# Patient Record
Sex: FEMALE | Race: WHITE | NOT HISPANIC OR LATINO | Employment: UNEMPLOYED | URBAN - NONMETROPOLITAN AREA
[De-identification: names, ages, dates, MRNs, and addresses within clinical notes are randomized per-mention and may not be internally consistent; named-entity substitution may affect disease eponyms.]

---

## 2016-12-02 LAB — HM PAP SMEAR: NORMAL

## 2017-01-03 ENCOUNTER — TELEPHONE (OUTPATIENT)
Dept: CARDIOLOGY | Facility: HOSPITAL | Age: 32
End: 2017-01-03

## 2017-01-10 ENCOUNTER — PROCEDURE VISIT (OUTPATIENT)
Dept: OBSTETRICS AND GYNECOLOGY | Facility: CLINIC | Age: 32
End: 2017-01-10

## 2017-01-10 DIAGNOSIS — R10.2 PELVIC PAIN IN FEMALE: ICD-10-CM

## 2017-01-10 NOTE — MR AVS SNAPSHOT
[facility] OBSTETRICS AND GYNECOLOGY  [phone]                    Laura Thomson   1/10/2017 10:10 AM   Procedure visit    Dept Phone:  [phone]   Encounter #:  [id number]    Provider:  ULTRASOUND ROOM SCARLETT MASON   Department:  [facility] OBSTETRICS AND GYNECOLOGY                Your Full Care Plan              Your Updated Medication List          This list is accurate as of: 1/10/17 10:37 AM.  Always use your most recent med list.                SPRINTEC 28 0.25-35 MG-MCG per tablet   Generic drug:  norgestimate-ethinyl estradiol               We Performed the Following     US Non-ob Transvaginal       You Were Diagnosed With        Codes Comments    Pelvic pain in female     ICD-10-CM: R10.2  ICD-9-CM: 625.9       Instructions     None    Patient Instructions History      Upcoming Appointments     Visit Type Date Time Department    ULTRASOUND 1/10/2017 10:10 AM [facility] SCARLETT FRASER    NEW PATIENT 1/24/2017  2:00 PM SANJAY GASTRO BRIA    FOLLOW UP 6/26/2017 11:30 AM [facility] ROEL FRASER      MyChart Signup     Our records indicate that you have declined [employer] [facility] Solxt signup. If you would like to sign up for Solxt, please email [email or web address] or call [phone] to obtain an activation code.             Other Info from Your Visit           Your Appointments     Jan 24, 2017  2:00 PM EST   New Patient with Perfecto Mcknight MD   [facility] GASTROENTEROLOGY (--)    [address]  [facility] [address] [phone]           Please bring medications or medication list to appointment along with insurance cards and a photo ID.            Jun 26, 2017 11:30 AM EDT   Follow Up with Francisco Gonzalez MD   [facility] CARDIOLOGY (--)    [address]  [facility] [address] [phone]           Arrive 15 minutes prior to appointment.              Allergies     No Known  Allergies      Vital Signs     Smoking Status                   Never Smoker           Problems and Diagnoses Noted     Pelvic pain in female

## 2017-01-12 VITALS
DIASTOLIC BLOOD PRESSURE: 60 MMHG | HEIGHT: 64 IN | BODY MASS INDEX: 28 KG/M2 | SYSTOLIC BLOOD PRESSURE: 114 MMHG | WEIGHT: 164 LBS

## 2017-01-20 ENCOUNTER — OFFICE VISIT (OUTPATIENT)
Dept: INTERNAL MEDICINE | Facility: CLINIC | Age: 32
End: 2017-01-20

## 2017-01-20 VITALS
BODY MASS INDEX: 28.04 KG/M2 | HEIGHT: 64 IN | WEIGHT: 164.25 LBS | OXYGEN SATURATION: 98 % | HEART RATE: 90 BPM | SYSTOLIC BLOOD PRESSURE: 110 MMHG | RESPIRATION RATE: 14 BRPM | DIASTOLIC BLOOD PRESSURE: 76 MMHG | TEMPERATURE: 98.4 F

## 2017-01-20 DIAGNOSIS — J02.9 ACUTE PHARYNGITIS, UNSPECIFIED ETIOLOGY: Primary | ICD-10-CM

## 2017-01-20 LAB
EXPIRATION DATE: NORMAL
EXPIRATION DATE: NORMAL
FLUAV AG NPH QL: NORMAL
FLUBV AG NPH QL: NORMAL
INTERNAL CONTROL: NORMAL
INTERNAL CONTROL: NORMAL
Lab: NORMAL
Lab: NORMAL
S PYO AG THROAT QL: NEGATIVE

## 2017-01-20 PROCEDURE — 87804 INFLUENZA ASSAY W/OPTIC: CPT | Performed by: PHYSICIAN ASSISTANT

## 2017-01-20 PROCEDURE — 99213 OFFICE O/P EST LOW 20 MIN: CPT | Performed by: PHYSICIAN ASSISTANT

## 2017-01-20 PROCEDURE — 87880 STREP A ASSAY W/OPTIC: CPT | Performed by: PHYSICIAN ASSISTANT

## 2017-01-20 RX ORDER — AMOXICILLIN 875 MG/1
875 TABLET, COATED ORAL 2 TIMES DAILY
Qty: 20 TABLET | Refills: 0 | Status: SHIPPED | OUTPATIENT
Start: 2017-01-20 | End: 2017-02-10

## 2017-01-20 RX ORDER — FLUCONAZOLE 150 MG/1
150 TABLET ORAL ONCE
Qty: 1 TABLET | Refills: 0 | Status: SHIPPED | OUTPATIENT
Start: 2017-01-20 | End: 2017-01-20

## 2017-01-20 NOTE — PROGRESS NOTES
Laura Walls Workman is a 31 y.o. female.     Subjective   History of Present Illness   Here today with concern of sore throat, rhinorrhea, cough and low grade fever since last night. Waking at night due to coughing. Denies body aches but has had a bad headache. Took ibuprofen last night which did not help.  No influenza vaccine this year.        The following portions of the patient's history were reviewed and updated as appropriate: allergies, current medications, past family history, past medical history, past social history, past surgical history and problem list.    Review of Systems    Constitutional: Negative for appetite change, chills, fatigue, fever and unexpected weight change.   HENT: sore throat, rhinorrhea.  Negative for congestion, ear pain, hearing loss, nosebleeds, postnasal drip, tinnitus and trouble swallowing.    Eyes: Negative for photophobia, discharge and visual disturbance.   Respiratory: Cough. Negative for chest tightness, shortness of breath and wheezing.    Cardiovascular: Negative for chest pain, palpitations and leg swelling.   Gastrointestinal: Negative for abdominal distention, abdominal pain, blood in stool, constipation, diarrhea, nausea and vomiting.   Endocrine: Negative for cold intolerance, heat intolerance, polydipsia, polyphagia and polyuria.   Musculoskeletal: Negative for arthralgias, back pain, joint swelling, myalgias, neck pain and neck stiffness.   Skin: Negative for color change, pallor, rash and wound.   Allergic/Immunologic: Negative for environmental allergies, food allergies and immunocompromised state.   Neurological: Headache. Negative for dizziness, tremors, seizures, weakness, numbness.  Hematological: Negative for adenopathy. Does not bruise/bleed easily.   Psychiatric/Behavioral: Negative for agitation, behavioral problems, confusion, hallucinations, self-injury and suicidal ideas. The patient is not nervous/anxious.      Objective    Physical  "Exam  Constitutional: Oriented to person, place, and time. Appears well-developed and well-nourished.   HENT: OP erythema, enlarged tonsils. Bilateral TM air/fluid levels.   Head: No sinus tenderness. Normocephalic and atraumatic.   Eyes: EOM are normal. Pupils are equal, round, and reactive to light.   Neck: Normal range of motion. Neck supple.   Cardiovascular: Normal rate, regular rhythm and normal heart sounds.    Pulmonary/Chest: Effort normal and breath sounds normal. No respiratory distress.  Has no wheezes or rales. Exhibits no chest wall tenderness.   Abdominal: Soft. Bowel sounds are normal. Exhibits no distension and no mass. There is no tenderness.   Musculoskeletal: Normal range of motion. Exhibits no tenderness.   Neurological: Alert and oriented to person, place, and time.   Skin: Skin is warm and dry.   Psychiatric: Has a normal mood and affect. Behavior is normal. Judgment and thought content normal.       Visit Vitals   • /76   • Pulse 90   • Temp 98.4 °F (36.9 °C)   • Resp 14   • Ht 63.5\" (161.3 cm)   • Wt 164 lb 4 oz (74.5 kg)   • LMP 12/16/2016   • SpO2 98%   • BMI 28.64 kg/m2       Nursing note and vitals reviewed.        Assessment/Plan   Laura was seen today for sore throat, fever and cough.    Diagnoses and all orders for this visit:    Acute pharyngitis, unspecified etiology  -     amoxicillin (AMOXIL) 875 MG tablet; Take 1 tablet by mouth 2 (Two) Times a Day.  -     fluconazole (DIFLUCAN) 150 MG tablet; Take 1 tablet by mouth 1 (One) Time for 1 dose.      Strep: negative  Influenza A&B: negative.        "

## 2017-01-20 NOTE — MR AVS SNAPSHOT
Laura Thomson   1/20/2017 4:45 PM   Office Visit    Provider:  LM Blakely   Department:  [facility] PRIMARY CARE   Dept Phone:  [phone]                Your Full Care Plan              Today's Medication Changes          These changes are accurate as of: 1/20/17  5:21 PM.  If you have any questions, ask your nurse or doctor.               New Medication(s)Ordered:     amoxicillin 875 MG tablet   Commonly known as:  AMOXIL   Take 1 tablet by mouth 2 (Two) Times a Day.       fluconazole 150 MG tablet   Commonly known as:  DIFLUCAN   Take 1 tablet by mouth 1 (One) Time for 1 dose.            Where to Get Your Medications      These medications were sent to [facility] [address] [place] [address] - [address] [place] [address] - [phone] [place] [id number] [address] [address]     Phone:  [phone]     amoxicillin 875 MG tablet    fluconazole 150 MG tablet                  Your Updated Medication List          This list is accurate as of: 1/20/17  5:21 PM.  Always use your most recent med list.                amoxicillin 875 MG tablet   Commonly known as:  AMOXIL   Take 1 tablet by mouth 2 (Two) Times a Day.       fluconazole 150 MG tablet   Commonly known as:  DIFLUCAN   Take 1 tablet by mouth 1 (One) Time for 1 dose.       SPRINTEC 28 0.25-35 MG-MCG per tablet   Generic drug:  norgestimate-ethinyl estradiol               You Were Diagnosed With        Codes Comments    Acute pharyngitis, unspecified etiology    -  Primary ICD-10-CM: J02.9  ICD-9-CM: 462       Instructions     None    Patient Instructions History      MyChart Signup     Our records indicate that you have declined [facility] MyChart signup. If you would like to sign up for [facility] Cent[facility], please email [email or web address] or call [phone] to obtain an activation code.             Other Info from Your Visit           Your Appointments     Jan 24, 2017  2:00 PM EST    "  New Patient with Perfecto Mcknight MD   [facility] GASTROENTEROLOGY (--)    [address]  [facility] [address] [phone]           Please bring medications or medication list to appointment along with insurance cards and a photo ID.            Jun 26, 2017 11:30 AM EDT   Follow Up with Francisco Gonzalez MD   [facility] CARDIOLOGY (--)    [address]  [facility] [address] [phone]           Arrive 15 minutes prior to appointment.              Allergies     No Known Allergies      Reason for Visit     Sore Throat onset yesterday    Fever last night-100-101    Cough           Vital Signs     Blood Pressure Pulse Temperature Respirations Height Weight    110/76 90 98.4 °F (36.9 °C) 14 63.5\" (161.3 cm) 164 lb 4 oz (74.5 kg)    Last Menstrual Period Oxygen Saturation Body Mass Index Smoking Status          12/16/2016 98% 28.64 kg/m2 Never Smoker        Problems and Diagnoses Noted     Acute sore throat    -  Primary      "

## 2017-01-24 ENCOUNTER — OFFICE VISIT (OUTPATIENT)
Dept: GASTROENTEROLOGY | Facility: CLINIC | Age: 32
End: 2017-01-24

## 2017-01-24 ENCOUNTER — PREP FOR SURGERY (OUTPATIENT)
Dept: GASTROENTEROLOGY | Facility: CLINIC | Age: 32
End: 2017-01-24

## 2017-01-24 VITALS
WEIGHT: 166 LBS | HEART RATE: 69 BPM | TEMPERATURE: 98.2 F | RESPIRATION RATE: 16 BRPM | HEIGHT: 64 IN | BODY MASS INDEX: 28.34 KG/M2 | DIASTOLIC BLOOD PRESSURE: 81 MMHG | SYSTOLIC BLOOD PRESSURE: 107 MMHG

## 2017-01-24 DIAGNOSIS — R19.7 DIARRHEA, UNSPECIFIED TYPE: Primary | ICD-10-CM

## 2017-01-24 DIAGNOSIS — R10.33 PERIUMBILICAL ABDOMINAL PAIN: ICD-10-CM

## 2017-01-24 DIAGNOSIS — R12 HEARTBURN: ICD-10-CM

## 2017-01-24 PROCEDURE — 99203 OFFICE O/P NEW LOW 30 MIN: CPT | Performed by: INTERNAL MEDICINE

## 2017-01-24 RX ORDER — SODIUM CHLORIDE 0.9 % (FLUSH) 0.9 %
1-10 SYRINGE (ML) INJECTION AS NEEDED
Status: CANCELLED | OUTPATIENT
Start: 2017-01-24

## 2017-01-24 RX ORDER — SODIUM CHLORIDE 9 MG/ML
70 INJECTION, SOLUTION INTRAVENOUS CONTINUOUS PRN
Status: CANCELLED | OUTPATIENT
Start: 2017-01-24

## 2017-01-24 NOTE — LETTER
"January 24, 2017     Roshan Omalley MD  [address]  [address]  [facility] [address]    Patient: Laura Thomson   YOB: 1985   Date of Visit: 1/24/2017       Dear Dr. Shahram MD:    Thank you for referring Laura Thomson to me for evaluation. Below are the relevant portions of my assessment and plan of care.    If you have questions, please do not hesitate to call me. I look forward to following Laura along with you.         Sincerely,        Perfecto Mcknight MD        CC: No Recipients  Perfecto Mcknight MD  1/24/2017  6:28 PM  Signed  [address]  [facility] [address]    (H)   (w) [phone]    Chief Complaint   Patient presents with   • Diarrhea       History of Present Illness    The patient has history of diarrhea off-and-on since age 15.  Diarrhea is rather severe.  It is generally sudden onset, frequency of bowel movements being 6-10 times a day.  The stools are described as loose and watery.  There is a nocturnal element of diarrhea. The diarrhea is associated with tenesmus.  The patient has taken over-the-counter antidiarrhea medications including Imodium perhaps 4-5 per day without improvement of her symptoms.  There is no history of significant consumption of milk or ice cream.  The patient denies taking laxatives, using magnesium based products, drinking herbal tea, senna preparations, aloe vera juice, nutritional supplements, sugar free candies or colon cleansers.  Her second cousin has Crohn's disease.  There is positive family history of colon cancer(great grandmother).  The patient had undergone a colonoscopy at age 15 by Dr. Rodriguez ) surgical service).  \"She was told to have\" irritable bowel syndrome\".    There is history of epigastric abdominal pain off and on for the last several years.  The pain is rather sudden in onset, moderate in severity and cramping in nature.  Frequency being several times a day.  The pain may last for several seconds to a minute or so.  There is " no radiation of abdominal pain.  Eating worsens the abdominal discomfort.  No definite relieving factors of abdominal pain.    The patient has a history of reflux off and on for the last several years.  The reflux used to be rather severe.  Symptoms were described as retrosternal burning sensation, and indigestion.  There was history of regurgitative symptoms.  Frequency was several times a day.  The symptoms were worse at night.  The patient takes acid suppressive therapy with some control of her symptoms.  The patient had undergone a fundoplication in 2014 with improvement of her symptoms.  There is no associated dysphagia or odynophagia.    The patient denies nausea or vomiting recently.  There is no history of reflux.  She denies dysphagia or odynophagia.There is no history of overt GI bleed (Hematemesis, melena or hematochezia).  There is no history of recent weight loss.    Review of Systems   Constitutional: Negative for appetite change, chills, fatigue, fever and unexpected weight change.   HENT: Negative for mouth sores, nosebleeds and trouble swallowing.    Eyes: Negative for discharge and redness.   Respiratory: Negative for apnea, cough and shortness of breath.    Cardiovascular: Positive for palpitations. Negative for chest pain and leg swelling.   Gastrointestinal: Positive for diarrhea. Negative for abdominal distention, abdominal pain, anal bleeding, blood in stool, constipation, nausea and vomiting.   Endocrine: Negative for cold intolerance, heat intolerance and polydipsia.   Genitourinary: Negative for dysuria, hematuria and urgency.   Musculoskeletal: Negative for arthralgias, joint swelling and myalgias.   Skin: Negative for rash.   Allergic/Immunologic: Negative for food allergies and immunocompromised state.   Neurological: Negative for dizziness, seizures, syncope and headaches.   Hematological: Negative for adenopathy. Bruises/bleeds easily.   Psychiatric/Behavioral: Negative for dysphoric  "mood. The patient is not nervous/anxious and is not hyperactive.      Patient Active Problem List   Diagnosis   • Depression   • Dysphagia   • Gastroesophageal reflux disease   • Hyperthyroidism   • Tachycardia   • Amenorrhea     Past Medical History   Diagnosis Date   • Abdominal pain, periumbilical    • Abdominal pain, RLQ (right lower quadrant)    • Acute pharyngitis    • Appetite lost    • Chest pain    • Depression    • Diarrhea    • Difficulty swallowing    • Fatigue    • GERD (gastroesophageal reflux disease)    • History of colonoscopy    • Hyperthyroidism    • Upper respiratory infection    • Vomiting      Past Surgical History   Procedure Laterality Date   • Cholecystectomy     • Dental procedure     •  section  ,,2015     X 3   • Upper gastrointestinal endoscopy     • Colonoscopy     • Stomach surgery       Family History   Problem Relation Age of Onset   • Coronary artery disease Mother    • Diabetes Mother    • Hypertension Mother    • Kidney disease Father    • Skin cancer Father    • Stroke Other    • Hypertension Other    • Diabetes Other    • Colon cancer Other      Social History   Substance Use Topics   • Smoking status: Never Smoker   • Smokeless tobacco: Never Used   • Alcohol use No       Current Outpatient Prescriptions:   •  amoxicillin (AMOXIL) 875 MG tablet, Take 1 tablet by mouth 2 (Two) Times a Day., Disp: 20 tablet, Rfl: 0  •  norgestimate-ethinyl estradiol (SPRINTEC 28) 0.25-35 MG-MCG per tablet, Take 1 package by mouth Daily., Disp: , Rfl:   No Known Allergies  Visit Vitals   • /81   • Pulse 69   • Temp 98.2 °F (36.8 °C)   • Resp 16   • Ht 63.5\" (161.3 cm)   • Wt 166 lb (75.3 kg)   • LMP 2017   • BMI 28.94 kg/m2     Physical Exam   Constitutional: She is oriented to person, place, and time. She appears well-developed and well-nourished. No distress.   HENT:   Head: Normocephalic and atraumatic.   Right Ear: Hearing and external ear normal.   Left " Ear: Hearing and external ear normal.   Nose: Nose normal.   Mouth/Throat: Oropharynx is clear and moist and mucous membranes are normal. Mucous membranes are not pale, not dry and not cyanotic. No oral lesions. No oropharyngeal exudate.   Eyes: Conjunctivae and EOM are normal. Right eye exhibits no discharge. Left eye exhibits no discharge. No scleral icterus.   Neck: Trachea normal. Neck supple. No JVD present. No edema present. No thyroid mass and no thyromegaly present.   Cardiovascular: Normal rate, regular rhythm, S2 normal and normal heart sounds.  Exam reveals no gallop, no S3 and no friction rub.    No murmur heard.  Pulmonary/Chest: Effort normal and breath sounds normal. No respiratory distress. She has no wheezes. She has no rales. She exhibits no tenderness.   Abdominal: Soft. Normal appearance and bowel sounds are normal. She exhibits no distension, no ascites and no mass. There is no splenomegaly or hepatomegaly. There is tenderness in the right upper quadrant. There is no rigidity, no rebound and no guarding. No hernia.   Musculoskeletal: She exhibits no tenderness or deformity.       Vascular Status -  Her exam exhibits no right foot edema. Her exam exhibits no left foot edema.  Lymphadenopathy:     She has no cervical adenopathy.        Left: No supraclavicular adenopathy present.   Neurological: She is alert and oriented to person, place, and time. She has normal strength. No cranial nerve deficit or sensory deficit. She exhibits normal muscle tone. Coordination normal.   Skin: No rash noted. She is not diaphoretic. No cyanosis. No pallor. Nails show no clubbing.   Psychiatric: She has a normal mood and affect. Her behavior is normal. Judgment and thought content normal.   Nursing note and vitals reviewed.    Laboratory Tests:    Upon review of medical records:    Dated August 15, 2015 WBC 11.3 hemoglobin 10.8 hematocrit 33 MCV 89.3 and platelet count 213.  Dated October 30, 2015 WBC 13.1  hemoglobin 10.0 hematocrit 31 MCV 82.6 and platelet count 194.    Dated July 22, 2016 sodium 144 potassium 3.8 chloride 104 CO2 26 BUN 10 serum creatinine 0.7 glucose 85.  Calcium 9.1.  Albumin 4.3T bili 0.6 AST 21 ALT 21 alkaline phosphatase 60.  Total cholesterol 190.  Triglycerides 131.  TSH 0.43.  Free T4 level 0.85.  W BC 8.0 hemoglobin 12.2 hematocrit 39 MCV 84.3 and platelet count 258.    Dated December 15, 2016 sodium 143 potassium 3.7 chloride 105 CO2 27 BUN 11 serum creatinine 0.7 glucose 94.  Calcium 9.3.  Albumin 4.14 bili 0.4 AST 19 ALT 32 alkaline phosphatase 65.  TSH 1.15.      Abdominal Imaging:  Upon Review of medical records:    Dated  January 17, 2014 patient underwent a esophagram/upper GI series which revealed patient ingested barium without difficulty.  Esophageal peristalsis is normal.  No mucosal abnormality or strictures noted within the esophagus.  No gastroesophageal reflux was demonstrated during the examination although the patient could not tolerate adequate barium per significant gastric filling to adequately test for reflux.  No hiatal hernia.  Stomach was well field for evaluation of the stomach.  No mass, ulceration, or filling defect in the stomach.  Pyloric channel was normal.  Duodenal bulb and remainder of the duodenum appears unremarkable without evidence of mass or ulceration.    Assessment and Plan:      Laura was seen today for diarrhea.    Diagnoses and all orders for this visit:    Diarrhea, unspecified type  Comments:  Recurrent long-standing diarrhea.  Possible differential includes underlying inflammatory bowel disease.  Orders:  -     Tissue Transglutaminase, IgA  -     IgA  -     C-reactive Protein    Periumbilical abdominal pain  Comments:  Possibly related to underlying ileitis.    Heartburn  Comments:  Improved after fundoplication.        Discussion:       Plan     Patient Instructions     1.  ChecktTG IgA, IgA quantitative, and C-reactive protein inflammatory  marker.  2.  Colonoscopy: Description of the procedure, risks benefits alternatives and options including non-operative options were discussed with the patient in detail.  The patient understands and wishes to proceed.  3.  Discussed with the patient in detail.  Opportunity was given to ask questions.        Patient Care Team:  Roshan Omalley MD as PCP - General    Perfecto Mcknight MD

## 2017-01-24 NOTE — MR AVS SNAPSHOT
Laura Thomson   1/24/2017 2:00 PM   Office Visit    Dept Phone:  [phone]   Encounter #:  [id number]    Provider:  Perfecto Mcknight MD   Department:  [facility] GASTROENTEROLOGY                Your Full Care Plan              Your Updated Medication List          This list is accurate as of: 1/24/17  3:24 PM.  Always use your most recent med list.                amoxicillin 875 MG tablet   Commonly known as:  AMOXIL   Take 1 tablet by mouth 2 (Two) Times a Day.       SPRINTEC 28 0.25-35 MG-MCG per tablet   Generic drug:  norgestimate-ethinyl estradiol               We Performed the Following     C-reactive Protein     IgA     Tissue Transglutaminase, IgA       You Were Diagnosed With        Codes Comments    Diarrhea, unspecified type    -  Primary ICD-10-CM: R19.7  ICD-9-CM: 787.91       Instructions    1.  ChecktTG IgA, and IgA quantitative, as well as C-reactive protein inflammatory marker.  2.  Colonoscopy: Description of the procedure, risks benefits alternatives and options including non-operative options were discussed with the patient in detail.  The patient understands and wishes to proceed.       Patient Instructions History      Upcoming Appointments     Visit Type Date Time Department    NEW PATIENT 1/24/2017  2:00 PM [facility] GASTRO FRASER    FOLLOW UP 6/26/2017 11:30 AM M[facility] ROEL CARD BRIA      MyChart Signup     Our records indicate that you have declined [facility] Intern[facility] signup. If you would like to sign up for [employer], please email [email or web address] or call [phone] to obtain an activation code.             Other Info from Your Visit           Your Appointments     Jun 26, 2017 11:30 AM EDT   Follow Up with Francisco Gonzalez MD   [facility] CARDIOLOGY (--)    [address]  [facility] [address] [phone]           Arrive 15 minutes prior to appointment.              Allergies     No  "Known Allergies      Reason for Visit     Diarrhea           Vital Signs     Blood Pressure Pulse Temperature Respirations Height Weight    107/81 69 98.2 °F (36.8 °C) 16 63.5\" (161.3 cm) 166 lb (75.3 kg)    Last Menstrual Period Body Mass Index Smoking Status             01/11/2017 28.94 kg/m2 Never Smoker         Problems and Diagnoses Noted     Diarrhea    -  Primary        "

## 2017-01-24 NOTE — PATIENT INSTRUCTIONS
1.  ChecktTG IgA, IgA quantitative, and C-reactive protein inflammatory marker.  2.  Colonoscopy: Description of the procedure, risks benefits alternatives and options including non-operative options were discussed with the patient in detail.  The patient understands and wishes to proceed.  3.  Discussed with the patient in detail.  Opportunity was given to ask questions.

## 2017-01-24 NOTE — PROGRESS NOTES
"[address] [place] [address]    (H)   (w) [phone]    Chief Complaint   Patient presents with   • Diarrhea       History of Present Illness    The patient has history of diarrhea off-and-on since age 15.  Diarrhea is rather severe.  It is generally sudden onset, frequency of bowel movements being 6-10 times a day.  The stools are described as loose and watery.  There is a nocturnal element of diarrhea. The diarrhea is associated with tenesmus.  The patient has taken over-the-counter antidiarrhea medications including Imodium perhaps 4-5 per day without improvement of her symptoms.  There is no history of significant consumption of milk or ice cream.  The patient denies taking laxatives, using magnesium based products, drinking herbal tea, senna preparations, aloe vera juice, nutritional supplements, sugar free candies or colon cleansers.  Her second cousin has Crohn's disease.  There is positive family history of colon cancer(great grandmother).  The patient had undergone a colonoscopy at age 15 by Dr. Rodriguez ) surgical service).  \"She was told to have\" irritable bowel syndrome\".    There is history of epigastric abdominal pain off and on for the last several years.  The pain is rather sudden in onset, moderate in severity and cramping in nature.  Frequency being several times a day.  The pain may last for several seconds to a minute or so.  There is no radiation of abdominal pain.  Eating worsens the abdominal discomfort.  No definite relieving factors of abdominal pain.    The patient has a history of reflux off and on for the last several years.  The reflux used to be rather severe.  Symptoms were described as retrosternal burning sensation, and indigestion.  There was history of regurgitative symptoms.  Frequency was several times a day.  The symptoms were worse at night.  The patient takes acid suppressive therapy with some control of her symptoms.  The patient had undergone a fundoplication in " 2014 with improvement of her symptoms.  There is no associated dysphagia or odynophagia.    The patient denies nausea or vomiting recently.  There is no history of reflux.  She denies dysphagia or odynophagia.There is no history of overt GI bleed (Hematemesis, melena or hematochezia).  There is no history of recent weight loss.    Review of Systems   Constitutional: Negative for appetite change, chills, fatigue, fever and unexpected weight change.   HENT: Negative for mouth sores, nosebleeds and trouble swallowing.    Eyes: Negative for discharge and redness.   Respiratory: Negative for apnea, cough and shortness of breath.    Cardiovascular: Positive for palpitations. Negative for chest pain and leg swelling.   Gastrointestinal: Positive for diarrhea. Negative for abdominal distention, abdominal pain, anal bleeding, blood in stool, constipation, nausea and vomiting.   Endocrine: Negative for cold intolerance, heat intolerance and polydipsia.   Genitourinary: Negative for dysuria, hematuria and urgency.   Musculoskeletal: Negative for arthralgias, joint swelling and myalgias.   Skin: Negative for rash.   Allergic/Immunologic: Negative for food allergies and immunocompromised state.   Neurological: Negative for dizziness, seizures, syncope and headaches.   Hematological: Negative for adenopathy. Bruises/bleeds easily.   Psychiatric/Behavioral: Negative for dysphoric mood. The patient is not nervous/anxious and is not hyperactive.      Patient Active Problem List   Diagnosis   • Depression   • Dysphagia   • Gastroesophageal reflux disease   • Hyperthyroidism   • Tachycardia   • Amenorrhea     Past Medical History   Diagnosis Date   • Abdominal pain, periumbilical    • Abdominal pain, RLQ (right lower quadrant)    • Acute pharyngitis    • Appetite lost    • Chest pain    • Depression    • Diarrhea    • Difficulty swallowing    • Fatigue    • GERD (gastroesophageal reflux disease)    • History of colonoscopy    •  "Hyperthyroidism    • Upper respiratory infection    • Vomiting      Past Surgical History   Procedure Laterality Date   • Cholecystectomy     • Dental procedure     •  section  ,,2015     X 3   • Upper gastrointestinal endoscopy     • Colonoscopy     • Stomach surgery       Family History   Problem Relation Age of Onset   • Coronary artery disease Mother    • Diabetes Mother    • Hypertension Mother    • Kidney disease Father    • Skin cancer Father    • Stroke Other    • Hypertension Other    • Diabetes Other    • Colon cancer Other      Social History   Substance Use Topics   • Smoking status: Never Smoker   • Smokeless tobacco: Never Used   • Alcohol use No       Current Outpatient Prescriptions:   •  amoxicillin (AMOXIL) 875 MG tablet, Take 1 tablet by mouth 2 (Two) Times a Day., Disp: 20 tablet, Rfl: 0  •  norgestimate-ethinyl estradiol (SPRINTEC 28) 0.25-35 MG-MCG per tablet, Take 1 package by mouth Daily., Disp: , Rfl:   No Known Allergies  Visit Vitals   • /81   • Pulse 69   • Temp 98.2 °F (36.8 °C)   • Resp 16   • Ht 63.5\" (161.3 cm)   • Wt 166 lb (75.3 kg)   • LMP 2017   • BMI 28.94 kg/m2     Physical Exam   Constitutional: She is oriented to person, place, and time. She appears well-developed and well-nourished. No distress.   HENT:   Head: Normocephalic and atraumatic.   Right Ear: Hearing and external ear normal.   Left Ear: Hearing and external ear normal.   Nose: Nose normal.   Mouth/Throat: Oropharynx is clear and moist and mucous membranes are normal. Mucous membranes are not pale, not dry and not cyanotic. No oral lesions. No oropharyngeal exudate.   Eyes: Conjunctivae and EOM are normal. Right eye exhibits no discharge. Left eye exhibits no discharge. No scleral icterus.   Neck: Trachea normal. Neck supple. No JVD present. No edema present. No thyroid mass and no thyromegaly present.   Cardiovascular: Normal rate, regular rhythm, S2 normal and normal heart " sounds.  Exam reveals no gallop, no S3 and no friction rub.    No murmur heard.  Pulmonary/Chest: Effort normal and breath sounds normal. No respiratory distress. She has no wheezes. She has no rales. She exhibits no tenderness.   Abdominal: Soft. Normal appearance and bowel sounds are normal. She exhibits no distension, no ascites and no mass. There is no splenomegaly or hepatomegaly. There is tenderness in the right upper quadrant. There is no rigidity, no rebound and no guarding. No hernia.   Musculoskeletal: She exhibits no tenderness or deformity.       Vascular Status -  Her exam exhibits no right foot edema. Her exam exhibits no left foot edema.  Lymphadenopathy:     She has no cervical adenopathy.        Left: No supraclavicular adenopathy present.   Neurological: She is alert and oriented to person, place, and time. She has normal strength. No cranial nerve deficit or sensory deficit. She exhibits normal muscle tone. Coordination normal.   Skin: No rash noted. She is not diaphoretic. No cyanosis. No pallor. Nails show no clubbing.   Psychiatric: She has a normal mood and affect. Her behavior is normal. Judgment and thought content normal.   Nursing note and vitals reviewed.    Laboratory Tests:    Upon review of medical records:    Dated August 15, 2015 WBC 11.3 hemoglobin 10.8 hematocrit 33 MCV 89.3 and platelet count 213.  Dated October 30, 2015 WBC 13.1 hemoglobin 10.0 hematocrit 31 MCV 82.6 and platelet count 194.    Dated July 22, 2016 sodium 144 potassium 3.8 chloride 104 CO2 26 BUN 10 serum creatinine 0.7 glucose 85.  Calcium 9.1.  Albumin 4.3T bili 0.6 AST 21 ALT 21 alkaline phosphatase 60.  Total cholesterol 190.  Triglycerides 131.  TSH 0.43.  Free T4 level 0.85.  W BC 8.0 hemoglobin 12.2 hematocrit 39 MCV 84.3 and platelet count 258.    Dated December 15, 2016 sodium 143 potassium 3.7 chloride 105 CO2 27 BUN 11 serum creatinine 0.7 glucose 94.  Calcium 9.3.  Albumin 4.14 bili 0.4 AST 19 ALT 32  alkaline phosphatase 65.  TSH 1.15.      Abdominal Imaging:  Upon Review of medical records:    Dated  January 17, 2014 patient underwent a esophagram/upper GI series which revealed patient ingested barium without difficulty.  Esophageal peristalsis is normal.  No mucosal abnormality or strictures noted within the esophagus.  No gastroesophageal reflux was demonstrated during the examination although the patient could not tolerate adequate barium per significant gastric filling to adequately test for reflux.  No hiatal hernia.  Stomach was well field for evaluation of the stomach.  No mass, ulceration, or filling defect in the stomach.  Pyloric channel was normal.  Duodenal bulb and remainder of the duodenum appears unremarkable without evidence of mass or ulceration.    Assessment and Plan:      Laura was seen today for diarrhea.    Diagnoses and all orders for this visit:    Diarrhea, unspecified type  Comments:  Recurrent long-standing diarrhea.  Possible differential includes underlying inflammatory bowel disease.  Orders:  -     Tissue Transglutaminase, IgA  -     IgA  -     C-reactive Protein    Periumbilical abdominal pain  Comments:  Possibly related to underlying ileitis.    Heartburn  Comments:  Improved after fundoplication.        Discussion:       Plan     Patient Instructions     1.  ChecktTG IgA, IgA quantitative, and C-reactive protein inflammatory marker.  2.  Colonoscopy: Description of the procedure, risks benefits alternatives and options including non-operative options were discussed with the patient in detail.  The patient understands and wishes to proceed.  3.  Discussed with the patient in detail.  Opportunity was given to ask questions.        Patient Care Team:  Roshan Omalley MD as PCP - General    Perfecto cMknight MD

## 2017-02-17 ENCOUNTER — ANESTHESIA (OUTPATIENT)
Dept: GASTROENTEROLOGY | Facility: HOSPITAL | Age: 32
End: 2017-02-17

## 2017-02-17 ENCOUNTER — HOSPITAL ENCOUNTER (OUTPATIENT)
Facility: HOSPITAL | Age: 32
Setting detail: HOSPITAL OUTPATIENT SURGERY
Discharge: HOME | End: 2017-02-17
Attending: INTERNAL MEDICINE | Admitting: INTERNAL MEDICINE

## 2017-02-17 ENCOUNTER — ANESTHESIA EVENT (OUTPATIENT)
Dept: GASTROENTEROLOGY | Facility: HOSPITAL | Age: 32
End: 2017-02-17

## 2017-02-17 VITALS
BODY MASS INDEX: 27.66 KG/M2 | SYSTOLIC BLOOD PRESSURE: 124 MMHG | TEMPERATURE: 97.3 F | HEART RATE: 76 BPM | DIASTOLIC BLOOD PRESSURE: 70 MMHG | OXYGEN SATURATION: 96 % | HEIGHT: 64 IN | RESPIRATION RATE: 18 BRPM | WEIGHT: 162 LBS

## 2017-02-17 DIAGNOSIS — R19.7 DIARRHEA, UNSPECIFIED TYPE: ICD-10-CM

## 2017-02-17 DIAGNOSIS — R10.33 PERIUMBILICAL ABDOMINAL PAIN: ICD-10-CM

## 2017-02-17 LAB — B-HCG UR QL: NEGATIVE

## 2017-02-17 PROCEDURE — 81025 URINE PREGNANCY TEST: CPT | Performed by: INTERNAL MEDICINE

## 2017-02-17 PROCEDURE — 25010000002 PROPOFOL 200 MG/20ML EMULSION: Performed by: NURSE ANESTHETIST, CERTIFIED REGISTERED

## 2017-02-17 PROCEDURE — 45380 COLONOSCOPY AND BIOPSY: CPT | Performed by: INTERNAL MEDICINE

## 2017-02-17 DEVICE — DEV CLIP GI QUICKCLIPPRO FIX ROT 2300MM: Type: IMPLANTABLE DEVICE | Status: FUNCTIONAL

## 2017-02-17 RX ORDER — SODIUM CHLORIDE 0.9 % (FLUSH) 0.9 %
1-10 SYRINGE (ML) INJECTION AS NEEDED
Status: DISCONTINUED | OUTPATIENT
Start: 2017-02-17 | End: 2017-02-17 | Stop reason: HOSPADM

## 2017-02-17 RX ORDER — GLYCOPYRROLATE 2 MG/1
2 TABLET ORAL DAILY
Qty: 30 TABLET | Refills: 1 | Status: SHIPPED | OUTPATIENT
Start: 2017-02-17 | End: 2017-11-15

## 2017-02-17 RX ORDER — SODIUM CHLORIDE 9 MG/ML
70 INJECTION, SOLUTION INTRAVENOUS CONTINUOUS PRN
Status: DISCONTINUED | OUTPATIENT
Start: 2017-02-17 | End: 2017-02-17 | Stop reason: HOSPADM

## 2017-02-17 RX ORDER — PROPOFOL 10 MG/ML
INJECTION, EMULSION INTRAVENOUS AS NEEDED
Status: DISCONTINUED | OUTPATIENT
Start: 2017-02-17 | End: 2017-02-17 | Stop reason: SURG

## 2017-02-17 RX ADMIN — SODIUM CHLORIDE 70 ML/HR: 9 INJECTION, SOLUTION INTRAVENOUS at 09:33

## 2017-02-17 RX ADMIN — LIDOCAINE HYDROCHLORIDE 100 MG: 20 INJECTION, SOLUTION INTRAVENOUS at 10:25

## 2017-02-17 RX ADMIN — PROPOFOL 300 MG: 10 INJECTION, EMULSION INTRAVENOUS at 10:25

## 2017-02-17 RX ADMIN — SODIUM CHLORIDE: 9 INJECTION, SOLUTION INTRAVENOUS at 10:25

## 2017-02-17 NOTE — ANESTHESIA PREPROCEDURE EVALUATION
Anesthesia Evaluation     Patient summary reviewed and Nursing notes reviewed   history of anesthetic complications ( hx of tolerance on induction): prolonged sedation     Airway   no difficulty expected  Dental - normal exam     Pulmonary    (+) recent URI resolved, decreased breath sounds,   Cardiovascular - normal exam  Exercise tolerance: good (4-7 METS)    (+) dysrhythmias (hx  neg holter) Tachycardia,       Neuro/Psych  (+) psychiatric history Depression,    GI/Hepatic/Renal/Endo    (+)  GERD, hyperthyroidism    Musculoskeletal     (+) arthralgias,   Abdominal    Substance History      OB/GYN          Other   (+) arthritis                               Anesthesia Plan    ASA 2     MAC     intravenous induction   Anesthetic plan and risks discussed with patient.

## 2017-02-17 NOTE — OP NOTE
PROCEDURE:  Colonoscopy to the terminal ileum with one cold biopsy polypectomy, and biopsies as well as placement of a 1 quick clip as hemostatic intervention.     DATE OF PROCEDURE:  February 17, 2017.    REFERRING PROVIDER:  Vitkor Pope M.D.     INSTRUMENT USED:   Olympus PCF H 190 DL Videocolonoscope.     INDICATIONS OF THE PROCEDURE:   This is a 31-year-old white female with history of recurrent long-standing diarrhea.  The patient has history of periumbilical abdominal pain.  Currently the colonoscopy for further evaluation.     BIOPSIES:  Biopsies were obtained from the terminal ileum, and redundant.  The colon including rectum.  A cold biopsy polypectomy was performed in the rectum.      PREPARATION:  Amelia prep score: 8 (3+2+3).     PHOTOGRAPHS:  Photographs were included in the medical records.     MEDICATIONS:  MAC.       CONSENT/PREPROCEDURE EVALUATION:  Risks, benefits, alternatives and options of the procedure including risks of sedation/anesthesia were discussed with the patient and informed consent was obtained prior to the procedure.  History and physical examination were performed and nothing precluded the test.      REPORT:  The patient was placed in left lateral decubitus position and a digital examination was performed.  Once under the influence of IV sedation, the instrument was inserted into the rectum and advanced under direct vision to cecum which was identified by the ileocecal valve, triradiate folds and appendiceal orifice. The scope was then maneuvered into the terminal ileum.        FINDINGS:      Digital rectal examination:  Good anal tone.  No perianal pathology.  No mass.        Terminal ileum:  5 - 6 cm. Early diverticular change was noted.  Mucosa appeared to be somewhat flat.  Biopsies were obtained.       Cecum and ascending colon: Focal erythema was noted in the ascending colon.  This was biopsied..  This area had a tendency to ooze.  This site was secured by placement of a  quick clip.       Hepatic flexure, transverse colon, splenic flexure:  Normal.         Descending colon, sigmoid colon and rectum:  Scant early diverticular change was noted in the left colon.  A 2-3 mm diminutive polyp was noted in the rectum.  This was removed with cold biopsy forceps.  Random biopsies were obtained from the colon including rectum.  A retroflex examination within the rectum revealed internal hemorrhoids.        The scope was then straightened, the lower GI tract was decompressed, and the scope was pulled out of the patient.  The patient tolerated the procedure well.  There were no immediate complications and the patient was transferred in stable condition for post procedure observation.       TECHNICAL DATA: Graham prep score: 8 (3+2+3).     Difficulty of examination:  Average.   Withdrawal time: 8 min.  Retroflex examination in right colon: Yes.  Second look Rectum to cecum with decompression: Yes.    DIAGNOSES:    Scant early diverticular change in the left colon and possibly in terminal ileum.  Small diminutive polyp in the rectum.  Internal hemorrhoids.  No endoscopic evidence of colitis was seen.  Random biopsies were obtained from the colon upon withdrawal of the scope.      RECOMMENDATIONS:     1.  Follow biopsies.    2.  Follow-up: 3-4 weeks.     3.  Followup colonoscopy: Possibly in 5 years depending on the biopsy results.      4.  Dietary instructions.     Thank you very much for letting me participate in the care of this patient. Please do not hesitate to call me if you have any questions.

## 2017-02-17 NOTE — PLAN OF CARE
Problem: GI Endoscopy (Adult)  Goal: Signs and Symptoms of Listed Potential Problems Will be Absent or Manageable (GI Endoscopy)  Outcome: Ongoing (interventions implemented as appropriate)    02/17/17 1503   GI Endoscopy   Problems Assessed (GI Endoscopy) all   Problems Present (GI Endoscopy) none

## 2017-02-17 NOTE — ANESTHESIA POSTPROCEDURE EVALUATION
Patient: Laura Thomson    Procedure Summary     Date Anesthesia Start Anesthesia Stop Room / Location    02/17/17 1024 1058 [facility] ENDOSCOPY 2 / [facility] ENDOSCOPY       Procedure Diagnosis Surgeon Provider    COLONOSCOPY WITH COLD BIOPSY POLYPECTOMY; BIOPSIES , QUICK CLIP (N/A Anus) Periumbilical abdominal pain; Diverticulosis of colon; Internal hemorrhoids; Colon polyp  (Periumbilical abdominal pain [R10.33]; Diarrhea, unspecified type [R19.7]) MD James Martell CRNA          Anesthesia Type: MAC  Last vitals  BP      Temp      Pulse     Resp      SpO2        Post Anesthesia Care and Evaluation    Patient location during evaluation: PACU  Patient participation: complete - patient participated  Level of consciousness: awake and alert  Pain management: adequate  Airway patency: patent  Anesthetic complications: No anesthetic complications  PONV Status: none  Cardiovascular status: acceptable  Respiratory status: acceptable  Hydration status: acceptable

## 2017-02-17 NOTE — H&P (VIEW-ONLY)
"[address] [place] [id number]    (H)   (w) [phone]    Chief Complaint   Patient presents with   • Diarrhea       History of Present Illness    The patient has history of diarrhea off-and-on since age 15.  Diarrhea is rather severe.  It is generally sudden onset, frequency of bowel movements being 6-10 times a day.  The stools are described as loose and watery.  There is a nocturnal element of diarrhea. The diarrhea is associated with tenesmus.  The patient has taken over-the-counter antidiarrhea medications including Imodium perhaps 4-5 per day without improvement of her symptoms.  There is no history of significant consumption of milk or ice cream.  The patient denies taking laxatives, using magnesium based products, drinking herbal tea, senna preparations, aloe vera juice, nutritional supplements, sugar free candies or colon cleansers.  Her second cousin has Crohn's disease.  There is positive family history of colon cancer(great grandmother).  The patient had undergone a colonoscopy at age 15 by Dr. Rodriguez ) surgical service).  \"She was told to have\" irritable bowel syndrome\".    There is history of epigastric abdominal pain off and on for the last several years.  The pain is rather sudden in onset, moderate in severity and cramping in nature.  Frequency being several times a day.  The pain may last for several seconds to a minute or so.  There is no radiation of abdominal pain.  Eating worsens the abdominal discomfort.  No definite relieving factors of abdominal pain.    The patient has a history of reflux off and on for the last several years.  The reflux used to be rather severe.  Symptoms were described as retrosternal burning sensation, and indigestion.  There was history of regurgitative symptoms.  Frequency was several times a day.  The symptoms were worse at night.  The patient takes acid suppressive therapy with some control of her symptoms.  The patient had undergone a fundoplication in " 2014 with improvement of her symptoms.  There is no associated dysphagia or odynophagia.    The patient denies nausea or vomiting recently.  There is no history of reflux.  She denies dysphagia or odynophagia.There is no history of overt GI bleed (Hematemesis, melena or hematochezia).  There is no history of recent weight loss.    Review of Systems   Constitutional: Negative for appetite change, chills, fatigue, fever and unexpected weight change.   HENT: Negative for mouth sores, nosebleeds and trouble swallowing.    Eyes: Negative for discharge and redness.   Respiratory: Negative for apnea, cough and shortness of breath.    Cardiovascular: Positive for palpitations. Negative for chest pain and leg swelling.   Gastrointestinal: Positive for diarrhea. Negative for abdominal distention, abdominal pain, anal bleeding, blood in stool, constipation, nausea and vomiting.   Endocrine: Negative for cold intolerance, heat intolerance and polydipsia.   Genitourinary: Negative for dysuria, hematuria and urgency.   Musculoskeletal: Negative for arthralgias, joint swelling and myalgias.   Skin: Negative for rash.   Allergic/Immunologic: Negative for food allergies and immunocompromised state.   Neurological: Negative for dizziness, seizures, syncope and headaches.   Hematological: Negative for adenopathy. Bruises/bleeds easily.   Psychiatric/Behavioral: Negative for dysphoric mood. The patient is not nervous/anxious and is not hyperactive.      Patient Active Problem List   Diagnosis   • Depression   • Dysphagia   • Gastroesophageal reflux disease   • Hyperthyroidism   • Tachycardia   • Amenorrhea     Past Medical History   Diagnosis Date   • Abdominal pain, periumbilical    • Abdominal pain, RLQ (right lower quadrant)    • Acute pharyngitis    • Appetite lost    • Chest pain    • Depression    • Diarrhea    • Difficulty swallowing    • Fatigue    • GERD (gastroesophageal reflux disease)    • History of colonoscopy    •  "Hyperthyroidism    • Upper respiratory infection    • Vomiting      Past Surgical History   Procedure Laterality Date   • Cholecystectomy     • Dental procedure     •  section  ,,2015     X 3   • Upper gastrointestinal endoscopy     • Colonoscopy     • Stomach surgery       Family History   Problem Relation Age of Onset   • Coronary artery disease Mother    • Diabetes Mother    • Hypertension Mother    • Kidney disease Father    • Skin cancer Father    • Stroke Other    • Hypertension Other    • Diabetes Other    • Colon cancer Other      Social History   Substance Use Topics   • Smoking status: Never Smoker   • Smokeless tobacco: Never Used   • Alcohol use No       Current Outpatient Prescriptions:   •  amoxicillin (AMOXIL) 875 MG tablet, Take 1 tablet by mouth 2 (Two) Times a Day., Disp: 20 tablet, Rfl: 0  •  norgestimate-ethinyl estradiol (SPRINTEC 28) 0.25-35 MG-MCG per tablet, Take 1 package by mouth Daily., Disp: , Rfl:   No Known Allergies  Visit Vitals   • /81   • Pulse 69   • Temp 98.2 °F (36.8 °C)   • Resp 16   • Ht 63.5\" (161.3 cm)   • Wt 166 lb (75.3 kg)   • LMP 2017   • BMI 28.94 kg/m2     Physical Exam   Constitutional: She is oriented to person, place, and time. She appears well-developed and well-nourished. No distress.   HENT:   Head: Normocephalic and atraumatic.   Right Ear: Hearing and external ear normal.   Left Ear: Hearing and external ear normal.   Nose: Nose normal.   Mouth/Throat: Oropharynx is clear and moist and mucous membranes are normal. Mucous membranes are not pale, not dry and not cyanotic. No oral lesions. No oropharyngeal exudate.   Eyes: Conjunctivae and EOM are normal. Right eye exhibits no discharge. Left eye exhibits no discharge. No scleral icterus.   Neck: Trachea normal. Neck supple. No JVD present. No edema present. No thyroid mass and no thyromegaly present.   Cardiovascular: Normal rate, regular rhythm, S2 normal and normal heart " sounds.  Exam reveals no gallop, no S3 and no friction rub.    No murmur heard.  Pulmonary/Chest: Effort normal and breath sounds normal. No respiratory distress. She has no wheezes. She has no rales. She exhibits no tenderness.   Abdominal: Soft. Normal appearance and bowel sounds are normal. She exhibits no distension, no ascites and no mass. There is no splenomegaly or hepatomegaly. There is tenderness in the right upper quadrant. There is no rigidity, no rebound and no guarding. No hernia.   Musculoskeletal: She exhibits no tenderness or deformity.       Vascular Status -  Her exam exhibits no right foot edema. Her exam exhibits no left foot edema.  Lymphadenopathy:     She has no cervical adenopathy.        Left: No supraclavicular adenopathy present.   Neurological: She is alert and oriented to person, place, and time. She has normal strength. No cranial nerve deficit or sensory deficit. She exhibits normal muscle tone. Coordination normal.   Skin: No rash noted. She is not diaphoretic. No cyanosis. No pallor. Nails show no clubbing.   Psychiatric: She has a normal mood and affect. Her behavior is normal. Judgment and thought content normal.   Nursing note and vitals reviewed.    Laboratory Tests:    Upon review of medical records:    Dated August 15, 2015 WBC 11.3 hemoglobin 10.8 hematocrit 33 MCV 89.3 and platelet count 213.  Dated October 30, 2015 WBC 13.1 hemoglobin 10.0 hematocrit 31 MCV 82.6 and platelet count 194.    Dated July 22, 2016 sodium 144 potassium 3.8 chloride 104 CO2 26 BUN 10 serum creatinine 0.7 glucose 85.  Calcium 9.1.  Albumin 4.3T bili 0.6 AST 21 ALT 21 alkaline phosphatase 60.  Total cholesterol 190.  Triglycerides 131.  TSH 0.43.  Free T4 level 0.85.  W BC 8.0 hemoglobin 12.2 hematocrit 39 MCV 84.3 and platelet count 258.    Dated December 15, 2016 sodium 143 potassium 3.7 chloride 105 CO2 27 BUN 11 serum creatinine 0.7 glucose 94.  Calcium 9.3.  Albumin 4.14 bili 0.4 AST 19 ALT 32  alkaline phosphatase 65.  TSH 1.15.      Abdominal Imaging:  Upon Review of medical records:    Dated  January 17, 2014 patient underwent a esophagram/upper GI series which revealed patient ingested barium without difficulty.  Esophageal peristalsis is normal.  No mucosal abnormality or strictures noted within the esophagus.  No gastroesophageal reflux was demonstrated during the examination although the patient could not tolerate adequate barium per significant gastric filling to adequately test for reflux.  No hiatal hernia.  Stomach was well field for evaluation of the stomach.  No mass, ulceration, or filling defect in the stomach.  Pyloric channel was normal.  Duodenal bulb and remainder of the duodenum appears unremarkable without evidence of mass or ulceration.    Assessment and Plan:      Laura was seen today for diarrhea.    Diagnoses and all orders for this visit:    Diarrhea, unspecified type  Comments:  Recurrent long-standing diarrhea.  Possible differential includes underlying inflammatory bowel disease.  Orders:  -     Tissue Transglutaminase, IgA  -     IgA  -     C-reactive Protein    Periumbilical abdominal pain  Comments:  Possibly related to underlying ileitis.    Heartburn  Comments:  Improved after fundoplication.        Discussion:       Plan     Patient Instructions     1.  ChecktTG IgA, IgA quantitative, and C-reactive protein inflammatory marker.  2.  Colonoscopy: Description of the procedure, risks benefits alternatives and options including non-operative options were discussed with the patient in detail.  The patient understands and wishes to proceed.  3.  Discussed with the patient in detail.  Opportunity was given to ask questions.        Patient Care Team:  Roshan Omalley MD as PCP - General    Perfecto Mcknight MD

## 2017-02-21 LAB
LAB AP CASE REPORT: NORMAL
Lab: NORMAL
PATH REPORT.FINAL DX SPEC: NORMAL

## 2017-02-24 ENCOUNTER — TELEPHONE (OUTPATIENT)
Dept: GASTROENTEROLOGY | Facility: CLINIC | Age: 32
End: 2017-02-24

## 2017-02-24 NOTE — TELEPHONE ENCOUNTER
Patient called to say that she has had some back pain since having her colonoscopy.  Advised her to call her PCP, due to back pain can be caused by many different things including, UTI/Kidneys, muscle/bone pain.  She is to call PCP to see what they recommend.

## 2017-03-13 ENCOUNTER — OFFICE VISIT (OUTPATIENT)
Dept: GASTROENTEROLOGY | Facility: CLINIC | Age: 32
End: 2017-03-13

## 2017-03-13 VITALS
WEIGHT: 169 LBS | DIASTOLIC BLOOD PRESSURE: 60 MMHG | RESPIRATION RATE: 15 BRPM | SYSTOLIC BLOOD PRESSURE: 112 MMHG | BODY MASS INDEX: 28.85 KG/M2 | HEART RATE: 104 BPM | HEIGHT: 64 IN | TEMPERATURE: 97.3 F

## 2017-03-13 DIAGNOSIS — R19.7 DIARRHEA, UNSPECIFIED TYPE: Primary | ICD-10-CM

## 2017-03-13 DIAGNOSIS — R10.33 PERIUMBILICAL ABDOMINAL PAIN: ICD-10-CM

## 2017-03-13 DIAGNOSIS — R11.0 NAUSEA: ICD-10-CM

## 2017-03-13 PROCEDURE — 99214 OFFICE O/P EST MOD 30 MIN: CPT | Performed by: INTERNAL MEDICINE

## 2017-03-13 NOTE — PROGRESS NOTES
[address] [facility]  [place] [address]    (H) [phone]  (W) [phone]    No chief complaint on file.    History of Present Illness     The patient came back for follow visit today.  She feels better in terms of diarrhea.  The patient has history of diarrhea off-and-on for the last 10 months.  The diarrhea used to be rather severe with bowel movements perhaps over 10 times per day.  This has significantly improved.  Now the patient has diarrhea which is moderately severe, frequency of bowel movements being 4-5 times a day.  The stools are described as loose and at times watery.  Occasionally, there is a nocturnal element of diarrhea. The diarrhea is associated with tenesmus at times.  The patient denies bright red blood per rectum.    There is history of periumbilical abdominal pain off and on for the last 10 months.  The pain is gradual in onset, mild-moderate in severity and cramping in character.  Frequency being 2-3 times a week.  The pain may last for several minutes.  There is no radiation of abdominal pain.  Eating worsens the abdominal discomfort.  No definite relieving factors of abdominal pain.  Her abdominal pain has improved.    The patient has history of nausea off and on for the last 2-3 weeks. Severity is mild, frequency being 2-3 times a week. There is no associated vomiting.  Nausea is not worsened by eating.  There are no relieving factors of nausea.     She denies reflux.  There is no dysphagia or odynophagia.  She denies recent weight loss.  There is no history of fever or chills.  The patient denies history of hematemesis or melena.  There is no history of weight loss.    Review of Systems   Constitutional: Negative for appetite change, chills, fatigue, fever and unexpected weight change.   HENT: Negative for mouth sores, nosebleeds and trouble swallowing.    Eyes: Negative for discharge and redness.   Respiratory: Negative for apnea, cough and shortness of breath.    Cardiovascular:  Positive for palpitations. Negative for chest pain and leg swelling.   Gastrointestinal: Positive for abdominal pain and diarrhea. Negative for abdominal distention, anal bleeding, blood in stool, constipation, nausea and vomiting.   Endocrine: Negative for cold intolerance, heat intolerance and polydipsia.   Genitourinary: Negative for dysuria, hematuria and urgency.   Musculoskeletal: Negative for arthralgias, joint swelling and myalgias.   Skin: Negative for rash.   Allergic/Immunologic: Negative for food allergies and immunocompromised state.   Neurological: Negative for dizziness, seizures, syncope and headaches.   Hematological: Negative for adenopathy. Bruises/bleeds easily.   Psychiatric/Behavioral: Negative for dysphoric mood. The patient is not nervous/anxious and is not hyperactive.      Patient Active Problem List   Diagnosis   • Depression   • Dysphagia   • Gastroesophageal reflux disease   • Hyperthyroidism   • Tachycardia   • Amenorrhea     Past Medical History   Diagnosis Date   • Abdominal pain, periumbilical    • Abdominal pain, RLQ (right lower quadrant)    • Acute pharyngitis    • Appetite lost    • Chest pain    • Depression    • Diarrhea    • Difficulty swallowing    • Fatigue    • Fracture      RIGHT WRIST TWICE, LEFT HAND   • GERD (gastroesophageal reflux disease)    • History of colonoscopy    • Hyperthyroidism    • Upper respiratory infection    • Vomiting      Past Surgical History   Procedure Laterality Date   • Cholecystectomy     • Dental procedure       REPORTS WISDOM TEETH EXTRACTED   •  section  ,,2015     X 3   • Upper gastrointestinal endoscopy     • Colonoscopy     • Stomach surgery       PATIENT REPORTS STOMACH WAS WRAPPED AROUND ESOPHAGUS   • Colonoscopy N/A 2017     Procedure: COLONOSCOPY WITH COLD BIOPSY POLYPECTOMY; BIOPSIES , QUICK CLIP;  Surgeon: Perfecto Mcknight MD;  Location: [facility] ENDOSCOPY;  Service:      Family History   Problem Relation Age  "of Onset   • Coronary artery disease Mother    • Diabetes Mother    • Hypertension Mother    • Kidney disease Father    • Skin cancer Father    • Stroke Other    • Hypertension Other    • Diabetes Other    • Colon cancer Other      Social History   Substance Use Topics   • Smoking status: Never Smoker   • Smokeless tobacco: Never Used   • Alcohol use No       Current Outpatient Prescriptions:   •  glycopyrrolate (ROBINUL) 2 MG tablet, Take 1 tablet by mouth Daily. Take 1 tablet daily.  Use as directed., Disp: 30 tablet, Rfl: 1  •  norgestimate-ethinyl estradiol (SPRINTEC 28) 0.25-35 MG-MCG per tablet, Take 1 package by mouth Daily., Disp: , Rfl:   No Known Allergies  Visit Vitals   • /60   • Pulse 104   • Temp 97.3 °F (36.3 °C)   • Resp 15   • Ht 63.5\" (161.3 cm)   • Wt 169 lb (76.7 kg)   • LMP 03/06/2017   • BMI 29.47 kg/m2     Physical Exam   Constitutional: She is oriented to person, place, and time. She appears well-developed and well-nourished. No distress.   HENT:   Head: Normocephalic and atraumatic.   Right Ear: Hearing and external ear normal.   Left Ear: Hearing and external ear normal.   Nose: Nose normal.   Mouth/Throat: Oropharynx is clear and moist and mucous membranes are normal. Mucous membranes are not pale, not dry and not cyanotic. No oral lesions. No oropharyngeal exudate.   Eyes: Conjunctivae and EOM are normal. Right eye exhibits no discharge. Left eye exhibits no discharge. No scleral icterus.   Neck: Trachea normal. Neck supple. No JVD present. No edema present. No thyroid mass and no thyromegaly present.   Cardiovascular: Normal rate, regular rhythm, S2 normal and normal heart sounds.  Exam reveals no gallop, no S3 and no friction rub.    No murmur heard.  Pulmonary/Chest: Effort normal and breath sounds normal. No respiratory distress. She has no wheezes. She has no rales. She exhibits no tenderness.   Abdominal: Soft. Normal appearance and bowel sounds are normal. She exhibits no " distension, no ascites and no mass. There is no splenomegaly or hepatomegaly. There is no tenderness. There is no rigidity, no rebound and no guarding. No hernia.   Musculoskeletal: She exhibits no tenderness or deformity.       Vascular Status -  Her exam exhibits no right foot edema. Her exam exhibits no left foot edema.  Lymphadenopathy:     She has no cervical adenopathy.        Left: No supraclavicular adenopathy present.   Neurological: She is alert and oriented to person, place, and time. She has normal strength. No cranial nerve deficit or sensory deficit. She exhibits normal muscle tone. Coordination normal.   Skin: No rash noted. She is not diaphoretic. No cyanosis. No pallor. Nails show no clubbing.   Psychiatric: She has a normal mood and affect. Her behavior is normal. Judgment and thought content normal.   Nursing note and vitals reviewed.    Laboratory Tests:    Upon review of medical records:    Upon review of medical records:    Dated August 15, 2015 WBC 11.3 hemoglobin 10.8 hematocrit 33 MCV 89.3 and platelet count 213.  Dated October 30, 2015 WBC 13.1 hemoglobin 10.0 hematocrit 31 MCV 82.6 and platelet count 194.     Dated July 22, 2016 sodium 144 potassium 3.8 chloride 104 CO2 26 BUN 10 serum creatinine 0.7 glucose 85. Calcium 9.1. Albumin 4.3T bili 0.6 AST 21 ALT 21 alkaline phosphatase 60. Total cholesterol 190. Triglycerides 131. TSH 0.43. Free T4 level 0.85. W BC 8.0 hemoglobin 12.2 hematocrit 39 MCV 84.3 and platelet count 258.     Dated December 15, 2016 sodium 143 potassium 3.7 chloride 105 CO2 27 BUN 11 serum creatinine 0.7 glucose 94. Calcium 9.3. Albumin 4.14 bili 0.4 AST 19 ALT 32 alkaline phosphatase 65. TSH 1.15.    Dated January 30, 2017 C-reactive protein 0.96 (elevated) (range is < 0.80.)    tTG IgA negative at 1.  IgA quantitative 130.        Abdominal Imaging:  Upon Review of medical records:     Dated January 17, 2014 patient underwent a esophagram/upper GI series which  revealed patient ingested barium without difficulty. Esophageal peristalsis is normal. No mucosal abnormality or strictures noted within the esophagus. No gastroesophageal reflux was demonstrated during the examination although the patient could not tolerate adequate barium per significant gastric filling to adequately test for reflux. No hiatal hernia. Stomach was well field for evaluation of the stomach. No mass, ulceration, or filling defect in the stomach. Pyloric channel was normal. Duodenal bulb and remainder of the duodenum appears unremarkable without evidence of mass or ulceration.    Procedures:  Upon review of medical records:    Dated February 17, 2017 the patient underwent a colonoscopy to the terminal ileum which revealed: Scant early diverticular change and left colon and possibly in terminal ileum.  Small diminutive polyp in the rectum.  Internal hemorrhoids.  No endoscopic evidence of colitis was seen.  Random biopsies were obtained from the colon upon withdrawal of scope.  Terminal ileum biopsies revealed small intestinal mucosa with prominent lymphoid aggregate.  No additional significant histopathologic abnormalities identified.  Rectal polyp, biopsy revealed scant fragment of colonic mucosa with no abnormalities identified.  Random colon biopsies revealed colonic-type mucosa with no significant histopathologic abnormalities.    Assessment and Plan:      Diagnoses and all orders for this visit:    Diarrhea, unspecified type  Comments:  Likely postinfectious IBS type picture.  Symptomatically improved.    Periumbilical abdominal pain  Comments:  Improved.    Nausea  Comments:  Could be potentially related to medication.          Discussion:       Plan     Patient Instructions     1. Low lactose diet.  2. May take Robinul Forte 2 mg 1/4 at night.  She may also try 1/4 in the morning.  3. The patient may add Imodium OTC.  One half tablet at night and if necessary one half tablet in the a.m.  Adjust  the dose to have 1-2 soft stools a day.  4. The patient may call back in one to 2 weeks regarding progress.  5. Follow-up in 4 weeks.      Patient Care Team:  Roshan Omalley MD as PCP - General    Perfecto Mcknight MD

## 2017-03-13 NOTE — PATIENT INSTRUCTIONS
1. Low lactose diet.  2. May take Robinul Forte 2 mg 1/4 at night.  She may also try 1/4 in the morning.  3. The patient may add Imodium OTC.  One half tablet at night and if necessary one half tablet in the a.m.  Adjust the dose to have 1-2 soft stools a day.  4. The patient may call back in one to 2 weeks regarding progress.  5. Follow-up in 4 weeks.

## 2017-03-23 ENCOUNTER — OFFICE VISIT (OUTPATIENT)
Dept: INTERNAL MEDICINE | Facility: CLINIC | Age: 32
End: 2017-03-23

## 2017-03-23 VITALS
HEART RATE: 70 BPM | RESPIRATION RATE: 16 BRPM | BODY MASS INDEX: 29.23 KG/M2 | SYSTOLIC BLOOD PRESSURE: 112 MMHG | DIASTOLIC BLOOD PRESSURE: 70 MMHG | OXYGEN SATURATION: 98 % | HEIGHT: 64 IN | WEIGHT: 171.19 LBS | TEMPERATURE: 98 F

## 2017-03-23 DIAGNOSIS — J06.9 VIRAL UPPER RESPIRATORY TRACT INFECTION: Primary | ICD-10-CM

## 2017-03-23 LAB
EXPIRATION DATE: NORMAL
INTERNAL CONTROL: NORMAL
Lab: NORMAL
S PYO AG THROAT QL: NEGATIVE

## 2017-03-23 PROCEDURE — 99213 OFFICE O/P EST LOW 20 MIN: CPT | Performed by: NURSE PRACTITIONER

## 2017-03-23 PROCEDURE — 87880 STREP A ASSAY W/OPTIC: CPT | Performed by: NURSE PRACTITIONER

## 2017-03-23 RX ORDER — IBUPROFEN 800 MG/1
800 TABLET ORAL EVERY 8 HOURS PRN
Qty: 20 TABLET | Refills: 0 | Status: SHIPPED | OUTPATIENT
Start: 2017-03-23 | End: 2017-06-19 | Stop reason: ALTCHOICE

## 2017-03-23 RX ORDER — GUAIFENESIN 600 MG/1
1200 TABLET, EXTENDED RELEASE ORAL 2 TIMES DAILY
Qty: 12 TABLET | Refills: 0 | Status: SHIPPED | OUTPATIENT
Start: 2017-03-23 | End: 2017-03-26

## 2017-03-23 NOTE — PROGRESS NOTES
"Subjective   Laura Walls Workman is a 31 y.o. female who presents for evaluation of symptoms of a URI. Symptoms include congestion, no  fever, non productive cough, sore throat and hoarsness. Onset of symptoms was 5 days ago, and has been unchanged since that time. Treatment to date: none. She is a  and is exposed to sick contacts.     The following portions of the patient's history were reviewed and updated as appropriate: allergies, current medications, past family history, past medical history, past social history, past surgical history and problem list.    Review of Systems  Constitutional: negative  Eyes: negative  Ears, nose, mouth, throat, and face: positive for hoarseness and sore throat  Respiratory: positive for cough  Cardiovascular: negative    Objective /70  Pulse 70  Temp 98 °F (36.7 °C)  Resp 16  Ht 63.5\" (161.3 cm)  Wt 171 lb 3 oz (77.7 kg)  LMP 03/06/2017  SpO2 98%  BMI 29.85 kg/m2  General appearance: alert, appears stated age, cooperative and no distress  Head: sinuses nontender to percussion  Eyes: conjunctivae/corneas clear. PERRL, EOM's intact. Fundi benign.  Ears: normal TM's and external ear canals both ears  Nose: no sinus tenderness  Throat: abnormal findings: marked oropharyngeal erythema  Neck: no adenopathy, no carotid bruit, no JVD, supple, symmetrical, trachea midline and thyroid not enlarged, symmetric, no tenderness/mass/nodules  Lungs: clear to auscultation bilaterally  Heart: regular rate and rhythm, S1, S2 normal, no murmur, click, rub or gallop  Pulses: 2+ and symmetric  Skin: Skin color, texture, turgor normal. No rashes or lesions  Lymph nodes: Cervical, supraclavicular, and axillary nodes normal.    Assessment/Plan   1. Viral upper respiratory tract infection  - guaiFENesin (MUCINEX) 600 MG 12 hr tablet; Take 2 tablets by mouth 2 (Two) Times a Day for 3 days.  Dispense: 12 tablet; Refill: 0  - ibuprofen (ADVIL,MOTRIN) 800 MG tablet; Take 1 tablet by " mouth Every 8 (Eight) Hours As Needed for Mild Pain (1-3).  Dispense: 20 tablet; Refill: 0  - POCT rapid strep A-negative  Discussed diagnosis and treatment of URI.  Suggested symptomatic OTC remedies.  Keep throat and mouth moist.   Try to avoid talking and wisperring.  Increase fluid intake and rest.  Good handwashing techniques.  Follow up as needed.  Megha Fregoso, APRN  03/23/2017

## 2017-04-18 ENCOUNTER — OFFICE VISIT (OUTPATIENT)
Dept: GASTROENTEROLOGY | Facility: CLINIC | Age: 32
End: 2017-04-18

## 2017-04-18 VITALS
WEIGHT: 170 LBS | SYSTOLIC BLOOD PRESSURE: 119 MMHG | HEART RATE: 79 BPM | BODY MASS INDEX: 29.02 KG/M2 | RESPIRATION RATE: 14 BRPM | HEIGHT: 64 IN | TEMPERATURE: 97.7 F | DIASTOLIC BLOOD PRESSURE: 87 MMHG

## 2017-04-18 DIAGNOSIS — R19.7 DIARRHEA, UNSPECIFIED TYPE: Primary | Chronic | ICD-10-CM

## 2017-04-18 DIAGNOSIS — R11.0 NAUSEA: Chronic | ICD-10-CM

## 2017-04-18 DIAGNOSIS — R10.33 PERIUMBILICAL ABDOMINAL PAIN: ICD-10-CM

## 2017-04-18 PROCEDURE — 99214 OFFICE O/P EST MOD 30 MIN: CPT | Performed by: NURSE PRACTITIONER

## 2017-04-18 RX ORDER — LOPERAMIDE HYDROCHLORIDE 2 MG/1
2 CAPSULE ORAL AS NEEDED
COMMUNITY
End: 2017-07-20

## 2017-04-18 NOTE — PATIENT INSTRUCTIONS
1. Low lactose diet.  2. Robinul Forte 2 mg 1/4 at night. She may also try 1/4 in the morning.  3. The patient may add Imodium OTC. One half tablet at night and if necessary one half tablet in the a.m. Adjust the dose to have 1-2 soft stools a day.  4. Abdominal ultrasound.  5. Follow up: 4 weeks

## 2017-04-18 NOTE — PROGRESS NOTES
"[address] [facility] [address]    Chief Complaint   Patient presents with   • Follow-up   • Abdominal Pain     The patient came back for follow visit today. She states she has been having improvement with her symptoms. She is feeling better. She has been taking Glycopyrolate 1/4 tablet twice a day and this has significantly improved her symptoms.     She feels better in terms of diarrhea. The patient has history of diarrhea off-and-on for the last 11 months. The diarrhea used to be rather severe with bowel movements perhaps over 10 times per day. This has significantly improved. Now the patient has diarrhea which is moderate, frequency of bowel movements being 3-4 times a day. The stools are described as loose. There is no nocturnal element of diarrhea. The diarrhea is associated with tenesmus at times.  The patient denies bright red blood per rectum.     There is history of periumbilical abdominal pain off and on for the last 10 months. The pain is gradual in onset, mild-moderate in severity and sharp in character. Frequency being 2-3 times a week. The pain may last for several minutes. The patient states the pain often occurs when she is bending over or moving around. The patient states the pain may initiate on the right side, but she will feel it \"shoot to the left side\". No definite relieving factors of abdominal pain. Her abdominal pain has improved somewhat, but it is not resolved. She states her mother has a history of adhesions and she is concerned this may be causing her symptoms as well, as she has had 3 sections, Nissen fundoplication, cholecystectomy and exploratory laparoscopy in the past.     The patient has history of nausea off and on for the last 6-8 weeks. Severity is mild, frequency being 2-3 times a week. There is no associated vomiting. Nausea is not worsened by eating. There are no relieving factors of nausea. The nausea has improved.     She denies reflux. There is no dysphagia or " odynophagia. She denies recent weight loss. There is no history of fever or chills. The patient denies history of hematemesis or melena. There is no history of weight loss.    Abdominal Pain   This is a recurrent problem. The current episode started more than 1 month ago. The onset quality is sudden. The problem occurs intermittently. The problem has been gradually improving. The pain is located in the periumbilical region. The quality of the pain is sharp. The abdominal pain does not radiate. Pertinent negatives include no arthralgias, constipation, diarrhea, dysuria, fever, headaches, hematuria, myalgias, nausea or vomiting. The pain is aggravated by eating. She has tried proton pump inhibitors for the symptoms. The treatment provided significant relief.     Review of Systems   Constitutional: Negative for appetite change, chills, fatigue, fever and unexpected weight change.   HENT: Negative for mouth sores, nosebleeds and trouble swallowing.    Eyes: Negative for discharge and redness.   Respiratory: Negative for apnea, cough and shortness of breath.    Cardiovascular: Negative for chest pain, palpitations and leg swelling.   Gastrointestinal: Positive for abdominal pain. Negative for abdominal distention, anal bleeding, blood in stool, constipation, diarrhea, nausea and vomiting.   Endocrine: Negative for cold intolerance, heat intolerance and polydipsia.   Genitourinary: Negative for dysuria, hematuria and urgency.   Musculoskeletal: Positive for back pain. Negative for arthralgias, joint swelling and myalgias.   Skin: Negative for rash.   Allergic/Immunologic: Negative for food allergies and immunocompromised state.   Neurological: Negative for dizziness, seizures, syncope and headaches.   Hematological: Negative for adenopathy. Does not bruise/bleed easily.   Psychiatric/Behavioral: Negative for dysphoric mood. The patient is not nervous/anxious and is not hyperactive.      Patient Active Problem List    Diagnosis   • Depression   • Dysphagia   • Gastroesophageal reflux disease   • Hyperthyroidism   • Amenorrhea   • Diarrhea   • Nausea   • Periumbilical abdominal pain     Past Medical History:   Diagnosis Date   • Abdominal pain, periumbilical    • Abdominal pain, RLQ (right lower quadrant)    • Acute pharyngitis    • Appetite lost    • Chest pain    • Depression    • Diarrhea    • Difficulty swallowing    • Fatigue    • Fracture     RIGHT WRIST TWICE, LEFT HAND   • GERD (gastroesophageal reflux disease)    • History of colonoscopy    • Hyperthyroidism    • Upper respiratory infection    • Vomiting      Past Surgical History:   Procedure Laterality Date   •  SECTION  ,,2015    X 3   • CHOLECYSTECTOMY     • COLONOSCOPY     • COLONOSCOPY N/A 2017    Procedure: COLONOSCOPY WITH COLD BIOPSY POLYPECTOMY; BIOPSIES , QUICK CLIP;  Surgeon: Perfecto Mcknight MD;  Location: [facility] ENDOSCOPY;  Service:    • DENTAL PROCEDURE      REPORTS WISDOM TEETH EXTRACTED   • STOMACH SURGERY      PATIENT REPORTS STOMACH WAS WRAPPED AROUND ESOPHAGUS   • UPPER GASTROINTESTINAL ENDOSCOPY       Family History   Problem Relation Age of Onset   • Coronary artery disease Mother    • Diabetes Mother    • Hypertension Mother    • Kidney disease Father    • Skin cancer Father    • Stroke Other    • Hypertension Other    • Diabetes Other    • Colon cancer Other      Social History   Substance Use Topics   • Smoking status: Never Smoker   • Smokeless tobacco: Never Used   • Alcohol use No       Current Outpatient Prescriptions:   •  glycopyrrolate (ROBINUL) 2 MG tablet, Take 1 tablet by mouth Daily. Take 1 tablet daily.  Use as directed., Disp: 30 tablet, Rfl: 1  •  ibuprofen (ADVIL,MOTRIN) 800 MG tablet, Take 1 tablet by mouth Every 8 (Eight) Hours As Needed for Mild Pain (1-3)., Disp: 20 tablet, Rfl: 0  •  loperamide (IMODIUM) 2 MG capsule, Take 2 mg by mouth As Needed for Diarrhea., Disp: , Rfl:   •  norgestimate-ethinyl  "estradiol (SPRINTEC 28) 0.25-35 MG-MCG per tablet, Take 1 package by mouth Daily., Disp: , Rfl:     No Known Allergies    /87  Pulse 79  Temp 97.7 °F (36.5 °C)  Resp 14  Ht 63.5\" (161.3 cm)  Wt 170 lb (77.1 kg)  LMP 04/04/2017 (Approximate)  BMI 29.64 kg/m2    Physical Exam   Constitutional: She is oriented to person, place, and time. She appears well-developed and well-nourished. No distress.   HENT:   Head: Normocephalic and atraumatic.   Right Ear: Hearing and external ear normal.   Left Ear: Hearing and external ear normal.   Nose: Nose normal.   Mouth/Throat: Oropharynx is clear and moist and mucous membranes are normal. Mucous membranes are not pale, not dry and not cyanotic. No oral lesions. No oropharyngeal exudate.   Eyes: Conjunctivae and EOM are normal. Right eye exhibits no discharge. Left eye exhibits no discharge.   Neck: Trachea normal. Neck supple. No JVD present. No edema present. No thyroid mass and no thyromegaly present.   Cardiovascular: Normal rate, regular rhythm, S2 normal and normal heart sounds.  Exam reveals no gallop, no S3 and no friction rub.    No murmur heard.  Pulmonary/Chest: Effort normal and breath sounds normal. No respiratory distress. She exhibits no tenderness.   Abdominal: Normal appearance and bowel sounds are normal. She exhibits no distension, no ascites and no mass. There is no splenomegaly or hepatomegaly. There is no tenderness. There is no rigidity, no rebound and no guarding. No hernia.       Vascular Status -  Her exam exhibits no right foot edema. Her exam exhibits no left foot edema.  Lymphadenopathy:     She has no cervical adenopathy.        Left: No supraclavicular adenopathy present.   Neurological: She is alert and oriented to person, place, and time. She has normal strength. No cranial nerve deficit or sensory deficit.   Skin: No rash noted. She is not diaphoretic. No cyanosis. No pallor. Nails show no clubbing.   Psychiatric: She has a normal " mood and affect.   Nursing note and vitals reviewed.  Stigmata of chronic liver disease:  None.  Asterixis:  None.    Laboratory Tests:    Upon review of medical records:     Dated August 15, 2015 WBC 11.3 hemoglobin 10.8 hematocrit 33 MCV 89.3 and platelet count 213.  Dated October 30, 2015 WBC 13.1 hemoglobin 10.0 hematocrit 31 MCV 82.6 and platelet count 194.     Dated July 22, 2016 sodium 144 potassium 3.8 chloride 104 CO2 26 BUN 10 serum creatinine 0.7 glucose 85. Calcium 9.1. Albumin 4.3T bili 0.6 AST 21 ALT 21 alkaline phosphatase 60. Total cholesterol 190. Triglycerides 131. TSH 0.43. Free T4 level 0.85. W BC 8.0 hemoglobin 12.2 hematocrit 39 MCV 84.3 and platelet count 258.     Dated December 15, 2016 sodium 143 potassium 3.7 chloride 105 CO2 27 BUN 11 serum creatinine 0.7 glucose 94. Calcium 9.3. Albumin 4.14 bili 0.4 AST 19 ALT 32 alkaline phosphatase 65. TSH 1.15.     Dated January 30, 2017 C-reactive protein 0.96 (elevated) (range is < 0.80.)   tTG IgA negative at 1. IgA quantitative 130.    Abdominal Imaging:    Upon Review of medical records:     Dated January 17, 2014 patient underwent a esophagram/upper GI series which revealed patient ingested barium without difficulty. Esophageal peristalsis is normal. No mucosal abnormality or strictures noted within the esophagus. No gastroesophageal reflux was demonstrated during the examination although the patient could not tolerate adequate barium per significant gastric filling to adequately test for reflux. No hiatal hernia. Stomach was well field for evaluation of the stomach. No mass, ulceration, or filling defect in the stomach. Pyloric channel was normal. Duodenal bulb and remainder of the duodenum appears unremarkable without evidence of mass or ulceration.    Procedures:    Upon review of medical records:     Dated February 17, 2017 the patient underwent a colonoscopy to the terminal ileum which revealed: Scant early diverticular change and left  colon and possibly in terminal ileum. Small diminutive polyp in the rectum. Internal hemorrhoids. No endoscopic evidence of colitis was seen. Random biopsies were obtained from the colon upon withdrawal of scope. Terminal ileum biopsies revealed small intestinal mucosa with prominent lymphoid aggregate. No additional significant histopathologic abnormalities identified. Rectal polyp, biopsy revealed scant fragment of colonic mucosa with no abnormalities identified. Random colon biopsies revealed colonic-type mucosa with no significant histopathologic abnormalities.    Laura was seen today for follow-up and abdominal pain.    Diagnoses and all orders for this visit:    Diarrhea, unspecified type  Comments:  Likely postinfectious IBS type picture.  Symptomatically improved.    Nausea  Comments:  Improving.  Orders:  -     US Abdomen Complete; Future    Periumbilical abdominal pain  Comments:  Improving. Possibly secondary to adhesions.  Orders:  -     US Abdomen Complete; Future        Plan  and Patient Instructions:  Patient Instructions   1. Low lactose diet.  2. Robinul Forte 2 mg 1/4 at night. She may also try 1/4 in the morning.  3. The patient may add Imodium OTC. One half tablet at night and if necessary one half tablet in the a.m. Adjust the dose to have 1-2 soft stools a day.  4. Abdominal ultrasound.  5. Follow up: 4 weeks    Patient Care Team:  Roshan Omalley MD as PCP - General  MD Magdalena Banks APRN

## 2017-04-24 ENCOUNTER — APPOINTMENT (OUTPATIENT)
Dept: ULTRASOUND IMAGING | Facility: HOSPITAL | Age: 32
End: 2017-04-24

## 2017-05-01 ENCOUNTER — APPOINTMENT (OUTPATIENT)
Dept: ULTRASOUND IMAGING | Facility: HOSPITAL | Age: 32
End: 2017-05-01

## 2017-05-05 ENCOUNTER — OFFICE VISIT (OUTPATIENT)
Dept: OBSTETRICS AND GYNECOLOGY | Facility: CLINIC | Age: 32
End: 2017-05-05

## 2017-05-05 VITALS
HEIGHT: 63 IN | SYSTOLIC BLOOD PRESSURE: 122 MMHG | BODY MASS INDEX: 29.95 KG/M2 | DIASTOLIC BLOOD PRESSURE: 62 MMHG | WEIGHT: 169 LBS

## 2017-05-05 DIAGNOSIS — R10.2 PELVIC PAIN IN FEMALE: Primary | ICD-10-CM

## 2017-05-05 DIAGNOSIS — R10.33 PERIUMBILICAL ABDOMINAL PAIN: ICD-10-CM

## 2017-05-05 PROCEDURE — 99213 OFFICE O/P EST LOW 20 MIN: CPT | Performed by: OBSTETRICS & GYNECOLOGY

## 2017-05-09 ENCOUNTER — OFFICE VISIT (OUTPATIENT)
Dept: INTERNAL MEDICINE | Facility: CLINIC | Age: 32
End: 2017-05-09

## 2017-05-09 VITALS
HEIGHT: 64 IN | SYSTOLIC BLOOD PRESSURE: 110 MMHG | HEART RATE: 83 BPM | TEMPERATURE: 98.7 F | WEIGHT: 171 LBS | BODY MASS INDEX: 29.19 KG/M2 | OXYGEN SATURATION: 98 % | DIASTOLIC BLOOD PRESSURE: 70 MMHG

## 2017-05-09 DIAGNOSIS — R05.9 COUGH: Primary | ICD-10-CM

## 2017-05-09 LAB
EXPIRATION DATE: ABNORMAL
EXPIRATION DATE: NORMAL
FLUAV AG NPH QL: POSITIVE
FLUBV AG NPH QL: NEGATIVE
INTERNAL CONTROL: ABNORMAL
INTERNAL CONTROL: NORMAL
Lab: ABNORMAL
Lab: NORMAL
S PYO AG THROAT QL: NEGATIVE

## 2017-05-09 PROCEDURE — 87880 STREP A ASSAY W/OPTIC: CPT | Performed by: PHYSICIAN ASSISTANT

## 2017-05-09 PROCEDURE — 99213 OFFICE O/P EST LOW 20 MIN: CPT | Performed by: PHYSICIAN ASSISTANT

## 2017-05-09 PROCEDURE — 87804 INFLUENZA ASSAY W/OPTIC: CPT | Performed by: PHYSICIAN ASSISTANT

## 2017-05-22 ENCOUNTER — HOSPITAL ENCOUNTER (OUTPATIENT)
Dept: ULTRASOUND IMAGING | Facility: HOSPITAL | Age: 32
Discharge: HOME | End: 2017-05-22
Admitting: NURSE PRACTITIONER

## 2017-05-22 DIAGNOSIS — R11.0 NAUSEA: Chronic | ICD-10-CM

## 2017-05-22 DIAGNOSIS — R10.33 PERIUMBILICAL ABDOMINAL PAIN: ICD-10-CM

## 2017-05-22 PROCEDURE — 76700 US EXAM ABDOM COMPLETE: CPT

## 2017-05-25 ENCOUNTER — TELEPHONE (OUTPATIENT)
Dept: GASTROENTEROLOGY | Facility: CLINIC | Age: 32
End: 2017-05-25

## 2017-06-01 ENCOUNTER — OFFICE VISIT (OUTPATIENT)
Dept: OBSTETRICS AND GYNECOLOGY | Facility: CLINIC | Age: 32
End: 2017-06-01

## 2017-06-01 VITALS
DIASTOLIC BLOOD PRESSURE: 60 MMHG | WEIGHT: 171 LBS | BODY MASS INDEX: 29.19 KG/M2 | SYSTOLIC BLOOD PRESSURE: 112 MMHG | HEIGHT: 64 IN

## 2017-06-01 DIAGNOSIS — N83.202 CYSTS OF BOTH OVARIES: Primary | ICD-10-CM

## 2017-06-01 DIAGNOSIS — N83.201 CYSTS OF BOTH OVARIES: Primary | ICD-10-CM

## 2017-06-01 DIAGNOSIS — R10.2 PELVIC PAIN IN FEMALE: ICD-10-CM

## 2017-06-01 PROCEDURE — 99213 OFFICE O/P EST LOW 20 MIN: CPT | Performed by: OBSTETRICS & GYNECOLOGY

## 2017-06-01 NOTE — PROGRESS NOTES
Subjective  Chief Complaint   Patient presents with   • Follow-up     TRANSVAGINAL ULTRASOUND, PELVIC PAIN.      Patient is 31 y.o.  here for f/u regarding pelvic and abdominal pain.  Pt reports pain currently is right mid quadrant.  Pt reports sharp, stabbing pain with radiation into the midline.  Pt denies any fever or chills.  Pt has had GI workup; recently had colonoscopy and abdominal scan with f/u with GI.  Pt here for TVS today.    History  Past Medical History:   Diagnosis Date   • Abdominal pain, periumbilical    • Abdominal pain, RLQ (right lower quadrant)    • Acute pharyngitis    • Appetite lost    • Chest pain    • Depression    • Diarrhea    • Difficulty swallowing    • Fatigue    • Fracture     RIGHT WRIST TWICE, LEFT HAND   • GERD (gastroesophageal reflux disease)    • History of colonoscopy    • History of ovarian cyst    • Hyperthyroidism    • Upper respiratory infection    • Vomiting      Current Outpatient Prescriptions on File Prior to Visit   Medication Sig Dispense Refill   • amoxicillin (AMOXIL) 875 MG tablet Take 1 tablet by mouth 2 (Two) Times a Day. 20 tablet 0   • glycopyrrolate (ROBINUL) 2 MG tablet Take 1 tablet by mouth Daily. Take 1 tablet daily.  Use as directed. 30 tablet 1   • ibuprofen (ADVIL,MOTRIN) 800 MG tablet Take 1 tablet by mouth Every 8 (Eight) Hours As Needed for Mild Pain (1-3). 20 tablet 0   • loperamide (IMODIUM) 2 MG capsule Take 2 mg by mouth As Needed for Diarrhea.     • norgestimate-ethinyl estradiol (SPRINTEC 28) 0.25-35 MG-MCG per tablet Take 1 package by mouth Daily.       No current facility-administered medications on file prior to visit.      No Known Allergies  Past Surgical History:   Procedure Laterality Date   •  SECTION  2008    DR NAVARRETE   •  SECTION  10/30/2015    DR VASQUEZ   •  SECTION  2010    DR ESPINOZA   • CHOLECYSTECTOMY     • COLONOSCOPY     • COLONOSCOPY N/A 2017    Procedure: COLONOSCOPY WITH  "COLD BIOPSY POLYPECTOMY; BIOPSIES , QUICK CLIP;  Surgeon: Perfecto Mcknight MD;  Location: [facility] ENDOSCOPY;  Service:    • DENTAL PROCEDURE      REPORTS WISDOM TEETH EXTRACTED   • DIAGNOSTIC LAPAROSCOPY  07/07/2005    DR ESPINOZA   • DIAGNOSTIC LAPAROSCOPY  02/14/2012    DR ESPINOZA   • LYSIS OF ABDOMINAL ADHESIONS  07/07/2005    DR ESPINOZA   • LYSIS OF ABDOMINAL ADHESIONS  02/14/2012    DR ESPINOZA   • OVARIAN CYST REMOVAL  07/07/2005    DR ESPINOZA   • STOMACH SURGERY  2014    PATIENT REPORTS STOMACH WAS WRAPPED AROUND ESOPHAGUS   • UPPER GASTROINTESTINAL ENDOSCOPY       Family History   Problem Relation Age of Onset   • Coronary artery disease Mother    • Diabetes Mother    • Hypertension Mother    • Kidney disease Father    • Skin cancer Father    • Stroke Other    • Hypertension Other    • Diabetes Other    • Colon cancer Other      Social History     Social History   • Marital status:      Spouse name: N/A   • Number of children: N/A   • Years of education: N/A     Social History Main Topics   • Smoking status: Never Smoker   • Smokeless tobacco: Never Used   • Alcohol use No   • Drug use: No   • Sexual activity: Defer     Other Topics Concern   • None     Social History Narrative     Review of Systems  All systems were reviewed and negative except for:  Genitourinary: postivie for  See HPI     Objective  Vitals:    06/01/17 1050   BP: 112/60   Weight: 171 lb (77.6 kg)   Height: 63.5\" (161.3 cm)     Physical Exam:  General Appearance: alert, appears stated age and cooperative  Head: normocephalic, without obvious abnormality and atraumatic  Eyes: lids and lashes normal, conjunctivae and sclerae normal, no icterus, no pallor and corneas clear  Neck: suppple, trachea midline and no thyromegaly  Lungs: clear to auscultation, respirations regular, respirations even and respirations unlabored  Heart: regular rhythm and normal rate, normal S1, S2, no murmur, gallop, or rubs and no click  Abdomen: normal bowel sounds, no masses, " no hepatomegaly, no splenomegaly, soft non-tender, no guarding and no rebound tenderness  Extremities: moves extremities well, no edema, no cyanosis and no redness  Skin: no bleeding, bruising or rash and no lesions noted  Psych: normal mood and affect, oriented to person, time and place, thought content organized and appropriate judgment    Lab Review   No data reviewed    Imaging   Pelvic ultrasound images independantly reviewed - TVS today shows normal uterus with normal ET; 5.63 mm; right ovary with 1.8 cm simple cyst; left ovary with 3.2 cm fluid filled structure near ovary c/w possible hydrosalpinx or paratubal cyst.  No free fluid seen.    Assessment/Plan    Problem List Items Addressed This Visit     None      Visit Diagnoses     Cysts of both ovaries    -  Primary  TVS as noted above.  Pt informed regarding findings.  Plan repeat in 6-8 weeks; if patient with continued pain and/or no resolution of cystic structure then patient may need dx lap    Relevant Orders    US Non-ob Transvaginal    Pelvic pain in female      Pt with pelvic/abdominal pain right midquadrant.  Pt instructed if any changes, worsening or symptoms, fever, or chills to call for CT scan and/or go to ER.  Pt voices understanding and in agreement with plan.          Follow up 6 weeks for re-evaluation    This note was electronically signed.  Keren Thomas M.D.

## 2017-06-16 ENCOUNTER — APPOINTMENT (OUTPATIENT)
Dept: GENERAL RADIOLOGY | Facility: HOSPITAL | Age: 32
End: 2017-06-16

## 2017-06-16 ENCOUNTER — HOSPITAL ENCOUNTER (EMERGENCY)
Facility: HOSPITAL | Age: 32
Discharge: HOME | End: 2017-06-16
Attending: EMERGENCY MEDICINE | Admitting: EMERGENCY MEDICINE

## 2017-06-16 VITALS
OXYGEN SATURATION: 99 % | WEIGHT: 174 LBS | DIASTOLIC BLOOD PRESSURE: 88 MMHG | HEIGHT: 63 IN | RESPIRATION RATE: 16 BRPM | TEMPERATURE: 98 F | SYSTOLIC BLOOD PRESSURE: 115 MMHG | BODY MASS INDEX: 30.83 KG/M2 | HEART RATE: 69 BPM

## 2017-06-16 DIAGNOSIS — S93.402A SPRAIN OF LEFT ANKLE, UNSPECIFIED LIGAMENT, INITIAL ENCOUNTER: Primary | ICD-10-CM

## 2017-06-16 PROCEDURE — 99283 EMERGENCY DEPT VISIT LOW MDM: CPT

## 2017-06-16 PROCEDURE — 73630 X-RAY EXAM OF FOOT: CPT

## 2017-06-16 PROCEDURE — 73610 X-RAY EXAM OF ANKLE: CPT

## 2017-06-16 RX ORDER — IBUPROFEN 400 MG/1
400 TABLET ORAL ONCE
Status: COMPLETED | OUTPATIENT
Start: 2017-06-16 | End: 2017-06-16

## 2017-06-16 RX ADMIN — IBUPROFEN 400 MG: 400 TABLET ORAL at 16:45

## 2017-06-19 ENCOUNTER — OFFICE VISIT (OUTPATIENT)
Dept: ORTHOPEDIC SURGERY | Facility: CLINIC | Age: 32
End: 2017-06-19

## 2017-06-19 VITALS — HEIGHT: 63 IN | RESPIRATION RATE: 16 BRPM | BODY MASS INDEX: 31.17 KG/M2 | WEIGHT: 175.9 LBS

## 2017-06-19 DIAGNOSIS — S93.412A SPRAIN OF CALCANEOFIBULAR LIGAMENT OF LEFT ANKLE, INITIAL ENCOUNTER: Primary | ICD-10-CM

## 2017-06-19 DIAGNOSIS — J06.9 VIRAL UPPER RESPIRATORY TRACT INFECTION: ICD-10-CM

## 2017-06-19 PROCEDURE — 99203 OFFICE O/P NEW LOW 30 MIN: CPT | Performed by: ORTHOPAEDIC SURGERY

## 2017-06-19 RX ORDER — IBUPROFEN 800 MG/1
800 TABLET ORAL 3 TIMES DAILY
Qty: 90 TABLET | Refills: 5 | Status: SHIPPED | OUTPATIENT
Start: 2017-06-19 | End: 2018-07-02

## 2017-06-19 NOTE — PROGRESS NOTES
Subjective   Patient ID: Laura Walls Workman is a 31 y.o. female  Pain and Edema of the Left Ankle (Patient sustained injury on 17 when stepped in hole at parents house twisting ankle. Patient stated heard a pop and began experiencing pain.)             History of Present Illness pain occurs along the lateral side of the left ankle denies medial ankle pain and swelling in the forefoot, has not worn any type of protective splinting, gives history of injury to left ankle many years ago.  Initial x-rays on  were negative for fracture in the ankle and foot    Review of Systems   Constitutional: Negative for diaphoresis, fever and unexpected weight change.   HENT: Negative for dental problem and sore throat.    Eyes: Negative for visual disturbance.   Respiratory: Negative for shortness of breath.    Cardiovascular: Negative for chest pain.   Gastrointestinal: Negative for abdominal pain, constipation, diarrhea, nausea and vomiting.   Genitourinary: Negative for difficulty urinating and frequency.   Musculoskeletal: Positive for arthralgias.   Neurological: Negative for headaches.   Hematological: Does not bruise/bleed easily.   All other systems reviewed and are negative.      Past Medical History:   Diagnosis Date   • Abdominal pain, periumbilical    • Abdominal pain, RLQ (right lower quadrant)    • Acute pharyngitis    • Appetite lost    • Chest pain    • Depression    • Diarrhea    • Difficulty swallowing    • Fatigue    • Fracture     RIGHT WRIST TWICE, LEFT HAND   • GERD (gastroesophageal reflux disease)    • History of colonoscopy    • History of ovarian cyst    • Hyperthyroidism    • Upper respiratory infection    • Vomiting         Past Surgical History:   Procedure Laterality Date   •  SECTION  2008    DR NAVARRETE   •  SECTION  10/30/2015    DR VASQUEZ   •  SECTION  2010    DR ESPINOZA   • CHOLECYSTECTOMY     • COLONOSCOPY     • COLONOSCOPY N/A 2017     "Procedure: COLONOSCOPY WITH COLD BIOPSY POLYPECTOMY; BIOPSIES , QUICK CLIP;  Surgeon: Perfecto Mcknight MD;  Location: [facility] ENDOSCOPY;  Service:    • DENTAL PROCEDURE      REPORTS WISDOM TEETH EXTRACTED   • DIAGNOSTIC LAPAROSCOPY  07/07/2005    DR ESPINOZA   • DIAGNOSTIC LAPAROSCOPY  02/14/2012    DR ESPINOZA   • LYSIS OF ABDOMINAL ADHESIONS  07/07/2005    DR ESPINOZA   • LYSIS OF ABDOMINAL ADHESIONS  02/14/2012    DR ESPINOZA   • OVARIAN CYST REMOVAL  07/07/2005    DR ESPINOZA   • STOMACH SURGERY  2014    PATIENT REPORTS STOMACH WAS WRAPPED AROUND ESOPHAGUS   • UPPER GASTROINTESTINAL ENDOSCOPY         Family History   Problem Relation Age of Onset   • Coronary artery disease Mother    • Diabetes Mother    • Hypertension Mother    • Kidney disease Father    • Skin cancer Father    • Stroke Other    • Hypertension Other    • Diabetes Other    • Colon cancer Other        Social History     Social History   • Marital status:      Spouse name: N/A   • Number of children: N/A   • Years of education: N/A     Occupational History   • Not on file.     Social History Main Topics   • Smoking status: Never Smoker   • Smokeless tobacco: Never Used   • Alcohol use No   • Drug use: No   • Sexual activity: Defer     Other Topics Concern   • Not on file     Social History Narrative       No Known Allergies    Objective   Resp 16  Ht 63\" (160 cm)  Wt 175 lb 14.4 oz (79.8 kg)  LMP 06/02/2017 (Approximate)  BMI 31.16 kg/m2   Physical Exam  Constitutional: He is oriented to person, place, and time. He appears well-developed and well-nourished.   HENT:   Head: Normocephalic and atraumatic.   Eyes: EOM are normal. Pupils are equal, round, and reactive to light.   Neck: Normal range of motion. Neck supple.   Cardiovascular: Normal rate.    Pulmonary/Chest: Effort normal and breath sounds normal.   Abdominal: Soft.   Neurological: He is alert and oriented to person, place, and time.   Skin: Skin is warm and dry.   Psychiatric: He has a normal mood " and affect.   Nursing note and vitals reviewed.       Ortho Exam left ankle with point tenderness over the distal anterior lateral malleolar area but no ecchymosis, stability, full range of motion, no medial deltoid tenderness, peroneal tendon function intact, foot neurovascularly intact  Assessment/Plan   Review of Radiographic Studies:    No new imaging done today.      Procedures        Physical therapy referral given      Recommendations/Plan:   Referral: PT/OT 2-3 x wk x 4-6 wks    Patient agreeable to call or return sooner for any concerns.             Impression: Grade 1 left lateral ankle sprain     Plan:  Appears tolerated, lace up ankle support, refer to therapy, recheck 6 weeks if not improved consider MRI at that time

## 2017-07-11 ENCOUNTER — TREATMENT (OUTPATIENT)
Dept: PHYSICAL THERAPY | Facility: CLINIC | Age: 32
End: 2017-07-11

## 2017-07-11 DIAGNOSIS — M25.572 LEFT ANKLE PAIN, UNSPECIFIED CHRONICITY: Primary | ICD-10-CM

## 2017-07-11 PROCEDURE — 97161 PT EVAL LOW COMPLEX 20 MIN: CPT | Performed by: PHYSICAL THERAPIST

## 2017-07-11 NOTE — PROGRESS NOTES
Physical Therapy Initial Evaluation and Plan of Care    Patient: Laura Thomson   : 1985  Diagnosis/ICD-10 Code:  Left ankle pain, unspecified chronicity [M25.572]  Referring practitioner: Richardson Fernandez MD  Date of Initial Visit: 2017  Today's Date: 2017  Patient seen for 1 sessions               Subjective Evaluation    History of Present Illness  Date of onset: 2017  Mechanism of injury: Pt states that she was cleaning her dads truck she fell in a pot hole, he foot twisted and she felt a pop in the ankle. She was told by the orthopedic surgeon that she tore all the tendons and was placed in a brace. The brace was causing more pain and she decided to go ahead and take it off.     Subjective comment: Pt c/o soreness and tenderness in the ankle and foot. Pt states that she had more pain with walking on vacation, she started to have pain after a few minutes of walking.   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  Quality of life: good    Pain  Current pain ratin  At best pain ratin  At worst pain ratin  Location: Outside of the left ankle  Quality: dull ache and sharp  Relieving factors: medications and rest  Aggravating factors: ambulation, squatting, stairs and standing  Progression: no change    Social Support  Lives with: young children and spouse    Diagnostic Tests  X-ray: normal    Treatments  No previous or current treatments  Patient Goals  Patient goals for therapy: decreased pain, improved balance, increased motion, decreased edema, increased strength and independence with ADLs/IADLs             Objective     Palpation     Additional Palpation Details  TTP noted along the lateral malleolus of the left ankle liborio in the areas of the post calcaneofibular ligament and the posterior talofibular ligament    Active Range of Motion   Left Ankle/Foot   Dorsiflexion (ke): 13 degrees    Plantar flexion: 50 degrees   Inversion: 53 degrees   Eversion: 6 degrees     Right Ankle/Foot   Dorsiflexion (ke): 15 degrees   Plantar flexion: 52 degrees   Inversion: 50 degrees   Eversion: 30 degrees     Strength/Myotome Testing     Left Ankle/Foot   Dorsiflexion: 4+  Plantar flexion: 4+  Inversion: 4+  Eversion: 4    Tests   Left Ankle/Foot   Positive for anterior drawer.     Ambulation     Ambulation: Level Surfaces     Additional Level Surfaces Ambulation Details  Mild antalgic gait noted on the left LE with slightly decreased weight acceptance.          Assessment & Plan     Assessment  Impairments: abnormal gait, abnormal muscle firing, abnormal or restricted ROM, activity intolerance, impaired balance, impaired physical strength, lacks appropriate home exercise program, pain with function and weight-bearing intolerance  Assessment details: Patient is a 31 year old female who comes to physical therapy after stepping into a hole and injuring her left ankle. Signs and symptoms are consistent with left ankle inversion sprain with probably rupture of the anterior talofibular ligaments and sprain of the remaining lateral ligaments resulting in pain, decreased ROM, decreased strength, and inability to perform all essential functional activities. Pt will benefit from skilled PT services to address the above issues.   Prognosis details: SHORT TERM GOALS:     2 weeks  1. Pt independent with HEP   2. Pt to demonstrate ability to ambulate in the clinic without walking boot and with no reports of increased pain  3. Pt to demonstrate ability to ambulate in the clinic without antalgic gait   3. Pt to demonstrate ability to perform single leg heel raise on the left without increase in pain      LONG TERM GOALS:   6 weeks  1. Pt to demonstrate ability to perform full functional squat with good form and no increase in pain in the left foot/ankle  2. Pt to demonstrate ability to ambulate on TM for 10 minutes with normal  gait pattern without increase in pain  3. Pt to report being able to work full shift without increase in pain in the left ankle/foot  4. Pt to demonstrate ability to perform SLS on the left lower extremity for 30 seconds without LOB or increased pain       Plan  Therapy options: will be seen for skilled physical therapy services  Planned modality interventions: cryotherapy, electrical stimulation/Russian stimulation, high voltage pulsed current (pain management), iontophoresis, microcurrent electrical stimulation, TENS, thermotherapy (hydrocollator packs) and ultrasound  Planned therapy interventions: ADL retraining, balance/weight-bearing training, body mechanics training, flexibility, functional ROM exercises, gait training, home exercise program, IADL retraining, joint mobilization, manual therapy, motor coordination training, neuromuscular re-education, soft tissue mobilization, spinal/joint mobilization, strengthening, stretching and therapeutic activities  Frequency: 2x week  Duration in weeks: 6  Treatment plan discussed with: patient  Functional Limitations: lifting, walking, uncomfortable because of pain and standing      Evaluation Only    Timed Treatment:   0   mins   Total Treatment:     32   mins    PT SIGNATURE: Montrell Vazquez PT   DATE TREATMENT INITIATED: 7/11/2017    Initial Certification  Certification Period: 10/9/2017  I certify that the therapy services are furnished while this patient is under my care.  The services outlined above are required by this patient, and will be reviewed every 90 days.     PHYSICIAN: Richardson Fernandez MD      DATE:     Please sign and return via fax to [phone].. Thank you, [facility].

## 2017-07-13 ENCOUNTER — OFFICE VISIT (OUTPATIENT)
Dept: OBSTETRICS AND GYNECOLOGY | Facility: CLINIC | Age: 32
End: 2017-07-13

## 2017-07-13 VITALS
SYSTOLIC BLOOD PRESSURE: 116 MMHG | BODY MASS INDEX: 31.01 KG/M2 | HEIGHT: 63 IN | DIASTOLIC BLOOD PRESSURE: 60 MMHG | WEIGHT: 175 LBS

## 2017-07-13 DIAGNOSIS — R10.2 PELVIC PAIN IN FEMALE: ICD-10-CM

## 2017-07-13 DIAGNOSIS — R10.33 PERIUMBILICAL ABDOMINAL PAIN: ICD-10-CM

## 2017-07-13 DIAGNOSIS — R10.31 RIGHT LOWER QUADRANT ABDOMINAL PAIN: Primary | ICD-10-CM

## 2017-07-13 LAB
BASOPHILS # BLD AUTO: 0.06 10*3/MM3 (ref 0–0.2)
BASOPHILS NFR BLD AUTO: 0.8 % (ref 0–2.5)
BUN SERPL-MCNC: 11 MG/DL (ref 7–20)
BUN/CREAT SERPL: 15.7 (ref 7.1–23.5)
CALCIUM SERPL-MCNC: 9.3 MG/DL (ref 8.4–10.2)
CHLORIDE SERPL-SCNC: 103 MMOL/L (ref 98–107)
CO2 SERPL-SCNC: 26 MMOL/L (ref 26–30)
CREAT SERPL-MCNC: 0.7 MG/DL (ref 0.6–1.3)
EOSINOPHIL # BLD AUTO: 0.13 10*3/MM3 (ref 0–0.7)
EOSINOPHIL NFR BLD AUTO: 1.6 % (ref 0–7)
ERYTHROCYTE [DISTWIDTH] IN BLOOD BY AUTOMATED COUNT: 14 % (ref 11.5–14.5)
GLUCOSE SERPL-MCNC: 105 MG/DL (ref 74–98)
HCT VFR BLD AUTO: 38.6 % (ref 37–47)
HGB BLD-MCNC: 12.3 G/DL (ref 12–16)
IMM GRANULOCYTES # BLD: 0.02 10*3/MM3 (ref 0–0.06)
IMM GRANULOCYTES NFR BLD: 0.3 % (ref 0–0.6)
LYMPHOCYTES # BLD AUTO: 2.27 10*3/MM3 (ref 0.6–3.4)
LYMPHOCYTES NFR BLD AUTO: 28.5 % (ref 10–50)
MCH RBC QN AUTO: 27.3 PG (ref 27–31)
MCHC RBC AUTO-ENTMCNC: 31.9 G/DL (ref 30–37)
MCV RBC AUTO: 85.8 FL (ref 81–99)
MONOCYTES # BLD AUTO: 0.65 10*3/MM3 (ref 0–0.9)
MONOCYTES NFR BLD AUTO: 8.2 % (ref 0–12)
NEUTROPHILS # BLD AUTO: 4.84 10*3/MM3 (ref 2–6.9)
NEUTROPHILS NFR BLD AUTO: 60.6 % (ref 37–80)
NRBC BLD AUTO-RTO: 0 /100 WBC (ref 0–0)
PLATELET # BLD AUTO: 271 10*3/MM3 (ref 130–400)
POTASSIUM SERPL-SCNC: 4.2 MMOL/L (ref 3.5–5.1)
RBC # BLD AUTO: 4.5 10*6/MM3 (ref 4.2–5.4)
SODIUM SERPL-SCNC: 140 MMOL/L (ref 137–145)
WBC # BLD AUTO: 7.97 10*3/MM3 (ref 4.8–10.8)

## 2017-07-13 PROCEDURE — 99213 OFFICE O/P EST LOW 20 MIN: CPT | Performed by: OBSTETRICS & GYNECOLOGY

## 2017-07-13 NOTE — PROGRESS NOTES
Subjective  Chief Complaint   Patient presents with   • Follow-up     TRANSVAGINAL ULTRASOUND, OVARIAN CYSTS     Patient is 31 y.o.  here for f/u TVS.  Pt with cont right mid abdominal pain radiating to umbilicus and across midline to left mid quadrant.  Pt reports pain is unchanged from last visit; no improvement.  Pt denies any GI symptoms.  Pt reports bm's are unchanged; pt does have history of IBS but reports no problems at present.   Pt eating well with no nausea or emesis.  Pt with no urinary symptoms.  Pt denies fever or chills.  Pt had previous TVS showing 1.8 cm cyst simple right and left adnexae with 3.2 cm fluid filled structure c/w hydrosalpinx.  Pt in monogamous relationship.  Pt with no vaginal discharge, itching, burning, or odor.    History  Past Medical History:   Diagnosis Date   • Abdominal pain, periumbilical    • Abdominal pain, RLQ (right lower quadrant)    • Acute pharyngitis    • Appetite lost    • Chest pain    • Depression    • Diarrhea    • Difficulty swallowing    • Fatigue    • Fracture     RIGHT WRIST TWICE, LEFT HAND   • GERD (gastroesophageal reflux disease)    • History of colonoscopy    • History of ovarian cyst    • Hyperthyroidism    • Ovarian cyst, bilateral    • Upper respiratory infection    • Vomiting      Current Outpatient Prescriptions on File Prior to Visit   Medication Sig Dispense Refill   • glycopyrrolate (ROBINUL) 2 MG tablet Take 1 tablet by mouth Daily. Take 1 tablet daily.  Use as directed. 30 tablet 1   • ibuprofen (ADVIL,MOTRIN) 800 MG tablet Take 1 tablet by mouth 3 (Three) Times a Day. 90 tablet 5   • loperamide (IMODIUM) 2 MG capsule Take 2 mg by mouth As Needed for Diarrhea.     • norgestimate-ethinyl estradiol (SPRINTEC 28) 0.25-35 MG-MCG per tablet Take 1 package by mouth Daily.     • amoxicillin (AMOXIL) 875 MG tablet Take 1 tablet by mouth 2 (Two) Times a Day. 20 tablet 0     No current facility-administered medications on file prior to visit.   "    No Known Allergies  Past Surgical History:   Procedure Laterality Date   •  SECTION  2008    DR NAVARRETE   •  SECTION  10/30/2015    DR VASQUEZ   •  SECTION  2010    DR ESPINOZA   • CHOLECYSTECTOMY     • COLONOSCOPY     • COLONOSCOPY N/A 2017    Procedure: COLONOSCOPY WITH COLD BIOPSY POLYPECTOMY; BIOPSIES , QUICK CLIP;  Surgeon: Perfecto Mcknight MD;  Location: [facility] ENDOSCOPY;  Service:    • DENTAL PROCEDURE      REPORTS WISDOM TEETH EXTRACTED   • DIAGNOSTIC LAPAROSCOPY  2005    DR ESPINOZA   • DIAGNOSTIC LAPAROSCOPY  2012    DR ESPINOZA   • LYSIS OF ABDOMINAL ADHESIONS  2005    DR ESPINOZA   • LYSIS OF ABDOMINAL ADHESIONS  2012    DR ESPINOZA   • OVARIAN CYST REMOVAL  2005    DR ESPINOZA   • STOMACH SURGERY      PATIENT REPORTS STOMACH WAS WRAPPED AROUND ESOPHAGUS   • UPPER GASTROINTESTINAL ENDOSCOPY       Family History   Problem Relation Age of Onset   • Coronary artery disease Mother    • Diabetes Mother    • Hypertension Mother    • Kidney disease Father    • Skin cancer Father    • Stroke Other    • Hypertension Other    • Diabetes Other    • Colon cancer Other      Social History     Social History   • Marital status:      Spouse name: N/A   • Number of children: N/A   • Years of education: N/A     Social History Main Topics   • Smoking status: Never Smoker   • Smokeless tobacco: Never Used   • Alcohol use No   • Drug use: No   • Sexual activity: Defer     Other Topics Concern   • None     Social History Narrative     Review of Systems  All systems were reviewed and negative except for:  Genitourinary: postivie for  See HPI     Objective  Vitals:    17 1420   BP: 116/60   Weight: 175 lb (79.4 kg)   Height: 63\" (160 cm)     Physical Exam:  General Appearance: alert, appears stated age and cooperative  Head: normocephalic, without obvious abnormality and atraumatic  Eyes: lids and lashes normal, conjunctivae and sclerae normal, no icterus, " no pallor and corneas clear  Neck: suppple, trachea midline and no thyromegaly  Lungs: clear to auscultation, respirations regular, respirations even and respirations unlabored  Heart: regular rhythm and normal rate, normal S1, S2, no murmur, gallop, or rubs and no click  Abdomen: normal bowel sounds, no masses, no hepatomegaly, no splenomegaly, no guarding, no rebound tenderness and +tenderness right mid quadrant  Pelvic: Not performed.  Extremities: moves extremities well, no edema, no cyanosis and no redness  Skin: no bleeding, bruising or rash and no lesions noted  Psych: normal mood and affect, oriented to person, time and place, thought content organized and appropriate judgment    Lab Review   No data reviewed    Imaging   Pelvic ultrasound images independantly reviewed - TVS today shows improved in fluid filled structure now measuring .59 cm; right ovarian cyst resolved; no free fluid.    Assessment/Plan    Problem List Items Addressed This Visit        Nervous and Auditory    Periumbilical abdominal pain  Plan below      Other Visit Diagnoses     Right lower quadrant abdominal pain    -  Primary  Patient with cont right mid-lower quadrant pain radiating to umbilicus.  Will obtain labs as noted today.  Improvement noted on TVS but patient with cont pain.  Plan CT scan of abdomen with contrast.  If normal then consider dx laparoscopy.  Instructions and precautions given.    Relevant Orders    CBC & Differential    Basic Metabolic Panel    CT Abdomen Pelvis With & Without Contrast    Pelvic pain in female      As noted above          Follow up as planned    This note was electronically signed.  Keren Thomas M.D.

## 2017-07-18 ENCOUNTER — TREATMENT (OUTPATIENT)
Dept: PHYSICAL THERAPY | Facility: CLINIC | Age: 32
End: 2017-07-18

## 2017-07-18 DIAGNOSIS — M25.572 LEFT ANKLE PAIN, UNSPECIFIED CHRONICITY: Primary | ICD-10-CM

## 2017-07-18 PROCEDURE — 97014 ELECTRIC STIMULATION THERAPY: CPT | Performed by: PHYSICAL THERAPIST

## 2017-07-18 PROCEDURE — 97140 MANUAL THERAPY 1/> REGIONS: CPT | Performed by: PHYSICAL THERAPIST

## 2017-07-18 PROCEDURE — 97110 THERAPEUTIC EXERCISES: CPT | Performed by: PHYSICAL THERAPIST

## 2017-07-19 ENCOUNTER — HOSPITAL ENCOUNTER (OUTPATIENT)
Dept: CT IMAGING | Facility: HOSPITAL | Age: 32
Discharge: HOME | End: 2017-07-19
Attending: OBSTETRICS & GYNECOLOGY | Admitting: OBSTETRICS & GYNECOLOGY

## 2017-07-19 DIAGNOSIS — R10.31 RIGHT LOWER QUADRANT ABDOMINAL PAIN: ICD-10-CM

## 2017-07-19 PROCEDURE — 0 IOPAMIDOL 61 % SOLUTION: Performed by: OBSTETRICS & GYNECOLOGY

## 2017-07-19 PROCEDURE — 0 DIATRIZOATE MEGLUMINE & SODIUM PER 1 ML: Performed by: OBSTETRICS & GYNECOLOGY

## 2017-07-19 PROCEDURE — 74178 CT ABD&PLV WO CNTR FLWD CNTR: CPT

## 2017-07-19 RX ADMIN — IOPAMIDOL 100 ML: 612 INJECTION, SOLUTION INTRAVENOUS at 10:30

## 2017-07-19 RX ADMIN — DIATRIZOATE MEGLUMINE AND DIATRIZOATE SODIUM 30 ML: 660; 100 LIQUID ORAL; RECTAL at 10:45

## 2017-07-19 NOTE — PROGRESS NOTES
Physical Therapy Daily Progress Note    Subjective   Laura Thomson reports that she fell down on the stairs twice over the weekend when her ankle gave out on her. She is having more pain in the ankle this week.     Objective   See Exercise, Manual, and Modality Logs for complete treatment.       Assessment/Plan     Held any resisted exercise today because of the patients increased pain in the ankle. She felt a little better after having the ice and e-stim for her ankle.     Progress per Plan of Care           Manual Therapy:    16     mins  83076;  Therapeutic Exercise:    20     mins  85447;     Neuromuscular Elvin:        mins  47793;    Therapeutic Activity:          mins  02029;     Gait Training:           mins  64930;     Ultrasound:          mins  75963;    Electrical Stimulation:    15     mins  46486 ( );  Dry Needling          mins self-pay    Timed Treatment:   36   mins   Total Treatment:     58   mins    Montrell Vazquez PT  Physical Therapist

## 2017-07-20 ENCOUNTER — TELEPHONE (OUTPATIENT)
Dept: OBSTETRICS AND GYNECOLOGY | Facility: CLINIC | Age: 32
End: 2017-07-20

## 2017-07-20 NOTE — TELEPHONE ENCOUNTER
Dr Thomas,   Called and informed patient of CT wants to go ahead and proceed with surgery. Can you send case request to Sunitha? Thanks.

## 2017-07-21 ENCOUNTER — PREP FOR SURGERY (OUTPATIENT)
Dept: OTHER | Facility: HOSPITAL | Age: 32
End: 2017-07-21

## 2017-07-21 DIAGNOSIS — R10.2 PELVIC PAIN: Primary | ICD-10-CM

## 2017-07-21 RX ORDER — SODIUM CHLORIDE 0.9 % (FLUSH) 0.9 %
1-10 SYRINGE (ML) INJECTION AS NEEDED
Status: CANCELLED | OUTPATIENT
Start: 2017-07-21

## 2017-07-25 ENCOUNTER — TREATMENT (OUTPATIENT)
Dept: PHYSICAL THERAPY | Facility: CLINIC | Age: 32
End: 2017-07-25

## 2017-07-25 ENCOUNTER — APPOINTMENT (OUTPATIENT)
Dept: GENERAL RADIOLOGY | Facility: HOSPITAL | Age: 32
End: 2017-07-25

## 2017-07-25 ENCOUNTER — HOSPITAL ENCOUNTER (EMERGENCY)
Facility: HOSPITAL | Age: 32
Discharge: HOME | End: 2017-07-25
Attending: EMERGENCY MEDICINE | Admitting: EMERGENCY MEDICINE

## 2017-07-25 VITALS
HEIGHT: 63 IN | SYSTOLIC BLOOD PRESSURE: 104 MMHG | OXYGEN SATURATION: 98 % | DIASTOLIC BLOOD PRESSURE: 72 MMHG | BODY MASS INDEX: 28.35 KG/M2 | RESPIRATION RATE: 18 BRPM | WEIGHT: 160 LBS | HEART RATE: 73 BPM | TEMPERATURE: 99 F

## 2017-07-25 DIAGNOSIS — R10.32 ABDOMINAL PAIN, LEFT LOWER QUADRANT: ICD-10-CM

## 2017-07-25 DIAGNOSIS — R55 VASOVAGAL SYNCOPE: Primary | ICD-10-CM

## 2017-07-25 DIAGNOSIS — M25.572 LEFT ANKLE PAIN, UNSPECIFIED CHRONICITY: Primary | ICD-10-CM

## 2017-07-25 DIAGNOSIS — N70.11 HYDROSALPINX: ICD-10-CM

## 2017-07-25 LAB
ALBUMIN SERPL-MCNC: 4.5 G/DL (ref 3.5–5)
ALBUMIN/GLOB SERPL: 1.5 G/DL (ref 1–2)
ALP SERPL-CCNC: 62 U/L (ref 38–126)
ALT SERPL W P-5'-P-CCNC: 23 U/L (ref 13–69)
ANION GAP SERPL CALCULATED.3IONS-SCNC: 14.7 MMOL/L
AST SERPL-CCNC: 19 U/L (ref 15–46)
B-HCG UR QL: NEGATIVE
BACTERIA UR QL AUTO: ABNORMAL /HPF
BASOPHILS # BLD AUTO: 0.05 10*3/MM3 (ref 0–0.2)
BASOPHILS NFR BLD AUTO: 0.5 % (ref 0–2.5)
BILIRUB SERPL-MCNC: 0.4 MG/DL (ref 0.2–1.3)
BILIRUB UR QL STRIP: NEGATIVE
BUN BLD-MCNC: 8 MG/DL (ref 7–20)
BUN/CREAT SERPL: 11.4 (ref 7.1–23.5)
CALCIUM SPEC-SCNC: 9.3 MG/DL (ref 8.4–10.2)
CHLORIDE SERPL-SCNC: 105 MMOL/L (ref 98–107)
CLARITY UR: CLEAR
CO2 SERPL-SCNC: 27 MMOL/L (ref 26–30)
COLOR UR: YELLOW
CREAT BLD-MCNC: 0.7 MG/DL (ref 0.6–1.3)
D-DIMER, QUANTITATIVE (MAD,POW, STR): 340 NG/ML (FEU) (ref 0–500)
DEPRECATED RDW RBC AUTO: 41.5 FL (ref 37–54)
EOSINOPHIL # BLD AUTO: 0.1 10*3/MM3 (ref 0–0.7)
EOSINOPHIL NFR BLD AUTO: 1 % (ref 0–7)
ERYTHROCYTE [DISTWIDTH] IN BLOOD BY AUTOMATED COUNT: 13.5 % (ref 11.5–14.5)
GFR SERPL CREATININE-BSD FRML MDRD: 97 ML/MIN/1.73
GLOBULIN UR ELPH-MCNC: 3.1 GM/DL
GLUCOSE BLD-MCNC: 120 MG/DL (ref 74–98)
GLUCOSE BLDC GLUCOMTR-MCNC: 110 MG/DL (ref 70–130)
GLUCOSE UR STRIP-MCNC: NEGATIVE MG/DL
HCT VFR BLD AUTO: 40.1 % (ref 37–47)
HGB BLD-MCNC: 12.9 G/DL (ref 12–16)
HGB UR QL STRIP.AUTO: ABNORMAL
HOLD SPECIMEN: NORMAL
HOLD SPECIMEN: NORMAL
HYALINE CASTS UR QL AUTO: ABNORMAL /LPF
IMM GRANULOCYTES # BLD: 0.03 10*3/MM3 (ref 0–0.06)
IMM GRANULOCYTES NFR BLD: 0.3 % (ref 0–0.6)
KETONES UR QL STRIP: NEGATIVE
LEUKOCYTE ESTERASE UR QL STRIP.AUTO: NEGATIVE
LYMPHOCYTES # BLD AUTO: 1.56 10*3/MM3 (ref 0.6–3.4)
LYMPHOCYTES NFR BLD AUTO: 15.1 % (ref 10–50)
MCH RBC QN AUTO: 27.2 PG (ref 27–31)
MCHC RBC AUTO-ENTMCNC: 32.2 G/DL (ref 30–37)
MCV RBC AUTO: 84.6 FL (ref 81–99)
MONOCYTES # BLD AUTO: 0.82 10*3/MM3 (ref 0–0.9)
MONOCYTES NFR BLD AUTO: 8 % (ref 0–12)
NEUTROPHILS # BLD AUTO: 7.75 10*3/MM3 (ref 2–6.9)
NEUTROPHILS NFR BLD AUTO: 75.1 % (ref 37–80)
NITRITE UR QL STRIP: NEGATIVE
NRBC BLD MANUAL-RTO: 0 /100 WBC (ref 0–0)
PH UR STRIP.AUTO: 6.5 [PH] (ref 5–8)
PLATELET # BLD AUTO: 284 10*3/MM3 (ref 130–400)
PMV BLD AUTO: 10.5 FL (ref 6–12)
POTASSIUM BLD-SCNC: 3.7 MMOL/L (ref 3.5–5.1)
PROT SERPL-MCNC: 7.6 G/DL (ref 6.3–8.2)
PROT UR QL STRIP: NEGATIVE
RBC # BLD AUTO: 4.74 10*6/MM3 (ref 4.2–5.4)
RBC # UR: ABNORMAL /HPF
REF LAB TEST METHOD: ABNORMAL
SODIUM BLD-SCNC: 143 MMOL/L (ref 137–145)
SP GR UR STRIP: 1.01 (ref 1–1.03)
SQUAMOUS #/AREA URNS HPF: ABNORMAL /HPF
TROPONIN I SERPL-MCNC: <0.012 NG/ML (ref 0–0.03)
UROBILINOGEN UR QL STRIP: ABNORMAL
WBC NRBC COR # BLD: 10.31 10*3/MM3 (ref 4.8–10.8)
WBC UR QL AUTO: ABNORMAL /HPF
WHOLE BLOOD HOLD SPECIMEN: NORMAL
WHOLE BLOOD HOLD SPECIMEN: NORMAL

## 2017-07-25 PROCEDURE — 25010000002 KETOROLAC TROMETHAMINE PER 15 MG: Performed by: PHYSICIAN ASSISTANT

## 2017-07-25 PROCEDURE — 99284 EMERGENCY DEPT VISIT MOD MDM: CPT

## 2017-07-25 PROCEDURE — 84484 ASSAY OF TROPONIN QUANT: CPT | Performed by: PHYSICIAN ASSISTANT

## 2017-07-25 PROCEDURE — 85379 FIBRIN DEGRADATION QUANT: CPT | Performed by: PHYSICIAN ASSISTANT

## 2017-07-25 PROCEDURE — 25010000002 ONDANSETRON PER 1 MG: Performed by: PHYSICIAN ASSISTANT

## 2017-07-25 PROCEDURE — 93005 ELECTROCARDIOGRAM TRACING: CPT

## 2017-07-25 PROCEDURE — 85025 COMPLETE CBC W/AUTO DIFF WBC: CPT | Performed by: PHYSICIAN ASSISTANT

## 2017-07-25 PROCEDURE — 81025 URINE PREGNANCY TEST: CPT | Performed by: PHYSICIAN ASSISTANT

## 2017-07-25 PROCEDURE — 71010 HC CHEST PA OR AP: CPT

## 2017-07-25 PROCEDURE — 96361 HYDRATE IV INFUSION ADD-ON: CPT

## 2017-07-25 PROCEDURE — 82962 GLUCOSE BLOOD TEST: CPT

## 2017-07-25 PROCEDURE — 96374 THER/PROPH/DIAG INJ IV PUSH: CPT

## 2017-07-25 PROCEDURE — 97110 THERAPEUTIC EXERCISES: CPT | Performed by: PHYSICAL THERAPIST

## 2017-07-25 PROCEDURE — 81001 URINALYSIS AUTO W/SCOPE: CPT | Performed by: PHYSICIAN ASSISTANT

## 2017-07-25 PROCEDURE — 80053 COMPREHEN METABOLIC PANEL: CPT | Performed by: PHYSICIAN ASSISTANT

## 2017-07-25 PROCEDURE — 81003 URINALYSIS AUTO W/O SCOPE: CPT | Performed by: PHYSICIAN ASSISTANT

## 2017-07-25 PROCEDURE — 96376 TX/PRO/DX INJ SAME DRUG ADON: CPT

## 2017-07-25 PROCEDURE — 97014 ELECTRIC STIMULATION THERAPY: CPT | Performed by: PHYSICAL THERAPIST

## 2017-07-25 PROCEDURE — 96375 TX/PRO/DX INJ NEW DRUG ADDON: CPT

## 2017-07-25 RX ORDER — ONDANSETRON 4 MG/1
4 TABLET, ORALLY DISINTEGRATING ORAL EVERY 6 HOURS PRN
Qty: 10 TABLET | Refills: 0 | Status: SHIPPED | OUTPATIENT
Start: 2017-07-25 | End: 2017-11-15

## 2017-07-25 RX ORDER — ONDANSETRON 2 MG/ML
4 INJECTION INTRAMUSCULAR; INTRAVENOUS ONCE
Status: COMPLETED | OUTPATIENT
Start: 2017-07-25 | End: 2017-07-25

## 2017-07-25 RX ORDER — NAPROXEN 500 MG/1
500 TABLET ORAL 2 TIMES DAILY PRN
Qty: 14 TABLET | Refills: 0 | Status: SHIPPED | OUTPATIENT
Start: 2017-07-25 | End: 2017-11-15

## 2017-07-25 RX ORDER — SODIUM CHLORIDE 0.9 % (FLUSH) 0.9 %
10 SYRINGE (ML) INJECTION AS NEEDED
Status: DISCONTINUED | OUTPATIENT
Start: 2017-07-25 | End: 2017-07-25 | Stop reason: HOSPADM

## 2017-07-25 RX ORDER — KETOROLAC TROMETHAMINE 30 MG/ML
30 INJECTION, SOLUTION INTRAMUSCULAR; INTRAVENOUS ONCE
Status: COMPLETED | OUTPATIENT
Start: 2017-07-25 | End: 2017-07-25

## 2017-07-25 RX ADMIN — ONDANSETRON 4 MG: 2 INJECTION, SOLUTION INTRAMUSCULAR; INTRAVENOUS at 18:08

## 2017-07-25 RX ADMIN — ONDANSETRON 4 MG: 2 INJECTION INTRAMUSCULAR; INTRAVENOUS at 16:16

## 2017-07-25 RX ADMIN — SODIUM CHLORIDE 1000 ML: 9 INJECTION, SOLUTION INTRAVENOUS at 14:42

## 2017-07-25 RX ADMIN — KETOROLAC TROMETHAMINE 30 MG: 30 INJECTION, SOLUTION INTRAMUSCULAR at 18:07

## 2017-07-25 NOTE — PROGRESS NOTES
Physical Therapy Daily Progress Note    Subjective   Laura Thomson reports that her ankle is feeling a little better, she did not have any falls this weekend and she has been wearing the brace since her last visit.     Objective   See Exercise, Manual, and Modality Logs for complete treatment.       Assessment/Plan     Pt was able to do more exercise in the clinic today without having an exacerbation of symptoms, she demonstrates improved strength of the ankle with t-band exercises     Progress per Plan of Care           Manual Therapy:         mins  36375;  Therapeutic Exercise:    42     mins  51357;     Neuromuscular Elvin:        mins  95754;    Therapeutic Activity:          mins  97827;     Gait Training:           mins  11868;     Ultrasound:          mins  35953;    Electrical Stimulation:    20     mins  85503 ( );  Dry Needling          mins self-pay    Timed Treatment:   42   mins   Total Treatment:     65   mins    Montrell Vazquez PT  Physical Therapist

## 2017-07-25 NOTE — ED PROVIDER NOTES
Subjective   HPI Comments: 31-year-old female was taking her child to the doctor today for a puncture wound to his foot.  Apparently was sitting in the lobby at [facility], became nauseated, hot all over, short of breath had some left lower quadrant pain and heart racing prior to a syncopal event, without injury.  Apparently was caught and taken down to the ground.  Her blood sugar was reportedly 160 and her vital signs were taken.  The vital signs are not known to the family.  No chest pain.  She is still somewhat disoriented at this time according to family.  Seems to be oriented to person place and time on my interview.  LMP 3 weeks ago.  Denies pregnancy.  Remote history of syncope, related to some sort of thyroid issue.  No headache or focal neurologic signs or symptoms.  No history of DVT or PE.  She does not smoke cigarettes.  She does use birth control pills and that is her only home medication.     Aggravating factors: Nausea.  Alleviating factors: None.  Treatment prior to arrival: Vital signs taken at [facility] and sent to ER.      Old records were reviewed.  Patient had a CT scan of her abdomen and pelvis less than 2 weeks ago revealing no acute abdomen findings.  She had a pelvic ultrasound performed in June revealing hydrosalpinx on the left.      History provided by:  Patient  History limited by: nothing.   used: No        Review of Systems   Constitutional: Negative.    HENT: Negative.    Eyes: Negative.    Respiratory: Positive for shortness of breath.    Cardiovascular: Positive for palpitations. Negative for chest pain.   Gastrointestinal: Positive for abdominal pain and nausea.   Endocrine: Negative.    Genitourinary: Negative for dysuria.   Musculoskeletal: Negative.    Skin: Negative.    Allergic/Immunologic: Negative.    Neurological: Positive for dizziness and syncope.   Hematological: Negative.    Psychiatric/Behavioral: Negative.    All other systems  reviewed and are negative.      Past Medical History:   Diagnosis Date   • Abdominal pain, periumbilical    • Abdominal pain, RLQ (right lower quadrant)    • Acute pharyngitis    • Appetite lost    • Chest pain    • Depression    • Diarrhea    • Difficulty swallowing    • Fatigue    • Fracture     RIGHT WRIST TWICE, LEFT HAND   • GERD (gastroesophageal reflux disease)    • History of colonoscopy    • History of ovarian cyst    • Hyperthyroidism    • Ovarian cyst, bilateral    • Upper respiratory infection    • Vomiting        No Known Allergies    Past Surgical History:   Procedure Laterality Date   •  SECTION  2008    DR NAVARRETE   •  SECTION  10/30/2015    DR VASQUEZ   •  SECTION  2010    DR ESPINOZA   • CHOLECYSTECTOMY     • COLONOSCOPY     • COLONOSCOPY N/A 2017    Procedure: COLONOSCOPY WITH COLD BIOPSY POLYPECTOMY; BIOPSIES , QUICK CLIP;  Surgeon: Perfecto Mcknight MD;  Location: [facility] ENDOSCOPY;  Service:    • DENTAL PROCEDURE      REPORTS WISDOM TEETH EXTRACTED   • DIAGNOSTIC LAPAROSCOPY  2005    DR ESPINOZA   • DIAGNOSTIC LAPAROSCOPY  2012    DR ESPINOZA   • LYSIS OF ABDOMINAL ADHESIONS  2005    DR ESPINOZA   • LYSIS OF ABDOMINAL ADHESIONS  2012    DR ESPINOZA   • OVARIAN CYST REMOVAL  2005    DR ESPINOZA   • STOMACH SURGERY      PATIENT REPORTS STOMACH WAS WRAPPED AROUND ESOPHAGUS   • UPPER GASTROINTESTINAL ENDOSCOPY         Family History   Problem Relation Age of Onset   • Coronary artery disease Mother    • Diabetes Mother    • Hypertension Mother    • Kidney disease Father    • Skin cancer Father    • Stroke Other    • Hypertension Other    • Diabetes Other    • Colon cancer Other        Social History     Social History   • Marital status:      Spouse name: N/A   • Number of children: N/A   • Years of education: N/A     Social History Main Topics   • Smoking status: Never Smoker   • Smokeless tobacco: Never Used   • Alcohol use No   • Drug  use: No   • Sexual activity: Yes     Other Topics Concern   • None     Social History Narrative   • None           Objective   Physical Exam   Constitutional: She is oriented to person, place, and time. She appears well-developed and well-nourished. No distress.   HENT:   Head: Normocephalic and atraumatic.   Right Ear: External ear normal.   Left Ear: External ear normal.   Eyes: EOM are normal. Pupils are equal, round, and reactive to light.   Neck: Normal range of motion. Neck supple.   Cardiovascular: Normal rate, regular rhythm and normal heart sounds.    Pulmonary/Chest: Effort normal and breath sounds normal. No stridor. She has no wheezes. She exhibits no tenderness.   Abdominal: Soft. She exhibits no distension. There is tenderness. There is no rebound and no guarding.   Minimal left lower quadrant tenderness.   Musculoskeletal: Normal range of motion. She exhibits no edema.   Neurological: She is alert and oriented to person, place, and time. She displays normal reflexes. No cranial nerve deficit. She exhibits normal muscle tone.   Skin: Skin is warm and dry. No rash noted. She is not diaphoretic.   Psychiatric: She has a normal mood and affect. Her behavior is normal. Judgment and thought content normal.   Nursing note and vitals reviewed.      ECG 12 Lead    Date/Time: 7/25/2017 2:07 PM  Performed by: EWA CHISHOLM  Authorized by: [facility]Y, [employer] [employer]   Comments: EKG: Sinus rhythm at a rate of 65.  No ischemia or infarction.  Normal axis and intervals.  No ectopy.               ED Course  ED Course                  MDM  Number of Diagnoses or Management Options  Abdominal pain, left lower quadrant: new and requires workup  Hydrosalpinx: new and requires workup  Vasovagal syncope: new and requires workup     Amount and/or Complexity of Data Reviewed  Clinical lab tests: reviewed and ordered  Tests in the radiology section of CPT®: ordered and reviewed  Tests in the medicine section of CPT®: ordered  and reviewed  Decide to obtain previous medical records or to obtain history from someone other than the patient: yes  Discuss the patient with other providers: yes  Independent visualization of images, tracings, or specimens: yes    Risk of Complications, Morbidity, and/or Mortality  Presenting problems: moderate  Diagnostic procedures: low  Management options: moderate    Patient Progress  Patient progress: stable      Final diagnoses:   Vasovagal syncope   Abdominal pain, left lower quadrant   Hydrosalpinx            Farzad Dumas PA-C  07/25/17 9739

## 2017-07-25 NOTE — DISCHARGE INSTRUCTIONS
Return to the ER for severe abdominal pain, fever, vomiting, vaginal discharge, chest pain, sustained heart racing, passing out, new or worsening symptoms.      Follow-up with Dr. Thomas regarding this left lower quadrant abdominal pain.  Follow-up with your family doctor tomorrow for further workup, treatment and evaluation.

## 2017-07-26 ENCOUNTER — TELEPHONE (OUTPATIENT)
Dept: OBSTETRICS AND GYNECOLOGY | Facility: CLINIC | Age: 32
End: 2017-07-26

## 2017-07-26 NOTE — TELEPHONE ENCOUNTER
----- Message from Estefani Doran sent at 7/26/2017 11:02 AM EDT -----  Contact: PT  PT WANTS YOU TO CALL HER AT HER MOTHER'S HOUSE...[phone].  THANKS

## 2017-07-26 NOTE — TELEPHONE ENCOUNTER
Patient advised was seen in ER yesterday. She advised pain is worse and passed out. Everything in ER was normal and is scheduled for surgery on 8/10/2017. Wanted to know what she needed to do.

## 2017-07-28 ENCOUNTER — TREATMENT (OUTPATIENT)
Dept: PHYSICAL THERAPY | Facility: CLINIC | Age: 32
End: 2017-07-28

## 2017-07-28 DIAGNOSIS — M25.572 LEFT ANKLE PAIN, UNSPECIFIED CHRONICITY: Primary | ICD-10-CM

## 2017-07-28 PROCEDURE — 97140 MANUAL THERAPY 1/> REGIONS: CPT | Performed by: PHYSICAL THERAPIST

## 2017-07-28 PROCEDURE — 97110 THERAPEUTIC EXERCISES: CPT | Performed by: PHYSICAL THERAPIST

## 2017-07-31 ENCOUNTER — OFFICE VISIT (OUTPATIENT)
Dept: ORTHOPEDIC SURGERY | Facility: CLINIC | Age: 32
End: 2017-07-31

## 2017-07-31 VITALS — RESPIRATION RATE: 16 BRPM | BODY MASS INDEX: 31.4 KG/M2 | WEIGHT: 177.2 LBS | HEIGHT: 63 IN

## 2017-07-31 DIAGNOSIS — S93.412A SPRAIN OF CALCANEOFIBULAR LIGAMENT OF LEFT ANKLE, INITIAL ENCOUNTER: Primary | ICD-10-CM

## 2017-07-31 PROCEDURE — 99213 OFFICE O/P EST LOW 20 MIN: CPT | Performed by: ORTHOPAEDIC SURGERY

## 2017-07-31 NOTE — PROGRESS NOTES
Physical Therapy Daily Progress Note    Subjective   Laura Thomson reports that she had a all after her last visit when she pased out while taking her son to [employer].  She denies hurting her ankle                                                                                                Objective   See Exercise, Manual, and Modality Logs for complete treatment.       Assessment/Plan     Pt had some soreness today but overall her strength and endurance to standing in the clinic.     Progress per Plan of Care           Manual Therapy:    10     mins  62076;  Therapeutic Exercise:    40     mins  59479;     Neuromuscular Elvin:        mins  69178;    Therapeutic Activity:          mins  51600;     Gait Training:           mins  17481;     Ultrasound:          mins  82717;    Electrical Stimulation:         mins  20686 ( );  Dry Needling          mins self-pay    Timed Treatment:   50   mins   Total Treatment:     55   mins    Montrell Vazquez PT  Physical Therapist

## 2017-07-31 NOTE — PROGRESS NOTES
Subjective   Patient ID: Laura Walls Workman is a 31 y.o. female  Follow-up of the Left Ankle             History of Present Illness  Pain is less but instability complaints continue, she is wearing a brace now going up and down steps which helps.  Most the pain lateralizes to the outer part of her left ankle.  Minimal swelling.      Review of Systems   Constitutional: Negative for diaphoresis, fever and unexpected weight change.   HENT: Negative for dental problem and sore throat.    Eyes: Negative for visual disturbance.   Respiratory: Negative for shortness of breath.    Cardiovascular: Negative for chest pain.   Gastrointestinal: Negative for abdominal pain, constipation, diarrhea, nausea and vomiting.   Genitourinary: Negative for difficulty urinating and frequency.   Musculoskeletal: Positive for arthralgias.   Neurological: Negative for headaches.   Hematological: Does not bruise/bleed easily.   All other systems reviewed and are negative.      Past Medical History:   Diagnosis Date   • Abdominal pain, periumbilical    • Abdominal pain, RLQ (right lower quadrant)    • Acute pharyngitis    • Appetite lost    • Chest pain    • Depression    • Diarrhea    • Difficulty swallowing    • Fatigue    • Fracture     RIGHT WRIST TWICE, LEFT HAND   • GERD (gastroesophageal reflux disease)    • History of colonoscopy    • History of ovarian cyst    • Hyperthyroidism    • Ovarian cyst, bilateral    • Upper respiratory infection    • Vomiting         Past Surgical History:   Procedure Laterality Date   •  SECTION  2008    DR NAVARRETE   •  SECTION  10/30/2015    DR VASQUEZ   •  SECTION  2010    DR ESPINOZA   • CHOLECYSTECTOMY     • COLONOSCOPY     • COLONOSCOPY N/A 2017    Procedure: COLONOSCOPY WITH COLD BIOPSY POLYPECTOMY; BIOPSIES , QUICK CLIP;  Surgeon: Perfecto Mcknight MD;  Location: [facility] ENDOSCOPY;  Service:    • DENTAL PROCEDURE      REPORTS WISDOM TEETH EXTRACTED   •  "DIAGNOSTIC LAPAROSCOPY  07/07/2005    DR ESPINOZA   • DIAGNOSTIC LAPAROSCOPY  02/14/2012    DR ESPINOZA   • LYSIS OF ABDOMINAL ADHESIONS  07/07/2005    DR ESPINOZA   • LYSIS OF ABDOMINAL ADHESIONS  02/14/2012    DR ESPINOZA   • OVARIAN CYST REMOVAL  07/07/2005    DR ESPINOZA   • STOMACH SURGERY  2014    PATIENT REPORTS STOMACH WAS WRAPPED AROUND ESOPHAGUS   • UPPER GASTROINTESTINAL ENDOSCOPY         Family History   Problem Relation Age of Onset   • Coronary artery disease Mother    • Diabetes Mother    • Hypertension Mother    • Kidney disease Father    • Skin cancer Father    • Stroke Other    • Hypertension Other    • Diabetes Other    • Colon cancer Other        Social History     Social History   • Marital status:      Spouse name: N/A   • Number of children: N/A   • Years of education: N/A     Occupational History   • Not on file.     Social History Main Topics   • Smoking status: Never Smoker   • Smokeless tobacco: Never Used   • Alcohol use No   • Drug use: No   • Sexual activity: Yes     Other Topics Concern   • Not on file     Social History Narrative       No Known Allergies    Objective   Resp 16  Ht 63\" (160 cm)  Wt 177 lb 3.2 oz (80.4 kg)  LMP 06/25/2017 (Exact Date)  BMI 31.39 kg/m2   Physical Exam  Constitutional: He is oriented to person, place, and time. He appears well-developed and well-nourished.   HENT:   Head: Normocephalic and atraumatic.   Eyes: EOM are normal. Pupils are equal, round, and reactive to light.   Neck: Normal range of motion. Neck supple.   Cardiovascular: Normal rate.    Pulmonary/Chest: Effort normal and breath sounds normal.   Abdominal: Soft.   Neurological: He is alert and oriented to person, place, and time.   Skin: Skin is warm and dry.   Psychiatric: He has a normal mood and affect.   Nursing note and vitals reviewed.       Ortho Exam   Left ankle with some tenderness to posterior lateral fibular region consistent with ankle sprain, full active dorsiflexion no Achilles area " tenderness or swelling neurovascularly intact    Assessment/Plan   Review of Radiographic Studies:    No new imaging done today.      Procedures     Laura was seen today for follow-up.    Diagnoses and all orders for this visit:    Sprain of calcaneofibular ligament of left ankle, initial encounter  -     Ambulatory Referral to Physical Therapy Evaluate and treat     Physical therapy referral given      Recommendations/Plan:   Work/Activity Status: May perform usual activities as tolerated    Patient agreeable to call or return sooner for any concerns.             Impression:  Left lateral grade 2 ankle sprain  Plan:  Continue therapy, brace for support, recheck 6-8 weeks, possible MRI at that time if still painful

## 2017-08-01 ENCOUNTER — TREATMENT (OUTPATIENT)
Dept: PHYSICAL THERAPY | Facility: CLINIC | Age: 32
End: 2017-08-01

## 2017-08-01 DIAGNOSIS — M25.572 LEFT ANKLE PAIN, UNSPECIFIED CHRONICITY: Primary | ICD-10-CM

## 2017-08-01 PROCEDURE — 97110 THERAPEUTIC EXERCISES: CPT | Performed by: PHYSICAL THERAPIST

## 2017-08-01 PROCEDURE — 97014 ELECTRIC STIMULATION THERAPY: CPT | Performed by: PHYSICAL THERAPIST

## 2017-08-01 NOTE — PROGRESS NOTES
Physical Therapy Daily Progress Note    Subjective   Laura Thomson reports that she is feeling better overall, she doesn't feel like she is as unstable and she denies having any falls over the past couple of weeks.     Objective   See Exercise, Manual, and Modality Logs for complete treatment.       Assessment/Plan     Pt is progressing well, she still has some tenderness and soreness over the lateral aspect of the ankle, but she shows an improvement in stability and endurance with standing for long periods of time.     Progress per Plan of Care and Progress strengthening /stabilization /functional activity           Manual Therapy:         mins  93742;  Therapeutic Exercise:    49     mins  36365;     Neuromuscular Elvin:        mins  82064;    Therapeutic Activity:          mins  01095;     Gait Training:           mins  43349;     Ultrasound:          mins  90010;    Electrical Stimulation:    15     mins  64707 ( );  Dry Needling          mins self-pay    Timed Treatment:   49   mins   Total Treatment:     68   mins    Montrell Vazquez PT  Physical Therapist

## 2017-08-02 ENCOUNTER — APPOINTMENT (OUTPATIENT)
Dept: PREADMISSION TESTING | Facility: HOSPITAL | Age: 32
End: 2017-08-02

## 2017-08-02 VITALS — WEIGHT: 177 LBS | BODY MASS INDEX: 30.22 KG/M2 | HEIGHT: 64 IN

## 2017-08-02 DIAGNOSIS — R10.2 PELVIC PAIN: ICD-10-CM

## 2017-08-02 LAB
ABO GROUP BLD: NORMAL
ANION GAP SERPL CALCULATED.3IONS-SCNC: 14.8 MMOL/L
BASOPHILS # BLD AUTO: 0.06 10*3/MM3 (ref 0–0.2)
BASOPHILS NFR BLD AUTO: 0.9 % (ref 0–2.5)
BILIRUB UR QL STRIP: ABNORMAL
BUN BLD-MCNC: 10 MG/DL (ref 7–20)
BUN/CREAT SERPL: 14.3 (ref 7.1–23.5)
CALCIUM SPEC-SCNC: 9.2 MG/DL (ref 8.4–10.2)
CHLORIDE SERPL-SCNC: 105 MMOL/L (ref 98–107)
CLARITY UR: CLEAR
CO2 SERPL-SCNC: 25 MMOL/L (ref 26–30)
COLOR UR: YELLOW
CREAT BLD-MCNC: 0.7 MG/DL (ref 0.6–1.3)
DEPRECATED RDW RBC AUTO: 41.9 FL (ref 37–54)
EOSINOPHIL # BLD AUTO: 0.1 10*3/MM3 (ref 0–0.7)
EOSINOPHIL NFR BLD AUTO: 1.4 % (ref 0–7)
ERYTHROCYTE [DISTWIDTH] IN BLOOD BY AUTOMATED COUNT: 13.4 % (ref 11.5–14.5)
GFR SERPL CREATININE-BSD FRML MDRD: 97 ML/MIN/1.73
GLUCOSE BLD-MCNC: 99 MG/DL (ref 74–98)
GLUCOSE UR STRIP-MCNC: NEGATIVE MG/DL
HCT VFR BLD AUTO: 39.7 % (ref 37–47)
HGB BLD-MCNC: 13 G/DL (ref 12–16)
HGB UR QL STRIP.AUTO: NEGATIVE
IMM GRANULOCYTES # BLD: 0.01 10*3/MM3 (ref 0–0.06)
IMM GRANULOCYTES NFR BLD: 0.1 % (ref 0–0.6)
KETONES UR QL STRIP: ABNORMAL
LEUKOCYTE ESTERASE UR QL STRIP.AUTO: NEGATIVE
LYMPHOCYTES # BLD AUTO: 1.67 10*3/MM3 (ref 0.6–3.4)
LYMPHOCYTES NFR BLD AUTO: 23.9 % (ref 10–50)
MCH RBC QN AUTO: 28 PG (ref 27–31)
MCHC RBC AUTO-ENTMCNC: 32.7 G/DL (ref 30–37)
MCV RBC AUTO: 85.4 FL (ref 81–99)
MONOCYTES # BLD AUTO: 0.56 10*3/MM3 (ref 0–0.9)
MONOCYTES NFR BLD AUTO: 8 % (ref 0–12)
NEUTROPHILS # BLD AUTO: 4.59 10*3/MM3 (ref 2–6.9)
NEUTROPHILS NFR BLD AUTO: 65.7 % (ref 37–80)
NITRITE UR QL STRIP: NEGATIVE
NRBC BLD MANUAL-RTO: 0 /100 WBC (ref 0–0)
PH UR STRIP.AUTO: 5.5 [PH] (ref 5–8)
PLATELET # BLD AUTO: 248 10*3/MM3 (ref 130–400)
PMV BLD AUTO: 10.5 FL (ref 6–12)
POTASSIUM BLD-SCNC: 3.8 MMOL/L (ref 3.5–5.1)
PROT UR QL STRIP: ABNORMAL
RBC # BLD AUTO: 4.65 10*6/MM3 (ref 4.2–5.4)
RH BLD: NEGATIVE
SODIUM BLD-SCNC: 141 MMOL/L (ref 137–145)
SP GR UR STRIP: 1.02 (ref 1–1.03)
UROBILINOGEN UR QL STRIP: ABNORMAL
WBC NRBC COR # BLD: 6.99 10*3/MM3 (ref 4.8–10.8)

## 2017-08-02 PROCEDURE — 86900 BLOOD TYPING SEROLOGIC ABO: CPT | Performed by: OBSTETRICS & GYNECOLOGY

## 2017-08-02 PROCEDURE — 80048 BASIC METABOLIC PNL TOTAL CA: CPT | Performed by: OBSTETRICS & GYNECOLOGY

## 2017-08-02 PROCEDURE — 85025 COMPLETE CBC W/AUTO DIFF WBC: CPT | Performed by: OBSTETRICS & GYNECOLOGY

## 2017-08-02 PROCEDURE — 86901 BLOOD TYPING SEROLOGIC RH(D): CPT | Performed by: OBSTETRICS & GYNECOLOGY

## 2017-08-02 PROCEDURE — 36415 COLL VENOUS BLD VENIPUNCTURE: CPT

## 2017-08-02 PROCEDURE — 81003 URINALYSIS AUTO W/O SCOPE: CPT | Performed by: OBSTETRICS & GYNECOLOGY

## 2017-08-04 ENCOUNTER — TREATMENT (OUTPATIENT)
Dept: PHYSICAL THERAPY | Facility: CLINIC | Age: 32
End: 2017-08-04

## 2017-08-04 DIAGNOSIS — M25.572 LEFT ANKLE PAIN, UNSPECIFIED CHRONICITY: Primary | ICD-10-CM

## 2017-08-04 PROCEDURE — 97110 THERAPEUTIC EXERCISES: CPT | Performed by: PHYSICAL THERAPIST

## 2017-08-04 PROCEDURE — 97140 MANUAL THERAPY 1/> REGIONS: CPT | Performed by: PHYSICAL THERAPIST

## 2017-08-08 ENCOUNTER — TREATMENT (OUTPATIENT)
Dept: PHYSICAL THERAPY | Facility: CLINIC | Age: 32
End: 2017-08-08

## 2017-08-08 DIAGNOSIS — M25.572 LEFT ANKLE PAIN, UNSPECIFIED CHRONICITY: Primary | ICD-10-CM

## 2017-08-08 PROCEDURE — 97014 ELECTRIC STIMULATION THERAPY: CPT | Performed by: PHYSICAL THERAPIST

## 2017-08-08 PROCEDURE — 97110 THERAPEUTIC EXERCISES: CPT | Performed by: PHYSICAL THERAPIST

## 2017-08-08 PROCEDURE — S0260 H&P FOR SURGERY: HCPCS | Performed by: OBSTETRICS & GYNECOLOGY

## 2017-08-08 NOTE — H&P
SASHA Hernández  Laura Thomson  : 1985  MRN: [id number]  CSN: [id number]    History and Physical    Subjective   Laura Thomson is a 31 y.o. year old  who present for surgery due to persistent abdominal and pelvic pain.  Pt reports pain right mid abdominal area radiating to umbilicus and across midline to left quadrant.  Pt has had TVS showing simple 1.8 cm cyst right and left with 3.2 cm fluid filled structure c/w hydrosalpinx.  Patient has had CT scan as well which is unremarkable.  Patient desires surgical intervention for further diagnosis and possible treatment.    History  Past Medical History:   Diagnosis Date   • Abdominal pain, periumbilical    • Abdominal pain, RLQ (right lower quadrant)    • Acute pharyngitis    • Appetite lost    • Chest pain    • Depression    • Diarrhea    • Difficulty swallowing    • Fatigue    • Fracture     RIGHT WRIST TWICE, LEFT HAND   • GERD (gastroesophageal reflux disease)    • History of colonoscopy    • History of ovarian cyst    • Hyperthyroidism     hyperactive taken off medicine when pregnant not placed back on   • Ovarian cyst, bilateral    • Spinal headache    • Upper respiratory infection    • Vomiting      No current facility-administered medications on file prior to encounter.      Current Outpatient Prescriptions on File Prior to Encounter   Medication Sig Dispense Refill   • glycopyrrolate (ROBINUL) 2 MG tablet Take 1 tablet by mouth Daily. Take 1 tablet daily.  Use as directed. 30 tablet 1   • ibuprofen (ADVIL,MOTRIN) 800 MG tablet Take 1 tablet by mouth 3 (Three) Times a Day. 90 tablet 5   • norgestimate-ethinyl estradiol (SPRINTEC 28) 0.25-35 MG-MCG per tablet Take 1 package by mouth Daily.       No Known Allergies  Past Surgical History:   Procedure Laterality Date   •  SECTION  2008    DR NAVARRETE   •  SECTION  10/30/2015    DR VASQUEZ   •  SECTION  2010    DR ESPINOZA   • CHOLECYSTECTOMY     •  COLONOSCOPY  2014   • COLONOSCOPY N/A 2/17/2017    Procedure: COLONOSCOPY WITH COLD BIOPSY POLYPECTOMY; BIOPSIES , QUICK CLIP;  Surgeon: Perfecto Mcknight MD;  Location: [facility] ENDOSCOPY;  Service:    • DENTAL PROCEDURE      REPORTS WISDOM TEETH EXTRACTED   • DIAGNOSTIC LAPAROSCOPY  07/07/2005    DR ESPINOZA   • DIAGNOSTIC LAPAROSCOPY  02/14/2012    DR ESPINOZA   • LYSIS OF ABDOMINAL ADHESIONS  07/07/2005    DR ESPINOZA   • LYSIS OF ABDOMINAL ADHESIONS  02/14/2012    DR ESPINOZA   • NISSEN FUNDOPLICATION     • OVARIAN CYST REMOVAL  07/07/2005    DR ESPINOZA   • STOMACH SURGERY  2014    PATIENT REPORTS STOMACH WAS WRAPPED AROUND ESOPHAGUS   • UPPER GASTROINTESTINAL ENDOSCOPY       Family History   Problem Relation Age of Onset   • Coronary artery disease Mother    • Diabetes Mother    • Hypertension Mother    • Kidney disease Father    • Skin cancer Father    • Stroke Other    • Hypertension Other    • Diabetes Other    • Colon cancer Other      Social History     Social History   • Marital status:      Spouse name: N/A   • Number of children: N/A   • Years of education: N/A     Social History Main Topics   • Smoking status: Never Smoker   • Smokeless tobacco: Never Used   • Alcohol use No   • Drug use: No   • Sexual activity: Defer     Other Topics Concern   • Not on file     Social History Narrative     Review of Systems  The following systems were reviewed and negative:  constitution, eyes, ENT, respiratory, cardiovascular, gastrointestinal, genitourinary, integument, breast, hematologic / lymphatic, musculoskeletal, neurological, behavioral/psych, endocrine and allergies / immunologic     Objective  /72   P 73  R 16  Physical Exam:  General Appearance: alert, appears stated age and cooperative  Head: normocephalic, without obvious abnormality and atraumatic  Eyes: lids and lashes normal, conjunctivae and sclerae normal, no icterus, no pallor and corneas clear  Neck: suppple, trachea midline and no thyromegaly  Lungs: clear  to auscultation, respirations regular, respirations even and respirations unlabored  Heart: regular rhythm and normal rate, normal S1, S2, no murmur, gallop, or rubs and no click  Abdomen: normal bowel sounds, no masses, no hepatomegaly, no splenomegaly, soft non-tender, no guarding and no rebound tenderness  Extremities: moves extremities well, no edema, no cyanosis and no redness  Skin: no bleeding, bruising or rash and no lesions noted  Psych: normal mood and affect, oriented to person, time and place, thought content organized and appropriate judgment    Labs  Lab Results   Component Value Date     08/02/2017    HGB 13.0 08/02/2017    HCT 39.7 08/02/2017    WBC 6.99 08/02/2017     08/02/2017    K 3.8 08/02/2017     08/02/2017    CO2 25.0 (L) 08/02/2017    BUN 10 08/02/2017    CREATININE 0.70 08/02/2017    GLUCOSE 99 (H) 08/02/2017    ALBUMIN 4.50 07/25/2017    CALCIUM 9.2 08/02/2017    AST 19 07/25/2017    ALT 23 07/25/2017    BILITOT 0.4 07/25/2017      Lab Results   Component Value Date    SQUAMEPIUA 0-2 07/25/2017    SPECGRAVUR 1.025 08/02/2017    KETONESU Trace (A) 08/02/2017    BLOODU Negative 08/02/2017    LEUKOCYTESUR Negative 08/02/2017    NITRITEU Negative 08/02/2017    RBCUA 0-2 (A) 07/25/2017    WBCUA 0-2 (A) 07/25/2017    BACTERIA Trace (A) 07/25/2017          Lab Results   Component Value Date    URINECX 50,000-60,000 CFU/mL Normal Urogenital Vicky 11/04/2016        Assessment   1. Pelvic and abdominal pain     Plan   1. Dx laparoscopy with possible lysis of adhesions, possible removal of ovarian cyst, possible salpingectomy, possible USO.  2. ABx and DVT prophylaxis as indicated.  3. Risks, complications, benefits, and other alternatives discussed.  4. All questions answered and pt in agreement with plan.    Keren Thomas M.D.  8/8/2017  9:01 AM

## 2017-08-08 NOTE — PROGRESS NOTES
Physical Therapy Daily Progress Note    Subjective   Laura Thomson reports that she is feeling better today, she was able to go without the brace for the weekend and did not have any exacerbation of her symptoms. She reports 3/10 pain     Objective   See Exercise, Manual, and Modality Logs for complete treatment.       Assessment/Plan     Pt is progressing well with therapy and she reports being able to stand and walk more without having exacerbation of her symptoms.     Progress per Plan of Care           Manual Therapy:         mins  33955;  Therapeutic Exercise:    47     mins  44760;     Neuromuscular Elvin:        mins  51615;    Therapeutic Activity:          mins  84479;     Gait Training:           mins  22350;     Ultrasound:          mins  97484;    Electrical Stimulation:    15     mins  88247 ( );  Dry Needling          mins self-pay    Timed Treatment:   62   mins   Total Treatment:     68   mins    Montrell Vazquez PT  Physical Therapist

## 2017-08-10 ENCOUNTER — ANESTHESIA EVENT (OUTPATIENT)
Dept: PERIOP | Facility: HOSPITAL | Age: 32
End: 2017-08-10

## 2017-08-10 ENCOUNTER — ANESTHESIA (OUTPATIENT)
Dept: PERIOP | Facility: HOSPITAL | Age: 32
End: 2017-08-10

## 2017-08-10 ENCOUNTER — HOSPITAL ENCOUNTER (OUTPATIENT)
Facility: HOSPITAL | Age: 32
Setting detail: HOSPITAL OUTPATIENT SURGERY
Discharge: HOME | End: 2017-08-10
Attending: OBSTETRICS & GYNECOLOGY | Admitting: OBSTETRICS & GYNECOLOGY

## 2017-08-10 VITALS
WEIGHT: 177 LBS | RESPIRATION RATE: 18 BRPM | SYSTOLIC BLOOD PRESSURE: 111 MMHG | BODY MASS INDEX: 31.36 KG/M2 | OXYGEN SATURATION: 97 % | TEMPERATURE: 99.9 F | HEART RATE: 61 BPM | DIASTOLIC BLOOD PRESSURE: 71 MMHG | HEIGHT: 63 IN

## 2017-08-10 DIAGNOSIS — N83.201 OVARIAN CYST, BILATERAL: Primary | ICD-10-CM

## 2017-08-10 DIAGNOSIS — N83.202 OVARIAN CYST, BILATERAL: Primary | ICD-10-CM

## 2017-08-10 DIAGNOSIS — R10.2 PELVIC PAIN: ICD-10-CM

## 2017-08-10 LAB — B-HCG UR QL: NEGATIVE

## 2017-08-10 PROCEDURE — 25010000002 ONDANSETRON PER 1 MG: Performed by: NURSE ANESTHETIST, CERTIFIED REGISTERED

## 2017-08-10 PROCEDURE — 58661 LAPAROSCOPY REMOVE ADNEXA: CPT | Performed by: OBSTETRICS & GYNECOLOGY

## 2017-08-10 PROCEDURE — 25010000002 MIDAZOLAM PER 1 MG

## 2017-08-10 PROCEDURE — 25010000002 FENTANYL CITRATE (PF) 100 MCG/2ML SOLUTION: Performed by: NURSE ANESTHETIST, CERTIFIED REGISTERED

## 2017-08-10 PROCEDURE — 25010000002 PROPOFOL 200 MG/20ML EMULSION: Performed by: NURSE ANESTHETIST, CERTIFIED REGISTERED

## 2017-08-10 PROCEDURE — 25010000002 PROMETHAZINE PER 50 MG

## 2017-08-10 PROCEDURE — 25010000002 DEXAMETHASONE PER 1 MG: Performed by: NURSE ANESTHETIST, CERTIFIED REGISTERED

## 2017-08-10 PROCEDURE — 81025 URINE PREGNANCY TEST: CPT | Performed by: OBSTETRICS & GYNECOLOGY

## 2017-08-10 PROCEDURE — 25010000002 HYDROMORPHONE PER 4 MG

## 2017-08-10 PROCEDURE — 25010000002 KETOROLAC TROMETHAMINE PER 15 MG: Performed by: NURSE ANESTHETIST, CERTIFIED REGISTERED

## 2017-08-10 RX ORDER — ONDANSETRON 4 MG/1
4 TABLET, FILM COATED ORAL ONCE AS NEEDED
Status: DISCONTINUED | OUTPATIENT
Start: 2017-08-10 | End: 2017-08-10 | Stop reason: HOSPADM

## 2017-08-10 RX ORDER — PROMETHAZINE HYDROCHLORIDE 25 MG/ML
6.25 INJECTION, SOLUTION INTRAMUSCULAR; INTRAVENOUS ONCE AS NEEDED
Status: COMPLETED | OUTPATIENT
Start: 2017-08-10 | End: 2017-08-10

## 2017-08-10 RX ORDER — ONDANSETRON 2 MG/ML
INJECTION INTRAMUSCULAR; INTRAVENOUS AS NEEDED
Status: DISCONTINUED | OUTPATIENT
Start: 2017-08-10 | End: 2017-08-10 | Stop reason: SURG

## 2017-08-10 RX ORDER — MAGNESIUM HYDROXIDE 1200 MG/15ML
LIQUID ORAL AS NEEDED
Status: DISCONTINUED | OUTPATIENT
Start: 2017-08-10 | End: 2017-08-10 | Stop reason: HOSPADM

## 2017-08-10 RX ORDER — HYDROCODONE BITARTRATE AND ACETAMINOPHEN 5; 325 MG/1; MG/1
1 TABLET ORAL EVERY 6 HOURS PRN
Qty: 30 TABLET | Refills: 0 | Status: SHIPPED | OUTPATIENT
Start: 2017-08-10 | End: 2017-08-16

## 2017-08-10 RX ORDER — PROMETHAZINE HYDROCHLORIDE 25 MG/ML
12.5 INJECTION, SOLUTION INTRAMUSCULAR; INTRAVENOUS ONCE AS NEEDED
Status: DISCONTINUED | OUTPATIENT
Start: 2017-08-10 | End: 2017-08-10 | Stop reason: HOSPADM

## 2017-08-10 RX ORDER — MIDAZOLAM HYDROCHLORIDE 1 MG/ML
2 INJECTION INTRAMUSCULAR; INTRAVENOUS ONCE
Status: COMPLETED | OUTPATIENT
Start: 2017-08-10 | End: 2017-08-10

## 2017-08-10 RX ORDER — SODIUM CHLORIDE 0.9 % (FLUSH) 0.9 %
1-10 SYRINGE (ML) INJECTION AS NEEDED
Status: DISCONTINUED | OUTPATIENT
Start: 2017-08-10 | End: 2017-08-10 | Stop reason: HOSPADM

## 2017-08-10 RX ORDER — KETOROLAC TROMETHAMINE 30 MG/ML
INJECTION, SOLUTION INTRAMUSCULAR; INTRAVENOUS AS NEEDED
Status: DISCONTINUED | OUTPATIENT
Start: 2017-08-10 | End: 2017-08-10 | Stop reason: SURG

## 2017-08-10 RX ORDER — MIDAZOLAM HYDROCHLORIDE 1 MG/ML
1 INJECTION INTRAMUSCULAR; INTRAVENOUS
Status: DISCONTINUED | OUTPATIENT
Start: 2017-08-10 | End: 2017-08-10 | Stop reason: HOSPADM

## 2017-08-10 RX ORDER — PROMETHAZINE HYDROCHLORIDE 25 MG/1
25 SUPPOSITORY RECTAL ONCE AS NEEDED
Status: COMPLETED | OUTPATIENT
Start: 2017-08-10 | End: 2017-08-10

## 2017-08-10 RX ORDER — SODIUM CHLORIDE, SODIUM LACTATE, POTASSIUM CHLORIDE, CALCIUM CHLORIDE 600; 310; 30; 20 MG/100ML; MG/100ML; MG/100ML; MG/100ML
1000 INJECTION, SOLUTION INTRAVENOUS CONTINUOUS PRN
Status: DISCONTINUED | OUTPATIENT
Start: 2017-08-10 | End: 2017-08-10 | Stop reason: HOSPADM

## 2017-08-10 RX ORDER — DEXAMETHASONE SODIUM PHOSPHATE 4 MG/ML
INJECTION, SOLUTION INTRA-ARTICULAR; INTRALESIONAL; INTRAMUSCULAR; INTRAVENOUS; SOFT TISSUE AS NEEDED
Status: DISCONTINUED | OUTPATIENT
Start: 2017-08-10 | End: 2017-08-10 | Stop reason: SURG

## 2017-08-10 RX ORDER — PROMETHAZINE HYDROCHLORIDE 12.5 MG/1
12.5 TABLET ORAL ONCE AS NEEDED
Status: DISCONTINUED | OUTPATIENT
Start: 2017-08-10 | End: 2017-08-10 | Stop reason: HOSPADM

## 2017-08-10 RX ORDER — ROCURONIUM BROMIDE 10 MG/ML
INJECTION, SOLUTION INTRAVENOUS AS NEEDED
Status: DISCONTINUED | OUTPATIENT
Start: 2017-08-10 | End: 2017-08-10 | Stop reason: SURG

## 2017-08-10 RX ORDER — ONDANSETRON HYDROCHLORIDE 8 MG/1
8 TABLET, FILM COATED ORAL EVERY 8 HOURS PRN
Qty: 30 TABLET | Refills: 0 | Status: SHIPPED | OUTPATIENT
Start: 2017-08-10 | End: 2017-11-15

## 2017-08-10 RX ORDER — NEOSTIGMINE METHYLSULFATE 5 MG/5 ML
SYRINGE (ML) INTRAVENOUS AS NEEDED
Status: DISCONTINUED | OUTPATIENT
Start: 2017-08-10 | End: 2017-08-10 | Stop reason: SURG

## 2017-08-10 RX ORDER — PROMETHAZINE HYDROCHLORIDE 25 MG/ML
INJECTION, SOLUTION INTRAMUSCULAR; INTRAVENOUS
Status: COMPLETED
Start: 2017-08-10 | End: 2017-08-10

## 2017-08-10 RX ORDER — FENTANYL CITRATE 50 UG/ML
INJECTION, SOLUTION INTRAMUSCULAR; INTRAVENOUS AS NEEDED
Status: DISCONTINUED | OUTPATIENT
Start: 2017-08-10 | End: 2017-08-10 | Stop reason: SURG

## 2017-08-10 RX ORDER — ONDANSETRON 2 MG/ML
4 INJECTION INTRAMUSCULAR; INTRAVENOUS ONCE AS NEEDED
Status: DISCONTINUED | OUTPATIENT
Start: 2017-08-10 | End: 2017-08-10 | Stop reason: HOSPADM

## 2017-08-10 RX ORDER — MEPERIDINE HYDROCHLORIDE 25 MG/ML
12.5 INJECTION INTRAMUSCULAR; INTRAVENOUS; SUBCUTANEOUS
Status: DISCONTINUED | OUTPATIENT
Start: 2017-08-10 | End: 2017-08-10 | Stop reason: HOSPADM

## 2017-08-10 RX ORDER — GLYCOPYRROLATE 0.2 MG/ML
INJECTION INTRAMUSCULAR; INTRAVENOUS AS NEEDED
Status: DISCONTINUED | OUTPATIENT
Start: 2017-08-10 | End: 2017-08-10 | Stop reason: SURG

## 2017-08-10 RX ORDER — SODIUM CHLORIDE 9 MG/ML
INJECTION, SOLUTION INTRAVENOUS
Status: DISCONTINUED
Start: 2017-08-10 | End: 2017-08-10 | Stop reason: HOSPADM

## 2017-08-10 RX ORDER — MIDAZOLAM HYDROCHLORIDE 1 MG/ML
INJECTION INTRAMUSCULAR; INTRAVENOUS
Status: COMPLETED
Start: 2017-08-10 | End: 2017-08-10

## 2017-08-10 RX ORDER — PROPOFOL 10 MG/ML
INJECTION, EMULSION INTRAVENOUS AS NEEDED
Status: DISCONTINUED | OUTPATIENT
Start: 2017-08-10 | End: 2017-08-10 | Stop reason: SURG

## 2017-08-10 RX ORDER — HYDROCODONE BITARTRATE AND ACETAMINOPHEN 5; 325 MG/1; MG/1
2 TABLET ORAL EVERY 4 HOURS PRN
Status: DISCONTINUED | OUTPATIENT
Start: 2017-08-10 | End: 2017-08-10 | Stop reason: HOSPADM

## 2017-08-10 RX ORDER — PROMETHAZINE HYDROCHLORIDE 25 MG/1
25 TABLET ORAL ONCE AS NEEDED
Status: COMPLETED | OUTPATIENT
Start: 2017-08-10 | End: 2017-08-10

## 2017-08-10 RX ADMIN — MIDAZOLAM HYDROCHLORIDE 2 MG: 1 INJECTION, SOLUTION INTRAMUSCULAR; INTRAVENOUS at 10:03

## 2017-08-10 RX ADMIN — Medication 1 MG: at 11:31

## 2017-08-10 RX ADMIN — Medication 2 MG: at 10:47

## 2017-08-10 RX ADMIN — PROMETHAZINE HYDROCHLORIDE 6.25 MG: 25 INJECTION, SOLUTION INTRAMUSCULAR; INTRAVENOUS at 11:24

## 2017-08-10 RX ADMIN — ROCURONIUM BROMIDE 25 MG: 10 INJECTION INTRAVENOUS at 10:16

## 2017-08-10 RX ADMIN — GLYCOPYRROLATE 0.2 MG: 0.2 INJECTION, SOLUTION INTRAMUSCULAR; INTRAVENOUS at 10:47

## 2017-08-10 RX ADMIN — PROPOFOL 150 MG: 10 INJECTION, EMULSION INTRAVENOUS at 10:16

## 2017-08-10 RX ADMIN — FENTANYL CITRATE 50 MCG: 50 INJECTION, SOLUTION INTRAMUSCULAR; INTRAVENOUS at 10:21

## 2017-08-10 RX ADMIN — ONDANSETRON 4 MG: 2 INJECTION INTRAMUSCULAR; INTRAVENOUS at 10:46

## 2017-08-10 RX ADMIN — SODIUM CHLORIDE, POTASSIUM CHLORIDE, SODIUM LACTATE AND CALCIUM CHLORIDE 1000 ML: 600; 310; 30; 20 INJECTION, SOLUTION INTRAVENOUS at 09:39

## 2017-08-10 RX ADMIN — Medication 20 MG: at 10:21

## 2017-08-10 RX ADMIN — KETOROLAC TROMETHAMINE 30 MG: 30 INJECTION, SOLUTION INTRAMUSCULAR at 10:46

## 2017-08-10 RX ADMIN — SODIUM CHLORIDE, POTASSIUM CHLORIDE, SODIUM LACTATE AND CALCIUM CHLORIDE: 600; 310; 30; 20 INJECTION, SOLUTION INTRAVENOUS at 10:13

## 2017-08-10 RX ADMIN — PROMETHAZINE HYDROCHLORIDE 6.25 MG: 25 INJECTION INTRAMUSCULAR; INTRAVENOUS at 11:24

## 2017-08-10 RX ADMIN — HYDROMORPHONE HYDROCHLORIDE 1 MG: 1 INJECTION, SOLUTION INTRAMUSCULAR; INTRAVENOUS; SUBCUTANEOUS at 11:31

## 2017-08-10 RX ADMIN — FENTANYL CITRATE 50 MCG: 50 INJECTION, SOLUTION INTRAMUSCULAR; INTRAVENOUS at 10:30

## 2017-08-10 RX ADMIN — MIDAZOLAM HYDROCHLORIDE 2 MG: 1 INJECTION INTRAMUSCULAR; INTRAVENOUS at 10:03

## 2017-08-10 RX ADMIN — DEXAMETHASONE SODIUM PHOSPHATE 8 MG: 4 INJECTION, SOLUTION INTRAMUSCULAR; INTRAVENOUS at 10:16

## 2017-08-10 NOTE — PLAN OF CARE
Problem: Perioperative Period (Adult)  Intervention: Promote Pulmonary Hygiene and Secretion Clearance    08/10/17 1131   Promote Aggressive Pulmonary Hygiene/Secretion Management   Cough And Deep Breathing done with encouragement   Activity   Activity Type bedrest   Positioning   Head Of Bed (HOB) Position HOB at 30-45 degrees       Intervention: Promote Effective Elimination    08/10/17 1116   Manage Acute Burn Pain   Bowel Intervention commode/bedpan at bedside   Genitourinary () Interventions   Urinary Elimination Promotion positioned for ease of voiding       Intervention: Monitor/Manage Pain    08/10/17 1131   Safety Interventions   Medication Review/Management medications reviewed   Manage Acute Burn Pain   Pain Management Interventions medicated         Goal: Signs and Symptoms of Listed Potential Problems Will be Absent or Manageable (Perioperative Period)  Outcome: Ongoing (interventions implemented as appropriate)    08/10/17 1200   Perioperative Period   Problems Assessed (Perioperative Period) all   Problems Present (Perioperative Period) pain;other (see comments)  (nausea)   Pt meets PACU discharge criteria.

## 2017-08-10 NOTE — ANESTHESIA POSTPROCEDURE EVALUATION
Patient: Laura Thomson    Procedure Summary     Date Anesthesia Start Anesthesia Stop Room / Location    08/10/17 1013 1102 [facility] / [facility]       Procedure Diagnosis Surgeon Provider    DIAGNOSTIC LAPAROSCOPY, LEFT SALPINGO-OOPHORECTOMY (N/A Abdomen) Pelvic pain  (Pelvic pain [R10.2]) MD Tiago Springer CRNA          Anesthesia Type: general  Last vitals  BP        Temp        Pulse       Resp        SpO2          Post Anesthesia Care and Evaluation    Patient location during evaluation: PACU  Patient participation: complete - patient participated  Level of consciousness: awake  Pain score: 0  Pain management: satisfactory to patient  Airway patency: patent  Anesthetic complications: No anesthetic complications  PONV Status: none  Cardiovascular status: acceptable and hemodynamically stable  Respiratory status: acceptable  Hydration status: acceptable

## 2017-08-10 NOTE — DISCHARGE INSTRUCTIONS
To assist you in voiding:  Drink plenty of fluids  Listen to running water while attempting to void.    If you are unable to urinate and you have an uncomfortable urge to void or it has been   6 hours since you were discharged, return to the Emergency Room    Rest today  No pushing,pulling,tugging,heavy lifting, or strenuous activity   No major decision making,driving,or drinking alcoholic beverages for 24 hours due to the sedation you received  Always use good hand hygiene/washing technique  No driving on pain medications

## 2017-08-10 NOTE — OP NOTE
[facility]  : 1985  MRN: [id number]  [facility]: [id number]  Date: 8/10/2017    Operative Report        Pre-op Diagnosis:  Pelvic pain [R10.2]   Ovarian cyst   Post-op Diagnosis:  Post-Op Diagnosis Codes:     * Pelvic pain [R10.2]       Ovarian cyst   Procedure: Procedure(s):  DIAGNOSTIC LAPAROSCOPY, LEFT SALPINGO-OOPHORECTOMY   Surgeon: Keren Thomas M.D.   Assist: RITA Singh   Anesthesia: General   Estimated Blood Loss: 10 mls   ABx: None   Drains:   Caraballo   Specimens:  Left ovary and tube   Findings: Large simple cyst of left ovary with minimal normal ovarian tissue noted.  Right adnexa normal.    Complications: none   Indications: See H&P     Description of Procedure:  After the appropriate time out and after adequate dosing of anesthesia, the patient was prepped and draped in the usual sterile fashion.  She was placed in the dorsal lithotomy position using Jake stirrups.  A caraballo catheter had been placed per nursing for drainage during the procedure.  A weighted speculum was placed in the vagina.  The anterior lip of the cervix was grasped with a single tooth tenaculum.  An acorn cannula was placed through the cervix to be used for manipulation during the procedure.  A 2 cm infraumbilical skin incision was made with the knife.  A 10 mm trocar was inserted under direct visualization with the Optiview without any complications.  The abdomen was insufflated with carbon dioxide being sure to keep the pressure less than 15 mmHg.  The patient was placed in Trendelenburg position.  Two ancillary 5 mm trocars were placed in both the right and left lower quadrants, lateral to the epigastric vessels, under direct visualization without any complications.  The pelvis was explored with the above findings noted.  The ureters were identified and noted to be well out of the operative fields at all times.  An attempt was made with ovarian cystectomy initially but given minimal to no normal  ovarian tissue left, decision was made to proceed with LSO.  Using the LigaSure device, the infundibulopelvic ligament was cauterized and transected freeing the ovarian and adnexal attachments on the left removing the left ovary and tube.  The specimen was placed in an Endopouch and retrieved through the umbilical port with the use of a 5 mm camera lens in the right lower quadrant trocar sleeve. The pelvis was irrigated and suctioned.  Inspection revealed adequate hemostasis at the surgical sites.  The abdomen had been released of carbon dioxide and the site of surgical excision was noted to be hemostatic.  The ancillary trocar sleeves were also removed and noted to be hemostatic.  After full desufflation, the umbilical port was also removed.  The umbilical fascia was closed with 0 Vicryl.  The skin was then closed at all sites using 4-0 nylon in an interrupted fashion.  Dressings were placed.  The single tooth tenaculum was removed.  The cervix was noted to be hemostatic..  All instrument and sponge counts were correct at the end of the procedure.  The patient tolerated the procedure well and was taken to the postoperative recovery room in stable condition.     Keren Thomas M.D.  8/10/2017  10:49 AM

## 2017-08-10 NOTE — ANESTHESIA PROCEDURE NOTES
Airway  Urgency: elective    Airway not difficult    General Information and Staff    Patient location during procedure: OR  CRNA: RAYMON LARA    Indications and Patient Condition  Indications for airway management: airway protection    Preoxygenated: yes  Mask difficulty assessment: 1 - vent by mask    Final Airway Details  Final airway type: endotracheal airway      Successful airway: ETT  Cuffed: yes   Successful intubation technique: direct laryngoscopy  Facilitating devices/methods: cricoid pressure  Endotracheal tube insertion site: oral  ETT size: 7.0 mm  Cormack-Lehane Classification: grade IIa - partial view of glottis  Placement verified by: chest auscultation   Measured from: lips  ETT to lips (cm): 20  Number of attempts at approach: 1

## 2017-08-10 NOTE — ANESTHESIA PREPROCEDURE EVALUATION
Anesthesia Evaluation     Patient summary reviewed and Nursing notes reviewed   history of anesthetic complications ( hx of tolerance on induction): prolonged sedation  NPO Solid Status: > 8 hours  NPO Liquid Status: > 8 hours     Airway   Mallampati: I  TM distance: >3 FB  no difficulty expected  Dental - normal exam     Pulmonary    (+) recent URI resolved, decreased breath sounds,   (-) not a smoker  Cardiovascular - normal exam  Exercise tolerance: good (4-7 METS)    (+) dysrhythmias (hx  neg holter) Tachycardia,       Neuro/Psych  (+) headaches, psychiatric history Depression,    GI/Hepatic/Renal/Endo    (+)  GERD, hyperthyroidism    Musculoskeletal     (+) arthralgias,   Abdominal  - normal exam   Substance History      OB/GYN          Other   (+) arthritis     ROS/Med Hx Other: ekg sr  Lab reviewed  cxr nad                                    Anesthesia Plan    ASA 2     general   (Risks and benefits discussed including risk of aspiration, recall and dental damage. All patient questions answered. Will continue with POC.)  intravenous induction   Anesthetic plan and risks discussed with patient.

## 2017-08-11 ENCOUNTER — TELEPHONE (OUTPATIENT)
Dept: OBSTETRICS AND GYNECOLOGY | Facility: CLINIC | Age: 32
End: 2017-08-11

## 2017-08-11 NOTE — TELEPHONE ENCOUNTER
----- Message from Estefani Doran sent at 8/11/2017 10:56 AM EDT -----  Contact: PT  SHAWN WANTS YOU TO CALL HER AT HER MOTHER'S HOUSE...[phone].  THANKS

## 2017-08-11 NOTE — TELEPHONE ENCOUNTER
Spoke with patient and she wanted to see when she would be able to go back to work. 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

## 2017-08-15 LAB
LAB AP CASE REPORT: NORMAL
Lab: NORMAL
PATH REPORT.FINAL DX SPEC: NORMAL

## 2017-08-16 ENCOUNTER — OFFICE VISIT (OUTPATIENT)
Dept: OBSTETRICS AND GYNECOLOGY | Facility: CLINIC | Age: 32
End: 2017-08-16

## 2017-08-16 VITALS
DIASTOLIC BLOOD PRESSURE: 60 MMHG | WEIGHT: 176.2 LBS | BODY MASS INDEX: 31.22 KG/M2 | SYSTOLIC BLOOD PRESSURE: 102 MMHG | HEIGHT: 63 IN

## 2017-08-16 DIAGNOSIS — Z98.890 STATUS POST LAPAROSCOPY: Primary | ICD-10-CM

## 2017-08-16 PROCEDURE — 99024 POSTOP FOLLOW-UP VISIT: CPT | Performed by: PHYSICIAN ASSISTANT

## 2017-08-16 NOTE — PROGRESS NOTES
"Subjective   Chief Complaint   Patient presents with   • Post-op      1 week DX lap LSO 4 stitches removed    • Abdominal Cramping     bloating     /60 (BP Location: Right arm, Patient Position: Sitting, Cuff Size: Adult)  Ht 63\" (160 cm)  Wt 176 lb 3.2 oz (79.9 kg)  LMP 2017  BMI 31.21 kg/m2   Laura Thomson is a 31 y.o. year old  presenting to be seen for post op visit  She is 6 days post op laparoscopy with LSO  She is doing well-normal bowel and bladder function-good bowel movements and flatus  Pathology benign-hemorrhagic ovarian  cyst    Past Medical History:   Diagnosis Date   • Abdominal pain, periumbilical    • Abdominal pain, RLQ (right lower quadrant)    • Acute pharyngitis    • Appetite lost    • Chest pain    • Depression    • Diarrhea    • Difficulty swallowing    • Fatigue    • Fracture     RIGHT WRIST TWICE, LEFT HAND   • GERD (gastroesophageal reflux disease)    • History of colonoscopy    • History of ovarian cyst    • Hyperthyroidism     hyperactive taken off medicine when pregnant not placed back on   • Ovarian cyst, bilateral    • Spinal headache    • Upper respiratory infection    • Vomiting         Current Outpatient Prescriptions:   •  ibuprofen (ADVIL,MOTRIN) 800 MG tablet, Take 1 tablet by mouth 3 (Three) Times a Day., Disp: 90 tablet, Rfl: 5  •  norgestimate-ethinyl estradiol (SPRINTEC 28) 0.25-35 MG-MCG per tablet, Take 1 package by mouth Daily., Disp: , Rfl:   •  glycopyrrolate (ROBINUL) 2 MG tablet, Take 1 tablet by mouth Daily. Take 1 tablet daily.  Use as directed., Disp: 30 tablet, Rfl: 1  •  naproxen (NAPROSYN) 500 MG tablet, Take 1 tablet by mouth 2 (Two) Times a Day As Needed for Mild Pain (1-3) or Moderate Pain (4-6)., Disp: 14 tablet, Rfl: 0  •  ondansetron (ZOFRAN) 8 MG tablet, Take 1 tablet by mouth Every 8 (Eight) Hours As Needed for Nausea or Vomiting., Disp: 30 tablet, Rfl: 0  •  ondansetron ODT (ZOFRAN-ODT) 4 MG disintegrating tablet, Take 1 " tablet by mouth Every 6 (Six) Hours As Needed for Nausea or Vomiting., Disp: 10 tablet, Rfl: 0   No Known Allergies   Past Surgical History:   Procedure Laterality Date   •  SECTION  2008    DR NAVARRETE   •  SECTION  10/30/2015    DR VASQUEZ   •  SECTION  2010    DR ESPINOZA   • CHOLECYSTECTOMY     • COLONOSCOPY     • COLONOSCOPY N/A 2017    Procedure: COLONOSCOPY WITH COLD BIOPSY POLYPECTOMY; BIOPSIES , QUICK CLIP;  Surgeon: Perfecto Mcknight MD;  Location: [facility] ENDOSCOPY;  Service:    • DENTAL PROCEDURE      REPORTS WISDOM TEETH EXTRACTED   • DIAGNOSTIC LAPAROSCOPY  2005    DR ESPINOZA   • DIAGNOSTIC LAPAROSCOPY  2012    DR ESPINOZA   • DIAGNOSTIC LAPAROSCOPY N/A 8/10/2017    Procedure: DIAGNOSTIC LAPAROSCOPY, LEFT SALPINGO-OOPHORECTOMY;  Surgeon: Keren Espinoza MD;  Location: [facility] OR;  Service:    • LYSIS OF ABDOMINAL ADHESIONS  2005    DR ESPINOZA   • LYSIS OF ABDOMINAL ADHESIONS  2012    DR ESPINOZA   • NISSEN FUNDOPLICATION     • OVARIAN CYST REMOVAL  2005    DR ESPINOZA   • STOMACH SURGERY      PATIENT REPORTS STOMACH WAS WRAPPED AROUND ESOPHAGUS   • UPPER GASTROINTESTINAL ENDOSCOPY        Social History     Social History   • Marital status:      Spouse name: N/A   • Number of children: N/A   • Years of education: N/A     Occupational History   • Not on file.     Social History Main Topics   • Smoking status: Never Smoker   • Smokeless tobacco: Never Used   • Alcohol use No   • Drug use: No   • Sexual activity: Defer     Other Topics Concern   • Not on file     Social History Narrative      Family History   Problem Relation Age of Onset   • Coronary artery disease Mother    • Diabetes Mother    • Hypertension Mother    • Kidney disease Father    • Skin cancer Father    • Stroke Other    • Hypertension Other    • Diabetes Other    • Colon cancer Other        The following portions of the patient's history were reviewed and updated as  appropriate:problem list, current medications, allergies, past family history, past medical history, past social history and past surgical history.         Objective     Physical Exam   Constitutional: She appears well-developed and well-nourished. She is cooperative. No distress.   Abdominal: Soft. Normal appearance and bowel sounds are normal. She exhibits no distension. There is no tenderness. There is no rigidity and no guarding.   Incisions healing well   Neurological: She is alert.     Laura was seen today for post-op and abdominal cramping.    Diagnoses and all orders for this visit:    Status post laparoscopy           This note was electronically signed.    Sarah Hilton PA-C   August 16, 2017

## 2017-08-17 ENCOUNTER — TELEPHONE (OUTPATIENT)
Dept: OBSTETRICS AND GYNECOLOGY | Facility: CLINIC | Age: 32
End: 2017-08-17

## 2017-08-21 ENCOUNTER — TELEPHONE (OUTPATIENT)
Dept: OBSTETRICS AND GYNECOLOGY | Facility: CLINIC | Age: 32
End: 2017-08-21

## 2017-08-21 ENCOUNTER — TREATMENT (OUTPATIENT)
Dept: PHYSICAL THERAPY | Facility: CLINIC | Age: 32
End: 2017-08-21

## 2017-08-21 DIAGNOSIS — M25.572 LEFT ANKLE PAIN, UNSPECIFIED CHRONICITY: Primary | ICD-10-CM

## 2017-08-21 PROCEDURE — 97110 THERAPEUTIC EXERCISES: CPT | Performed by: PHYSICAL THERAPIST

## 2017-08-21 NOTE — TELEPHONE ENCOUNTER
Late entry:   Pt had called - 8-17 after office hours -  stated she had locked herself out of the house and her child was left in the house - states she ran to find help to get the door open.... States she was concerned she may have hurt herself.  Laura did not sound in distress or pain - thinks she will be OK but just wanted to check.    Encouraged to rest - anticipate discomfort will resolve - if any doubt - will need to go to ER.    Stated she understood.

## 2017-08-21 NOTE — TELEPHONE ENCOUNTER
----- Message from Galina Fabian sent at 8/21/2017  8:43 AM EDT -----  Contact: PT  PT STATES SHES RELEASED TO RETURN TO WORK ON Thursday BUT WANTING TO RETURN TOMORROW, STATES SHE'S FEELING FINE AND LIFTING HER CHILD. REQUESTING A NOTE IF POSSIBLE    ASHUTOSH  [phone]

## 2017-08-21 NOTE — PROGRESS NOTES
Physical Therapy Daily Progress Note    Subjective   Laura Thomson reports that she is feeling very good, she has not had any falls and just has some occasional soreness in the the ankle, and lower leg.     Objective   See Exercise, Manual, and Modality Logs for complete treatment.       Assessment/Plan     Pt has progressed very well with therapy and she is able to do more functional and dynamic activity in the clinic without c/o exacerbation of symptoms. Pt should be appropriate for return to work.     Progress per Plan of Care           Manual Therapy:         mins  11942;  Therapeutic Exercise:    55     mins  69583;     Neuromuscular Elvin:        mins  24680;    Therapeutic Activity:          mins  28369;     Gait Training:           mins  36008;     Ultrasound:          mins  07223;    Electrical Stimulation:         mins  14251 ( );  Dry Needling          mins self-pay    Timed Treatment:   55   mins   Total Treatment:     58   mins    Montrell Vazquez, PT  Physical Therapist

## 2017-10-23 ENCOUNTER — TELEPHONE (OUTPATIENT)
Dept: OBSTETRICS AND GYNECOLOGY | Facility: CLINIC | Age: 32
End: 2017-10-23

## 2017-10-23 RX ORDER — NORGESTIMATE AND ETHINYL ESTRADIOL 0.25-0.035
1 KIT ORAL DAILY
Qty: 28 TABLET | Refills: 1 | Status: SHIPPED | OUTPATIENT
Start: 2017-10-23 | End: 2017-12-18

## 2017-10-23 NOTE — TELEPHONE ENCOUNTER
----- Message from Estefani Doran sent at 10/23/2017  1:24 PM EDT -----  Contact: PT  PT IS SCHEDULED WITH DR ESPINOZA ON 12/7/17 FOR ANNUAL.  PLEASE SEND IN 2 REFILLS OF SPRINTEC TO [facility].  THANKS

## 2017-11-15 ENCOUNTER — OFFICE VISIT (OUTPATIENT)
Dept: OBSTETRICS AND GYNECOLOGY | Facility: CLINIC | Age: 32
End: 2017-11-15

## 2017-11-15 VITALS
BODY MASS INDEX: 31.36 KG/M2 | HEIGHT: 63 IN | DIASTOLIC BLOOD PRESSURE: 72 MMHG | WEIGHT: 177 LBS | SYSTOLIC BLOOD PRESSURE: 128 MMHG

## 2017-11-15 DIAGNOSIS — N92.6 IRREGULAR MENSTRUAL BLEEDING: ICD-10-CM

## 2017-11-15 DIAGNOSIS — N95.1 MENOPAUSAL SYMPTOMS: Primary | ICD-10-CM

## 2017-11-15 PROCEDURE — 99214 OFFICE O/P EST MOD 30 MIN: CPT | Performed by: OBSTETRICS & GYNECOLOGY

## 2017-11-15 RX ORDER — DESOGESTREL AND ETHINYL ESTRADIOL 21-5 (28)
1 KIT ORAL DAILY
Qty: 28 TABLET | Refills: 12 | Status: SHIPPED | OUTPATIENT
Start: 2017-11-15 | End: 2018-07-02

## 2017-11-15 NOTE — PROGRESS NOTES
Subjective  Chief Complaint   Patient presents with   • HORMONAL SYMPTOMS     Patient complains of hot flashes, mood changes.      Patient is 32 y.o.  here for evaluation of hot flashes, night sweats, and insomnia.  Pt reports acute onset of symptoms.  Pt reports this is affecting quality of life as patient unable to sleep.  Pt also with mood swings and irritable.  Pt unsure regarding timing of menopause in family.  Pt still on ocps; medication has not changed.  Pt also reports now irregular bleeding; will start then stop for day and restart; patient also with bleeding into the new pill pack.  Pt with no other changes in health or medications.  Pt with no changes in stress.    History  Past Medical History:   Diagnosis Date   • Abdominal pain, periumbilical    • Abdominal pain, RLQ (right lower quadrant)    • Acute pharyngitis    • Appetite lost    • Chest pain    • Depression    • Diarrhea    • Difficulty swallowing    • Fatigue    • Fracture     RIGHT WRIST TWICE, LEFT HAND   • GERD (gastroesophageal reflux disease)    • History of colonoscopy    • History of ovarian cyst    • Hyperthyroidism     hyperactive taken off medicine when pregnant not placed back on   • Ovarian cyst, bilateral    • Spinal headache    • Upper respiratory infection    • Vomiting      Current Outpatient Prescriptions on File Prior to Visit   Medication Sig Dispense Refill   • ibuprofen (ADVIL,MOTRIN) 800 MG tablet Take 1 tablet by mouth 3 (Three) Times a Day. 90 tablet 5   • norgestimate-ethinyl estradiol (SPRINTEC 28) 0.25-35 MG-MCG per tablet Take 1 tablet by mouth Daily. 28 tablet 1   • [DISCONTINUED] glycopyrrolate (ROBINUL) 2 MG tablet Take 1 tablet by mouth Daily. Take 1 tablet daily.  Use as directed. 30 tablet 1   • [DISCONTINUED] naproxen (NAPROSYN) 500 MG tablet Take 1 tablet by mouth 2 (Two) Times a Day As Needed for Mild Pain (1-3) or Moderate Pain (4-6). 14 tablet 0   • [DISCONTINUED] ondansetron (ZOFRAN) 8 MG tablet  Take 1 tablet by mouth Every 8 (Eight) Hours As Needed for Nausea or Vomiting. 30 tablet 0   • [DISCONTINUED] ondansetron ODT (ZOFRAN-ODT) 4 MG disintegrating tablet Take 1 tablet by mouth Every 6 (Six) Hours As Needed for Nausea or Vomiting. 10 tablet 0     No current facility-administered medications on file prior to visit.      No Known Allergies  Past Surgical History:   Procedure Laterality Date   •  SECTION  2008    DR NAVARRETE   •  SECTION  10/30/2015    DR VASQUEZ   •  SECTION  2010    DR ESPINOZA   • CHOLECYSTECTOMY     • COLONOSCOPY     • COLONOSCOPY N/A 2017    Procedure: COLONOSCOPY WITH COLD BIOPSY POLYPECTOMY; BIOPSIES , QUICK CLIP;  Surgeon: Perfecto Mcknight MD;  Location: [facility] ENDOSCOPY;  Service:    • DENTAL PROCEDURE      REPORTS WISDOM TEETH EXTRACTED   • DIAGNOSTIC LAPAROSCOPY  2005    DR ESPINOZA   • DIAGNOSTIC LAPAROSCOPY  2012    DR ESPINOZA   • DIAGNOSTIC LAPAROSCOPY N/A 8/10/2017    Procedure: DIAGNOSTIC LAPAROSCOPY, LEFT SALPINGO-OOPHORECTOMY;  Surgeon: Keren Espinoza MD;  Location: [facility] OR;  Service:    • LYSIS OF ABDOMINAL ADHESIONS  2005    DR ESPINOZA   • LYSIS OF ABDOMINAL ADHESIONS  2012    DR ESPINOZA   • NISSEN FUNDOPLICATION     • OVARIAN CYST REMOVAL  2005    DR ESPINOZA   • STOMACH SURGERY      PATIENT REPORTS STOMACH WAS WRAPPED AROUND ESOPHAGUS   • UPPER GASTROINTESTINAL ENDOSCOPY       Family History   Problem Relation Age of Onset   • Coronary artery disease Mother    • Diabetes Mother    • Hypertension Mother    • Kidney disease Father    • Skin cancer Father    • Stroke Other    • Hypertension Other    • Diabetes Other    • Colon cancer Other      Social History     Social History   • Marital status:      Spouse name: N/A   • Number of children: N/A   • Years of education: N/A     Social History Main Topics   • Smoking status: Never Smoker   • Smokeless tobacco: Never Used   • Alcohol use No   • Drug use: No   •  "Sexual activity: Defer     Other Topics Concern   • None     Social History Narrative     Review of Systems  All systems were reviewed and negative except for:  Behavioral/Psych: positive for  unstable mood  Endocrine: positive for  hot flashes     Objective  Vitals:    11/15/17 1003   BP: 128/72   Weight: 177 lb (80.3 kg)   Height: 63\" (160 cm)     Physical Exam:  General Appearance: alert, appears stated age and cooperative  Head: normocephalic, without obvious abnormality and atraumatic  Eyes: lids and lashes normal, conjunctivae and sclerae normal, no icterus, no pallor, corneas clear and PERRLA  Ears: ears appear intact with no abnormalities noted  Nose: nares normal, septum midline, mucosa normal and no drainage  Neck: suppple, trachea midline and no thyromegaly  Lungs: clear to auscultation, respirations regular, respirations even and respirations unlabored  Heart: regular rhythm and normal rate, normal S1, S2, no murmur, gallop, or rubs and no click  Breasts: Not performed.  Abdomen: normal bowel sounds, no masses, no hepatomegaly, no splenomegaly, soft non-tender, no guarding and no rebound tenderness  Pelvic: Not performed.  Extremities: moves extremities well, no edema, no cyanosis and no redness  Skin: no bleeding, bruising or rash and no lesions noted  Lymph Nodes: no palpable adenopathy  Psych: normal mood and affect, oriented to person, time and place, thought content organized and appropriate judgment    Lab Review   TSH - last one 12/2016    Imaging   No data reviewed    Assessment/Plan    Problem List Items Addressed This Visit     None      Visit Diagnoses     Menopausal symptoms    -  Primary New  Patient with symptoms as noted above; pt to return on Friday end of placebo week for labs as noted.  Plan pending results of labs.  Will change ocps given above findings and irregular bleeding to start this pack.  Pt to f/u as discussed.    Relevant Orders    Follicle Stimulating Hormone    Estradiol    " Testosterone, Free, Total    TSH    Irregular menstrual bleeding    New  Patient with irregular bleeding on ocps.  Rx new ocp given as noted.  Pt to return for labs.  F/U post first pack for reevaluation and possibly further testing pending results.            Follow up as discussed     This note was electronically signed.  Keren Thomas M.D.

## 2017-11-19 LAB
ESTRADIOL SERPL-MCNC: 89.6 PG/ML
FSH SERPL-ACNC: 7.2 MIU/ML
TESTOST FREE SERPL-MCNC: 0.4 PG/ML (ref 0–4.2)
TESTOST SERPL-MCNC: 13 NG/DL (ref 8–48)
TSH SERPL DL<=0.005 MIU/L-ACNC: 0.28 MIU/ML (ref 0.47–4.68)

## 2017-11-20 ENCOUNTER — RESULTS ENCOUNTER (OUTPATIENT)
Dept: OBSTETRICS AND GYNECOLOGY | Facility: CLINIC | Age: 32
End: 2017-11-20

## 2017-11-20 ENCOUNTER — OFFICE VISIT (OUTPATIENT)
Dept: INTERNAL MEDICINE | Facility: CLINIC | Age: 32
End: 2017-11-20

## 2017-11-20 VITALS
HEIGHT: 63 IN | SYSTOLIC BLOOD PRESSURE: 122 MMHG | DIASTOLIC BLOOD PRESSURE: 80 MMHG | OXYGEN SATURATION: 98 % | HEART RATE: 96 BPM | TEMPERATURE: 98.5 F | WEIGHT: 175 LBS | BODY MASS INDEX: 31.01 KG/M2 | RESPIRATION RATE: 14 BRPM

## 2017-11-20 DIAGNOSIS — N95.1 MENOPAUSAL SYMPTOMS: ICD-10-CM

## 2017-11-20 DIAGNOSIS — E05.90 HYPERTHYROIDISM: Primary | ICD-10-CM

## 2017-11-20 PROCEDURE — 99213 OFFICE O/P EST LOW 20 MIN: CPT | Performed by: NURSE PRACTITIONER

## 2017-11-21 ENCOUNTER — TELEPHONE (OUTPATIENT)
Dept: INTERNAL MEDICINE | Facility: CLINIC | Age: 32
End: 2017-11-21

## 2017-11-28 ENCOUNTER — HOSPITAL ENCOUNTER (OUTPATIENT)
Dept: ULTRASOUND IMAGING | Facility: HOSPITAL | Age: 32
Discharge: HOME | End: 2017-11-28
Attending: NURSE PRACTITIONER | Admitting: NURSE PRACTITIONER

## 2017-11-28 ENCOUNTER — TELEPHONE (OUTPATIENT)
Dept: INTERNAL MEDICINE | Facility: CLINIC | Age: 32
End: 2017-11-28

## 2017-11-28 DIAGNOSIS — E05.90 HYPERTHYROIDISM: Primary | ICD-10-CM

## 2017-11-28 PROCEDURE — 76536 US EXAM OF HEAD AND NECK: CPT

## 2017-11-28 NOTE — TELEPHONE ENCOUNTER
Patient is coming in this morning downstairs to have a U/S. She mentioned that's he was also suppose to have labs done in regards to her thyroid. The orders is not in the chart for her to have them done. She wants to have them done while she is here for the U/S. Sri is out of the office today and I am needing to see if maybe Amelie could put in orders.

## 2017-11-29 LAB
T3FREE SERPL-MCNC: 3.8 PG/ML (ref 2–4.4)
T4 SERPL-MCNC: 9.5 UG/DL (ref 4.5–12)
THYROPEROXIDASE AB SERPL-ACNC: 7 IU/ML (ref 0–34)
TSH SERPL DL<=0.005 MIU/L-ACNC: 0.48 MIU/ML (ref 0.47–4.68)

## 2017-11-30 ENCOUNTER — TELEPHONE (OUTPATIENT)
Dept: INTERNAL MEDICINE | Facility: CLINIC | Age: 32
End: 2017-11-30

## 2017-11-30 NOTE — TELEPHONE ENCOUNTER
----- Message from LM Blakely sent at 11/29/2017  7:47 AM EST -----  Please let her know that her thyroid labs are all within normal.

## 2017-12-18 ENCOUNTER — OFFICE VISIT (OUTPATIENT)
Dept: INTERNAL MEDICINE | Facility: CLINIC | Age: 32
End: 2017-12-18

## 2017-12-18 VITALS
HEART RATE: 94 BPM | BODY MASS INDEX: 30.69 KG/M2 | DIASTOLIC BLOOD PRESSURE: 64 MMHG | SYSTOLIC BLOOD PRESSURE: 108 MMHG | WEIGHT: 173.19 LBS | HEIGHT: 63 IN | OXYGEN SATURATION: 97 % | TEMPERATURE: 99.1 F | RESPIRATION RATE: 16 BRPM

## 2017-12-18 DIAGNOSIS — J01.40 ACUTE PANSINUSITIS, RECURRENCE NOT SPECIFIED: Primary | ICD-10-CM

## 2017-12-18 PROBLEM — R19.7 DIARRHEA: Status: RESOLVED | Noted: 2017-04-18 | Resolved: 2017-12-18

## 2017-12-18 PROBLEM — R11.0 NAUSEA: Status: RESOLVED | Noted: 2017-04-18 | Resolved: 2017-12-18

## 2017-12-18 PROCEDURE — 99213 OFFICE O/P EST LOW 20 MIN: CPT | Performed by: NURSE PRACTITIONER

## 2017-12-18 RX ORDER — FLUCONAZOLE 150 MG/1
150 TABLET ORAL ONCE
Qty: 1 TABLET | Refills: 0 | Status: SHIPPED | OUTPATIENT
Start: 2017-12-18 | End: 2017-12-18

## 2017-12-18 RX ORDER — AMOXICILLIN AND CLAVULANATE POTASSIUM 875; 125 MG/1; MG/1
1 TABLET, FILM COATED ORAL 2 TIMES DAILY
Qty: 20 TABLET | Refills: 0 | Status: SHIPPED | OUTPATIENT
Start: 2017-12-18 | End: 2017-12-28

## 2017-12-18 NOTE — PROGRESS NOTES
Chief Complaint / Reason:      Chief Complaint   Patient presents with   • Cough     onset Friday   • Nasal Congestion     and runny nose   • Fever     possible       Subjective     Sinusitis   This is a new problem. The current episode started in the past 7 days. The maximum temperature recorded prior to her arrival was 100.4 - 100.9 F. Associated symptoms include chills, congestion, coughing, ear pain, headaches, a hoarse voice, neck pain, sinus pressure, sneezing, a sore throat and swollen glands. Past treatments include lying down and oral decongestants. The treatment provided no relief.       History taken from: patient    PMH/FH/Social History were reviewed and updated appropriately in the electronic medical record.     Review of Systems:   Review of Systems   Constitutional: Positive for chills and fatigue.   HENT: Positive for congestion, ear pain, hoarse voice, sinus pain, sinus pressure, sneezing and sore throat.    Respiratory: Positive for cough.    Cardiovascular: Negative.    Gastrointestinal: Negative.    Musculoskeletal: Positive for neck pain.   Skin: Negative.    Neurological: Positive for headaches.   Hematological: Negative for adenopathy.     All other systems were reviewed and are negative.  Exceptions are noted in the subjective or above.      Objective     Vital Signs  Vitals:    12/18/17 1744   BP: 108/64   Pulse: 94   Resp: 16   Temp: 99.1 °F (37.3 °C)   SpO2: 97%       Body mass index is 30.68 kg/(m^2).    Physical Exam   Constitutional: She is oriented to person, place, and time. She appears well-developed and well-nourished.   HENT:   Head: Normocephalic.   Right Ear: External ear normal. Tympanic membrane is bulging. A middle ear effusion is present.   Left Ear: External ear normal. Tympanic membrane is bulging. A middle ear effusion is present.   Nose: Mucosal edema, rhinorrhea and sinus tenderness present. Right sinus exhibits maxillary sinus tenderness and frontal sinus tenderness.  Left sinus exhibits maxillary sinus tenderness and frontal sinus tenderness.   Mouth/Throat: Mucous membranes are dry. Posterior oropharyngeal erythema present.   Cardiovascular: Normal rate, regular rhythm, normal heart sounds and intact distal pulses.    Pulmonary/Chest: Effort normal. She has decreased breath sounds in the right lower field and the left lower field.   Abdominal: Soft. Bowel sounds are normal.   Lymphadenopathy:     She has no cervical adenopathy.   Neurological: She is alert and oriented to person, place, and time.   Skin: Skin is warm and dry.   Psychiatric: She has a normal mood and affect. Her behavior is normal. Judgment and thought content normal.   Nursing note and vitals reviewed.    Medication Review:     Current Outpatient Prescriptions:   •  desogestrel-ethinyl estradiol (KARIVA) 0.15-0.02/0.01 MG (21/5) per tablet, Take 1 tablet by mouth Daily., Disp: 28 tablet, Rfl: 12  •  ibuprofen (ADVIL,MOTRIN) 800 MG tablet, Take 1 tablet by mouth 3 (Three) Times a Day., Disp: 90 tablet, Rfl: 5  •  amoxicillin-clavulanate (AUGMENTIN) 875-125 MG per tablet, Take 1 tablet by mouth 2 (Two) Times a Day for 10 days., Disp: 20 tablet, Rfl: 0  •  FLUoxetine HCl, PMDD, 10 MG capsule, Take 10 mg by mouth Daily., Disp: 30 each, Rfl: 6    Assessment/Plan   Laura was seen today for cough, nasal congestion and fever.    Diagnoses and all orders for this visit:    Acute pansinusitis, recurrence not specified  -     amoxicillin-clavulanate (AUGMENTIN) 875-125 MG per tablet; Take 1 tablet by mouth 2 (Two) Times a Day for 10 days.  Increase fluid intake and rest.    Humidification   Mucinex and saline nasal irrigation   Salt water gargles    Motrin or Tylenol for discomfort or fever  Seek Medical Attention   Persistent high fever (>102°F)   Sudden, severe pain in the face or head   Vision issues   Confusion or difficulty thinking clearly   Swelling or redness around eyes   Stiff neck    Other orders  -      fluconazole (DIFLUCAN) 150 MG tablet; Take 1 tablet by mouth 1 (One) Time for 1 dose.        Return if symptoms worsen or fail to improve.    Megha Fregoso, APRN  12/18/2017

## 2017-12-20 ENCOUNTER — OFFICE VISIT (OUTPATIENT)
Dept: OBSTETRICS AND GYNECOLOGY | Facility: CLINIC | Age: 32
End: 2017-12-20

## 2017-12-20 VITALS
HEIGHT: 63 IN | SYSTOLIC BLOOD PRESSURE: 122 MMHG | DIASTOLIC BLOOD PRESSURE: 60 MMHG | BODY MASS INDEX: 30.48 KG/M2 | WEIGHT: 172 LBS

## 2017-12-20 DIAGNOSIS — N95.1 MENOPAUSAL SYMPTOMS: Primary | ICD-10-CM

## 2017-12-20 DIAGNOSIS — N92.6 IRREGULAR MENSTRUAL BLEEDING: ICD-10-CM

## 2017-12-20 DIAGNOSIS — R10.2 PELVIC PAIN IN FEMALE: ICD-10-CM

## 2017-12-20 PROCEDURE — 99213 OFFICE O/P EST LOW 20 MIN: CPT | Performed by: OBSTETRICS & GYNECOLOGY

## 2017-12-20 RX ORDER — FLUOXETINE HYDROCHLORIDE 10 MG/1
10 CAPSULE ORAL EVERY 24 HOURS
Qty: 30 EACH | Refills: 6 | Status: SHIPPED | OUTPATIENT
Start: 2017-12-20 | End: 2018-05-30

## 2017-12-20 NOTE — PROGRESS NOTES
Subjective  Chief Complaint   Patient presents with   • Follow-up     FOLLOW UP ORAL CONTRACEPTIVES, PATIENT ADVISED HAVING MOOD CHANGES AND HOT FLASHES.      Patient is 32 y.o.  here for f/u of ocps with history of irregular menses, heavy and painful.  Pt reports now on 2nd pack of ocps.  Pt does report menses was lighter; lasted from Wed-.  Pt reports pain was slightly improved as well.  Pt's main complaint was menopausal symptoms and mood swings prior to menses which were severe.  Pt with hot flashes, sweats, nausea, as well as significant mood swings.  Pt reports irritable and anxious during that time.  Pt reports severe in nature.  Pt has never been on SSRI in past.  Pt has tolerated the ocps otherwise.    History  Past Medical History:   Diagnosis Date   • Abdominal pain, periumbilical    • Abdominal pain, RLQ (right lower quadrant)    • Acute pharyngitis    • Appetite lost    • Chest pain    • Depression    • Diarrhea    • Difficulty swallowing    • Fatigue    • Fracture     RIGHT WRIST TWICE, LEFT HAND   • GERD (gastroesophageal reflux disease)    • History of colonoscopy    • History of ovarian cyst    • Hyperthyroidism     hyperactive taken off medicine when pregnant not placed back on   • Ovarian cyst, bilateral    • Spinal headache    • Upper respiratory infection    • Vomiting      Current Outpatient Prescriptions on File Prior to Visit   Medication Sig Dispense Refill   • amoxicillin-clavulanate (AUGMENTIN) 875-125 MG per tablet Take 1 tablet by mouth 2 (Two) Times a Day for 10 days. 20 tablet 0   • desogestrel-ethinyl estradiol (KARIVA) 0.15-0.02/0.01 MG (21/5) per tablet Take 1 tablet by mouth Daily. 28 tablet 12   • ibuprofen (ADVIL,MOTRIN) 800 MG tablet Take 1 tablet by mouth 3 (Three) Times a Day. 90 tablet 5     No current facility-administered medications on file prior to visit.      No Known Allergies  Past Surgical History:   Procedure Laterality Date   •  SECTION   "2008    DR NAVARRETE   •  SECTION  10/30/2015    DR VASQUEZ   •  SECTION  2010    DR ESPINOZA   • CHOLECYSTECTOMY     • COLONOSCOPY     • COLONOSCOPY N/A 2017    Procedure: COLONOSCOPY WITH COLD BIOPSY POLYPECTOMY; BIOPSIES , QUICK CLIP;  Surgeon: Perfecto Mcknight MD;  Location: [facility] ENDOSCOPY;  Service:    • DENTAL PROCEDURE      REPORTS WISDOM TEETH EXTRACTED   • DIAGNOSTIC LAPAROSCOPY  2005    DR ESPINOZA   • DIAGNOSTIC LAPAROSCOPY  2012    DR ESPINOZA   • DIAGNOSTIC LAPAROSCOPY N/A 8/10/2017    Procedure: DIAGNOSTIC LAPAROSCOPY, LEFT SALPINGO-OOPHORECTOMY;  Surgeon: Keren Espinoza MD;  Location: [facility] OR;  Service:    • LYSIS OF ABDOMINAL ADHESIONS  2005    DR ESPINOZA   • LYSIS OF ABDOMINAL ADHESIONS  2012    DR ESPINOZA   • NISSEN FUNDOPLICATION     • OVARIAN CYST REMOVAL  2005    DR ESPINOZA   • STOMACH SURGERY      PATIENT REPORTS STOMACH WAS WRAPPED AROUND ESOPHAGUS   • UPPER GASTROINTESTINAL ENDOSCOPY       Family History   Problem Relation Age of Onset   • Coronary artery disease Mother    • Diabetes Mother    • Hypertension Mother    • Kidney disease Father    • Skin cancer Father    • Stroke Other    • Hypertension Other    • Diabetes Other    • Colon cancer Other      Social History     Social History   • Marital status:      Spouse name: N/A   • Number of children: N/A   • Years of education: N/A     Social History Main Topics   • Smoking status: Never Smoker   • Smokeless tobacco: Never Used   • Alcohol use No   • Drug use: No   • Sexual activity: Defer     Other Topics Concern   • None     Social History Narrative     Review of Systems  All systems were reviewed and negative except for:  Genitourinary: postivie for  abnormal menstrual bleeding  Behavioral/Psych: positive for  unstable mood  Endocrine: positive for  hot flashes     Objective  Vitals:    17 1044   BP: 122/60   Weight: 78 kg (172 lb)   Height: 160 cm (63\")     Physical " Exam:  General Appearance: alert, appears stated age and cooperative  Head: normocephalic, without obvious abnormality and atraumatic  Eyes: lids and lashes normal, conjunctivae and sclerae normal, no icterus, no pallor, corneas clear and PERRLA  Ears: ears appear intact with no abnormalities noted  Nose: nares normal, septum midline, mucosa normal and no drainage  Neck: suppple, trachea midline and no thyromegaly  Lungs: clear to auscultation, respirations regular, respirations even and respirations unlabored  Heart: regular rhythm and normal rate, normal S1, S2, no murmur, gallop, or rubs and no click  Breasts: Not performed.  Abdomen: normal bowel sounds, no masses, no hepatomegaly, no splenomegaly, soft non-tender, no guarding and no rebound tenderness  Pelvic: Not performed.  Extremities: moves extremities well, no edema, no cyanosis and no redness  Skin: no bleeding, bruising or rash and no lesions noted  Lymph Nodes: no palpable adenopathy  Psych: normal mood and affect, oriented to person, time and place, thought content organized and appropriate judgment    Lab Review   No data reviewed    Imaging   No data reviewed    Assessment/Plan    Problem List Items Addressed This Visit     None      Visit Diagnoses     Menopausal symptoms    -  Primary  Pt with menopausal symptoms prior to menses.  Discussed etiology for symptoms with decreasing estrogen levels.  Various options discussed with patient.  Given patient just started new ocp with some improvement in other symptoms, recommend continuation with starting SSRI.  Rx Sarafem given to take week prior to and week of menses.  Instructions and precautions given.  Pt in agreement with plan.    Irregular menstrual bleeding    Improved  Pt has seen some improvement with symptoms on first cycle of ocps.  Plan cont current pill.    Pelvic pain in female    Improved  See plan above            Follow up 3 months     This note was electronically signed.  Keren Thomas  M.D.

## 2018-01-29 ENCOUNTER — OFFICE VISIT (OUTPATIENT)
Dept: INTERNAL MEDICINE | Facility: CLINIC | Age: 33
End: 2018-01-29

## 2018-01-29 VITALS
HEIGHT: 63 IN | TEMPERATURE: 98.5 F | BODY MASS INDEX: 31.22 KG/M2 | DIASTOLIC BLOOD PRESSURE: 60 MMHG | OXYGEN SATURATION: 98 % | RESPIRATION RATE: 14 BRPM | HEART RATE: 76 BPM | SYSTOLIC BLOOD PRESSURE: 104 MMHG | WEIGHT: 176.19 LBS

## 2018-01-29 DIAGNOSIS — N64.4 BREAST PAIN, LEFT: Primary | ICD-10-CM

## 2018-01-29 PROCEDURE — 99213 OFFICE O/P EST LOW 20 MIN: CPT | Performed by: NURSE PRACTITIONER

## 2018-01-31 ENCOUNTER — HOSPITAL ENCOUNTER (OUTPATIENT)
Dept: ULTRASOUND IMAGING | Facility: HOSPITAL | Age: 33
Discharge: HOME | End: 2018-01-31
Admitting: NURSE PRACTITIONER

## 2018-01-31 PROCEDURE — 76641 ULTRASOUND BREAST COMPLETE: CPT

## 2018-02-01 ENCOUNTER — TELEPHONE (OUTPATIENT)
Dept: INTERNAL MEDICINE | Facility: CLINIC | Age: 33
End: 2018-02-01

## 2018-02-01 NOTE — TELEPHONE ENCOUNTER
PLEASE RETURN CALL. PATIENT SAID SHE CALLED HER INSURANCE REGARDING THE LAB WORK [facility] WANTS HER TO GET DONE, AND THEY TOLD HER THEY NEED A CODE FROM US.

## 2018-02-05 ENCOUNTER — OFFICE VISIT (OUTPATIENT)
Dept: SURGERY | Facility: CLINIC | Age: 33
End: 2018-02-05

## 2018-02-05 VITALS
OXYGEN SATURATION: 98 % | DIASTOLIC BLOOD PRESSURE: 80 MMHG | HEIGHT: 63 IN | HEART RATE: 83 BPM | BODY MASS INDEX: 31.18 KG/M2 | TEMPERATURE: 97.5 F | WEIGHT: 176 LBS | SYSTOLIC BLOOD PRESSURE: 126 MMHG

## 2018-02-05 DIAGNOSIS — N63.0 BREAST MASS: Primary | ICD-10-CM

## 2018-02-05 PROCEDURE — 99244 OFF/OP CNSLTJ NEW/EST MOD 40: CPT | Performed by: SURGERY

## 2018-02-05 NOTE — PROGRESS NOTES
Patient: Laura Thomson    YOB: 1985    Date: 02/05/2018    Primary Care Provider: Roshan Omalley MD      Chief complaint:   Chief Complaint   Patient presents with   • Mass     Mass in breast       Subjective .     History of present illness:  I saw the patient in the office  today for evaluation and treatment of a mass in her left breast. She had an ultrasound performed on 1/31/18 that showed BI  RADS 2 and a benign appearing normal size lymph node in the left axilla measuring 16 x 8 x 12 mm. She complains of burning pain in her left breast for the past two months, radiates into her left arm, worsens with movement and touch. She denies nipple drainage and skin discoloration.     Review of Systems   Constitutional: Negative for chills, fever and unexpected weight change.   HENT: Negative for hearing loss, trouble swallowing and voice change.    Eyes: Negative for visual disturbance.   Respiratory: Negative for apnea, cough, chest tightness, shortness of breath and wheezing.    Cardiovascular: Negative for chest pain, palpitations and leg swelling.   Gastrointestinal: Negative for abdominal distention, abdominal pain, anal bleeding, blood in stool, constipation, diarrhea, nausea, rectal pain and vomiting.   Endocrine: Negative for cold intolerance and heat intolerance.   Genitourinary: Negative for difficulty urinating, dysuria and flank pain.   Musculoskeletal: Negative for back pain and gait problem.   Skin: Negative for color change, rash and wound.        Mass in her left breast   Neurological: Negative for dizziness, syncope, speech difficulty, weakness, light-headedness, numbness and headaches.   Hematological: Negative for adenopathy. Does not bruise/bleed easily.   Psychiatric/Behavioral: Negative for confusion. The patient is not nervous/anxious.        History:  Past Medical History:   Diagnosis Date   • Abdominal pain, periumbilical    • Abdominal pain, RLQ (right lower quadrant)     • Acute pharyngitis    • Appetite lost    • Chest pain    • Depression    • Diarrhea    • Difficulty swallowing    • Fatigue    • Fracture     RIGHT WRIST TWICE, LEFT HAND   • GERD (gastroesophageal reflux disease)    • History of colonoscopy    • History of ovarian cyst    • Hyperthyroidism     hyperactive taken off medicine when pregnant not placed back on   • Ovarian cyst, bilateral    • Spinal headache    • Upper respiratory infection    • Vomiting           Past Surgical History:   Procedure Laterality Date   •  SECTION  2008    DR NAVARRETE   •  SECTION  10/30/2015    DR VASQUEZ   •  SECTION  2010    DR ESPINOZA   • CHOLECYSTECTOMY     • COLONOSCOPY     • COLONOSCOPY N/A 2017    Procedure: COLONOSCOPY WITH COLD BIOPSY POLYPECTOMY; BIOPSIES , QUICK CLIP;  Surgeon: Perfecto Mcknight MD;  Location: [facility] ENDOSCOPY;  Service:    • DENTAL PROCEDURE      REPORTS WISDOM TEETH EXTRACTED   • DIAGNOSTIC LAPAROSCOPY  2005    DR ESPINOZA   • DIAGNOSTIC LAPAROSCOPY  2012    DR ESPINOZA   • DIAGNOSTIC LAPAROSCOPY N/A 8/10/2017    Procedure: DIAGNOSTIC LAPAROSCOPY, LEFT SALPINGO-OOPHORECTOMY;  Surgeon: Keren Espinoza MD;  Location: [facility] OR;  Service:    • LYSIS OF ABDOMINAL ADHESIONS  2005    DR ESPINOZA   • LYSIS OF ABDOMINAL ADHESIONS  2012    DR ESPINOZA   • NISSEN FUNDOPLICATION     • OVARIAN CYST REMOVAL  2005    DR ESPINOZA   • STOMACH SURGERY      PATIENT REPORTS STOMACH WAS WRAPPED AROUND ESOPHAGUS   • UPPER GASTROINTESTINAL ENDOSCOPY         Family History   Problem Relation Age of Onset   • Coronary artery disease Mother    • Diabetes Mother    • Hypertension Mother    • Kidney disease Father    • Skin cancer Father    • Stroke Other    • Hypertension Other    • Diabetes Other    • Colon cancer Other        Social History   Substance Use Topics   • Smoking status: Never Smoker   • Smokeless tobacco: Never Used   • Alcohol use No       Allergies:  No Known  Allergies    Medications:     Current Outpatient Prescriptions:   •  desogestrel-ethinyl estradiol (KARIVA) 0.15-0.02/0.01 MG (21/5) per tablet, Take 1 tablet by mouth Daily., Disp: 28 tablet, Rfl: 12  •  FLUoxetine HCl, PMDD, 10 MG capsule, Take 10 mg by mouth Daily., Disp: 30 each, Rfl: 6  •  ibuprofen (ADVIL,MOTRIN) 800 MG tablet, Take 1 tablet by mouth 3 (Three) Times a Day., Disp: 90 tablet, Rfl: 5    Objective     Vital Signs:        Physical Exam:     General Appearance:    Alert, cooperative, in no acute distress   Head:    Normocephalic, without obvious abnormality, atraumatic   Eyes:            Lids and lashes normal, conjunctivae and sclerae normal, no   icterus, no pallor, corneas clear,   Ears:    Ears appear intact with no abnormalities noted   Throat:   No oral lesions, no thrush, oral mucosa moist   Breast:     No suspicious masses of either breast, slight tenderness left axilla   Lungs:     Clear to auscultation,respirations regular, even and                  Unlabored    Heart:    Regular rhythm and normal rate, no murmur, no gallop.   Chest Wall:    No abnormalities observed   Abdomen:     Normal bowel sounds, no masses, no organomegaly, soft        non-tender, non-distended, no guarding.   Extremities:   Moves all extremities well, no edema, no cyanosis, no             redness   Pulses:   Pulses palpable and equal bilaterally   Skin:   No bleeding, bruising or rash   Lymph nodes:   No palpable adenopathy   Neurologic:   Cranial nerves 2 - 12 grossly intact.           Results Review:   I reviewed the patient's new clinical results.  I reviewed the patient's new imaging results and agree with the interpretation.  I reviewed the patient's other test results and agree with the interpretation      Assessment / Plan:    1. Breast mass        I did have a detailed and extensive discussion with the patient in the office today and reviewed her recent workup.  I have told the patient that she needs to have  a bilateral mammogram and then return to see me.    Electronically signed by Franck King MD  02/08/18  12:15 PM

## 2018-02-06 NOTE — TELEPHONE ENCOUNTER
PER CELESTINO, WE DO NOT DO PRECERTS FOR [employer] TESTING, THE PATIENT WILL NEED TO CALL HER INSURANCE COMPANY. ANDI NOTIFIED.

## 2018-02-20 ENCOUNTER — HOSPITAL ENCOUNTER (OUTPATIENT)
Dept: MAMMOGRAPHY | Facility: HOSPITAL | Age: 33
Discharge: HOME | End: 2018-02-20
Attending: SURGERY | Admitting: SURGERY

## 2018-02-20 DIAGNOSIS — N63.0 BREAST MASS: ICD-10-CM

## 2018-02-20 PROCEDURE — 77066 DX MAMMO INCL CAD BI: CPT

## 2018-02-20 PROCEDURE — G0279 TOMOSYNTHESIS, MAMMO: HCPCS

## 2018-02-26 ENCOUNTER — OFFICE VISIT (OUTPATIENT)
Dept: SURGERY | Facility: CLINIC | Age: 33
End: 2018-02-26

## 2018-02-26 VITALS
HEIGHT: 63 IN | HEART RATE: 70 BPM | WEIGHT: 177.4 LBS | SYSTOLIC BLOOD PRESSURE: 120 MMHG | TEMPERATURE: 98.7 F | DIASTOLIC BLOOD PRESSURE: 78 MMHG | OXYGEN SATURATION: 99 % | BODY MASS INDEX: 31.43 KG/M2

## 2018-02-26 DIAGNOSIS — N64.4 BREAST PAIN, LEFT: Primary | ICD-10-CM

## 2018-02-26 PROCEDURE — 99213 OFFICE O/P EST LOW 20 MIN: CPT | Performed by: SURGERY

## 2018-02-26 NOTE — PROGRESS NOTES
Patient: Laura Thomson    YOB: 1985    Date: 02/26/2018    Primary Care Provider: Roshan Omalley MD    Chief Complaint:   Chief Complaint   Patient presents with   • Follow-up     Follow up left breast pain and mammogram       History: I am seeing the patient today in follow up from a recent mammogram and left breast pain. Patient continues to complain of left breast pain.  She denies a mass. She states that the pain starts out as a 6 early in the day and as the day progresses her pain is usually at a 10 by bedtime.  The patient states she does constantly work throughout the day and does not slow down.  Previous breast ultrasound only showed a small lymph node but no evidence of a breast mass in the area of pain.  Mammogram was read as a BIRADS 1.     The following portions of the patient's history were reviewed and updated as appropriate: allergies, current medications, past family history, past medical history, past social history, past surgical history and problem list.      Review of Systems   Constitutional: Negative for chills, fever and unexpected weight change.   HENT: Negative for hearing loss, trouble swallowing and voice change.    Eyes: Negative for visual disturbance.   Respiratory: Negative for apnea, cough, chest tightness, shortness of breath and wheezing.    Cardiovascular: Negative for chest pain, palpitations and leg swelling.   Gastrointestinal: Negative for abdominal distention, abdominal pain, anal bleeding, blood in stool, constipation, diarrhea, nausea, rectal pain and vomiting.   Endocrine: Negative for cold intolerance and heat intolerance.   Genitourinary: Negative for difficulty urinating, dysuria and flank pain.   Musculoskeletal: Negative for back pain and gait problem.   Skin: Negative for color change, rash and wound.   Neurological: Negative for dizziness, syncope, speech difficulty, weakness, light-headedness, numbness and headaches.   Hematological:  "Negative for adenopathy. Does not bruise/bleed easily.   Psychiatric/Behavioral: Negative for confusion. The patient is not nervous/anxious.        Vital Signs  /78 (BP Location: Left arm)  Pulse 70  Temp 98.7 °F (37.1 °C) (Temporal Artery )   Ht 160 cm (63\")  Wt 80.5 kg (177 lb 6.4 oz)  SpO2 99%  BMI 31.42 kg/m2    Allergies:  No Known Allergies    Medications:    Current Outpatient Prescriptions:   •  desogestrel-ethinyl estradiol (KARIVA) 0.15-0.02/0.01 MG (21/5) per tablet, Take 1 tablet by mouth Daily., Disp: 28 tablet, Rfl: 12  •  FLUoxetine HCl, PMDD, 10 MG capsule, Take 10 mg by mouth Daily., Disp: 30 each, Rfl: 6  •  ibuprofen (ADVIL,MOTRIN) 800 MG tablet, Take 1 tablet by mouth 3 (Three) Times a Day., Disp: 90 tablet, Rfl: 5    Physical Exam:   General Appearance:    Alert, cooperative, in no acute distress   Head:    Normocephalic, without obvious abnormality, atraumatic   Lungs:     Clear to auscultation,respirations regular, even and                  unlabored    Heart:    Regular rhythm and normal rate, normal S1 and S2, no            murmur, no gallop, no rub, no click   Abdomen:     Normal bowel sounds, no masses, no organomegaly, soft        non-tender, non-distended, no guarding, no rebound                tenderness   Extremities:   Moves all extremities well, no edema, no cyanosis, no             redness   Pulses:   Pulses palpable and equal bilaterally   Skin:   No bleeding, bruising or rash, bilateral breast exam shows no masses, there is some left breast tenderness       Results Review:   I reviewed the patient's new clinical results.  I reviewed the patient's new imaging results and agree with the interpretation.  I reviewed the patient's other test results and agree with the interpretation     Assessment/Plan     1. Breast pain, left      I did have a detailed discussion with the patient and her mother in the office today.  She needs no surgical intervention, I have asked her to " avoid caffeine products and she needs to see me back in the office in 1 year with a follow-up bilateral mammogram.  She clearly knows to see me sooner if she has any further problems.    Electronically signed by Franck King MD  02/26/18  Scribed for Franck King MD by Madonna De La Torre. 2/26/2018  10:48 AM

## 2018-02-28 ENCOUNTER — OFFICE VISIT (OUTPATIENT)
Dept: INTERNAL MEDICINE | Facility: CLINIC | Age: 33
End: 2018-02-28

## 2018-02-28 VITALS
RESPIRATION RATE: 14 BRPM | TEMPERATURE: 98.8 F | HEIGHT: 63 IN | BODY MASS INDEX: 31.92 KG/M2 | DIASTOLIC BLOOD PRESSURE: 70 MMHG | OXYGEN SATURATION: 99 % | WEIGHT: 180.13 LBS | SYSTOLIC BLOOD PRESSURE: 110 MMHG | HEART RATE: 64 BPM

## 2018-02-28 DIAGNOSIS — N64.4 BREAST PAIN, LEFT: Primary | ICD-10-CM

## 2018-02-28 PROCEDURE — 99213 OFFICE O/P EST LOW 20 MIN: CPT | Performed by: NURSE PRACTITIONER

## 2018-02-28 NOTE — PROGRESS NOTES
Chief Complaint / Reason:      Chief Complaint   Patient presents with   • Breast Pain     f/u- 1 mo., saw Dr. King on monday, he did some testing.        Subjective     HPI  Patient presents today for one-month follow-up regarding left breast pain.  She had previously had a left breast complete ultrasound performed which showed no acute abnormalities.  She states that she had tried decreasing caffeine intake changing deodorants and applying warm moist heat to area which provided only minimal relief.  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  Her father knows Dr. King and contacted him regarding the breast pain complaint and she saw Dr. King on Monday and he ordered a limited ultrasound of left breast and a mammogram.  Both imaging performed did not reveal breast mass but did show left axillary lymph node.  Denies chest pain, shortness of breath or heart palpitations.  She does state that she has had some weight gain.  She sees Dr. hensley on 3/21/18 and has been taking some hormones to help regulate periods and decrease bleeding.  I recommend she discuss hormones with Dr. hensley at that visit.  She should continue doing monthly breast self exams.  History taken from: patient    PMH/FH/Social History were reviewed and updated appropriately in the electronic medical record.     Review of Systems:   Review of Systems   Constitutional: Positive for unexpected weight change (weight gain ). Negative for chills and fever.   HENT: Negative for hearing loss, trouble swallowing and voice change.    Eyes: Negative for visual disturbance.   Respiratory: Negative for apnea, cough, chest tightness, shortness of breath and wheezing.    Cardiovascular: Negative for chest pain, palpitations and leg swelling.   Gastrointestinal: Negative for abdominal distention, abdominal pain, anal bleeding, blood in stool, constipation, diarrhea, nausea, rectal pain and vomiting.    Endocrine: Negative for cold intolerance and heat intolerance.   Genitourinary: Negative for difficulty urinating, dysuria and flank pain.   Musculoskeletal: Negative for back pain and gait problem.   Skin: Negative for color change, rash and wound.        left breast tenderness    Neurological: Negative for dizziness, syncope, speech difficulty, weakness, light-headedness, numbness and headaches.   Hematological: Positive for adenopathy. Does not bruise/bleed easily.   Psychiatric/Behavioral: Negative for confusion. The patient is not nervous/anxious.      All other systems were reviewed and are negative.  Exceptions are noted in the subjective or above.      Objective     Vital Signs  Vitals:    02/28/18 0847   BP: 110/70   Pulse: 64   Resp: 14   Temp: 98.8 °F (37.1 °C)   SpO2: 99%       Body mass index is 31.91 kg/(m^2).    Physical Exam   Constitutional: She is oriented to person, place, and time. She appears well-developed and well-nourished. No distress.   Cardiovascular: Normal rate, regular rhythm and normal heart sounds.    Pulmonary/Chest: Effort normal and breath sounds normal. She has no wheezes. She exhibits no tenderness.   Neurological: She is alert and oriented to person, place, and time.   Skin: Skin is warm and dry. No rash noted. No erythema. No pallor.   Psychiatric: She has a normal mood and affect. Her behavior is normal. Judgment and thought content normal.   Nursing note and vitals reviewed.       Results Review:    I reviewed the patient's previous clinical results.   Results for orders placed during the hospital encounter of 02/20/18   Mammo Diagnostic Digital Tomosynthesis Bilateral With CAD    Narrative EXAMINATION: MAMMO DIAGNOSTIC DIGITAL TOMOSYNTHESIS BILATERAL W CAD-      CLINICAL INDICATION: left breast pain; N63.0-Unspecified lump in  unspecified breast     TECHNIQUE: Bilateral CC and MLO views were obtained with both 2-D and  3-D acquisitions. The study was read with the  assistance of CAD.      COMPARISON: None.     FINDINGS:  There are scattered areas of fibroglandular density. Palpable  marker is present in the left upper outer quadrant. There are no  suspicious masses, areas of architectural distortion or clustered  microcalcifications.       Impression BI-RADS CATEGORY: 1 , NEGATIVE.              RECOMMENDATIONS: Annual screening mammogram.           NOTES: Mammography does not detect approximately 10-15% of breast  cancers. Physical examination of the breasts by a physician and regular  monthly breast self examinations are integral parts of breast cancer  screening.  A normal mammogram does not exclude breast cancer if there  is an abnormal finding on physical examination. When clinically  indicated, a biopsy should not be postponed because of a normal  mammogram report.  [place]. [address]     NOTE: If a biopsy is performed on this patient, a copy of the pathology  report would be appreciated.     This report was finalized on 2/20/2018 2:41 PM by Nahid Parks M.D..     Results for orders placed in visit on 02/05/18   US breast left limited    Narrative LEFT BREAST ULTRASOUND       The patient did have a breast ultrasound performed today in the normal   manner.  There was good visualization obtained of the corresponding breast   as mentioned above, there was noted to be no evidence of solid or cystic   abnormalities of the left breast that would be suspicious in nature.    There was a small lymph node noted of the left axilla.         Impression Normal left breast ultrasound           Medication Review:     Current Outpatient Prescriptions:   •  desogestrel-ethinyl estradiol (KARIVA) 0.15-0.02/0.01 MG (21/5) per tablet, Take 1 tablet by mouth Daily., Disp: 28 tablet, Rfl: 12  •  FLUoxetine HCl, PMDD, 10 MG capsule, Take 10 mg by mouth Daily., Disp: 30 each, Rfl: 6  •  ibuprofen (ADVIL,MOTRIN) 800 MG tablet, Take 1  tablet by mouth 3 (Three) Times a Day., Disp: 90 tablet, Rfl: 5    Assessment/Plan   Laura was seen today for breast pain.    Diagnoses and all orders for this visit:    Breast pain, left  Mammogram recommend annual screening.  Recommend patient continue to do monthly breast self exam.  Recommend she follow up with Dr. hensley as scheduled.  Continue to decrease caffeine intake.  Return if symptoms worsen or fail to improve.    Megha Fregoso, APRN  02/28/2018

## 2018-03-21 ENCOUNTER — OFFICE VISIT (OUTPATIENT)
Dept: OBSTETRICS AND GYNECOLOGY | Facility: CLINIC | Age: 33
End: 2018-03-21

## 2018-03-21 VITALS
HEIGHT: 63 IN | BODY MASS INDEX: 31.71 KG/M2 | SYSTOLIC BLOOD PRESSURE: 120 MMHG | DIASTOLIC BLOOD PRESSURE: 70 MMHG | WEIGHT: 179 LBS

## 2018-03-21 DIAGNOSIS — F32.81 PMDD (PREMENSTRUAL DYSPHORIC DISORDER): Primary | ICD-10-CM

## 2018-03-21 DIAGNOSIS — N95.1 MENOPAUSAL SYMPTOMS: ICD-10-CM

## 2018-03-21 DIAGNOSIS — N92.6 IRREGULAR MENSTRUAL BLEEDING: ICD-10-CM

## 2018-03-21 PROCEDURE — 99213 OFFICE O/P EST LOW 20 MIN: CPT | Performed by: OBSTETRICS & GYNECOLOGY

## 2018-03-21 NOTE — PROGRESS NOTES
Subjective  Chief Complaint   Patient presents with   • Follow-up     FOLLOW UP ORAL CONTRACEPTIVES, MENOPAUSAL SYMPTOMS.      Patient is 32 y.o.  here for f/u ocps.  Pt on 5th pack of ocps; starting menses at the appropriate time; lasting 2-7 days; no BTB.  Pt reports the menses have improved.  Pt has continued to have hot flashes, night sweats.  Pt reports the menopausal symptoms are not improved.  Pt also reports no improvement with mood swings and irritability liborio prior to menses.  Pt can't remember when to take the fluoxetine.  Pt has not been taking regularly.    History  Past Medical History:   Diagnosis Date   • Abdominal pain, periumbilical    • Abdominal pain, RLQ (right lower quadrant)    • Acute pharyngitis    • Appetite lost    • Chest pain    • Depression    • Diarrhea    • Difficulty swallowing    • Fatigue    • Fracture     RIGHT WRIST TWICE, LEFT HAND   • GERD (gastroesophageal reflux disease)    • History of colonoscopy    • History of ovarian cyst    • Hyperthyroidism     hyperactive taken off medicine when pregnant not placed back on   • Ovarian cyst, bilateral    • Spinal headache    • Upper respiratory infection    • Vomiting      Current Outpatient Prescriptions on File Prior to Visit   Medication Sig Dispense Refill   • desogestrel-ethinyl estradiol (KARIVA) 0.15-0.02/0.01 MG () per tablet Take 1 tablet by mouth Daily. 28 tablet 12   • FLUoxetine HCl, PMDD, 10 MG capsule Take 10 mg by mouth Daily. 30 each 6   • ibuprofen (ADVIL,MOTRIN) 800 MG tablet Take 1 tablet by mouth 3 (Three) Times a Day. 90 tablet 5     No current facility-administered medications on file prior to visit.      No Known Allergies  Past Surgical History:   Procedure Laterality Date   •  SECTION  2008    DR NAVARRETE   •  SECTION  10/30/2015    DR VASQUEZ   •  SECTION  2010    DR ESPINOZA   • CHOLECYSTECTOMY     • COLONOSCOPY     • COLONOSCOPY N/A 2017    Procedure:  "COLONOSCOPY WITH COLD BIOPSY POLYPECTOMY; BIOPSIES , QUICK CLIP;  Surgeon: Perfecto Mcknight MD;  Location: [facility] ENDOSCOPY;  Service:    • DENTAL PROCEDURE      REPORTS WISDOM TEETH EXTRACTED   • DIAGNOSTIC LAPAROSCOPY  07/07/2005    DR ESPINOZA   • DIAGNOSTIC LAPAROSCOPY  02/14/2012    DR ESPINOZA   • DIAGNOSTIC LAPAROSCOPY N/A 8/10/2017    Procedure: DIAGNOSTIC LAPAROSCOPY, LEFT SALPINGO-OOPHORECTOMY;  Surgeon: Keren Espinoza MD;  Location: [facility] OR;  Service:    • LYSIS OF ABDOMINAL ADHESIONS  07/07/2005    DR ESPINOZA   • LYSIS OF ABDOMINAL ADHESIONS  02/14/2012    DR ESPINOZA   • NISSEN FUNDOPLICATION     • OOPHORECTOMY     • OVARIAN CYST REMOVAL  07/07/2005    DR ESPINOZA   • STOMACH SURGERY  2014    PATIENT REPORTS STOMACH WAS WRAPPED AROUND ESOPHAGUS   • UPPER GASTROINTESTINAL ENDOSCOPY       Family History   Problem Relation Age of Onset   • Coronary artery disease Mother    • Diabetes Mother    • Hypertension Mother    • Kidney disease Father    • Skin cancer Father    • Stroke Other    • Hypertension Other    • Diabetes Other    • Colon cancer Other      Social History     Social History   • Marital status:      Social History Main Topics   • Smoking status: Never Smoker   • Smokeless tobacco: Never Used   • Alcohol use No   • Drug use: No   • Sexual activity: Defer     Other Topics Concern   • Not on file     Review of Systems  All systems were reviewed and negative except for:  Genitourinary: postivie for  abnormal menstrual bleeding  Behavioral/Psych: positive for  unstable mood  Endocrine: positive for  hot flashes     Objective  Vitals:    03/21/18 1117   BP: 120/70   Weight: 81.2 kg (179 lb)   Height: 160 cm (63\")     Physical Exam:  General Appearance: alert, appears stated age and cooperative  Head: normocephalic, without obvious abnormality and atraumatic  Eyes: lids and lashes normal, conjunctivae and sclerae normal, no icterus, no pallor, corneas clear and PERRLA  Ears: ears appear intact with no " abnormalities noted  Nose: nares normal, septum midline, mucosa normal and no drainage  Neck: suppple, trachea midline and no thyromegaly  Lungs: clear to auscultation, respirations regular, respirations even and respirations unlabored  Heart: regular rhythm and normal rate, normal S1, S2, no murmur, gallop, or rubs and no click  Breasts: Not performed.  Abdomen: normal bowel sounds, no masses, no hepatomegaly, no splenomegaly, soft non-tender, no guarding and no rebound tenderness  Pelvic: Not performed.  Extremities: moves extremities well, no edema, no cyanosis and no redness  Skin: no bleeding, bruising or rash and no lesions noted  Lymph Nodes: no palpable adenopathy  Psych: normal mood and affect, oriented to person, time and place, thought content organized and appropriate judgment    Lab Review   No data reviewed    Imaging   No data reviewed    Assessment/Plan    Problem List Items Addressed This Visit     None      Visit Diagnoses     PMDD (premenstrual dysphoric disorder)    -  Primary  Pt with no improvement in mood swings.  Can't remember when to start medication.  Pt informed to take fluoxetine daily.    Menopausal symptoms    Unchanged  Pt to start medication daily as noted.    Irregular menstrual bleeding    Improved  Pt to cont ocps as previously given.            Follow up 2 months    This note was electronically signed.  Keren Thomas M.D.

## 2018-03-27 ENCOUNTER — OFFICE VISIT (OUTPATIENT)
Dept: INTERNAL MEDICINE | Facility: CLINIC | Age: 33
End: 2018-03-27

## 2018-03-27 VITALS
HEIGHT: 63 IN | RESPIRATION RATE: 16 BRPM | WEIGHT: 179 LBS | HEART RATE: 77 BPM | OXYGEN SATURATION: 96 % | SYSTOLIC BLOOD PRESSURE: 118 MMHG | BODY MASS INDEX: 31.71 KG/M2 | TEMPERATURE: 98.2 F | DIASTOLIC BLOOD PRESSURE: 70 MMHG

## 2018-03-27 DIAGNOSIS — R39.9 URINARY SYMPTOM OR SIGN: Primary | ICD-10-CM

## 2018-03-27 LAB
BILIRUB BLD-MCNC: ABNORMAL MG/DL
CLARITY, POC: CLEAR
COLOR UR: YELLOW
GLUCOSE UR STRIP-MCNC: NEGATIVE MG/DL
KETONES UR QL: NEGATIVE
LEUKOCYTE EST, POC: ABNORMAL
NITRITE UR-MCNC: NEGATIVE MG/ML
PH UR: 5 [PH] (ref 5–8)
PROT UR STRIP-MCNC: NEGATIVE MG/DL
RBC # UR STRIP: NEGATIVE /UL
SP GR UR: 1.03 (ref 1–1.03)
UROBILINOGEN UR QL: NORMAL

## 2018-03-27 PROCEDURE — 99213 OFFICE O/P EST LOW 20 MIN: CPT | Performed by: NURSE PRACTITIONER

## 2018-03-27 PROCEDURE — 81003 URINALYSIS AUTO W/O SCOPE: CPT | Performed by: NURSE PRACTITIONER

## 2018-03-27 NOTE — PROGRESS NOTES
Chief Complaint / Reason:      Chief Complaint   Patient presents with   • Urinary Tract Infection     pt presents for possible uti has recently treated may have yeast as well       Subjective     HPI  Presents today with complaints of urinary symptoms and stated that she was treated a week ago Sunday from urgent care on [address] with Bactrim and Pyridium along with Diflucan.  She states that she still continues to have itching and burning with urination.  She denies any fever.  She states that she has tried taking over-the-counter Monistat also.  Denies any chance of pregnancy she states her last menstrual period was 3 weeks ago.  Denies any hematuria or blood in stool.  Denies nausea constipation or diarrhea.    History taken from: patient    PMH/FH/Social History were reviewed and updated appropriately in the electronic medical record.     Review of Systems:   Review of Systems   Constitutional: Positive for fatigue.   Respiratory: Negative.    Cardiovascular: Negative.    Gastrointestinal: Positive for abdominal pain.   Genitourinary: Positive for difficulty urinating, dysuria, frequency and urgency.     All other systems were reviewed and are negative.  Exceptions are noted in the subjective or above.      Objective     Vital Signs  Vitals:    03/27/18 0857   BP: 118/70   Pulse: 77   Resp: 16   Temp: 98.2 °F (36.8 °C)   SpO2: 96%       Body mass index is 31.72 kg/m².    Physical Exam   Constitutional: She is oriented to person, place, and time. She appears well-developed and well-nourished.   HENT:   Head: Normocephalic.   Right Ear: External ear normal.   Left Ear: External ear normal.   Cardiovascular: Normal rate, regular rhythm, normal heart sounds and intact distal pulses.    Pulmonary/Chest: Effort normal and breath sounds normal.   Abdominal: Soft. Bowel sounds are normal. There is tenderness in the right lower quadrant and suprapubic area. There is no CVA tenderness.   Neurological: She is alert and  oriented to person, place, and time.   Skin: Skin is warm and dry.   Nursing note and vitals reviewed.       Results Review:    I reviewed the patient's new clinical results.   Office Visit on 03/27/2018   Component Date Value Ref Range Status   • Color 03/27/2018 Yellow  Yellow, Straw, Dark Yellow, Pushpa Final   • Clarity, UA 03/27/2018 Clear  Clear Final   • Glucose, UA 03/27/2018 Negative  Negative, 1000 mg/dL (3+) mg/dL Final   • Bilirubin 03/27/2018 1 mg/dL* Negative Final   • Ketones, UA 03/27/2018 Negative  Negative Final   • Specific Gravity  03/27/2018 1.030  1.005 - 1.030 Final   • Blood, UA 03/27/2018 Negative  Negative Final   • pH, Urine 03/27/2018 5.0  5.0 - 8.0 Final   • Protein, POC 03/27/2018 Negative  Negative mg/dL Final   • Urobilinogen, UA 03/27/2018 Normal  Normal Final   • Leukocytes 03/27/2018 25 Christos/ul* Negative Final   • Nitrite, UA 03/27/2018 Negative  Negative Final         Medication Review:     Current Outpatient Prescriptions:   •  desogestrel-ethinyl estradiol (KARIVA) 0.15-0.02/0.01 MG (21/5) per tablet, Take 1 tablet by mouth Daily., Disp: 28 tablet, Rfl: 12  •  FLUoxetine HCl, PMDD, 10 MG capsule, Take 10 mg by mouth Daily., Disp: 30 each, Rfl: 6  •  ibuprofen (ADVIL,MOTRIN) 800 MG tablet, Take 1 tablet by mouth 3 (Three) Times a Day., Disp: 90 tablet, Rfl: 5    Assessment/Plan   Laura was seen today for urinary tract infection.    Diagnoses and all orders for this visit:    Urinary symptom or sign  -     POCT urinalysis dipstick, automated  -     Urine Culture - Urine, Urine, Clean Catch    Increase fluid intake and rest.  Void often to empty bladder.  Wipe from front to back and avoid changes in bowel habits.  Empty your bladder after intercourse.   Avoid potentially irritating feminine products.    Return if symptoms worsen or fail to improve.    Megha Fregoso, APRN  03/27/2018

## 2018-03-29 LAB
BACTERIA UR CULT: NORMAL
BACTERIA UR CULT: NORMAL

## 2018-05-16 ENCOUNTER — TRANSCRIBE ORDERS (OUTPATIENT)
Dept: LAB | Facility: HOSPITAL | Age: 33
End: 2018-05-16

## 2018-05-16 ENCOUNTER — LAB (OUTPATIENT)
Dept: LAB | Facility: HOSPITAL | Age: 33
End: 2018-05-16

## 2018-05-16 DIAGNOSIS — N92.6 IRREGULAR MENSTRUAL CYCLE: ICD-10-CM

## 2018-05-16 DIAGNOSIS — F39 MILD MOOD DISORDER (HCC): Primary | ICD-10-CM

## 2018-05-16 DIAGNOSIS — F39 MILD MOOD DISORDER (HCC): ICD-10-CM

## 2018-05-16 LAB
25(OH)D3 SERPL-MCNC: 19.8 NG/ML
ESTRADIOL SERPL HS-MCNC: <12 PG/ML
FSH SERPL-ACNC: 6.2 MIU/ML
LH SERPL-ACNC: 5.3 MIU/ML
PROGEST SERPL-MCNC: 0.19 NG/ML
PROLACTIN SERPL-MCNC: 3.2 NG/ML
TESTOST SERPL-MCNC: 13.81 NG/DL
TSH SERPL DL<=0.05 MIU/L-ACNC: 0.53 MIU/ML (ref 0.35–5.35)
VIT B12 BLD-MCNC: 166 PG/ML (ref 211–911)

## 2018-05-16 PROCEDURE — 84144 ASSAY OF PROGESTERONE: CPT

## 2018-05-16 PROCEDURE — 84403 ASSAY OF TOTAL TESTOSTERONE: CPT

## 2018-05-16 PROCEDURE — 82306 VITAMIN D 25 HYDROXY: CPT

## 2018-05-16 PROCEDURE — 83001 ASSAY OF GONADOTROPIN (FSH): CPT

## 2018-05-16 PROCEDURE — 82670 ASSAY OF TOTAL ESTRADIOL: CPT

## 2018-05-16 PROCEDURE — 83002 ASSAY OF GONADOTROPIN (LH): CPT

## 2018-05-16 PROCEDURE — 84443 ASSAY THYROID STIM HORMONE: CPT

## 2018-05-16 PROCEDURE — 84402 ASSAY OF FREE TESTOSTERONE: CPT

## 2018-05-16 PROCEDURE — 82607 VITAMIN B-12: CPT

## 2018-05-16 PROCEDURE — 84146 ASSAY OF PROLACTIN: CPT

## 2018-05-16 PROCEDURE — 36415 COLL VENOUS BLD VENIPUNCTURE: CPT

## 2018-05-19 LAB — TESTOST FREE SERPL-MCNC: 0.4 PG/ML (ref 0–4.2)

## 2018-05-30 ENCOUNTER — OFFICE VISIT (OUTPATIENT)
Dept: OBSTETRICS AND GYNECOLOGY | Facility: CLINIC | Age: 33
End: 2018-05-30

## 2018-05-30 ENCOUNTER — TELEPHONE (OUTPATIENT)
Dept: INTERNAL MEDICINE | Facility: CLINIC | Age: 33
End: 2018-05-30

## 2018-05-30 VITALS
WEIGHT: 177 LBS | BODY MASS INDEX: 31.36 KG/M2 | SYSTOLIC BLOOD PRESSURE: 118 MMHG | HEIGHT: 63 IN | DIASTOLIC BLOOD PRESSURE: 80 MMHG

## 2018-05-30 DIAGNOSIS — N94.10 DYSPAREUNIA, FEMALE: ICD-10-CM

## 2018-05-30 DIAGNOSIS — F32.81 PMDD (PREMENSTRUAL DYSPHORIC DISORDER): ICD-10-CM

## 2018-05-30 DIAGNOSIS — R10.2 PELVIC PAIN: Primary | ICD-10-CM

## 2018-05-30 PROBLEM — Z90.79 HISTORY OF LEFT SALPINGO-OOPHORECTOMY: Status: ACTIVE | Noted: 2018-05-30

## 2018-05-30 PROBLEM — Z84.2 FAMILY HISTORY OF ENDOMETRIOSIS: Status: ACTIVE | Noted: 2018-05-30

## 2018-05-30 PROBLEM — Z90.721 HISTORY OF LEFT SALPINGO-OOPHORECTOMY: Status: ACTIVE | Noted: 2018-05-30

## 2018-05-30 PROBLEM — Z90.49 S/P CHOLECYSTECTOMY: Status: ACTIVE | Noted: 2018-05-30

## 2018-05-30 PROBLEM — Z98.891 H/O: CESAREAN SECTION: Status: ACTIVE | Noted: 2018-05-30

## 2018-05-30 PROCEDURE — 99203 OFFICE O/P NEW LOW 30 MIN: CPT | Performed by: OBSTETRICS & GYNECOLOGY

## 2018-05-30 NOTE — TELEPHONE ENCOUNTER
I contacted patient and she wanted to discuss what Dr. Alicea stated her options were and I advised her to continue researching the options. Recommend she discussed with Dr. Alicea.

## 2018-05-30 NOTE — TELEPHONE ENCOUNTER
BEEN HAVING SOME ISSUES AND YOU REFERRED HER TO A SPECIALIST.  SHE HAD HER APPT THIS MORNING AND WOULD LIKE TO SPEAK WITH YOU ABOUT THIS.  PLEASE RETURN CALL TO PT.  IF YOU CANNOT REACH HER ON THE CELL PHONE PLEASE CALL THE HOME NUMBER BECAUSE SHE DOESN'T GET CELL SERVICE AT HOME.

## 2018-05-30 NOTE — PROGRESS NOTES
Subjective   Chief Complaint   Patient presents with   • Gynecologic Exam   • Abdominal Pain   • Constipation     Laura Riveraman is a 32 y.o. year old  presenting to be seen because of Abdominal pelvic pain.  She also has dyspareunia.  This is at entry and during intercourse.  Complains also hot flashes and decreased sex drive.  She reports that she feels she has normal lubrication but there is pain at entry.  She's been  for 11 years.  She thinks this has changed since she had her left ovary removed in 2017.  Path report was reviewed from [facility] not revealed a hemorrhagic corpus luteum cyst.  She later remembered that she may have had surgery on that same ovary when she was 17 years old.  In the operative note in 2017 a mention there was very little normal ovarian tissue left so the whole adnexa was therefore removed after cyst was taken off the ovary.  She may have had one other exploratory or laparoscopic surgery.  There is some endometriosis in her mother and maternal grandmother.  She doesn't want anymore children.  Has some issues with irritability crying moodiness before and early in her period.  She wondered about a hysterectomy.  She reports some of her friends who are nurses suggested she should've had a hysterectomy in 2017.  However I don't see any evidence of endometriosis so I told her I don't know so agree with that opinion..   Current birth control method: OCP (estrogen/progesterone).     Past 6 month menstrual and contraceptive history:    No LMP recorded.  Cycle Frequency: regular, predictable and consistent every 28 - 32 days   Menstrual cycle character: flow is typically normal and flow is typically moderately heavy   Cycle Duration: 4 - 7   Number of heavy days of flows: 2   Intermenstrual bleeding present: She spots before.  It may be black or red flow may very from month to month she may skip a month.     Post-coital bleeding present: no       The following  "portions of the patient's history were reviewed and updated as appropriate:problem list, current medications and allergies    Review of Systems      Objective   /80   Ht 159.4 cm (62.75\")   Wt 80.3 kg (177 lb)   BMI 31.60 kg/m²     General:  well developed; well nourished  no acute distress  appears stated age   Skin:  No suspicious lesions seen   Thyroid: not examined   Lungs:  breathing is unlabored   Heart:  Not performed.       Abdomen: soft, non-tender; no masses  no umbilical or inginual hernias are present  no hepato-splenomegaly   Pelvis: Clinical staff was present for exam  External genitalia:  normal appearance of the external genitalia including Bartholin's and Starbuck's glands.  :  urethral meatus normal; Nontender urethra  Vaginal:  normal pink mucosa without prolapse or lesions. Tenderness to single digit in all directions.  Uterus seemed to be anterior and not very tender certainly not any more so than single digit vaginal examinatio  Uterus:  normal pink mucosa without prolapse or lesions. Tenderness to single digit posteriorly laterally and anteriorly.  Adnexa:  non palpable bilaterally.  Rectal:  digital rectal exam not performed; anus visually normal appearing.     Lab Review   Path report left tube and ovary 2017; TSH    Imaging   No data reviewed       Assessment   1. Pelvic pain with no evidence of endometriosisOn pathology report from laparoscopy in 2017.  She had hemorrhagic left ovarian cyst.  Sounds like she probably had a left cyst removed when she was 17 years old very minimal normal ovarian tissue in the fall of 17 Dr. hensley remove tube and ovary on that side.  2. She's having problems with hot flashes and decreased libido and painful intercourse.  Discussed that discontinuing her birth control pills may help her libido issue if it was hormonally related.  However she is also very tender just to single digit and more so to 2 fingers in the vagina so there may be an issue of " vaginismus as well.  Reviewed TSH levels and they appear to be normal regarding hot flashes  3. PMDD with irritability moodiness crying before and during her cycle.  This is short-lived for the most part.  I discussed the progesterone in her birth control pills may be contributing to some of her problems such as the PMDD decreased libido potentially hot flashes.  Would not be causing the abdominal pain or pain with intercourse which is new over the last 6-8 months.  4. I don't think an ultrasound would be helpful as she was fairly cooperative on examination uterus was not tender nor was the right or left adnexa very tender at all.    5. Her bladder was nontender.  I do not think this represents interstitial cystitis no history of nocturia      Plan   1. I would like her to stop her birth control pills after this week when she completes his pack use condoms for birth control for a few months and see if some of her moodiness libido and hot flashes don't improve.  Potentially could help the painful intercourse if there is a dominance a progesterone effect in the vagina.  2. We'll see her back in a few months to see how she is doing reverse all her issues.    Medications Rx this encounter:  No orders of the defined types were placed in this encounter.         This note was electronically signed.    Luc Alicea MD  May 30, 2018

## 2018-06-11 ENCOUNTER — TELEPHONE (OUTPATIENT)
Dept: OBSTETRICS AND GYNECOLOGY | Facility: CLINIC | Age: 33
End: 2018-06-11

## 2018-06-11 NOTE — TELEPHONE ENCOUNTER
Try OTC Advil or Aleve for pain (okay to Rx if she prefers) and / or heating pad  Withdrawal bleeding is normal after pills but surprised if heavy as pills should make periods lighter  We want to see if some side effects improve off of OCP;s

## 2018-06-11 NOTE — TELEPHONE ENCOUNTER
Patient states that she d/c her birth control,as told. She states that she is now passing clots and having a lot of pain. She didn't know if this is normal since she stopped the birth control.

## 2018-07-02 ENCOUNTER — OFFICE VISIT (OUTPATIENT)
Dept: INTERNAL MEDICINE | Facility: CLINIC | Age: 33
End: 2018-07-02

## 2018-07-02 VITALS
SYSTOLIC BLOOD PRESSURE: 114 MMHG | OXYGEN SATURATION: 98 % | WEIGHT: 179 LBS | DIASTOLIC BLOOD PRESSURE: 60 MMHG | BODY MASS INDEX: 31.71 KG/M2 | RESPIRATION RATE: 14 BRPM | HEIGHT: 63 IN | TEMPERATURE: 97.5 F | HEART RATE: 76 BPM
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
## 2018-07-02 PROCEDURE — 99213 OFFICE O/P EST LOW 20 MIN: CPT | Performed by: NURSE PRACTITIONER

## 2018-07-02 PROCEDURE — 90471 IMMUNIZATION ADMIN: CPT | Performed by: NURSE PRACTITIONER

## 2018-07-02 PROCEDURE — 90715 TDAP VACCINE 7 YRS/> IM: CPT | Performed by: NURSE PRACTITIONER

## 2018-07-02 NOTE — PROGRESS NOTES
Chief Complaint / Reason:      Chief Complaint   Patient presents with   • Insect Bite     possible tick bite to left lower leg, feels a burning sensation down the leg.        Subjective     HPI  Patient presents today with possible tick bite to left lower leg.  She states she noticed it a few days ago and it is tender to touch but she has shaved around the area and she did use her 's razor.  She states that she feels a burning sensation down the leg.  Denies fever or chills.  She states that it is also itchy at times and she has tried to avoid scratching it because it hurts.  History taken from: patient    PMH/FH/Social History were reviewed and updated appropriately in the electronic medical record.     Review of Systems:   Review of Systems   Constitutional: Negative.    Respiratory: Negative.    Cardiovascular: Negative.    Gastrointestinal: Negative.    Skin: Positive for color change.   Neurological: Negative.      All other systems were reviewed and are negative.  Exceptions are noted in the subjective or above.      Objective     Vital Signs  Vitals:    07/02/18 1414   BP: 114/60   Pulse: 76   Resp: 14   Temp: 97.5 °F (36.4 °C)   SpO2: 98%       Body mass index is 31.71 kg/m².    Physical Exam   Constitutional: She is oriented to person, place, and time. She appears well-developed and well-nourished. No distress.   Cardiovascular: Normal rate, regular rhythm, normal heart sounds and intact distal pulses.    Pulmonary/Chest: Effort normal and breath sounds normal. She has no wheezes. She exhibits no tenderness.   Neurological: She is alert and oriented to person, place, and time.   Skin: Skin is warm and dry. Capillary refill takes less than 2 seconds. No rash noted. There is erythema. No pallor.        Psychiatric: She has a normal mood and affect. Her behavior is normal. Judgment and thought content normal.   Nursing note and vitals reviewed.       Results Review:    I reviewed the patient's  previous clinical results.       Medication Review:     Current Outpatient Prescriptions:   •  HYDROcodone-acetaminophen (NORCO) 5-325 MG per tablet, Take 1 tablet by mouth Every 6 (Six) Hours As Needed for Severe Pain  for up to 9 days., Disp: 20 tablet, Rfl: 0  •  ibuprofen (ADVIL,MOTRIN) 600 MG tablet, Take 1 tablet by mouth Every 6 (Six) Hours As Needed for Moderate Pain  for up to 1 day., Disp: 30 tablet, Rfl: 0  •  mupirocin (BACTROBAN) 2 % ointment, Apply  topically 3 (Three) Times a Day., Disp: 15 g, Rfl: 0  No current facility-administered medications for this visit.     Assessment/Plan   Laura was seen today for insect bite.    Diagnoses and all orders for this visit:    Insect bite, initial encounter  -     mupirocin (BACTROBAN) 2 % ointment; Apply  topically 3 (Three) Times a Day.    Other orders  -     Tdap Vaccine Greater Than or Equal To 8yo IM    Discussed worsening signs and symptoms.  Advised patient to avoid using the others razors and to keep area clean and dry.  Discussed health maintenance with patient and she was out of date on Tdap.  No Follow-up on file.    Megha Fregoso, TRISTEN  07/02/2018

## 2018-07-03 ENCOUNTER — PREP FOR SURGERY (OUTPATIENT)
Dept: OTHER | Facility: HOSPITAL | Age: 33
End: 2018-07-03

## 2018-07-03 ENCOUNTER — OFFICE VISIT (OUTPATIENT)
Dept: OBSTETRICS AND GYNECOLOGY | Facility: CLINIC | Age: 33
End: 2018-07-03

## 2018-07-03 ENCOUNTER — APPOINTMENT (OUTPATIENT)
Dept: PREADMISSION TESTING | Facility: HOSPITAL | Age: 33
End: 2018-07-03

## 2018-07-03 VITALS
SYSTOLIC BLOOD PRESSURE: 116 MMHG | DIASTOLIC BLOOD PRESSURE: 70 MMHG | RESPIRATION RATE: 16 BRPM | BODY MASS INDEX: 31.71 KG/M2 | WEIGHT: 179 LBS

## 2018-07-03 VITALS — HEIGHT: 63 IN | BODY MASS INDEX: 31.72 KG/M2 | WEIGHT: 179.01 LBS

## 2018-07-03 DIAGNOSIS — N92.0 MENORRHAGIA WITH REGULAR CYCLE: ICD-10-CM

## 2018-07-03 DIAGNOSIS — Z30.09 ENCOUNTER FOR OTHER GENERAL COUNSELING OR ADVICE ON CONTRACEPTION: ICD-10-CM

## 2018-07-03 DIAGNOSIS — F32.81 PMDD (PREMENSTRUAL DYSPHORIC DISORDER): ICD-10-CM

## 2018-07-03 DIAGNOSIS — N94.6 DYSMENORRHEA: ICD-10-CM

## 2018-07-03 DIAGNOSIS — R10.2 PELVIC PAIN: ICD-10-CM

## 2018-07-03 DIAGNOSIS — R10.2 PELVIC PAIN: Primary | ICD-10-CM

## 2018-07-03 DIAGNOSIS — N95.1 MENOPAUSAL SYMPTOMS: Primary | ICD-10-CM

## 2018-07-03 LAB
BASOPHILS # BLD AUTO: 0.03 10*3/MM3 (ref 0–0.2)
BASOPHILS NFR BLD AUTO: 0.4 % (ref 0–1)
DEPRECATED RDW RBC AUTO: 42 FL (ref 37–54)
EOSINOPHIL # BLD AUTO: 0.1 10*3/MM3 (ref 0–0.3)
EOSINOPHIL NFR BLD AUTO: 1.5 % (ref 0–3)
ERYTHROCYTE [DISTWIDTH] IN BLOOD BY AUTOMATED COUNT: 13.3 % (ref 11.3–14.5)
HBA1C MFR BLD: 5.5 % (ref 4.8–5.6)
HCT VFR BLD AUTO: 40.7 % (ref 34.5–44)
HGB BLD-MCNC: 13 G/DL (ref 11.5–15.5)
IMM GRANULOCYTES # BLD: 0 10*3/MM3 (ref 0–0.03)
IMM GRANULOCYTES NFR BLD: 0 % (ref 0–0.6)
LYMPHOCYTES # BLD AUTO: 1.92 10*3/MM3 (ref 0.6–4.8)
LYMPHOCYTES NFR BLD AUTO: 28.7 % (ref 24–44)
MCH RBC QN AUTO: 27.7 PG (ref 27–31)
MCHC RBC AUTO-ENTMCNC: 31.9 G/DL (ref 32–36)
MCV RBC AUTO: 86.8 FL (ref 80–99)
MONOCYTES # BLD AUTO: 0.42 10*3/MM3 (ref 0–1)
MONOCYTES NFR BLD AUTO: 6.3 % (ref 0–12)
NEUTROPHILS # BLD AUTO: 4.21 10*3/MM3 (ref 1.5–8.3)
NEUTROPHILS NFR BLD AUTO: 63.1 % (ref 41–71)
PLATELET # BLD AUTO: 242 10*3/MM3 (ref 150–450)
PMV BLD AUTO: 10.8 FL (ref 6–12)
RBC # BLD AUTO: 4.69 10*6/MM3 (ref 3.89–5.14)
WBC NRBC COR # BLD: 6.68 10*3/MM3 (ref 3.5–10.8)

## 2018-07-03 PROCEDURE — 85025 COMPLETE CBC W/AUTO DIFF WBC: CPT | Performed by: OBSTETRICS & GYNECOLOGY

## 2018-07-03 PROCEDURE — 36415 COLL VENOUS BLD VENIPUNCTURE: CPT

## 2018-07-03 PROCEDURE — 83036 HEMOGLOBIN GLYCOSYLATED A1C: CPT | Performed by: ANESTHESIOLOGY

## 2018-07-03 PROCEDURE — 99213 OFFICE O/P EST LOW 20 MIN: CPT | Performed by: OBSTETRICS & GYNECOLOGY

## 2018-07-03 RX ORDER — SODIUM CHLORIDE, SODIUM LACTATE, POTASSIUM CHLORIDE, CALCIUM CHLORIDE 600; 310; 30; 20 MG/100ML; MG/100ML; MG/100ML; MG/100ML
125 INJECTION, SOLUTION INTRAVENOUS CONTINUOUS
Status: CANCELLED | OUTPATIENT
Start: 2018-07-03

## 2018-07-03 RX ORDER — SODIUM CHLORIDE 0.9 % (FLUSH) 0.9 %
1-10 SYRINGE (ML) INJECTION AS NEEDED
Status: CANCELLED | OUTPATIENT
Start: 2018-07-03

## 2018-07-03 RX ORDER — CEFAZOLIN SODIUM 2 G/100ML
2 INJECTION, SOLUTION INTRAVENOUS
Status: CANCELLED | OUTPATIENT
Start: 2018-07-03

## 2018-07-03 NOTE — PROGRESS NOTES
Subjective   Chief Complaint   Patient presents with   • Follow-up     off ocp's since 17, can't tell a difference being off b/c     Laura Thomson is a 32 y.o. year old  presenting to be seen because of follow-up of her issues with premenstrual syndrome, pelvic pain including dyspareunia dysmenorrhea and menorrhagia.  She has nota difference off of her birth control pills.  She had a normal FSH and LH while on birth control pills..  But then had a period off of pills indicating she is not postmenopausal.  She has lost her left tube and ovary due to an ectopic I believe or ovarian cysts on not sure.  Discussed that she'll probably go 2 menopausal early because of that.  She is also having heavier periods now that she is off birth control pills which did not help her pain.  She does not want anymore children.  Her youngest will be 3 and October she is 110% sure no more children.  We discussed salpingectomy of the remaining right tube to reduce risk for tubal cancer.  However she is also having heavy periods.  I mentioned ablation.  She wonders about vaginal hysterectomy.  Given the fact that she did not have endometriosis on Dr. Arvizu laparoscopy we would not necessarily have to do laparoscopy and vaginal hysterectomy with salpingectomy would take care of her bleeding and cramping.  May not help her dyspareunia.  And we will leave her ovary so she potentially could have premenstrual syndrome symptoms much or cause of hot flashes but doesn't sound like it's menopausal for hypothyroidism and thyroid levels have been normal to low TSH   Current birth control method: none.     Past 6 month menstrual and contraceptive history:    Patient's last menstrual period was 2018.  Cycle Frequency: regular, predictable and consistent every 28 - 32 days   Menstrual cycle character: flow is typically normal and flow is typically moderately heavy   Cycle Duration: 6 - 6   Number of heavy days of flows: 4    Intermenstrual bleeding present: no   Post-coital bleeding present: no       The following portions of the patient's history were reviewed and updated as appropriate:current medications, allergies and past surgical history    Review of Systems      Objective   /70   Resp 16   Wt 81.2 kg (179 lb)   LMP 06/26/2018   BMI 31.71 kg/m²     General:  well developed; well nourished  no acute distress  appears stated age   Skin:  Not performed.   Thyroid: not examined   Lungs:  breathing is unlabored   Heart:  Not performed.       Abdomen: soft, non-tender; no masses  no umbilical or inginual hernias are present  no hepato-splenomegaly   Pelvis: Not performed.     Lab Review   No data reviewed    Imaging   Pelvic ultrasound report       Assessment   1. Dysmenorrhea and dyspareunia with normal laparoscopy 2017.  Normal ultrasound essentially 2016 and 17.  Family history of endometriosis in her mother who is with her today.  She had a hysterectomy when she was young.  2. Relative menorrhagia off birth control pills which she does not want to continue.    3. Desire sterilization family is completed  4. PMDD   5. Menopausal symptoms      Plan   1. After long discussion of options will proceed with vaginal hysterectomy and salpingectomy as long as insurance approves.  I discussed recovery overnight stay etc. given information on vaginal hysterectomy.  We will preserve her remaining ovary at her young age  2. If not consider microscopic salpingectomy for sterilization and endometrial ablation at the same outpatient procedure    Medications Rx this encounter:  No orders of the defined types were placed in this encounter.         This note was electronically signed.    Luc Alicea MD  July 3, 2018

## 2018-07-03 NOTE — DISCHARGE INSTRUCTIONS

## 2018-07-06 ENCOUNTER — ANESTHESIA EVENT (OUTPATIENT)
Dept: PERIOP | Facility: HOSPITAL | Age: 33
End: 2018-07-06

## 2018-07-06 RX ORDER — SODIUM CHLORIDE 0.9 % (FLUSH) 0.9 %
1-10 SYRINGE (ML) INJECTION AS NEEDED
Status: CANCELLED | OUTPATIENT
Start: 2018-07-06

## 2018-07-06 RX ORDER — FAMOTIDINE 20 MG/1
20 TABLET, FILM COATED ORAL ONCE
Status: CANCELLED | OUTPATIENT
Start: 2018-07-06 | End: 2018-07-06

## 2018-07-06 RX ORDER — LIDOCAINE HYDROCHLORIDE 10 MG/ML
0.5 INJECTION, SOLUTION EPIDURAL; INFILTRATION; INTRACAUDAL; PERINEURAL ONCE AS NEEDED
Status: CANCELLED | OUTPATIENT
Start: 2018-07-06

## 2018-07-06 RX ORDER — FAMOTIDINE 10 MG/ML
20 INJECTION, SOLUTION INTRAVENOUS ONCE
Status: CANCELLED | OUTPATIENT
Start: 2018-07-06 | End: 2018-07-06

## 2018-07-06 RX ORDER — SODIUM CHLORIDE, SODIUM LACTATE, POTASSIUM CHLORIDE, CALCIUM CHLORIDE 600; 310; 30; 20 MG/100ML; MG/100ML; MG/100ML; MG/100ML
9 INJECTION, SOLUTION INTRAVENOUS CONTINUOUS
Status: CANCELLED | OUTPATIENT
Start: 2018-07-06

## 2018-07-09 ENCOUNTER — ANESTHESIA (OUTPATIENT)
Dept: PERIOP | Facility: HOSPITAL | Age: 33
End: 2018-07-09

## 2018-07-09 ENCOUNTER — HOSPITAL ENCOUNTER (OUTPATIENT)
Facility: HOSPITAL | Age: 33
Discharge: HOME | End: 2018-07-10
Attending: OBSTETRICS & GYNECOLOGY | Admitting: ANESTHESIOLOGY

## 2018-07-09 DIAGNOSIS — R10.2 PELVIC PAIN: ICD-10-CM

## 2018-07-09 LAB
B-HCG UR QL: NEGATIVE
INTERNAL NEGATIVE CONTROL: NORMAL
INTERNAL POSITIVE CONTROL: REACTIVE
Lab: NORMAL

## 2018-07-09 PROCEDURE — 25010000002 ONDANSETRON PER 1 MG: Performed by: NURSE ANESTHETIST, CERTIFIED REGISTERED

## 2018-07-09 PROCEDURE — 25010000003 CEFAZOLIN IN DEXTROSE 2-4 GM/100ML-% SOLUTION: Performed by: OBSTETRICS & GYNECOLOGY

## 2018-07-09 PROCEDURE — 58262 VAG HYST INCLUDING T/O: CPT | Performed by: OBSTETRICS & GYNECOLOGY

## 2018-07-09 PROCEDURE — 25010000002 FENTANYL CITRATE (PF) 100 MCG/2ML SOLUTION: Performed by: NURSE ANESTHETIST, CERTIFIED REGISTERED

## 2018-07-09 PROCEDURE — 81025 URINE PREGNANCY TEST: CPT | Performed by: ANESTHESIOLOGY

## 2018-07-09 PROCEDURE — 25010000002 PROPOFOL 10 MG/ML EMULSION: Performed by: NURSE ANESTHETIST, CERTIFIED REGISTERED

## 2018-07-09 PROCEDURE — 25010000002 PHENYLEPHRINE PER 1 ML: Performed by: OBSTETRICS & GYNECOLOGY

## 2018-07-09 PROCEDURE — 25010000002 PROMETHAZINE PER 50 MG: Performed by: OBSTETRICS & GYNECOLOGY

## 2018-07-09 PROCEDURE — 88307 TISSUE EXAM BY PATHOLOGIST: CPT | Performed by: OBSTETRICS & GYNECOLOGY

## 2018-07-09 PROCEDURE — 25010000002 KETOROLAC TROMETHAMINE PER 15 MG: Performed by: OBSTETRICS & GYNECOLOGY

## 2018-07-09 PROCEDURE — 25010000002 MIDAZOLAM PER 1 MG: Performed by: ANESTHESIOLOGY

## 2018-07-09 RX ORDER — BISACODYL 5 MG/1
10 TABLET, DELAYED RELEASE ORAL DAILY PRN
Status: DISCONTINUED | OUTPATIENT
Start: 2018-07-09 | End: 2018-07-10 | Stop reason: HOSPADM

## 2018-07-09 RX ORDER — SODIUM CHLORIDE, SODIUM LACTATE, POTASSIUM CHLORIDE, CALCIUM CHLORIDE 600; 310; 30; 20 MG/100ML; MG/100ML; MG/100ML; MG/100ML
100 INJECTION, SOLUTION INTRAVENOUS CONTINUOUS
Status: DISCONTINUED | OUTPATIENT
Start: 2018-07-09 | End: 2018-07-10 | Stop reason: HOSPADM

## 2018-07-09 RX ORDER — MIDAZOLAM HYDROCHLORIDE 1 MG/ML
2 INJECTION INTRAMUSCULAR; INTRAVENOUS ONCE
Status: COMPLETED | OUTPATIENT
Start: 2018-07-09 | End: 2018-07-09

## 2018-07-09 RX ORDER — SODIUM CHLORIDE, SODIUM LACTATE, POTASSIUM CHLORIDE, CALCIUM CHLORIDE 600; 310; 30; 20 MG/100ML; MG/100ML; MG/100ML; MG/100ML
125 INJECTION, SOLUTION INTRAVENOUS CONTINUOUS
Status: DISCONTINUED | OUTPATIENT
Start: 2018-07-09 | End: 2018-07-09

## 2018-07-09 RX ORDER — DOCUSATE SODIUM 100 MG/1
100 CAPSULE, LIQUID FILLED ORAL 2 TIMES DAILY PRN
Status: DISCONTINUED | OUTPATIENT
Start: 2018-07-09 | End: 2018-07-10 | Stop reason: HOSPADM

## 2018-07-09 RX ORDER — ZOLPIDEM TARTRATE 5 MG/1
5 TABLET ORAL NIGHTLY PRN
Status: DISCONTINUED | OUTPATIENT
Start: 2018-07-09 | End: 2018-07-10 | Stop reason: HOSPADM

## 2018-07-09 RX ORDER — MEPERIDINE HYDROCHLORIDE 25 MG/ML
12.5 INJECTION INTRAMUSCULAR; INTRAVENOUS; SUBCUTANEOUS
Status: DISCONTINUED | OUTPATIENT
Start: 2018-07-09 | End: 2018-07-09 | Stop reason: HOSPADM

## 2018-07-09 RX ORDER — ONDANSETRON 4 MG/1
4 TABLET, FILM COATED ORAL EVERY 6 HOURS PRN
Status: DISCONTINUED | OUTPATIENT
Start: 2018-07-09 | End: 2018-07-10 | Stop reason: HOSPADM

## 2018-07-09 RX ORDER — HYDROCODONE BITARTRATE AND ACETAMINOPHEN 5; 325 MG/1; MG/1
1 TABLET ORAL EVERY 4 HOURS PRN
Status: DISCONTINUED | OUTPATIENT
Start: 2018-07-09 | End: 2018-07-10 | Stop reason: HOSPADM

## 2018-07-09 RX ORDER — DEXTROSE AND SODIUM CHLORIDE 5; .45 G/100ML; G/100ML
100 INJECTION, SOLUTION INTRAVENOUS CONTINUOUS
Status: DISCONTINUED | OUTPATIENT
Start: 2018-07-09 | End: 2018-07-10 | Stop reason: HOSPADM

## 2018-07-09 RX ORDER — PROMETHAZINE HYDROCHLORIDE 12.5 MG/1
12.5 TABLET ORAL EVERY 6 HOURS PRN
Status: DISCONTINUED | OUTPATIENT
Start: 2018-07-09 | End: 2018-07-10 | Stop reason: HOSPADM

## 2018-07-09 RX ORDER — SODIUM CHLORIDE 0.9 % (FLUSH) 0.9 %
1-10 SYRINGE (ML) INJECTION AS NEEDED
Status: DISCONTINUED | OUTPATIENT
Start: 2018-07-09 | End: 2018-07-09 | Stop reason: HOSPADM

## 2018-07-09 RX ORDER — SIMETHICONE 80 MG
80 TABLET,CHEWABLE ORAL 4 TIMES DAILY PRN
Status: DISCONTINUED | OUTPATIENT
Start: 2018-07-09 | End: 2018-07-10 | Stop reason: HOSPADM

## 2018-07-09 RX ORDER — ACETAMINOPHEN 650 MG
TABLET, EXTENDED RELEASE ORAL AS NEEDED
Status: DISCONTINUED | OUTPATIENT
Start: 2018-07-09 | End: 2018-07-09 | Stop reason: HOSPADM

## 2018-07-09 RX ORDER — FAMOTIDINE 20 MG/1
20 TABLET, FILM COATED ORAL ONCE
Status: COMPLETED | OUTPATIENT
Start: 2018-07-09 | End: 2018-07-09

## 2018-07-09 RX ORDER — LIDOCAINE HYDROCHLORIDE 10 MG/ML
0.5 INJECTION, SOLUTION EPIDURAL; INFILTRATION; INTRACAUDAL; PERINEURAL ONCE AS NEEDED
Status: COMPLETED | OUTPATIENT
Start: 2018-07-09 | End: 2018-07-09

## 2018-07-09 RX ORDER — PROPOFOL 10 MG/ML
VIAL (ML) INTRAVENOUS AS NEEDED
Status: DISCONTINUED | OUTPATIENT
Start: 2018-07-09 | End: 2018-07-09 | Stop reason: SURG

## 2018-07-09 RX ORDER — KETOROLAC TROMETHAMINE 30 MG/ML
30 INJECTION, SOLUTION INTRAMUSCULAR; INTRAVENOUS EVERY 6 HOURS PRN
Status: DISCONTINUED | OUTPATIENT
Start: 2018-07-09 | End: 2018-07-10 | Stop reason: HOSPADM

## 2018-07-09 RX ORDER — PROMETHAZINE HYDROCHLORIDE 25 MG/ML
12.5 INJECTION, SOLUTION INTRAMUSCULAR; INTRAVENOUS EVERY 6 HOURS PRN
Status: DISCONTINUED | OUTPATIENT
Start: 2018-07-09 | End: 2018-07-10 | Stop reason: HOSPADM

## 2018-07-09 RX ORDER — ONDANSETRON 2 MG/ML
4 INJECTION INTRAMUSCULAR; INTRAVENOUS ONCE AS NEEDED
Status: COMPLETED | OUTPATIENT
Start: 2018-07-09 | End: 2018-07-09

## 2018-07-09 RX ORDER — ATRACURIUM BESYLATE 10 MG/ML
INJECTION, SOLUTION INTRAVENOUS AS NEEDED
Status: DISCONTINUED | OUTPATIENT
Start: 2018-07-09 | End: 2018-07-09 | Stop reason: SURG

## 2018-07-09 RX ORDER — LIDOCAINE HYDROCHLORIDE 10 MG/ML
INJECTION, SOLUTION EPIDURAL; INFILTRATION; INTRACAUDAL; PERINEURAL AS NEEDED
Status: DISCONTINUED | OUTPATIENT
Start: 2018-07-09 | End: 2018-07-09 | Stop reason: SURG

## 2018-07-09 RX ORDER — NALOXONE HCL 0.4 MG/ML
0.4 VIAL (ML) INJECTION AS NEEDED
Status: DISCONTINUED | OUTPATIENT
Start: 2018-07-09 | End: 2018-07-09 | Stop reason: HOSPADM

## 2018-07-09 RX ORDER — FENTANYL CITRATE 50 UG/ML
50 INJECTION, SOLUTION INTRAMUSCULAR; INTRAVENOUS
Status: DISCONTINUED | OUTPATIENT
Start: 2018-07-09 | End: 2018-07-09 | Stop reason: HOSPADM

## 2018-07-09 RX ORDER — EPHEDRINE SULFATE 50 MG/ML
5 INJECTION, SOLUTION INTRAVENOUS ONCE AS NEEDED
Status: DISCONTINUED | OUTPATIENT
Start: 2018-07-09 | End: 2018-07-09 | Stop reason: HOSPADM

## 2018-07-09 RX ORDER — IBUPROFEN 600 MG/1
600 TABLET ORAL EVERY 6 HOURS PRN
Status: DISCONTINUED | OUTPATIENT
Start: 2018-07-09 | End: 2018-07-10 | Stop reason: HOSPADM

## 2018-07-09 RX ORDER — METOCLOPRAMIDE HYDROCHLORIDE 5 MG/ML
10 INJECTION INTRAMUSCULAR; INTRAVENOUS EVERY 6 HOURS PRN
Status: DISCONTINUED | OUTPATIENT
Start: 2018-07-09 | End: 2018-07-10 | Stop reason: HOSPADM

## 2018-07-09 RX ORDER — PROMETHAZINE HYDROCHLORIDE 12.5 MG/1
12.5 SUPPOSITORY RECTAL EVERY 6 HOURS PRN
Status: DISCONTINUED | OUTPATIENT
Start: 2018-07-09 | End: 2018-07-10 | Stop reason: HOSPADM

## 2018-07-09 RX ORDER — SODIUM CHLORIDE, SODIUM LACTATE, POTASSIUM CHLORIDE, CALCIUM CHLORIDE 600; 310; 30; 20 MG/100ML; MG/100ML; MG/100ML; MG/100ML
9 INJECTION, SOLUTION INTRAVENOUS CONTINUOUS
Status: DISCONTINUED | OUTPATIENT
Start: 2018-07-09 | End: 2018-07-09

## 2018-07-09 RX ORDER — FENTANYL CITRATE 50 UG/ML
INJECTION, SOLUTION INTRAMUSCULAR; INTRAVENOUS AS NEEDED
Status: DISCONTINUED | OUTPATIENT
Start: 2018-07-09 | End: 2018-07-09 | Stop reason: SURG

## 2018-07-09 RX ORDER — ONDANSETRON 2 MG/ML
INJECTION INTRAMUSCULAR; INTRAVENOUS AS NEEDED
Status: DISCONTINUED | OUTPATIENT
Start: 2018-07-09 | End: 2018-07-09 | Stop reason: SURG

## 2018-07-09 RX ORDER — CEFAZOLIN SODIUM 2 G/100ML
2 INJECTION, SOLUTION INTRAVENOUS EVERY 8 HOURS
Status: COMPLETED | OUTPATIENT
Start: 2018-07-09 | End: 2018-07-10

## 2018-07-09 RX ORDER — HYDROCODONE BITARTRATE AND ACETAMINOPHEN 10; 325 MG/1; MG/1
1 TABLET ORAL EVERY 6 HOURS PRN
Status: DISCONTINUED | OUTPATIENT
Start: 2018-07-09 | End: 2018-07-10 | Stop reason: HOSPADM

## 2018-07-09 RX ORDER — ONDANSETRON 2 MG/ML
4 INJECTION INTRAMUSCULAR; INTRAVENOUS EVERY 6 HOURS PRN
Status: DISCONTINUED | OUTPATIENT
Start: 2018-07-09 | End: 2018-07-10 | Stop reason: HOSPADM

## 2018-07-09 RX ORDER — SODIUM CHLORIDE 0.9 % (FLUSH) 0.9 %
1-10 SYRINGE (ML) INJECTION AS NEEDED
Status: DISCONTINUED | OUTPATIENT
Start: 2018-07-09 | End: 2018-07-09

## 2018-07-09 RX ORDER — CEFAZOLIN SODIUM 2 G/100ML
2 INJECTION, SOLUTION INTRAVENOUS
Status: COMPLETED | OUTPATIENT
Start: 2018-07-09 | End: 2018-07-09

## 2018-07-09 RX ORDER — FAMOTIDINE 10 MG/ML
20 INJECTION, SOLUTION INTRAVENOUS ONCE
Status: CANCELLED | OUTPATIENT
Start: 2018-07-09 | End: 2018-07-09

## 2018-07-09 RX ORDER — MAGNESIUM HYDROXIDE 1200 MG/15ML
LIQUID ORAL AS NEEDED
Status: DISCONTINUED | OUTPATIENT
Start: 2018-07-09 | End: 2018-07-09 | Stop reason: HOSPADM

## 2018-07-09 RX ORDER — LABETALOL HYDROCHLORIDE 5 MG/ML
5 INJECTION, SOLUTION INTRAVENOUS
Status: DISCONTINUED | OUTPATIENT
Start: 2018-07-09 | End: 2018-07-09 | Stop reason: HOSPADM

## 2018-07-09 RX ORDER — OXYCODONE HYDROCHLORIDE AND ACETAMINOPHEN 5; 325 MG/1; MG/1
1 TABLET ORAL ONCE AS NEEDED
Status: DISCONTINUED | OUTPATIENT
Start: 2018-07-09 | End: 2018-07-09 | Stop reason: HOSPADM

## 2018-07-09 RX ADMIN — FENTANYL CITRATE 50 MCG: 50 INJECTION, SOLUTION INTRAMUSCULAR; INTRAVENOUS at 17:10

## 2018-07-09 RX ADMIN — HYDROCODONE BITARTRATE AND ACETAMINOPHEN 1 TABLET: 10; 325 TABLET ORAL at 20:49

## 2018-07-09 RX ADMIN — LIDOCAINE HYDROCHLORIDE 30 MG: 10 INJECTION, SOLUTION EPIDURAL; INFILTRATION; INTRACAUDAL; PERINEURAL at 14:32

## 2018-07-09 RX ADMIN — DEXTROSE AND SODIUM CHLORIDE 100 ML/HR: 5; 450 INJECTION, SOLUTION INTRAVENOUS at 18:10

## 2018-07-09 RX ADMIN — PROPOFOL 150 MG: 10 INJECTION, EMULSION INTRAVENOUS at 14:32

## 2018-07-09 RX ADMIN — FAMOTIDINE 20 MG: 20 TABLET ORAL at 12:06

## 2018-07-09 RX ADMIN — LIDOCAINE HYDROCHLORIDE 0.3 ML: 10 INJECTION, SOLUTION EPIDURAL; INFILTRATION; INTRACAUDAL; PERINEURAL at 12:05

## 2018-07-09 RX ADMIN — KETOROLAC TROMETHAMINE 30 MG: 30 INJECTION, SOLUTION INTRAMUSCULAR at 18:10

## 2018-07-09 RX ADMIN — SODIUM CHLORIDE, POTASSIUM CHLORIDE, SODIUM LACTATE AND CALCIUM CHLORIDE: 600; 310; 30; 20 INJECTION, SOLUTION INTRAVENOUS at 14:28

## 2018-07-09 RX ADMIN — SODIUM CHLORIDE, POTASSIUM CHLORIDE, SODIUM LACTATE AND CALCIUM CHLORIDE: 600; 310; 30; 20 INJECTION, SOLUTION INTRAVENOUS at 15:30

## 2018-07-09 RX ADMIN — FENTANYL CITRATE 100 MCG: 50 INJECTION, SOLUTION INTRAMUSCULAR; INTRAVENOUS at 14:32

## 2018-07-09 RX ADMIN — FENTANYL CITRATE 50 MCG: 50 INJECTION, SOLUTION INTRAMUSCULAR; INTRAVENOUS at 16:55

## 2018-07-09 RX ADMIN — CEFAZOLIN SODIUM 2 G: 2 INJECTION, SOLUTION INTRAVENOUS at 22:28

## 2018-07-09 RX ADMIN — SODIUM CHLORIDE, POTASSIUM CHLORIDE, SODIUM LACTATE AND CALCIUM CHLORIDE 9 ML/HR: 600; 310; 30; 20 INJECTION, SOLUTION INTRAVENOUS at 12:05

## 2018-07-09 RX ADMIN — PROMETHAZINE HYDROCHLORIDE 12.5 MG: 25 INJECTION INTRAMUSCULAR; INTRAVENOUS at 18:11

## 2018-07-09 RX ADMIN — ATRACURIUM BESYLATE 40 MG: 10 INJECTION, SOLUTION INTRAVENOUS at 14:32

## 2018-07-09 RX ADMIN — CEFAZOLIN SODIUM 2 G: 2 INJECTION, SOLUTION INTRAVENOUS at 14:28

## 2018-07-09 RX ADMIN — ONDANSETRON 4 MG: 2 INJECTION, SOLUTION INTRAMUSCULAR; INTRAVENOUS at 16:55

## 2018-07-09 RX ADMIN — SODIUM CHLORIDE 500 ML: 9 INJECTION, SOLUTION INTRAVENOUS at 18:10

## 2018-07-09 RX ADMIN — MIDAZOLAM HYDROCHLORIDE 2 MG: 1 INJECTION, SOLUTION INTRAMUSCULAR; INTRAVENOUS at 12:14

## 2018-07-09 RX ADMIN — ONDANSETRON 4 MG: 2 INJECTION INTRAMUSCULAR; INTRAVENOUS at 16:17

## 2018-07-09 NOTE — BRIEF OP NOTE
VAGINAL HYSTERECTOMY  Progress Note    Laura Thomson  7/9/2018    Pre-op Diagnosis:   Pelvic pain [R10.2]       Post-Op Diagnosis Codes:     * Pelvic pain [R10.2]    Procedure/CPT® Codes:      Procedure(s):  VAGINAL HYSTERECTOMY, right SALPINGECTOMY    Surgeon(s):  Luc Alicea MD    Anesthesia: General    Staff:   Circulator: Rebecca Combs RN; Raimundo Shaffer RN; Melonei Kenney RN  Scrub Person: Emmanuel Marsh; Russell Dyer  Nursing Assistant: Bobby Elliott  Assistant: LM Mosley    Estimated Blood Loss: 200 mL    Urine Voided: 200 mL    Specimens:                ID Type Source Tests Collected by Time   A :  Tissue Uterus with Cervix TISSUE PATHOLOGY EXAM Luc Alicea MD 7/9/2018 1623         Drains:   Urethral Catheter Silicone 16 Fr. (Active)       Findings: normal size uterus c/w adenomyosis and adhesions from cesareans    Complications: none      Luc Alicea MD     Date: 7/9/2018  Time: 4:30 PM

## 2018-07-09 NOTE — ANESTHESIA PREPROCEDURE EVALUATION
Anesthesia Evaluation     Patient summary reviewed and Nursing notes reviewed   history of anesthetic complications:  NPO Solid Status: > 8 hours  NPO Liquid Status: > 8 hours           Airway   Mallampati: II  TM distance: >3 FB  Neck ROM: full  No difficulty expected  Dental      Pulmonary - normal exam   (+) recent URI,   Cardiovascular - normal exam        Neuro/Psych  (+) headaches, psychiatric history,     GI/Hepatic/Renal/Endo    (+)  GERD,  hyperthyroidism    Musculoskeletal     Abdominal    Substance History      OB/GYN          Other                        Anesthesia Plan    ASA 2     general     intravenous induction   Anesthetic plan and risks discussed with patient.    Plan discussed with CRNA.

## 2018-07-09 NOTE — OP NOTE
OPERATIVE NOTE    2018    Preoperative diagnosis: Menorrhagia and dysmenorrhea    Postoperative diagnosis: Same probable adenomyosis and adhesions secondary 3  sections    Anesthesia: General    Estimated blood loss: 200 mL    Brief history and indications: ID: 32 y.o.  with history of menorrhagia and dysmenorrhea and dyspareunia.  She had been tried on birth control pills with no change in her pain.  Her bleeding had increased off the pills.  Options were discussed including IUD and ablation.  She had a laparoscopy was normal no endometriosis noted.  Family is completed she desires definitive therapy.  Options were discussed and she elected to proceed with vaginal hysterectomy.  She had 3 prior  sections and possibly an ectopic pregnancy were she lost her left tube and ovary.        Procedure: The patient was taken to the operating room and placed under general anesthesia without complication.  She she was prepped and draped in the standard fashion in dorsolithotomy position.  Shafer for straight drainage.  A timeout was undertaken prior to proceeding.  Exam under anesthesia revealed slightly narrow vaginal introitus normal multiparous anterior uterus.  No adnexal masses appreciated.  A weighted speculum was too wide to be placed today Malu retractor was used the cervix was grasped with a Sheila thyroid clamp.  Injected circumferentially with dilute Jose Guadalupe-Synephrine.  A curvilinear circumferential incision is made anteriorly.  I had difficulty entering anteriorly possibly secondary to  section scar.  I elected to proceed with Malu technique with clamps bilaterally.  Class replace and Nathan transfixation sutures were placed.  There was some tearing of the tissue.  I had earlier attempted to put the uterus anteriorly in a Doderlein technique with no success because tissue tearing.  I entered the posterior cul-de-sac.  Continue to use clamps bilaterally.  Once again had difficulty  entering anteriorly even know what can feel my fingertip.  The cervix avulsed from the fundus with what was felt to be normal traction.  The fundus was grasped.  I proceeded with Malu transfixation sutures and clamping bilaterally.  The fundus was removed.  Angle sutures were placed incorporating vaginal mucosa cardinal ligaments posterior perineum once again vaginal mucosa.  This improved hemostasis.  The abdomen was inspected to the ovary on the right side had a cyst which during manipulation ruptured clear fluid.  Identified the right tube and grasp it with a right angle clamp placed a free tie and stitch tie around this and excised it.  There are some bleeding above this area which was controlled with a figure-of-eight suture.  The Prolene stitch was placed posteriorly to close the cul-de-sac from left to right inside the uterosacral ligaments.  The lower pelvis was irrigated and suctioned for blood clots found to be hemostatic.  The peritoneum was closed using a chromic pursestring suture.  The vaginal cuff was closed using interrupted chromic sutures.  A Betadine soaked pack was placed in vagina.    Vaginal instruments  were removed with exception of a Shafer catheter remaining in place. Sponge and needle counts were correct.  Patient tolerated the procedure well and left for the postop recovery room in stable condition.    Findings were consistent with probable adenomyosis with a very friable uterus with some sutures tearing through tissue and having to be replaced.  The cervix also avulsed  from the uterine fundus.  There were scar tissue anteriorly consistent with 3 prior  sections making anterior entry more difficult.  Right ovarian cyst apparently benign; absent left tube and ovary       Luc Alicea M.D.

## 2018-07-09 NOTE — ANESTHESIA PROCEDURE NOTES
Airway  Urgency: elective    Airway not difficult    General Information and Staff    Patient location during procedure: OR  Anesthesiologist: SARMAD SANTANA  CRNA: MARLIN ENGLISH    Indications and Patient Condition  Indications for airway management: airway protection    Preoxygenated: yes  MILS not maintained throughout  Mask difficulty assessment: 1 - vent by mask    Final Airway Details  Final airway type: endotracheal airway      Successful airway: ETT  Cuffed: yes   Successful intubation technique: direct laryngoscopy  Endotracheal tube insertion site: oral  Blade: Asher  Blade size: #3  ETT size: 7.0 mm  Cormack-Lehane Classification: grade I - full view of glottis  Placement verified by: chest auscultation and capnometry   Cuff volume (mL): 5  Measured from: lips  ETT to lips (cm): 21  Number of attempts at approach: 1    Additional Comments  Negative epigastric sounds, Breath sound equal bilaterally with symmetric chest rise and fall

## 2018-07-09 NOTE — ANESTHESIA POSTPROCEDURE EVALUATION
Patient: Laura Thomson    Procedure Summary     Date:  07/09/18 Room / Location:   [facility] [address] [place] [address] [facility]    Anesthesia Start:  1428 Anesthesia Stop:      Procedure:  VAGINAL HYSTERECTOMY, BILATERAL SALPINGECTOMY (N/A Uterus) Diagnosis:       Pelvic pain      (Pelvic pain [R10.2])    Surgeon:  Luc Alicea MD Provider:  Dimple Ames MD    Anesthesia Type:  general ASA Status:  2          Anesthesia Type: general  Last vitals  BP   121/78 (07/09/18 1158)127/72   Temp   98 °F (36.7 °C) (07/09/18 1158)97.4   Pulse   66 (07/09/18 1158)79   Resp   18 (07/09/18 1158) 20    SpO2   98 % (07/09/18 1158)98     Post Anesthesia Care and Evaluation    Patient location during evaluation: PACU  Patient participation: complete - patient participated  Level of consciousness: awake and alert  Pain score: 0  Pain management: adequate  Airway patency: patent  Anesthetic complications: No anesthetic complications  PONV Status: none  Cardiovascular status: hemodynamically stable and acceptable  Respiratory status: nonlabored ventilation, acceptable and nasal cannula  Hydration status: acceptable

## 2018-07-10 VITALS
DIASTOLIC BLOOD PRESSURE: 78 MMHG | RESPIRATION RATE: 18 BRPM | BODY MASS INDEX: 31.71 KG/M2 | TEMPERATURE: 98.4 F | OXYGEN SATURATION: 97 % | WEIGHT: 179 LBS | HEART RATE: 68 BPM | SYSTOLIC BLOOD PRESSURE: 125 MMHG | HEIGHT: 63 IN

## 2018-07-10 PROBLEM — Z90.710 S/P VAGINAL HYSTERECTOMY: Status: ACTIVE | Noted: 2018-07-10

## 2018-07-10 PROBLEM — R10.2 PELVIC PAIN: Status: ACTIVE | Noted: 2017-07-21

## 2018-07-10 PROCEDURE — 25010000002 KETOROLAC TROMETHAMINE PER 15 MG: Performed by: OBSTETRICS & GYNECOLOGY

## 2018-07-10 PROCEDURE — 25010000003 CEFAZOLIN IN DEXTROSE 2-4 GM/100ML-% SOLUTION: Performed by: OBSTETRICS & GYNECOLOGY

## 2018-07-10 RX ORDER — HYDROCODONE BITARTRATE AND ACETAMINOPHEN 5; 325 MG/1; MG/1
1 TABLET ORAL EVERY 6 HOURS PRN
Qty: 20 TABLET | Refills: 0 | Status: SHIPPED | OUTPATIENT
Start: 2018-07-10 | End: 2018-07-19

## 2018-07-10 RX ORDER — IBUPROFEN 600 MG/1
600 TABLET ORAL EVERY 6 HOURS PRN
Qty: 30 TABLET | Refills: 0 | Status: SHIPPED | OUTPATIENT
Start: 2018-07-10 | End: 2018-07-11

## 2018-07-10 RX ADMIN — MUPIROCIN: 20 OINTMENT TOPICAL at 10:39

## 2018-07-10 RX ADMIN — KETOROLAC TROMETHAMINE 30 MG: 30 INJECTION, SOLUTION INTRAMUSCULAR at 00:49

## 2018-07-10 RX ADMIN — HYDROCODONE BITARTRATE AND ACETAMINOPHEN 1 TABLET: 10; 325 TABLET ORAL at 05:38

## 2018-07-10 RX ADMIN — KETOROLAC TROMETHAMINE 30 MG: 30 INJECTION, SOLUTION INTRAMUSCULAR at 07:42

## 2018-07-10 RX ADMIN — CEFAZOLIN SODIUM 2 G: 2 INJECTION, SOLUTION INTRAVENOUS at 05:36

## 2018-07-10 RX ADMIN — HYDROCODONE BITARTRATE AND ACETAMINOPHEN 1 TABLET: 5; 325 TABLET ORAL at 10:39

## 2018-07-10 RX ADMIN — DOCUSATE SODIUM 100 MG: 100 CAPSULE, LIQUID FILLED ORAL at 10:39

## 2018-07-10 NOTE — PLAN OF CARE
Problem: Patient Care Overview  Goal: Plan of Care Review  Outcome: Ongoing (interventions implemented as appropriate)   07/10/18 0501   Coping/Psychosocial   Plan of Care Reviewed With patient   Plan of Care Review   Progress improving   OTHER   Outcome Summary will remove caraballo 5 am, vag packing at 0700,pt probab ly nwill be dc'd today     Goal: Individualization and Mutuality  Outcome: Ongoing (interventions implemented as appropriate)    Goal: Discharge Needs Assessment  Outcome: Ongoing (interventions implemented as appropriate)      Problem: Pain, Acute (Adult)  Goal: Identify Related Risk Factors and Signs and Symptoms  Outcome: Ongoing (interventions implemented as appropriate)    Goal: Acceptable Pain Control/Comfort Level  Outcome: Ongoing (interventions implemented as appropriate)

## 2018-07-10 NOTE — PLAN OF CARE
Problem: Patient Care Overview  Goal: Plan of Care Review  Outcome: Ongoing (interventions implemented as appropriate)   07/10/18 9892   Coping/Psychosocial   Plan of Care Reviewed With patient   Plan of Care Review   Progress improving   OTHER   Outcome Summary minimal pain, slept well, family at bedside.     Goal: Individualization and Mutuality  Outcome: Ongoing (interventions implemented as appropriate)

## 2018-07-10 NOTE — DISCHARGE SUMMARY
Mariel Thomson  : 1985  MRN: [id number]  CSN: [id number]    Discharge Summary      Date of Admission: 2018   Date of Discharge:    Discharge Diagnosis: Dysmenorrhea, dyspareunia, menorrhagia   Status post left salpingo-oophorectomy  Probable adenomyosis    Procedures Performed: Procedure(s):  VAGINAL HYSTERECTOMY, Right SALPINGECTOMY      Consults: None    Brief History: Patient is a 32 y.o.who presented For complaints of menorrhagia and dysmenorrhea along with dyspareunia.  She had a negative laparoscopy for endometriosis.  Her periods came heavy off of birth control pills which did not help her cramping.  Family is completed she desires definitive therapy in the form of hysterectomy.  She is status post 3 prior  sections.  With the negative laparoscopy decision made to see with vaginal hysterectomy and attempt to remove the right fallopian tube to reduce risk for tubal cancer.  She understood risks, case planned procedure options in her care was agreeable to proceed.     Hospital Course: Patient underwent the above procedure without difficulty postoperatively she is doing well and instructions reviewed and understood morning of postoperative day #1.  Discharge instructions were reviewed and understood by the patient    Pending Studies: Pathology    Condition at discharge: gradually improving   Discharge Medications:    Your medication list      START taking these medications      Instructions Last Dose Given Next Dose Due   HYDROcodone-acetaminophen 5-325 MG per tablet  Commonly known as:  NORCO      Take 1 tablet by mouth Every 6 (Six) Hours As Needed for Severe Pain  for up to 9 days.       ibuprofen 600 MG tablet  Commonly known as:  ADVIL,MOTRIN      Take 1 tablet by mouth Every 6 (Six) Hours As Needed for Moderate Pain  for up to 1 day.          CONTINUE taking these medications      Instructions Last Dose Given Next Dose Due   mupirocin 2 % ointment  Commonly known  as:  BACTROBAN      Apply  topically 3 (Three) Times a Day.             Where to Get Your Medications      These medications were sent to [facility] [address] [place] [address] - [address] [place] [address] - [phone]  - [phone]   [address] [facility] [address]    Phone:  [phone] ·   ibuprofen 600 MG tablet     You can get these medications from any pharmacy    Bring a paper prescription for each of these medications  · HYDROcodone-acetaminophen 5-325 MG per tablet        Discharge Disposition: home   Follow-up: To be seen back in 2 weeks or sooner should she have any difficulties          This note has been electronically signed.    Luc Alicea MD  July 10, 2018

## 2018-07-10 NOTE — PLAN OF CARE
Problem: Patient Care Overview  Goal: Discharge Needs Assessment  Outcome: Ongoing (interventions implemented as appropriate)   07/10/18 0322   Discharge Needs Assessment   Readmission Within the Last 30 Days no previous admission in last 30 days   Concerns to be Addressed discharge planning   Patient/Family Anticipates Transition to home with family   Patient/Family Anticipated Services at Transition none   Transportation Concerns car, none   Transportation Anticipated family or friend will provide   Anticipated Changes Related to Illness none   Equipment Needed After Discharge none   Outpatient/Agency/Support Group Needs (none)   Discharge Facility/Level of Care Needs (none)   Current Discharge Risk (none)   Disability   Equipment Currently Used at Home none     Goal: Interprofessional Rounds/Family Conf  Outcome: Outcome(s) achieved Date Met: 07/10/18      Problem: Pain, Acute (Adult)  Goal: Identify Related Risk Factors and Signs and Symptoms  Outcome: Ongoing (interventions implemented as appropriate)    Goal: Acceptable Pain Control/Comfort Level  Outcome: Ongoing (interventions implemented as appropriate)

## 2018-07-11 LAB
CYTO UR: NORMAL
LAB AP CASE REPORT: NORMAL
LAB AP CLINICAL INFORMATION: NORMAL
PATH REPORT.FINAL DX SPEC: NORMAL
PATH REPORT.GROSS SPEC: NORMAL

## 2018-07-12 ENCOUNTER — TELEPHONE (OUTPATIENT)
Dept: OBSTETRICS AND GYNECOLOGY | Facility: CLINIC | Age: 33
End: 2018-07-12

## 2018-07-12 RX ORDER — ONDANSETRON 4 MG/1
4 TABLET, ORALLY DISINTEGRATING ORAL EVERY 12 HOURS PRN
Qty: 9 TABLET | Refills: 0 | Status: SHIPPED | OUTPATIENT
Start: 2018-07-12 | End: 2018-08-20

## 2018-07-12 NOTE — TELEPHONE ENCOUNTER
SONG      PT HAD VAGINAL HYSTERECTOMY ON Monday. She is very nauseated. What does she need to do?      [facility] in [place], [place]

## 2018-07-24 ENCOUNTER — OFFICE VISIT (OUTPATIENT)
Dept: OBSTETRICS AND GYNECOLOGY | Facility: CLINIC | Age: 33
End: 2018-07-24

## 2018-07-24 VITALS
SYSTOLIC BLOOD PRESSURE: 110 MMHG | RESPIRATION RATE: 16 BRPM | BODY MASS INDEX: 31.53 KG/M2 | WEIGHT: 178 LBS | DIASTOLIC BLOOD PRESSURE: 60 MMHG

## 2018-07-24 DIAGNOSIS — Z09 SURGERY FOLLOW-UP: Primary | ICD-10-CM

## 2018-07-24 PROCEDURE — 99024 POSTOP FOLLOW-UP VISIT: CPT | Performed by: OBSTETRICS & GYNECOLOGY

## 2018-07-24 NOTE — PROGRESS NOTES
Subjective   Chief Complaint   Patient presents with   • Post-op     TVH 18     Laura Thomson is a 32 y.o. year old  presenting to be seen for her post-operative visit.  She had a TVH with right salpingectomy - partial .  Currently she reports no problems with eating, bowel movements, voiding, or wound drainage and pain is well controlled. First few days were rough but okay now    The following portions of the patient's history were reviewed and updated as appropriate:problem list, current medications and allergies    Review of Systems pinkish vaginal discharge     Objective   /60   Resp 16   Wt 80.7 kg (178 lb)   LMP 2018   BMI 31.53 kg/m²     General:  well developed; well nourished  no acute distress  appears stated age   Abdomen: soft, non-tender; no masses  no umbilical or inginual hernias are present  no hepato-splenomegaly   Pelvis: Clinical staff was present for exam  External genitalia:  normal appearance of the external genitalia including Bartholin's and Norrie's glands.  :  urethral meatus normal;  Vaginal:  normal pink mucosa without prolapse or lesions. slight discharge c/w minimal blood and angle sutures are present(vicryl)  Cervix:  absent.  Uterus:  absent.  Adnexa:  normal bimanual exam of the adnexa.          Assessment   1. She is doing well postop from TVH and right partial salpingectomy.  The pathology report was reviewed.  I had thought there may be adenomyosis of the uterus is soft and friable at the time of surgery but there was only benign findings.  2. She is having minimal bleeding from the vaginal cuff angle sutures are present.  Discussed that I would have her wait a couple weeks before intercourse and to be careful regarding deeper penetration as the vagina was not as deep as it used to be.  Otherwise normal activities are okay     Plan   1. As above follow-up for yearly examinations.  2. No need for Pap testing without the cervix.    Medications Rx  this encounter:  No orders of the defined types were placed in this encounter.         This note was electronically signed.    Luc Alicea MD  July 24, 2018

## 2018-08-20 ENCOUNTER — OFFICE VISIT (OUTPATIENT)
Dept: INTERNAL MEDICINE | Facility: CLINIC | Age: 33
End: 2018-08-20

## 2018-08-20 VITALS
TEMPERATURE: 97.9 F | RESPIRATION RATE: 14 BRPM | OXYGEN SATURATION: 97 % | DIASTOLIC BLOOD PRESSURE: 70 MMHG | BODY MASS INDEX: 31.5 KG/M2 | HEIGHT: 63 IN | HEART RATE: 72 BPM | SYSTOLIC BLOOD PRESSURE: 108 MMHG | WEIGHT: 177.75 LBS

## 2018-08-20 DIAGNOSIS — K11.20 PAROTITIS: ICD-10-CM

## 2018-08-20 DIAGNOSIS — H92.01 EARACHE SYMPTOMS IN RIGHT EAR: Primary | ICD-10-CM

## 2018-08-20 PROCEDURE — 99213 OFFICE O/P EST LOW 20 MIN: CPT | Performed by: NURSE PRACTITIONER

## 2018-08-20 NOTE — PROGRESS NOTES
Chief Complaint / Reason:      Chief Complaint   Patient presents with   • Earache     right ear pain x 1 week. Tried son's ear drops a couple of times.        Subjective     HPI  Ear Pain  Patient complains of ear pain and possible ear infection. Symptoms include right ear pain. Onset of symptoms was 1 week ago, and have been gradually worsening since that time. Associated symptoms include: sore throat. Patient denies: chills, fever , headache, non productive cough, productive cough and sinus pressure. She is drinking plenty of fluids.    History taken from: patient    PMH/FH/Social History were reviewed and updated appropriately in the electronic medical record.     Review of Systems:   Review of Systems   Constitutional: Negative.    HENT: Positive for ear pain.    Respiratory: Negative.    Cardiovascular: Negative.    Gastrointestinal: Negative.    Neurological: Negative.      All other systems were reviewed and are negative.  Exceptions are noted in the subjective or above.      Objective     Vital Signs  Vitals:    08/20/18 1306   BP: 108/70   Pulse: 72   Resp: 14   Temp: 97.9 °F (36.6 °C)   SpO2: 97%       Body mass index is 31.49 kg/m².    Physical Exam   Constitutional: She is oriented to person, place, and time. She appears well-developed and well-nourished. No distress.   HENT:   Head: Normocephalic.   Right Ear: External ear normal. Tympanic membrane is bulging. Tympanic membrane is not erythematous.   Left Ear: External ear normal. Tympanic membrane is bulging. Tympanic membrane is not erythematous.   Nose: Right sinus exhibits maxillary sinus tenderness and frontal sinus tenderness. Left sinus exhibits maxillary sinus tenderness and frontal sinus tenderness.   Mouth/Throat: Mucous membranes are dry. Posterior oropharyngeal erythema present.   Cardiovascular: Normal rate, regular rhythm, normal heart sounds and intact distal pulses.    Pulmonary/Chest: Effort normal and breath sounds normal. She has  no wheezes. She exhibits no tenderness.   Abdominal: Soft. Bowel sounds are normal.   Lymphadenopathy:        Head (right side): Preauricular adenopathy present.     She has cervical adenopathy.        Right cervical: Superficial cervical adenopathy present.   Neurological: She is alert and oriented to person, place, and time.   Skin: Skin is warm and dry. No rash noted. No erythema. No pallor.   Psychiatric: She has a normal mood and affect. Her behavior is normal. Judgment and thought content normal.   Nursing note and vitals reviewed.       Results Review:    I reviewed the patient's previous clinical results.       Medication Review:   No current outpatient prescriptions on file.    Assessment/Plan   Laura was seen today for earache.    Diagnoses and all orders for this visit:    Earache symptoms in right ear  Discussed worsening s/s recommend take otc tylenol or motrin   Parotitis  Recommend patient increase water intake and suck on sour candy to increase salvia production  Discussed worsening signs and symptoms    Return if symptoms worsen or fail to improve.    Megha Fregoso, TRISTEN  08/20/2018

## 2018-08-24 ENCOUNTER — HOSPITAL ENCOUNTER (EMERGENCY)
Facility: HOSPITAL | Age: 33
Discharge: HOME | End: 2018-08-24
Attending: EMERGENCY MEDICINE | Admitting: EMERGENCY MEDICINE

## 2018-08-24 VITALS
BODY MASS INDEX: 31.15 KG/M2 | HEART RATE: 88 BPM | RESPIRATION RATE: 18 BRPM | OXYGEN SATURATION: 99 % | TEMPERATURE: 98.2 F | DIASTOLIC BLOOD PRESSURE: 80 MMHG | WEIGHT: 175.8 LBS | HEIGHT: 63 IN | SYSTOLIC BLOOD PRESSURE: 121 MMHG

## 2018-08-24 DIAGNOSIS — S61.210A LACERATION OF RIGHT INDEX FINGER WITHOUT FOREIGN BODY WITHOUT DAMAGE TO NAIL, INITIAL ENCOUNTER: Primary | ICD-10-CM

## 2018-08-24 PROCEDURE — 99283 EMERGENCY DEPT VISIT LOW MDM: CPT

## 2018-08-24 RX ORDER — ONDANSETRON 4 MG/1
4 TABLET, ORALLY DISINTEGRATING ORAL ONCE
Status: COMPLETED | OUTPATIENT
Start: 2018-08-24 | End: 2018-08-24

## 2018-08-24 RX ORDER — BACITRACIN ZINC 500 [USP'U]/G
OINTMENT TOPICAL ONCE
Status: COMPLETED | OUTPATIENT
Start: 2018-08-24 | End: 2018-08-24

## 2018-08-24 RX ORDER — LIDOCAINE HYDROCHLORIDE 10 MG/ML
10 INJECTION, SOLUTION INFILTRATION; PERINEURAL ONCE
Status: COMPLETED | OUTPATIENT
Start: 2018-08-24 | End: 2018-08-24

## 2018-08-24 RX ADMIN — BACITRACIN ZINC: 500 OINTMENT TOPICAL at 20:05

## 2018-08-24 RX ADMIN — LIDOCAINE HYDROCHLORIDE 10 ML: 10 INJECTION, SOLUTION INFILTRATION; PERINEURAL at 19:33

## 2018-08-24 RX ADMIN — ONDANSETRON 4 MG: 4 TABLET, ORALLY DISINTEGRATING ORAL at 20:05

## 2018-10-30 ENCOUNTER — OFFICE VISIT (OUTPATIENT)
Dept: INTERNAL MEDICINE | Facility: CLINIC | Age: 33
End: 2018-10-30

## 2018-10-30 VITALS
WEIGHT: 168 LBS | HEART RATE: 92 BPM | BODY MASS INDEX: 29.77 KG/M2 | HEIGHT: 63 IN | SYSTOLIC BLOOD PRESSURE: 104 MMHG | TEMPERATURE: 98.4 F | OXYGEN SATURATION: 99 % | DIASTOLIC BLOOD PRESSURE: 72 MMHG

## 2018-10-30 DIAGNOSIS — A08.4 VIRAL GASTROENTERITIS: Primary | ICD-10-CM

## 2018-10-30 PROCEDURE — 99213 OFFICE O/P EST LOW 20 MIN: CPT | Performed by: PHYSICIAN ASSISTANT

## 2018-10-30 RX ORDER — PROMETHAZINE HYDROCHLORIDE 25 MG/1
25 TABLET ORAL EVERY 6 HOURS PRN
Qty: 20 TABLET | Refills: 0 | Status: SHIPPED | OUTPATIENT
Start: 2018-10-30 | End: 2018-12-17

## 2018-10-30 NOTE — PROGRESS NOTES
Laura Walls Workman is a 33 y.o. female.     Subjective   History of Present Illness   Here today with concern of 5 days of diarrhea, nausea and dry heaving. She has not eaten much since onset. She is trying to drink enough fluids. No fever. She has had some RLQ pain. No family members have had similar symptoms. She has been using promethazine and zofran which are not helping much. Diarrhea is very watery.     Had bronchitis last week diagnosed at an urgent care where she was prescribed Zithromax which she used 3 dose of before stopping due to concern of it causing diarrhea. She was also prescribed prednisone and cough syrup. She still has a little cough but not nearly as bad.           The following portions of the patient's history were reviewed and updated as appropriate: allergies, current medications, past family history, past medical history, past social history, past surgical history and problem list.    Review of Systems    Constitutional: Negative for appetite change, chills, fatigue, fever and unexpected weight change.   HENT: Negative for congestion, ear pain, hearing loss, nosebleeds, postnasal drip, rhinorrhea, sore throat, tinnitus and trouble swallowing.    Eyes: Negative for photophobia, discharge and visual disturbance.   Respiratory: cough- nearly resolved. Negative for chest tightness, shortness of breath and wheezing.    Cardiovascular: Negative for chest pain, palpitations and leg swelling.   Gastrointestinal: abdominal pain, diarrhea, nausea and vomiting. Negative for abdominal distention, blood in stool, constipation.  Endocrine: Negative for cold intolerance, heat intolerance, polydipsia, polyphagia and polyuria.   Musculoskeletal: Negative for arthralgias, back pain, joint swelling, myalgias, neck pain and neck stiffness.   Skin: Negative for color change, pallor, rash and wound.   Allergic/Immunologic: Negative for environmental allergies, food allergies and immunocompromised state.  "  Neurological: Negative for dizziness, tremors, seizures, weakness, numbness and headaches.   Hematological: Negative for adenopathy. Does not bruise/bleed easily.   Psychiatric/Behavioral: Negative for sleep disturbances, agitation, behavioral problems, confusion, hallucinations, self-injury and suicidal ideas. The patient is not nervous/anxious.      Objective    Physical Exam  Constitutional: Appears well-developed and well-nourished.   HENT: OP normal.   Head: Normocephalic and atraumatic.   Eyes: EOM are normal. Pupils are equal, round, and reactive to light.   Neck: Normal range of motion. Neck supple.   Cardiovascular: Normal rate, regular rhythm and normal heart sounds.    Pulmonary/Chest: Effort normal and breath sounds normal. No respiratory distress.  Has no wheezes or rales. Exhibits no chest wall tenderness.   Abdominal: mild diffuse tenderness. Soft. Bowel sounds are normoactive. Exhibits no distension and no mass.   Musculoskeletal: Normal range of motion. Exhibits no tenderness.   Neurological: Alert and oriented to person, place, and time.   Skin: Skin is warm and dry.   Psychiatric: Has a normal mood and affect. Behavior is normal. Judgment and thought content normal.       /72   Pulse 92   Temp 98.4 °F (36.9 °C)   Ht 160 cm (62.99\")   Wt 76.2 kg (168 lb)   LMP 06/26/2018   SpO2 99%   BMI 29.77 kg/m²     Nursing note and vitals reviewed.        Assessment/Plan   Laura was seen today for gi problem, nausea and diarrhea.    Diagnoses and all orders for this visit:    Viral gastroenteritis  -     promethazine (PHENERGAN) 25 MG tablet; Take 1 tablet by mouth Every 6 (Six) Hours As Needed for Nausea or Vomiting.      Recommended she begin Imodium OTC per package instructions. Encouraged aggressive rehydration with water and Pedialyte.     Call or RTC if symptoms worsen or persist.          "

## 2018-12-17 ENCOUNTER — APPOINTMENT (OUTPATIENT)
Dept: LAB | Facility: HOSPITAL | Age: 33
End: 2018-12-17

## 2018-12-17 ENCOUNTER — OFFICE VISIT (OUTPATIENT)
Dept: INTERNAL MEDICINE | Facility: CLINIC | Age: 33
End: 2018-12-17

## 2018-12-17 VITALS
RESPIRATION RATE: 16 BRPM | DIASTOLIC BLOOD PRESSURE: 72 MMHG | HEIGHT: 63 IN | SYSTOLIC BLOOD PRESSURE: 102 MMHG | OXYGEN SATURATION: 97 % | WEIGHT: 169 LBS | TEMPERATURE: 98.1 F | HEART RATE: 67 BPM | BODY MASS INDEX: 29.95 KG/M2

## 2018-12-17 DIAGNOSIS — R79.89 LOW TSH LEVEL: Primary | ICD-10-CM

## 2018-12-17 DIAGNOSIS — J01.40 ACUTE PANSINUSITIS, RECURRENCE NOT SPECIFIED: ICD-10-CM

## 2018-12-17 LAB
T4 FREE SERPL-MCNC: 0.93 NG/DL (ref 0.78–2.19)
TSH SERPL DL<=0.05 MIU/L-ACNC: 0.77 MIU/ML (ref 0.47–4.68)

## 2018-12-17 PROCEDURE — 84481 FREE ASSAY (FT-3): CPT | Performed by: NURSE PRACTITIONER

## 2018-12-17 PROCEDURE — 84439 ASSAY OF FREE THYROXINE: CPT | Performed by: NURSE PRACTITIONER

## 2018-12-17 PROCEDURE — 84443 ASSAY THYROID STIM HORMONE: CPT | Performed by: NURSE PRACTITIONER

## 2018-12-17 PROCEDURE — 99214 OFFICE O/P EST MOD 30 MIN: CPT | Performed by: NURSE PRACTITIONER

## 2018-12-17 PROCEDURE — 36415 COLL VENOUS BLD VENIPUNCTURE: CPT | Performed by: NURSE PRACTITIONER

## 2018-12-19 LAB — T3FREE SERPL-MCNC: 3.5 PG/ML (ref 2–4.4)

## 2018-12-20 RX ORDER — AMOXICILLIN 875 MG/1
875 TABLET, COATED ORAL 2 TIMES DAILY
Qty: 14 TABLET | Refills: 0 | Status: SHIPPED | OUTPATIENT
Start: 2018-12-20 | End: 2019-01-31

## 2019-01-06 NOTE — PROGRESS NOTES
Chief Complaint / Reason:      Chief Complaint   Patient presents with   • Thyroid Problem     thinks my thyroid acting up. knot on left side. also developed a cold       Subjective     HPI  Patient presents today with complaints of sinus pressure sore throat and congestion also she thinks her thyroid is aches and abdomen like to have labs rechecked.  She denies chest pain, shortness of breath or heart palpitations she states upper respiratory symptoms have been going on for over a week and has progressively worsened the last few days denies fever or chills.  In regards to thyroid she states that her thyroid levels have been abnormal and she feels a knot on the left side of her throat.  Denies any problems swallowing or breathing.  History taken from: patient    PMH/FH/Social History were reviewed and updated appropriately in the electronic medical record.     Review of Systems:   Review of Systems   Constitutional: Positive for fatigue.   HENT: Positive for congestion, sinus pressure, sinus pain and sore throat.    Respiratory: Negative.    Cardiovascular: Negative.    Gastrointestinal: Negative.    Skin: Negative.    Neurological: Negative.    Hematological: Negative for adenopathy.     All other systems were reviewed and are negative.  Exceptions are noted in the subjective or above.      Objective     Vital Signs  Vitals:    12/17/18 1720   BP: 102/72   Pulse: 67   Resp: 16   Temp: 98.1 °F (36.7 °C)   SpO2: 97%       Body mass index is 29.95 kg/m².    Physical Exam   Constitutional: She is oriented to person, place, and time. She appears well-developed and well-nourished.   HENT:   Head: Normocephalic.   Right Ear: External ear normal. Tympanic membrane is erythematous and bulging.   Left Ear: External ear normal. Tympanic membrane is erythematous and bulging.   Nose: Right sinus exhibits maxillary sinus tenderness and frontal sinus tenderness. Left sinus exhibits maxillary sinus tenderness and frontal sinus  tenderness.   Mouth/Throat: Mucous membranes are dry. Posterior oropharyngeal erythema present.   Cardiovascular: Normal rate, regular rhythm, normal heart sounds and intact distal pulses.   Pulmonary/Chest: Effort normal and breath sounds normal.   Abdominal: Soft. Bowel sounds are normal.   Lymphadenopathy:     She has no cervical adenopathy.   Neurological: She is alert and oriented to person, place, and time.   Skin: Skin is warm and dry.   Psychiatric: She has a normal mood and affect. Her behavior is normal. Judgment and thought content normal.   Nursing note and vitals reviewed.         Medication Review:     Current Outpatient Medications:   •  amoxicillin (AMOXIL) 875 MG tablet, Take 1 tablet by mouth 2 (Two) Times a Day., Disp: 14 tablet, Rfl: 0    Assessment/Plan   Laura was seen today for thyroid problem.    Diagnoses and all orders for this visit:    Low TSH level  -     Cancel: T4, free  -     Cancel: TSH  -     TSH  -     T3, free  -     T4, free    Acute pansinusitis, recurrence not specified    amoxicillin (AMOXIL) 875 MG tablet, Take 1 tablet by mouth 2 (Two) Times a Day., Disp: 14 tablet, Rfl: 0      Return if symptoms worsen or fail to improve.    Megha Fregoso, APRN  12/17/2018

## 2019-01-31 ENCOUNTER — OFFICE VISIT (OUTPATIENT)
Dept: OBSTETRICS AND GYNECOLOGY | Facility: CLINIC | Age: 34
End: 2019-01-31

## 2019-01-31 VITALS
HEIGHT: 64 IN | DIASTOLIC BLOOD PRESSURE: 70 MMHG | BODY MASS INDEX: 29.19 KG/M2 | SYSTOLIC BLOOD PRESSURE: 112 MMHG | WEIGHT: 171 LBS

## 2019-01-31 DIAGNOSIS — Z01.419 WOMEN'S ANNUAL ROUTINE GYNECOLOGICAL EXAMINATION: Primary | ICD-10-CM

## 2019-01-31 PROBLEM — Z90.49 S/P CHOLECYSTECTOMY: Status: RESOLVED | Noted: 2018-05-30 | Resolved: 2019-01-31

## 2019-01-31 PROBLEM — R10.2 PELVIC PAIN: Status: RESOLVED | Noted: 2017-07-21 | Resolved: 2019-01-31

## 2019-01-31 PROBLEM — Z84.2 FAMILY HISTORY OF ENDOMETRIOSIS: Status: RESOLVED | Noted: 2018-05-30 | Resolved: 2019-01-31

## 2019-01-31 PROBLEM — R10.33 PERIUMBILICAL ABDOMINAL PAIN: Status: RESOLVED | Noted: 2017-04-18 | Resolved: 2019-01-31

## 2019-01-31 PROBLEM — Z98.891 H/O: CESAREAN SECTION: Status: RESOLVED | Noted: 2018-05-30 | Resolved: 2019-01-31

## 2019-01-31 PROBLEM — Z90.710 S/P VAGINAL HYSTERECTOMY: Status: RESOLVED | Noted: 2018-07-10 | Resolved: 2019-01-31

## 2019-01-31 PROCEDURE — 99395 PREV VISIT EST AGE 18-39: CPT | Performed by: OBSTETRICS & GYNECOLOGY

## 2019-01-31 NOTE — PROGRESS NOTES
"Subjective   Chief Complaint   Patient presents with   • Annual Exam     Laura Thomson is a 33 y.o. year old  who is S/P hysterectomy presenting to be seen for her annual exam.  She had a lot of pain after the vaginal hysterectomy but overall well worth it ! No issues     Recently accepted to [employer] she will go for an associates in business.  Congratulated.    SEXUAL Hx:  She is currently sexually active.  In the past year there has not been new sexual partners.    She does nothave pain or problems with sex  Concerns about domestic violence:no  HEALTH Hx:  Level of weekly physical activity:3 hours  She wears her seat belt: yes  Caffeine intake : small  Calcium intake: 1servings per day  Social History    Tobacco Use      Smoking status: Never Smoker      Smokeless tobacco: Never Used    Alcohol:does not drink  Illegal drug use:no      The following portions of the patient's history were reviewed and updated as appropriate:problem list, current medications, allergies, past family history, past medical history, past social history and past surgical history.      Review of Systems  Constitutional   POS: nothing reported    NEG: anorexia or night sweats   Gastointestinal POS: nothing reported    NEG: bloating, change in bowel habits, melena or reflux symptoms   Genitourinary POS: nothing reported    NEG:dysuria or hematuria   Skin POS :moles that are changing in size, shape, color or rashes    NEG : nothing reported   Breast POS: normal mamm except lymph node    NEG: nothing reported        Objective   /70   Ht 161.3 cm (63.5\")   Wt 77.6 kg (171 lb)   LMP 2018   Breastfeeding? No   BMI 29.82 kg/m²     General:  well developed; well nourished  no acute distress  appears stated age   Skin:  No suspicious lesions seen   Thyroid: not examined   Breasts:  Examined in supine position  Symmetric without masses or skin dimpling  Nipples normal without inversion, lesions or " discharge  There are no palpable axillary nodes   Abdomen: soft, non-tender; no masses  no umbilical or inguinal hernias are present  no hepato-splenomegaly   Pelvis: Clinical staff was present for exam  External genitalia:  normal appearance of the external genitalia including Bartholin's and [place]'s glands.  :  urethral meatus normal; urethral hypermobility is absent.  Vaginal:  normal pink mucosa without prolapse or lesions.  Uterus:  absent.  Adnexa:  normal bimanual exam of the adnexa.       Lab Review   No data reviewed and path report  Imaging Review   Breast ultrasound report  Mammogram report       Assessment   1. Normal post hysterectomy examination.  We cleared up some confusion.  She is also status post a left salpingo-oophorectomy at a  section by Dr. Thomas.  2. She had a mammogram last year that showed a benign lymph node.  The radiologist and Dr. King suggested a repeat mammogram in 1 year.  I discussed that I am not sure that that is necessary given the benign finding but due to protocol I will order another one.  If this is a normal mammogram I would think that she could safely return to more normal screening such as age 39.     Plan   1. 1000 mg calcium in divided doses ; ideally in her diet will give information on dietary calcium  2. Regular exercise  3. Self breast awareness, mammogram protocols discussed  4.   Follow up for annual exam or sooner for any problems.    No orders of the defined types were placed in this encounter.         This note was electronically signed.    Luc Alicea M.D.  2019

## 2019-02-06 DIAGNOSIS — Z12.31 BREAST CANCER SCREENING BY MAMMOGRAM: Primary | ICD-10-CM

## 2019-03-22 ENCOUNTER — HOSPITAL ENCOUNTER (OUTPATIENT)
Dept: MAMMOGRAPHY | Facility: HOSPITAL | Age: 34
Discharge: HOME | End: 2019-03-22
Admitting: OBSTETRICS & GYNECOLOGY

## 2019-03-22 DIAGNOSIS — Z12.31 BREAST CANCER SCREENING BY MAMMOGRAM: ICD-10-CM

## 2019-03-22 PROCEDURE — 77067 SCR MAMMO BI INCL CAD: CPT

## 2019-03-22 PROCEDURE — 77063 BREAST TOMOSYNTHESIS BI: CPT

## 2019-04-07 ENCOUNTER — APPOINTMENT (OUTPATIENT)
Dept: CT IMAGING | Facility: HOSPITAL | Age: 34
End: 2019-04-07

## 2019-04-07 ENCOUNTER — HOSPITAL ENCOUNTER (EMERGENCY)
Facility: HOSPITAL | Age: 34
Discharge: HOME | End: 2019-04-07
Attending: EMERGENCY MEDICINE | Admitting: EMERGENCY MEDICINE

## 2019-04-07 VITALS
BODY MASS INDEX: 30.79 KG/M2 | TEMPERATURE: 97.9 F | HEART RATE: 72 BPM | OXYGEN SATURATION: 100 % | HEIGHT: 63 IN | SYSTOLIC BLOOD PRESSURE: 112 MMHG | WEIGHT: 173.8 LBS | RESPIRATION RATE: 16 BRPM | DIASTOLIC BLOOD PRESSURE: 91 MMHG

## 2019-04-07 DIAGNOSIS — M54.50 ACUTE MIDLINE LOW BACK PAIN WITHOUT SCIATICA: Primary | ICD-10-CM

## 2019-04-07 PROCEDURE — 72131 CT LUMBAR SPINE W/O DYE: CPT

## 2019-04-07 PROCEDURE — 99283 EMERGENCY DEPT VISIT LOW MDM: CPT

## 2019-04-07 RX ORDER — IBUPROFEN 600 MG/1
600 TABLET ORAL ONCE
Status: COMPLETED | OUTPATIENT
Start: 2019-04-07 | End: 2019-04-07

## 2019-04-07 RX ORDER — ACETAMINOPHEN 325 MG/1
650 TABLET ORAL ONCE
Status: COMPLETED | OUTPATIENT
Start: 2019-04-07 | End: 2019-04-07

## 2019-04-07 RX ADMIN — ACETAMINOPHEN 650 MG: 325 TABLET, FILM COATED ORAL at 17:52

## 2019-04-07 RX ADMIN — IBUPROFEN 600 MG: 600 TABLET, FILM COATED ORAL at 17:52

## 2019-04-07 NOTE — ED PROVIDER NOTES
Subjective   History of Present Illness   33-year-old female presenting with lower back pain.  States that she fell getting into her house about 1 hour prior to arrival, heard a loud pop, and has had pain across her lower back since that is worse with any movement. Denies any weakness / numbness in legs, loss of control of bowels or bladder, fever, IVDA.       Review of Systems   Musculoskeletal: Positive for back pain.   All other systems reviewed and are negative.      Past Medical History:   Diagnosis Date   • Abdominal pain, periumbilical    • Abdominal pain, RLQ (right lower quadrant)    • Acute pharyngitis    • Appetite lost    • Chest pain    • Depression     patient denies   • Diarrhea    • Difficulty swallowing    • Fatigue    • Fracture     RIGHT WRIST TWICE, LEFT HAND   • GERD (gastroesophageal reflux disease)    • History of colonoscopy    • History of ovarian cyst    • Hyperthyroidism     hyperactive taken off medicine when pregnant not placed back on   • Ovarian cyst, bilateral    • Spinal headache    • Upper respiratory infection    • Vomiting        No Known Allergies    Past Surgical History:   Procedure Laterality Date   •  SECTION  2008    DR NAVARRETE   •  SECTION  10/30/2015    DR VASQUEZ   •  SECTION  2010    DR ESPINOZA   • CHOLECYSTECTOMY     • COLONOSCOPY     • COLONOSCOPY N/A 2017    Procedure: COLONOSCOPY WITH COLD BIOPSY POLYPECTOMY; BIOPSIES , QUICK CLIP;  Surgeon: Perfecto Mcknight MD;  Location: [facility] ENDOSCOPY;  Service:    • DENTAL PROCEDURE      REPORTS WISDOM TEETH EXTRACTED   • DIAGNOSTIC LAPAROSCOPY  2005    DR ESPINOZA   • DIAGNOSTIC LAPAROSCOPY  2012    DR ESPINOZA   • DIAGNOSTIC LAPAROSCOPY N/A 8/10/2017    Procedure: DIAGNOSTIC LAPAROSCOPY, LEFT SALPINGO-OOPHORECTOMY;  Surgeon: Keren Espinoza MD;  Location: [facility] OR;  Service:    • LYSIS OF ABDOMINAL ADHESIONS  2005    DR ESPINOZA   • LYSIS OF ABDOMINAL ADHESIONS  2012      OLGA   • NISSEN FUNDOPLICATION     • OVARIAN CYST REMOVAL  07/07/2005    DR ESPINOZA   • SALPINGO OOPHORECTOMY Left 08/2017   • STOMACH SURGERY  2014    PATIENT REPORTS STOMACH WAS WRAPPED AROUND ESOPHAGUS   • UPPER GASTROINTESTINAL ENDOSCOPY     • VAGINAL HYSTERECTOMY N/A 7/9/2018    Procedure: VAGINAL HYSTERECTOMY, BILATERAL SALPINGECTOMY;  Surgeon: Luc Alicea MD;  Location: [facility];  Service: Gynecology       Family History   Problem Relation Age of Onset   • Coronary artery disease Mother    • Diabetes Mother    • Hypertension Mother    • Kidney disease Father    • Skin cancer Father    • Stroke Other    • Hypertension Other    • Diabetes Other    • Colon cancer Other        Social History     Socioeconomic History   • Marital status:      Spouse name: Not on file   • Number of children: Not on file   • Years of education: Not on file   • Highest education level: Not on file   Tobacco Use   • Smoking status: Never Smoker   • Smokeless tobacco: Never Used   Substance and Sexual Activity   • Alcohol use: No   • Drug use: No   • Sexual activity: Yes     Partners: Male     Birth control/protection: Surgical           Objective   Physical Exam   Constitutional: She is oriented to person, place, and time. She appears well-developed and well-nourished. No distress.   HENT:   Head: Normocephalic.   Mouth/Throat: Oropharynx is clear and moist. No oropharyngeal exudate.   Eyes: Conjunctivae are normal. No scleral icterus.   Neck: Neck supple. No tracheal deviation present.   Cardiovascular: Normal rate, regular rhythm, normal heart sounds and intact distal pulses. Exam reveals no gallop and no friction rub.   No murmur heard.  Pulmonary/Chest: Effort normal and breath sounds normal. No stridor. No respiratory distress. She has no wheezes. She has no rales. She exhibits no tenderness.   Abdominal: Soft. She exhibits no distension and no mass. There is no tenderness. There is no rebound and no guarding.    Musculoskeletal: She exhibits tenderness (Focally ttp over L2-3). She exhibits no edema or deformity.   Neurological: She is alert and oriented to person, place, and time.   Strength and sensation intact to BUE / BLE   Skin: Skin is warm and dry. She is not diaphoretic. No erythema. No pallor.   Psychiatric: She has a normal mood and affect. Her behavior is normal.   Nursing note and vitals reviewed.      Procedures           ED Course                  MDM  Number of Diagnoses or Management Options  Acute midline low back pain without sciatica:      Amount and/or Complexity of Data Reviewed  Tests in the radiology section of CPT®: reviewed      33-year-old female here with acute lower back pain status post fall.  Afebrile, vital signs stable.  No red flag signs for cauda equina or other emergent cause of her pain.  However, given her direct trauma and tenderness palpation over the spinous process, I will get imaging, treat symptomatically and reassess.    7:35 PM I received patient in signout.  CT scan of the spine is without acute findings.  Will discharge home with symptomatic treatment.    Final diagnoses:   Acute midline low back pain without sciatica            Maximino Bo MD  04/07/19 1935

## 2019-04-08 ENCOUNTER — OFFICE VISIT (OUTPATIENT)
Dept: INTERNAL MEDICINE | Facility: CLINIC | Age: 34
End: 2019-04-08

## 2019-04-08 VITALS
SYSTOLIC BLOOD PRESSURE: 113 MMHG | DIASTOLIC BLOOD PRESSURE: 61 MMHG | BODY MASS INDEX: 31.18 KG/M2 | WEIGHT: 176 LBS | TEMPERATURE: 98 F | HEART RATE: 75 BPM | RESPIRATION RATE: 18 BRPM | OXYGEN SATURATION: 100 %

## 2019-04-08 DIAGNOSIS — M54.50 ACUTE BILATERAL LOW BACK PAIN WITHOUT SCIATICA: Primary | ICD-10-CM

## 2019-04-08 PROCEDURE — 99213 OFFICE O/P EST LOW 20 MIN: CPT | Performed by: PHYSICIAN ASSISTANT

## 2019-04-08 PROCEDURE — 96372 THER/PROPH/DIAG INJ SC/IM: CPT | Performed by: PHYSICIAN ASSISTANT

## 2019-04-08 RX ORDER — TIZANIDINE 4 MG/1
4 TABLET ORAL NIGHTLY PRN
Qty: 15 TABLET | Refills: 0 | Status: SHIPPED | OUTPATIENT
Start: 2019-04-08 | End: 2019-06-05

## 2019-04-08 RX ORDER — KETOROLAC TROMETHAMINE 30 MG/ML
60 INJECTION, SOLUTION INTRAMUSCULAR; INTRAVENOUS ONCE
Status: COMPLETED | OUTPATIENT
Start: 2019-04-08 | End: 2019-04-08

## 2019-04-08 RX ADMIN — KETOROLAC TROMETHAMINE 60 MG: 30 INJECTION, SOLUTION INTRAMUSCULAR; INTRAVENOUS at 17:52

## 2019-04-08 NOTE — PROGRESS NOTES
Subjective     Chief Complaint   Patient presents with   • Back Pain     fell yesterday and back popped. Had CT yesterday       History of Present Illness     Laura Walls Workman is a 33 y.o. female presenting with complaints of lower back discomfort following fall yesterday evening at her home.  Patient went to the ED, lumbar CT without acute findings.  She denies any loss of bowel or bladder functioning, saddle anesthesia.  Pain does not radiate into the lower legs.  She has not taken any medication over-the-counter.  She denies any prior back injuries.    The following portions of the patient's history were reviewed and updated as appropriate: current medications, allergies, PMH.    Review of Systems   Constitutional: Negative for appetite change, chills, fatigue, fever and unexpected weight change.   HENT: Negative for congestion, ear pain, hearing loss, nosebleeds, sinus pressure, sore throat, tinnitus and trouble swallowing.    Eyes: Negative for pain, discharge, redness, itching and visual disturbance.   Respiratory: Negative for cough, chest tightness, shortness of breath and wheezing.    Cardiovascular: Negative for chest pain, palpitations and leg swelling.   Gastrointestinal: Negative for abdominal pain, blood in stool, constipation, diarrhea, nausea and vomiting.   Endocrine: Negative for cold intolerance, heat intolerance, polydipsia, polyphagia and polyuria.   Genitourinary: Negative for decreased urine volume, dysuria, flank pain, frequency and hematuria.   Musculoskeletal: Positive for back pain. Negative for arthralgias, gait problem, joint swelling, myalgias, neck pain and neck stiffness.   Skin: Negative for color change and rash.   Allergic/Immunologic: Negative for environmental allergies, food allergies and immunocompromised state.   Neurological: Negative for dizziness, syncope, weakness, light-headedness and headaches.   Hematological: Negative for adenopathy. Does not bruise/bleed easily.    Psychiatric/Behavioral: Negative for dysphoric mood, sleep disturbance and suicidal ideas. The patient is not nervous/anxious.      Objective     Vitals:    04/08/19 1727   BP: 113/61   Pulse: 75   Resp: 18   Temp: 98 °F (36.7 °C)   TempSrc: Temporal   SpO2: 100%   Weight: 79.8 kg (176 lb)       Physical Exam   Constitutional: Appears well-developed and well-nourished.   Cardiovascular: Normal rate, regular rhythm and normal heart sounds.    Pulmonary/Chest: Effort normal and breath sounds normal.   Abdominal: Soft. Bowel sounds are normal. There is no CVA tenderness.   Musculoskeletal: Lumbar paraspinous muscles ttp  Lymphadenopathy: No cervical adenopathy noted.   Neurological: Alert and oriented to person, place, and time.   Skin: Skin is warm and dry.   Psychiatric: Exhibits a normal mood and affect.     Assessment/Plan     Diagnoses and all orders for this visit:    Acute bilateral low back pain without sciatica  -     ketorolac (TORADOL) injection 60 mg  -     tiZANidine (ZANAFLEX) 4 MG tablet; Take 1 tablet by mouth At Night As Needed for Muscle Spasms.    Heat, gentle range of motion and stretching, may add on Tylenol as needed for discomfort.  RTC if symptoms worsen or fail to improve.    Brenda Gama PA-C  04/08/2019         Please note that portions of this note were completed with a voice recognition program. Efforts were made to edit dictation, but occasionally words are mistranscribed.

## 2019-04-27 ENCOUNTER — HOSPITAL ENCOUNTER (EMERGENCY)
Facility: HOSPITAL | Age: 34
Discharge: HOME | End: 2019-04-27
Attending: EMERGENCY MEDICINE | Admitting: EMERGENCY MEDICINE

## 2019-04-27 ENCOUNTER — APPOINTMENT (OUTPATIENT)
Dept: GENERAL RADIOLOGY | Facility: HOSPITAL | Age: 34
End: 2019-04-27

## 2019-04-27 VITALS
HEART RATE: 76 BPM | OXYGEN SATURATION: 99 % | DIASTOLIC BLOOD PRESSURE: 83 MMHG | WEIGHT: 179.4 LBS | TEMPERATURE: 98 F | HEIGHT: 64 IN | SYSTOLIC BLOOD PRESSURE: 119 MMHG | RESPIRATION RATE: 16 BRPM | BODY MASS INDEX: 30.63 KG/M2

## 2019-04-27 DIAGNOSIS — S63.502A WRIST SPRAIN, LEFT, INITIAL ENCOUNTER: Primary | ICD-10-CM

## 2019-04-27 PROCEDURE — 99283 EMERGENCY DEPT VISIT LOW MDM: CPT

## 2019-04-27 PROCEDURE — 73130 X-RAY EXAM OF HAND: CPT

## 2019-04-27 RX ORDER — IBUPROFEN 800 MG/1
800 TABLET ORAL ONCE
Status: COMPLETED | OUTPATIENT
Start: 2019-04-27 | End: 2019-04-27

## 2019-04-27 RX ORDER — IBUPROFEN 600 MG/1
600 TABLET ORAL EVERY 6 HOURS PRN
Qty: 30 TABLET | Refills: 0 | Status: SHIPPED | OUTPATIENT
Start: 2019-04-27 | End: 2019-04-30 | Stop reason: ALTCHOICE

## 2019-04-27 RX ADMIN — IBUPROFEN 800 MG: 800 TABLET ORAL at 18:41

## 2019-04-30 ENCOUNTER — OFFICE VISIT (OUTPATIENT)
Dept: ORTHOPEDIC SURGERY | Facility: CLINIC | Age: 34
End: 2019-04-30

## 2019-04-30 VITALS — WEIGHT: 179 LBS | RESPIRATION RATE: 18 BRPM | HEIGHT: 64 IN | BODY MASS INDEX: 30.56 KG/M2

## 2019-04-30 DIAGNOSIS — S63.502A LEFT WRIST SPRAIN, INITIAL ENCOUNTER: Primary | ICD-10-CM

## 2019-04-30 PROCEDURE — 99203 OFFICE O/P NEW LOW 30 MIN: CPT | Performed by: PHYSICIAN ASSISTANT

## 2019-04-30 PROCEDURE — 29075 APPL CST ELBW FNGR SHORT ARM: CPT | Performed by: PHYSICIAN ASSISTANT

## 2019-04-30 NOTE — PROGRESS NOTES
Subjective   Patient ID: Laura Walls Workman is a 33 y.o. left hand dominant female  Pain and Injury of the Left Wrist (Patient here today for left wrist pain after fall on 19. She was in her yard and tripped and fell on wrist, it was bent backward when she fell. )         History of Present Illness    Patient presents with complaints of left wrist and hand pain status post fall on 2019.  She was seen in the emergency room diagnosed with wrist sprain and placed in thumb spica brace.  She states she is left-hand dominant and the brace does seem to irritate her hand.  Patient request a cast as opposed to the thumb spica brace.  Patient has been taking ibuprofen with no improvement  Pain Score: 8  Pain Location: Wrist  Pain Orientation: Left  Pain Radiating Towards: up arm  Pain Descriptors: Throbbing, Stabbing  Pain Frequency: Constant/continuous  Pain Onset: Sudden  Date Pain First Started: 19  Clinical Progression: Gradually worsening  Aggravating Factors: (any movement of arm)        Pain Intervention(s): Home medication, Cold applied, Elevated(ibuprofen, brace)  Result of Injury: Yes(fell in yard)  Work-Related Injury: No    Past Medical History:   Diagnosis Date   • Abdominal pain, periumbilical    • Abdominal pain, RLQ (right lower quadrant)    • Acute pharyngitis    • Appetite lost    • Chest pain    • Depression     patient denies   • Diarrhea    • Difficulty swallowing    • Fatigue    • Fracture     RIGHT WRIST TWICE, LEFT HAND   • GERD (gastroesophageal reflux disease)    • History of colonoscopy    • History of ovarian cyst    • Hyperthyroidism     hyperactive taken off medicine when pregnant not placed back on   • Ovarian cyst, bilateral    • Spinal headache    • Upper respiratory infection    • Vomiting         Past Surgical History:   Procedure Laterality Date   •  SECTION  2008    DR NAVARRETE   •  SECTION  10/30/2015    DR VASQUEZ   •  SECTION  2010     DR ESPINOZA   • CHOLECYSTECTOMY     • COLONOSCOPY  2014   • COLONOSCOPY N/A 2/17/2017    Procedure: COLONOSCOPY WITH COLD BIOPSY POLYPECTOMY; BIOPSIES , QUICK CLIP;  Surgeon: Perfecto Mcknight MD;  Location: [facility] ENDOSCOPY;  Service:    • DENTAL PROCEDURE      REPORTS WISDOM TEETH EXTRACTED   • DIAGNOSTIC LAPAROSCOPY  07/07/2005    DR ESPINOZA   • DIAGNOSTIC LAPAROSCOPY  02/14/2012    DR ESPINOZA   • DIAGNOSTIC LAPAROSCOPY N/A 8/10/2017    Procedure: DIAGNOSTIC LAPAROSCOPY, LEFT SALPINGO-OOPHORECTOMY;  Surgeon: Keren Espinoza MD;  Location: [facility] OR;  Service:    • LYSIS OF ABDOMINAL ADHESIONS  07/07/2005    DR ESPINOZA   • LYSIS OF ABDOMINAL ADHESIONS  02/14/2012    DR ESPINOZA   • NISSEN FUNDOPLICATION     • OVARIAN CYST REMOVAL  07/07/2005    DR ESPINOZA   • SALPINGO OOPHORECTOMY Left 08/2017   • STOMACH SURGERY  2014    PATIENT REPORTS STOMACH WAS WRAPPED AROUND ESOPHAGUS   • UPPER GASTROINTESTINAL ENDOSCOPY     • VAGINAL HYSTERECTOMY N/A 7/9/2018    Procedure: VAGINAL HYSTERECTOMY, BILATERAL SALPINGECTOMY;  Surgeon: Luc Alicea MD;  Location: [facility] OR;  Service: Gynecology       Family History   Problem Relation Age of Onset   • Coronary artery disease Mother    • Diabetes Mother    • Hypertension Mother    • Kidney disease Father    • Skin cancer Father    • Stroke Other    • Hypertension Other    • Diabetes Other    • Colon cancer Other        Social History     Socioeconomic History   • Marital status:      Spouse name: Not on file   • Number of children: Not on file   • Years of education: Not on file   • Highest education level: Not on file   Tobacco Use   • Smoking status: Never Smoker   • Smokeless tobacco: Never Used   Substance and Sexual Activity   • Alcohol use: No   • Drug use: No   • Sexual activity: Yes     Partners: Male     Birth control/protection: Surgical         Current Outpatient Medications:   •  tiZANidine (ZANAFLEX) 4 MG tablet, Take 1 tablet by mouth At Night As Needed for Muscle Spasms., Disp: 15  "tablet, Rfl: 0    No Known Allergies    Review of Systems   Constitutional: Negative for fever.   HENT: Negative for voice change.    Eyes: Negative for visual disturbance.   Respiratory: Negative for shortness of breath.    Cardiovascular: Negative for chest pain.   Gastrointestinal: Negative for abdominal distention and abdominal pain.   Genitourinary: Negative for dysuria.   Musculoskeletal: Positive for arthralgias and joint swelling. Negative for gait problem.   Skin: Negative for rash.   Neurological: Negative for speech difficulty.   Hematological: Does not bruise/bleed easily.   Psychiatric/Behavioral: Negative for confusion.       Objective   Resp 18   Ht 162.6 cm (64\")   Wt 81.2 kg (179 lb)   LMP 06/26/2018   BMI 30.73 kg/m²    Physical Exam   Constitutional: She appears well-developed.   Pulmonary/Chest: Effort normal.   Musculoskeletal:        Left elbow: She exhibits normal range of motion and no swelling. No tenderness found. No radial head, no medial epicondyle, no lateral epicondyle and no olecranon process tenderness noted.        Left wrist: She exhibits tenderness (left hand). She exhibits normal range of motion, no swelling, no effusion, no crepitus, no deformity and no laceration.        Left hand: She exhibits no tenderness, no bony tenderness and normal capillary refill. Normal sensation noted. Normal strength noted. She exhibits no thumb/finger opposition.   Neurological: She is alert.   Psychiatric: She has a normal mood and affect.   Vitals reviewed.    Ortho Exam     Neurologic Exam       Mild ttp to anatomical snuffbox     Assessment/Plan   Independent Review of Radiographic Studies:    X-ray of the left hand was reviewed from [facility].  There is no evidence of acute fracture.  We will re-x-ray and recheck in 2 weeks        Procedures       Laura was seen today for pain and injury.    Diagnoses and all orders for this visit:    Left wrist sprain, initial encounter     "   Reduced physical activity as appropriate  Weight bearing parameters reviewed  Avoid offending activity    Recommendations/Plan:  Exercise, medications, injections, other patient advice, and return appointment as noted.  Patient is encouraged to call or return for any issues or concerns.    Patient was placed in a thumb spica fiberglass cast  Will re-examine in 2 weeks and re xray.     Patient agreeable to call or return sooner for any concerns.

## 2019-05-20 DIAGNOSIS — S63.502A LEFT WRIST SPRAIN, INITIAL ENCOUNTER: Primary | ICD-10-CM

## 2019-05-21 ENCOUNTER — OFFICE VISIT (OUTPATIENT)
Dept: ORTHOPEDIC SURGERY | Facility: CLINIC | Age: 34
End: 2019-05-21

## 2019-05-21 VITALS — WEIGHT: 177 LBS | RESPIRATION RATE: 18 BRPM | BODY MASS INDEX: 31.36 KG/M2 | HEIGHT: 63 IN

## 2019-05-21 DIAGNOSIS — S63.502A LEFT WRIST SPRAIN, INITIAL ENCOUNTER: Primary | ICD-10-CM
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
## 2019-05-21 DIAGNOSIS — M25.532 WRIST PAIN, ACUTE, LEFT: ICD-10-CM

## 2019-05-21 PROCEDURE — 99213 OFFICE O/P EST LOW 20 MIN: CPT | Performed by: PHYSICIAN ASSISTANT

## 2019-05-21 NOTE — PROGRESS NOTES
Subjective   Patient ID: Laura Walls Workman is a 33 y.o. left hand dominant female  Fracture and Follow-up of the Left Hand (Left wrist sprain)         History of Present Illness    Patient is following up with a scheduled appointment in regards to left wrist injury after a fall on 2019.  Patient states she is still having pain to the left wrist worse with movement.  She has been immobilized in a thumb spica fiberglass cast since her initial eval in our office on 2019.  Patient denies numbness or tingling    Past Medical History:   Diagnosis Date   • Abdominal pain, periumbilical    • Abdominal pain, RLQ (right lower quadrant)    • Acute pharyngitis    • Appetite lost    • Chest pain    • Depression     patient denies   • Diarrhea    • Difficulty swallowing    • Fatigue    • Fracture     RIGHT WRIST TWICE, LEFT HAND   • GERD (gastroesophageal reflux disease)    • History of colonoscopy    • History of ovarian cyst    • Hyperthyroidism     hyperactive taken off medicine when pregnant not placed back on   • Ovarian cyst, bilateral    • Spinal headache    • Upper respiratory infection    • Vomiting         Past Surgical History:   Procedure Laterality Date   •  SECTION  2008    DR NAVARRETE   •  SECTION  10/30/2015    DR VASQUEZ   •  SECTION  2010    DR ESPINOZA   • CHOLECYSTECTOMY     • COLONOSCOPY     • COLONOSCOPY N/A 2017    Procedure: COLONOSCOPY WITH COLD BIOPSY POLYPECTOMY; BIOPSIES , QUICK CLIP;  Surgeon: Perfecto Mcknight MD;  Location: [facility] ENDOSCOPY;  Service:    • DENTAL PROCEDURE      REPORTS WISDOM TEETH EXTRACTED   • DIAGNOSTIC LAPAROSCOPY  2005    DR ESPINOZA   • DIAGNOSTIC LAPAROSCOPY  2012    DR ESPINOZA   • DIAGNOSTIC LAPAROSCOPY N/A 8/10/2017    Procedure: DIAGNOSTIC LAPAROSCOPY, LEFT SALPINGO-OOPHORECTOMY;  Surgeon: Keren Espinoza MD;  Location: [facility] OR;  Service:    • LYSIS OF ABDOMINAL ADHESIONS  2005    DR ESPINOZA   • LYSIS OF ABDOMINAL  ADHESIONS  02/14/2012    DR ESPINOZA   • NISSEN FUNDOPLICATION     • OVARIAN CYST REMOVAL  07/07/2005    DR ESPINOZA   • SALPINGO OOPHORECTOMY Left 08/2017   • STOMACH SURGERY  2014    PATIENT REPORTS STOMACH WAS WRAPPED AROUND ESOPHAGUS   • UPPER GASTROINTESTINAL ENDOSCOPY     • VAGINAL HYSTERECTOMY N/A 7/9/2018    Procedure: VAGINAL HYSTERECTOMY, BILATERAL SALPINGECTOMY;  Surgeon: Luc Alicea MD;  Location: [facility];  Service: Gynecology       Family History   Problem Relation Age of Onset   • Coronary artery disease Mother    • Diabetes Mother    • Hypertension Mother    • Kidney disease Father    • Skin cancer Father    • Stroke Other    • Hypertension Other    • Diabetes Other    • Colon cancer Other        Social History     Socioeconomic History   • Marital status:      Spouse name: Not on file   • Number of children: Not on file   • Years of education: Not on file   • Highest education level: Not on file   Tobacco Use   • Smoking status: Never Smoker   • Smokeless tobacco: Never Used   Substance and Sexual Activity   • Alcohol use: No   • Drug use: No   • Sexual activity: Yes     Partners: Male     Birth control/protection: Surgical       I have reviewed all of the above social hx, family hx, surgical hx, medications, allergies & ROS and confirm that it is accurate.      Current Outpatient Medications:   •  tiZANidine (ZANAFLEX) 4 MG tablet, Take 1 tablet by mouth At Night As Needed for Muscle Spasms., Disp: 15 tablet, Rfl: 0    No Known Allergies    Review of Systems   Constitutional: Negative for fever.   HENT: Negative for voice change.    Eyes: Negative for visual disturbance.   Respiratory: Negative for shortness of breath.    Cardiovascular: Negative for chest pain.   Gastrointestinal: Negative for abdominal distention and abdominal pain.   Genitourinary: Negative for dysuria.   Musculoskeletal: Positive for arthralgias. Negative for gait problem and joint swelling.   Skin: Negative for rash.  "  Neurological: Negative for speech difficulty.   Hematological: Does not bruise/bleed easily.   Psychiatric/Behavioral: Negative for confusion.       Objective   Resp 18   Ht 160 cm (63\")   Wt 80.3 kg (177 lb)   LMP 06/26/2018   BMI 31.35 kg/m²    Physical Exam   Constitutional: She is oriented to person, place, and time. She appears well-nourished.   Pulmonary/Chest: Effort normal.   Musculoskeletal:        Left wrist: She exhibits decreased range of motion, tenderness and bony tenderness. She exhibits no swelling, no effusion, no crepitus, no deformity and no laceration.        Left hand: She exhibits normal capillary refill, no deformity and no swelling. Normal sensation noted. She exhibits no thumb/finger opposition.   Neurological: She is alert and oriented to person, place, and time.   Psychiatric: She has a normal mood and affect. Her behavior is normal.   Vitals reviewed.    Left Hand Exam     Range of Motion   Wrist   Extension: 40   Flexion: 70   Pronation: normal   Supination: normal     Muscle Strength   The patient has normal left wrist strength.    Tests   Tinel's sign (median nerve): negative  Finkelstein's test: positive    Other   Erythema: absent  Sensation: normal  Pulse: present             Neurologic Exam     Mental Status   Oriented to person, place, and time.          + left snuffbox ttp     Assessment/Plan   Independent Review of Radiographic Studies:    X-ray of the left wrist performed in the office reveals no obvious acute fracture.  Patient is very tender to the snuffbox region    Procedures       Laura was seen today for fracture and follow-up.    Diagnoses and all orders for this visit:    Left wrist sprain, initial encounter  -     CT wrist left wo contrast    Wrist pain, acute, left  -     CT wrist left wo contrast    Fall, initial encounter  -     CT wrist left wo contrast       Discussion of orthopedic goals  Risk, benefits, and merits of treatment alternatives reviewed with " the patient and questions answered  Use brace as instructed  Reduced physical activity as appropriate  Weight bearing parameters reviewed  Avoid offending activity    Recommendations/Plan:  Exercise, medications, injections, other patient advice, and return appointment as noted.  Patient is encouraged to call or return for any issues or concerns.    Patient was given a Velcro thumb spica brace.  Wear this and treat like a cast.  Follow-up after CT of the left wrist    I do feel a CT scan of the left wrist is warranted as the patient has tried rest, immobilization with a cast and the initial x-rays are negative for acute fracture.  We need to rule out a scaphoid fracture to determine if further immobilization is warranted.  If the CT of the left wrist is negative for acute osseous abnormality we will enroll in formal physical therapy for wrist sprain    Patient agreeable to call or return sooner for any concerns.

## 2019-05-22 ENCOUNTER — HOSPITAL ENCOUNTER (OUTPATIENT)
Dept: CT IMAGING | Facility: HOSPITAL | Age: 34
Discharge: HOME | End: 2019-05-22
Admitting: PHYSICIAN ASSISTANT

## 2019-05-22 DIAGNOSIS — S63.502A WRIST SPRAIN, LEFT, INITIAL ENCOUNTER: ICD-10-CM

## 2019-05-22 DIAGNOSIS — M25.332 DISI (DORSAL INTERCALATED SEGMENT INSTABILITY), LEFT: Primary | ICD-10-CM

## 2019-05-22 PROCEDURE — 73200 CT UPPER EXTREMITY W/O DYE: CPT

## 2019-05-22 NOTE — PROGRESS NOTES
I called the patient to inform her of the abnormal CT results to her left wrist. Dr. Bragg reviewed the imaging as well and would like for her to Follow up with a hand subspecialist for an evaluation.  Patient is to continue using the thumb spica Velcro wrist brace.  We will place a referral to hand surgeon.

## 2019-05-24 DIAGNOSIS — M25.332 DISI (DORSAL INTERCALATED SEGMENT INSTABILITY), LEFT: Primary | ICD-10-CM

## 2019-06-05 ENCOUNTER — OFFICE VISIT (OUTPATIENT)
Dept: INTERNAL MEDICINE | Facility: CLINIC | Age: 34
End: 2019-06-05

## 2019-06-05 VITALS
TEMPERATURE: 97.9 F | OXYGEN SATURATION: 96 % | BODY MASS INDEX: 31.35 KG/M2 | WEIGHT: 177 LBS | HEART RATE: 63 BPM | SYSTOLIC BLOOD PRESSURE: 113 MMHG | DIASTOLIC BLOOD PRESSURE: 64 MMHG

## 2019-06-05 DIAGNOSIS — S91.302A OPEN WOUND OF LEFT FOOT, INITIAL ENCOUNTER: ICD-10-CM

## 2019-06-05 DIAGNOSIS — M79.672 LEFT FOOT PAIN: ICD-10-CM

## 2019-06-05 DIAGNOSIS — E03.9 HYPOTHYROIDISM, UNSPECIFIED TYPE: Primary | ICD-10-CM

## 2019-06-05 LAB
T4 FREE SERPL-MCNC: 0.92 NG/DL (ref 0.78–2.19)
TSH SERPL DL<=0.005 MIU/L-ACNC: 0.71 MIU/ML (ref 0.47–4.68)

## 2019-06-05 PROCEDURE — 99214 OFFICE O/P EST MOD 30 MIN: CPT | Performed by: PHYSICIAN ASSISTANT

## 2019-06-05 RX ORDER — CEPHALEXIN 500 MG/1
500 CAPSULE ORAL 2 TIMES DAILY
Qty: 14 CAPSULE | Refills: 0 | OUTPATIENT
Start: 2019-06-05 | End: 2019-06-16 | Stop reason: HOSPADM

## 2019-06-05 NOTE — PROGRESS NOTES
Subjective     Chief Complaint   Patient presents with   • Foot Pain     left foot feels like there is glass       History of Present Illness     Laura Thomson is a 33 y.o. female presenting with complaints of left foot pain.  Patient states several weeks ago a glass fell off of a shelf.  She believes she had all of it cleaned up but now she is concerned there might be a small piece of glass in her foot as she is having slight pain as well as an area of redness.  Patient states she just noticed this last night.  She has not noticed any bleeding or drainage from this wound.  It is painful to bear weight or to the touch.    She also is due to have her thyroid checked.  Patient notes a history of hypothyroidism, has been on medication at one time but eventually taken back off of it.  Her most recent labs were in December 2018 and these were normal.  She does feel like she has gained some weight and noticed some changes to her skin and hair.  Would like to make sure thyroid is not abnormal again.    The following portions of the patient's history were reviewed and updated as appropriate: current medications, allergies, PMH.    Review of Systems   Constitutional: Negative for appetite change, chills, fatigue, fever and unexpected weight change.   HENT: Negative for congestion, ear pain, hearing loss, nosebleeds, sinus pressure, sore throat, tinnitus and trouble swallowing.    Eyes: Negative for pain, discharge, redness, itching and visual disturbance.   Respiratory: Negative for cough, chest tightness, shortness of breath and wheezing.    Cardiovascular: Negative for chest pain, palpitations and leg swelling.   Gastrointestinal: Negative for abdominal pain, blood in stool, constipation, diarrhea, nausea and vomiting.   Endocrine: Negative for cold intolerance, heat intolerance, polydipsia, polyphagia and polyuria.   Genitourinary: Negative for decreased urine volume, dysuria, flank pain, frequency and hematuria.    Musculoskeletal: Negative for arthralgias, back pain, gait problem, joint swelling, myalgias, neck pain and neck stiffness.        Left foot pain.   Skin: Positive for wound. Negative for color change and rash.   Allergic/Immunologic: Negative for environmental allergies, food allergies and immunocompromised state.   Neurological: Negative for dizziness, syncope, weakness, light-headedness and headaches.   Hematological: Negative for adenopathy. Does not bruise/bleed easily.   Psychiatric/Behavioral: Negative for dysphoric mood, sleep disturbance and suicidal ideas. The patient is not nervous/anxious.        Objective     Vitals:    06/05/19 1450   BP: 113/64   Pulse: 63   Temp: 97.9 °F (36.6 °C)   TempSrc: Temporal   SpO2: 96%   Weight: 80.3 kg (177 lb)       Physical Exam   Constitutional: She is oriented to person, place, and time. She appears well-developed and well-nourished.   HENT:   Nose: Nose normal.   Mouth/Throat: Oropharynx is clear and moist.   Eyes: EOM are normal. Pupils are equal, round, and reactive to light.   Neck: Normal range of motion. Neck supple. No thyromegaly present.   Cardiovascular: Normal rate and regular rhythm.   Pulmonary/Chest: Effort normal and breath sounds normal.   Abdominal: Soft. Bowel sounds are normal.   Musculoskeletal: Normal range of motion.   Small wound to left foot, about 1 cm raised and erythematous.  Not draining anything.  No induration.  It is tender to the touch.        Lymphadenopathy:     She has no cervical adenopathy.   Neurological: She is alert and oriented to person, place, and time.   Skin: Skin is warm and dry.   Psychiatric: She has a normal mood and affect.       Assessment/Plan     Diagnoses and all orders for this visit:    Hypothyroidism, unspecified type  -     TSH  -     T4, Free    Left foot pain    Open wound of left foot, initial encounter  -     cephalexin (KEFLEX) 500 MG capsule; Take 1 capsule by mouth 2 (Two) Times a Day.    This area of  "discomfort and redness may be due to infection either from initial injury or from when she tried to \"take something out of it several days ago\".  If she is not improving or pain is worsening, consider x-ray to rule out foreign body.    Brenda Gama PA-C  06/05/2019         Please note that portions of this note were completed with a voice recognition program. Efforts were made to edit dictation, but occasionally words are mistranscribed.    "

## 2019-06-11 ENCOUNTER — TELEPHONE (OUTPATIENT)
Dept: OBSTETRICS AND GYNECOLOGY | Facility: CLINIC | Age: 34
End: 2019-06-11

## 2019-06-11 ENCOUNTER — LAB (OUTPATIENT)
Dept: LAB | Facility: HOSPITAL | Age: 34
End: 2019-06-11

## 2019-06-11 DIAGNOSIS — N95.9 POSTMENOPAUSAL SYMPTOMS: Primary | ICD-10-CM

## 2019-06-11 DIAGNOSIS — N95.9 POSTMENOPAUSAL SYMPTOMS: ICD-10-CM

## 2019-06-11 PROCEDURE — 83001 ASSAY OF GONADOTROPIN (FSH): CPT

## 2019-06-11 PROCEDURE — 36415 COLL VENOUS BLD VENIPUNCTURE: CPT

## 2019-06-11 PROCEDURE — 83002 ASSAY OF GONADOTROPIN (LH): CPT

## 2019-06-11 NOTE — TELEPHONE ENCOUNTER
Pt had hysterectomy last July - she is just now recently having hot flashes - night sweats ect.. - She wants to know if dr nicole could order blood work to test her hormones and if so shed like to do that in [facility] in [place]    Please call her [phone]

## 2019-06-12 LAB
FSH SERPL-ACNC: 6.7 MIU/ML
LH SERPL-ACNC: 8.4 MIU/ML

## 2019-06-13 ENCOUNTER — TRANSCRIBE ORDERS (OUTPATIENT)
Dept: PHYSICAL THERAPY | Facility: CLINIC | Age: 34
End: 2019-06-13

## 2019-06-13 DIAGNOSIS — S63.509A SPRAIN OF WRIST, UNSPECIFIED LATERALITY, INITIAL ENCOUNTER: Primary | ICD-10-CM

## 2019-06-16 ENCOUNTER — HOSPITAL ENCOUNTER (EMERGENCY)
Facility: HOSPITAL | Age: 34
Discharge: HOME | End: 2019-06-16
Attending: STUDENT IN AN ORGANIZED HEALTH CARE EDUCATION/TRAINING PROGRAM | Admitting: STUDENT IN AN ORGANIZED HEALTH CARE EDUCATION/TRAINING PROGRAM

## 2019-06-16 VITALS
WEIGHT: 179 LBS | RESPIRATION RATE: 16 BRPM | OXYGEN SATURATION: 97 % | BODY MASS INDEX: 31.71 KG/M2 | HEIGHT: 63 IN | TEMPERATURE: 98 F | DIASTOLIC BLOOD PRESSURE: 76 MMHG | HEART RATE: 69 BPM | SYSTOLIC BLOOD PRESSURE: 116 MMHG

## 2019-06-16 DIAGNOSIS — H66.91 RIGHT ACUTE OTITIS MEDIA: Primary | ICD-10-CM

## 2019-06-16 PROCEDURE — 99283 EMERGENCY DEPT VISIT LOW MDM: CPT

## 2019-06-16 RX ORDER — TRAMADOL HYDROCHLORIDE 50 MG/1
50 TABLET ORAL EVERY 6 HOURS PRN
Qty: 10 TABLET | Refills: 0 | Status: SHIPPED | OUTPATIENT
Start: 2019-06-16 | End: 2019-07-10

## 2019-06-16 RX ORDER — AMOXICILLIN 500 MG/1
500 CAPSULE ORAL 3 TIMES DAILY
Qty: 21 CAPSULE | Refills: 0 | Status: SHIPPED | OUTPATIENT
Start: 2019-06-16 | End: 2019-06-23

## 2019-06-16 RX ORDER — IBUPROFEN 600 MG/1
600 TABLET ORAL EVERY 8 HOURS PRN
Qty: 12 TABLET | Refills: 0 | OUTPATIENT
Start: 2019-06-16 | End: 2019-09-03 | Stop reason: HOSPADM

## 2019-06-16 RX ORDER — ACETAMINOPHEN 325 MG/1
650 TABLET ORAL ONCE
Status: COMPLETED | OUTPATIENT
Start: 2019-06-16 | End: 2019-06-16

## 2019-06-16 RX ADMIN — ACETAMINOPHEN 650 MG: 325 TABLET, FILM COATED ORAL at 08:31

## 2019-06-19 ENCOUNTER — TREATMENT (OUTPATIENT)
Dept: PHYSICAL THERAPY | Facility: CLINIC | Age: 34
End: 2019-06-19

## 2019-06-19 DIAGNOSIS — S63.502D SPRAIN OF LEFT WRIST, SUBSEQUENT ENCOUNTER: Primary | ICD-10-CM

## 2019-06-19 DIAGNOSIS — M25.532 LEFT WRIST PAIN: ICD-10-CM

## 2019-06-19 PROCEDURE — 97162 PT EVAL MOD COMPLEX 30 MIN: CPT | Performed by: PHYSICAL THERAPIST

## 2019-06-19 NOTE — PROGRESS NOTES
Physical Therapy Initial Evaluation and Plan of Care      Patient: Laura Walls Workman   : 1985  Diagnosis/ICD-10 Code:  No primary diagnosis found.  Referring practitioner: MATA Sanon*    Subjective Evaluation    History of Present Illness  Date of onset: 2019  Mechanism of injury: Pt reports falling backwards over cooler and bending L wrist backwards to her forearm. Pt reports going to ER and then to the doctor who casted her for 3 weeks. Pt reports going to [facility] orthopedics who re casted the wrist for another 3 weeks. Pt reports that the doctor said the ligaments were healed and to go to PT. Pt reports that lifting and pulling makes pain worse. Pt reports that she does not do much to relieve pain. Second cast was removed about 2 weeks ago.       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  Current pain ratin  At best pain rating: 3  At worst pain ratin  Quality: sharp and throbbing  Aggravating factors: lifting (pulling, house work)  Progression: no change    Social Support  Lives in: one-story house  Lives with: spouse and young children    Hand dominance: left    Diagnostic Tests  X-ray: normal  CT scan: normal    Treatments  Previous treatment: physical therapy and immobilization  Current treatment: immobilization  Patient Goals  Patient goals for therapy: decreased pain, increased motion, increased strength, return to work and independence with ADLs/IADLs             Objective       Tenderness     Additional Tenderness Details  Pt with diffuse tenderness throughout the volar surface of the wrist. Pt with tenderness over thumb into the thenar eminence.     Active Range of Motion     Left Wrist   Wrist flexion: 62 degrees   Wrist extension: 41 degrees   Radial deviation: 15 degrees   Ulnar deviation: 33 degrees     Right Wrist   Wrist flexion: 75 degrees   Wrist extension: 62 degrees   Radial deviation: 17 degrees   Ulnar deviation: 25 degrees     Strength/Myotome  Testing   Cervical Spine     Left   Levator scapulae (C4): 5    Right   Levator scapulae (C4): 5    Left Shoulder     Planes of Motion   Abduction: 4     Isolated Muscles   Levator scapulae: 5     Right Shoulder     Planes of Motion   Abduction: 4     Isolated Muscles   Levator scapulae: 5     Left Elbow   Flexion: 4+  Extension: 4    Right Elbow   Flexion: 4+  Extension: 4    Left Wrist/Hand      (2nd hand position)     Trial 1: 18.6 lbs    Trial 2: 12.5 lbs    Trial 3: 12.4 lbs    Average: 14.5 lbs    Right Wrist/Hand      (2nd hand position)     Trial 1: 58.5 lbs    Trial 2: 60.9 lbs    Trial 3: 52.5 lbs    Average: 57.3 lbs    Additional Strength Details  EPL painful on L hand    Dorsal palmar interossei 5/5 bilaterally            Assessment & Plan     Assessment  Impairments: abnormal or restricted ROM, activity intolerance, impaired physical strength, lacks appropriate home exercise program and pain with function  Assessment details: Pt is a 33 year old female that presents to PT 2 months following traumatic sprain of L wrist. Pt with no sensory deficits or neuro signs noted with exam. Pt with decreased ROM, decreased strength, and pain expected following wrist sprain. Pt with decreased  strength in L hand. Pt would benefit from PT to address the above deficits.   Prognosis: good  Prognosis details: Short Term Goals (2 weeks)  1. Pt will demonstrate independence with HEP  2. Pt will increase AROM of L wrist to equal with R wrist  3. Pt will increase L hand  strength to 30 lbs    Long Term Goals (6 weeks)  1. Pt will have 4/5 strength in wrist extension and flexion to help improve function with daily tasks  2. Pt will increase L hand  strength to equal to or greater than R hand  3. Pt will be able to play piano for 30 mins without pain in the L hand or wrist  Functional Limitations: carrying objects, lifting, sleeping and uncomfortable because of pain  Plan  Therapy options: will be seen for  skilled physical therapy services  Planned modality interventions: cryotherapy and thermotherapy (hydrocollator packs)  Planned therapy interventions: abdominal trunk stabilization, body mechanics training, fine motor coordination training, flexibility, functional ROM exercises, home exercise program, joint mobilization, manual therapy, motor coordination training, neuromuscular re-education, soft tissue mobilization, strengthening, stretching and therapeutic activities  Frequency: 2x week  Treatment plan discussed with: patient  Plan details: Pt to be seen 2x per week for 4-6 weeks        Manual Therapy:         mins  22389;  Therapeutic Exercise:         mins  77750;     Neuromuscular Elvin:        mins  12261;    Therapeutic Activity:          mins  03070;     Gait Training:           mins  43934;     Ultrasound:          mins  60263;    Electrical Stimulation:         mins  83528 ( );  Dry Needling          mins self-pay    Timed Treatment:      mins   Total Treatment:     51   mins    PT SIGNATURE: Theron Hammer, PT   DATE TREATMENT INITIATED: 6/19/2019    Initial Certification  Certification Period: 9/17/2019  I certify that the therapy services are furnished while this patient is under my care.  The services outlined above are required by this patient, and will be reviewed every 90 days.     PHYSICIAN: Rolando Martinez PA-C      DATE:     Please sign and return via fax to [phone].. Thank you, [facility].

## 2019-06-24 ENCOUNTER — TREATMENT (OUTPATIENT)
Dept: PHYSICAL THERAPY | Facility: CLINIC | Age: 34
End: 2019-06-24

## 2019-06-24 DIAGNOSIS — M25.532 LEFT WRIST PAIN: ICD-10-CM

## 2019-06-24 DIAGNOSIS — S63.502D SPRAIN OF LEFT WRIST, SUBSEQUENT ENCOUNTER: Primary | ICD-10-CM

## 2019-06-24 PROCEDURE — 97110 THERAPEUTIC EXERCISES: CPT | Performed by: PHYSICAL THERAPIST

## 2019-06-24 PROCEDURE — 97140 MANUAL THERAPY 1/> REGIONS: CPT | Performed by: PHYSICAL THERAPIST

## 2019-06-24 NOTE — PROGRESS NOTES
Physical Therapy Daily Progress Note        Laura Thomson reports 0/10 pain today at rest.  Pt reports that the wrist hurts the most when moving into flexion. Pt states she has been working with the HEP a lot since last visit.         Objective Pt present to PT today with no distress at rest.     Pt tolerated exercises well today with no increased pain in the L wrist.     Pt with moderate tenderness over the anterior forearm over the carpals.       See Exercise, Manual, and Modality Logs for complete treatment.     Assessment/Plan  Pt wrist motion has improved and pt has less pain although  and strength in wrist are still decreased. Pt would benefit from PT to help improve wrist and hand motion, strength, and activity tolerance in order to increase function and return to work in August.       Progress per Plan of Care  Progress as tolerated           Manual Therapy:    12     mins  59571;  Therapeutic Exercise:    31     mins  31027;     Neuromuscular Elvin:        mins  46577;    Therapeutic Activity:          mins  21078;     Gait Training:        ___  mins  93146;     Ultrasound:          mins  00103;    Electrical Stimulation:         mins  93675 ( );  Dry Needling          mins self-pay    Timed Treatment:   43   mins   Total Treatment:     54   mins    Theron Hammer, PT  Physical Therapist

## 2019-06-26 ENCOUNTER — TREATMENT (OUTPATIENT)
Dept: PHYSICAL THERAPY | Facility: CLINIC | Age: 34
End: 2019-06-26

## 2019-06-26 DIAGNOSIS — M25.532 LEFT WRIST PAIN: ICD-10-CM

## 2019-06-26 DIAGNOSIS — M25.572 LEFT ANKLE PAIN, UNSPECIFIED CHRONICITY: ICD-10-CM

## 2019-06-26 DIAGNOSIS — S63.502D SPRAIN OF LEFT WRIST, SUBSEQUENT ENCOUNTER: Primary | ICD-10-CM

## 2019-06-26 PROCEDURE — 97140 MANUAL THERAPY 1/> REGIONS: CPT | Performed by: PHYSICAL THERAPIST

## 2019-06-26 PROCEDURE — 97110 THERAPEUTIC EXERCISES: CPT | Performed by: PHYSICAL THERAPIST

## 2019-06-26 NOTE — PROGRESS NOTES
Physical Therapy Daily Progress Note        Laura Thomson reports 3/10 pain today at rest.  Pt reports that she felt pretty good after last visit and the pain has decreased in the palmar surface of the wrist. Pt states that she was able to use her hand some to clean the house yesterday.         Objective Pt present to PT today with no distress at rest.     Pt tolerated exercises well today with no increased pain.       See Exercise, Manual, and Modality Logs for complete treatment.     Assessment/Plan  Pt continues to improve L hand and wrist function with less pain and greater activity tolerance. Pt to progress strengthening as tolerated. Pt would benefit from PT to help increase pain free motion, strength and function in the L wrist and hand.       Progress per Plan of Care  Progress strengthening           Manual Therapy:    12     mins  40836;  Therapeutic Exercise:    33     mins  30701;     Neuromuscular Elvin:        mins  21952;    Therapeutic Activity:          mins  63308;     Gait Training:        ___  mins  07792;     Ultrasound:          mins  63306;    Electrical Stimulation:         mins  53402 ( );  Dry Needling          mins self-pay    Timed Treatment:   45   mins   Total Treatment:     55   mins    Theron Hammer, PT  Physical Therapist

## 2019-07-02 ENCOUNTER — TREATMENT (OUTPATIENT)
Dept: PHYSICAL THERAPY | Facility: CLINIC | Age: 34
End: 2019-07-02

## 2019-07-02 DIAGNOSIS — S63.502D SPRAIN OF LEFT WRIST, SUBSEQUENT ENCOUNTER: Primary | ICD-10-CM

## 2019-07-02 DIAGNOSIS — M25.572 LEFT ANKLE PAIN, UNSPECIFIED CHRONICITY: ICD-10-CM

## 2019-07-02 DIAGNOSIS — M25.532 LEFT WRIST PAIN: ICD-10-CM

## 2019-07-02 PROCEDURE — 97140 MANUAL THERAPY 1/> REGIONS: CPT | Performed by: PHYSICAL THERAPIST

## 2019-07-02 PROCEDURE — 97110 THERAPEUTIC EXERCISES: CPT | Performed by: PHYSICAL THERAPIST

## 2019-07-02 NOTE — PROGRESS NOTES
Physical Therapy Daily Progress Note        Laura Thomson reports 0/10 pain today at rest.  Pt reports that she has felt good although the L hand and forearm fatigue quickly and feel weak. Pt states that she does not have much pain at this point.         Objective Pt present to PT today with no distress at rest.     Pt tolerated increased activities well today with no increased pain in the hand or wrist after exercises.       See Exercise, Manual, and Modality Logs for complete treatment.     Assessment/Plan  Pt continues to improve L wrist and hand function and strength with less pain with activities. Pt would benefit from PT to help improve activity tolerance and function to return to work in August without fatigue.       Progress per Plan of Care  Progress as tolerated           Manual Therapy:    13     mins  73783;  Therapeutic Exercise:    34     mins  97461;     Neuromuscular Elvin:        mins  70277;    Therapeutic Activity:          mins  53556;     Gait Training:        ___  mins  78319;     Ultrasound:          mins  66155;    Electrical Stimulation:         mins  73493 ( );  Dry Needling          mins self-pay    Timed Treatment:   47   mins   Total Treatment:     47   mins    Theron Hammer, PT  Physical Therapist

## 2019-07-05 ENCOUNTER — TREATMENT (OUTPATIENT)
Dept: PHYSICAL THERAPY | Facility: CLINIC | Age: 34
End: 2019-07-05

## 2019-07-05 PROCEDURE — 97110 THERAPEUTIC EXERCISES: CPT | Performed by: PHYSICAL THERAPIST

## 2019-07-05 PROCEDURE — 97140 MANUAL THERAPY 1/> REGIONS: CPT | Performed by: PHYSICAL THERAPIST

## 2019-07-05 NOTE — PROGRESS NOTES
Physical Therapy Daily Progress Note        Laura Thomson reports 0/10 pain today at rest.  Pt reports that the wrist and hand are feeling good with no pain. Pt states that her  and hand feel weak still despite pain free function.         Objective Pt present to PT today with no distress at rest.     Pt fatigued quickly today although was able to do more exercises today to help improve  and wrist strength.       See Exercise, Manual, and Modality Logs for complete treatment.     Assessment/Plan  Pt continues to improve with no pain and improved activity tolerance for daily household activities. Pt would benefit from PT to help increase wrist function, strength, and endurance to return to work in august.       Progress per Plan of Care  Progress strengthening           Manual Therapy:    12     mins  19034;  Therapeutic Exercise:    34     mins  35884;     Neuromuscular Elvin:        mins  68181;    Therapeutic Activity:          mins  69747;     Gait Training:        ___  mins  14502;     Ultrasound:          mins  17130;    Electrical Stimulation:         mins  03745 ( );  Dry Needling          mins self-pay    Timed Treatment:   46   mins   Total Treatment:     46   mins    Theron Hammer, PT  Physical Therapist

## 2019-07-08 ENCOUNTER — TREATMENT (OUTPATIENT)
Dept: PHYSICAL THERAPY | Facility: CLINIC | Age: 34
End: 2019-07-08

## 2019-07-08 DIAGNOSIS — S63.502D SPRAIN OF LEFT WRIST, SUBSEQUENT ENCOUNTER: Primary | ICD-10-CM

## 2019-07-08 DIAGNOSIS — M25.532 LEFT WRIST PAIN: ICD-10-CM

## 2019-07-08 DIAGNOSIS — M25.572 LEFT ANKLE PAIN, UNSPECIFIED CHRONICITY: ICD-10-CM

## 2019-07-08 PROCEDURE — 97140 MANUAL THERAPY 1/> REGIONS: CPT | Performed by: PHYSICAL THERAPIST

## 2019-07-08 PROCEDURE — 97110 THERAPEUTIC EXERCISES: CPT | Performed by: PHYSICAL THERAPIST

## 2019-07-08 NOTE — PROGRESS NOTES
Physical Therapy Daily Progress Note        Luara Thomson reports 0/10 pain today at rest.  Pt reports that her wrist has been doing well and is feeling stronger. Pt states that she has had no pain in the L wrist but the hand and wrist get tired easily.         Objective Pt present to PT today with no distress at rest.     Pt tolerated exercises well today with some fatigue but was able to increase activity today with more resistance.       See Exercise, Manual, and Modality Logs for complete treatment.     Assessment/Plan  Pt continues to improve with greater activity tolerance with exercises during PT sessions. Pt continues to be pain free but still fatigues. Pt would benefit from PT to help improve wrist and hand strength, endurance, and function in order to return to full work duties as a .       Progress per Plan of Care  Progress as tolerated           Manual Therapy:    9     mins  23145;  Therapeutic Exercise:    41     mins  34304;     Neuromuscular Elvin:        mins  07021;    Therapeutic Activity:          mins  38867;     Gait Training:        ___  mins  29307;     Ultrasound:          mins  92730;    Electrical Stimulation:         mins  72473 ( );  Dry Needling          mins self-pay    Timed Treatment:   50   mins   Total Treatment:     50   mins    Theron Hammer, PT  Physical Therapist

## 2019-07-10 ENCOUNTER — TELEPHONE (OUTPATIENT)
Dept: INTERNAL MEDICINE | Facility: CLINIC | Age: 34
End: 2019-07-10

## 2019-07-10 ENCOUNTER — OFFICE VISIT (OUTPATIENT)
Dept: INTERNAL MEDICINE | Facility: CLINIC | Age: 34
End: 2019-07-10

## 2019-07-10 ENCOUNTER — TREATMENT (OUTPATIENT)
Dept: PHYSICAL THERAPY | Facility: CLINIC | Age: 34
End: 2019-07-10

## 2019-07-10 VITALS
DIASTOLIC BLOOD PRESSURE: 59 MMHG | HEIGHT: 63 IN | HEART RATE: 61 BPM | BODY MASS INDEX: 31.36 KG/M2 | RESPIRATION RATE: 15 BRPM | OXYGEN SATURATION: 99 % | TEMPERATURE: 97.3 F | SYSTOLIC BLOOD PRESSURE: 105 MMHG | WEIGHT: 177 LBS

## 2019-07-10 DIAGNOSIS — M25.532 LEFT WRIST PAIN: ICD-10-CM

## 2019-07-10 DIAGNOSIS — M26.69 TMJ CREPITUS: Primary | ICD-10-CM

## 2019-07-10 DIAGNOSIS — S63.502D SPRAIN OF LEFT WRIST, SUBSEQUENT ENCOUNTER: Primary | ICD-10-CM

## 2019-07-10 DIAGNOSIS — M25.572 LEFT ANKLE PAIN, UNSPECIFIED CHRONICITY: ICD-10-CM

## 2019-07-10 PROCEDURE — 97140 MANUAL THERAPY 1/> REGIONS: CPT | Performed by: PHYSICAL THERAPIST

## 2019-07-10 PROCEDURE — 97110 THERAPEUTIC EXERCISES: CPT | Performed by: PHYSICAL THERAPIST

## 2019-07-10 PROCEDURE — 99213 OFFICE O/P EST LOW 20 MIN: CPT | Performed by: NURSE PRACTITIONER

## 2019-07-10 NOTE — TELEPHONE ENCOUNTER
Patient was looking to speak with a nurse or provider for a second opinion because of a recent appointment at the ENT.    Patient went into ENT because of ear aches/infection and the doctor diagnosed TMJ. The doctor suggested physical therapy as the solution for this.    Patient wants second opinion before going through PT process.    Please advise.

## 2019-07-10 NOTE — TELEPHONE ENCOUNTER
It doesn't look like we referred her to ENT that I can see. She will need to be seen for referral. thanks

## 2019-07-10 NOTE — PROGRESS NOTES
Physical Therapy Daily Progress Note        Laura Thomson reports 0/10 pain today at rest.  Pt reports that she has had no pain with any activities during her daily routine. Pt states that her hand still feels weak and fatigues easily.         Objective       Strength/Myotome Testing     Left Wrist/Hand      (2nd hand position)     Trial 1: 46.9 lbs    Trial 2: 42.1 lbs    Trial 3: 44.4 lbs    Average: 44.47 lbs   Pt present to PT today with no distress at rest.     Pt tolerated exercises well today with no increased pain in the L wrist.       See Exercise, Manual, and Modality Logs for complete treatment.     Assessment/Plan  Pt has improved  strength although still needs 15-20 lbs to reach goals. Pt continues to be pain free. Pt would benefit from PT to help increase hand activity tolerance, strength, and function in order to return to normal work and daily activities with full hand function.       Progress per Plan of Care  Progress as tolerated           Manual Therapy:    11     mins  46334;  Therapeutic Exercise:    40     mins  09028;     Neuromuscular Elvin:        mins  82859;    Therapeutic Activity:          mins  03979;     Gait Training:        ___  mins  45584;     Ultrasound:          mins  35473;    Electrical Stimulation:         mins  41054 ( );  Dry Needling          mins self-pay    Timed Treatment:   51   mins   Total Treatment:     51   mins    Theron Hammer, PT  Physical Therapist

## 2019-07-10 NOTE — TELEPHONE ENCOUNTER
Patient stated she has already been to the ENT. She wanted to know if our office thought that physical therapy was necessary for the TMJ.

## 2019-07-11 NOTE — PROGRESS NOTES
Chief Complaint / Reason:      Chief Complaint   Patient presents with   • Temporomandibular Joint Pain     went to ENT and states she has TMJ. ENT wants to send her to PT and she wants second opinion.        Subjective     HPI  Patient presents today with complaints of TMJ.  She states that she did see an ENT as she had recurrent ear infection and I recommended that she go to physical therapy and she would like to have a second opinion.  She is scheduled with Dr. SLOAN for an second opinion that she wanted to discuss plan of care and other options available.  Patient has tried bite guard but states that she has tried steroids.  Patient states that her reasoning for not wanting to go to physical therapy is because she had previously been going to physical therapy because of an injury of upper extemity.  She denies grinding her teeth or any history of injury or trauma to face.  History taken from: patient    PMH/FH/Social History were reviewed and updated appropriately in the electronic medical record.   Past Medical History:   Diagnosis Date   • Abdominal pain, periumbilical    • Abdominal pain, RLQ (right lower quadrant)    • Acute pharyngitis    • Appetite lost    • Chest pain    • Depression     patient denies   • Diarrhea    • Difficulty swallowing    • Fatigue    • Fracture     RIGHT WRIST TWICE, LEFT HAND   • GERD (gastroesophageal reflux disease)    • History of colonoscopy    • History of ovarian cyst    • Hyperthyroidism     hyperactive taken off medicine when pregnant not placed back on   • Ovarian cyst, bilateral    • Spinal headache    • TMJ (dislocation of temporomandibular joint)    • Upper respiratory infection    • Vomiting      Past Surgical History:   Procedure Laterality Date   •  SECTION  2008    DR NAVARRETE   •  SECTION  10/30/2015    DR VASQUEZ   •  SECTION  2010    DR ESPINOZA   • CHOLECYSTECTOMY     • COLONOSCOPY     • COLONOSCOPY N/A 2017    Procedure:  COLONOSCOPY WITH COLD BIOPSY POLYPECTOMY; BIOPSIES , QUICK CLIP;  Surgeon: Perfecto Mcknight MD;  Location: [facility] ENDOSCOPY;  Service:    • DENTAL PROCEDURE      REPORTS WISDOM TEETH EXTRACTED   • DIAGNOSTIC LAPAROSCOPY  07/07/2005    DR ESPINOZA   • DIAGNOSTIC LAPAROSCOPY  02/14/2012    DR ESPINOZA   • DIAGNOSTIC LAPAROSCOPY N/A 8/10/2017    Procedure: DIAGNOSTIC LAPAROSCOPY, LEFT SALPINGO-OOPHORECTOMY;  Surgeon: Keren Espinoza MD;  Location: [facility] OR;  Service:    • LYSIS OF ABDOMINAL ADHESIONS  07/07/2005    DR ESPINOZA   • LYSIS OF ABDOMINAL ADHESIONS  02/14/2012    DR ESPINOZA   • NISSEN FUNDOPLICATION     • OVARIAN CYST REMOVAL  07/07/2005    DR ESPINOZA   • SALPINGO OOPHORECTOMY Left 08/2017   • STOMACH SURGERY  2014    PATIENT REPORTS STOMACH WAS WRAPPED AROUND ESOPHAGUS   • UPPER GASTROINTESTINAL ENDOSCOPY     • VAGINAL HYSTERECTOMY N/A 7/9/2018    Procedure: VAGINAL HYSTERECTOMY, BILATERAL SALPINGECTOMY;  Surgeon: Luc Alicea MD;  Location: [facility] OR;  Service: Gynecology     Social History     Socioeconomic History   • Marital status:      Spouse name: Not on file   • Number of children: Not on file   • Years of education: Not on file   • Highest education level: Not on file   Tobacco Use   • Smoking status: Never Smoker   • Smokeless tobacco: Never Used   Substance and Sexual Activity   • Alcohol use: No   • Drug use: No   • Sexual activity: Yes     Partners: Male     Birth control/protection: Surgical     Family History   Problem Relation Age of Onset   • Coronary artery disease Mother    • Diabetes Mother    • Hypertension Mother    • Kidney disease Father    • Skin cancer Father    • Stroke Other    • Hypertension Other    • Diabetes Other    • Colon cancer Other        Review of Systems:   Review of Systems   Constitutional: Positive for fatigue.   HENT:        TMJ   Respiratory: Negative.    Cardiovascular: Negative.    Musculoskeletal: Positive for arthralgias.   Neurological: Negative.    Psychiatric/Behavioral:  Positive for sleep disturbance and stress.         All other systems were reviewed and are negative.  Exceptions are noted in the subjective or above.      Objective     Vital Signs  Vitals:    07/10/19 1729   BP: 105/59   Pulse: 61   Resp: 15   Temp: 97.3 °F (36.3 °C)   SpO2: 99%       Body mass index is 31.35 kg/m².    Physical Exam   Constitutional: She is oriented to person, place, and time. She appears well-developed and well-nourished. No distress.   HENT:   Head: Normocephalic.   Right Ear: Tympanic membrane is bulging.   Left Ear: Tympanic membrane is bulging.   Mouth/Throat: Mucous membranes are dry. Posterior oropharyngeal erythema present.   Tenderness noted in temporomandibular joint with opening and closing of mouth   Neck: No JVD present.   Cardiovascular: Normal rate, regular rhythm, normal heart sounds and intact distal pulses.   No murmur heard.  Pulmonary/Chest: Effort normal and breath sounds normal. No respiratory distress. She exhibits no tenderness.   Abdominal: Soft. She exhibits no distension. There is no tenderness.   Neurological: She is alert and oriented to person, place, and time.   Skin: Skin is warm and dry. Capillary refill takes less than 2 seconds. She is not diaphoretic.   Psychiatric: Her mood appears anxious.   Nursing note and vitals reviewed.           Results Review:    I reviewed the patient's previous clinical results.       Medication Review:     Current Outpatient Medications:   •  ibuprofen (ADVIL,MOTRIN) 600 MG tablet, Take 1 tablet by mouth Every 8 (Eight) Hours As Needed for Mild Pain ., Disp: 12 tablet, Rfl: 0    Assessment/Plan   Laura was seen today for temporomandibular joint pain.    Diagnoses and all orders for this visit:    TMJ crepitus    Nonpharmacological interventions such as bite guard and getting adequate rest avoid foods that worsen symptoms.  Recommend stress management.  Avoid clenching teeth.  Recommend patient follow-up with physical therapy as  prescribed and keep scheduled appointment with other ENT to discuss management options.  Recommend over-the-counter analgesics and advised patient to apply warm moist heat for inflammation.    Return if symptoms worsen or fail to improve.    Megha Fregoso, APRN  07/10/2019

## 2019-07-15 ENCOUNTER — TREATMENT (OUTPATIENT)
Dept: PHYSICAL THERAPY | Facility: CLINIC | Age: 34
End: 2019-07-15

## 2019-07-15 PROCEDURE — 97140 MANUAL THERAPY 1/> REGIONS: CPT | Performed by: PHYSICAL THERAPIST

## 2019-07-15 PROCEDURE — 97110 THERAPEUTIC EXERCISES: CPT | Performed by: PHYSICAL THERAPIST

## 2019-07-15 NOTE — PROGRESS NOTES
Physical Therapy Daily Progress Note        Laura Thomson reports 0/10 pain today at rest.  Pt reports that she has had no pain in the L wrist but like reported the last few visits, she still fatigues when using her left hand. Pt states that she has trouble getting lids and tops off of jars with the left hand.         Objective Pt present to PT today with no distress at rest.     Pt tolerated exercises well but was fatigued following activities today.       See Exercise, Manual, and Modality Logs for complete treatment.     Assessment/Plan  Pt continues to be pain free with all activities at PT and at home but still has weakness noted with daily living. Pt would benefit from PT to help improve wrist and hand strength and activity tolerance to improve function with work and daily tasks.       Progress per Plan of Care  Progress as tolerated           Manual Therapy:    9     mins  59605;  Therapeutic Exercise:    43     mins  20305;     Neuromuscular Elvin:        mins  73823;    Therapeutic Activity:          mins  53032;     Gait Training:        ___  mins  39184;     Ultrasound:          mins  16587;    Electrical Stimulation:         mins  50985 ( );  Dry Needling          mins self-pay    Timed Treatment:   52   mins   Total Treatment:     52   mins    Theron Hammer, PT  Physical Therapist

## 2019-07-19 ENCOUNTER — TELEPHONE (OUTPATIENT)
Dept: INTERNAL MEDICINE | Facility: CLINIC | Age: 34
End: 2019-07-19

## 2019-07-19 NOTE — TELEPHONE ENCOUNTER
Patient called in regards to the medication she is requesting in her email (07/10/19). She just wanted to check on the status.    Patient states that [facility] in [place] is the best place to send the meds.    Please advise.

## 2019-07-22 ENCOUNTER — TREATMENT (OUTPATIENT)
Dept: PHYSICAL THERAPY | Facility: CLINIC | Age: 34
End: 2019-07-22

## 2019-07-22 DIAGNOSIS — M25.532 LEFT WRIST PAIN: ICD-10-CM

## 2019-07-22 DIAGNOSIS — S63.502D SPRAIN OF LEFT WRIST, SUBSEQUENT ENCOUNTER: Primary | ICD-10-CM

## 2019-07-22 DIAGNOSIS — M25.572 LEFT ANKLE PAIN, UNSPECIFIED CHRONICITY: ICD-10-CM

## 2019-07-22 PROCEDURE — 97110 THERAPEUTIC EXERCISES: CPT | Performed by: PHYSICAL THERAPIST

## 2019-07-22 PROCEDURE — 97140 MANUAL THERAPY 1/> REGIONS: CPT | Performed by: PHYSICAL THERAPIST

## 2019-07-22 NOTE — PROGRESS NOTES
Physical Therapy Daily Progress Note        Laura Thomson reports 0/10 pain today at rest.  Pt reports that she is noticing less fatigue during her normal activities with greater ease opening jars and lids. Pt reports that she has had no pain in the L wrist or hand.         Objective Pt present to PT today with no distress at rest.     Pt tolerated exercises well today with no pain in the L wrist or hand although she did fatigue.       See Exercise, Manual, and Modality Logs for complete treatment.     Assessment/Plan  Pt continues to be pain free with daily activities and exercises but is still fatiguing. Pt to follow up next week. Pt would benefit from PT to help progress strengthening exercises and improve activity tolerance to help reduce fatigue with daily activities and in order to return to work without limitations.       Progress per Plan of Care  Assess  strength           Manual Therapy:    11     mins  69469;  Therapeutic Exercise:    28     mins  60302;     Neuromuscular Elvin:        mins  45397;    Therapeutic Activity:         mins  67816;     Gait Training:        ___  mins  50675;     Ultrasound:          mins  12394;    Electrical Stimulation:         mins  04467 ( );  Dry Needling          mins self-pay    Timed Treatment:  39    mins   Total Treatment:     53   mins    Theron Hammer, PT  Physical Therapist

## 2019-07-24 RX ORDER — HYDROXYZINE HYDROCHLORIDE 25 MG/1
25 TABLET, FILM COATED ORAL NIGHTLY PRN
Qty: 30 TABLET | Refills: 1 | Status: SHIPPED | OUTPATIENT
Start: 2019-07-24 | End: 2019-09-16 | Stop reason: SDUPTHER

## 2019-07-29 ENCOUNTER — TREATMENT (OUTPATIENT)
Dept: PHYSICAL THERAPY | Facility: CLINIC | Age: 34
End: 2019-07-29

## 2019-07-29 DIAGNOSIS — S63.502D SPRAIN OF LEFT WRIST, SUBSEQUENT ENCOUNTER: Primary | ICD-10-CM

## 2019-07-29 DIAGNOSIS — M25.532 LEFT WRIST PAIN: ICD-10-CM

## 2019-07-29 PROCEDURE — 97110 THERAPEUTIC EXERCISES: CPT | Performed by: PHYSICAL THERAPIST

## 2019-07-29 NOTE — PROGRESS NOTES
Physical Therapy Daily Progress Note        Laura Thomson reports 0/10 pain today at rest.  Pt reports that she continues to have no pain in the L wrist with any activities. Pt reports that she is noticing that she is not fatiguing as much with her daily activities.         Objective       Strength/Myotome Testing     Left Wrist/Hand      (2nd hand position)     Trial 1: 54 lbs    Right Wrist/Hand      (2nd hand position)     Trial 1: 61.7 lbs   Pt present to PT today with no distress at rest.     Pt tolerated exercises well today with no increased pain in her wrist.       See Exercise, Manual, and Modality Logs for complete treatment.     Assessment/Plan  Pt continues to have some weakness in the L  but is continuing increase strength and activity tolerance. Pt to continue with PT to help increase hand and wrist strength, function, and endurance for improved daily and work activities.       Progress per Plan of Care  Progress as tolerated           Manual Therapy:         mins  52956;  Therapeutic Exercise:    52     mins  00408;     Neuromuscular Elvin:        mins  37814;    Therapeutic Activity:          mins  26426;     Gait Training:        ___  mins  41754;     Ultrasound:          mins  53330;    Electrical Stimulation:         mins  04475 ( );  Dry Needling          mins self-pay    Timed Treatment:   52   mins   Total Treatment:     52   mins    Theron Hammer, PT  Physical Therapist

## 2019-08-05 ENCOUNTER — TREATMENT (OUTPATIENT)
Dept: PHYSICAL THERAPY | Facility: CLINIC | Age: 34
End: 2019-08-05

## 2019-08-05 DIAGNOSIS — M25.532 LEFT WRIST PAIN: ICD-10-CM

## 2019-08-05 DIAGNOSIS — S63.502D SPRAIN OF LEFT WRIST, SUBSEQUENT ENCOUNTER: Primary | ICD-10-CM

## 2019-08-05 PROCEDURE — 97110 THERAPEUTIC EXERCISES: CPT | Performed by: PHYSICAL THERAPIST

## 2019-08-05 PROCEDURE — 97140 MANUAL THERAPY 1/> REGIONS: CPT | Performed by: PHYSICAL THERAPIST

## 2019-08-05 NOTE — PROGRESS NOTES
Physical Therapy Daily Progress Note        Laura Thomson reports 0/10 pain today at rest.  Pt reports that her wrist and forearm feel worn out today. Pt states she has been on vacation and been very busy and doing a lot. Pt reports that she has not had any pain in the L wrist or hand.         Objective Pt present to PT today with no distress at rest.     Pt tolerated exercises well today with no increased pain in the L wrist or hand.       See Exercise, Manual, and Modality Logs for complete treatment.     Assessment/Plan  Pt continues to progress well with activities and has full, pain free motion in the L wrist and hand. Pt to continue with HEP to help strengthen her wrist and  but no longer needs supervised treatment for progression. Pt to be held for 2 weeks and then discharged.       Progress per Plan of Care  Progress as tolerated           Manual Therapy:    10     mins  16341;  Therapeutic Exercise:    33     mins  86233;     Neuromuscular Elvin:        mins  28682;    Therapeutic Activity:          mins  17793;     Gait Training:        ___  mins  44527;     Ultrasound:          mins  60112;    Electrical Stimulation:         mins  71881 ( );  Dry Needling          mins self-pay    Timed Treatment:   43   mins   Total Treatment:     43   mins    Theron Hammer, PT  Physical Therapist

## 2019-08-09 ENCOUNTER — TRANSCRIBE ORDERS (OUTPATIENT)
Dept: PHYSICAL THERAPY | Facility: CLINIC | Age: 34
End: 2019-08-09

## 2019-08-09 DIAGNOSIS — M26.609 TEMPORAL MANDIBULAR JOINT DISORDER: Primary | ICD-10-CM

## 2019-09-14 ENCOUNTER — HOSPITAL ENCOUNTER (EMERGENCY)
Facility: HOSPITAL | Age: 34
Discharge: HOME | End: 2019-09-14
Attending: EMERGENCY MEDICINE | Admitting: EMERGENCY MEDICINE

## 2019-09-14 ENCOUNTER — APPOINTMENT (OUTPATIENT)
Dept: GENERAL RADIOLOGY | Facility: HOSPITAL | Age: 34
End: 2019-09-14

## 2019-09-14 VITALS
WEIGHT: 185.38 LBS | BODY MASS INDEX: 32.85 KG/M2 | OXYGEN SATURATION: 98 % | SYSTOLIC BLOOD PRESSURE: 121 MMHG | HEIGHT: 63 IN | DIASTOLIC BLOOD PRESSURE: 91 MMHG | RESPIRATION RATE: 19 BRPM | HEART RATE: 75 BPM | TEMPERATURE: 97.5 F

## 2019-09-14 DIAGNOSIS — J40 BRONCHITIS: Primary | ICD-10-CM

## 2019-09-14 PROCEDURE — 96372 THER/PROPH/DIAG INJ SC/IM: CPT

## 2019-09-14 PROCEDURE — 71046 X-RAY EXAM CHEST 2 VIEWS: CPT

## 2019-09-14 PROCEDURE — 25010000002 METHYLPREDNISOLONE PER 80 MG: Performed by: PHYSICIAN ASSISTANT

## 2019-09-14 PROCEDURE — 99282 EMERGENCY DEPT VISIT SF MDM: CPT

## 2019-09-14 RX ORDER — METHYLPREDNISOLONE ACETATE 80 MG/ML
80 INJECTION, SUSPENSION INTRA-ARTICULAR; INTRALESIONAL; INTRAMUSCULAR; SOFT TISSUE ONCE
Status: COMPLETED | OUTPATIENT
Start: 2019-09-14 | End: 2019-09-14

## 2019-09-14 RX ADMIN — METHYLPREDNISOLONE ACETATE 80 MG: 80 INJECTION, SUSPENSION INTRA-ARTICULAR; INTRALESIONAL; INTRAMUSCULAR; SOFT TISSUE at 12:27

## 2019-09-14 NOTE — ED PROVIDER NOTES
"Subjective   34-year-old female presents emergency room with complaints of previous diagnosis of pneumonia has taken her antibiotics but still does not feel very well.  She is also been placed on methylprednisolone.  She does have a primary care physician.  She is had a hysterectomy.  Vital signs are normal.  Diastolic blood pressures a little bit high but she has O2 sat of 98% on room air.  She has a significant medical history including pharyngitis chest pain depression and difficulty swallowing fatigue GERD hyperthyroidism spinal headaches TMJ and vomiting.  She denies any allergies to medications.  She denies ear pain, headache, nausea, vomiting, diarrhea, fevers, chills, abdominal pain.  She has had decreased solid food intake but has been drinking plenty of water.  She finished a 10-day course of antibiotics, doxycycline.  She is finished her methylprednisolone.  She had a breathing treatment at urgent care.  They did not do a chest x-ray but after listening to the patient at urgent care the the patient said, \"the provider stated that I was past bronchitis but not quite to pneumonia and treated me.\"            Review of Systems   Constitutional: Positive for activity change, appetite change and fatigue. Negative for chills and fever.   HENT: Positive for congestion, rhinorrhea and sinus pressure. Negative for ear discharge, ear pain, facial swelling, sore throat and trouble swallowing.    Respiratory: Positive for cough and chest tightness. Negative for shortness of breath and wheezing.    Cardiovascular: Negative for chest pain.   Gastrointestinal: Negative for abdominal pain, constipation, diarrhea, nausea and vomiting.   Genitourinary: Negative for decreased urine volume, difficulty urinating, dysuria and frequency.   Musculoskeletal: Negative for arthralgias, back pain and myalgias.   Skin: Negative for color change and rash.   Neurological: Negative for dizziness, syncope, weakness and light-headedness. "   Hematological: Does not bruise/bleed easily.       Past Medical History:   Diagnosis Date   • Abdominal pain, periumbilical    • Abdominal pain, RLQ (right lower quadrant)    • Acute pharyngitis    • Appetite lost    • Chest pain    • Depression     patient denies   • Diarrhea    • Difficulty swallowing    • Fatigue    • Fracture     RIGHT WRIST TWICE, LEFT HAND   • GERD (gastroesophageal reflux disease)    • History of colonoscopy    • History of ovarian cyst    • Hyperthyroidism     hyperactive taken off medicine when pregnant not placed back on   • Ovarian cyst, bilateral    • Spinal headache    • TMJ (dislocation of temporomandibular joint)    • Upper respiratory infection    • Vomiting        No Known Allergies    Past Surgical History:   Procedure Laterality Date   •  SECTION  2008    DR NAVARRETE   •  SECTION  10/30/2015    DR VASQUEZ   •  SECTION  2010    DR ESPINOZA   • CHOLECYSTECTOMY     • COLONOSCOPY     • COLONOSCOPY N/A 2017    Procedure: COLONOSCOPY WITH COLD BIOPSY POLYPECTOMY; BIOPSIES , QUICK CLIP;  Surgeon: Perfecto Mcknight MD;  Location: [facility] ENDOSCOPY;  Service:    • DENTAL PROCEDURE      REPORTS WISDOM TEETH EXTRACTED   • DIAGNOSTIC LAPAROSCOPY  2005    DR ESPINOZA   • DIAGNOSTIC LAPAROSCOPY  2012    DR ESPINOZA   • DIAGNOSTIC LAPAROSCOPY N/A 8/10/2017    Procedure: DIAGNOSTIC LAPAROSCOPY, LEFT SALPINGO-OOPHORECTOMY;  Surgeon: Keren Espinoza MD;  Location: [facility] OR;  Service:    • LYSIS OF ABDOMINAL ADHESIONS  2005    DR ESPINOZA   • LYSIS OF ABDOMINAL ADHESIONS  2012    DR ESPINOZA   • NISSEN FUNDOPLICATION     • OVARIAN CYST REMOVAL  2005    DR ESPINOZA   • SALPINGO OOPHORECTOMY Left 2017   • STOMACH SURGERY      PATIENT REPORTS STOMACH WAS WRAPPED AROUND ESOPHAGUS   • UPPER GASTROINTESTINAL ENDOSCOPY     • VAGINAL HYSTERECTOMY N/A 2018    Procedure: VAGINAL HYSTERECTOMY, BILATERAL SALPINGECTOMY;  Surgeon: Luc Alicae MD;   Location: [facility] OR;  Service: Gynecology       Family History   Problem Relation Age of Onset   • Coronary artery disease Mother    • Diabetes Mother    • Hypertension Mother    • Kidney disease Father    • Skin cancer Father    • Stroke Other    • Hypertension Other    • Diabetes Other    • Colon cancer Other        Social History     Socioeconomic History   • Marital status:      Spouse name: Not on file   • Number of children: Not on file   • Years of education: Not on file   • Highest education level: Not on file   Tobacco Use   • Smoking status: Never Smoker   • Smokeless tobacco: Never Used   Substance and Sexual Activity   • Alcohol use: No   • Drug use: No   • Sexual activity: Yes     Partners: Male     Birth control/protection: Surgical           Objective   Physical Exam   Constitutional: She is oriented to person, place, and time. Vital signs are normal. She appears well-developed and well-nourished. She is cooperative.  Non-toxic appearance. She does not have a sickly appearance. She does not appear ill. No distress.   HENT:   Head: Normocephalic and atraumatic.   Right Ear: Hearing, tympanic membrane, external ear and ear canal normal.   Left Ear: Hearing, tympanic membrane, external ear and ear canal normal.   Nose: Mucosal edema present. No sinus tenderness.   Mouth/Throat: Uvula is midline and oropharynx is clear and moist. No posterior oropharyngeal erythema.   Eyes: Conjunctivae, EOM and lids are normal.   Neck: Normal range of motion and phonation normal. Neck supple. No neck rigidity.   Cardiovascular: Normal rate, regular rhythm, intact distal pulses and normal pulses.   Pulmonary/Chest: Effort normal. No accessory muscle usage. Tachypnea noted. No respiratory distress. She has decreased breath sounds (mild, poor effort due to impending cough) in the right lower field and the left lower field.   Abdominal: Soft. Normal appearance. There is no tenderness.   Musculoskeletal: Normal range  of motion. She exhibits no edema or deformity.   Neurological: She is alert and oriented to person, place, and time. She has normal strength. She is not disoriented. No sensory deficit. Coordination and gait normal. GCS eye subscore is 4. GCS verbal subscore is 5. GCS motor subscore is 6.   Skin: Skin is warm, dry and intact. Capillary refill takes less than 2 seconds. No rash noted.   Psychiatric: She has a normal mood and affect. Her speech is normal and behavior is normal.   Nursing note and vitals reviewed.      Procedures           ED Course    XR Chest 2 View   Order: [id number]   Status:  Final result   Visible to patient:  No (Not Released) Next appt:  02/03/2020 at 10:00 AM in Gynecology (Luc Alicea MD)   Details     Reading Physician Reading Date Result Priority   Nahid Parks MD 9/14/2019       Narrative     PROCEDURE: XR CHEST 2 VW-        HISTORY: cough, recent tx for URI     COMPARISON: 07/25/2017.     FINDINGS: The heart is normal in size. The mediastinum is unremarkable.  The lungs are clear. There is no pneumothorax. There are no acute  osseous abnormalities.         Impression     No acute cardiopulmonary process.           This report was finalized on 9/14/2019 12:06 PM by Nahid Parks M.D..           12:18 PM-no acute abnormality noted on xray. I have treated with depomedrol injection. She needs to follow up with PCP on Monday.     I have reviewed all labs, and all imaging that has been ordered for this patient.  I have discussed the results of this testing with the patient.  Together the patient, their family and I discussed the plan for treatment and disposition.      Red flags, indicating immediate need to return to the emergency room, were discussed with the patient and/or the patient's family and they verbalized understanding.  The patient and/or the family were encouraged to return to the emergency room for any worsening or concerning symptoms.            MDM    Final  diagnoses:   Bronchitis              Megha Carpio PA-C  09/14/19 2263

## 2019-09-16 ENCOUNTER — OFFICE VISIT (OUTPATIENT)
Dept: INTERNAL MEDICINE | Facility: CLINIC | Age: 34
End: 2019-09-16

## 2019-09-16 VITALS
OXYGEN SATURATION: 99 % | SYSTOLIC BLOOD PRESSURE: 116 MMHG | HEART RATE: 91 BPM | RESPIRATION RATE: 15 BRPM | WEIGHT: 186 LBS | DIASTOLIC BLOOD PRESSURE: 71 MMHG | BODY MASS INDEX: 32.96 KG/M2 | HEIGHT: 63 IN | TEMPERATURE: 98.6 F

## 2019-09-16 DIAGNOSIS — J40 BRONCHITIS: ICD-10-CM

## 2019-09-16 DIAGNOSIS — J30.2 SEASONAL ALLERGIC RHINITIS, UNSPECIFIED TRIGGER: ICD-10-CM

## 2019-09-16 DIAGNOSIS — K57.90 DIVERTICULOSIS OF INTESTINE WITHOUT BLEEDING, UNSPECIFIED INTESTINAL TRACT LOCATION: ICD-10-CM

## 2019-09-16 DIAGNOSIS — F41.9 ANXIETY: ICD-10-CM

## 2019-09-16 DIAGNOSIS — R10.30 LOWER ABDOMINAL PAIN: Primary | ICD-10-CM

## 2019-09-16 DIAGNOSIS — H65.192 ACUTE OTITIS MEDIA WITH EFFUSION OF LEFT EAR: Primary | ICD-10-CM

## 2019-09-16 DIAGNOSIS — Z87.19 HISTORY OF RECTAL POLYPS: ICD-10-CM

## 2019-09-16 DIAGNOSIS — R19.7 DIARRHEA, UNSPECIFIED TYPE: ICD-10-CM

## 2019-09-16 PROCEDURE — 96372 THER/PROPH/DIAG INJ SC/IM: CPT | Performed by: NURSE PRACTITIONER

## 2019-09-16 PROCEDURE — 99214 OFFICE O/P EST MOD 30 MIN: CPT | Performed by: NURSE PRACTITIONER

## 2019-09-16 RX ORDER — FLUTICASONE PROPIONATE 50 MCG
2 SPRAY, SUSPENSION (ML) NASAL DAILY
Qty: 15.8 ML | Refills: 1 | Status: SHIPPED | OUTPATIENT
Start: 2019-09-16 | End: 2020-02-13

## 2019-09-16 RX ORDER — HYDROXYZINE HYDROCHLORIDE 25 MG/1
25 TABLET, FILM COATED ORAL NIGHTLY PRN
Qty: 30 TABLET | Refills: 1 | Status: SHIPPED | OUTPATIENT
Start: 2019-09-16 | End: 2020-05-14

## 2019-09-16 RX ORDER — CEFTRIAXONE 1 G/1
1 INJECTION, POWDER, FOR SOLUTION INTRAMUSCULAR; INTRAVENOUS ONCE
Status: COMPLETED | OUTPATIENT
Start: 2019-09-16 | End: 2019-09-16

## 2019-09-16 RX ORDER — CETIRIZINE HYDROCHLORIDE 10 MG/1
10 TABLET ORAL DAILY
Qty: 30 TABLET | Refills: 1 | Status: SHIPPED | OUTPATIENT
Start: 2019-09-16 | End: 2020-02-13

## 2019-09-16 RX ADMIN — CEFTRIAXONE 1 G: 1 INJECTION, POWDER, FOR SOLUTION INTRAMUSCULAR; INTRAVENOUS at 10:03

## 2019-09-16 NOTE — PROGRESS NOTES
Chief Complaint / Reason:      Chief Complaint   Patient presents with   • Cough     and congestion. been to [facility] and told almost pneumonia on 2019. went to ER on Saturday and got a steriod shot. Nurse told me walking pneumonia       Subjective     HPI  Patient presents today with complaints of ongoing cough and congestion.  She states that she has been to urgent care on 2019 and they thought that she had pneumonia and did give her doxycycline in which she states she completed.  She went to the ER on Saturday, 2019 and got a steroid shot.  Patient did have a chest x-ray which did not show any acute abnormalities.  Patient was diagnosed with bronchitis she states that she has tried coughing and deep breathing with minimal relief denies fever or chills but states she feels like she cannot catch her breath.  She has tried albuterol inhalers along with the nebulizer.  She does have seasonal allergies but does not take daily medication.  She states that she feels like symptoms have been going on for about 3 weeks.Vital signs are stable.  History taken from: patient    PMH/FH/Social History were reviewed and updated appropriately in the electronic medical record.   Past Medical History:   Diagnosis Date   • Abdominal pain, periumbilical    • Abdominal pain, RLQ (right lower quadrant)    • Acute pharyngitis    • Appetite lost    • Chest pain    • Depression     patient denies   • Diarrhea    • Difficulty swallowing    • Fatigue    • Fracture     RIGHT WRIST TWICE, LEFT HAND   • GERD (gastroesophageal reflux disease)    • History of colonoscopy    • History of ovarian cyst    • Hyperthyroidism     hyperactive taken off medicine when pregnant not placed back on   • Ovarian cyst, bilateral    • Spinal headache    • TMJ (dislocation of temporomandibular joint)    • Upper respiratory infection    • Vomiting      Past Surgical History:   Procedure Laterality Date   •  SECTION  2008    DR NAVARRETE    •  SECTION  10/30/2015    DR VASQUEZ   •  SECTION  2010    DR ESPINOZA   • CHOLECYSTECTOMY     • COLONOSCOPY     • COLONOSCOPY N/A 2017    Procedure: COLONOSCOPY WITH COLD BIOPSY POLYPECTOMY; BIOPSIES , QUICK CLIP;  Surgeon: Perfecto Mcknight MD;  Location: [facility] ENDOSCOPY;  Service:    • DENTAL PROCEDURE      REPORTS WISDOM TEETH EXTRACTED   • DIAGNOSTIC LAPAROSCOPY  2005    DR ESPINOZA   • DIAGNOSTIC LAPAROSCOPY  2012    DR ESPINOZA   • DIAGNOSTIC LAPAROSCOPY N/A 8/10/2017    Procedure: DIAGNOSTIC LAPAROSCOPY, LEFT SALPINGO-OOPHORECTOMY;  Surgeon: Keren Espinoza MD;  Location: [facility] OR;  Service:    • LYSIS OF ABDOMINAL ADHESIONS  2005    DR ESPINOZA   • LYSIS OF ABDOMINAL ADHESIONS  2012    DR ESPINOZA   • NISSEN FUNDOPLICATION     • OVARIAN CYST REMOVAL  2005    DR ESPINOZA   • SALPINGO OOPHORECTOMY Left 2017   • STOMACH SURGERY      PATIENT REPORTS STOMACH WAS WRAPPED AROUND ESOPHAGUS   • UPPER GASTROINTESTINAL ENDOSCOPY     • VAGINAL HYSTERECTOMY N/A 2018    Procedure: VAGINAL HYSTERECTOMY, BILATERAL SALPINGECTOMY;  Surgeon: Luc Alicea MD;  Location: [facility] OR;  Service: Gynecology     Social History     Socioeconomic History   • Marital status:      Spouse name: Not on file   • Number of children: Not on file   • Years of education: Not on file   • Highest education level: Not on file   Tobacco Use   • Smoking status: Never Smoker   • Smokeless tobacco: Never Used   Substance and Sexual Activity   • Alcohol use: No   • Drug use: No   • Sexual activity: Yes     Partners: Male     Birth control/protection: Surgical     Family History   Problem Relation Age of Onset   • Coronary artery disease Mother    • Diabetes Mother    • Hypertension Mother    • Kidney disease Father    • Skin cancer Father    • Stroke Other    • Hypertension Other    • Diabetes Other    • Colon cancer Other        Review of Systems:   Review of Systems   Constitutional: Positive for  fatigue. Negative for chills and fever.   HENT: Positive for congestion and sore throat.    Respiratory: Positive for cough, chest tightness, shortness of breath and wheezing.    Cardiovascular: Negative.  Negative for chest pain.   Gastrointestinal: Negative for nausea.   Allergic/Immunologic: Positive for environmental allergies.   Psychiatric/Behavioral: Positive for sleep disturbance and stress.         All other systems were reviewed and are negative.  Exceptions are noted in the subjective or above.      Objective     Vital Signs  Vitals:    09/16/19 0804   BP: 116/71   Pulse: 91   Resp: 15   Temp: 98.6 °F (37 °C)   SpO2: 99%       Body mass index is 32.95 kg/m².    Physical Exam   Constitutional: She appears well-developed and well-nourished. No distress.   HENT:   Head: Normocephalic.   Right Ear: External ear and ear canal normal. Tympanic membrane is erythematous and bulging.   Left Ear: External ear and ear canal normal. Tympanic membrane is erythematous and bulging. A middle ear effusion is present.   Nose: Mucosal edema, rhinorrhea and congestion present. Right sinus exhibits no maxillary sinus tenderness and no frontal sinus tenderness. Left sinus exhibits no maxillary sinus tenderness and no frontal sinus tenderness.   Mouth/Throat: Mucous membranes are dry. Posterior oropharyngeal edema and posterior oropharyngeal erythema present. No tonsillar exudate.   Eyes: Conjunctivae are normal. Right eye exhibits no discharge. Left eye exhibits no discharge.   Neck: Normal range of motion. Neck supple.   Cardiovascular: Normal rate, regular rhythm and normal heart sounds.   Pulmonary/Chest: Effort normal. No respiratory distress. She has decreased breath sounds. She has wheezes. She has no rhonchi. She has no rales.   Lymphadenopathy:        Head (right side): No submental, no submandibular and no tonsillar adenopathy present.        Head (left side): No submental, no submandibular and no tonsillar adenopathy  present.     She has no cervical adenopathy.   Skin: Skin is warm and dry. She is not diaphoretic.   Nursing note and vitals reviewed.           Results Review:    I reviewed the patient's previous clinical results.       Medication Review:     Current Outpatient Medications:   •  albuterol sulfate  (90 Base) MCG/ACT inhaler, Inhale 2 puffs Every 6 (Six) Hours As Needed for Wheezing., Disp: 1 inhaler, Rfl: 0  •  hydrOXYzine (ATARAX) 25 MG tablet, Take 1 tablet by mouth At Night As Needed for Anxiety., Disp: 30 tablet, Rfl: 1  •  cetirizine (zyrTEC) 10 MG tablet, Take 1 tablet by mouth Daily., Disp: 30 tablet, Rfl: 1  •  fluticasone (FLONASE) 50 MCG/ACT nasal spray, 2 sprays into the nostril(s) as directed by provider Daily., Disp: 15.8 mL, Rfl: 1  No current facility-administered medications for this visit.     Assessment/Plan   Laura was seen today for cough.    Diagnoses and all orders for this visit:    Acute otitis media with effusion of left ear  -     cefTRIAXone (ROCEPHIN) injection 1 g  -     fluticasone (FLONASE) 50 MCG/ACT nasal spray; 2 sprays into the nostril(s) as directed by provider Daily.  -     cetirizine (zyrTEC) 10 MG tablet; Take 1 tablet by mouth Daily.    Bronchitis  -     cefTRIAXone (ROCEPHIN) injection 1 g  Recommend hydration and coughing and deep breathing.  Seasonal allergic rhinitis, unspecified trigger  -     fluticasone (FLONASE) 50 MCG/ACT nasal spray; 2 sprays into the nostril(s) as directed by provider Daily.  -     cetirizine (zyrTEC) 10 MG tablet; Take 1 tablet by mouth Daily.  -     hydrOXYzine (ATARAX) 25 MG tablet; Take 1 tablet by mouth At Night As Needed for Anxiety.    Anxiety  -     hydrOXYzine (ATARAX) 25 MG tablet; Take 1 tablet by mouth At Night As Needed for Anxiety.        Return if symptoms worsen or fail to improve.    Megha Fregoso, APRN  09/16/2019

## 2019-09-17 ENCOUNTER — HOSPITAL ENCOUNTER (OUTPATIENT)
Dept: GENERAL RADIOLOGY | Facility: HOSPITAL | Age: 34
Discharge: HOME | End: 2019-09-17
Admitting: NURSE PRACTITIONER

## 2019-09-17 PROCEDURE — 74018 RADEX ABDOMEN 1 VIEW: CPT

## 2019-09-25 ENCOUNTER — OFFICE VISIT (OUTPATIENT)
Dept: GASTROENTEROLOGY | Facility: CLINIC | Age: 34
End: 2019-09-25

## 2019-09-25 VITALS
DIASTOLIC BLOOD PRESSURE: 80 MMHG | TEMPERATURE: 97.8 F | WEIGHT: 187 LBS | BODY MASS INDEX: 31.92 KG/M2 | SYSTOLIC BLOOD PRESSURE: 110 MMHG | HEART RATE: 84 BPM | RESPIRATION RATE: 15 BRPM | HEIGHT: 64 IN

## 2019-09-25 DIAGNOSIS — R12 HEARTBURN: Chronic | ICD-10-CM

## 2019-09-25 DIAGNOSIS — R10.13 EPIGASTRIC PAIN: Primary | Chronic | ICD-10-CM

## 2019-09-25 DIAGNOSIS — R13.10 DYSPHAGIA, UNSPECIFIED TYPE: Chronic | ICD-10-CM

## 2019-09-25 DIAGNOSIS — R19.7 DIARRHEA, UNSPECIFIED TYPE: Chronic | ICD-10-CM

## 2019-09-25 DIAGNOSIS — R11.0 NAUSEA: Chronic | ICD-10-CM

## 2019-09-25 PROCEDURE — 99214 OFFICE O/P EST MOD 30 MIN: CPT | Performed by: NURSE PRACTITIONER

## 2019-09-25 RX ORDER — SODIUM CHLORIDE 9 MG/ML
70 INJECTION, SOLUTION INTRAVENOUS CONTINUOUS PRN
Status: CANCELLED | OUTPATIENT
Start: 2019-09-25

## 2019-09-25 RX ORDER — FAMOTIDINE 20 MG/1
20 TABLET, FILM COATED ORAL 2 TIMES DAILY
Qty: 60 TABLET | Refills: 5 | Status: SHIPPED | OUTPATIENT
Start: 2019-09-25 | End: 2019-10-15 | Stop reason: HOSPADM

## 2019-09-25 RX ORDER — CHOLESTYRAMINE LIGHT 4 G/5.7G
POWDER, FOR SUSPENSION ORAL
Qty: 239.4 G | Refills: 3 | Status: SHIPPED | OUTPATIENT
Start: 2019-09-25 | End: 2020-02-13

## 2019-09-25 RX ORDER — ONDANSETRON 4 MG/1
4 TABLET, ORALLY DISINTEGRATING ORAL EVERY 8 HOURS PRN
Qty: 30 TABLET | Refills: 1 | Status: SHIPPED | OUTPATIENT
Start: 2019-09-25 | End: 2020-02-13

## 2019-09-25 NOTE — PATIENT INSTRUCTIONS
1. Antireflux measures: Avoid fried, fatty foods, alcohol, chocolate, coffee, tea,  soft drinks, peppermint and spearmint, spicy foods, tomatoes and tomato based foods, onion based foods, and smoking. Other antireflux measures include weight reduction if overweight, avoiding tight clothing around the abdomen, elevating the head of the bed 6 inches with blocks under the head board, and don't drink or eat before going to bed and avoid lying down immediately after meals.  2. Pepcid 20 mg 1 po twice daily.  3. Zofran 4 mg 1 po every 8 hours as needed for nausea.   4. High fiber diet with liberal water intake.   5. Dietary instructions.  The patient should eat relatively soft diet, and should eat in upright position and chew well.  The patient should drink water after 2-3 bites and take medications with liberal amounts of water. Furthermore after eating and taking medications, the patient should remain in upright position for 5-10 minutes.  6. Cholestyramine powder 1/2 scoop at bedtime in 8 ounces of noncarbonated beverage. May increase to 1 scoop if diarrhea is not controlled. Adjust to have 1-2 soft bowel movements daily. Avoid constipation.   7. Upper endoscopy-EGD: Description of the procedure, risks, benefits, alternatives and options, including nonoperative options, were discussed with the patient in detail. The patient understands and wishes to proceed.

## 2019-09-25 NOTE — PROGRESS NOTES
"Chief Complaint   Patient presents with   • Diarrhea   • Abdominal Pain   • Bloated   • Nausea     There is a long standing history of epigastric pain. The pain occurs daily. The pain is moderate and described as cramping and a sensation of fullness. She constantly feels bloated and \"swollen\". Eating makes the pain worse. Nothing improves the pain.     There is a long standing history of nausea. The patient has nausea daily. Eating makes nausea worse. Nausea is described as mild. She is not taking anything for nausea. The patient denies vomiting.     There is a long standing history of heartburn. The patient may have heartburn 1-2 times per week. Heartburn is mild to moderate. Reflux is worse at night. The patient is not taking anything for heartburn. Of interest, the patient has a history of Nissen wrap in the past.     There is a history of difficulty swallowing for the past 6 months. The patient has difficulty swallowing solids and liquids several times per day. She frequently \"gets choked\" when swallowing saliva. There is no history of weight loss.     There is a long standing history of diarrhea. The patient may have diarrhea several times per day. Eating makes diarrhea worse. The patient is not taking anything for diarrhea at this time. The patient had colonoscopy in 2017 and biopsies were negative for inflammatory bowel disease. She was recommended to take Imodium in the past, but she stopped taking it as it caused constipation. There is no history of bright red blood per rectum or melena.     Diarrhea    This is a chronic problem. Episode onset: age 14. The problem occurs 5 to 10 times per day. The problem has been unchanged. Diarrhea characteristics: loose. The patient states that diarrhea does not awaken her from sleep. Associated symptoms include abdominal pain. Pertinent negatives include no arthralgias, chills, coughing, fever, headaches, myalgias or vomiting. Nothing aggravates the symptoms. There are " no known risk factors. She has tried anti-motility drug for the symptoms. The treatment provided significant relief.   Abdominal Pain   This is a chronic problem. The onset quality is gradual. The problem occurs daily. The problem has been unchanged. The pain is located in the epigastric region. The pain is moderate. The quality of the pain is a sensation of fullness and cramping. The abdominal pain does not radiate. Associated symptoms include diarrhea and nausea. Pertinent negatives include no arthralgias, constipation, dysuria, fever, headaches, hematuria, myalgias or vomiting. Nothing aggravates the pain. The pain is relieved by nothing. She has tried nothing for the symptoms. Her past medical history is significant for GERD.   Nausea   This is a chronic problem. The current episode started more than 1 year ago. The problem occurs daily. The problem has been unchanged. Associated symptoms include abdominal pain, fatigue and nausea. Pertinent negatives include no arthralgias, chest pain, chills, coughing, fever, headaches, joint swelling, myalgias, rash or vomiting. The symptoms are aggravated by eating. She has tried nothing for the symptoms.   Heartburn   She complains of abdominal pain, heartburn and nausea. She reports no chest pain or no coughing. This is a chronic problem. The current episode started more than 1 year ago. The problem occurs occasionally. The problem has been unchanged. The heartburn duration is an hour. The heartburn is located in the substernum. The heartburn is of mild intensity. The heartburn wakes her from sleep. Nothing aggravates the symptoms. Associated symptoms include fatigue. There are no known risk factors. She has tried nothing for the symptoms.   Difficulty Swallowing   This is a chronic problem. Episode onset: March 2019. The problem occurs daily. The problem has been unchanged. Associated symptoms include abdominal pain, fatigue and nausea. Pertinent negatives include no  arthralgias, chest pain, chills, coughing, fever, headaches, joint swelling, myalgias, rash or vomiting. The symptoms are aggravated by drinking and eating. She has tried nothing for the symptoms.     Review of Systems   Constitutional: Positive for fatigue and unexpected weight change. Negative for appetite change, chills and fever.   HENT: Negative for mouth sores, nosebleeds and trouble swallowing.    Eyes: Negative for discharge and redness.   Respiratory: Positive for shortness of breath. Negative for apnea and cough.    Cardiovascular: Negative for chest pain, palpitations and leg swelling.   Gastrointestinal: Positive for abdominal distention, abdominal pain, diarrhea, heartburn and nausea. Negative for anal bleeding, blood in stool, constipation and vomiting.   Endocrine: Positive for polydipsia. Negative for cold intolerance and heat intolerance.   Genitourinary: Negative for dysuria, hematuria and urgency.   Musculoskeletal: Negative for arthralgias, joint swelling and myalgias.   Skin: Negative for rash.   Allergic/Immunologic: Negative for food allergies and immunocompromised state.   Neurological: Negative for dizziness, seizures, syncope and headaches.   Hematological: Negative for adenopathy. Does not bruise/bleed easily.   Psychiatric/Behavioral: Negative for dysphoric mood. The patient is nervous/anxious. The patient is not hyperactive.      Patient Active Problem List   Diagnosis   • Depression   • Dysphagia   • Gastroesophageal reflux disease   • Hyperthyroidism   • Diarrhea   • Nausea   • History of left salpingo-oophorectomy  2017 August   • PMDD (premenstrual dysphoric disorder)   • Epigastric pain   • Heartburn     Past Medical History:   Diagnosis Date   • Abdominal pain, periumbilical    • Abdominal pain, RLQ (right lower quadrant)    • Acute pharyngitis    • Appetite lost    • Chest pain    • Colon polyp 02/17/2017   • Depression     patient denies   • Diarrhea    • Difficulty swallowing     • Fatigue    • Fracture     RIGHT WRIST TWICE, LEFT HAND   • GERD (gastroesophageal reflux disease)    • History of colonoscopy    • History of ovarian cyst    • Hyperthyroidism     hyperactive taken off medicine when pregnant not placed back on   • Ovarian cyst, bilateral    • Spinal headache    • TMJ (dislocation of temporomandibular joint)    • Upper respiratory infection    • Vomiting      Past Surgical History:   Procedure Laterality Date   •  SECTION  2008    DR NAVARRETE   •  SECTION  10/30/2015    DR VASQUEZ   •  SECTION  2010    DR ESPINOZA   • CHOLECYSTECTOMY     • COLONOSCOPY     • COLONOSCOPY N/A 2017    Procedure: COLONOSCOPY WITH COLD BIOPSY POLYPECTOMY; BIOPSIES , QUICK CLIP;  Surgeon: Perfecto Mcknight MD;  Location: [facility] ENDOSCOPY;  Service:    • DENTAL PROCEDURE      REPORTS WISDOM TEETH EXTRACTED   • DIAGNOSTIC LAPAROSCOPY  2005    DR ESPINOZA   • DIAGNOSTIC LAPAROSCOPY  2012    DR ESPINOZA   • DIAGNOSTIC LAPAROSCOPY N/A 8/10/2017    Procedure: DIAGNOSTIC LAPAROSCOPY, LEFT SALPINGO-OOPHORECTOMY;  Surgeon: Keren Espinoza MD;  Location: [facility] OR;  Service:    • LYSIS OF ABDOMINAL ADHESIONS  2005    DR ESPINOZA   • LYSIS OF ABDOMINAL ADHESIONS  2012    DR ESPINOZA   • NISSEN FUNDOPLICATION     • OVARIAN CYST REMOVAL  2005    DR ESPINOZA   • SALPINGO OOPHORECTOMY Left 2017   • STOMACH SURGERY      PATIENT REPORTS STOMACH WAS WRAPPED AROUND ESOPHAGUS   • UPPER GASTROINTESTINAL ENDOSCOPY     • VAGINAL HYSTERECTOMY N/A 2018    Procedure: VAGINAL HYSTERECTOMY, BILATERAL SALPINGECTOMY;  Surgeon: Luc Alicea MD;  Location: [facility] OR;  Service: Gynecology     Family History   Problem Relation Age of Onset   • Coronary artery disease Mother    • Diabetes Mother    • Hypertension Mother    • Kidney disease Father    • Skin cancer Father    • Stroke Other    • Hypertension Other    • Diabetes Other    • Colon cancer Other      Social History  "    Tobacco Use   • Smoking status: Never Smoker   • Smokeless tobacco: Never Used   Substance Use Topics   • Alcohol use: No       Current Outpatient Medications:   •  albuterol sulfate  (90 Base) MCG/ACT inhaler, Inhale 2 puffs Every 6 (Six) Hours As Needed for Wheezing., Disp: 1 inhaler, Rfl: 0  •  cetirizine (zyrTEC) 10 MG tablet, Take 1 tablet by mouth Daily., Disp: 30 tablet, Rfl: 1  •  fluticasone (FLONASE) 50 MCG/ACT nasal spray, 2 sprays into the nostril(s) as directed by provider Daily., Disp: 15.8 mL, Rfl: 1  •  hydrOXYzine (ATARAX) 25 MG tablet, Take 1 tablet by mouth At Night As Needed for Anxiety., Disp: 30 tablet, Rfl: 1  •  cholestyramine light (PREVALITE) 4 GM/DOSE powder, Take 1/2-1 scoop daily in 8 oz of noncarbonated beverage for diarrhea. Adjust to have 1-2 soft bowel movements daily., Disp: 239.4 g, Rfl: 3  •  famotidine (PEPCID) 20 MG tablet, Take 1 tablet by mouth 2 (Two) Times a Day., Disp: 60 tablet, Rfl: 5  •  ondansetron ODT (ZOFRAN-ODT) 4 MG disintegrating tablet, Take 1 tablet by mouth Every 8 (Eight) Hours As Needed for Nausea or Vomiting., Disp: 30 tablet, Rfl: 1    No Known Allergies     /80   Pulse 84   Temp 97.8 °F (36.6 °C)   Resp 15   Ht 162.6 cm (64\")   Wt 84.8 kg (187 lb)   LMP 06/26/2018   BMI 32.10 kg/m²     Physical Exam   Constitutional: She is oriented to person, place, and time. She appears well-developed and well-nourished. No distress.   HENT:   Head: Normocephalic and atraumatic.   Right Ear: Hearing and external ear normal.   Left Ear: Hearing and external ear normal.   Nose: Nose normal.   Mouth/Throat: Oropharynx is clear and moist and mucous membranes are normal. Mucous membranes are not pale, not dry and not cyanotic. No oral lesions. No oropharyngeal exudate.   Eyes: Conjunctivae and EOM are normal. Right eye exhibits no discharge. Left eye exhibits no discharge.   Neck: Trachea normal. Neck supple. No JVD present. No edema present. No thyroid " mass and no thyromegaly present.   Cardiovascular: Normal rate, regular rhythm, S2 normal and normal heart sounds. Exam reveals no gallop, no S3 and no friction rub.   No murmur heard.  Pulmonary/Chest: Effort normal and breath sounds normal. No respiratory distress. She exhibits no tenderness.   Abdominal: Normal appearance and bowel sounds are normal. She exhibits no distension, no ascites and no mass. There is no splenomegaly or hepatomegaly. There is no tenderness. There is no rigidity, no rebound and no guarding. No hernia.     Vascular Status -  Her right foot exhibits no edema. Her left foot exhibits no edema.  Lymphadenopathy:     She has no cervical adenopathy.        Left: No supraclavicular adenopathy present.   Neurological: She is alert and oriented to person, place, and time. She has normal strength. No cranial nerve deficit or sensory deficit.   Skin: No rash noted. She is not diaphoretic. No cyanosis. No pallor. Nails show no clubbing.   Psychiatric: She has a normal mood and affect.   Nursing note and vitals reviewed.  Stigmata of chronic liver disease:  None.  Asterixis:  None.    Laboratory Tests:   Upon review of records:    Dated 6/5/2019 TSH 0.714    Abdominal Imaging:  Upon review of records:    KUB dated 9/17/2019 reveals an AP view of the abdomen and pelvis demonstrates a nonspecific, nonobstructive bowel gas pattern.  No radiopaque stones are seen overlying the renal shadows.    Procedures:  Upon review of records:    Dated February 17, 2017 the patient underwent a colonoscopy to the terminal ileum which revealed: Scant early diverticular change and left colon and possibly in terminal ileum. Small diminutive polyp in the rectum. Internal hemorrhoids. No endoscopic evidence of colitis was seen. Random biopsies were obtained from the colon upon withdrawal of scope. Terminal ileum biopsies revealed small intestinal mucosa with prominent lymphoid aggregate. No additional significant  histopathologic abnormalities identified. Rectal polyp, biopsy revealed scant fragment of colonic mucosa with no abnormalities identified. Random colon biopsies revealed colonic-type mucosa with no significant histopathologic abnormalities.    Assessment:      ICD-10-CM ICD-9-CM   1. Epigastric pain R10.13 789.06   2. Nausea R11.0 787.02   3. Heartburn R12 787.1   4. Dysphagia, unspecified type R13.10 787.20   5. Diarrhea, unspecified type R19.7 787.91     Discussion:  1. History of epigastric pain and nausea.  Differentials include peptic ulcer disease, pancreatobiliary disease, delayed gastric emptying.  2. Long-standing history of heartburn.  Not on medications at this time.  Concerns for underlying Malcolm's.  3. Long-standing history of difficulty swallowing.  Differentials include esophagitis, esophageal dysmotility, Schatzki's ring.  4. Long-standing history of diarrhea.  Likely secondary to bile acid diarrhea.  Colonoscopy biopsies in 2017 with no evidence of underlying inflammatory bowel disease or microscopic colitis.    Plan/  Patient Instructions   1. Antireflux measures: Avoid fried, fatty foods, alcohol, chocolate, coffee, tea,  soft drinks, peppermint and spearmint, spicy foods, tomatoes and tomato based foods, onion based foods, and smoking. Other antireflux measures include weight reduction if overweight, avoiding tight clothing around the abdomen, elevating the head of the bed 6 inches with blocks under the head board, and don't drink or eat before going to bed and avoid lying down immediately after meals.  2. Pepcid 20 mg 1 po twice daily.  3. Zofran 4 mg 1 po every 8 hours as needed for nausea.   4. High fiber diet with liberal water intake.   5. Dietary instructions.  The patient should eat relatively soft diet, and should eat in upright position and chew well.  The patient should drink water after 2-3 bites and take medications with liberal amounts of water. Furthermore after eating and taking  medications, the patient should remain in upright position for 5-10 minutes.  6. Cholestyramine powder 1/2 scoop at bedtime in 8 ounces of noncarbonated beverage. May increase to 1 scoop if diarrhea is not controlled. Adjust to have 1-2 soft bowel movements daily. Avoid constipation.   7. Upper endoscopy-EGD: Description of the procedure, risks, benefits, alternatives and options, including nonoperative options, were discussed with the patient in detail. The patient understands and wishes to proceed.     Magdalena Richardson, APRN

## 2019-10-15 ENCOUNTER — ANESTHESIA EVENT (OUTPATIENT)
Dept: GASTROENTEROLOGY | Facility: HOSPITAL | Age: 34
End: 2019-10-15

## 2019-10-15 ENCOUNTER — HOSPITAL ENCOUNTER (OUTPATIENT)
Facility: HOSPITAL | Age: 34
Setting detail: HOSPITAL OUTPATIENT SURGERY
Discharge: HOME | End: 2019-10-15
Attending: INTERNAL MEDICINE | Admitting: INTERNAL MEDICINE

## 2019-10-15 ENCOUNTER — ANESTHESIA (OUTPATIENT)
Dept: GASTROENTEROLOGY | Facility: HOSPITAL | Age: 34
End: 2019-10-15

## 2019-10-15 VITALS
BODY MASS INDEX: 30.56 KG/M2 | DIASTOLIC BLOOD PRESSURE: 65 MMHG | OXYGEN SATURATION: 97 % | HEART RATE: 56 BPM | RESPIRATION RATE: 16 BRPM | HEIGHT: 64 IN | TEMPERATURE: 97.3 F | WEIGHT: 179 LBS | SYSTOLIC BLOOD PRESSURE: 104 MMHG

## 2019-10-15 DIAGNOSIS — R13.10 DYSPHAGIA, UNSPECIFIED TYPE: ICD-10-CM

## 2019-10-15 DIAGNOSIS — R10.13 EPIGASTRIC PAIN: ICD-10-CM

## 2019-10-15 DIAGNOSIS — R11.0 NAUSEA: ICD-10-CM

## 2019-10-15 DIAGNOSIS — R12 HEARTBURN: ICD-10-CM

## 2019-10-15 PROCEDURE — 25010000002 PROPOFOL 200 MG/20ML EMULSION: Performed by: NURSE ANESTHETIST, CERTIFIED REGISTERED

## 2019-10-15 PROCEDURE — 43239 EGD BIOPSY SINGLE/MULTIPLE: CPT | Performed by: INTERNAL MEDICINE

## 2019-10-15 PROCEDURE — 25010000002 ONDANSETRON PER 1 MG: Performed by: NURSE ANESTHETIST, CERTIFIED REGISTERED

## 2019-10-15 PROCEDURE — C1726 CATH, BAL DIL, NON-VASCULAR: HCPCS | Performed by: INTERNAL MEDICINE

## 2019-10-15 PROCEDURE — 43249 ESOPH EGD DILATION <30 MM: CPT | Performed by: INTERNAL MEDICINE

## 2019-10-15 RX ORDER — ONDANSETRON 2 MG/ML
INJECTION INTRAMUSCULAR; INTRAVENOUS AS NEEDED
Status: DISCONTINUED | OUTPATIENT
Start: 2019-10-15 | End: 2019-10-15 | Stop reason: SURG

## 2019-10-15 RX ORDER — LIDOCAINE HYDROCHLORIDE 20 MG/ML
INJECTION, SOLUTION INTRAVENOUS AS NEEDED
Status: DISCONTINUED | OUTPATIENT
Start: 2019-10-15 | End: 2019-10-15 | Stop reason: SURG

## 2019-10-15 RX ORDER — SODIUM CHLORIDE 0.9 % (FLUSH) 0.9 %
10 SYRINGE (ML) INJECTION AS NEEDED
Status: DISCONTINUED | OUTPATIENT
Start: 2019-10-15 | End: 2019-10-15 | Stop reason: HOSPADM

## 2019-10-15 RX ORDER — ESOMEPRAZOLE MAGNESIUM 40 MG/1
CAPSULE, DELAYED RELEASE ORAL
Qty: 30 CAPSULE | Refills: 3 | Status: SHIPPED | OUTPATIENT
Start: 2019-10-15 | End: 2020-02-13

## 2019-10-15 RX ORDER — PROPOFOL 10 MG/ML
INJECTION, EMULSION INTRAVENOUS AS NEEDED
Status: DISCONTINUED | OUTPATIENT
Start: 2019-10-15 | End: 2019-10-15 | Stop reason: SURG

## 2019-10-15 RX ORDER — SODIUM CHLORIDE 9 MG/ML
70 INJECTION, SOLUTION INTRAVENOUS CONTINUOUS PRN
Status: DISCONTINUED | OUTPATIENT
Start: 2019-10-15 | End: 2019-10-15 | Stop reason: HOSPADM

## 2019-10-15 RX ORDER — SODIUM CHLORIDE, SODIUM LACTATE, POTASSIUM CHLORIDE, CALCIUM CHLORIDE 600; 310; 30; 20 MG/100ML; MG/100ML; MG/100ML; MG/100ML
1000 INJECTION, SOLUTION INTRAVENOUS CONTINUOUS
Status: DISCONTINUED | OUTPATIENT
Start: 2019-10-15 | End: 2019-10-15

## 2019-10-15 RX ADMIN — ONDANSETRON 4 MG: 2 INJECTION INTRAMUSCULAR; INTRAVENOUS at 07:40

## 2019-10-15 RX ADMIN — PROPOFOL 50 MG: 10 INJECTION, EMULSION INTRAVENOUS at 07:54

## 2019-10-15 RX ADMIN — LIDOCAINE HYDROCHLORIDE 60 MG: 20 INJECTION, SOLUTION INTRAVENOUS at 07:40

## 2019-10-15 RX ADMIN — PROPOFOL 200 MG: 10 INJECTION, EMULSION INTRAVENOUS at 07:45

## 2019-10-15 RX ADMIN — PROPOFOL 50 MG: 10 INJECTION, EMULSION INTRAVENOUS at 07:49

## 2019-10-15 RX ADMIN — PROPOFOL 50 MG: 10 INJECTION, EMULSION INTRAVENOUS at 07:57

## 2019-10-15 RX ADMIN — SODIUM CHLORIDE 70 ML/HR: 9 INJECTION, SOLUTION INTRAVENOUS at 06:46

## 2019-10-15 NOTE — ANESTHESIA POSTPROCEDURE EVALUATION
Patient: Laura Thomson    Procedure Summary     Date:  10/15/19 Room / Location:  [facility] ENDOSCOPY 2 / [facility] ENDOSCOPY    Anesthesia Start:  0740 Anesthesia Stop:  0802    Procedure:  ESOPHAGOGASTRODUODENOSCOPY WITH BIOPSY AND ESOPHAGEAL DILATATION (N/A Esophagus) Diagnosis:       Epigastric pain      Nausea      Heartburn      Dysphagia, unspecified type      (Epigastric pain [R10.13])      (Nausea [R11.0])      (Heartburn [R12])      (Dysphagia, unspecified type [R13.10])    Surgeon:  Perfecto Mcknight MD Provider:  Richard Blair CRNA    Anesthesia Type:  MAC ASA Status:  2          Anesthesia Type: MAC  Last vitals  BP   104/65 (10/15/19 0835)   Temp   97.3 °F (36.3 °C) (10/15/19 0805)   Pulse   56 (10/15/19 0835)   Resp   16 (10/15/19 0835)     SpO2   97 % (10/15/19 0835)     Post Anesthesia Care and Evaluation    Patient location during evaluation: bedside  Patient participation: complete - patient participated  Level of consciousness: awake  Pain score: 0  Pain management: adequate  Airway patency: patent  Anesthetic complications: No anesthetic complications  PONV Status: controlled  Cardiovascular status: acceptable and stable  Respiratory status: acceptable and room air  Hydration status: acceptable

## 2019-10-15 NOTE — INTERVAL H&P NOTE
"  H&P reviewed. The patient was examined and there are no changes to the H&P.       No recent shortness of breath or chest pains.      Blood pressure 118/78, pulse 69, temperature 98 °F (36.7 °C), temperature source Tympanic, resp. rate 17, height 161.3 cm (63.5\"), weight 81.2 kg (179 lb), last menstrual period 06/26/2018, SpO2 99 %, not currently breastfeeding.    Chest: clear to auscultation.  Cardiac exam: No S3 no murmurs.   Abdomen: soft bowel sounds present nondistended nontender.        "

## 2019-10-15 NOTE — ANESTHESIA PREPROCEDURE EVALUATION
Anesthesia Evaluation     Patient summary reviewed and Nursing notes reviewed   no history of anesthetic complications:  NPO Solid Status: > 8 hours  NPO Liquid Status: > 8 hours           Airway   Mallampati: I  TM distance: >3 FB  Neck ROM: full  no difficulty expected  Dental - normal exam     Pulmonary - normal exam   (+) recent URI,   Cardiovascular - negative cardio ROS and normal exam        Neuro/Psych- negative ROS  GI/Hepatic/Renal/Endo    (+)  GERD,  hyperthyroidism    Musculoskeletal (-) negative ROS    Abdominal    Substance History - negative use     OB/GYN negative ob/gyn ROS         Other - negative ROS                       Anesthesia Plan    ASA 2     MAC     intravenous induction   Anesthetic plan, all risks, benefits, and alternatives have been provided, discussed and informed consent has been obtained with: patient.

## 2019-10-15 NOTE — DISCHARGE INSTRUCTIONS
No pushing, pulling, tugging, heavy lifting, or strenuous activity.  No major decision making, driving, or drinking alcoholic beverages for 24 hours. (due to the medications you have received)  Always use good hand hygiene/washing techniques.  NO driving while taking pain medications.      To assist you in voiding:  Drink plenty of fluids  Listen to running water while attempting to void.    If you are unable to urinate and you have an uncomfortable urge to void or it has been   6 hours since you were discharged, return to the Emergency Room    ********************************************************************    Postprocedure instructions:  1. Nothing by mouth until fully alert and as specified below .  2. Bedrest until fully alert.  3. Vital signs as routine.    Diet:   1. Nothing by mouth for 60 minutes, then if no chest pains the patient may have Clear liquids diet (No Sodas) for 4 hours.  2. May advance to soft diet in 4 hours if no chest pains, Fever or chills, nausea vomiting or bleeding.    Other instructions:  1. The patient may eat in upright position, chew well, take small bites and take medications in upright position.   2. The patient should drink water after 3-4 bites, and liberally with medications.    3. The patient should remain upright for about 10 minutes after eating and taking oral medications.    Blood Thinner and other medications Directions:  Avoid Aspirin & other NSAIDS for 3 days.  Tylenol is okay.    Other instructions:  The patient should avoid medications that have a potential to cause pill esophagitis including potassium pills, tetracycline capsules, iron pills, NSAIDs and bisphosphonates.    Follow-up:    DR. HANNA MCKNIGHT in 4 weeks.Office phone # [phone].    ****************************************************************************************************    Notes to the patient and the family from Dr. Mcknight.     Dear patient/family member,    Findings on today's procedure  are as follows:    1. Esophagitis. Inflammation of the esophagus.  2. Esophageal rings.  These areas were stretched.   3. Inflammation of the stomach.  Erosive gastritis.  4. Small sliding hiatal hernia.  5. No cancer. No active ulcers.    6. Bulbar duodenitis.  Inflammation of the top portion of the small intestine.    7. Small nodule in the lower portion of the stomach.  This was biopsied.      Recommendations:    1. Protonix (Pantoprazole) tablet 40 mg tablet. Take 1 tablet orally in the morning half an hour before eating every day.  2. Other instructions as above.  3. Discontinue Pepcid-famotidine.  Zofran 4 mg tablet.  1-to 3 times a day by mouth as needed for nausea.    Should you have more questions please do not hesitate to talk to the nurse who can call me and let me talk to you.     I hope you feel better.    Perfecto Mcknight M.D., FACP, FACG.

## 2019-10-15 NOTE — OP NOTE
PREOPERATIVE DIAGNOSIS: Right upper eyelid ptosis.   POSTOPERATIVE DIAGNOSIS:  Right upper eyelid ptosis.   PROCEDURE:Right upper eyelid ptosis repair by Raines's muscle conjunctival resection.   SURGEON: Leo Barba MD  ASSISTANT: Kelsi Harvey MD  ANESTHESIA: Monitored with local infiltration of 1% lidocaine with epinephrine.   COMPLICATIONS: None.   ESTIMATED BLOOD LOSS: Less than 5 cc.   HISTORY: Yuko Arambula presented with upper lid ptosis interfering with the superior visual field and activities of daily living. After the risks, benefits and alternatives to the proposed procedure were explained, informed consent was obtained.   PROCEDURE: The patient was brought to the operating room and placed supine on the operating table. IV sedation was given. The right upper eyelid was infiltrated with local anesthetic and  was prepped and draped in the typical sterile ophthalmic fashion. Atttention was directed to the right  side.  A 4-0 silk suture was placed through the eyelid margin and the eyelid everted over a Desmarres retractor. A 6-0 silk suture was then threaded through the conjunctiva and Raines's muscle 4.5 mm from the superior tarsal border. These sutures were used to elevate the conjunctiva and Raines's muscle which was gently peeled from the underlying levator muscle. The Putterman clamp was used and clamped over the elevated tissues. A 6-0 plain gut suture was run in a horizontal mattress fashion 1 mm below the clamp from lateral to medial, then medial to lateral. The elevated tissues were excised with a 15 blade. The sutures were then externalized and tied in the lid crease. The 4-0 silk suture was removed. The lid was reverted to its normal position and ophthalmic antibiotic ointment placed in the eye. The patient tolerated the procedure well and left the operating room in stable condition.   LEO BARBA MD      PROCEDURE:  Upper Endoscopy with serial dilation of the esophagus to 18 mm using CRE balloon and biopsies.    DATE OF PROCEDURE: October 15, 2019.    REFERRING PROVIDER:  Megha Fregoso APRN.     INSTRUMENT:  Olympus GIF H 190 video endoscope     INDICATIONS OF THE PROCEDURE: This is a 34-year-old white female is free of recurrent reflux, epigastric abdominal pain, nausea and dysphagia.        BIOPSIES: Second portion of duodenum.  Gastric antrum, body and fundus.  Mid and distal esophagus.  Gastric antral nodule.     MEDICATIONS:  MAC.     PHOTOGRAPHS:  Photographs were included in the medical records.     CONSENT/PREPROCEDURE EVALUATION:  Risks, benefits, alternatives and options of the procedure including risks of anesthesia/sedation were discussed and informed consent was obtained prior to the procedure. History and physical examination were performed and nothing precluded the test.     REPORT:  The patient was placed in left lateral decubitus position. Once under the influence of IV sedation, the instrument was inserted into the mouth and esophagus was intubated under direct vision without difficulty.    Visualized portions of the laryngopharyngeal areas including partial view of the vocal cords did not reveal significant pathology.    Esophagus:  Z line was noted to be around 36 cm.  Erythematous distal esophagitis.  Subtle concentric rings were noted within the mid and distal esophagus.  Biopsies were obtained.  Proximal, A small sliding hiatal hernia less than 3 cm was noted.  No Malcolm's esophagus was seen.     Stomach:  Antrum:  Erythematous-erosive gastritis.  Antral deformity was seen.  6 to 7 mm rather formed gastric antral nodule is noted.  Biopsies were obtained from the gastric antral nodule.  Angulus, lesser and greater curves: Normal.  Retroflex examination: Sliding hiatal hernia.  Cardia and fundus: Erythematous-erosive gastritis.     Body of the stomach: Erythematous-erosive gastritis.   Good distensibility of the stomach was achieved no giant folds were noted.  Erosive Biopsies were obtained from the gastric antrum,  body and fundus.    Pylorus and pyloric channel:  normal.     Duodenum:  Bulb: Mild erythematous bulbar duodenitis.  Second portion: normal.  No scalloping was seen in the second portion of duodenum.  Biopsies were obtained from the second portion of duodenum.    Intervention: Distal, mid and more proximal esophagus were dilated using 15-16.5-18 mm CRE balloon to 18 mm under vision without difficulty.  No active bleeding was noted.       The upper GI tract was decompressed and the scope was pulled out of the patient. The patient tolerated the procedure well.     DIAGNOSES:     1. Erythematous distal esophagitis.  Subtle concentric rings involving the proximal mid and distal esophagus.  Status post dilation to 18 mm.  2. Small sliding hiatal hernia less than 3 cm.  3. Erythematous-erosive gastritis.  4. Gastric antral deformity.  5. Gastric antral nodule.  6. Erythematous bulbar duodenitis.    RECOMMENDATIONS:  1.  Dietary instructions.  2.  Nexium (esomeprazole) capsule 40 mg. Take one capsule orally in the morning half an hour before eating daily.  3.  Follow biopsies.  4.  Follow up in office.     Thank you very much for letting me participate in the care of this patient. Please do not hesitate to call me if you have any questions.

## 2019-10-16 ENCOUNTER — OFFICE VISIT (OUTPATIENT)
Dept: SURGERY | Facility: CLINIC | Age: 34
End: 2019-10-16

## 2019-10-16 VITALS
OXYGEN SATURATION: 98 % | BODY MASS INDEX: 30.64 KG/M2 | HEART RATE: 62 BPM | DIASTOLIC BLOOD PRESSURE: 70 MMHG | WEIGHT: 179.5 LBS | SYSTOLIC BLOOD PRESSURE: 110 MMHG | TEMPERATURE: 98.2 F | HEIGHT: 64 IN

## 2019-10-16 DIAGNOSIS — R11.0 NAUSEA: Primary | ICD-10-CM

## 2019-10-16 DIAGNOSIS — R10.13 EPIGASTRIC PAIN: ICD-10-CM

## 2019-10-16 DIAGNOSIS — R11.0 NAUSEA: ICD-10-CM

## 2019-10-16 DIAGNOSIS — K21.00 GASTROESOPHAGEAL REFLUX DISEASE WITH ESOPHAGITIS: Primary | ICD-10-CM

## 2019-10-16 DIAGNOSIS — K21.9 GASTROESOPHAGEAL REFLUX DISEASE WITHOUT ESOPHAGITIS: ICD-10-CM

## 2019-10-16 PROCEDURE — 99243 OFF/OP CNSLTJ NEW/EST LOW 30: CPT | Performed by: SURGERY

## 2019-10-16 NOTE — PROGRESS NOTES
Patient: Laura Thomson    YOB: 1985    Date: 10/16/2019    Primary Care Provider: Megha Fregoso APRN    Reason for Consultation:Epigastric pain, GERD    Chief Complaint   Patient presents with   • Heartburn       SUBJECTIVE:    History of present illness:  I saw the patient in the office  today as a consultation for evaluation and treatment of ***    The following portions of the patient's history were reviewed and updated as appropriate: allergies, current medications, past family history, past medical history, past social history, past surgical history and problem list.      Review of Systems    History:  Past Medical History:   Diagnosis Date   • Abdominal pain, periumbilical    • Abdominal pain, RLQ (right lower quadrant)    • Acute pharyngitis    • Anxiety    • Appetite lost    • Chest pain    • Colon polyp 2017   • Depression     patient denies   • Diarrhea     Secondary to IBS   • Difficulty swallowing    • Dysphagia     Patient reported she is noticing that she gets choked easily   • Fatigue    • Fracture     RIGHT WRIST TWICE, LEFT HAND   • GERD (gastroesophageal reflux disease)    • Hearing loss     No use of hearing aids   • History of bronchitis    • History of colonoscopy    • History of ovarian cyst    • History of pneumonia    • Hyperthyroidism     hyperactive taken off medicine when pregnant.  Reported she was medication at one time, but none since pregnancy.    • Ovarian cyst, bilateral    • Seasonal allergies    • TMJ (dislocation of temporomandibular joint)    • Upper respiratory infection    • Vomiting        Past Surgical History:   Procedure Laterality Date   •  SECTION  2008    DR NAVARRETE   •  SECTION  10/30/2015    DR VASQUEZ   •  SECTION  2010    DR ESPINOZA   • CHOLECYSTECTOMY     • COLONOSCOPY     • COLONOSCOPY N/A 2017    Procedure: COLONOSCOPY WITH COLD BIOPSY POLYPECTOMY; BIOPSIES , QUICK CLIP;  Surgeon:  Perfecto Mcknight MD;  Location: [facility] ENDOSCOPY;  Service:    • DIAGNOSTIC LAPAROSCOPY  07/07/2005    DR ESPINOZA   • DIAGNOSTIC LAPAROSCOPY  02/14/2012    DR ESPINOZA   • DIAGNOSTIC LAPAROSCOPY N/A 8/10/2017    Procedure: DIAGNOSTIC LAPAROSCOPY, LEFT SALPINGO-OOPHORECTOMY;  Surgeon: Keren Espinoza MD;  Location: [facility] OR;  Service:    • ENDOSCOPY N/A 10/15/2019    Procedure: ESOPHAGOGASTRODUODENOSCOPY WITH BIOPSY AND ESOPHAGEAL DILATATION;  Surgeon: Perfecto Mcknight MD;  Location: [facility] ENDOSCOPY;  Service: Gastroenterology   • LYSIS OF ABDOMINAL ADHESIONS  07/07/2005    DR ESPINOZA   • LYSIS OF ABDOMINAL ADHESIONS  02/14/2012    DR ESPINOZA   • NISSEN FUNDOPLICATION     • OVARIAN CYST REMOVAL  07/07/2005    DR ESPINOZA   • SALPINGO OOPHORECTOMY Left 08/2017   • STOMACH SURGERY  2014    PATIENT REPORTS STOMACH WAS WRAPPED AROUND ESOPHAGUS   • UPPER GASTROINTESTINAL ENDOSCOPY  2013   • VAGINAL HYSTERECTOMY N/A 7/9/2018    Procedure: VAGINAL HYSTERECTOMY, BILATERAL SALPINGECTOMY;  Surgeon: Luc Alicea MD;  Location: [facility] OR;  Service: Gynecology   • WISDOM TOOTH EXTRACTION         Family History   Problem Relation Age of Onset   • Coronary artery disease Mother    • Diabetes Mother    • Hypertension Mother    • Kidney disease Father    • Skin cancer Father    • Stroke Other    • Hypertension Other    • Diabetes Other    • Colon cancer Other        Social History     Tobacco Use   • Smoking status: Never Smoker   • Smokeless tobacco: Never Used   Substance Use Topics   • Alcohol use: No   • Drug use: No       Allergies:  No Known Allergies    Medications:    Current Outpatient Medications:   •  albuterol sulfate  (90 Base) MCG/ACT inhaler, Inhale 2 puffs Every 6 (Six) Hours As Needed for Wheezing., Disp: 1 inhaler, Rfl: 0  •  cetirizine (zyrTEC) 10 MG tablet, Take 1 tablet by mouth Daily., Disp: 30 tablet, Rfl: 1  •  cholestyramine light (PREVALITE) 4 GM/DOSE powder, Take 1/2-1 scoop daily in 8 oz of noncarbonated beverage for  "diarrhea. Adjust to have 1-2 soft bowel movements daily. (Patient taking differently: Take  by mouth Take As Directed. Take 1/2-1 scoop daily in 8 oz of noncarbonated beverage for diarrhea. Adjust to have 1-2 soft bowel movements daily.), Disp: 239.4 g, Rfl: 3  •  esomeprazole (nexIUM) 40 MG capsule, Take 1 capsule by mouth 30 minutes before breakfast daily., Disp: 30 capsule, Rfl: 3  •  fluticasone (FLONASE) 50 MCG/ACT nasal spray, 2 sprays into the nostril(s) as directed by provider Daily., Disp: 15.8 mL, Rfl: 1  •  hydrOXYzine (ATARAX) 25 MG tablet, Take 1 tablet by mouth At Night As Needed for Anxiety., Disp: 30 tablet, Rfl: 1  •  ondansetron ODT (ZOFRAN-ODT) 4 MG disintegrating tablet, Take 1 tablet by mouth Every 8 (Eight) Hours As Needed for Nausea or Vomiting., Disp: 30 tablet, Rfl: 1    OBJECTIVE:    Vital Signs:   Vitals:    10/16/19 1306   BP: 110/70   Pulse: 62   Temp: 98.2 °F (36.8 °C)   SpO2: 98%   Weight: 81.4 kg (179 lb 8 oz)   Height: 161.3 cm (63.5\")       Physical Exam:   General Appearance:    Alert, cooperative, in no acute distress   Head:    Normocephalic, without obvious abnormality, atraumatic   Eyes:            Lids and lashes normal, conjunctivae and sclerae normal, no   icterus, no pallor, corneas clear, PERRLA   Ears:    Ears appear intact with no abnormalities noted   Throat:   No oral lesions, no thrush, oral mucosa moist   Neck:   No adenopathy, supple, trachea midline, no thyromegaly, no   carotid bruit, no JVD   Lungs:     Clear to auscultation,respirations regular, even and                  unlabored    Heart:    Regular rhythm and normal rate, normal S1 and S2, no            murmur, no gallop, no rub, no click   Chest Wall:    No abnormalities observed   Abdomen:     Normal bowel sounds, no masses, no organomegaly, soft        non-tender, non-distended, no guarding, there is evidence of epigastric  tenderness   Extremities:   Moves all extremities well, no edema, no cyanosis, no   " "          redness   Pulses:   Pulses palpable and equal bilaterally   Skin:   No bleeding, bruising or rash   Lymph nodes:   No palpable adenopathy   Neurologic:   Cranial nerves 2 - 12 grossly intact, sensation intact     Results Review:   {Results Review:[id number]::\"I reviewed the patient's new clinical results.\"}    Review of Systems was reviewed and confirmed as accurate today.    ASSESSMENT/PLAN:    No diagnosis found.    I did have a detailed and extensive discussion with the patient in the office and they understand that they need to undergo upper endoscopy. Full risks and benefits of operative versus nonoperative intervention were discussed with the patient and these include bleeding and esophageal injury. The patient understands, agrees, and wishes to proceed with the surgical treatment plan as mentioned above. The patient had no questions for me at the end of the discussion.       I discussed the patients findings and my recommendations with patient.     Electronically signed by Tania Cortés MA  10/16/19 1:07 PM                "

## 2019-10-16 NOTE — PROGRESS NOTES
Patient: Laura Thomson    YOB: 1985    Date: 10/16/2019    Primary Care Provider: Megha Fregoso APRN    Chief Complaint   Patient presents with   • Heartburn       SUBJECTIVE:    History of present illness:  I saw the patient in the office today for an evaluation and consultation GERD.  Patient has a significant history of acid reflux.  She had a lap nissen done more than 5 years ago.  She complains of reflux that has become constant with epigastric pressure and burning. Patient had an EGD with Dr Mcknight 10/15/19.    She did have a recent upper endoscopy performed yesterday and did have esophageal dilation performed of the mid, upper, and lower aspects of the esophagus as per the operative report.  She does not really complain of excessive reflux but rather burning type discomfort in the midepigastric region associated with abdominal bloating.  Nothing seems to make this better or worse, she has been started on Nexium recently.    The following portions of the patient's history were reviewed and updated as appropriate: allergies, current medications, past family history, past medical history, past social history, past surgical history and problem list.       Review of Systems   Constitutional: Negative for chills, fever and unexpected weight change.   HENT: Negative for hearing loss, trouble swallowing and voice change.    Eyes: Negative for visual disturbance.   Respiratory: Negative for apnea, cough, chest tightness, shortness of breath and wheezing.    Cardiovascular: Negative for chest pain, palpitations and leg swelling.   Gastrointestinal: Positive for abdominal distention. Negative for abdominal pain, anal bleeding, blood in stool, constipation, diarrhea, nausea, rectal pain and vomiting.   Endocrine: Negative for cold intolerance and heat intolerance.   Genitourinary: Negative for difficulty urinating, dysuria and flank pain.   Musculoskeletal: Negative for back pain and gait  problem.   Skin: Negative for color change, rash and wound.   Neurological: Negative for dizziness, syncope, speech difficulty, weakness, light-headedness, numbness and headaches.   Hematological: Negative for adenopathy. Does not bruise/bleed easily.   Psychiatric/Behavioral: Negative for confusion. The patient is not nervous/anxious.        Allergies:  No Known Allergies    Medications:    Current Outpatient Medications:   •  albuterol sulfate  (90 Base) MCG/ACT inhaler, Inhale 2 puffs Every 6 (Six) Hours As Needed for Wheezing., Disp: 1 inhaler, Rfl: 0  •  cetirizine (zyrTEC) 10 MG tablet, Take 1 tablet by mouth Daily., Disp: 30 tablet, Rfl: 1  •  cholestyramine light (PREVALITE) 4 GM/DOSE powder, Take 1/2-1 scoop daily in 8 oz of noncarbonated beverage for diarrhea. Adjust to have 1-2 soft bowel movements daily. (Patient taking differently: Take  by mouth Take As Directed. Take 1/2-1 scoop daily in 8 oz of noncarbonated beverage for diarrhea. Adjust to have 1-2 soft bowel movements daily.), Disp: 239.4 g, Rfl: 3  •  esomeprazole (nexIUM) 40 MG capsule, Take 1 capsule by mouth 30 minutes before breakfast daily., Disp: 30 capsule, Rfl: 3  •  fluticasone (FLONASE) 50 MCG/ACT nasal spray, 2 sprays into the nostril(s) as directed by provider Daily., Disp: 15.8 mL, Rfl: 1  •  hydrOXYzine (ATARAX) 25 MG tablet, Take 1 tablet by mouth At Night As Needed for Anxiety., Disp: 30 tablet, Rfl: 1  •  ondansetron ODT (ZOFRAN-ODT) 4 MG disintegrating tablet, Take 1 tablet by mouth Every 8 (Eight) Hours As Needed for Nausea or Vomiting., Disp: 30 tablet, Rfl: 1    History:  Past Medical History:   Diagnosis Date   • Abdominal pain, periumbilical    • Abdominal pain, RLQ (right lower quadrant)    • Acute pharyngitis    • Anxiety    • Appetite lost    • Chest pain    • Colon polyp 02/17/2017   • Depression     patient denies   • Diarrhea     Secondary to IBS   • Difficulty swallowing    • Dysphagia     Patient reported she  is noticing that she gets choked easily   • Fatigue    • Fracture     RIGHT WRIST TWICE, LEFT HAND   • GERD (gastroesophageal reflux disease)    • Hearing loss     No use of hearing aids   • History of bronchitis    • History of colonoscopy    • History of ovarian cyst    • History of pneumonia    • Hyperthyroidism     hyperactive taken off medicine when pregnant.  Reported she was medication at one time, but none since pregnancy.    • Ovarian cyst, bilateral    • Seasonal allergies    • TMJ (dislocation of temporomandibular joint)    • Upper respiratory infection    • Vomiting        Past Surgical History:   Procedure Laterality Date   •  SECTION  2008    DR NAVARRETE   •  SECTION  10/30/2015    DR VASQUEZ   •  SECTION  2010    DR ESPINOZA   • CHOLECYSTECTOMY     • COLONOSCOPY     • COLONOSCOPY N/A 2017    Procedure: COLONOSCOPY WITH COLD BIOPSY POLYPECTOMY; BIOPSIES , QUICK CLIP;  Surgeon: Perfecto Mcknight MD;  Location: [facility] ENDOSCOPY;  Service:    • DIAGNOSTIC LAPAROSCOPY  2005    DR ESPINOZA   • DIAGNOSTIC LAPAROSCOPY  2012    DR ESPINOZA   • DIAGNOSTIC LAPAROSCOPY N/A 8/10/2017    Procedure: DIAGNOSTIC LAPAROSCOPY, LEFT SALPINGO-OOPHORECTOMY;  Surgeon: Keren Espinoza MD;  Location: [facility] OR;  Service:    • ENDOSCOPY N/A 10/15/2019    Procedure: ESOPHAGOGASTRODUODENOSCOPY WITH BIOPSY AND ESOPHAGEAL DILATATION;  Surgeon: Perfecto Mcknight MD;  Location: [facility] ENDOSCOPY;  Service: Gastroenterology   • LYSIS OF ABDOMINAL ADHESIONS  2005    DR ESPINOZA   • LYSIS OF ABDOMINAL ADHESIONS  2012    DR ESPINOZA   • NISSEN FUNDOPLICATION     • OVARIAN CYST REMOVAL  2005    DR ESPINOZA   • SALPINGO OOPHORECTOMY Left 2017   • STOMACH SURGERY      PATIENT REPORTS STOMACH WAS WRAPPED AROUND ESOPHAGUS   • UPPER GASTROINTESTINAL ENDOSCOPY     • VAGINAL HYSTERECTOMY N/A 2018    Procedure: VAGINAL HYSTERECTOMY, BILATERAL SALPINGECTOMY;  Surgeon: Luc Alicea MD;   "Location: [facility] OR;  Service: Gynecology   • WISDOM TOOTH EXTRACTION         Family History   Problem Relation Age of Onset   • Coronary artery disease Mother    • Diabetes Mother    • Hypertension Mother    • Kidney disease Father    • Skin cancer Father    • Stroke Other    • Hypertension Other    • Diabetes Other    • Colon cancer Other        Social History     Tobacco Use   • Smoking status: Never Smoker   • Smokeless tobacco: Never Used   Substance Use Topics   • Alcohol use: No   • Drug use: No        OBJECTIVE:    Vital Signs:   Vitals:    10/16/19 1306   BP: 110/70   Pulse: 62   Temp: 98.2 °F (36.8 °C)   SpO2: 98%   Weight: 81.4 kg (179 lb 8 oz)   Height: 161.3 cm (63.5\")       Physical Exam:   General Appearance:    Alert, cooperative, in no acute distress   Head:    Normocephalic, without obvious abnormality, atraumatic   Eyes:            Lids and lashes normal, conjunctivae and sclerae normal, no   icterus, no pallor, corneas clear, PERRLA   Ears:    Ears appear intact with no abnormalities noted   Throat:   No oral lesions, no thrush, oral mucosa moist   Neck:   No adenopathy, supple, trachea midline, no thyromegaly, no   carotid bruit, no JVD   Lungs:     Clear to auscultation,respirations regular, even and                  unlabored    Heart:    Regular rhythm and normal rate, normal S1 and S2, no            murmur, no gallop, no rub, no click   Chest Wall:    No abnormalities observed   Abdomen:     Normal bowel sounds, no masses, no organomegaly, soft        non-tender, non-distended, no guarding, no rebound                tenderness, well-healed incisions   Extremities:   Moves all extremities well, no edema, no cyanosis, no             redness   Pulses:   Pulses palpable and equal bilaterally   Skin:   No bleeding, bruising or rash   Lymph nodes:   No palpable adenopathy   Neurologic:   Cranial nerves 2 - 12 grossly intact, sensation intact, DTR       present and equal bilaterally     Results " Review:   I reviewed the patient's new clinical results.  I reviewed the patient's new imaging results and agree with the interpretation.  I reviewed the patient's other test results and agree with the interpretation     I did review the patient's recent op notes and previous work-up.    ASSESSMENT/PLAN:    1. Gastroesophageal reflux disease with esophagitis    2. Epigastric pain    3. Nausea        I did have a detailed discussion with the patient and her family in the office today.  I think the patient needs to have a barium swallow and upper GI performed and she may or may not need a CT scan of the chest to ascertain the degree of hiatal hernia present.  I am not convinced that her symptomatology is related to failed Nissen fundoplication, I do not know if she is going to require further surgical intervention.  I will see her back in the office in 2 weeks, I have asked her to stay compliant on her current prescription of Nexium.    I discussed the patients findings and my recommendations with patient and family.    Review of Systems was reviewed and confirmed as accurate today.    Electronically signed by Franck King MD  10/16/19

## 2019-10-18 ENCOUNTER — HOSPITAL ENCOUNTER (OUTPATIENT)
Dept: GENERAL RADIOLOGY | Facility: HOSPITAL | Age: 34
Discharge: HOME | End: 2019-10-18

## 2019-10-18 ENCOUNTER — HOSPITAL ENCOUNTER (OUTPATIENT)
Dept: GENERAL RADIOLOGY | Facility: HOSPITAL | Age: 34
Discharge: HOME | End: 2019-10-18
Admitting: SURGERY

## 2019-10-18 DIAGNOSIS — R10.13 EPIGASTRIC PAIN: ICD-10-CM

## 2019-10-18 DIAGNOSIS — K21.9 GASTROESOPHAGEAL REFLUX DISEASE WITHOUT ESOPHAGITIS: ICD-10-CM

## 2019-10-18 DIAGNOSIS — K21.00 GASTROESOPHAGEAL REFLUX DISEASE WITH ESOPHAGITIS: ICD-10-CM

## 2019-10-18 DIAGNOSIS — R11.0 NAUSEA: ICD-10-CM

## 2019-10-18 LAB
LAB AP CASE REPORT: NORMAL
PATH REPORT.FINAL DX SPEC: NORMAL

## 2019-10-18 PROCEDURE — 74246 X-RAY XM UPR GI TRC 2CNTRST: CPT

## 2019-10-28 ENCOUNTER — OFFICE VISIT (OUTPATIENT)
Dept: SURGERY | Facility: CLINIC | Age: 34
End: 2019-10-28

## 2019-10-28 VITALS
BODY MASS INDEX: 30.56 KG/M2 | SYSTOLIC BLOOD PRESSURE: 124 MMHG | WEIGHT: 179 LBS | HEIGHT: 64 IN | HEART RATE: 71 BPM | TEMPERATURE: 98.2 F | DIASTOLIC BLOOD PRESSURE: 76 MMHG

## 2019-10-28 DIAGNOSIS — K21.00 GASTROESOPHAGEAL REFLUX DISEASE WITH ESOPHAGITIS: ICD-10-CM

## 2019-10-28 DIAGNOSIS — R10.13 EPIGASTRIC PAIN: Primary | ICD-10-CM

## 2019-10-28 PROCEDURE — 99213 OFFICE O/P EST LOW 20 MIN: CPT | Performed by: SURGERY

## 2019-10-28 NOTE — PROGRESS NOTES
"Patient: Laura Thomson    YOB: 1985    Date: 10/28/2019    Primary Care Provider: Megha Fregoso APRN    Chief Complaint   Patient presents with   • Follow-up     UGI        History: I saw the patient in the office today for an evaluation and follow up from her recent UGI that did show gastroesophageal reflux to the mid esophagus. She had an EGD with Dr. Mcknight on 10/15/19 and has recently started Nexium. She complains of burning in the midepigastric region associated with abdominal bloating. She states Nexium is not helping.     Barium swallow showed slight reflux, there was no evidence of recurrent hiatal hernia.  It does seem that the wrap was still intact.    The following portions of the patient's history were reviewed and updated as appropriate: allergies, current medications, past family history, past medical history, past social history, past surgical history and problem list.      Review of Systems   Constitutional: Negative for chills, fatigue and fever.   Respiratory: Negative for cough.    Cardiovascular: Negative for chest pain.   Gastrointestinal: Positive for abdominal distention and abdominal pain. Negative for diarrhea, nausea and vomiting.       Vital Signs  Vitals:    10/28/19 0851   BP: 124/76   Pulse: 71   Temp: 98.2 °F (36.8 °C)   Weight: 81.2 kg (179 lb)   Height: 161.3 cm (63.5\")       Allergies:  No Known Allergies    Medications:    Current Outpatient Medications:   •  albuterol sulfate  (90 Base) MCG/ACT inhaler, Inhale 2 puffs Every 6 (Six) Hours As Needed for Wheezing., Disp: 1 inhaler, Rfl: 0  •  cetirizine (zyrTEC) 10 MG tablet, Take 1 tablet by mouth Daily., Disp: 30 tablet, Rfl: 1  •  cholestyramine light (PREVALITE) 4 GM/DOSE powder, Take 1/2-1 scoop daily in 8 oz of noncarbonated beverage for diarrhea. Adjust to have 1-2 soft bowel movements daily. (Patient taking differently: Take  by mouth Take As Directed. Take 1/2-1 scoop daily in 8 oz of " noncarbonated beverage for diarrhea. Adjust to have 1-2 soft bowel movements daily.), Disp: 239.4 g, Rfl: 3  •  esomeprazole (nexIUM) 40 MG capsule, Take 1 capsule by mouth 30 minutes before breakfast daily., Disp: 30 capsule, Rfl: 3  •  fluticasone (FLONASE) 50 MCG/ACT nasal spray, 2 sprays into the nostril(s) as directed by provider Daily., Disp: 15.8 mL, Rfl: 1  •  hydrOXYzine (ATARAX) 25 MG tablet, Take 1 tablet by mouth At Night As Needed for Anxiety., Disp: 30 tablet, Rfl: 1  •  ondansetron ODT (ZOFRAN-ODT) 4 MG disintegrating tablet, Take 1 tablet by mouth Every 8 (Eight) Hours As Needed for Nausea or Vomiting., Disp: 30 tablet, Rfl: 1    Physical Exam:   General Appearance:    Alert, cooperative, in no acute distress   Head:    Normocephalic, without obvious abnormality, atraumatic   Lungs:     Clear to auscultation,respirations regular, even and                  unlabored    Heart:    Regular rhythm and normal rate, normal S1 and S2, no            murmur, no gallop, no rub, no click   Abdomen:     Normal bowel sounds, no masses, no organomegaly, soft        non-tender, non-distended, no guarding, no rebound                tenderness, no evidence of peritoneal signs   Extremities:   Moves all extremities well, no edema, no cyanosis, no             redness   Pulses:   Pulses palpable and equal bilaterally   Skin:   No bleeding, bruising or rash       Results Review:   I reviewed the patient's new clinical results.  I reviewed the patient's new imaging results and agree with the interpretation.  I reviewed the patient's other test results and agree with the interpretation     ASSESSMENT/PLAN:    1. Epigastric pain    2. Gastroesophageal reflux disease with esophagitis      I did have a detailed discussion with the patient in the office today.  She states that she takes 3 Nexium's per day and this does not seem to help her symptomatology and I really wonder if this is something else other than reflux that is  causing her symptoms, Nexium should be able to handle her symptoms related to any reflux that is occurring.  I want to see her back in the office in 3 weeks and we will perform ultrasound of the incisions to make sure she does not have an incisional hernia, review of her barium swallow was actually encouraging and that she does not have a recurrent hiatal hernia and her wrap seems to be intact.    Electronically signed by Franck King MD  10/28/19    Portions of this note has been scribed for Franck King MD by Johana Jackson. 10/28/2019  10:00 AM

## 2019-11-15 NOTE — PROGRESS NOTES
"Patient: Laura Thomson    YOB: 1985    Date: 11/18/2019    Primary Care Provider: Megha Fregoso APRN    Chief Complaint   Patient presents with   • Follow-up     reflux       History: Patient is in the office today for an evaluation and follow up of reflux. Patient has had a recent UGI that did show gastroesophageal reflux to the mid esophagus. She has also had a previous EGD with Dr. Mcknight on 10/15/19. She complains of dysphagia and states it has worsened since her last visit. She states the reflux has not worsened or improved.     She also has pain at the periumbilical region at the area of previous incision.  This pain is sharp in nature, associated with a palpable mass, worse with heavy lifting, worse with standing, not relieved, present on many months, non-radiating.    The following portions of the patient's history were reviewed and updated as appropriate: allergies, current medications, past family history, past medical history, past social history, past surgical history and problem list.      Review of Systems   Constitutional: Negative for chills, fatigue and fever.   HENT: Positive for trouble swallowing.    Respiratory: Negative for cough.    Cardiovascular: Negative for chest pain.   Gastrointestinal: Negative for abdominal pain, diarrhea, nausea and vomiting.       Vital Signs  Vitals:    11/18/19 0908   BP: 118/70   Pulse: 84   Temp: 98.3 °F (36.8 °C)   SpO2: 99%   Weight: 81.2 kg (179 lb)   Height: 161.3 cm (63.5\")       Allergies:  No Known Allergies    Medications:    Current Outpatient Medications:   •  albuterol sulfate  (90 Base) MCG/ACT inhaler, Inhale 2 puffs Every 6 (Six) Hours As Needed for Wheezing., Disp: 1 inhaler, Rfl: 0  •  cetirizine (zyrTEC) 10 MG tablet, Take 1 tablet by mouth Daily., Disp: 30 tablet, Rfl: 1  •  cholestyramine light (PREVALITE) 4 GM/DOSE powder, Take 1/2-1 scoop daily in 8 oz of noncarbonated beverage for diarrhea. Adjust to have " 1-2 soft bowel movements daily. (Patient taking differently: Take  by mouth Take As Directed. Take 1/2-1 scoop daily in 8 oz of noncarbonated beverage for diarrhea. Adjust to have 1-2 soft bowel movements daily.), Disp: 239.4 g, Rfl: 3  •  esomeprazole (nexIUM) 40 MG capsule, Take 1 capsule by mouth 30 minutes before breakfast daily., Disp: 30 capsule, Rfl: 3  •  fluticasone (FLONASE) 50 MCG/ACT nasal spray, 2 sprays into the nostril(s) as directed by provider Daily., Disp: 15.8 mL, Rfl: 1  •  hydrOXYzine (ATARAX) 25 MG tablet, Take 1 tablet by mouth At Night As Needed for Anxiety., Disp: 30 tablet, Rfl: 1  •  ondansetron ODT (ZOFRAN-ODT) 4 MG disintegrating tablet, Take 1 tablet by mouth Every 8 (Eight) Hours As Needed for Nausea or Vomiting., Disp: 30 tablet, Rfl: 1    Physical Exam:   General Appearance:    Alert, cooperative, in no acute distress   Head:    Normocephalic, without obvious abnormality, atraumatic   Lungs:     Clear to auscultation,respirations regular, even and                  unlabored    Heart:    Regular rhythm and normal rate, normal S1 and S2, no            murmur, no gallop, no rub, no click   Abdomen:     Normal bowel sounds, no masses, no organomegaly, soft        non-tender, non-distended, no guarding, no rebound                Tenderness, tender to deep palpation at the periumbilical region with a palpable mass consistent with incisional hernia that is incarcerated   Extremities:   Moves all extremities well, no edema, no cyanosis, no             redness   Pulses:   Pulses palpable and equal bilaterally   Skin:   No bleeding, bruising or rash       Results Review:   I reviewed the patient's new clinical results.  I reviewed the patient's new imaging results and agree with the interpretation.  I reviewed the patient's other test results and agree with the interpretation     ASSESSMENT/PLAN:    1. Gastroesophageal reflux disease, esophagitis presence not specified    2. Dysphagia,  unspecified type    3. Incisional pain      I did have a clear and detailed discussion with the patient and her mother in the office today.  I think she needs to undergo incisional hernia repair with mesh implantation but I have clearly told the patient that I do not think she needs any further surgical intervention with regards to her previous Nissen fundoplication.    Risk and benefits were discussed with her, she understands and agrees, and wishes to proceed.    Electronically signed by Franck King MD  11/20/19    Portions of this note has been scribed for Franck King MD by Johana Jackson. 11/20/2019  1:53 PM

## 2019-11-18 ENCOUNTER — OFFICE VISIT (OUTPATIENT)
Dept: SURGERY | Facility: CLINIC | Age: 34
End: 2019-11-18

## 2019-11-18 VITALS
SYSTOLIC BLOOD PRESSURE: 118 MMHG | HEIGHT: 64 IN | OXYGEN SATURATION: 99 % | BODY MASS INDEX: 30.56 KG/M2 | WEIGHT: 179 LBS | HEART RATE: 84 BPM | DIASTOLIC BLOOD PRESSURE: 70 MMHG | TEMPERATURE: 98.3 F

## 2019-11-18 DIAGNOSIS — L76.82 INCISIONAL PAIN: ICD-10-CM

## 2019-11-18 DIAGNOSIS — K21.9 GASTROESOPHAGEAL REFLUX DISEASE, ESOPHAGITIS PRESENCE NOT SPECIFIED: Primary | ICD-10-CM

## 2019-11-18 DIAGNOSIS — R13.10 DYSPHAGIA, UNSPECIFIED TYPE: ICD-10-CM

## 2019-11-18 PROCEDURE — 99213 OFFICE O/P EST LOW 20 MIN: CPT | Performed by: SURGERY

## 2019-12-20 ENCOUNTER — OUTSIDE FACILITY SERVICE (OUTPATIENT)
Dept: SURGERY | Facility: CLINIC | Age: 34
End: 2019-12-20

## 2019-12-20 PROCEDURE — 49561 PR REPAIR INCISIONAL HERNIA,STRANG: CPT | Performed by: SURGERY

## 2019-12-20 PROCEDURE — 49568 PR IMPLANT MESH HERNIA REPAIR/DEBRIDEMENT CLOSURE: CPT | Performed by: SURGERY

## 2019-12-26 NOTE — PROGRESS NOTES
"Patient: Laura Thomson    YOB: 1985    Date: 12/30/2019    Primary Care Provider: Megha Fregoso APRN    Chief Complaint   Patient presents with   • Post-op Follow-up     hernia       History of present illness:  I saw the patient in the office today as a followup from their recent incarcerated incisional hernia repair with mesh which was done on 12/20/2019.  They state that they have done well and are having no complaints.    Vital Signs:   Vitals:    12/30/19 0951   BP: 126/70   Pulse: 72   Temp: 98.5 °F (36.9 °C)   SpO2: 98%   Weight: 83 kg (183 lb)   Height: 161.3 cm (63.5\")       Physical Exam:   General Appearance:    Alert, cooperative, in no acute distress   Abdomen:     no masses, no organomegaly, soft non-tender, non-distended, no guarding, wounds are well healed, no evidence of recurrent hernia     Assessment / Plan:    1. Postoperative visit        I did discuss the situation with the patient today in the office and they have done well from their recent herniorraphy.  I have released the patient back to normal activity, they understand that they need to be careful about heavy lifting.  I need to see the patient back in the office only if they are having further problems, they know to call me if they are.    Electronically signed by Franck King MD  12/30/19                "

## 2019-12-30 ENCOUNTER — OFFICE VISIT (OUTPATIENT)
Dept: SURGERY | Facility: CLINIC | Age: 34
End: 2019-12-30

## 2019-12-30 VITALS
WEIGHT: 183 LBS | SYSTOLIC BLOOD PRESSURE: 126 MMHG | DIASTOLIC BLOOD PRESSURE: 70 MMHG | OXYGEN SATURATION: 98 % | HEIGHT: 64 IN | TEMPERATURE: 98.5 F | HEART RATE: 72 BPM | BODY MASS INDEX: 31.24 KG/M2

## 2019-12-30 DIAGNOSIS — Z48.89 POSTOPERATIVE VISIT: Primary | ICD-10-CM

## 2019-12-30 PROCEDURE — 99024 POSTOP FOLLOW-UP VISIT: CPT | Performed by: SURGERY

## 2020-01-07 ENCOUNTER — TELEPHONE (OUTPATIENT)
Dept: INTERNAL MEDICINE | Facility: CLINIC | Age: 35
End: 2020-01-07

## 2020-01-07 RX ORDER — OSELTAMIVIR PHOSPHATE 75 MG/1
75 CAPSULE ORAL DAILY
Qty: 10 CAPSULE | Refills: 0 | Status: SHIPPED | OUTPATIENT
Start: 2020-01-07 | End: 2020-01-17

## 2020-01-07 NOTE — TELEPHONE ENCOUNTER
Patient called in to request Monse be calling in to pharmacy.    Child was just confirmed with the Flu and Tomas suggested that she request RX so as to not catch it herself.     Pharmacy confirmed      Patient requested call to confirm if RX is sent.

## 2020-01-07 NOTE — TELEPHONE ENCOUNTER
Patient informed medication has been sent to the pharmacy.    I did clear this with Dr. Scherer before sending

## 2020-02-13 ENCOUNTER — OFFICE VISIT (OUTPATIENT)
Dept: OBSTETRICS AND GYNECOLOGY | Facility: CLINIC | Age: 35
End: 2020-02-13

## 2020-02-13 VITALS
BODY MASS INDEX: 30.73 KG/M2 | WEIGHT: 180 LBS | DIASTOLIC BLOOD PRESSURE: 80 MMHG | SYSTOLIC BLOOD PRESSURE: 118 MMHG | HEIGHT: 64 IN

## 2020-02-13 DIAGNOSIS — N39.3 SUI (STRESS URINARY INCONTINENCE, FEMALE): ICD-10-CM

## 2020-02-13 DIAGNOSIS — Z01.419 ENCOUNTER FOR GYNECOLOGICAL EXAMINATION WITHOUT ABNORMAL FINDING: Primary | ICD-10-CM

## 2020-02-13 PROCEDURE — 99395 PREV VISIT EST AGE 18-39: CPT | Performed by: OBSTETRICS & GYNECOLOGY

## 2020-02-13 NOTE — PROGRESS NOTES
DataSubjective   Chief Complaint   Patient presents with   • Annual Exam     Laura Thomson is a 34 y.o. year old  who is post-menopausal.  She is S/P hysterectomy presenting to be seen for her annual exam.  This past year she has not been on hormone replacement therapy.  There has not been vaginal bleeding in the last 12 months.  Menopausal symptoms are not present.  Recently had surgery with Dr. King Nissen fundoplication/hiatal hernia repair for reflux.  Updated some of her surgeries which are duplicated; from a GYN standpoint she still has her right ovary no tubes no left ovary or uterus cervix.    SEXUAL Hx:  She is currently sexually active.    [place] is painful: no              She has concerns about domestic violence no    HEALTH Hx:  Level of weekly physical activity: 5 hours watching children  She wears her seat belt: yes.  Self breast awareness: yes  Calcium servings per day: none  Caffeine intake:none for 2 months  Social History     Substance and Sexual Activity   Alcohol Use No                 Social History    Tobacco Use      Smoking status: Never Smoker      Smokeless tobacco: Never Used      The following portions of the patient's history were reviewed and updated as appropriate:problem list, current medications, allergies, past family history, past medical history, past social history and past surgical history    Current Outpatient Medications:   •  albuterol sulfate  (90 Base) MCG/ACT inhaler, Inhale 2 puffs Every 6 (Six) Hours As Needed for Wheezing., Disp: 1 inhaler, Rfl: 0  •  hydrOXYzine (ATARAX) 25 MG tablet, Take 1 tablet by mouth At Night As Needed for Anxiety., Disp: 30 tablet, Rfl: 1    Review of Systems  Constitutional POS: nothing reported    NEG: anorexia, fevers, malaise or night sweats   Genitourinary POS: RITU is present but it IS NOT effecting her ADL's    NEG: dysuria, frequency or hematuria   Gastointestinal POS: nothing reported    NEG: bloating,  "change in bowel habits, melena or reflux symptoms   Breast                       POS: nothing reported  NEG: persistent breast lump, skin dimpling, breast tenderness or nipple discharge                    Objective   /80   Ht 161.3 cm (63.5\")   Wt 81.6 kg (180 lb)   LMP 06/26/2018   Breastfeeding No   BMI 31.39 kg/m²     General:  well developed; well nourished  no acute distress  appears stated age   Skin:  No suspicious lesions seen   Thyroid: not examined   Breasts:  Examined in supine position  Symmetric without masses or skin dimpling  Nipples normal without inversion, lesions or discharge  Fibrocystic changes are present both breasts without a discrete mass   Abdomen: soft, non-tender; no masses  no umbilical or inguinal hernias are present  no hepato-splenomegaly   Pelvis: Clinical staff was present for exam  External genitalia:  normal appearance of the external genitalia including Bartholin's and Argo's glands.  :  urethral meatus normal;  Vaginal:  normal pink mucosa without prolapse or lesions. she is able to perform a Kegel contraction upon request;  Uterus:  absent.  Adnexa:  normal bimanual exam of the adnexa.  Rectal:  digital rectal exam not performed; anus visually normal appearing.       Lab and Imaging Review   PATHOLOGY    CT of abdomen/pelvis report  Mammogram report       Assessment   1. Normal post hysterectomy examination.  Minimal tenderness at the vaginal cuff with slight banding at the apex.  2. Stress urinary incontinence.  She had never done a Kegel exercise before today.  We will ask her to do these for 5 minutes a day and as needed along with timed voiding  3. Gynecologic, breast and colorectal screening protocols were reviewed.       Plan   1. Behavioral changes as above for bladder leakage  2. The importance of keeping all planned follow-up and taking all medications as prescribed was emphasized.  3. Self breast awareness and mammogram protocols discussed.  4. Regular " exercise and calcium ( ideally dietary) discussed  5. Follow up for annual exam or PRN     No orders of the defined types were placed in this encounter.    No orders of the defined types were placed in this encounter.         This note was electronically signed.    Luc Alicea MD  February 13, 2020    Note: Speech recognition transcription software may have been used to create portions of this document.  An attempt at proofreading has been made but errors in transcription could still be present.

## 2020-03-01 ENCOUNTER — APPOINTMENT (OUTPATIENT)
Dept: GENERAL RADIOLOGY | Facility: HOSPITAL | Age: 35
End: 2020-03-01

## 2020-03-01 ENCOUNTER — HOSPITAL ENCOUNTER (EMERGENCY)
Facility: HOSPITAL | Age: 35
Discharge: HOME | End: 2020-03-01
Attending: EMERGENCY MEDICINE | Admitting: EMERGENCY MEDICINE

## 2020-03-01 VITALS
DIASTOLIC BLOOD PRESSURE: 73 MMHG | HEART RATE: 69 BPM | TEMPERATURE: 98.4 F | SYSTOLIC BLOOD PRESSURE: 110 MMHG | HEIGHT: 63 IN | BODY MASS INDEX: 31.01 KG/M2 | OXYGEN SATURATION: 96 % | WEIGHT: 175 LBS | RESPIRATION RATE: 16 BRPM

## 2020-03-01 DIAGNOSIS — R07.9 CHEST PAIN, UNSPECIFIED TYPE: Primary | ICD-10-CM

## 2020-03-01 LAB
ALBUMIN SERPL-MCNC: 4.4 G/DL (ref 3.5–5.2)
ALBUMIN/GLOB SERPL: 1.6 G/DL
ALP SERPL-CCNC: 71 U/L (ref 39–117)
ALT SERPL W P-5'-P-CCNC: 11 U/L (ref 1–33)
ANION GAP SERPL CALCULATED.3IONS-SCNC: 13 MMOL/L (ref 5–15)
AST SERPL-CCNC: 19 U/L (ref 1–32)
BASOPHILS # BLD AUTO: 0.06 10*3/MM3 (ref 0–0.2)
BASOPHILS NFR BLD AUTO: 0.6 % (ref 0–1.5)
BILIRUB SERPL-MCNC: 0.3 MG/DL (ref 0.2–1.2)
BUN BLD-MCNC: 10 MG/DL (ref 6–20)
BUN/CREAT SERPL: 12.7 (ref 7–25)
CALCIUM SPEC-SCNC: 9.2 MG/DL (ref 8.6–10.5)
CHLORIDE SERPL-SCNC: 102 MMOL/L (ref 98–107)
CO2 SERPL-SCNC: 26 MMOL/L (ref 22–29)
CREAT BLD-MCNC: 0.79 MG/DL (ref 0.57–1)
DEPRECATED RDW RBC AUTO: 40.1 FL (ref 37–54)
EOSINOPHIL # BLD AUTO: 0.11 10*3/MM3 (ref 0–0.4)
EOSINOPHIL NFR BLD AUTO: 1 % (ref 0.3–6.2)
ERYTHROCYTE [DISTWIDTH] IN BLOOD BY AUTOMATED COUNT: 12.7 % (ref 12.3–15.4)
GFR SERPL CREATININE-BSD FRML MDRD: 83 ML/MIN/1.73
GLOBULIN UR ELPH-MCNC: 2.7 GM/DL
GLUCOSE BLD-MCNC: 130 MG/DL (ref 65–99)
HCT VFR BLD AUTO: 42 % (ref 34–46.6)
HGB BLD-MCNC: 13.8 G/DL (ref 12–15.9)
HOLD SPECIMEN: NORMAL
HOLD SPECIMEN: NORMAL
IMM GRANULOCYTES # BLD AUTO: 0.03 10*3/MM3 (ref 0–0.05)
IMM GRANULOCYTES NFR BLD AUTO: 0.3 % (ref 0–0.5)
LYMPHOCYTES # BLD AUTO: 2.06 10*3/MM3 (ref 0.7–3.1)
LYMPHOCYTES NFR BLD AUTO: 19 % (ref 19.6–45.3)
MCH RBC QN AUTO: 28.3 PG (ref 26.6–33)
MCHC RBC AUTO-ENTMCNC: 32.9 G/DL (ref 31.5–35.7)
MCV RBC AUTO: 86.2 FL (ref 79–97)
MONOCYTES # BLD AUTO: 0.89 10*3/MM3 (ref 0.1–0.9)
MONOCYTES NFR BLD AUTO: 8.2 % (ref 5–12)
NEUTROPHILS # BLD AUTO: 7.7 10*3/MM3 (ref 1.7–7)
NEUTROPHILS NFR BLD AUTO: 70.9 % (ref 42.7–76)
NRBC BLD AUTO-RTO: 0 /100 WBC (ref 0–0.2)
PLATELET # BLD AUTO: 265 10*3/MM3 (ref 140–450)
PMV BLD AUTO: 10.4 FL (ref 6–12)
POTASSIUM BLD-SCNC: 3.2 MMOL/L (ref 3.5–5.2)
PROT SERPL-MCNC: 7.1 G/DL (ref 6–8.5)
RBC # BLD AUTO: 4.87 10*6/MM3 (ref 3.77–5.28)
SODIUM BLD-SCNC: 141 MMOL/L (ref 136–145)
TROPONIN I SERPL-MCNC: 0 NG/ML (ref 0–0.05)
TROPONIN T SERPL-MCNC: <0.01 NG/ML (ref 0–0.03)
TROPONIN T SERPL-MCNC: <0.01 NG/ML (ref 0–0.03)
WBC NRBC COR # BLD: 10.85 10*3/MM3 (ref 3.4–10.8)
WHOLE BLOOD HOLD SPECIMEN: NORMAL
WHOLE BLOOD HOLD SPECIMEN: NORMAL

## 2020-03-01 PROCEDURE — 96375 TX/PRO/DX INJ NEW DRUG ADDON: CPT

## 2020-03-01 PROCEDURE — 25010000002 ONDANSETRON PER 1 MG: Performed by: EMERGENCY MEDICINE

## 2020-03-01 PROCEDURE — 96374 THER/PROPH/DIAG INJ IV PUSH: CPT

## 2020-03-01 PROCEDURE — 71045 X-RAY EXAM CHEST 1 VIEW: CPT

## 2020-03-01 PROCEDURE — 25010000002 MORPHINE PER 10 MG: Performed by: EMERGENCY MEDICINE

## 2020-03-01 PROCEDURE — 85025 COMPLETE CBC W/AUTO DIFF WBC: CPT | Performed by: EMERGENCY MEDICINE

## 2020-03-01 PROCEDURE — 80053 COMPREHEN METABOLIC PANEL: CPT | Performed by: EMERGENCY MEDICINE

## 2020-03-01 PROCEDURE — 84484 ASSAY OF TROPONIN QUANT: CPT | Performed by: EMERGENCY MEDICINE

## 2020-03-01 PROCEDURE — 25010000002 PROMETHAZINE PER 50 MG: Performed by: EMERGENCY MEDICINE

## 2020-03-01 PROCEDURE — 99284 EMERGENCY DEPT VISIT MOD MDM: CPT

## 2020-03-01 PROCEDURE — 93005 ELECTROCARDIOGRAM TRACING: CPT | Performed by: EMERGENCY MEDICINE

## 2020-03-01 RX ORDER — MORPHINE SULFATE 4 MG/ML
4 INJECTION, SOLUTION INTRAMUSCULAR; INTRAVENOUS ONCE
Status: COMPLETED | OUTPATIENT
Start: 2020-03-01 | End: 2020-03-01

## 2020-03-01 RX ORDER — SODIUM CHLORIDE 0.9 % (FLUSH) 0.9 %
10 SYRINGE (ML) INJECTION AS NEEDED
Status: DISCONTINUED | OUTPATIENT
Start: 2020-03-01 | End: 2020-03-02 | Stop reason: HOSPADM

## 2020-03-01 RX ORDER — PROMETHAZINE HYDROCHLORIDE 25 MG/ML
12.5 INJECTION, SOLUTION INTRAMUSCULAR; INTRAVENOUS ONCE
Status: COMPLETED | OUTPATIENT
Start: 2020-03-01 | End: 2020-03-01

## 2020-03-01 RX ORDER — ONDANSETRON 2 MG/ML
4 INJECTION INTRAMUSCULAR; INTRAVENOUS ONCE
Status: COMPLETED | OUTPATIENT
Start: 2020-03-01 | End: 2020-03-01

## 2020-03-01 RX ORDER — PROMETHAZINE HYDROCHLORIDE 25 MG/1
25 TABLET ORAL EVERY 6 HOURS PRN
Qty: 12 TABLET | Refills: 0 | Status: SHIPPED | OUTPATIENT
Start: 2020-03-01 | End: 2021-09-09 | Stop reason: SDUPTHER

## 2020-03-01 RX ORDER — ALUMINA, MAGNESIA, AND SIMETHICONE 2400; 2400; 240 MG/30ML; MG/30ML; MG/30ML
15 SUSPENSION ORAL ONCE
Status: COMPLETED | OUTPATIENT
Start: 2020-03-01 | End: 2020-03-01

## 2020-03-01 RX ORDER — ASPIRIN 325 MG
325 TABLET ORAL ONCE
Status: COMPLETED | OUTPATIENT
Start: 2020-03-01 | End: 2020-03-01

## 2020-03-01 RX ORDER — PANTOPRAZOLE SODIUM 20 MG/1
20 TABLET, DELAYED RELEASE ORAL DAILY
Qty: 30 TABLET | Refills: 0 | Status: SHIPPED | OUTPATIENT
Start: 2020-03-01 | End: 2020-06-23

## 2020-03-01 RX ORDER — NITROGLYCERIN 0.4 MG/1
0.4 TABLET SUBLINGUAL
Status: DISCONTINUED | OUTPATIENT
Start: 2020-03-01 | End: 2020-03-02 | Stop reason: HOSPADM

## 2020-03-01 RX ORDER — ONDANSETRON 4 MG/1
4 TABLET, ORALLY DISINTEGRATING ORAL EVERY 8 HOURS PRN
Qty: 12 TABLET | Refills: 0 | Status: SHIPPED | OUTPATIENT
Start: 2020-03-01 | End: 2021-09-11

## 2020-03-01 RX ORDER — LIDOCAINE HYDROCHLORIDE 20 MG/ML
15 SOLUTION OROPHARYNGEAL ONCE
Status: COMPLETED | OUTPATIENT
Start: 2020-03-01 | End: 2020-03-01

## 2020-03-01 RX ADMIN — PROMETHAZINE HYDROCHLORIDE 12.5 MG: 25 INJECTION INTRAMUSCULAR; INTRAVENOUS at 21:44

## 2020-03-01 RX ADMIN — ASPIRIN 325 MG ORAL TABLET 325 MG: 325 PILL ORAL at 19:45

## 2020-03-01 RX ADMIN — MORPHINE SULFATE 4 MG: 4 INJECTION, SOLUTION INTRAMUSCULAR; INTRAVENOUS at 21:43

## 2020-03-01 RX ADMIN — ALUMINUM HYDROXIDE, MAGNESIUM HYDROXIDE, AND DIMETHICONE 15 ML: 400; 400; 40 SUSPENSION ORAL at 19:47

## 2020-03-01 RX ADMIN — ONDANSETRON 4 MG: 2 INJECTION INTRAMUSCULAR; INTRAVENOUS at 19:46

## 2020-03-01 RX ADMIN — LIDOCAINE HYDROCHLORIDE 15 ML: 20 SOLUTION ORAL; TOPICAL at 19:47

## 2020-03-01 RX ADMIN — NITROGLYCERIN 0.4 MG: 0.4 TABLET SUBLINGUAL at 19:45

## 2020-03-02 ENCOUNTER — OFFICE VISIT (OUTPATIENT)
Dept: CARDIOLOGY | Facility: CLINIC | Age: 35
End: 2020-03-02

## 2020-03-02 VITALS
WEIGHT: 180 LBS | DIASTOLIC BLOOD PRESSURE: 78 MMHG | HEIGHT: 63 IN | BODY MASS INDEX: 31.89 KG/M2 | TEMPERATURE: 98.2 F | OXYGEN SATURATION: 98 % | HEART RATE: 87 BPM | SYSTOLIC BLOOD PRESSURE: 102 MMHG

## 2020-03-02 DIAGNOSIS — R07.89 CHEST PAIN, ATYPICAL: Primary | ICD-10-CM

## 2020-03-02 DIAGNOSIS — R00.2 PALPITATIONS: ICD-10-CM

## 2020-03-02 PROCEDURE — 99243 OFF/OP CNSLTJ NEW/EST LOW 30: CPT | Performed by: INTERNAL MEDICINE

## 2020-03-02 NOTE — ED PROVIDER NOTES
TRIAGE CHIEF COMPLAINT:     Nursing and triage notes reviewed    Chief Complaint   Patient presents with   • Chest Pain      HPI: Laura Walls Workman is a 34 y.o. female who presents to the emergency department complaining of chest pain.  The patient describes pressure in the left side of her chest radiating to her left shoulder with tingling in her left upper extremity.  Symptoms began an hour approximately prior to arrival.  Patient also complains of nausea.  Denies abdominal pain.  Denies any history of cardiac disease.    REVIEW OF SYSTEMS: All other systems reviewed and are negative     PAST MEDICAL HISTORY:   Past Medical History:   Diagnosis Date   • Abdominal pain, periumbilical    • Abdominal pain, RLQ (right lower quadrant)    • Acute pharyngitis    • Anxiety    • Appetite lost    • Chest pain    • Colon polyp 02/17/2017   • Depression     patient denies   • Diarrhea     Secondary to IBS   • Difficulty swallowing    • Dysphagia     Patient reported she is noticing that she gets choked easily   • Fatigue    • Fracture     RIGHT WRIST TWICE, LEFT HAND   • GERD (gastroesophageal reflux disease)    • Hearing loss     No use of hearing aids   • History of bronchitis    • History of colonoscopy    • History of ovarian cyst    • History of pneumonia    • Hyperthyroidism     hyperactive taken off medicine when pregnant.  Reported she was medication at one time, but none since pregnancy.    • Ovarian cyst, bilateral    • Seasonal allergies    • TMJ (dislocation of temporomandibular joint)    • Upper respiratory infection    • Vomiting         FAMILY HISTORY:   Family History   Problem Relation Age of Onset   • Coronary artery disease Mother    • Diabetes Mother    • Hypertension Mother    • Kidney disease Father    • Skin cancer Father    • Stroke Maternal Grandfather    • Hypertension Maternal Grandfather    • Diabetes Maternal Grandfather    • Colon cancer Maternal Great-Grandmother    • Osteoporosis Neg Hx          SOCIAL HISTORY:   Social History     Socioeconomic History   • Marital status:      Spouse name: Not on file   • Number of children: Not on file   • Years of education: Not on file   • Highest education level: Not on file   Tobacco Use   • Smoking status: Never Smoker   • Smokeless tobacco: Never Used   Substance and Sexual Activity   • Alcohol use: No   • Drug use: No   • Sexual activity: Yes     Partners: Male     Birth control/protection: Surgical        SURGICAL HISTORY:   Past Surgical History:   Procedure Laterality Date   •  SECTION  2008    DR NAVARRETE   •  SECTION  10/30/2015    DR VASQUEZ   •  SECTION  2010    DR ESPINOZA   • CHOLECYSTECTOMY     • COLONOSCOPY N/A 2017    Procedure: COLONOSCOPY WITH COLD BIOPSY POLYPECTOMY; BIOPSIES , QUICK CLIP;  Surgeon: Perfecto Mcknight MD;  Location: [facility] ENDOSCOPY;  Service:    • DIAGNOSTIC LAPAROSCOPY  2005    DR ESPINOZA   • DIAGNOSTIC LAPAROSCOPY  2012    DR ESPINOZA   • DIAGNOSTIC LAPAROSCOPY N/A 8/10/2017    Procedure: DIAGNOSTIC LAPAROSCOPY, LEFT SALPINGO-OOPHORECTOMY;  Surgeon: Keren Espinoza MD;  Location: [facility] OR;  Service:    • ENDOSCOPY N/A 10/15/2019    Procedure: ESOPHAGOGASTRODUODENOSCOPY WITH BIOPSY AND ESOPHAGEAL DILATATION;  Surgeon: Perfecto Mcknight MD;  Location: [facility] ENDOSCOPY;  Service: Gastroenterology   • LYSIS OF ABDOMINAL ADHESIONS  2005    DR ESPINOZA/ ovarian cystectomy   • LYSIS OF ABDOMINAL ADHESIONS  2012    DR ESPINOZA   • NISSEN FUNDOPLICATION  2013   • SALPINGO OOPHORECTOMY Left 2017   • UPPER GASTROINTESTINAL ENDOSCOPY     • VAGINAL HYSTERECTOMY N/A 2018    Procedure: VAGINAL HYSTERECTOMY, BILATERAL SALPINGECTOMY;  Surgeon: Luc Alicea MD;  Location: [facility] OR;  Service: Gynecology   • WISDOM TOOTH EXTRACTION          CURRENT MEDICATIONS:      Medication List      ASK your doctor about these medications    albuterol sulfate  (90 Base) MCG/ACT  inhaler  Commonly known as:  PROVENTIL HFA;VENTOLIN HFA;PROAIR HFA  Inhale 2 puffs Every 6 (Six) Hours As Needed for Wheezing.     hydrOXYzine 25 MG tablet  Commonly known as:  ATARAX  Take 1 tablet by mouth At Night As Needed for Anxiety.           ALLERGIES: Patient has no known allergies.     PHYSICAL EXAM:   VITAL SIGNS:   Vitals:    03/01/20 1910   BP: 123/83   Pulse: 93   Resp: 16   Temp: 98.4 °F (36.9 °C)   SpO2: 99%      CONSTITUTIONAL: Awake, oriented, appears non-toxic   HENT: Atraumatic, normocephalic, oral mucosa pink and moist, airway patent. Nares patent without drainage. External ears normal.   EYES: Conjunctiva clear  NECK: Trachea midline, non-tender, supple   CARDIOVASCULAR: Normal heart rate, Normal rhythm, No murmurs, rubs, gallops   PULMONARY/CHEST: Clear to auscultation, no rhonchi, wheezes, or rales. Symmetrical breath sounds.  Tenderness along the left sternal border.  ABDOMINAL: Non-distended, soft, non-tender - no rebound or guarding.   NEUROLOGIC: Non-focal, moving all four extremities, no gross sensory or motor deficits.   EXTREMITIES: No clubbing, cyanosis, or edema   SKIN: Warm, Dry, No erythema, No rash     ED COURSE / MEDICAL DECISION MAKING:   Laura Thomson is a 34 y.o. female who presents to the emergency department for evaluation of chest pain.  Patient appears mildly uncomfortable on arrival but is nontoxic.  Vital signs are stable.  Exam reveals some mild tenderness in the left sternal border but otherwise is unremarkable.    EKG interpreted by me reveals sinus rhythm with rate of 80 bpm.  There are no acute ST segment or T wave changes.  This is a normal-appearing EKG.    Chest x-ray per my interpretation did not reveal any obvious acute cardiopulmonary process.    Laboratory testing including 2 sets of cardiac enzymes were within normal limits.  Patient's pain improved after treatment in the emergency department.  At this point time I have a lower suspicion for acute  coronary syndrome, however will refer patient to a cardiologist for further evaluation.  I gave patient very strict return precautions to include any new or worsening of her symptoms.    DECISION TO DISCHARGE/ADMIT: see ED care timeline     FINAL IMPRESSION:   1 --chest pain  2 --   3 --     Electronically signed by: Camille Messina MD, 3/1/2020 7:23 PM       Camille Messina MD  03/01/20 6780

## 2020-03-02 NOTE — PROGRESS NOTES
"     [facility] Cardiology OP Consult Note    Laura Thomson  [id number]  2020    Referred By: Camille Messina MD    Chief Complaint: Chest pain    History of Present Illness:   Mrs. Laura Thomson is a 34 y.o. female who presents to the Cardiology Clinic for evaluation of chest pain.  The patient has a past medical history significant for hypothyroidism, GERD, and anxiety/depression.  Her past cardiac history significant for palpitations, for which she underwent 24-hour Holter monitoring in .  At that time, she did not have any significant arrhythmias and rare supraventricular and ventricular ectopy with no patient symptom diary reported.  She presents to the cardiology clinic today for follow-up for evaluation of chest pain.  The patient initially presented to the emergency department 3/1 for evaluation of chest pain.  Ports she developed an episode of palpitations described as a \"racing\" heart rate.  She also describes her palpitations as a \"fluttering\" sensation.  Her palpitations were followed by substernal chest discomfort which she describes as a \"pressure-like\" sensation.  She denies any associated dizziness or lightheadedness.  She does report associated nausea as well as diaphoresis.  On further questioning, the patient has had 3-4 similar episodes over the past 3 weeks.  She was subsequently taken to the emergency department for evaluation    On initial evaluation emergency department, an ECG did not show any acute ischemic changes.  Initial labs including troponin x3 were unremarkable.  She is subsequent discharged from emergency room, instructed to follow-up with cCardiology for further management.  Consider evaluation emergency department yesterday, the patient has not had any recurrent episodes.  She denies exertional chest pain or dyspnea.  No orthopnea, PND, or lower extremity edema.  No other specific complaints at this time.    Past Cardiac Testin. Last " Coronary Angio: None  2. Prior Stress Testing: None  3. Last Echo: None  4. Prior Holter Monitor: 24-hour Holter 12/30/2016   1.  Predominantly sinus rhythm, with sinus arrhythmia   2.  Rare ventricular and supraventricular ectopy   3.  No patient diary submitted    Review of Systems:   Review of Systems   Constitutional: Negative for activity change, appetite change, chills, diaphoresis, fatigue, fever, unexpected weight gain and unexpected weight loss.   Respiratory: Positive for chest tightness and shortness of breath. Negative for cough and wheezing.    Cardiovascular: Positive for chest pain and palpitations. Negative for leg swelling.   Gastrointestinal: Negative for abdominal pain, anal bleeding, blood in stool and GERD.   Endocrine: Negative for cold intolerance and heat intolerance.   Genitourinary: Negative for hematuria.   Neurological: Negative for dizziness, syncope, weakness and light-headedness.   Hematological: Does not bruise/bleed easily.   Psychiatric/Behavioral: Negative for depressed mood and stress. The patient is not nervous/anxious.        Past Medical History:   Past Medical History:   Diagnosis Date   • Abdominal pain, periumbilical    • Abdominal pain, RLQ (right lower quadrant)    • Acute pharyngitis    • Anxiety    • Appetite lost    • Chest pain    • Colon polyp 02/17/2017   • Depression     patient denies   • Diarrhea     Secondary to IBS   • Difficulty swallowing    • Dysphagia     Patient reported she is noticing that she gets choked easily   • Fatigue    • Fracture     RIGHT WRIST TWICE, LEFT HAND   • GERD (gastroesophageal reflux disease)    • Hearing loss     No use of hearing aids   • History of bronchitis    • History of colonoscopy    • History of ovarian cyst    • History of pneumonia    • Hyperthyroidism     hyperactive taken off medicine when pregnant.  Reported she was medication at one time, but none since pregnancy.    • Ovarian cyst, bilateral    • Seasonal allergies     • TMJ (dislocation of temporomandibular joint)    • Upper respiratory infection    • Vomiting        Past Surgical History:   Past Surgical History:   Procedure Laterality Date   •  SECTION  2008    DR NAVARRETE   •  SECTION  10/30/2015    DR VASQUEZ   •  SECTION  2010    DR ESPINOZA   • CHOLECYSTECTOMY     • COLONOSCOPY N/A 2017    Procedure: COLONOSCOPY WITH COLD BIOPSY POLYPECTOMY; BIOPSIES , QUICK CLIP;  Surgeon: Perfecto Mcknight MD;  Location: [facility] ENDOSCOPY;  Service:    • DIAGNOSTIC LAPAROSCOPY  2005    DR ESPINOZA   • DIAGNOSTIC LAPAROSCOPY  2012    DR ESPINOZA   • DIAGNOSTIC LAPAROSCOPY N/A 8/10/2017    Procedure: DIAGNOSTIC LAPAROSCOPY, LEFT SALPINGO-OOPHORECTOMY;  Surgeon: Keren Espinoza MD;  Location: [facility] OR;  Service:    • ENDOSCOPY N/A 10/15/2019    Procedure: ESOPHAGOGASTRODUODENOSCOPY WITH BIOPSY AND ESOPHAGEAL DILATATION;  Surgeon: Perfecto Mcknight MD;  Location: [facility] ENDOSCOPY;  Service: Gastroenterology   • LYSIS OF ABDOMINAL ADHESIONS  2005    DR ESPINOZA/ ovarian cystectomy   • LYSIS OF ABDOMINAL ADHESIONS  2012    DR ESPINOZA   • NISSEN FUNDOPLICATION  2013   • SALPINGO OOPHORECTOMY Left 2017   • UPPER GASTROINTESTINAL ENDOSCOPY     • VAGINAL HYSTERECTOMY N/A 2018    Procedure: VAGINAL HYSTERECTOMY, BILATERAL SALPINGECTOMY;  Surgeon: Luc Alicea MD;  Location: [facility];  Service: Gynecology   • WISDOM TOOTH EXTRACTION         Family History:   Family History   Problem Relation Age of Onset   • Coronary artery disease Mother    • Diabetes Mother    • Hypertension Mother    • Kidney disease Father    • Skin cancer Father    • Stroke Maternal Grandfather    • Hypertension Maternal Grandfather    • Diabetes Maternal Grandfather    • Colon cancer Maternal Great-Grandmother    • Osteoporosis Neg Hx        Social History:   Social History     Socioeconomic History   • Marital status:      Spouse name: Not on file   • Number  "of children: Not on file   • Years of education: Not on file   • Highest education level: Not on file   Tobacco Use   • Smoking status: Never Smoker   • Smokeless tobacco: Never Used   Substance and Sexual Activity   • Alcohol use: No   • Drug use: No   • Sexual activity: Yes     Partners: Male     Birth control/protection: Surgical       Medications:     Current Outpatient Medications:   •  hydrOXYzine (ATARAX) 25 MG tablet, Take 1 tablet by mouth At Night As Needed for Anxiety., Disp: 30 tablet, Rfl: 1  •  albuterol sulfate  (90 Base) MCG/ACT inhaler, Inhale 2 puffs Every 6 (Six) Hours As Needed for Wheezing., Disp: 1 inhaler, Rfl: 0  •  ondansetron ODT (ZOFRAN-ODT) 4 MG disintegrating tablet, Take 1 tablet by mouth Every 8 (Eight) Hours As Needed for Nausea or Vomiting., Disp: 12 tablet, Rfl: 0  •  pantoprazole (PROTONIX) 20 MG EC tablet, Take 1 tablet by mouth Daily., Disp: 30 tablet, Rfl: 0  •  promethazine (PHENERGAN) 25 MG tablet, Take 1 tablet by mouth Every 6 (Six) Hours As Needed for Nausea or Vomiting., Disp: 12 tablet, Rfl: 0  No current facility-administered medications for this visit.     Allergies:   No Known Allergies    Physical Exam:  Vital Signs:   Vitals:    03/02/20 1356   BP: 102/78   BP Location: Right arm   Patient Position: Sitting   Cuff Size: Adult   Pulse: 87   Temp: 98.2 °F (36.8 °C)   TempSrc: Temporal   SpO2: 98%   Weight: 81.6 kg (180 lb)   Height: 160 cm (63\")       Physical Exam   Constitutional: She is oriented to person, place, and time. She appears well-developed and well-nourished. No distress.   HENT:   Head: Normocephalic and atraumatic.   Moist Mucous Membranes.    Eyes: Pupils are equal, round, and reactive to light. EOM are normal. No scleral icterus.   Neck: No tracheal deviation present.   Cardiovascular: Normal rate, regular rhythm, normal heart sounds and intact distal pulses. Exam reveals no gallop and no friction rub.   No murmur heard.  Normal JVD. "   Pulmonary/Chest: Effort normal and breath sounds normal. No stridor. No respiratory distress. She has no wheezes. She has no rales. She exhibits no tenderness.   Abdominal: Soft. Bowel sounds are normal. She exhibits no distension. There is no tenderness. There is no rebound and no guarding.   Musculoskeletal: Normal range of motion. She exhibits no edema.   Lymphadenopathy:     She has no cervical adenopathy.   Neurological: She is alert and oriented to person, place, and time.   Skin: Skin is warm and dry. She is not diaphoretic. No erythema.   Psychiatric: She has a normal mood and affect. Her behavior is normal.       Results Review:   I reviewed the patient's new clinical results.  I personally viewed and interpreted the patient's EKG/Telemetry data    ECG 3/1/2020: Sinus rhythm at 80 bpm with sinus arrhythmia.  Normal ECG.      Assessment / Plan:     1.  Palpitations  --Presents today for evaluation of palpitations, with approximately 4 episodes over the past 3 weeks  --Palpitations currently of unclear etiology  --ECG 3/1 with normal sinus rhythm with sinus arrhythmia, no significant abnormalities  --Laboratory evaluation 3/1 significant only for mild hypokalemia  --Will obtain 30-day MCOT to further rule out underlying arrhythmias, attempt to correlate symptoms  --Follow-up in 2 months to review results, or sooner if indicated    2.  Chest pain  --Additionally complains of episodes of chest pain, which appear to primarily be related to palpitations  --No exertional chest pain or exertional anginal symptoms  --ECG without acute or prior ischemic changes  --Troponin x2 unremarkable on 3/1  --Will obtain exercise treadmill stress test to further rule out ischemia as contributing to her current symptoms        Follow Up:   Return in about 2 months (around 5/2/2020).      Thank you for allowing me to participate in the care of your patient. Please to not hesitate to contact me with additional questions or  concerns.     ASIA. Edd Corbin MD  Interventional Cardiology   03/02/2020  1:55 PM

## 2020-03-13 ENCOUNTER — TELEPHONE (OUTPATIENT)
Dept: INTERNAL MEDICINE | Facility: CLINIC | Age: 35
End: 2020-03-13

## 2020-03-13 NOTE — TELEPHONE ENCOUNTER
Has a bad ear ache all week. Wanted to know if something can be called in for her. Right ear, can't hardly hear out of it. She said she is trying to avoid getting out because her daughter is asthmatic and she does not want to take chances.     Please call her back and let her know     [facility]

## 2020-05-13 DIAGNOSIS — J30.2 SEASONAL ALLERGIC RHINITIS, UNSPECIFIED TRIGGER: ICD-10-CM

## 2020-05-13 DIAGNOSIS — F41.9 ANXIETY: ICD-10-CM

## 2020-05-14 RX ORDER — HYDROXYZINE HYDROCHLORIDE 25 MG/1
25 TABLET, FILM COATED ORAL NIGHTLY PRN
Qty: 30 TABLET | Refills: 0 | Status: SHIPPED | OUTPATIENT
Start: 2020-05-14 | End: 2020-07-06 | Stop reason: SDUPTHER

## 2020-06-09 ENCOUNTER — OFFICE VISIT (OUTPATIENT)
Dept: CARDIOLOGY | Facility: CLINIC | Age: 35
End: 2020-06-09

## 2020-06-09 VITALS
BODY MASS INDEX: 34.02 KG/M2 | SYSTOLIC BLOOD PRESSURE: 98 MMHG | HEIGHT: 63 IN | HEART RATE: 64 BPM | DIASTOLIC BLOOD PRESSURE: 70 MMHG | OXYGEN SATURATION: 98 % | WEIGHT: 192 LBS

## 2020-06-09 DIAGNOSIS — R00.2 PALPITATIONS: Primary | ICD-10-CM

## 2020-06-09 DIAGNOSIS — K21.9 GASTROESOPHAGEAL REFLUX DISEASE, ESOPHAGITIS PRESENCE NOT SPECIFIED: ICD-10-CM

## 2020-06-09 PROBLEM — R07.89 CHEST PAIN, ATYPICAL: Status: ACTIVE | Noted: 2020-06-09

## 2020-06-09 PROCEDURE — 99213 OFFICE O/P EST LOW 20 MIN: CPT | Performed by: INTERNAL MEDICINE

## 2020-06-09 RX ORDER — IBUPROFEN 600 MG/1
600 TABLET ORAL EVERY 6 HOURS PRN
COMMUNITY
Start: 2020-06-08 | End: 2022-01-19 | Stop reason: HOSPADM

## 2020-06-09 NOTE — PROGRESS NOTES
"             [facility] Office Follow Up Note    Laura Thomson  [id number]  06/09/2020    Primary Care Provider: Megha Fregoso APRN    Chief Complaint: Regular follow-up    History of Present Illness:   Mrs. Laura Thomson is a 34 y.o. female who presents to the Cardiology Clinic for regular follow-up after cardiac testing.  The patient has a past medical history significant for hypothyroidism, GERD, and anxiety/depression.  She has a past cardiac history significant for palpitations, and recently presented to the cardiology clinic for evaluation of palpitations and chest discomfort.  She subsequently underwent a GXT in 5/20 which showed no evidence of ischemia, with an average exercise capacity of 10.1 METS and a low risk Duke treadmill score.  She additionally underwent outpatient cardiac monitoring, which showed occasional PACs with no correlation of symptoms to ectopy.  She returns to cardiology clinic today for regular follow-up.  Since her last appointment, the patient reports overall improvement in her palpitations.  She currently reports a \"fluttering\" sensation which typically lasts 1 to 2 seconds before resolving spontaneously.  Currently, she reports one episode every 2 to 3 weeks.  No associated dizziness, lightheadedness, or presyncopal symptoms.  No history of syncope.  She continues to note occasional episodes of chest discomfort, particularly when lying down at night.  She does have a history of significant gastric reflux, having previously undergone Nissen fundoplication.  She remains on PPI therapy, however suspicious for recurrent reflux may be contributing to her episodes of chest discomfort.  She continues to deny any exertional component of her chest pain.  No significant exertional dyspnea.  No orthopnea, PND, or lower extremity swelling.  She reports recently developing left ankle discomfort, for which she is currently in a brace.  Trace " "evaluation obtained, and have reportedly revealed a stress fracture.  No other specific complaints at this time.     Past Cardiac Testin. Last Coronary Angio: None  2. Prior Stress Testing:  GXT 2020   1.  Normal exercise treadmill stress test.   2.  No ischemic ECG changes or significant anginal symptoms during peak stress.   3.  Normal chronotropic and BP response to exercise.   4.  Average exercise capacity, reaching 10.1 METS.   5.  Low risk Duke treadmill score, 7.0.  3. Last Echo: None  4. Prior Holter Monitor:    1.  24-hour Holter 2016               1.  Predominantly sinus rhythm, with sinus arrhythmia               2.  Rare ventricular and supraventricular ectopy               3.  No patient diary submitted   2.  30-day MCOT     1.  The predominant rhythm is sinus rhythm with an average heart rate 75 bpm    2.  Normal atrioventricular conduction    3.  No sustained supraventricular or ventricular arrhythmias    4.  Occasional PACs, burden 1%    5.  Two reported episodes of \"chest pain and shortness of breath\" corresponding with mild sinus tachycardia.  No correlation to ectopy.    Review of Systems:   Review of Systems   Constitutional: Negative for activity change, appetite change, chills, diaphoresis, fatigue, fever, unexpected weight gain and unexpected weight loss.   Eyes: Negative for blurred vision and double vision.   Respiratory: Negative for cough, chest tightness, shortness of breath and wheezing.    Cardiovascular: Positive for chest pain and palpitations. Negative for leg swelling.   Gastrointestinal: Negative for abdominal pain, anal bleeding, blood in stool and GERD.   Endocrine: Negative for cold intolerance and heat intolerance.   Genitourinary: Negative for hematuria.   Musculoskeletal:        Left ankle pain   Neurological: Negative for dizziness, syncope, weakness and light-headedness.   Hematological: Does not bruise/bleed easily.   Psychiatric/Behavioral: Negative " "for depressed mood and stress. The patient is not nervous/anxious.        I have reviewed and/or updated the patient's past medical, past surgical, family, social history, problem list and allergies as appropriate.     Medications:     Current Outpatient Medications:   •  albuterol sulfate  (90 Base) MCG/ACT inhaler, Inhale 2 puffs Every 6 (Six) Hours As Needed for Wheezing., Disp: 1 inhaler, Rfl: 0  •  hydrOXYzine (ATARAX) 25 MG tablet, TAKE 1 TABLET BY MOUTH AT NIGHT AS NEEDED FOR ANXIETY, Disp: 30 tablet, Rfl: 0  •  ibuprofen (ADVIL,MOTRIN) 600 MG tablet, , Disp: , Rfl:   •  ondansetron ODT (ZOFRAN-ODT) 4 MG disintegrating tablet, Take 1 tablet by mouth Every 8 (Eight) Hours As Needed for Nausea or Vomiting., Disp: 12 tablet, Rfl: 0  •  pantoprazole (PROTONIX) 20 MG EC tablet, Take 1 tablet by mouth Daily., Disp: 30 tablet, Rfl: 0  •  promethazine (PHENERGAN) 25 MG tablet, Take 1 tablet by mouth Every 6 (Six) Hours As Needed for Nausea or Vomiting., Disp: 12 tablet, Rfl: 0    Physical Exam:  Vital Signs:   Vitals:    06/09/20 1125   BP: 98/70   Pulse: 64   SpO2: 98%   Weight: 87.1 kg (192 lb)   Height: 160 cm (63\")       Physical Exam   Constitutional: She is oriented to person, place, and time. She appears well-developed and well-nourished. No distress.   HENT:   Head: Normocephalic and atraumatic.   Moist Mucous Membranes.    Eyes: Pupils are equal, round, and reactive to light. EOM are normal. No scleral icterus.   Neck: No tracheal deviation present.   Cardiovascular: Normal rate, regular rhythm, normal heart sounds and intact distal pulses. Exam reveals no gallop and no friction rub.   No murmur heard.  Normal JVD.   Pulmonary/Chest: Effort normal and breath sounds normal. No stridor. No respiratory distress. She has no wheezes. She has no rales. She exhibits no tenderness.   Abdominal: Soft. Bowel sounds are normal. She exhibits no distension. There is no tenderness. There is no rebound and no " guarding.   Musculoskeletal: She exhibits no edema.   Left foot/ankle brace in place.   Lymphadenopathy:     She has no cervical adenopathy.   Neurological: She is alert and oriented to person, place, and time.   Skin: Skin is warm and dry. She is not diaphoretic. No erythema.   Psychiatric: She has a normal mood and affect. Her behavior is normal.       Results Review:   I reviewed the patient's new clinical results.      Assessment / Plan:     1.  Palpitations  --Initially presented for evaluation of palpitations, of unclear etiology  --30-day MCOT 5/20 showed predominantly sinus rhythm, occasional PACs and brief paroxysmal SVT, however no correlation of symptoms to ectopy  --Overall improvement in frequency of palpitations since last follow-up  --Symptoms possibly related to occasional symptomatic ectopy, however given symptoms are rare and minimally bothersome no indication for suppression at this time  --Will follow-up in 1 year to reevaluate symptoms at that time     2.  Chest pain  --Continues to experience episodes of atypical to noncardiac chest pain, with some characteristics suggestive of acid reflux  --Prior ECG without acute or prior ischemic changes  --GXT in 5/20 without evidence of inducible ischemia  --Given unremarkable GXT, suspect episodes of chest pain are likely noncardiac in etiology  --The patient has follow-up scheduled with surgery for further evaluation of recurrent reflux in the setting of prior Nissen fundoplication  --No indication for further coronary ischemic evaluation at this time      Preventative Cardiology:   Tobacco Cessation: N/A  Obstructive Sleep Apnea Screening: Deffered  AAA Screening: N/A  Peripheral Arterial Disease Screening: N/A    Follow Up:   Return in about 1 year (around 6/9/2021).      Thank you for allowing me to participate in the care of your patient. Please to not hesitate to contact me with additional questions or concerns.     MALIA Corbin,  MD  Interventional Cardiology   06/09/2020  11:40 AM

## 2020-06-15 ENCOUNTER — OFFICE VISIT (OUTPATIENT)
Dept: SURGERY | Facility: CLINIC | Age: 35
End: 2020-06-15

## 2020-06-15 VITALS
HEIGHT: 63 IN | OXYGEN SATURATION: 99 % | DIASTOLIC BLOOD PRESSURE: 64 MMHG | SYSTOLIC BLOOD PRESSURE: 110 MMHG | BODY MASS INDEX: 34.2 KG/M2 | TEMPERATURE: 98 F | WEIGHT: 193 LBS | HEART RATE: 85 BPM

## 2020-06-15 DIAGNOSIS — K21.9 GASTROESOPHAGEAL REFLUX DISEASE, ESOPHAGITIS PRESENCE NOT SPECIFIED: ICD-10-CM

## 2020-06-15 DIAGNOSIS — K21.9 GASTROESOPHAGEAL REFLUX DISEASE, ESOPHAGITIS PRESENCE NOT SPECIFIED: Primary | ICD-10-CM

## 2020-06-15 DIAGNOSIS — R13.10 DYSPHAGIA, UNSPECIFIED TYPE: ICD-10-CM

## 2020-06-15 DIAGNOSIS — R10.84 GENERALIZED ABDOMINAL PAIN: Primary | ICD-10-CM

## 2020-06-15 PROCEDURE — 99243 OFF/OP CNSLTJ NEW/EST LOW 30: CPT | Performed by: SURGERY

## 2020-06-15 NOTE — PROGRESS NOTES
Patient: Laura Thomson    YOB: 1985    Date: 06/15/2020    Primary Care Provider: Megha Fregoso APRN    Chief Complaint   Patient presents with   • Mass       SUBJECTIVE:    History of present illness:  I saw the patient in the office today as a consultation for evaluation and treatment of a bulge on her mid to left abdomen. She states she has had the bulge for a little over a month. She denies irregular bowel movements.     She did have an incisional hernia with mesh implantation in October of last year.  She has had a previous laparoscopic Nissen performed 7 years ago.  She does have episodes of dysphasia, she does have episodes of intermittent reflux.    The following portions of the patient's history were reviewed and updated as appropriate: allergies, current medications, past family history, past medical history, past social history, past surgical history and problem list.    Review of Systems   Constitutional: Negative for chills, fever and unexpected weight change.   HENT: Negative for hearing loss, trouble swallowing and voice change.    Eyes: Negative for visual disturbance.   Respiratory: Negative for apnea, cough, chest tightness, shortness of breath and wheezing.    Cardiovascular: Negative for chest pain, palpitations and leg swelling.   Gastrointestinal: Negative for abdominal distention, abdominal pain, anal bleeding, blood in stool, constipation, diarrhea, nausea, rectal pain and vomiting.   Endocrine: Negative for cold intolerance and heat intolerance.   Genitourinary: Negative for difficulty urinating, dysuria and flank pain.   Musculoskeletal: Negative for back pain and gait problem.   Skin: Negative for color change, rash and wound.   Neurological: Negative for dizziness, syncope, speech difficulty, weakness, light-headedness, numbness and headaches.   Hematological: Negative for adenopathy. Does not bruise/bleed easily.   Psychiatric/Behavioral: Negative for  confusion. The patient is not nervous/anxious.        History:  Past Medical History:   Diagnosis Date   • Abdominal pain, periumbilical    • Abdominal pain, RLQ (right lower quadrant)    • Acute pharyngitis    • Anxiety    • Appetite lost    • Chest pain    • Colon polyp 2017   • Depression     patient denies   • Diarrhea     Secondary to IBS   • Difficulty swallowing    • Dysphagia     Patient reported she is noticing that she gets choked easily   • Fatigue    • Fracture     RIGHT WRIST TWICE, LEFT HAND   • GERD (gastroesophageal reflux disease)    • Hearing loss     No use of hearing aids   • History of bronchitis    • History of colonoscopy    • History of ovarian cyst    • History of pneumonia    • Hyperthyroidism     hyperactive taken off medicine when pregnant.  Reported she was medication at one time, but none since pregnancy.    • Ovarian cyst, bilateral    • Seasonal allergies    • TMJ (dislocation of temporomandibular joint)    • Upper respiratory infection    • Vomiting        Past Surgical History:   Procedure Laterality Date   •  SECTION  2008    DR NAVARRETE   •  SECTION  10/30/2015    DR VASQUEZ   •  SECTION  2010    DR ESPINOZA   • CHOLECYSTECTOMY     • COLONOSCOPY N/A 2017    Procedure: COLONOSCOPY WITH COLD BIOPSY POLYPECTOMY; BIOPSIES , QUICK CLIP;  Surgeon: Perfecto Mcknight MD;  Location: [facility] ENDOSCOPY;  Service:    • DIAGNOSTIC LAPAROSCOPY  2005    DR ESPINOZA   • DIAGNOSTIC LAPAROSCOPY  2012    DR ESPINOZA   • DIAGNOSTIC LAPAROSCOPY N/A 8/10/2017    Procedure: DIAGNOSTIC LAPAROSCOPY, LEFT SALPINGO-OOPHORECTOMY;  Surgeon: Keren Espinoza MD;  Location: [facility] OR;  Service:    • ENDOSCOPY N/A 10/15/2019    Procedure: ESOPHAGOGASTRODUODENOSCOPY WITH BIOPSY AND ESOPHAGEAL DILATATION;  Surgeon: Perfecto Mcknight MD;  Location: [facility] ENDOSCOPY;  Service: Gastroenterology   • LYSIS OF ABDOMINAL ADHESIONS  2005    DR ESPINOZA/ ovarian cystectomy   •  "LYSIS OF ABDOMINAL ADHESIONS  02/14/2012    DR ESPINOZA   • NISSEN FUNDOPLICATION  06/2013   • SALPINGO OOPHORECTOMY Left 08/2017   • UPPER GASTROINTESTINAL ENDOSCOPY  2013   • VAGINAL HYSTERECTOMY N/A 7/9/2018    Procedure: VAGINAL HYSTERECTOMY, BILATERAL SALPINGECTOMY;  Surgeon: Luc Alicea MD;  Location: [facility];  Service: Gynecology   • WISDOM TOOTH EXTRACTION  2000       Family History   Problem Relation Age of Onset   • Coronary artery disease Mother    • Diabetes Mother    • Hypertension Mother    • Kidney disease Father    • Skin cancer Father    • Stroke Maternal Grandfather    • Hypertension Maternal Grandfather    • Diabetes Maternal Grandfather    • Colon cancer Maternal Great-Grandmother    • Osteoporosis Neg Hx        Social History     Tobacco Use   • Smoking status: Never Smoker   • Smokeless tobacco: Never Used   Substance Use Topics   • Alcohol use: No   • Drug use: No       Allergies:  No Known Allergies    Medications:    Current Outpatient Medications:   •  albuterol sulfate  (90 Base) MCG/ACT inhaler, Inhale 2 puffs Every 6 (Six) Hours As Needed for Wheezing., Disp: 1 inhaler, Rfl: 0  •  hydrOXYzine (ATARAX) 25 MG tablet, TAKE 1 TABLET BY MOUTH AT NIGHT AS NEEDED FOR ANXIETY, Disp: 30 tablet, Rfl: 0  •  ibuprofen (ADVIL,MOTRIN) 600 MG tablet, , Disp: , Rfl:   •  ondansetron ODT (ZOFRAN-ODT) 4 MG disintegrating tablet, Take 1 tablet by mouth Every 8 (Eight) Hours As Needed for Nausea or Vomiting., Disp: 12 tablet, Rfl: 0  •  pantoprazole (PROTONIX) 20 MG EC tablet, Take 1 tablet by mouth Daily., Disp: 30 tablet, Rfl: 0  •  promethazine (PHENERGAN) 25 MG tablet, Take 1 tablet by mouth Every 6 (Six) Hours As Needed for Nausea or Vomiting., Disp: 12 tablet, Rfl: 0    OBJECTIVE:    Vital Signs:   Vitals:    06/15/20 0918   BP: 110/64   Pulse: 85   Temp: 98 °F (36.7 °C)   SpO2: 99%   Weight: 87.5 kg (193 lb)   Height: 160 cm (62.99\")       Physical Exam:   General Appearance:    Alert, " cooperative, in no acute distress   Head:    Normocephalic, without obvious abnormality, atraumatic   Eyes:            Lids and lashes normal, conjunctivae and sclerae normal, no   icterus, no pallor, corneas clear, PERRLA   Ears:    Ears appear intact with no abnormalities noted   Throat:   No oral lesions, no thrush, oral mucosa moist   Neck:   No adenopathy, supple, trachea midline, no thyromegaly, no   carotid bruit, no JVD   Lungs:     Clear to auscultation,respirations regular, even and                  unlabored    Heart:    Regular rhythm and normal rate, normal S1 and S2, no            murmur   Abdomen:     no masses, no organomegaly, soft non-tender, non-distended, no guarding, there is evidence of a slight bulge at the previous incisional hernia but no evidence of recurrent hernia noted   Extremities:   Moves all extremities well, no edema, no cyanosis, no             redness   Pulses:   Pulses palpable and equal bilaterally   Skin:   No bleeding, bruising or rash   Lymph nodes:   No palpable adenopathy   Neurologic:   Cranial nerves 2 - 12 grossly intact, sensation intact        Results Review:   I reviewed the patient's new clinical results.  I reviewed the patient's new imaging results and agree with the interpretation.  I reviewed the patient's other test results and agree with the interpretation    Review of Systems was reviewed and confirmed as accurate as documented by the MA.    ASSESSMENT/PLAN:    1. Generalized abdominal pain    2. Dysphagia, unspecified type    3. Gastroesophageal reflux disease, esophagitis presence not specified        I had a detailed and extensive discussion with the patient in the office and they understand that they need to undergo upper endoscopy for further evaluation of her symptomatology.  Risk and benefits of operative versus nonoperative intervention have been discussed with the patient, she understands and agrees, and wishes to proceed.I discussed the patients  findings and my recommendations with patient and her mother.    As far as her incisional hernia is concerned, ultrasound was performed today and there is no evidence of recurrent hernia.  I have just asked the patient to stay away from heavy lifting.    Electronically signed by Franck King MD  06/15/20    Portions of this note has been scribed for Franck King MD by Johana Jackson. 6/15/2020  09:42

## 2020-06-17 ENCOUNTER — LAB (OUTPATIENT)
Dept: LAB | Facility: HOSPITAL | Age: 35
End: 2020-06-17

## 2020-06-17 DIAGNOSIS — K21.9 GASTROESOPHAGEAL REFLUX DISEASE, ESOPHAGITIS PRESENCE NOT SPECIFIED: ICD-10-CM

## 2020-06-17 LAB
REF LAB TEST METHOD: NORMAL
SARS-COV-2 RNA RESP QL NAA+PROBE: NOT DETECTED

## 2020-06-17 PROCEDURE — U0002 COVID-19 LAB TEST NON-CDC: HCPCS

## 2020-06-17 PROCEDURE — U0004 COV-19 TEST NON-CDC HGH THRU: HCPCS

## 2020-06-17 PROCEDURE — C9803 HOPD COVID-19 SPEC COLLECT: HCPCS

## 2020-06-19 ENCOUNTER — OUTSIDE FACILITY SERVICE (OUTPATIENT)
Dept: SURGERY | Facility: CLINIC | Age: 35
End: 2020-06-19

## 2020-06-19 PROCEDURE — 43239 EGD BIOPSY SINGLE/MULTIPLE: CPT | Performed by: SURGERY

## 2020-06-23 ENCOUNTER — OFFICE VISIT (OUTPATIENT)
Dept: OBSTETRICS AND GYNECOLOGY | Facility: CLINIC | Age: 35
End: 2020-06-23

## 2020-06-23 VITALS
WEIGHT: 194 LBS | RESPIRATION RATE: 16 BRPM | BODY MASS INDEX: 34.37 KG/M2 | SYSTOLIC BLOOD PRESSURE: 108 MMHG | DIASTOLIC BLOOD PRESSURE: 70 MMHG

## 2020-06-23 DIAGNOSIS — N39.3 SUI (STRESS URINARY INCONTINENCE, FEMALE): Primary | ICD-10-CM

## 2020-06-23 PROCEDURE — 99213 OFFICE O/P EST LOW 20 MIN: CPT | Performed by: OBSTETRICS & GYNECOLOGY

## 2020-06-23 RX ORDER — OMEPRAZOLE 40 MG/1
40 CAPSULE, DELAYED RELEASE ORAL DAILY
COMMUNITY
Start: 2020-06-19 | End: 2021-08-26

## 2020-06-23 RX ORDER — OXYBUTYNIN CHLORIDE 5 MG/1
5 TABLET ORAL 2 TIMES DAILY
Qty: 60 TABLET | Refills: 2 | OUTPATIENT
Start: 2020-06-23 | End: 2021-05-18

## 2020-06-23 NOTE — PROGRESS NOTES
Subjective   Chief Complaint   Patient presents with   • Urinary Incontinence     has been doing exercises and it is not helping      Laura Riveraman is a 34 y.o. year old .  Patient's last menstrual period was 2018.  She presents to be seen because of follow-up for urinary incontinence.  She does not think the Kegel exercises are doing much good.  Still changing her clothing maybe 2 or 3 times a day on occasion.  Not wearing pads however.  Suggest she may try to change the solution to help change her clothing quite as often.  She does not relate this as being severely bothersome to her however despite changing clothing fairly often.  She does void about every hour to 2 hours.  Gets up maybe 3 or 4 times at night despite not drinking liquids about 2 and half hours before she goes to sleep.  She has not tried any counter medications to help this incontinence.  Symptoms: positive :  both stress and urge incontinence are  present but it IS NOT effecting her ADL's                     Negative: dysuria or hematuria  Duration:  year(s)  Severity : mild and moderate  Associated factors: worsening since childbirth 5 years ago after last child ; weight 7#9oz 4 weeks early  Therapies: Kegel exercises, which was not very effective    no or minimal alcohol, nonsmoker, no or mild caffeine use                          The following portions of the patient's history were reviewed and updated as appropriate:problem list, current medications and allergies    Current Outpatient Medications:   •  albuterol sulfate  (90 Base) MCG/ACT inhaler, Inhale 2 puffs Every 6 (Six) Hours As Needed for Wheezing., Disp: 1 inhaler, Rfl: 0  •  hydrOXYzine (ATARAX) 25 MG tablet, TAKE 1 TABLET BY MOUTH AT NIGHT AS NEEDED FOR ANXIETY, Disp: 30 tablet, Rfl: 0  •  ibuprofen (ADVIL,MOTRIN) 600 MG tablet, , Disp: , Rfl:   •  omeprazole (priLOSEC) 40 MG capsule, Take 40 mg by mouth Daily., Disp: , Rfl:   •  ondansetron ODT  (ZOFRAN-ODT) 4 MG disintegrating tablet, Take 1 tablet by mouth Every 8 (Eight) Hours As Needed for Nausea or Vomiting., Disp: 12 tablet, Rfl: 0  •  promethazine (PHENERGAN) 25 MG tablet, Take 1 tablet by mouth Every 6 (Six) Hours As Needed for Nausea or Vomiting., Disp: 12 tablet, Rfl: 0  •  oxybutynin (DITROPAN) 5 MG tablet, Take 1 tablet by mouth 2 (Two) Times a Day., Disp: 60 tablet, Rfl: 2     Review of systems  Constitutional    Positive:nothing                             Negative:anorexia, malaise, night sweats or sweats  Gastrointestinal  pos:nothing reported                             Neg:bloating, change in bowel habits, melena or reflux symptoms       Objective   /70   Resp 16   Wt 88 kg (194 lb)   LMP 06/26/2018   Breastfeeding No   BMI 34.37 kg/m²     General:  well developed; well nourished  no acute distress  appears stated age   Skin:  No suspicious lesions seen   Thyroid: not examined   Lungs:  breathing is unlabored   Heart:  Not performed.   Abdomen: soft, non-tender; no masses  no umbilical or inguinal hernias are present  no hepato-splenomegaly   Pelvis: Clinical staff was present for exam  External genitalia:  normal appearance of the external genitalia including Bartholin's and [place]'s glands.  :  urethral meatus normal; urethra hypermobile; Sterile Q-tip applied approximately 15-20 degree angle change with Valsalva  Vaginal:  normal pink mucosa without prolapse or lesions.  Uterus:  absent.  Adnexa:  non palpable bilaterally.     Lab Review   CBC, CMP and PATHOLOGY      Imaging   CT of abdomen/pelvis report       Assessment   1. Mixed urinary incontinence.  She grazes is mild to moderate in severity despite changing clothing to 3 times a day.  This been going on about 5 years she has had negative urinalysis in that time..  2. She was able to do a moderately strong Kegel exercise but encouraged to continue these along with timed voiding.  We will add Ditropan for the urge  incontinence as she occasionally will leave a trail on the way to the bathroom if she cannot get there in time.  3.      Plan   1.  Follow-up in about 3 months  2.          No orders of the defined types were placed in this encounter.    New Medications Ordered This Visit   Medications   • oxybutynin (DITROPAN) 5 MG tablet     Sig: Take 1 tablet by mouth 2 (Two) Times a Day.     Dispense:  60 tablet     Refill:  2              This note was electronically signed.    Luc Alicea MD  June 23, 2020

## 2020-07-06 DIAGNOSIS — F41.9 ANXIETY: ICD-10-CM

## 2020-07-06 DIAGNOSIS — J30.2 SEASONAL ALLERGIC RHINITIS, UNSPECIFIED TRIGGER: ICD-10-CM

## 2020-07-06 RX ORDER — HYDROXYZINE HYDROCHLORIDE 25 MG/1
25 TABLET, FILM COATED ORAL NIGHTLY PRN
Qty: 30 TABLET | Refills: 0 | Status: SHIPPED | OUTPATIENT
Start: 2020-07-06 | End: 2020-07-10 | Stop reason: SDUPTHER

## 2020-07-10 ENCOUNTER — OFFICE VISIT (OUTPATIENT)
Dept: INTERNAL MEDICINE | Facility: CLINIC | Age: 35
End: 2020-07-10

## 2020-07-10 VITALS
WEIGHT: 193 LBS | HEIGHT: 63 IN | RESPIRATION RATE: 15 BRPM | SYSTOLIC BLOOD PRESSURE: 104 MMHG | OXYGEN SATURATION: 100 % | BODY MASS INDEX: 34.2 KG/M2 | HEART RATE: 80 BPM | TEMPERATURE: 98 F | DIASTOLIC BLOOD PRESSURE: 65 MMHG

## 2020-07-10 DIAGNOSIS — Z13.0 SCREENING FOR ENDOCRINE, NUTRITIONAL, METABOLIC AND IMMUNITY DISORDER: ICD-10-CM

## 2020-07-10 DIAGNOSIS — E66.9 OBESITY (BMI 30.0-34.9): ICD-10-CM

## 2020-07-10 DIAGNOSIS — Z13.29 SCREENING FOR ENDOCRINE, NUTRITIONAL, METABOLIC AND IMMUNITY DISORDER: ICD-10-CM

## 2020-07-10 DIAGNOSIS — J30.2 SEASONAL ALLERGIC RHINITIS, UNSPECIFIED TRIGGER: ICD-10-CM

## 2020-07-10 DIAGNOSIS — Z00.00 PHYSICAL EXAM, ANNUAL: Primary | ICD-10-CM

## 2020-07-10 DIAGNOSIS — Z13.21 SCREENING FOR ENDOCRINE, NUTRITIONAL, METABOLIC AND IMMUNITY DISORDER: ICD-10-CM

## 2020-07-10 DIAGNOSIS — Z13.228 SCREENING FOR ENDOCRINE, NUTRITIONAL, METABOLIC AND IMMUNITY DISORDER: ICD-10-CM

## 2020-07-10 DIAGNOSIS — F41.9 ANXIETY: ICD-10-CM

## 2020-07-10 PROCEDURE — 99395 PREV VISIT EST AGE 18-39: CPT | Performed by: NURSE PRACTITIONER

## 2020-07-10 RX ORDER — HYDROXYZINE HYDROCHLORIDE 25 MG/1
25 TABLET, FILM COATED ORAL NIGHTLY PRN
Qty: 90 TABLET | Refills: 1 | Status: SHIPPED | OUTPATIENT
Start: 2020-07-10 | End: 2020-09-16 | Stop reason: SDUPTHER
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
## 2020-07-11 LAB
25(OH)D3+25(OH)D2 SERPL-MCNC: 26.7 NG/ML (ref 30–100)
ALBUMIN SERPL-MCNC: 4.6 G/DL (ref 3.5–5.2)
ALBUMIN/GLOB SERPL: 2.4 G/DL
ALP SERPL-CCNC: 67 U/L (ref 39–117)
ALT SERPL-CCNC: 12 U/L (ref 1–33)
AST SERPL-CCNC: 16 U/L (ref 1–32)
BASOPHILS # BLD AUTO: 0.07 10*3/MM3 (ref 0–0.2)
BASOPHILS NFR BLD AUTO: 0.9 % (ref 0–1.5)
BILIRUB SERPL-MCNC: 0.4 MG/DL (ref 0–1.2)
BUN SERPL-MCNC: 9 MG/DL (ref 6–20)
BUN/CREAT SERPL: 12.5 (ref 7–25)
CALCIUM SERPL-MCNC: 8.8 MG/DL (ref 8.6–10.5)
CHLORIDE SERPL-SCNC: 104 MMOL/L (ref 98–107)
CHOLEST SERPL-MCNC: 147 MG/DL (ref 0–200)
CO2 SERPL-SCNC: 28.6 MMOL/L (ref 22–29)
CREAT SERPL-MCNC: 0.72 MG/DL (ref 0.57–1)
EOSINOPHIL # BLD AUTO: 0.13 10*3/MM3 (ref 0–0.4)
EOSINOPHIL NFR BLD AUTO: 1.8 % (ref 0.3–6.2)
ERYTHROCYTE [DISTWIDTH] IN BLOOD BY AUTOMATED COUNT: 13.1 % (ref 12.3–15.4)
FERRITIN SERPL-MCNC: 28.9 NG/ML (ref 13–150)
GLOBULIN SER CALC-MCNC: 1.9 GM/DL
GLUCOSE SERPL-MCNC: 89 MG/DL (ref 65–99)
HBA1C MFR BLD: 5.7 % (ref 4.8–5.6)
HCT VFR BLD AUTO: 39.1 % (ref 34–46.6)
HDLC SERPL-MCNC: 44 MG/DL (ref 40–60)
HGB BLD-MCNC: 12.9 G/DL (ref 12–15.9)
IMM GRANULOCYTES # BLD AUTO: 0.02 10*3/MM3 (ref 0–0.05)
IMM GRANULOCYTES NFR BLD AUTO: 0.3 % (ref 0–0.5)
IRON SATN MFR SERPL: 16 % (ref 20–50)
IRON SERPL-MCNC: 59 MCG/DL (ref 37–145)
LDLC SERPL CALC-MCNC: 82 MG/DL (ref 0–100)
LYMPHOCYTES # BLD AUTO: 1.43 10*3/MM3 (ref 0.7–3.1)
LYMPHOCYTES NFR BLD AUTO: 19.4 % (ref 19.6–45.3)
MCH RBC QN AUTO: 28 PG (ref 26.6–33)
MCHC RBC AUTO-ENTMCNC: 33 G/DL (ref 31.5–35.7)
MCV RBC AUTO: 85 FL (ref 79–97)
MONOCYTES # BLD AUTO: 0.75 10*3/MM3 (ref 0.1–0.9)
MONOCYTES NFR BLD AUTO: 10.2 % (ref 5–12)
NEUTROPHILS # BLD AUTO: 4.97 10*3/MM3 (ref 1.7–7)
NEUTROPHILS NFR BLD AUTO: 67.4 % (ref 42.7–76)
NRBC BLD AUTO-RTO: 0 /100 WBC (ref 0–0.2)
PLATELET # BLD AUTO: 267 10*3/MM3 (ref 140–450)
POTASSIUM SERPL-SCNC: 4.3 MMOL/L (ref 3.5–5.2)
PROT SERPL-MCNC: 6.5 G/DL (ref 6–8.5)
RBC # BLD AUTO: 4.6 10*6/MM3 (ref 3.77–5.28)
SODIUM SERPL-SCNC: 141 MMOL/L (ref 136–145)
T4 FREE SERPL-MCNC: 1.21 NG/DL (ref 0.93–1.7)
TIBC SERPL-MCNC: 376 MCG/DL
TRIGL SERPL-MCNC: 107 MG/DL (ref 0–150)
TSH SERPL DL<=0.005 MIU/L-ACNC: 0.8 UIU/ML (ref 0.27–4.2)
UIBC SERPL-MCNC: 317 MCG/DL (ref 112–346)
VIT B12 SERPL-MCNC: 186 PG/ML (ref 211–946)
VLDLC SERPL CALC-MCNC: 21.4 MG/DL
WBC # BLD AUTO: 7.37 10*3/MM3 (ref 3.4–10.8)

## 2020-07-14 ENCOUNTER — TELEPHONE (OUTPATIENT)
Dept: INTERNAL MEDICINE | Facility: CLINIC | Age: 35
End: 2020-07-14

## 2020-07-14 NOTE — TELEPHONE ENCOUNTER
Spoke with pt and told her you was out of office today that someone would call her when they was reviewed.

## 2020-07-14 NOTE — TELEPHONE ENCOUNTER
PATIENT CALLED REQUESTING A CALL BACK  REGARDING LAB RESULTS    PATIENT CALL BACK    [phone]

## 2020-07-20 NOTE — TELEPHONE ENCOUNTER
PATIENT IS CALLING BACK TO REQUEST A CALL  BACK WITH LAB RESULTS BECAUSE SHE REPORTS THAT SHE WAS TOLD AFTER HER LEVELS WERE CHECKED, THE DR MIGHT CHANGE HER ANXIETY MEDICATION DOSAGE.       PLEASE CALL PATIENT [phone]

## 2020-07-22 ENCOUNTER — CLINICAL SUPPORT (OUTPATIENT)
Dept: INTERNAL MEDICINE | Facility: CLINIC | Age: 35
End: 2020-07-22

## 2020-07-22 DIAGNOSIS — E53.8 VITAMIN B12 DEFICIENCY: ICD-10-CM

## 2020-07-22 DIAGNOSIS — E53.8 B12 DEFICIENCY: Primary | ICD-10-CM

## 2020-07-22 PROCEDURE — 96372 THER/PROPH/DIAG INJ SC/IM: CPT | Performed by: NURSE PRACTITIONER

## 2020-07-22 RX ORDER — CYANOCOBALAMIN 1000 UG/ML
1000 INJECTION, SOLUTION INTRAMUSCULAR; SUBCUTANEOUS
Status: COMPLETED | OUTPATIENT
Start: 2020-07-22 | End: 2020-08-19

## 2020-07-22 RX ORDER — ERGOCALCIFEROL 1.25 MG/1
50000 CAPSULE ORAL WEEKLY
Qty: 12 CAPSULE | Refills: 0 | Status: SHIPPED | OUTPATIENT
Start: 2020-07-22 | End: 2021-09-11 | Stop reason: ALTCHOICE

## 2020-07-22 RX ORDER — ESCITALOPRAM OXALATE 5 MG/1
5 TABLET ORAL DAILY
Qty: 30 TABLET | Refills: 1 | Status: SHIPPED | OUTPATIENT
Start: 2020-07-22 | End: 2020-09-16 | Stop reason: SDUPTHER

## 2020-07-22 RX ADMIN — CYANOCOBALAMIN 1000 MCG: 1000 INJECTION, SOLUTION INTRAMUSCULAR; SUBCUTANEOUS at 13:39

## 2020-07-22 NOTE — TELEPHONE ENCOUNTER
I contacted patient and discussed lab results with her seen in B12 injections as her B12 was insufficient along with vitamin D insufficiency vitamin D was sent into pharmacy also patient requested something for her nerves and states that she is more anxious but she has been sleeping better taking Atarax.  She denies SI or HI Lexapro 5 mg was sent to pharmacy.  Patient is advised to follow-up in 4 weeks since I started her on a new medication she stated understanding.

## 2020-07-29 ENCOUNTER — CLINICAL SUPPORT (OUTPATIENT)
Dept: INTERNAL MEDICINE | Facility: CLINIC | Age: 35
End: 2020-07-29

## 2020-07-29 DIAGNOSIS — F32.A DEPRESSION, UNSPECIFIED DEPRESSION TYPE: ICD-10-CM

## 2020-07-29 PROCEDURE — 96372 THER/PROPH/DIAG INJ SC/IM: CPT | Performed by: NURSE PRACTITIONER

## 2020-07-29 RX ADMIN — CYANOCOBALAMIN 1000 MCG: 1000 INJECTION, SOLUTION INTRAMUSCULAR; SUBCUTANEOUS at 11:37

## 2020-07-30 ENCOUNTER — OFFICE VISIT (OUTPATIENT)
Dept: SURGERY | Facility: CLINIC | Age: 35
End: 2020-07-30

## 2020-07-30 VITALS
WEIGHT: 193.2 LBS | SYSTOLIC BLOOD PRESSURE: 110 MMHG | HEIGHT: 63 IN | TEMPERATURE: 98.2 F | BODY MASS INDEX: 34.23 KG/M2 | HEART RATE: 74 BPM | OXYGEN SATURATION: 98 % | DIASTOLIC BLOOD PRESSURE: 70 MMHG
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
## 2020-07-30 PROCEDURE — 99213 OFFICE O/P EST LOW 20 MIN: CPT | Performed by: SURGERY

## 2020-07-30 PROCEDURE — 10060 I&D ABSCESS SIMPLE/SINGLE: CPT | Performed by: SURGERY

## 2020-07-30 NOTE — PROGRESS NOTES
Patient: Laura Thomson    YOB: 1985    Date: 07/30/2020    Primary Care Provider: Megha Fregoso APRN    Chief Complaint   Patient presents with   • Tick Removal       SUBJECTIVE:     History of present illness:  I saw the patient in the office today for an evaluation and consultation tick bite. Patient complains of a tick bite @ right flank. She complains of redness, itching and raised.     Apparently this does cause her some dull discomfort, located in the right flank region, associated with a small tick bite, present over the past day or 2, no history of fever, nonradiating in nature, touching it makes it worse.    The following portions of the patient's history were reviewed and updated as appropriate: allergies, current medications, past family history, past medical history, past social history, past surgical history and problem list.      Review of Systems   Constitutional: Negative for chills, fever and unexpected weight change.   HENT: Negative for hearing loss, trouble swallowing and voice change.    Eyes: Negative for visual disturbance.   Respiratory: Negative for apnea, cough, chest tightness, shortness of breath and wheezing.    Cardiovascular: Negative for chest pain, palpitations and leg swelling.   Gastrointestinal: Negative for abdominal distention, abdominal pain, anal bleeding, blood in stool, constipation, diarrhea, nausea, rectal pain and vomiting.   Endocrine: Negative for cold intolerance and heat intolerance.   Genitourinary: Negative for difficulty urinating, dysuria and flank pain.   Musculoskeletal: Negative for back pain and gait problem.   Skin: Negative for color change, rash and wound.   Neurological: Negative for dizziness, syncope, speech difficulty, weakness, light-headedness, numbness and headaches.   Hematological: Negative for adenopathy. Does not bruise/bleed easily.   Psychiatric/Behavioral: Negative for confusion. The patient is not  nervous/anxious.        Allergies:  No Known Allergies    Medications:    Current Outpatient Medications:   •  albuterol sulfate  (90 Base) MCG/ACT inhaler, Inhale 2 puffs Every 6 (Six) Hours As Needed for Wheezing., Disp: 1 inhaler, Rfl: 0  •  escitalopram (Lexapro) 5 MG tablet, Take 1 tablet by mouth Daily., Disp: 30 tablet, Rfl: 1  •  hydrOXYzine (ATARAX) 25 MG tablet, Take 1 tablet by mouth At Night As Needed for Anxiety., Disp: 90 tablet, Rfl: 1  •  ibuprofen (ADVIL,MOTRIN) 600 MG tablet, , Disp: , Rfl:   •  omeprazole (priLOSEC) 40 MG capsule, Take 40 mg by mouth Daily., Disp: , Rfl:   •  ondansetron ODT (ZOFRAN-ODT) 4 MG disintegrating tablet, Take 1 tablet by mouth Every 8 (Eight) Hours As Needed for Nausea or Vomiting., Disp: 12 tablet, Rfl: 0  •  oxybutynin (DITROPAN) 5 MG tablet, Take 1 tablet by mouth 2 (Two) Times a Day., Disp: 60 tablet, Rfl: 2  •  promethazine (PHENERGAN) 25 MG tablet, Take 1 tablet by mouth Every 6 (Six) Hours As Needed for Nausea or Vomiting., Disp: 12 tablet, Rfl: 0  •  vitamin D (ERGOCALCIFEROL) 1.25 MG (47452 UT) capsule capsule, Take 1 capsule by mouth 1 (One) Time Per Week., Disp: 12 capsule, Rfl: 0    Current Facility-Administered Medications:   •  cyanocobalamin injection 1,000 mcg, 1,000 mcg, Intramuscular, Q7 Days, Megha Fregoso APRN, 1,000 mcg at 07/29/20 1137    History:  Past Medical History:   Diagnosis Date   • Abdominal pain, periumbilical    • Abdominal pain, RLQ (right lower quadrant)    • Acute pharyngitis    • Anxiety    • Appetite lost    • Chest pain    • Colon polyp 02/17/2017   • Depression     patient denies   • Diarrhea     Secondary to IBS   • Difficulty swallowing    • Dysphagia     Patient reported she is noticing that she gets choked easily   • Fatigue    • Fracture     RIGHT WRIST TWICE, LEFT HAND   • GERD (gastroesophageal reflux disease)    • Hearing loss     No use of hearing aids   • History of bronchitis    • History of colonoscopy     • History of ovarian cyst    • History of pneumonia    • Hyperthyroidism     hyperactive taken off medicine when pregnant.  Reported she was medication at one time, but none since pregnancy.    • Ovarian cyst, bilateral    • Seasonal allergies    • TMJ (dislocation of temporomandibular joint)    • Upper respiratory infection    • Vomiting        Past Surgical History:   Procedure Laterality Date   •  SECTION  2008    DR NAVARRETE   •  SECTION  10/30/2015    DR VASQUEZ   •  SECTION  2010    DR ESPINOZA   • CHOLECYSTECTOMY     • COLONOSCOPY N/A 2017    Procedure: COLONOSCOPY WITH COLD BIOPSY POLYPECTOMY; BIOPSIES , QUICK CLIP;  Surgeon: Perfecto Mcknight MD;  Location: [facility] ENDOSCOPY;  Service:    • DIAGNOSTIC LAPAROSCOPY  2005    DR JONES NAVA/cystectomy   • DIAGNOSTIC LAPAROSCOPY  2012    DR ESPINOZA/ELIJAH   • DIAGNOSTIC LAPAROSCOPY N/A 8/10/2017     DIAGNOSTIC LAPAROSCOPY, LEFT SALPINGO-OOPHORECTOMY;  Keren Espinoza MD;  Location: [facility] OR;  Service:    • ENDOSCOPY N/A 10/15/2019    Procedure: ESOPHAGOGASTRODUODENOSCOPY WITH BIOPSY AND ESOPHAGEAL DILATATION;  Surgeon: Perfecto Mcknight MD;  Location: [facility] ENDOSCOPY;  Service: Gastroenterology   • HERNIA REPAIR  2019   • NISSEN FUNDOPLICATION  2013   • UPPER GASTROINTESTINAL ENDOSCOPY     • VAGINAL HYSTERECTOMY N/A 2018    VAGINAL HYSTERECTOMY, BILATERAL SALPINGECTOMY;  Luc Alicea MD;  Location: [facility] OR;  Service: Gynecology   • WISDOM TOOTH EXTRACTION         Family History   Problem Relation Age of Onset   • Coronary artery disease Mother    • Diabetes Mother    • Hypertension Mother    • Kidney disease Father    • Skin cancer Father    • Stroke Maternal Grandfather    • Hypertension Maternal Grandfather    • Diabetes Maternal Grandfather    • Colon cancer Maternal Great-Grandmother    • Osteoporosis Neg Hx        Social History     Tobacco Use   • Smoking status: Never Smoker   • Smokeless  "tobacco: Never Used   Substance Use Topics   • Alcohol use: No   • Drug use: No        OBJECTIVE:    Vital Signs:   Vitals:    07/30/20 1435   BP: 110/70   Pulse: 74   Temp: 98.2 °F (36.8 °C)   SpO2: 98%   Weight: 87.6 kg (193 lb 3.2 oz)   Height: 160 cm (63\")       Physical Exam:   General Appearance:    Alert, cooperative, in no acute distress   Head:    Normocephalic, without obvious abnormality, atraumatic   Eyes:            Lids and lashes normal, conjunctivae and sclerae normal, no   icterus, no pallor, corneas clear, PERRLA   Ears:    Ears appear intact with no abnormalities noted   Throat:   No oral lesions, no thrush, oral mucosa moist   Neck:   No adenopathy, supple, trachea midline, no thyromegaly, no   carotid bruit, no JVD   Lungs:     Clear to auscultation,respirations regular, even and                  unlabored    Heart:    Regular rhythm and normal rate, normal S1 and S2, no            murmur, no gallop, no rub, no click   Chest Wall:    No abnormalities observed   Abdomen:     Normal bowel sounds, no masses, no organomegaly, soft        non-tender, non-distended, no guarding, no rebound                tenderness   Extremities:   Moves all extremities well, no edema, no cyanosis, no             redness   Pulses:   Pulses palpable and equal bilaterally   Skin:   No bleeding, bruising or rash, small bite sized area in the right flank consistent with a history of tick bite with a dark area in the middle   Lymph nodes:   No palpable adenopathy   Neurologic:   Cranial nerves 2 - 12 grossly intact, sensation intact, DTR       present and equal bilaterally     Results Review:   I reviewed the patient's new clinical results.  I reviewed the patient's new imaging results and agree with the interpretation.  I reviewed the patient's other test results and agree with the interpretation    Review of Systems was reviewed and confirmed as accurate as documented by the MA.    ASSESSMENT/PLAN:    1. Tick bite, " initial encounter        In short, the patient needs to undergo incision and drainage of this.  Risk and benefits were explained to her.    Procedure:     I recommended abscess drainage to the patient. I explained the indication as well as the risks and benefits which include bleeding, further infection requiring additional procedures, non healing of the wound etc. The patient understands these and wishes to proceed.      The patient was brought to the procedure room. Consent and time out were performed. The area was prepped and draped in the usual fashion. 1% lidocaine with epinephrine was infused locally. The area was then incised and drained sharply with a #11 blade.  Minimal blood loss had occurred and was well controlled with pressure. There were no complications and the patient tolerated the procedure well. Wound instructions were given. I did excise the central region without difficulty.    I discussed the patients findings and my recommendations with patient and family        Electronically signed by Tania Cortés MA  07/30/20

## 2020-08-04 ENCOUNTER — TELEPHONE (OUTPATIENT)
Dept: INTERNAL MEDICINE | Facility: CLINIC | Age: 35
End: 2020-08-04

## 2020-08-04 ENCOUNTER — CLINICAL SUPPORT (OUTPATIENT)
Dept: INTERNAL MEDICINE | Facility: CLINIC | Age: 35
End: 2020-08-04

## 2020-08-04 DIAGNOSIS — E53.8 B12 DEFICIENCY: ICD-10-CM

## 2020-08-04 PROCEDURE — 96372 THER/PROPH/DIAG INJ SC/IM: CPT | Performed by: NURSE PRACTITIONER

## 2020-08-04 RX ADMIN — CYANOCOBALAMIN 1000 MCG: 1000 INJECTION, SOLUTION INTRAMUSCULAR; SUBCUTANEOUS at 12:23

## 2020-08-04 NOTE — TELEPHONE ENCOUNTER
Patient stated she is due for her B12 shot tomorrow but would like to know if she could come by today and get it.   Please call patient and advise [phone]

## 2020-08-17 ENCOUNTER — TRANSCRIBE ORDERS (OUTPATIENT)
Dept: ADMINISTRATIVE | Facility: HOSPITAL | Age: 35
End: 2020-08-17

## 2020-08-17 DIAGNOSIS — M65.272: ICD-10-CM

## 2020-08-17 DIAGNOSIS — S96.012A: Primary | ICD-10-CM

## 2020-08-19 ENCOUNTER — CLINICAL SUPPORT (OUTPATIENT)
Dept: INTERNAL MEDICINE | Facility: CLINIC | Age: 35
End: 2020-08-19

## 2020-08-19 DIAGNOSIS — E53.8 B12 DEFICIENCY: ICD-10-CM

## 2020-08-19 PROCEDURE — 96372 THER/PROPH/DIAG INJ SC/IM: CPT | Performed by: NURSE PRACTITIONER

## 2020-08-19 RX ADMIN — CYANOCOBALAMIN 1000 MCG: 1000 INJECTION, SOLUTION INTRAMUSCULAR; SUBCUTANEOUS at 10:25

## 2020-09-08 ENCOUNTER — HOSPITAL ENCOUNTER (OUTPATIENT)
Dept: MRI IMAGING | Facility: HOSPITAL | Age: 35
Discharge: HOME | End: 2020-09-08
Admitting: ORTHOPAEDIC SURGERY

## 2020-09-08 DIAGNOSIS — M65.272: ICD-10-CM

## 2020-09-08 DIAGNOSIS — S96.012A: ICD-10-CM

## 2020-09-08 PROCEDURE — 73721 MRI JNT OF LWR EXTRE W/O DYE: CPT

## 2020-09-16 ENCOUNTER — OFFICE VISIT (OUTPATIENT)
Dept: INTERNAL MEDICINE | Facility: CLINIC | Age: 35
End: 2020-09-16

## 2020-09-16 VITALS
OXYGEN SATURATION: 99 % | HEART RATE: 87 BPM | BODY MASS INDEX: 34.55 KG/M2 | SYSTOLIC BLOOD PRESSURE: 113 MMHG | RESPIRATION RATE: 15 BRPM | HEIGHT: 63 IN | DIASTOLIC BLOOD PRESSURE: 83 MMHG | WEIGHT: 195 LBS | TEMPERATURE: 97.7 F

## 2020-09-16 DIAGNOSIS — F41.9 ANXIETY: ICD-10-CM

## 2020-09-16 DIAGNOSIS — J30.2 SEASONAL ALLERGIC RHINITIS, UNSPECIFIED TRIGGER: ICD-10-CM

## 2020-09-16 PROCEDURE — 99214 OFFICE O/P EST MOD 30 MIN: CPT | Performed by: NURSE PRACTITIONER

## 2020-09-16 RX ORDER — HYDROXYZINE HYDROCHLORIDE 25 MG/1
25 TABLET, FILM COATED ORAL EVERY 8 HOURS PRN
Qty: 90 TABLET | Refills: 1 | Status: SHIPPED | OUTPATIENT
Start: 2020-09-16 | End: 2021-03-22 | Stop reason: SDUPTHER

## 2020-09-16 RX ORDER — ESCITALOPRAM OXALATE 10 MG/1
10 TABLET ORAL DAILY
Qty: 30 TABLET | Refills: 1 | Status: SHIPPED | OUTPATIENT
Start: 2020-09-16 | End: 2020-10-21 | Stop reason: SDUPTHER

## 2020-10-21 ENCOUNTER — OFFICE VISIT (OUTPATIENT)
Dept: INTERNAL MEDICINE | Facility: CLINIC | Age: 35
End: 2020-10-21

## 2020-10-21 VITALS — TEMPERATURE: 98.1 F

## 2020-10-21 DIAGNOSIS — F41.9 ANXIETY: ICD-10-CM

## 2020-10-21 PROCEDURE — 99441 PR PHYS/QHP TELEPHONE EVALUATION 5-10 MIN: CPT | Performed by: NURSE PRACTITIONER

## 2020-10-21 RX ORDER — ASPIRIN 325 MG
325 TABLET, DELAYED RELEASE (ENTERIC COATED) ORAL DAILY
COMMUNITY
Start: 2020-10-14 | End: 2021-05-18

## 2020-10-21 RX ORDER — ESCITALOPRAM OXALATE 10 MG/1
10 TABLET ORAL DAILY
Qty: 90 TABLET | Refills: 1 | Status: SHIPPED | OUTPATIENT
Start: 2020-10-21 | End: 2022-02-07 | Stop reason: SDUPTHER

## 2020-10-21 NOTE — PROGRESS NOTES
You have chosen to receive care through a telephone visit. Do you consent to use a telephone visit for your medical care today? Yes  Involved parties: Rosalind Richardson CMA, TRISTEN Durand and patient.    Chief Complaint / Reason:      Chief Complaint   Patient presents with   • Anxiety     fup on lexapro       Subjective     HPI  Patient presents today via telephone as she could not get her Internet to do video visit regarding follow-up on Lexapro.  She states overall she has been doing well and denies any side effects.  She denies SI or HI.  She would like to continue medication at current dosage and states that she did not take the medicine while she was on pain medicine as she was concerned about the interactions.  She states that she had been under a lot of stress as she does have limited mobility and is currently in a wheelchair after having left foot/ankle surgery last Tuesday per Dr. Alvarez.  She does have a follow-up appointment on Monday.  History taken from: patient    PMH/FH/Social History were reviewed and updated appropriately in the electronic medical record.   Past Medical History:   Diagnosis Date   • Abdominal pain, periumbilical    • Abdominal pain, RLQ (right lower quadrant)    • Acute pharyngitis    • Anxiety    • Appetite lost    • Chest pain    • Colon polyp 02/17/2017   • Depression     patient denies   • Diarrhea     Secondary to IBS   • Difficulty swallowing    • Dysphagia     Patient reported she is noticing that she gets choked easily   • Fatigue    • Fracture     RIGHT WRIST TWICE, LEFT HAND   • GERD (gastroesophageal reflux disease)    • Hearing loss     No use of hearing aids   • History of bronchitis    • History of colonoscopy    • History of ovarian cyst    • History of pneumonia    • Hyperthyroidism     hyperactive taken off medicine when pregnant.  Reported she was medication at one time, but none since pregnancy.    • Ovarian cyst, bilateral    • Seasonal allergies    • TMJ  (dislocation of temporomandibular joint)    • Upper respiratory infection    • Vomiting      Past Surgical History:   Procedure Laterality Date   •  SECTION  2008    DR NAVARRETE   •  SECTION  10/30/2015    DR VASQUEZ   •  SECTION  2010    DR ESPINOZA   • CHOLECYSTECTOMY     • COLONOSCOPY N/A 2017    Procedure: COLONOSCOPY WITH COLD BIOPSY POLYPECTOMY; BIOPSIES , QUICK CLIP;  Surgeon: Perfecto Mcknight MD;  Location: [facility] ENDOSCOPY;  Service:    • DIAGNOSTIC LAPAROSCOPY  2005    DR JONES NAVA/cystectomy   • DIAGNOSTIC LAPAROSCOPY  2012    DR ESPINOZA/ELIJAH   • DIAGNOSTIC LAPAROSCOPY N/A 8/10/2017     DIAGNOSTIC LAPAROSCOPY, LEFT SALPINGO-OOPHORECTOMY;  Keren Espinoza MD;  Location: [facility] OR;  Service:    • ENDOSCOPY N/A 10/15/2019    Procedure: ESOPHAGOGASTRODUODENOSCOPY WITH BIOPSY AND ESOPHAGEAL DILATATION;  Surgeon: Perfecto Mcknight MD;  Location: [facility] ENDOSCOPY;  Service: Gastroenterology   • FOOT SURGERY     • HERNIA REPAIR  2019   • NISSEN FUNDOPLICATION  2013   • UPPER GASTROINTESTINAL ENDOSCOPY     • VAGINAL HYSTERECTOMY N/A 2018    VAGINAL HYSTERECTOMY, BILATERAL SALPINGECTOMY;  Luc Alicea MD;  Location: [facility] OR;  Service: Gynecology   • WISDOM TOOTH EXTRACTION       Social History     Socioeconomic History   • Marital status:      Spouse name: Not on file   • Number of children: Not on file   • Years of education: Not on file   • Highest education level: Not on file   Tobacco Use   • Smoking status: Never Smoker   • Smokeless tobacco: Never Used   Substance and Sexual Activity   • Alcohol use: No   • Drug use: No   • Sexual activity: Yes     Partners: Male     Birth control/protection: Surgical     Family History   Problem Relation Age of Onset   • Coronary artery disease Mother    • Diabetes Mother    • Hypertension Mother    • Kidney disease Father    • Skin cancer Father    • Stroke Maternal Grandfather    • Hypertension  Maternal Grandfather    • Diabetes Maternal Grandfather    • Colon cancer Maternal Great-Grandmother    • Osteoporosis Neg Hx        Review of Systems:   Review of Systems   Constitutional: Positive for activity change and fatigue.   Respiratory: Negative.    Cardiovascular: Negative.    Musculoskeletal: Positive for arthralgias and gait problem.   Psychiatric/Behavioral: Negative.  Positive for stress.         All other systems were reviewed and are negative.  Exceptions are noted in the subjective or above.      Objective     Vital Signs  Vitals:    10/21/20 0923   Temp: 98.1 °F (36.7 °C)       There is no height or weight on file to calculate BMI.    Unable to perform physical assessment     Results Review:    I reviewed the patient's previous clinical results.       Medication Review:     Current Outpatient Medications:   •  albuterol sulfate  (90 Base) MCG/ACT inhaler, Inhale 2 puffs Every 6 (Six) Hours As Needed for Wheezing., Disp: 1 inhaler, Rfl: 0  •  escitalopram (LEXAPRO) 10 MG tablet, Take 1 tablet by mouth Daily., Disp: 90 tablet, Rfl: 1  •  hydrOXYzine (ATARAX) 25 MG tablet, Take 1 tablet by mouth Every 8 (Eight) Hours As Needed for Anxiety., Disp: 90 tablet, Rfl: 1  •  ibuprofen (ADVIL,MOTRIN) 600 MG tablet, , Disp: , Rfl:   •  omeprazole (priLOSEC) 40 MG capsule, Take 40 mg by mouth Daily., Disp: , Rfl:   •  ondansetron ODT (ZOFRAN-ODT) 4 MG disintegrating tablet, Take 1 tablet by mouth Every 8 (Eight) Hours As Needed for Nausea or Vomiting., Disp: 12 tablet, Rfl: 0  •  oxybutynin (DITROPAN) 5 MG tablet, Take 1 tablet by mouth 2 (Two) Times a Day., Disp: 60 tablet, Rfl: 2  •  promethazine (PHENERGAN) 25 MG tablet, Take 1 tablet by mouth Every 6 (Six) Hours As Needed for Nausea or Vomiting., Disp: 12 tablet, Rfl: 0  •  vitamin D (ERGOCALCIFEROL) 1.25 MG (31170 UT) capsule capsule, Take 1 capsule by mouth 1 (One) Time Per Week., Disp: 12 capsule, Rfl: 0  •  aspirin  MG tablet, Take 325 mg  by mouth Daily., Disp: , Rfl:     Assessment/Plan   Diagnoses and all orders for this visit:    1. Anxiety  -     escitalopram (LEXAPRO) 10 MG tablet; Take 1 tablet by mouth Daily.  Dispense: 90 tablet; Refill: 1    Continue current medication regimen, stable on medications without worsening s/s       Return in about 6 months (around 4/21/2021), or if symptoms worsen or fail to improve.    Megha Fregoso, APRN  10/21/2020

## 2020-10-26 ENCOUNTER — TELEPHONE (OUTPATIENT)
Dept: INTERNAL MEDICINE | Facility: CLINIC | Age: 35
End: 2020-10-26

## 2020-10-26 NOTE — TELEPHONE ENCOUNTER
Caller: Laura Thomson    Relationship: Self    Best call back number: [phone]     What medication are you requesting: ANTIBIOTIC FOR INFECTION    What are your current symptoms: ODOR AND BURNING WHEN URINATING WITH ITCHING    How long have you been experiencing symptoms: 3DYS    Have you had these symptoms before:    [x] Yes  [x] No    Have you been treated for these symptoms before:   [x] Yes  [] No    If a prescription is needed, what is your preferred pharmacy and phone number:  [facility]    Additional notes:PATIENT CALLED IN REQUESTING AN ANTIBIOTIC FOR TREATMENT OF AN INFECTION DUE TO PREVIOUS MEDICATION CAUSING NEW SYMPTOMS AND INFECTION PLEASE ADVISE

## 2020-12-08 ENCOUNTER — TELEPHONE (OUTPATIENT)
Dept: INTERNAL MEDICINE | Facility: CLINIC | Age: 35
End: 2020-12-08

## 2020-12-09 ENCOUNTER — HOSPITAL ENCOUNTER (OUTPATIENT)
Dept: ULTRASOUND IMAGING | Facility: HOSPITAL | Age: 35
Discharge: HOME | End: 2020-12-09
Admitting: NURSE PRACTITIONER

## 2020-12-09 ENCOUNTER — OFFICE VISIT (OUTPATIENT)
Dept: INTERNAL MEDICINE | Facility: CLINIC | Age: 35
End: 2020-12-09

## 2020-12-09 VITALS
OXYGEN SATURATION: 98 % | SYSTOLIC BLOOD PRESSURE: 122 MMHG | RESPIRATION RATE: 16 BRPM | HEART RATE: 89 BPM | DIASTOLIC BLOOD PRESSURE: 83 MMHG | BODY MASS INDEX: 31.54 KG/M2 | HEIGHT: 63 IN | TEMPERATURE: 98.2 F | WEIGHT: 178 LBS

## 2020-12-09 DIAGNOSIS — M79.605 PAIN AND SWELLING OF LEFT LOWER EXTREMITY: Primary | ICD-10-CM

## 2020-12-09 DIAGNOSIS — M79.89 PAIN AND SWELLING OF LEFT LOWER EXTREMITY: Primary | ICD-10-CM

## 2020-12-09 PROCEDURE — 93971 EXTREMITY STUDY: CPT

## 2020-12-09 PROCEDURE — 99213 OFFICE O/P EST LOW 20 MIN: CPT | Performed by: NURSE PRACTITIONER

## 2020-12-09 NOTE — PROGRESS NOTES
Chief Complaint / Reason:      Chief Complaint   Patient presents with   • Foot Pain     left -turning colors and swollen x 2 days. Surgery 10/13/2020 fixed tendons and ligaments.        Subjective     HPI  Presents today with complaints of left ankle pain along with left calf pain.  She states that it turns colors and has been more swelling the past few days.  She denies any history of DVT, chest pain or shortness of breath she has ongoing shortness of breath but denies it being worse than normal.  She is accompanied by her .  Patient denies any bloody sputum numbness or tingling but states that she is limited on her mobility due to surgical intervention of left ankle from 10/13/2020.  Patient is able to wiggle her toes and is currently in boot and using scooter for ambulation.  She states that she is now able to bear weight some but it does hurt.  She does have an appointment with Ortho for follow-up but is concerned regarding DVT as she was on aspirin and stopped taking it.  Does have swelling and pain in lower extremity.  Vital signs are stable.  History taken from: patient    PMH/FH/Social History were reviewed and updated appropriately in the electronic medical record.   Past Medical History:   Diagnosis Date   • Abdominal pain, periumbilical    • Abdominal pain, RLQ (right lower quadrant)    • Acute pharyngitis    • Anxiety    • Appetite lost    • Chest pain    • Colon polyp 02/17/2017   • Depression     patient denies   • Diarrhea     Secondary to IBS   • Difficulty swallowing    • Dysphagia     Patient reported she is noticing that she gets choked easily   • Fatigue    • Fracture     RIGHT WRIST TWICE, LEFT HAND   • GERD (gastroesophageal reflux disease)    • Hearing loss     No use of hearing aids   • History of bronchitis    • History of colonoscopy    • History of ovarian cyst    • History of pneumonia    • Hyperthyroidism     hyperactive taken off medicine when pregnant.  Reported she was  medication at one time, but none since pregnancy.    • Ovarian cyst, bilateral    • Seasonal allergies    • TMJ (dislocation of temporomandibular joint)    • Upper respiratory infection    • Vomiting      Past Surgical History:   Procedure Laterality Date   •  SECTION  2008    DR NAVARRETE   •  SECTION  10/30/2015    DR VASQUEZ   •  SECTION  2010    DR ESPINOZA   • CHOLECYSTECTOMY     • COLONOSCOPY N/A 2017    Procedure: COLONOSCOPY WITH COLD BIOPSY POLYPECTOMY; BIOPSIES , QUICK CLIP;  Surgeon: Perfecto Mcknight MD;  Location: [facility] ENDOSCOPY;  Service:    • DIAGNOSTIC LAPAROSCOPY  2005    DR JONES NAVA/cystectomy   • DIAGNOSTIC LAPAROSCOPY  2012    DR ESPINOZA/ELIJAH   • DIAGNOSTIC LAPAROSCOPY N/A 8/10/2017     DIAGNOSTIC LAPAROSCOPY, LEFT SALPINGO-OOPHORECTOMY;  Keren Espinoza MD;  Location: [facility] OR;  Service:    • ENDOSCOPY N/A 10/15/2019    Procedure: ESOPHAGOGASTRODUODENOSCOPY WITH BIOPSY AND ESOPHAGEAL DILATATION;  Surgeon: Perfecto Mcknight MD;  Location: [facility] ENDOSCOPY;  Service: Gastroenterology   • FOOT SURGERY     • HERNIA REPAIR  2019   • NISSEN FUNDOPLICATION  2013   • UPPER GASTROINTESTINAL ENDOSCOPY     • VAGINAL HYSTERECTOMY N/A 2018    VAGINAL HYSTERECTOMY, BILATERAL SALPINGECTOMY;  Luc Alicea MD;  Location: [facility] OR;  Service: Gynecology   • WISDOM TOOTH EXTRACTION       Social History     Socioeconomic History   • Marital status:      Spouse name: Not on file   • Number of children: Not on file   • Years of education: Not on file   • Highest education level: Not on file   Tobacco Use   • Smoking status: Never Smoker   • Smokeless tobacco: Never Used   Substance and Sexual Activity   • Alcohol use: No   • Drug use: No   • Sexual activity: Yes     Partners: Male     Birth control/protection: Surgical     Family History   Problem Relation Age of Onset   • Coronary artery disease Mother    • Diabetes Mother    • Hypertension  Mother    • Kidney disease Father    • Skin cancer Father    • Stroke Maternal Grandfather    • Hypertension Maternal Grandfather    • Diabetes Maternal Grandfather    • Colon cancer Maternal Great-Grandmother    • Osteoporosis Neg Hx        Review of Systems:   Review of Systems   Constitutional: Positive for activity change and fatigue.   Respiratory: Negative.    Cardiovascular: Negative.    Musculoskeletal: Positive for arthralgias, gait problem, joint swelling and myalgias.   Allergic/Immunologic: Positive for environmental allergies.   Psychiatric/Behavioral: Positive for sleep disturbance and stress. The patient is nervous/anxious.          All other systems were reviewed and are negative.  Exceptions are noted in the subjective or above.      Objective     Vital Signs  Vitals:    12/09/20 0841   BP: 122/83   Pulse: 89   Resp: 16   Temp: 98.2 °F (36.8 °C)   SpO2: 98%       Body mass index is 31.53 kg/m².    Physical Exam  Vitals signs and nursing note reviewed.   Constitutional:       Appearance: She is well-developed.   Cardiovascular:      Rate and Rhythm: Normal rate and regular rhythm.      Heart sounds: Normal heart sounds.   Pulmonary:      Effort: Pulmonary effort is normal.      Breath sounds: Normal breath sounds.   Abdominal:      General: Bowel sounds are normal.      Palpations: Abdomen is soft.   Musculoskeletal: Normal range of motion.         General: Tenderness present.      Comments: Reddish-purple discoloration noted when patient foot dangling or attempting to stand.  Refill normal.  Swelling noted in left lower extremity but is not hot.  Patient does have boot which was removed for assessment and pulses are palpable   Skin:     General: Skin is warm and dry.      Capillary Refill: Capillary refill takes less than 2 seconds.      Findings: Erythema present.   Neurological:      Mental Status: She is alert and oriented to person, place, and time.      Coordination: Coordination normal.    Psychiatric:         Behavior: Behavior normal.         Thought Content: Thought content normal.         Judgment: Judgment normal.              Results Review:    I reviewed the patient's previous clinical results.       Medication Review:     Current Outpatient Medications:   •  albuterol sulfate  (90 Base) MCG/ACT inhaler, Inhale 2 puffs Every 6 (Six) Hours As Needed for Wheezing., Disp: 1 inhaler, Rfl: 0  •  aspirin  MG tablet, Take 325 mg by mouth Daily., Disp: , Rfl:   •  escitalopram (LEXAPRO) 10 MG tablet, Take 1 tablet by mouth Daily., Disp: 90 tablet, Rfl: 1  •  hydrOXYzine (ATARAX) 25 MG tablet, Take 1 tablet by mouth Every 8 (Eight) Hours As Needed for Anxiety., Disp: 90 tablet, Rfl: 1  •  ibuprofen (ADVIL,MOTRIN) 600 MG tablet, , Disp: , Rfl:   •  omeprazole (priLOSEC) 40 MG capsule, Take 40 mg by mouth Daily., Disp: , Rfl:   •  ondansetron ODT (ZOFRAN-ODT) 4 MG disintegrating tablet, Take 1 tablet by mouth Every 8 (Eight) Hours As Needed for Nausea or Vomiting., Disp: 12 tablet, Rfl: 0  •  oxybutynin (DITROPAN) 5 MG tablet, Take 1 tablet by mouth 2 (Two) Times a Day., Disp: 60 tablet, Rfl: 2  •  promethazine (PHENERGAN) 25 MG tablet, Take 1 tablet by mouth Every 6 (Six) Hours As Needed for Nausea or Vomiting., Disp: 12 tablet, Rfl: 0  •  vitamin D (ERGOCALCIFEROL) 1.25 MG (61654 UT) capsule capsule, Take 1 capsule by mouth 1 (One) Time Per Week., Disp: 12 capsule, Rfl: 0    Diagnoses and all orders for this visit:    Pain and swelling of left lower extremity  -     US venous doppler lower extremity left (duplex)    Discussed nonpharmacological interventions and recommend increasing activity.  Discussed DVT prevention and recommend following up with Ortho.  Discussed differential diagnosis.    Return if symptoms worsen or fail to improve.    Megha Fregoso, APRN  12/09/2020

## 2021-01-25 ENCOUNTER — APPOINTMENT (OUTPATIENT)
Dept: GENERAL RADIOLOGY | Facility: HOSPITAL | Age: 36
End: 2021-01-25

## 2021-01-25 ENCOUNTER — HOSPITAL ENCOUNTER (EMERGENCY)
Facility: HOSPITAL | Age: 36
Discharge: HOME | End: 2021-01-25
Attending: EMERGENCY MEDICINE | Admitting: STUDENT IN AN ORGANIZED HEALTH CARE EDUCATION/TRAINING PROGRAM

## 2021-01-25 VITALS
SYSTOLIC BLOOD PRESSURE: 121 MMHG | DIASTOLIC BLOOD PRESSURE: 70 MMHG | HEIGHT: 64 IN | HEART RATE: 77 BPM | WEIGHT: 195 LBS | RESPIRATION RATE: 18 BRPM | TEMPERATURE: 98.7 F | OXYGEN SATURATION: 99 % | BODY MASS INDEX: 33.29 KG/M2

## 2021-01-25 DIAGNOSIS — S93.402A SPRAIN OF LEFT ANKLE, UNSPECIFIED LIGAMENT, INITIAL ENCOUNTER: Primary | ICD-10-CM

## 2021-01-25 PROCEDURE — 99283 EMERGENCY DEPT VISIT LOW MDM: CPT

## 2021-01-25 PROCEDURE — 73630 X-RAY EXAM OF FOOT: CPT

## 2021-01-25 PROCEDURE — 73610 X-RAY EXAM OF ANKLE: CPT

## 2021-01-25 RX ORDER — HYDROCODONE BITARTRATE AND ACETAMINOPHEN 5; 325 MG/1; MG/1
1 TABLET ORAL EVERY 6 HOURS PRN
Qty: 12 TABLET | Refills: 0 | Status: SHIPPED | OUTPATIENT
Start: 2021-01-25 | End: 2021-02-26

## 2021-01-25 RX ORDER — HYDROCODONE BITARTRATE AND ACETAMINOPHEN 5; 325 MG/1; MG/1
1 TABLET ORAL ONCE
Status: COMPLETED | OUTPATIENT
Start: 2021-01-25 | End: 2021-01-25

## 2021-01-25 RX ADMIN — HYDROCODONE BITARTRATE AND ACETAMINOPHEN 1 TABLET: 5; 325 TABLET ORAL at 19:02

## 2021-02-08 ENCOUNTER — TRANSCRIBE ORDERS (OUTPATIENT)
Dept: ADMINISTRATIVE | Facility: HOSPITAL | Age: 36
End: 2021-02-08

## 2021-02-08 DIAGNOSIS — S86.392A OTHER INJURY OF MUSCLE(S) AND TENDON(S) OF PERONEAL MUSCLE GROUP AT LOWER LEG LEVEL, LEFT LEG, INITIAL ENCOUNTER: Primary | ICD-10-CM

## 2021-02-08 DIAGNOSIS — M76.72 PERONEAL TENDINITIS OF LEFT LOWER EXTREMITY: ICD-10-CM

## 2021-02-09 ENCOUNTER — HOSPITAL ENCOUNTER (OUTPATIENT)
Dept: MRI IMAGING | Facility: HOSPITAL | Age: 36
Discharge: HOME | End: 2021-02-09
Admitting: ORTHOPAEDIC SURGERY

## 2021-02-09 DIAGNOSIS — S86.392A OTHER INJURY OF MUSCLE(S) AND TENDON(S) OF PERONEAL MUSCLE GROUP AT LOWER LEG LEVEL, LEFT LEG, INITIAL ENCOUNTER: ICD-10-CM

## 2021-02-09 DIAGNOSIS — M76.72 PERONEAL TENDINITIS OF LEFT LOWER EXTREMITY: ICD-10-CM

## 2021-02-09 PROCEDURE — 73721 MRI JNT OF LWR EXTRE W/O DYE: CPT

## 2021-02-26 ENCOUNTER — OFFICE VISIT (OUTPATIENT)
Dept: INTERNAL MEDICINE | Facility: CLINIC | Age: 36
End: 2021-02-26

## 2021-02-26 VITALS
WEIGHT: 198.8 LBS | DIASTOLIC BLOOD PRESSURE: 64 MMHG | TEMPERATURE: 97.5 F | HEIGHT: 64 IN | SYSTOLIC BLOOD PRESSURE: 123 MMHG | HEART RATE: 76 BPM | OXYGEN SATURATION: 98 % | BODY MASS INDEX: 33.94 KG/M2

## 2021-02-26 DIAGNOSIS — F33.0 MILD EPISODE OF RECURRENT MAJOR DEPRESSIVE DISORDER (HCC): Primary | ICD-10-CM

## 2021-02-26 DIAGNOSIS — J30.9 ALLERGIC RHINITIS, UNSPECIFIED SEASONALITY, UNSPECIFIED TRIGGER: ICD-10-CM

## 2021-02-26 PROCEDURE — 99214 OFFICE O/P EST MOD 30 MIN: CPT | Performed by: NURSE PRACTITIONER

## 2021-02-26 RX ORDER — BUPROPION HYDROCHLORIDE 150 MG/1
150 TABLET ORAL EVERY MORNING
Qty: 90 TABLET | Refills: 0 | Status: SHIPPED | OUTPATIENT
Start: 2021-02-26 | End: 2021-03-22 | Stop reason: SDUPTHER

## 2021-02-26 RX ORDER — FLUTICASONE PROPIONATE 50 MCG
2 SPRAY, SUSPENSION (ML) NASAL DAILY
Qty: 15.8 ML | Refills: 1 | OUTPATIENT
Start: 2021-02-26 | End: 2021-05-18

## 2021-02-26 RX ORDER — MECLIZINE HCL 12.5 MG/1
12.5 TABLET ORAL 3 TIMES DAILY PRN
Qty: 30 TABLET | Refills: 0 | OUTPATIENT
Start: 2021-02-26 | End: 2021-05-18

## 2021-02-26 RX ORDER — LORATADINE 10 MG/1
10 TABLET ORAL DAILY
Qty: 30 TABLET | Refills: 1 | Status: SHIPPED | OUTPATIENT
Start: 2021-02-26 | End: 2023-03-24 | Stop reason: HOSPADM

## 2021-03-22 ENCOUNTER — OFFICE VISIT (OUTPATIENT)
Dept: INTERNAL MEDICINE | Facility: CLINIC | Age: 36
End: 2021-03-22

## 2021-03-22 VITALS
SYSTOLIC BLOOD PRESSURE: 115 MMHG | HEART RATE: 67 BPM | OXYGEN SATURATION: 99 % | WEIGHT: 194.8 LBS | TEMPERATURE: 97.8 F | DIASTOLIC BLOOD PRESSURE: 69 MMHG | BODY MASS INDEX: 33.26 KG/M2 | HEIGHT: 64 IN

## 2021-03-22 DIAGNOSIS — E66.9 OBESITY (BMI 30.0-34.9): Primary | ICD-10-CM

## 2021-03-22 DIAGNOSIS — J30.2 SEASONAL ALLERGIC RHINITIS, UNSPECIFIED TRIGGER: ICD-10-CM

## 2021-03-22 DIAGNOSIS — F41.9 ANXIETY: ICD-10-CM

## 2021-03-22 DIAGNOSIS — F33.0 MILD EPISODE OF RECURRENT MAJOR DEPRESSIVE DISORDER (HCC): ICD-10-CM

## 2021-03-22 PROCEDURE — 99214 OFFICE O/P EST MOD 30 MIN: CPT | Performed by: NURSE PRACTITIONER

## 2021-03-22 RX ORDER — HYDROXYZINE HYDROCHLORIDE 25 MG/1
25 TABLET, FILM COATED ORAL EVERY 8 HOURS PRN
Qty: 90 TABLET | Refills: 1 | Status: SHIPPED | OUTPATIENT
Start: 2021-03-22 | End: 2022-02-07 | Stop reason: SDUPTHER

## 2021-03-22 RX ORDER — BUPROPION HYDROCHLORIDE 300 MG/1
300 TABLET ORAL EVERY MORNING
Qty: 30 TABLET | Refills: 1 | Status: SHIPPED | OUTPATIENT
Start: 2021-03-22 | End: 2021-04-22 | Stop reason: SDUPTHER

## 2021-03-22 NOTE — PROGRESS NOTES
Chief Complaint / Reason:      Chief Complaint   Patient presents with   • Weight Loss     follow up on medication to help with weight loss       Subjective     HPI  Patient presents today for follow-up regarding weight loss and mood.  She states overall she feels like she is doing okay and can tell a difference but is wondering if we can increase the dosage of the Wellbutrin.  She denies any side effects.  Denies SI or HI.  She states that she feels more motivated and they are getting ready to go to [place] for vacation.  History taken from: patient    PMH/FH/Social History were reviewed and updated appropriately in the electronic medical record.   Past Medical History:   Diagnosis Date   • Abdominal pain, periumbilical    • Abdominal pain, RLQ (right lower quadrant)    • Acute pharyngitis    • Anxiety    • Appetite lost    • Chest pain    • Colon polyp 2017   • Depression     patient denies   • Diarrhea     Secondary to IBS   • Difficulty swallowing    • Dysphagia     Patient reported she is noticing that she gets choked easily   • Fatigue    • Fracture     RIGHT WRIST TWICE, LEFT HAND   • GERD (gastroesophageal reflux disease)    • Hearing loss     No use of hearing aids   • History of bronchitis    • History of colonoscopy    • History of ovarian cyst    • History of pneumonia    • Hyperthyroidism     hyperactive taken off medicine when pregnant.  Reported she was medication at one time, but none since pregnancy.    • Ovarian cyst, bilateral    • Seasonal allergies    • TMJ (dislocation of temporomandibular joint)    • Upper respiratory infection    • Vomiting      Past Surgical History:   Procedure Laterality Date   •  SECTION  2008    DR NAVARRETE   •  SECTION  10/30/2015    DR VASQUEZ   •  SECTION  2010    DR ESPINOZA   • CHOLECYSTECTOMY     • COLONOSCOPY N/A 2017    Procedure: COLONOSCOPY WITH COLD BIOPSY POLYPECTOMY; BIOPSIES , QUICK CLIP;  Surgeon: Perfecto  MEGAN Mcknight MD;  Location: [facility] ENDOSCOPY;  Service:    • DIAGNOSTIC LAPAROSCOPY  07/07/2005    DR JONES NAVA/cystectomy   • DIAGNOSTIC LAPAROSCOPY  02/14/2012    DR ESPINOZA/LEIJAH   • DIAGNOSTIC LAPAROSCOPY N/A 8/10/2017     DIAGNOSTIC LAPAROSCOPY, LEFT SALPINGO-OOPHORECTOMY;  Keren Espinoza MD;  Location: [facility] OR;  Service:    • ENDOSCOPY N/A 10/15/2019    Procedure: ESOPHAGOGASTRODUODENOSCOPY WITH BIOPSY AND ESOPHAGEAL DILATATION;  Surgeon: Perfecto Mcknight MD;  Location: [facility] ENDOSCOPY;  Service: Gastroenterology   • FOOT SURGERY     • HERNIA REPAIR  12/20/2019   • NISSEN FUNDOPLICATION  06/2013   • UPPER GASTROINTESTINAL ENDOSCOPY  2013   • VAGINAL HYSTERECTOMY N/A 7/9/2018    VAGINAL HYSTERECTOMY, BILATERAL SALPINGECTOMY;  Luc Alicea MD;  Location: [facility] OR;  Service: Gynecology   • WISDOM TOOTH EXTRACTION  2000     Social History     Socioeconomic History   • Marital status:      Spouse name: Not on file   • Number of children: Not on file   • Years of education: Not on file   • Highest education level: Not on file   Tobacco Use   • Smoking status: Never Smoker   • Smokeless tobacco: Never Used   Substance and Sexual Activity   • Alcohol use: No   • Drug use: No   • Sexual activity: Yes     Partners: Male     Birth control/protection: Surgical     Family History   Problem Relation Age of Onset   • Coronary artery disease Mother    • Diabetes Mother    • Hypertension Mother    • Kidney disease Father    • Skin cancer Father    • Stroke Maternal Grandfather    • Hypertension Maternal Grandfather    • Diabetes Maternal Grandfather    • Colon cancer Maternal Great-Grandmother    • Osteoporosis Neg Hx        Review of Systems:   Review of Systems   Constitutional: Positive for fatigue.   Respiratory: Negative.    Cardiovascular: Negative.    Allergic/Immunologic: Positive for environmental allergies.   Neurological: Negative.    Psychiatric/Behavioral: Negative.  Positive for stress.         All other systems  were reviewed and are negative.  Exceptions are noted in the subjective or above.      Objective     Vital Signs  Vitals:    03/22/21 1654   BP: 115/69   Pulse: 67   Temp: 97.8 °F (36.6 °C)   SpO2: 99%       Body mass index is 33.97 kg/m².    Physical Exam  Vitals and nursing note reviewed.   Constitutional:       Appearance: She is well-developed.   Cardiovascular:      Rate and Rhythm: Normal rate and regular rhythm.      Heart sounds: Normal heart sounds.   Pulmonary:      Effort: Pulmonary effort is normal.      Breath sounds: Normal breath sounds.   Abdominal:      General: Bowel sounds are normal.      Palpations: Abdomen is soft.   Musculoskeletal:         General: Normal range of motion.   Skin:     General: Skin is warm and dry.      Capillary Refill: Capillary refill takes less than 2 seconds.   Neurological:      Mental Status: She is alert and oriented to person, place, and time.      Coordination: Coordination normal.   Psychiatric:         Behavior: Behavior normal.         Thought Content: Thought content normal.         Judgment: Judgment normal.              Results Review:    I reviewed the patient's previous clinical results.       Medication Review:     Current Outpatient Medications:   •  albuterol sulfate  (90 Base) MCG/ACT inhaler, Inhale 2 puffs Every 6 (Six) Hours As Needed for Wheezing., Disp: 1 inhaler, Rfl: 0  •  aspirin  MG tablet, Take 325 mg by mouth Daily., Disp: , Rfl:   •  buPROPion XL (Wellbutrin XL) 300 MG 24 hr tablet, Take 1 tablet by mouth Every Morning., Disp: 30 tablet, Rfl: 1  •  escitalopram (LEXAPRO) 10 MG tablet, Take 1 tablet by mouth Daily., Disp: 90 tablet, Rfl: 1  •  fluticasone (Flonase) 50 MCG/ACT nasal spray, 2 sprays into the nostril(s) as directed by provider Daily., Disp: 15.8 mL, Rfl: 1  •  hydrOXYzine (ATARAX) 25 MG tablet, Take 1 tablet by mouth Every 8 (Eight) Hours As Needed for Anxiety., Disp: 90 tablet, Rfl: 1  •  ibuprofen (ADVIL,MOTRIN) 600  MG tablet, , Disp: , Rfl:   •  loratadine (Claritin) 10 MG tablet, Take 1 tablet by mouth Daily., Disp: 30 tablet, Rfl: 1  •  meclizine (ANTIVERT) 12.5 MG tablet, Take 1 tablet by mouth 3 (Three) Times a Day As Needed for Dizziness or Nausea., Disp: 30 tablet, Rfl: 0  •  omeprazole (priLOSEC) 40 MG capsule, Take 40 mg by mouth Daily., Disp: , Rfl:   •  ondansetron ODT (ZOFRAN-ODT) 4 MG disintegrating tablet, Take 1 tablet by mouth Every 8 (Eight) Hours As Needed for Nausea or Vomiting. (Patient taking differently: Take 4 mg by mouth As Needed for Nausea or Vomiting.), Disp: 12 tablet, Rfl: 0  •  oxybutynin (DITROPAN) 5 MG tablet, Take 1 tablet by mouth 2 (Two) Times a Day., Disp: 60 tablet, Rfl: 2  •  promethazine (PHENERGAN) 25 MG tablet, Take 1 tablet by mouth Every 6 (Six) Hours As Needed for Nausea or Vomiting. (Patient taking differently: Take 25 mg by mouth As Needed for Nausea or Vomiting.), Disp: 12 tablet, Rfl: 0  •  vitamin D (ERGOCALCIFEROL) 1.25 MG (33661 UT) capsule capsule, Take 1 capsule by mouth 1 (One) Time Per Week., Disp: 12 capsule, Rfl: 0    Diagnoses and all orders for this visit:    Obesity (BMI 30.0-34.9)  Continue Wellbutrin and discussed dietary modifications along with exercise  Mild episode of recurrent major depressive disorder (CMS/HCC)  -     buPROPion XL (Wellbutrin XL) 300 MG 24 hr tablet; Take 1 tablet by mouth Every Morning.  Continue Wellbutrin and discussed nonpharmacological interventions  Seasonal allergic rhinitis, unspecified trigger  -     hydrOXYzine (ATARAX) 25 MG tablet; Take 1 tablet by mouth Every 8 (Eight) Hours As Needed for Anxiety.    Anxiety  -     hydrOXYzine (ATARAX) 25 MG tablet; Take 1 tablet by mouth Every 8 (Eight) Hours As Needed for Anxiety.    Stable on current medication regimen      Return in about 4 weeks (around 4/19/2021), or if symptoms worsen or fail to improve.    Megha Fregoso, APRN  03/22/2021

## 2021-04-22 DIAGNOSIS — F33.0 MILD EPISODE OF RECURRENT MAJOR DEPRESSIVE DISORDER (HCC): ICD-10-CM

## 2021-04-22 RX ORDER — BUPROPION HYDROCHLORIDE 300 MG/1
300 TABLET ORAL EVERY MORNING
Qty: 90 TABLET | Refills: 1 | Status: SHIPPED | OUTPATIENT
Start: 2021-04-22 | End: 2022-02-07 | Stop reason: SDUPTHER

## 2021-04-29 ENCOUNTER — OFFICE VISIT (OUTPATIENT)
Dept: OBSTETRICS AND GYNECOLOGY | Facility: CLINIC | Age: 36
End: 2021-04-29

## 2021-04-29 ENCOUNTER — LAB (OUTPATIENT)
Dept: LAB | Facility: HOSPITAL | Age: 36
End: 2021-04-29

## 2021-04-29 VITALS
BODY MASS INDEX: 31.92 KG/M2 | DIASTOLIC BLOOD PRESSURE: 70 MMHG | SYSTOLIC BLOOD PRESSURE: 118 MMHG | WEIGHT: 187 LBS | HEIGHT: 64 IN

## 2021-04-29 DIAGNOSIS — Z01.411 ENCOUNTER FOR GYNECOLOGICAL EXAMINATION WITH ABNORMAL FINDING: ICD-10-CM

## 2021-04-29 DIAGNOSIS — R35.1 NOCTURIA: ICD-10-CM

## 2021-04-29 DIAGNOSIS — N39.3 SUI (STRESS URINARY INCONTINENCE, FEMALE): ICD-10-CM

## 2021-04-29 DIAGNOSIS — R35.1 NOCTURIA: Primary | ICD-10-CM

## 2021-04-29 DIAGNOSIS — F32.81 PMDD (PREMENSTRUAL DYSPHORIC DISORDER): ICD-10-CM

## 2021-04-29 PROBLEM — Z90.721 HISTORY OF LEFT SALPINGO-OOPHORECTOMY: Status: RESOLVED | Noted: 2018-05-30 | Resolved: 2021-04-29

## 2021-04-29 PROBLEM — Z90.79 HISTORY OF LEFT SALPINGO-OOPHORECTOMY: Status: RESOLVED | Noted: 2018-05-30 | Resolved: 2021-04-29

## 2021-04-29 LAB
BILIRUB UR QL STRIP: NEGATIVE
CLARITY UR: CLEAR
COLOR UR: YELLOW
GLUCOSE UR STRIP-MCNC: NEGATIVE MG/DL
HGB UR QL STRIP.AUTO: NEGATIVE
KETONES UR QL STRIP: NEGATIVE
LEUKOCYTE ESTERASE UR QL STRIP.AUTO: NEGATIVE
NITRITE UR QL STRIP: NEGATIVE
PH UR STRIP.AUTO: 6.5 [PH] (ref 5–8)
PROT UR QL STRIP: NEGATIVE
SP GR UR STRIP: <1.005 (ref 1–1.03)
UROBILINOGEN UR QL STRIP: ABNORMAL

## 2021-04-29 PROCEDURE — 99395 PREV VISIT EST AGE 18-39: CPT | Performed by: OBSTETRICS & GYNECOLOGY

## 2021-04-29 PROCEDURE — 81003 URINALYSIS AUTO W/O SCOPE: CPT

## 2021-04-29 RX ORDER — LIDOCAINE 50 MG/G
OINTMENT TOPICAL NIGHTLY
Qty: 35 G | Refills: 1 | Status: SHIPPED | OUTPATIENT
Start: 2021-04-29 | End: 2023-03-24 | Stop reason: HOSPADM

## 2021-04-29 NOTE — PROGRESS NOTES
Subjective   Chief Complaint   Patient presents with   • Annual Exam     Laura Thomson is a 35 y.o. year old  presenting to be seen for her annual exam.    Current birth control method: Hysterectomy .  She had a question regarding cycling with her to eat daughters.  Discussed that she still has 1 ovary and will still ovulating on a regular basis so she could have PMS symptoms along with them her cycle along with them.  She still has some stress incontinence and nocturia 4-5 times.  She drinks about 7 glasses of water a day.  Suggested that there is an urban myth to drink that much water.  She says she stays thirsty.  She goes through a few light liners a day.  Denies being a problem at home when she gets to the bathroom.  I would suggest she try to cut back on across the chest more opportunity to leak urine.  She reports she does some Kegel's but they are not very effective.  She has had 3  sections but did labor with the first in .      Patient's last menstrual period was 2018.    She is sexually active.   Condoms are not typically used.    STDs and sexual behavior discussed.  [place] is painful or she is having problems :yes at entry ; uses OTC lubricants; states she is a bit embarrassed to discuss this but I told her that is why she is here to see us.  We can potentially try some estrogens or some other things depending on what is found on examination.  She states her  does not really understand why it hurts.  She has concerns about domestic violence: no.                              She exercises regularly: yes.  Discussed maintaining healthy weight and nutrition and injury avoidance  Self breast awareness:yes    Calcium intake: is not adequate.1  Caffeine intake: no or mild caffeine use  Social History    Tobacco Use      Smoking status: Never Smoker      Smokeless tobacco: Never Used    Social History     Substance and Sexual Activity   Alcohol Use No      Discussed  avoidance of illicit drugs    The following portions of the patient's history were reviewed and updated as is  appropriate:problem list, current medications, allergies, past family history, past medical history, past social history and past surgical history.    Current Outpatient Medications:   •  albuterol sulfate  (90 Base) MCG/ACT inhaler, Inhale 2 puffs Every 6 (Six) Hours As Needed for Wheezing., Disp: 1 inhaler, Rfl: 0  •  aspirin  MG tablet, Take 325 mg by mouth Daily., Disp: , Rfl:   •  buPROPion XL (Wellbutrin XL) 300 MG 24 hr tablet, Take 1 tablet by mouth Every Morning., Disp: 90 tablet, Rfl: 1  •  escitalopram (LEXAPRO) 10 MG tablet, Take 1 tablet by mouth Daily., Disp: 90 tablet, Rfl: 1  •  fluticasone (Flonase) 50 MCG/ACT nasal spray, 2 sprays into the nostril(s) as directed by provider Daily., Disp: 15.8 mL, Rfl: 1  •  hydrOXYzine (ATARAX) 25 MG tablet, Take 1 tablet by mouth Every 8 (Eight) Hours As Needed for Anxiety., Disp: 90 tablet, Rfl: 1  •  ibuprofen (ADVIL,MOTRIN) 600 MG tablet, , Disp: , Rfl:   •  loratadine (Claritin) 10 MG tablet, Take 1 tablet by mouth Daily., Disp: 30 tablet, Rfl: 1  •  meclizine (ANTIVERT) 12.5 MG tablet, Take 1 tablet by mouth 3 (Three) Times a Day As Needed for Dizziness or Nausea., Disp: 30 tablet, Rfl: 0  •  omeprazole (priLOSEC) 40 MG capsule, Take 40 mg by mouth Daily., Disp: , Rfl:   •  ondansetron ODT (ZOFRAN-ODT) 4 MG disintegrating tablet, Take 1 tablet by mouth Every 8 (Eight) Hours As Needed for Nausea or Vomiting. (Patient taking differently: Take 4 mg by mouth As Needed for Nausea or Vomiting.), Disp: 12 tablet, Rfl: 0  •  oxybutynin (DITROPAN) 5 MG tablet, Take 1 tablet by mouth 2 (Two) Times a Day., Disp: 60 tablet, Rfl: 2  •  promethazine (PHENERGAN) 25 MG tablet, Take 1 tablet by mouth Every 6 (Six) Hours As Needed for Nausea or Vomiting. (Patient taking differently: Take 25 mg by mouth As Needed for Nausea or Vomiting.), Disp: 12 tablet,  "Rfl: 0  •  vitamin D (ERGOCALCIFEROL) 1.25 MG (87510 UT) capsule capsule, Take 1 capsule by mouth 1 (One) Time Per Week., Disp: 12 capsule, Rfl: 0  •  lidocaine (XYLOCAINE) 5 % ointment, Apply  topically to the appropriate area as directed Every Night., Disp: 35 g, Rfl: 1    Discussed risk factors for hypertension, hyperlipidemia coronary heart disease.    Review of systems    Constitutional    POS weight loss                            NEG anorexia, fatigue or night sweats  Breast                POS nothing reported                            NEG persistent breast lump, skin dimpling or nipple discharge  GI                      POS nothing reported                            NEG bloating, change in bowel habits, melena or reflux symptoms                       POS RITU is present but it IS NOT effecting her ADL's; nocturia 4-5 x                            NEG dysuria or hematuria         Objective   /70   Ht 161.3 cm (63.5\")   Wt 84.8 kg (187 lb)   LMP 06/26/2018   Breastfeeding No   BMI 32.61 kg/m²       General:  well developed; well nourished  no acute distress  appears stated age   Skin:  No suspicious lesions seen   Thyroid:    Breasts:  Examined in supine position  Symmetric without masses or skin dimpling  Nipples normal without inversion, lesions or discharge  Fibrocystic changes are present both breasts without a discrete mass   Abdomen: soft, non-tender; no masses  no umbilical or inguinal hernias are present  no hepato-splenomegaly   Pelvis: Clinical staff was present for exam  External genitalia:  normal appearance of the external genitalia including Bartholin's and Bosworth's glands.  :  urethral meatus normal; bladder tender to palpation;  Vaginal:  normal pink mucosa without prolapse or lesions. pH = 4.5 she is able to perform a Kegel contraction upon request;  Cervix:  absent.  Uterus:  absent.  Adnexa:  normal bimanual exam of the adnexa.  Rectal:  digital rectal exam not performed; anus " visually normal appearing.       Lab Review   CBC, CMP, LIPIDS, PATHOLOGY  , TSH and Vitamin D    Imaging  Mammogram report               Assessment     1. Premenopausal exam status post hysterectomy; she has her right ovary remaining.  2. Dyspareunia at entry.  Caliber of the vagina is little bit narrowed could not place 2 fingers because of discomfort.  I will try some lidocaine ointment  3. Fairly weak Kegel response would encourage doing these 5 minutes a day and as needed  4. Stress incontinence and nocturia discussed decreasing liquids.  She already limits liquids at nighttime check urinalysis to rule out UTI  5. Discussed my [facility] mid July             Plan     1. Annual examination or sooner as needed  2. 1000 mg calcium in divided doses ideally in diet; regular exercise  3. Self breast awareness discussed  4.    Continue timed voiding check urinalysis information regarding Kegels/incontinence and mid urethral sling   5.. Lidocaine ointment and cottonball nightly placed just inside introitus at night            New Medications Ordered This Visit   Medications   • lidocaine (XYLOCAINE) 5 % ointment     Sig: Apply  topically to the appropriate area as directed Every Night.     Dispense:  35 g     Refill:  1     Orders Placed This Encounter   Procedures   • Urinalysis With Culture If Indicated - Urine, Clean Catch     Standing Status:   Future     Standing Expiration Date:   4/29/2022     Order Specific Question:   Release to patient     Answer:   Immediate           This note was electronically signed.    Luc Alicea MD  4/29/2021

## 2021-05-03 ENCOUNTER — PREP FOR SURGERY (OUTPATIENT)
Dept: OTHER | Facility: HOSPITAL | Age: 36
End: 2021-05-03

## 2021-05-03 DIAGNOSIS — N39.3 SUI (STRESS URINARY INCONTINENCE, FEMALE): Primary | ICD-10-CM

## 2021-05-03 RX ORDER — SODIUM CHLORIDE 0.9 % (FLUSH) 0.9 %
3 SYRINGE (ML) INJECTION EVERY 12 HOURS SCHEDULED
Status: CANCELLED | OUTPATIENT
Start: 2021-05-03

## 2021-05-03 RX ORDER — SODIUM CHLORIDE 0.9 % (FLUSH) 0.9 %
1-10 SYRINGE (ML) INJECTION AS NEEDED
Status: CANCELLED | OUTPATIENT
Start: 2021-05-03

## 2021-05-12 ENCOUNTER — TRANSCRIBE ORDERS (OUTPATIENT)
Dept: LAB | Facility: HOSPITAL | Age: 36
End: 2021-05-12

## 2021-05-12 DIAGNOSIS — Z01.818 PRE-OP EXAM: Primary | ICD-10-CM

## 2021-05-27 ENCOUNTER — TRANSCRIBE ORDERS (OUTPATIENT)
Dept: LAB | Facility: HOSPITAL | Age: 36
End: 2021-05-27

## 2021-06-02 ENCOUNTER — PRE-ADMISSION TESTING (OUTPATIENT)
Dept: PREADMISSION TESTING | Facility: HOSPITAL | Age: 36
End: 2021-06-02

## 2021-06-02 ENCOUNTER — OFFICE VISIT (OUTPATIENT)
Dept: OBSTETRICS AND GYNECOLOGY | Facility: CLINIC | Age: 36
End: 2021-06-02

## 2021-06-02 VITALS
DIASTOLIC BLOOD PRESSURE: 70 MMHG | SYSTOLIC BLOOD PRESSURE: 118 MMHG | WEIGHT: 183 LBS | HEIGHT: 64 IN | BODY MASS INDEX: 31.24 KG/M2

## 2021-06-02 VITALS — BODY MASS INDEX: 32.54 KG/M2 | HEIGHT: 63 IN | WEIGHT: 183.64 LBS

## 2021-06-02 DIAGNOSIS — Z01.818 PREOP EXAMINATION: ICD-10-CM

## 2021-06-02 DIAGNOSIS — N39.3 SUI (STRESS URINARY INCONTINENCE, FEMALE): Primary | ICD-10-CM

## 2021-06-02 PROBLEM — K58.0 IRRITABLE BOWEL SYNDROME WITH DIARRHEA: Status: ACTIVE | Noted: 2021-06-02

## 2021-06-02 PROBLEM — R07.89 CHEST PAIN, ATYPICAL: Status: RESOLVED | Noted: 2020-06-09 | Resolved: 2021-06-02

## 2021-06-02 LAB
DEPRECATED RDW RBC AUTO: 42.3 FL (ref 37–54)
ERYTHROCYTE [DISTWIDTH] IN BLOOD BY AUTOMATED COUNT: 13.5 % (ref 12.3–15.4)
HCT VFR BLD AUTO: 42.5 % (ref 34–46.6)
HGB BLD-MCNC: 13.6 G/DL (ref 12–15.9)
MCH RBC QN AUTO: 27.8 PG (ref 26.6–33)
MCHC RBC AUTO-ENTMCNC: 32 G/DL (ref 31.5–35.7)
MCV RBC AUTO: 86.7 FL (ref 79–97)
PLATELET # BLD AUTO: 271 10*3/MM3 (ref 140–450)
PMV BLD AUTO: 10.8 FL (ref 6–12)
RBC # BLD AUTO: 4.9 10*6/MM3 (ref 3.77–5.28)
WBC # BLD AUTO: 7.73 10*3/MM3 (ref 3.4–10.8)

## 2021-06-02 PROCEDURE — 36415 COLL VENOUS BLD VENIPUNCTURE: CPT

## 2021-06-02 PROCEDURE — 85027 COMPLETE CBC AUTOMATED: CPT

## 2021-06-02 PROCEDURE — S0260 H&P FOR SURGERY: HCPCS | Performed by: OBSTETRICS & GYNECOLOGY

## 2021-06-02 NOTE — H&P (VIEW-ONLY)
Subjective   Laura Walls Workman is a 35 y.o. year old  who is scheduled  for surgery due to symptomatic stress urinary incontinence.  She has failed conservative measures with bladder training Kegel exercises.  Pessary discussed and declined.  She has been provided with information for mid urethral slings.  Discussed potential postoperative urinary retention and need for catheterization intermittently if that occurs.  Also discussed risk for damage to surrounding tissues.  Length of procedure discussed.  She should build go home later that same day we will provide her with information to record voided and post void residuals.  My nursing assistant will instruct her on self-catheterization techniques and will provide information that regarding more information regarding planned surgery.  This is scheduled for Wednesday the .  She is here today with her daughter.  She has had the opportunity ask and have her questions answered..    Past Medical History:   Diagnosis Date   • Abdominal pain, periumbilical    • Abdominal pain, RLQ (right lower quadrant)    • Acute pharyngitis    • Anxiety    • Appetite lost    • Chest pain    • Chest pain, atypical 2020   • Colon polyp 2017   • Depression     patient denies   • Diarrhea     Secondary to IBS   • Difficulty swallowing    • Dysphagia     Patient reported she is noticing that she gets choked easily   • Fatigue    • Fracture     RIGHT WRIST TWICE, LEFT HAND   • GERD (gastroesophageal reflux disease)    • H/O foot surgery 10/2020   • Hearing loss     No use of hearing aids   • History of bronchitis    • History of colonoscopy    • History of left salpingo-oophorectomy  2018   • History of ovarian cyst    • History of pneumonia    • Hyperthyroidism     hyperactive taken off medicine when pregnant.  Reported she was medication at one time, but none since pregnancy.    • Ovarian cyst, bilateral    • Seasonal allergies    • TMJ  (dislocation of temporomandibular joint)    • Upper respiratory infection    • Vomiting      Past Surgical History:   Procedure Laterality Date   •  SECTION  2008    DR NAVARRETE   •  SECTION  10/30/2015    DR VASQUEZ   •  SECTION  2010    DR ESPINOZA   • CHOLECYSTECTOMY     • COLONOSCOPY N/A 2017     COLONOSCOPY WITH COLD BIOPSY POLYPECTOMY; BIOPSIES , QUICK CLIP;  Surgeon: Perfecto Mcknight MD;  Location: [facility] ENDOSCOPY;  Service:    • DIAGNOSTIC LAPAROSCOPY  2005    DR JONES NAVA/cystectomy   • DIAGNOSTIC LAPAROSCOPY  2012    DR ESPINOZA/ELIJAH   • DIAGNOSTIC LAPAROSCOPY N/A 8/10/2017     DIAGNOSTIC LAPAROSCOPY, LEFT S&O;  Keren Espinoza MD;  Location: [facility] OR;  Service:    • ENDOSCOPY N/A 10/15/2019    Procedure: ESOPHAGOGASTRODUODENOSCOPY WITH BIOPSY AND ESOPHAGEAL DILATATION;  Surgeon: Perfecto Mcknight MD;  Location: [facility] ENDOSCOPY;  Service: Gastroenterology   • FOOT SURGERY     • HERNIA REPAIR  2019   • NISSEN FUNDOPLICATION  2013   • UPPER GASTROINTESTINAL ENDOSCOPY     • VAGINAL HYSTERECTOMY N/A 2018    VAGINAL HYSTERECTOMY, BILATERAL SALPINGECTOMY;  Luc Alicea MD;  Location: [facility] OR;  Service: Gynecology   • WISDOM TOOTH EXTRACTION       Social History     Socioeconomic History   • Marital status:      Spouse name: Not on file   • Number of children: Not on file   • Years of education: Not on file   • Highest education level: Not on file   Tobacco Use   • Smoking status: Never Smoker   • Smokeless tobacco: Never Used   Vaping Use   • Vaping Use: Never used   Substance and Sexual Activity   • Alcohol use: No   • Drug use: No   • Sexual activity: Yes     Partners: Male     Birth control/protection: Surgical       Current Outpatient Medications:   •  albuterol sulfate  (90 Base) MCG/ACT inhaler, Inhale 2 puffs Every 6 (Six) Hours As Needed for Wheezing., Disp: 1 inhaler, Rfl: 0  •  buPROPion XL (Wellbutrin XL) 300 MG 24 hr  tablet, Take 1 tablet by mouth Every Morning., Disp: 90 tablet, Rfl: 1  •  escitalopram (LEXAPRO) 10 MG tablet, Take 1 tablet by mouth Daily., Disp: 90 tablet, Rfl: 1  •  hydrOXYzine (ATARAX) 25 MG tablet, Take 1 tablet by mouth Every 8 (Eight) Hours As Needed for Anxiety., Disp: 90 tablet, Rfl: 1  •  ibuprofen (ADVIL,MOTRIN) 600 MG tablet, , Disp: , Rfl:   •  lidocaine (XYLOCAINE) 5 % ointment, Apply  topically to the appropriate area as directed Every Night., Disp: 35 g, Rfl: 1  •  loratadine (Claritin) 10 MG tablet, Take 1 tablet by mouth Daily., Disp: 30 tablet, Rfl: 1  •  omeprazole (priLOSEC) 40 MG capsule, Take 40 mg by mouth Daily., Disp: , Rfl:   •  ondansetron ODT (ZOFRAN-ODT) 4 MG disintegrating tablet, Take 1 tablet by mouth Every 8 (Eight) Hours As Needed for Nausea or Vomiting. (Patient taking differently: Take 4 mg by mouth As Needed for Nausea or Vomiting.), Disp: 12 tablet, Rfl: 0  •  promethazine (PHENERGAN) 25 MG tablet, Take 1 tablet by mouth Every 6 (Six) Hours As Needed for Nausea or Vomiting. (Patient taking differently: Take 25 mg by mouth As Needed for Nausea or Vomiting.), Disp: 12 tablet, Rfl: 0  •  vitamin D (ERGOCALCIFEROL) 1.25 MG (64744 UT) capsule capsule, Take 1 capsule by mouth 1 (One) Time Per Week., Disp: 12 capsule, Rfl: 0      No Known Allergies  Social History    Tobacco Use      Smoking status: Never Smoker      Smokeless tobacco: Never Used    Review of Systems   Constitutional: Negative.    HENT: Negative.    Eyes: Positive for visual disturbance.   Respiratory: Negative.    Cardiovascular: Negative.    Gastrointestinal: Positive for diarrhea and nausea.   Endocrine: Negative.    Genitourinary: Positive for difficulty urinating.   Musculoskeletal: Negative.    Skin: Negative.    Allergic/Immunologic: Negative.    Neurological: Negative.    Hematological: Negative.    Psychiatric/Behavioral: Negative.    wears glasses; GERD          Objective   /70   Ht 161.9 cm  "(63.75\")   Wt 83 kg (183 lb)   LMP 06/26/2018   Breastfeeding No   BMI 31.66 kg/m²   General: well developed; well nourished  no acute distress  appears stated age   Heart: regular rate and rhythm   Lungs: breathing is unlabored  clear to auscultation bilaterally   Abdomen: soft, non-tender; no masses  no umbilical or inguinal hernias are present  no hepato-splenomegaly   Pelvis:: Clinical staff was present for exam  She was instructed on intermittent self-catheterization techniques          Assessment   1. Symptomatic stress urinary incontinence interfering with activities of daily living.  Status post hysterectomy  2. Standard risks of anesthesia, bleeding, infection and damage to surrounding structures discussed.  Additional risk for specific procedure discussed as well.  Patient had opportunity to ask and have her questions answered regarding the planned surgery, risks, options, postoperative recovery and expected outcome.       Plan   1. She is scheduled for TVT exact (mid urethral sling) and associated cystoscopy at 730 on June 9th  2. Preadmission testing Covid testing  3. Given preoperative and postoperative instruction sheets and post void residual charts  4. I will call in postoperative narcotics on day of surgery      Luc Alicea M.D.  6/2/2021              "

## 2021-06-02 NOTE — DISCHARGE INSTRUCTIONS

## 2021-06-02 NOTE — H&P
Subjective   Laura Walls Workman is a 35 y.o. year old  who is scheduled  for surgery due to symptomatic stress urinary incontinence.  She has failed conservative measures with bladder training Kegel exercises.  Pessary discussed and declined.  She has been provided with information for mid urethral slings.  Discussed potential postoperative urinary retention and need for catheterization intermittently if that occurs.  Also discussed risk for damage to surrounding tissues.  Length of procedure discussed.  She should build go home later that same day we will provide her with information to record voided and post void residuals.  My nursing assistant will instruct her on self-catheterization techniques and will provide information that regarding more information regarding planned surgery.  This is scheduled for Wednesday the .  She is here today with her daughter.  She has had the opportunity ask and have her questions answered..    Past Medical History:   Diagnosis Date   • Abdominal pain, periumbilical    • Abdominal pain, RLQ (right lower quadrant)    • Acute pharyngitis    • Anxiety    • Appetite lost    • Chest pain    • Chest pain, atypical 2020   • Colon polyp 2017   • Depression     patient denies   • Diarrhea     Secondary to IBS   • Difficulty swallowing    • Dysphagia     Patient reported she is noticing that she gets choked easily   • Fatigue    • Fracture     RIGHT WRIST TWICE, LEFT HAND   • GERD (gastroesophageal reflux disease)    • H/O foot surgery 10/2020   • Hearing loss     No use of hearing aids   • History of bronchitis    • History of colonoscopy    • History of left salpingo-oophorectomy  2018   • History of ovarian cyst    • History of pneumonia    • Hyperthyroidism     hyperactive taken off medicine when pregnant.  Reported she was medication at one time, but none since pregnancy.    • Ovarian cyst, bilateral    • Seasonal allergies    • TMJ  (dislocation of temporomandibular joint)    • Upper respiratory infection    • Vomiting      Past Surgical History:   Procedure Laterality Date   •  SECTION  2008    DR NAVARRETE   •  SECTION  10/30/2015    DR VASQUEZ   •  SECTION  2010    DR ESPINOZA   • CHOLECYSTECTOMY     • COLONOSCOPY N/A 2017     COLONOSCOPY WITH COLD BIOPSY POLYPECTOMY; BIOPSIES , QUICK CLIP;  Surgeon: Perfecto Mcknight MD;  Location: [facility] ENDOSCOPY;  Service:    • DIAGNOSTIC LAPAROSCOPY  2005    DR JONES NAVA/cystectomy   • DIAGNOSTIC LAPAROSCOPY  2012    DR ESPINOZA/ELIJAH   • DIAGNOSTIC LAPAROSCOPY N/A 8/10/2017     DIAGNOSTIC LAPAROSCOPY, LEFT S&O;  Keren Esipnoza MD;  Location: [facility] OR;  Service:    • ENDOSCOPY N/A 10/15/2019    Procedure: ESOPHAGOGASTRODUODENOSCOPY WITH BIOPSY AND ESOPHAGEAL DILATATION;  Surgeon: Perfecto Mcknight MD;  Location: [facility] ENDOSCOPY;  Service: Gastroenterology   • FOOT SURGERY     • HERNIA REPAIR  2019   • NISSEN FUNDOPLICATION  2013   • UPPER GASTROINTESTINAL ENDOSCOPY     • VAGINAL HYSTERECTOMY N/A 2018    VAGINAL HYSTERECTOMY, BILATERAL SALPINGECTOMY;  Luc Alicea MD;  Location: [facility] OR;  Service: Gynecology   • WISDOM TOOTH EXTRACTION       Social History     Socioeconomic History   • Marital status:      Spouse name: Not on file   • Number of children: Not on file   • Years of education: Not on file   • Highest education level: Not on file   Tobacco Use   • Smoking status: Never Smoker   • Smokeless tobacco: Never Used   Vaping Use   • Vaping Use: Never used   Substance and Sexual Activity   • Alcohol use: No   • Drug use: No   • Sexual activity: Yes     Partners: Male     Birth control/protection: Surgical       Current Outpatient Medications:   •  albuterol sulfate  (90 Base) MCG/ACT inhaler, Inhale 2 puffs Every 6 (Six) Hours As Needed for Wheezing., Disp: 1 inhaler, Rfl: 0  •  buPROPion XL (Wellbutrin XL) 300 MG 24 hr  tablet, Take 1 tablet by mouth Every Morning., Disp: 90 tablet, Rfl: 1  •  escitalopram (LEXAPRO) 10 MG tablet, Take 1 tablet by mouth Daily., Disp: 90 tablet, Rfl: 1  •  hydrOXYzine (ATARAX) 25 MG tablet, Take 1 tablet by mouth Every 8 (Eight) Hours As Needed for Anxiety., Disp: 90 tablet, Rfl: 1  •  ibuprofen (ADVIL,MOTRIN) 600 MG tablet, , Disp: , Rfl:   •  lidocaine (XYLOCAINE) 5 % ointment, Apply  topically to the appropriate area as directed Every Night., Disp: 35 g, Rfl: 1  •  loratadine (Claritin) 10 MG tablet, Take 1 tablet by mouth Daily., Disp: 30 tablet, Rfl: 1  •  omeprazole (priLOSEC) 40 MG capsule, Take 40 mg by mouth Daily., Disp: , Rfl:   •  ondansetron ODT (ZOFRAN-ODT) 4 MG disintegrating tablet, Take 1 tablet by mouth Every 8 (Eight) Hours As Needed for Nausea or Vomiting. (Patient taking differently: Take 4 mg by mouth As Needed for Nausea or Vomiting.), Disp: 12 tablet, Rfl: 0  •  promethazine (PHENERGAN) 25 MG tablet, Take 1 tablet by mouth Every 6 (Six) Hours As Needed for Nausea or Vomiting. (Patient taking differently: Take 25 mg by mouth As Needed for Nausea or Vomiting.), Disp: 12 tablet, Rfl: 0  •  vitamin D (ERGOCALCIFEROL) 1.25 MG (24142 UT) capsule capsule, Take 1 capsule by mouth 1 (One) Time Per Week., Disp: 12 capsule, Rfl: 0      No Known Allergies  Social History    Tobacco Use      Smoking status: Never Smoker      Smokeless tobacco: Never Used    Review of Systems   Constitutional: Negative.    HENT: Negative.    Eyes: Positive for visual disturbance.   Respiratory: Negative.    Cardiovascular: Negative.    Gastrointestinal: Positive for diarrhea and nausea.   Endocrine: Negative.    Genitourinary: Positive for difficulty urinating.   Musculoskeletal: Negative.    Skin: Negative.    Allergic/Immunologic: Negative.    Neurological: Negative.    Hematological: Negative.    Psychiatric/Behavioral: Negative.    wears glasses; GERD          Objective   /70   Ht 161.9 cm  "(63.75\")   Wt 83 kg (183 lb)   LMP 06/26/2018   Breastfeeding No   BMI 31.66 kg/m²   General: well developed; well nourished  no acute distress  appears stated age   Heart: regular rate and rhythm   Lungs: breathing is unlabored  clear to auscultation bilaterally   Abdomen: soft, non-tender; no masses  no umbilical or inguinal hernias are present  no hepato-splenomegaly   Pelvis:: Clinical staff was present for exam  She was instructed on intermittent self-catheterization techniques          Assessment   1. Symptomatic stress urinary incontinence interfering with activities of daily living.  Status post hysterectomy  2. Standard risks of anesthesia, bleeding, infection and damage to surrounding structures discussed.  Additional risk for specific procedure discussed as well.  Patient had opportunity to ask and have her questions answered regarding the planned surgery, risks, options, postoperative recovery and expected outcome.       Plan   1. She is scheduled for TVT exact (mid urethral sling) and associated cystoscopy at 730 on June 9th  2. Preadmission testing Covid testing  3. Given preoperative and postoperative instruction sheets and post void residual charts  4. I will call in postoperative narcotics on day of surgery      Luc Alicea M.D.  6/2/2021              "

## 2021-06-02 NOTE — PAT

## 2021-06-04 ENCOUNTER — APPOINTMENT (OUTPATIENT)
Dept: GENERAL RADIOLOGY | Facility: HOSPITAL | Age: 36
End: 2021-06-04

## 2021-06-04 ENCOUNTER — HOSPITAL ENCOUNTER (EMERGENCY)
Facility: HOSPITAL | Age: 36
Discharge: HOME | End: 2021-06-04
Attending: EMERGENCY MEDICINE | Admitting: EMERGENCY MEDICINE

## 2021-06-04 VITALS
SYSTOLIC BLOOD PRESSURE: 111 MMHG | DIASTOLIC BLOOD PRESSURE: 74 MMHG | RESPIRATION RATE: 18 BRPM | HEART RATE: 72 BPM | BODY MASS INDEX: 32.25 KG/M2 | TEMPERATURE: 98.7 F | OXYGEN SATURATION: 97 % | WEIGHT: 182 LBS | HEIGHT: 63 IN

## 2021-06-04 DIAGNOSIS — S93.401A SPRAIN OF RIGHT ANKLE, UNSPECIFIED LIGAMENT, INITIAL ENCOUNTER: Primary | ICD-10-CM

## 2021-06-04 PROCEDURE — 99282 EMERGENCY DEPT VISIT SF MDM: CPT

## 2021-06-04 PROCEDURE — 73610 X-RAY EXAM OF ANKLE: CPT

## 2021-06-04 NOTE — ED PROVIDER NOTES
Subjective   35-year-old female presents to the ED with chief complaint of ankle injury.  Patient is complaining of right ankle pain.  Patient states that she stepped in a hole and rolled her ankle outward.  She has pain on the lateral aspect of her right malleolus.  Pain is worse with weightbearing.  No prior treatments or limiting factors.  No prior evaluations.  No other complaints at this time.          Review of Systems   Musculoskeletal: Positive for arthralgias and joint swelling.   All other systems reviewed and are negative.      Past Medical History:   Diagnosis Date   • Abdominal pain, periumbilical    • Abdominal pain, RLQ (right lower quadrant)    • Acute pharyngitis    • Anxiety    • Appetite lost    • Chest pain    • Chest pain, atypical 2020   • Colon polyp 2017   • Depression     patient denies   • Diarrhea     Secondary to IBS   • Difficulty swallowing    • Dysphagia     Patient reported she is noticing that she gets choked easily   • Fatigue    • Fracture     RIGHT WRIST TWICE, LEFT HAND   • GERD (gastroesophageal reflux disease)    • H/O foot surgery 10/2020   • Hearing loss     No use of hearing aids   • History of bronchitis    • History of colonoscopy    • History of left salpingo-oophorectomy  2018   • History of ovarian cyst    • History of pneumonia    • Hyperthyroidism     hyperactive taken off medicine when pregnant.  Reported she was medication at one time, but none since pregnancy.    • Ovarian cyst, bilateral    • Seasonal allergies    • TMJ (dislocation of temporomandibular joint)    • Upper respiratory infection    • Vomiting        No Known Allergies    Past Surgical History:   Procedure Laterality Date   •  SECTION  2008    DR NAVARRETE   •  SECTION  10/30/2015    DR VASQUEZ   •  SECTION  2010    DR ESPINOZA   • CHOLECYSTECTOMY     • COLONOSCOPY N/A 2017     COLONOSCOPY WITH COLD BIOPSY POLYPECTOMY; BIOPSIES ,  QUICK CLIP;  Surgeon: Perfecto Mcknight MD;  Location: [facility] ENDOSCOPY;  Service:    • DIAGNOSTIC LAPAROSCOPY  07/07/2005    DR JONES NAVA/cystectomy   • DIAGNOSTIC LAPAROSCOPY  02/14/2012    DR ESIPNOZA/ELIJAH   • DIAGNOSTIC LAPAROSCOPY N/A 8/10/2017     DIAGNOSTIC LAPAROSCOPY, LEFT S&O;  Keren Espinoza MD;  Location: [facility] OR;  Service:    • ENDOSCOPY N/A 10/15/2019    Procedure: ESOPHAGOGASTRODUODENOSCOPY WITH BIOPSY AND ESOPHAGEAL DILATATION;  Surgeon: Perfecto Mcknight MD;  Location: [facility] ENDOSCOPY;  Service: Gastroenterology   • FOOT SURGERY     • HERNIA REPAIR  12/20/2019   • NISSEN FUNDOPLICATION  06/2013   • UPPER GASTROINTESTINAL ENDOSCOPY  2013   • VAGINAL HYSTERECTOMY N/A 7/9/2018    VAGINAL HYSTERECTOMY, BILATERAL SALPINGECTOMY;  Luc Alicea MD;  Location: [facility] OR;  Service: Gynecology   • WISDOM TOOTH EXTRACTION  2000       Family History   Problem Relation Age of Onset   • Coronary artery disease Mother    • Diabetes Mother    • Hypertension Mother    • Kidney disease Father    • Skin cancer Father    • Stroke Maternal Grandfather    • Hypertension Maternal Grandfather    • Diabetes Maternal Grandfather    • Colon cancer Maternal Great-Grandmother    • Osteoporosis Neg Hx        Social History     Socioeconomic History   • Marital status:      Spouse name: Not on file   • Number of children: Not on file   • Years of education: Not on file   • Highest education level: Not on file   Tobacco Use   • Smoking status: Never Smoker   • Smokeless tobacco: Never Used   Vaping Use   • Vaping Use: Never used   Substance and Sexual Activity   • Alcohol use: No   • Drug use: No   • Sexual activity: Yes     Partners: Male     Birth control/protection: Surgical           Objective   Physical Exam  Vitals and nursing note reviewed.   Constitutional:       General: She is not in acute distress.     Appearance: She is well-developed. She is not diaphoretic.   HENT:      Head: Normocephalic and atraumatic.      Nose: Nose  normal.   Eyes:      Conjunctiva/sclera: Conjunctivae normal.   Cardiovascular:      Rate and Rhythm: Normal rate and regular rhythm.   Pulmonary:      Effort: Pulmonary effort is normal. No respiratory distress.      Breath sounds: Normal breath sounds.   Abdominal:      General: There is no distension.      Palpations: Abdomen is soft.      Tenderness: There is no abdominal tenderness. There is no guarding.   Musculoskeletal:         General: No deformity.      Comments: Tenderness to palpation the right ankle.  No ecchymosis or edema.  Tenderness is mostly localized to the lateral malleolus.   Neurological:      Mental Status: She is alert and oriented to person, place, and time.      Cranial Nerves: No cranial nerve deficit.         Procedures           ED Course                                           MDM    35-year-old female with right ankle injury.  Imaging is negative for acute process.  Patient is appropriate for discharge to follow-up as needed.    Final diagnoses:   Sprain of right ankle, unspecified ligament, initial encounter       ED Disposition  ED Disposition     ED Disposition Condition Comment    Discharge Stable           Megha Fregoso, APRN  [address] [id number] [phone]               Medication List      Changed    ondansetron ODT 4 MG disintegrating tablet  Commonly known as: ZOFRAN-ODT  Take 1 tablet by mouth Every 8 (Eight) Hours As Needed for Nausea or Vomiting.  What changed: when to take this     promethazine 25 MG tablet  Commonly known as: PHENERGAN  Take 1 tablet by mouth Every 6 (Six) Hours As Needed for Nausea or Vomiting.  What changed: when to take this             Blu Morales, DO  06/05/21 0721

## 2021-06-07 ENCOUNTER — LAB (OUTPATIENT)
Dept: LAB | Facility: HOSPITAL | Age: 36
End: 2021-06-07

## 2021-06-07 DIAGNOSIS — Z01.818 PRE-OP EXAM: ICD-10-CM

## 2021-06-07 LAB — SARS-COV-2 RNA NOSE QL NAA+PROBE: NOT DETECTED

## 2021-06-07 PROCEDURE — C9803 HOPD COVID-19 SPEC COLLECT: HCPCS

## 2021-06-07 PROCEDURE — U0004 COV-19 TEST NON-CDC HGH THRU: HCPCS

## 2021-06-08 ENCOUNTER — ANESTHESIA EVENT (OUTPATIENT)
Dept: PERIOP | Facility: HOSPITAL | Age: 36
End: 2021-06-08

## 2021-06-08 RX ORDER — FAMOTIDINE 10 MG/ML
20 INJECTION, SOLUTION INTRAVENOUS ONCE
Status: CANCELLED | OUTPATIENT
Start: 2021-06-08 | End: 2021-06-08

## 2021-06-09 ENCOUNTER — HOSPITAL ENCOUNTER (OUTPATIENT)
Facility: HOSPITAL | Age: 36
Discharge: HOME | End: 2021-06-09
Attending: OBSTETRICS & GYNECOLOGY | Admitting: ANESTHESIOLOGY

## 2021-06-09 ENCOUNTER — ANESTHESIA (OUTPATIENT)
Dept: PERIOP | Facility: HOSPITAL | Age: 36
End: 2021-06-09

## 2021-06-09 VITALS
BODY MASS INDEX: 32.25 KG/M2 | WEIGHT: 182 LBS | DIASTOLIC BLOOD PRESSURE: 81 MMHG | HEIGHT: 63 IN | HEART RATE: 90 BPM | TEMPERATURE: 98.5 F | OXYGEN SATURATION: 92 % | SYSTOLIC BLOOD PRESSURE: 125 MMHG | RESPIRATION RATE: 18 BRPM

## 2021-06-09 DIAGNOSIS — G89.18 POST-OP PAIN: Primary | ICD-10-CM

## 2021-06-09 DIAGNOSIS — N39.3 SUI (STRESS URINARY INCONTINENCE, FEMALE): ICD-10-CM

## 2021-06-09 PROCEDURE — C1771 REP DEV, URINARY, W/SLING: HCPCS | Performed by: OBSTETRICS & GYNECOLOGY

## 2021-06-09 PROCEDURE — 25010000002 DEXAMETHASONE PER 1 MG: Performed by: NURSE ANESTHETIST, CERTIFIED REGISTERED

## 2021-06-09 PROCEDURE — 25010000002 KETOROLAC TROMETHAMINE PER 15 MG: Performed by: NURSE ANESTHETIST, CERTIFIED REGISTERED

## 2021-06-09 PROCEDURE — 25010000002 ONDANSETRON PER 1 MG: Performed by: NURSE ANESTHETIST, CERTIFIED REGISTERED

## 2021-06-09 PROCEDURE — 25010000003 CEFAZOLIN IN DEXTROSE 2-4 GM/100ML-% SOLUTION: Performed by: NURSE ANESTHETIST, CERTIFIED REGISTERED

## 2021-06-09 PROCEDURE — 25010000002 PROPOFOL 10 MG/ML EMULSION: Performed by: NURSE ANESTHETIST, CERTIFIED REGISTERED

## 2021-06-09 PROCEDURE — 25010000002 ATROPINE PER 0.01 MG: Performed by: NURSE ANESTHETIST, CERTIFIED REGISTERED

## 2021-06-09 PROCEDURE — 25010000002 FENTANYL CITRATE (PF) 50 MCG/ML SOLUTION: Performed by: NURSE ANESTHETIST, CERTIFIED REGISTERED

## 2021-06-09 PROCEDURE — 25010000002 MIDAZOLAM PER 1 MG: Performed by: ANESTHESIOLOGY

## 2021-06-09 PROCEDURE — 57288 REPAIR BLADDER DEFECT: CPT | Performed by: OBSTETRICS & GYNECOLOGY

## 2021-06-09 DEVICE — SLNG TVT EXACT CONTINENCE SYS BX/1EA: Type: IMPLANTABLE DEVICE | Site: URETHRA | Status: FUNCTIONAL

## 2021-06-09 RX ORDER — CEFAZOLIN SODIUM 2 G/100ML
INJECTION, SOLUTION INTRAVENOUS AS NEEDED
Status: DISCONTINUED | OUTPATIENT
Start: 2021-06-09 | End: 2021-06-09 | Stop reason: SURG

## 2021-06-09 RX ORDER — DEXAMETHASONE SODIUM PHOSPHATE 4 MG/ML
INJECTION, SOLUTION INTRA-ARTICULAR; INTRALESIONAL; INTRAMUSCULAR; INTRAVENOUS; SOFT TISSUE AS NEEDED
Status: DISCONTINUED | OUTPATIENT
Start: 2021-06-09 | End: 2021-06-09 | Stop reason: SURG

## 2021-06-09 RX ORDER — OXYCODONE AND ACETAMINOPHEN 10; 325 MG/1; MG/1
1 TABLET ORAL EVERY 4 HOURS PRN
Status: DISCONTINUED | OUTPATIENT
Start: 2021-06-09 | End: 2021-06-09 | Stop reason: HOSPADM

## 2021-06-09 RX ORDER — SODIUM CHLORIDE, SODIUM LACTATE, POTASSIUM CHLORIDE, CALCIUM CHLORIDE 600; 310; 30; 20 MG/100ML; MG/100ML; MG/100ML; MG/100ML
9 INJECTION, SOLUTION INTRAVENOUS CONTINUOUS
Status: DISCONTINUED | OUTPATIENT
Start: 2021-06-09 | End: 2021-06-09 | Stop reason: HOSPADM

## 2021-06-09 RX ORDER — FENTANYL CITRATE 50 UG/ML
INJECTION, SOLUTION INTRAMUSCULAR; INTRAVENOUS AS NEEDED
Status: DISCONTINUED | OUTPATIENT
Start: 2021-06-09 | End: 2021-06-09 | Stop reason: SURG

## 2021-06-09 RX ORDER — DROPERIDOL 2.5 MG/ML
0.62 INJECTION, SOLUTION INTRAMUSCULAR; INTRAVENOUS ONCE AS NEEDED
Status: DISCONTINUED | OUTPATIENT
Start: 2021-06-09 | End: 2021-06-09 | Stop reason: HOSPADM

## 2021-06-09 RX ORDER — KETOROLAC TROMETHAMINE 30 MG/ML
INJECTION, SOLUTION INTRAMUSCULAR; INTRAVENOUS AS NEEDED
Status: DISCONTINUED | OUTPATIENT
Start: 2021-06-09 | End: 2021-06-09 | Stop reason: SURG

## 2021-06-09 RX ORDER — HYDROMORPHONE HYDROCHLORIDE 1 MG/ML
0.5 INJECTION, SOLUTION INTRAMUSCULAR; INTRAVENOUS; SUBCUTANEOUS
Status: DISCONTINUED | OUTPATIENT
Start: 2021-06-09 | End: 2021-06-09 | Stop reason: HOSPADM

## 2021-06-09 RX ORDER — SODIUM CHLORIDE 0.9 % (FLUSH) 0.9 %
10 SYRINGE (ML) INJECTION AS NEEDED
Status: DISCONTINUED | OUTPATIENT
Start: 2021-06-09 | End: 2021-06-09 | Stop reason: HOSPADM

## 2021-06-09 RX ORDER — SODIUM CHLORIDE 0.9 % (FLUSH) 0.9 %
10 SYRINGE (ML) INJECTION EVERY 12 HOURS SCHEDULED
Status: DISCONTINUED | OUTPATIENT
Start: 2021-06-09 | End: 2021-06-09 | Stop reason: HOSPADM

## 2021-06-09 RX ORDER — OXYCODONE AND ACETAMINOPHEN 7.5; 325 MG/1; MG/1
1 TABLET ORAL ONCE AS NEEDED
Status: DISCONTINUED | OUTPATIENT
Start: 2021-06-09 | End: 2021-06-09 | Stop reason: HOSPADM

## 2021-06-09 RX ORDER — SODIUM CHLORIDE 0.9 % (FLUSH) 0.9 %
1-10 SYRINGE (ML) INJECTION AS NEEDED
Status: DISCONTINUED | OUTPATIENT
Start: 2021-06-09 | End: 2021-06-09 | Stop reason: HOSPADM

## 2021-06-09 RX ORDER — SODIUM CHLORIDE 0.9 % (FLUSH) 0.9 %
3 SYRINGE (ML) INJECTION EVERY 12 HOURS SCHEDULED
Status: DISCONTINUED | OUTPATIENT
Start: 2021-06-09 | End: 2021-06-09 | Stop reason: HOSPADM

## 2021-06-09 RX ORDER — GLYCOPYRROLATE 0.2 MG/ML
INJECTION INTRAMUSCULAR; INTRAVENOUS AS NEEDED
Status: DISCONTINUED | OUTPATIENT
Start: 2021-06-09 | End: 2021-06-09 | Stop reason: SURG

## 2021-06-09 RX ORDER — ONDANSETRON 4 MG/1
4 TABLET, FILM COATED ORAL ONCE AS NEEDED
Status: DISCONTINUED | OUTPATIENT
Start: 2021-06-09 | End: 2021-06-09 | Stop reason: HOSPADM

## 2021-06-09 RX ORDER — MEPERIDINE HYDROCHLORIDE 25 MG/ML
12.5 INJECTION INTRAMUSCULAR; INTRAVENOUS; SUBCUTANEOUS
Status: DISCONTINUED | OUTPATIENT
Start: 2021-06-09 | End: 2021-06-09 | Stop reason: HOSPADM

## 2021-06-09 RX ORDER — ATROPINE SULFATE 0.4 MG/ML
AMPUL (ML) INJECTION AS NEEDED
Status: DISCONTINUED | OUTPATIENT
Start: 2021-06-09 | End: 2021-06-09 | Stop reason: SURG

## 2021-06-09 RX ORDER — LIDOCAINE HYDROCHLORIDE 10 MG/ML
INJECTION, SOLUTION EPIDURAL; INFILTRATION; INTRACAUDAL; PERINEURAL AS NEEDED
Status: DISCONTINUED | OUTPATIENT
Start: 2021-06-09 | End: 2021-06-09 | Stop reason: SURG

## 2021-06-09 RX ORDER — TRAMADOL HYDROCHLORIDE 50 MG/1
50 TABLET ORAL ONCE AS NEEDED
Status: DISCONTINUED | OUTPATIENT
Start: 2021-06-09 | End: 2021-06-09 | Stop reason: HOSPADM

## 2021-06-09 RX ORDER — IPRATROPIUM BROMIDE AND ALBUTEROL SULFATE 2.5; .5 MG/3ML; MG/3ML
3 SOLUTION RESPIRATORY (INHALATION) ONCE AS NEEDED
Status: DISCONTINUED | OUTPATIENT
Start: 2021-06-09 | End: 2021-06-09 | Stop reason: HOSPADM

## 2021-06-09 RX ORDER — ACETAMINOPHEN 325 MG/1
650 TABLET ORAL EVERY 6 HOURS
Start: 2021-06-09 | End: 2022-01-19 | Stop reason: HOSPADM

## 2021-06-09 RX ORDER — FENTANYL CITRATE 50 UG/ML
50 INJECTION, SOLUTION INTRAMUSCULAR; INTRAVENOUS
Status: DISCONTINUED | OUTPATIENT
Start: 2021-06-09 | End: 2021-06-09 | Stop reason: HOSPADM

## 2021-06-09 RX ORDER — ONDANSETRON 2 MG/ML
4 INJECTION INTRAMUSCULAR; INTRAVENOUS ONCE AS NEEDED
Status: COMPLETED | OUTPATIENT
Start: 2021-06-09 | End: 2021-06-09

## 2021-06-09 RX ORDER — BUPIVACAINE HYDROCHLORIDE 5 MG/ML
INJECTION, SOLUTION PERINEURAL AS NEEDED
Status: DISCONTINUED | OUTPATIENT
Start: 2021-06-09 | End: 2021-06-09 | Stop reason: HOSPADM

## 2021-06-09 RX ORDER — IBUPROFEN 600 MG/1
600 TABLET ORAL EVERY 6 HOURS PRN
Status: DISCONTINUED | OUTPATIENT
Start: 2021-06-09 | End: 2021-06-09 | Stop reason: HOSPADM

## 2021-06-09 RX ORDER — EPHEDRINE SULFATE 50 MG/ML
INJECTION, SOLUTION INTRAVENOUS AS NEEDED
Status: DISCONTINUED | OUTPATIENT
Start: 2021-06-09 | End: 2021-06-09 | Stop reason: SURG

## 2021-06-09 RX ORDER — ACETAMINOPHEN 325 MG/1
650 TABLET ORAL ONCE
Status: DISCONTINUED | OUTPATIENT
Start: 2021-06-09 | End: 2021-06-09 | Stop reason: HOSPADM

## 2021-06-09 RX ORDER — SODIUM CHLORIDE 9 MG/ML
INJECTION, SOLUTION INTRAVENOUS CONTINUOUS PRN
Status: COMPLETED | OUTPATIENT
Start: 2021-06-09 | End: 2021-06-09

## 2021-06-09 RX ORDER — ONDANSETRON 2 MG/ML
INJECTION INTRAMUSCULAR; INTRAVENOUS AS NEEDED
Status: DISCONTINUED | OUTPATIENT
Start: 2021-06-09 | End: 2021-06-09 | Stop reason: SURG

## 2021-06-09 RX ORDER — ACETAMINOPHEN 325 MG/1
650 TABLET ORAL ONCE AS NEEDED
Status: DISCONTINUED | OUTPATIENT
Start: 2021-06-09 | End: 2021-06-09 | Stop reason: HOSPADM

## 2021-06-09 RX ORDER — TRAMADOL HYDROCHLORIDE 50 MG/1
50 TABLET ORAL EVERY 6 HOURS PRN
Qty: 10 TABLET | Refills: 0 | Status: SHIPPED | OUTPATIENT
Start: 2021-06-09 | End: 2021-08-17

## 2021-06-09 RX ORDER — PROPOFOL 10 MG/ML
VIAL (ML) INTRAVENOUS AS NEEDED
Status: DISCONTINUED | OUTPATIENT
Start: 2021-06-09 | End: 2021-06-09 | Stop reason: SURG

## 2021-06-09 RX ORDER — IBUPROFEN 600 MG/1
600 TABLET ORAL 4 TIMES DAILY
Qty: 24 TABLET | Refills: 0
Start: 2021-06-09 | End: 2021-08-17

## 2021-06-09 RX ORDER — ACETAMINOPHEN 650 MG/1
650 SUPPOSITORY RECTAL ONCE AS NEEDED
Status: DISCONTINUED | OUTPATIENT
Start: 2021-06-09 | End: 2021-06-09 | Stop reason: HOSPADM

## 2021-06-09 RX ORDER — LIDOCAINE HYDROCHLORIDE 10 MG/ML
0.5 INJECTION, SOLUTION EPIDURAL; INFILTRATION; INTRACAUDAL; PERINEURAL ONCE AS NEEDED
Status: COMPLETED | OUTPATIENT
Start: 2021-06-09 | End: 2021-06-09

## 2021-06-09 RX ORDER — FAMOTIDINE 20 MG/1
20 TABLET, FILM COATED ORAL ONCE
Status: COMPLETED | OUTPATIENT
Start: 2021-06-09 | End: 2021-06-09

## 2021-06-09 RX ORDER — MIDAZOLAM HYDROCHLORIDE 1 MG/ML
1 INJECTION INTRAMUSCULAR; INTRAVENOUS
Status: COMPLETED | OUTPATIENT
Start: 2021-06-09 | End: 2021-06-09

## 2021-06-09 RX ORDER — MAGNESIUM HYDROXIDE 1200 MG/15ML
LIQUID ORAL AS NEEDED
Status: DISCONTINUED | OUTPATIENT
Start: 2021-06-09 | End: 2021-06-09 | Stop reason: HOSPADM

## 2021-06-09 RX ADMIN — TRAMADOL HYDROCHLORIDE 50 MG: 50 TABLET, FILM COATED ORAL at 10:23

## 2021-06-09 RX ADMIN — GLYCOPYRROLATE 0.2 MCG: 0.4 INJECTION INTRAMUSCULAR; INTRAVENOUS at 08:12

## 2021-06-09 RX ADMIN — LIDOCAINE HYDROCHLORIDE 50 MG: 10 INJECTION, SOLUTION EPIDURAL; INFILTRATION; INTRACAUDAL; PERINEURAL at 07:46

## 2021-06-09 RX ADMIN — FENTANYL CITRATE 50 MCG: 50 INJECTION, SOLUTION INTRAMUSCULAR; INTRAVENOUS at 09:39

## 2021-06-09 RX ADMIN — ONDANSETRON 4 MG: 2 INJECTION INTRAMUSCULAR; INTRAVENOUS at 09:44

## 2021-06-09 RX ADMIN — ONDANSETRON 4 MG: 2 INJECTION INTRAMUSCULAR; INTRAVENOUS at 08:29

## 2021-06-09 RX ADMIN — DEXAMETHASONE SODIUM PHOSPHATE 8 MG: 4 INJECTION, SOLUTION INTRA-ARTICULAR; INTRALESIONAL; INTRAMUSCULAR; INTRAVENOUS; SOFT TISSUE at 07:49

## 2021-06-09 RX ADMIN — LIDOCAINE HYDROCHLORIDE 0.5 ML: 10 INJECTION, SOLUTION EPIDURAL; INFILTRATION; INTRACAUDAL; PERINEURAL at 07:07

## 2021-06-09 RX ADMIN — PROPOFOL 200 MG: 10 INJECTION, EMULSION INTRAVENOUS at 07:46

## 2021-06-09 RX ADMIN — ATROPINE SULFATE 0.1 MG: 0.4 INJECTION, SOLUTION INTRAMUSCULAR; INTRAVENOUS; SUBCUTANEOUS at 08:15

## 2021-06-09 RX ADMIN — FENTANYL CITRATE 50 MCG: 50 INJECTION, SOLUTION INTRAMUSCULAR; INTRAVENOUS at 09:44

## 2021-06-09 RX ADMIN — EPHEDRINE SULFATE 10 MG: 50 INJECTION INTRAVENOUS at 08:05

## 2021-06-09 RX ADMIN — SODIUM CHLORIDE, POTASSIUM CHLORIDE, SODIUM LACTATE AND CALCIUM CHLORIDE: 600; 310; 30; 20 INJECTION, SOLUTION INTRAVENOUS at 08:40

## 2021-06-09 RX ADMIN — KETOROLAC TROMETHAMINE 30 MG: 30 INJECTION, SOLUTION INTRAMUSCULAR; INTRAVENOUS at 09:02

## 2021-06-09 RX ADMIN — FENTANYL CITRATE 50 MCG: 50 INJECTION, SOLUTION INTRAMUSCULAR; INTRAVENOUS at 07:45

## 2021-06-09 RX ADMIN — EPHEDRINE SULFATE 20 MG: 50 INJECTION INTRAVENOUS at 08:09

## 2021-06-09 RX ADMIN — FENTANYL CITRATE 50 MCG: 50 INJECTION, SOLUTION INTRAMUSCULAR; INTRAVENOUS at 07:40

## 2021-06-09 RX ADMIN — GLYCOPYRROLATE 0.2 MCG: 0.4 INJECTION INTRAMUSCULAR; INTRAVENOUS at 08:11

## 2021-06-09 RX ADMIN — FAMOTIDINE 20 MG: 20 TABLET ORAL at 07:08

## 2021-06-09 RX ADMIN — MIDAZOLAM HYDROCHLORIDE 1 MG: 1 INJECTION, SOLUTION INTRAMUSCULAR; INTRAVENOUS at 07:20

## 2021-06-09 RX ADMIN — SODIUM CHLORIDE, POTASSIUM CHLORIDE, SODIUM LACTATE AND CALCIUM CHLORIDE 9 ML/HR: 600; 310; 30; 20 INJECTION, SOLUTION INTRAVENOUS at 07:08

## 2021-06-09 RX ADMIN — CEFAZOLIN SODIUM 2 G: 10 INJECTION, POWDER, FOR SOLUTION INTRAVENOUS at 07:40

## 2021-06-09 RX ADMIN — MIDAZOLAM HYDROCHLORIDE 1 MG: 1 INJECTION, SOLUTION INTRAMUSCULAR; INTRAVENOUS at 07:39

## 2021-06-09 NOTE — ANESTHESIA POSTPROCEDURE EVALUATION
Patient: Laura Thomson    Procedure Summary     Date: 06/09/21 Room / Location:  [facility] [address] [place] [address] [facility]    Anesthesia Start: 0740 Anesthesia Stop: 0914    Procedure: MID-URETHRAL SLING WITH CYSTOSCOPY TVT EXACT (N/A Uterus) Diagnosis:       RITU (stress urinary incontinence, female)      (RITU (stress urinary incontinence, female) [N39.3])    Surgeons: Luc Alicea MD Provider: German Calix MD    Anesthesia Type: general ASA Status: 2          Anesthesia Type: general    Vitals  Vitals Value Taken Time   BP 96/74 06/09/21 0910   Temp     Pulse 92 06/09/21 0913   Resp     SpO2 100 % 06/09/21 0913   Vitals shown include unvalidated device data.        Post Anesthesia Care and Evaluation    Patient location during evaluation: PACU  Patient participation: complete - patient participated  Level of consciousness: awake and alert  Pain management: adequate  Airway patency: patent  Anesthetic complications: No anesthetic complications  PONV Status: none  Cardiovascular status: hemodynamically stable and acceptable  Respiratory status: nonlabored ventilation, acceptable and nasal cannula  Hydration status: acceptable

## 2021-06-09 NOTE — OP NOTE
2021      Preoperative diagnosis: Stress urinary incontinence    Postoperative diagnosis: Same    Procedure: Tension-free vaginal taping-exact with associated cystoscopy    Anesthesia: General    Circulator: Audrey Adam RN  Scrub Person: BonnieRoula  Assistant: Renetta Wang PA  Orientee: Joel Michele RN       Estimated blood loss: 75 ml    Complications: none    Drains: none      Brief history and indications for procedure: This patient is a 35 y.o. [id number] has symptomatic stress urinary incontinence.  She has failed conservative medical management.  This was interfering with activities of daily living.  Alternative therapies were discussed.  Risk and benefits of each of these was discussed.  She elected to proceed with mid urethral sling in the form of TVT (tension-free vaginal taping).  Potential complications discussed including need for intermittent self-catheterization if she encounters urinary retention.  She had previously demonstrated ability to perform self-catheterization in the office.  Other potential complications included but were not limited to damage to surrounding structures including bladder, ureter, urethra and bowel.      Procedure: The patient was taken to the operating room placed under general anesthesia without complication.  She was prepped and draped in standard fashion dorsolithotomy position.  A timeout was undertaken for cystoscopy and tension-free vaginal taping/exact.    The 30 degree cystoscope was inserted under direct visualization and the bladder was inspected.  Findings revealed normal bladder anatomy.  Ureteral orifices were located bilaterally.    5 mL's local was placed to the right and left periurethral tissue.  A midline incision was made approximately 1 cm from the urethral meatus with knife blade suburethrally.  The para urethral space was opened up with Metzenbaum scissors.  5 mL of local was placed approximately 1 cm to the right and left of the  "midline suprapubically.  Incisions were made with knife blade.    The bladder was drained.  Urethral guide was placed and the urethra was deviated to the right.  The TVT  EXACT trocar was passed on the left side in standard fashion behind the symphysis pubis towards the ipsilateral shoulder and exiting through the previously made skin Incision.  The urethral guide was removed , the cystoscope was inserted to inspect for proper placement.  With no foreign body noted within the bladder , the bladder was drained and the procedure was repeated in a similar fashion on the opposite side by deviating the urethra to the left while the TVT trocar was passed on the right side.  The bladder was inspected to assure no foreign body was seen within the bladder.  The ureteral orifices were visualized and normal peristalsis and urine reflux was noted.  Having ascertained proper placement of the trochars, the TVT tape was you are adjusted.  A #8 Hegar dilator was passed into the urethra.  There was adequate mobility of approximately 45 degrees with the dilator.  Valsalva pressure was given to inspect for and noted  leakage of urine.  The dilator was again passed to make sure there was no \"bump \"in the urethra due to the TVT tape.  With the TVT adequately adjusted,  a right angle clamp was placed between the TVT tape and the urethra to keep the TVT tape flattened while the plastic sheaths were removed suprapubically.  The TVT tape was cut just below the level of the skin.  These incisions were closed with 3-0 plain suture.  The vaginal incision was closed with a 3-0 chromic stitch.      The Shafer was  Pulled after leaving 150-200 ml of fluid in the bladder to backfill.  Sponge, instrument and needle counts were correct.  The patient tolerated the procedure well and was taken to postop recovery area in stable condition.    Luc Alicea MD  "

## 2021-06-09 NOTE — PLAN OF CARE
Goal Outcome Evaluation:  Plan of Care Reviewed With: patient, mother        Progress: improving  Outcome Summary: Patient recvd from PACU. Tolerating PO, pain controled and pt has voided. All DC criteria met. DC home with mother.

## 2021-06-09 NOTE — ANESTHESIA PROCEDURE NOTES
Airway  Urgency: elective    Date/Time: 6/9/2021 7:46 AM  End Time:6/9/2021 7:47 AM  Airway not difficult    General Information and Staff    Patient location during procedure: OR  CRNA: Kraig Garcia CRNA    Indications and Patient Condition  Indications for airway management: airway protection    Preoxygenated: yes  Mask difficulty assessment: 1 - vent by mask    Final Airway Details  Final airway type: supraglottic airway      Successful airway: I-gel  Size 4    Number of attempts at approach: 1  Assessment: lips, teeth, and gum same as pre-op    Additional Comments  LMA placed without difficulty, ventilation with assist, equal breath sounds and symmetric chest rise and fall

## 2021-06-09 NOTE — ANESTHESIA PREPROCEDURE EVALUATION
Anesthesia Evaluation     Patient summary reviewed and Nursing notes reviewed   no history of anesthetic complications:  NPO Solid Status: > 8 hours  NPO Liquid Status: > 2 hours           Airway   Mallampati: II  TM distance: >3 FB  Neck ROM: full  No difficulty expected  Dental - normal exam     Pulmonary - negative pulmonary ROS and normal exam    breath sounds clear to auscultation  Cardiovascular - negative cardio ROS and normal exam    Rhythm: regular  Rate: normal      ROS comment: 5/20 Negative stress test - had chest/upper abdominal pain later diagnosed as GERD    Neuro/Psych- negative ROS  GI/Hepatic/Renal/Endo    (+)  GERD well controlled,      Musculoskeletal (-) negative ROS    Abdominal    Substance History      OB/GYN      Comment: Pelvic prolapse      Other - negative ROS                       Anesthesia Plan    ASA 2     general     intravenous induction     Anesthetic plan, all risks, benefits, and alternatives have been provided, discussed and informed consent has been obtained with: patient.    Plan discussed with CRNA.

## 2021-06-10 ENCOUNTER — HOSPITAL ENCOUNTER (EMERGENCY)
Facility: HOSPITAL | Age: 36
Discharge: HOME | End: 2021-06-10
Attending: EMERGENCY MEDICINE | Admitting: EMERGENCY MEDICINE

## 2021-06-10 ENCOUNTER — TELEPHONE (OUTPATIENT)
Dept: OBSTETRICS AND GYNECOLOGY | Facility: CLINIC | Age: 36
End: 2021-06-10

## 2021-06-10 VITALS
BODY MASS INDEX: 33.31 KG/M2 | TEMPERATURE: 98.3 F | DIASTOLIC BLOOD PRESSURE: 79 MMHG | WEIGHT: 188 LBS | SYSTOLIC BLOOD PRESSURE: 117 MMHG | RESPIRATION RATE: 18 BRPM | HEART RATE: 61 BPM | HEIGHT: 63 IN | OXYGEN SATURATION: 97 %

## 2021-06-10 DIAGNOSIS — R33.9 URINARY RETENTION: Primary | ICD-10-CM

## 2021-06-10 LAB
BACTERIA UR QL AUTO: ABNORMAL /HPF
BILIRUB UR QL STRIP: NEGATIVE
CLARITY UR: CLEAR
COLOR UR: YELLOW
GLUCOSE UR STRIP-MCNC: NEGATIVE MG/DL
HGB UR QL STRIP.AUTO: ABNORMAL
HYALINE CASTS UR QL AUTO: ABNORMAL /LPF
KETONES UR QL STRIP: NEGATIVE
LEUKOCYTE ESTERASE UR QL STRIP.AUTO: ABNORMAL
NITRITE UR QL STRIP: NEGATIVE
PH UR STRIP.AUTO: 5.5 [PH] (ref 5–8)
PROT UR QL STRIP: NEGATIVE
RBC # UR: ABNORMAL /HPF
REF LAB TEST METHOD: ABNORMAL
SP GR UR STRIP: 1.02 (ref 1–1.03)
SQUAMOUS #/AREA URNS HPF: ABNORMAL /HPF
UROBILINOGEN UR QL STRIP: ABNORMAL
WBC UR QL AUTO: ABNORMAL /HPF

## 2021-06-10 PROCEDURE — 81001 URINALYSIS AUTO W/SCOPE: CPT | Performed by: EMERGENCY MEDICINE

## 2021-06-10 PROCEDURE — 99283 EMERGENCY DEPT VISIT LOW MDM: CPT

## 2021-06-10 PROCEDURE — 51798 US URINE CAPACITY MEASURE: CPT

## 2021-06-10 NOTE — TELEPHONE ENCOUNTER
Laura had question of how long she is to go between voiding.  She is going every 2-3 hours voiding about 200 mL.  Has not had to catheterize her self yet.  Told her she may be voiding more often because of the IV fluids.  She was up about 130 and emptied her bladder and was awake until 3 but did not void till 630 or 7 AM when she voided about 300 mL.  I have asked her not to drink anything for about 3 hours before she goes to sleep and to void before she goes to sleep.  I would like her to do it in and out cath after she voids wants to be sure she does not have an increased postvoid residual.    Less than 100 would be good if it is 200 and more she may need to catheterize her self particularly at night before she goes to sleep so that she can avoid over distention her bladder  She voiced understanding.  And will call if she has any questions.

## 2021-06-10 NOTE — ED PROVIDER NOTES
Subjective   35-year-old female presents to the ED with a chief complaint of postop complication.  The patient indicates that she had a procedure yesterday at [facility].  Chart review shows that she had a Tension-free vaginal taping-exact with associated cystoscopy.  She states that she has had some difficulty with urinating and decreased urinary output since procedure.  She states that earlier today she was urinating 200 to 300 mL every few hours but since then it has decreased.  She states that she has some suprapubic pressure.  The surgeon told her to straight cath herself at home but she was unable to path she pass the catheter secondary to some swelling.  She denies fever chills.  No other complaints at this time.          Review of Systems   Constitutional: Negative for fatigue and fever.   Genitourinary: Positive for difficulty urinating and pelvic pain.   All other systems reviewed and are negative.      Past Medical History:   Diagnosis Date   • Abdominal pain, periumbilical    • Abdominal pain, RLQ (right lower quadrant)    • Acute pharyngitis    • Anxiety    • Appetite lost    • Chest pain    • Chest pain, atypical 6/9/2020   • Colon polyp 02/17/2017   • Depression     patient denies   • Diarrhea     Secondary to IBS   • Difficulty swallowing    • Dysphagia     Patient reported she is noticing that she gets choked easily   • Fatigue    • Fracture     RIGHT WRIST TWICE, LEFT HAND   • GERD (gastroesophageal reflux disease)    • H/O foot surgery 10/2020   • Hearing loss     No use of hearing aids   • History of bronchitis    • History of colonoscopy    • History of left salpingo-oophorectomy  2017 August 5/30/2018   • History of ovarian cyst    • History of pneumonia    • Hyperthyroidism     hyperactive taken off medicine when pregnant.  Reported she was medication at one time, but none since pregnancy.    • Ovarian cyst, bilateral    • Seasonal allergies    • TMJ (dislocation of  temporomandibular joint)    • Upper respiratory infection    • Vomiting        No Known Allergies    Past Surgical History:   Procedure Laterality Date   •  SECTION  2008    DR NAVARRETE   •  SECTION  10/30/2015    DR VASQUEZ   •  SECTION  2010    DR ESPINOZA   • CHOLECYSTECTOMY     • COLONOSCOPY N/A 2017     COLONOSCOPY WITH COLD BIOPSY POLYPECTOMY; BIOPSIES , QUICK CLIP;  Surgeon: Perfecto Mcknight MD;  Location: [facility] ENDOSCOPY;  Service:    • DIAGNOSTIC LAPAROSCOPY  2005    DR JONES NAVA/cystectomy   • DIAGNOSTIC LAPAROSCOPY  2012    DR ESPINOZA/ELIJAH   • DIAGNOSTIC LAPAROSCOPY N/A 8/10/2017     DIAGNOSTIC LAPAROSCOPY, LEFT S&O;  Keren Espinoza MD;  Location: [facility] OR;  Service:    • ENDOSCOPY N/A 10/15/2019    Procedure: ESOPHAGOGASTRODUODENOSCOPY WITH BIOPSY AND ESOPHAGEAL DILATATION;  Surgeon: Perfecto Mcknight MD;  Location:  [facility];  Service: Gastroenterology   • FOOT SURGERY Left     ligament and tendon repair   • HERNIA REPAIR  2019    abd   • MID-URETHRAL SLING WITH CYSTOSCOPY N/A 2021    Procedure: MID-URETHRAL SLING WITH CYSTOSCOPY TVT EXACT;  Surgeon: Luc Alicea MD;  Location:  [facility];  Service: Obstetrics/Gynecology;  Laterality: N/A;   • NISSEN FUNDOPLICATION  2013   • UPPER GASTROINTESTINAL ENDOSCOPY     • VAGINAL HYSTERECTOMY N/A 2018    VAGINAL HYSTERECTOMY, BILATERAL SALPINGECTOMY;  Luc Alicea MD;  Location:  [facility] OR;  Service: Gynecology   • WISDOM TOOTH EXTRACTION         Family History   Problem Relation Age of Onset   • Coronary artery disease Mother    • Diabetes Mother    • Hypertension Mother    • Kidney disease Father    • Skin cancer Father    • Stroke Maternal Grandfather    • Hypertension Maternal Grandfather    • Diabetes Maternal Grandfather    • Colon cancer Maternal Great-Grandmother    • Osteoporosis Neg Hx        Social History     Socioeconomic History   • Marital status:      Spouse name:  Not on file   • Number of children: Not on file   • Years of education: Not on file   • Highest education level: Not on file   Tobacco Use   • Smoking status: Never Smoker   • Smokeless tobacco: Never Used   Vaping Use   • Vaping Use: Never used   Substance and Sexual Activity   • Alcohol use: No   • Drug use: No   • Sexual activity: Yes     Partners: Male     Birth control/protection: Surgical           Objective   Physical Exam  Vitals and nursing note reviewed.   Constitutional:       General: She is not in acute distress.     Appearance: She is well-developed. She is not diaphoretic.   HENT:      Head: Normocephalic and atraumatic.      Nose: Nose normal.   Eyes:      Conjunctiva/sclera: Conjunctivae normal.   Cardiovascular:      Rate and Rhythm: Normal rate and regular rhythm.   Pulmonary:      Effort: Pulmonary effort is normal. No respiratory distress.      Breath sounds: Normal breath sounds.   Abdominal:      General: There is no distension.      Palpations: Abdomen is soft.      Tenderness: There is abdominal tenderness. There is no guarding.      Comments: spurapubic TTP   Musculoskeletal:         General: No deformity.   Neurological:      Mental Status: She is alert and oriented to person, place, and time.      Cranial Nerves: No cranial nerve deficit.         Procedures           ED Course                                           MDM         35-year-old female presents to the ED complaining of urinary retention decreased urinary output status post cystoscopy and vaginal taping. Bladder scan shows 100 cc in the bladder. Discussed with Dr. Cruz the patient surgeon he recommended placing a Shafer catheter until she can follow-up on Monday. Patient was agreeable to this plan.    Final diagnoses:   Urinary retention       ED Disposition  ED Disposition     ED Disposition Condition Comment    Discharge Stable           Megha Fregoso, TRISTEN  [address]  [facility]  [address]  [phone]          Luc Alicea MD  [address] [address]  [phone]    Schedule an appointment as soon as possible for a visit            Medication List      Changed    ondansetron ODT 4 MG disintegrating tablet  Commonly known as: ZOFRAN-ODT  Take 1 tablet by mouth Every 8 (Eight) Hours As Needed for Nausea or Vomiting.  What changed: when to take this     promethazine 25 MG tablet  Commonly known as: PHENERGAN  Take 1 tablet by mouth Every 6 (Six) Hours As Needed for Nausea or Vomiting.  What changed: when to take this             Blu Morales, DO  06/11/21 0035

## 2021-06-11 NOTE — ED NOTES
Dr. Alicea on the phone at this time. Call transferred to Dr. Morales.      German Brandt  06/10/21 2016

## 2021-06-11 NOTE — PROGRESS NOTES
Telephone call to patient on Friday, June 11.  She was seen emergency room in [place].  Note states that she had decreased urination.  I spoke with her earlier on Thursday and she was voiding about 200 mL every couple of hours and I have asked her to try to stretch the void to 3 every 3 hours.  She report a lot of pressure and was unable to self catheter self to rule out increased post void residual as I discussed on the telephone.  She had been shown how to self catheterize her self in the office prior to surgery.  She went to the emergency room and they did a bladder scan and there was only 100 mL in the bladder.  She reports that after the catheter was placed she felt better with less pressure.  She slept better last night had about 500 mL in the bladder this morning.  Gave her the option of removing the catheter self or coming to the office on Monday and she would rather come to the office.  I have asked her to record the amount of urine that she voids or empties from her Shafer catheter so we get an idea of urinary output.  Could be there is swelling or maybe even a small hematoma underneath the urethra that is causing some of her pressure.  I do not think Urispas to be helpful since she has had her symptoms relieved with the catheter being placed.  The reassuring is that she was able to void without too much difficulty after surgery.  It could be that the placement of the TVT exact is irritating her bladder and giving her the pressure sensation however she should still have a sensation with the Shafer catheter in place if that was the cause.  We will evaluate on Monday.  Patient voices understanding

## 2021-06-11 NOTE — ED NOTES
[employer] [facility] contacted at this time requesting on-call for Dr. Alicea per Dr. Morales. Dr. Alicea to be paged and return call.      German Brandt  06/10/21 2012

## 2021-06-14 ENCOUNTER — LAB (OUTPATIENT)
Dept: LAB | Facility: HOSPITAL | Age: 36
End: 2021-06-14

## 2021-06-14 ENCOUNTER — OFFICE VISIT (OUTPATIENT)
Dept: OBSTETRICS AND GYNECOLOGY | Facility: CLINIC | Age: 36
End: 2021-06-14

## 2021-06-14 VITALS — SYSTOLIC BLOOD PRESSURE: 110 MMHG | RESPIRATION RATE: 16 BRPM | DIASTOLIC BLOOD PRESSURE: 60 MMHG

## 2021-06-14 DIAGNOSIS — G89.18 POST-OPERATIVE PAIN: ICD-10-CM

## 2021-06-14 DIAGNOSIS — G89.18 POST-OPERATIVE PAIN: Primary | ICD-10-CM

## 2021-06-14 LAB
BILIRUB UR QL STRIP: NEGATIVE
CLARITY UR: CLEAR
COLOR UR: YELLOW
GLUCOSE UR STRIP-MCNC: NEGATIVE MG/DL
HGB UR QL STRIP.AUTO: ABNORMAL
KETONES UR QL STRIP: NEGATIVE
LEUKOCYTE ESTERASE UR QL STRIP.AUTO: NEGATIVE
NITRITE UR QL STRIP: NEGATIVE
PH UR STRIP.AUTO: 6.5 [PH] (ref 5–8)
PROT UR QL STRIP: ABNORMAL
SP GR UR STRIP: 1.03 (ref 1–1.03)
UROBILINOGEN UR QL STRIP: ABNORMAL

## 2021-06-14 PROCEDURE — 99024 POSTOP FOLLOW-UP VISIT: CPT | Performed by: OBSTETRICS & GYNECOLOGY

## 2021-06-14 PROCEDURE — 81001 URINALYSIS AUTO W/SCOPE: CPT

## 2021-06-14 NOTE — PROGRESS NOTES
Subjective   Chief Complaint   Patient presents with   • Post-op     Laura Thomson is a 35 y.o. year old  presenting to be seen for probable Shafer catheter removal.   She reports that after I talked to her I had suggested possibly catheterizing herself to be sure she did not have an increased postvoid residual.  She was unable to catheterize herself with assistance of her daughter when her mother tried it was very painful so that is why they went [facility] ED.  She had a TVT done on Wednesday the  and  was able to void when I talked her on Thursday, about 200 to 300 mL at a time.  I asked her to check postvoid residual at least once to be sure there is not a great deal and if so she can continue to catheter self as needed.  She reportedly had trouble catheterizing herself and went to the emergency room she said they had trouble catheterizing her as well so when they called I elected to have them leave the Shafer in over the weekend to give her some relief.  Currently the Shafer catheter is really bothering her.   I reviewed notes and the urinalysis did shows a little bit of blood some white cells and red cells and trace of leuks.  So we will send urinalysis from this Shafer  She is here today with her daughter.    The following portions of the patient's history were reviewed and updated as appropriate:problem list, current medications, allergies and past surgical history    Current Outpatient Medications:   •  acetaminophen (TYLENOL) 325 MG tablet, Take 2 tablets by mouth Every 6 (Six) Hours. For 3 to 5 days postoperatively, Disp: , Rfl:   •  albuterol sulfate  (90 Base) MCG/ACT inhaler, Inhale 2 puffs Every 6 (Six) Hours As Needed for Wheezing., Disp: 1 inhaler, Rfl: 0  •  buPROPion XL (Wellbutrin XL) 300 MG 24 hr tablet, Take 1 tablet by mouth Every Morning., Disp: 90 tablet, Rfl: 1  •  escitalopram (LEXAPRO) 10 MG tablet, Take 1 tablet by mouth Daily., Disp: 90 tablet, Rfl: 1  •   hydrOXYzine (ATARAX) 25 MG tablet, Take 1 tablet by mouth Every 8 (Eight) Hours As Needed for Anxiety., Disp: 90 tablet, Rfl: 1  •  ibuprofen (ADVIL,MOTRIN) 600 MG tablet, , Disp: , Rfl:   •  ibuprofen (ADVIL,MOTRIN) 600 MG tablet, Take 1 tablet by mouth 4 (Four) Times a Day. For 3 to 5 days postoperatively, Disp: 24 tablet, Rfl: 0  •  lidocaine (XYLOCAINE) 5 % ointment, Apply  topically to the appropriate area as directed Every Night., Disp: 35 g, Rfl: 1  •  loratadine (Claritin) 10 MG tablet, Take 1 tablet by mouth Daily., Disp: 30 tablet, Rfl: 1  •  omeprazole (priLOSEC) 40 MG capsule, Take 40 mg by mouth Daily., Disp: , Rfl:   •  ondansetron ODT (ZOFRAN-ODT) 4 MG disintegrating tablet, Take 1 tablet by mouth Every 8 (Eight) Hours As Needed for Nausea or Vomiting. (Patient taking differently: Take 4 mg by mouth As Needed for Nausea or Vomiting.), Disp: 12 tablet, Rfl: 0  •  promethazine (PHENERGAN) 25 MG tablet, Take 1 tablet by mouth Every 6 (Six) Hours As Needed for Nausea or Vomiting. (Patient taking differently: Take 25 mg by mouth As Needed for Nausea or Vomiting.), Disp: 12 tablet, Rfl: 0  •  vitamin D (ERGOCALCIFEROL) 1.25 MG (49992 UT) capsule capsule, Take 1 capsule by mouth 1 (One) Time Per Week., Disp: 12 capsule, Rfl: 0  •  traMADol (ULTRAM) 50 MG tablet, Take 1 tablet by mouth Every 6 (Six) Hours As Needed for Severe Pain ., Disp: 10 tablet, Rfl: 0  Review of Systems : Please see above     Objective   /60   Resp 16   LMP 06/26/2018   Breastfeeding No     General:  well developed; well nourished  no acute distress  appears stated age   Abdomen: soft, non-tender; no masses  no umbilical or inguinal hernias are present  no hepato-splenomegaly  The suprapubic incisions are intact there is bruising in the midline and off to the right yellowish-green approximately 3 cm maximal dimension   Pelvis: Clinical staff was present for exam  External genitalia:  normal appearance of the external genitalia  "including Bartholin's and Amesti's glands.  :  urethral meatus normal; There does not appear to be any significant swelling around the Shafer catheter which we were removed today and send urinalysis.  Vaginal:  normal pink mucosa without prolapse or lesions.  Uterus:  absent.  Adnexa:  normal bimanual exam of the adnexa.  Rectal:  digital rectal exam not performed; anus visually normal appearing.                  Assessment   1. History of postoperative bladder \"pressure \"without excessive postvoid residual.  The patient reports that she did void in the emergency room before the bladder scanner but only got 100 on bladder scan which would be within normal limits the first couple days after TVT/mid urethral sling.       Plan   1. I discussed again timed voiding recording voided amounts.  If she does not feel full post void she does not need to catheterize her self.  She may want to catheterize her self tonight after limiting liquids for 3 hours prior to sleep.  If there is less than 100 mL on catheterization I would suggest she not do that in the future.  On the other hand should she get up to her times or more in the middle of the night she may need to catheterize her self after that second episode in the middle of the night.  She reports she feels comfortable catheterizing herself.  She may stay at her mother's as her  is not very helpful postoperatively.  2. Keep her follow-up appointment June 24 sooner as needed.  3. If pressure persists might call in medication to help such as Urispas    Orders Placed This Encounter   Procedures   • Urinalysis With Culture If Indicated - Urine, Catheter In/Out     Standing Status:   Future     Standing Expiration Date:   6/14/2022     Order Specific Question:   Release to patient     Answer:   Immediate     No orders of the defined types were placed in this encounter.         This note was electronically signed.    Luc Alicea MD  June 14, 2021    "

## 2021-06-15 LAB
BACTERIA UR QL AUTO: ABNORMAL /HPF
COD CRY URNS QL: ABNORMAL /HPF
HYALINE CASTS UR QL AUTO: ABNORMAL /LPF
RBC # UR: ABNORMAL /HPF
REF LAB TEST METHOD: ABNORMAL
SQUAMOUS #/AREA URNS HPF: ABNORMAL /HPF
WBC UR QL AUTO: ABNORMAL /HPF

## 2021-06-18 ENCOUNTER — TELEPHONE (OUTPATIENT)
Dept: OBSTETRICS AND GYNECOLOGY | Facility: CLINIC | Age: 36
End: 2021-06-18

## 2021-06-24 ENCOUNTER — OFFICE VISIT (OUTPATIENT)
Dept: OBSTETRICS AND GYNECOLOGY | Facility: CLINIC | Age: 36
End: 2021-06-24

## 2021-06-24 VITALS
SYSTOLIC BLOOD PRESSURE: 118 MMHG | WEIGHT: 185 LBS | RESPIRATION RATE: 16 BRPM | BODY MASS INDEX: 32.77 KG/M2 | DIASTOLIC BLOOD PRESSURE: 70 MMHG

## 2021-06-24 DIAGNOSIS — Z09 S/P GYNECOLOGICAL SURGERY, FOLLOW-UP EXAM: Primary | ICD-10-CM

## 2021-06-24 PROBLEM — R19.7 DIARRHEA: Status: RESOLVED | Noted: 2017-04-18 | Resolved: 2021-06-24

## 2021-06-24 PROBLEM — N39.3 SUI (STRESS URINARY INCONTINENCE, FEMALE): Status: RESOLVED | Noted: 2020-02-13 | Resolved: 2021-06-24

## 2021-06-24 PROCEDURE — 99024 POSTOP FOLLOW-UP VISIT: CPT | Performed by: OBSTETRICS & GYNECOLOGY

## 2021-06-24 NOTE — PROGRESS NOTES
Subjective   Chief Complaint   Patient presents with   • Post-op     Laura Thomson is a 35 y.o. year old  presenting to be seen for her post-operative visit.  She had a TVT exact and cystoscopy.  The operative findings were reviewed.  Currently she reports no problems with eating, bowel movements, voiding, or wound drainage and pain is well controlled.  After the catheter was taken out postop week #1 she could not void on one occasion and had her sister in law who is a nurse at [facility] come down to catheterize her self.  She said it looks swollen I placed a little bit ice on the urethra and then she was able to catheterize about 400 mL of fluid.  Since then she has had no problems with voiding voids about 2 or 300 mL at a time feels like she empties adequately and has not had to self catheterize.    The following portions of the patient's history were reviewed and updated as appropriate:problem list, current medications, allergies and past surgical history    Current Outpatient Medications:   •  acetaminophen (TYLENOL) 325 MG tablet, Take 2 tablets by mouth Every 6 (Six) Hours. For 3 to 5 days postoperatively, Disp: , Rfl:   •  albuterol sulfate  (90 Base) MCG/ACT inhaler, Inhale 2 puffs Every 6 (Six) Hours As Needed for Wheezing., Disp: 1 inhaler, Rfl: 0  •  buPROPion XL (Wellbutrin XL) 300 MG 24 hr tablet, Take 1 tablet by mouth Every Morning., Disp: 90 tablet, Rfl: 1  •  escitalopram (LEXAPRO) 10 MG tablet, Take 1 tablet by mouth Daily., Disp: 90 tablet, Rfl: 1  •  hydrOXYzine (ATARAX) 25 MG tablet, Take 1 tablet by mouth Every 8 (Eight) Hours As Needed for Anxiety., Disp: 90 tablet, Rfl: 1  •  ibuprofen (ADVIL,MOTRIN) 600 MG tablet, , Disp: , Rfl:   •  ibuprofen (ADVIL,MOTRIN) 600 MG tablet, Take 1 tablet by mouth 4 (Four) Times a Day. For 3 to 5 days postoperatively, Disp: 24 tablet, Rfl: 0  •  lidocaine (XYLOCAINE) 5 % ointment, Apply  topically to the appropriate area as  directed Every Night., Disp: 35 g, Rfl: 1  •  loratadine (Claritin) 10 MG tablet, Take 1 tablet by mouth Daily., Disp: 30 tablet, Rfl: 1  •  omeprazole (priLOSEC) 40 MG capsule, Take 40 mg by mouth Daily., Disp: , Rfl:   •  ondansetron ODT (ZOFRAN-ODT) 4 MG disintegrating tablet, Take 1 tablet by mouth Every 8 (Eight) Hours As Needed for Nausea or Vomiting. (Patient taking differently: Take 4 mg by mouth As Needed for Nausea or Vomiting.), Disp: 12 tablet, Rfl: 0  •  promethazine (PHENERGAN) 25 MG tablet, Take 1 tablet by mouth Every 6 (Six) Hours As Needed for Nausea or Vomiting. (Patient taking differently: Take 25 mg by mouth As Needed for Nausea or Vomiting.), Disp: 12 tablet, Rfl: 0  •  traMADol (ULTRAM) 50 MG tablet, Take 1 tablet by mouth Every 6 (Six) Hours As Needed for Severe Pain ., Disp: 10 tablet, Rfl: 0  •  vitamin D (ERGOCALCIFEROL) 1.25 MG (04942 UT) capsule capsule, Take 1 capsule by mouth 1 (One) Time Per Week., Disp: 12 capsule, Rfl: 0  Review of Systems : Please see above     Objective   /70   Resp 16   Wt 83.9 kg (185 lb)   LMP 06/26/2018   Breastfeeding No   BMI 32.77 kg/m²     General:  well developed; well nourished  no acute distress  appears stated age   Abdomen: soft, non-tender; no masses  no umbilical or inguinal hernias are present  no hepato-splenomegaly   Pelvis: Clinical staff was present for exam  External genitalia:  normal appearance of the external genitalia including Bartholin's and Stinnett's glands.  :  urethral meatus normal;  Vaginal:  normal pink mucosa without prolapse or lesions. Suture line is healing in with minimal granulation tissue  Uterus:  absent.  Adnexa:  non palpable bilaterally. On bimanual examination the bladder feels empty after recent voiding  Rectal:  digital rectal exam not performed; anus visually normal appearing.                  Assessment   1. Doing well status post TVT exact with initial issue with urinary retention and inability to self  cath.  She has not had the need to self catheterize lately.       Plan   1. Normal activities  2. Continue timed voiding and keep her self-catheterization supplies in the event she has any problems over the next few months.  3. Annual examination or sooner as needed.  She can follow-up with Radha RAMON or Dr. Aguayo here at this office.    No orders of the defined types were placed in this encounter.    No orders of the defined types were placed in this encounter.         This note was electronically signed.    Luc Alicea MD  June 24, 2021

## 2021-07-23 RX ORDER — BUPROPION HYDROCHLORIDE 150 MG/1
150 TABLET ORAL DAILY
Qty: 90 TABLET | Refills: 1 | Status: SHIPPED | OUTPATIENT
Start: 2021-07-23 | End: 2022-02-07 | Stop reason: SDUPTHER

## 2021-08-15 ENCOUNTER — APPOINTMENT (OUTPATIENT)
Dept: CT IMAGING | Facility: HOSPITAL | Age: 36
End: 2021-08-15

## 2021-08-15 ENCOUNTER — APPOINTMENT (OUTPATIENT)
Dept: GENERAL RADIOLOGY | Facility: HOSPITAL | Age: 36
End: 2021-08-15

## 2021-08-15 ENCOUNTER — HOSPITAL ENCOUNTER (EMERGENCY)
Facility: HOSPITAL | Age: 36
Discharge: HOME | End: 2021-08-15
Attending: EMERGENCY MEDICINE | Admitting: EMERGENCY MEDICINE

## 2021-08-15 VITALS
OXYGEN SATURATION: 100 % | HEART RATE: 63 BPM | WEIGHT: 181 LBS | BODY MASS INDEX: 30.9 KG/M2 | DIASTOLIC BLOOD PRESSURE: 86 MMHG | RESPIRATION RATE: 16 BRPM | SYSTOLIC BLOOD PRESSURE: 119 MMHG | TEMPERATURE: 98.1 F | HEIGHT: 64 IN

## 2021-08-15 DIAGNOSIS — I88.0 MESENTERIC ADENITIS: ICD-10-CM

## 2021-08-15 DIAGNOSIS — R40.4 EPISODE OF ALTERED CONSCIOUSNESS: Primary | ICD-10-CM

## 2021-08-15 LAB
ALBUMIN SERPL-MCNC: 4.3 G/DL (ref 3.5–5.2)
ALBUMIN/GLOB SERPL: 1.7 G/DL
ALP SERPL-CCNC: 78 U/L (ref 39–117)
ALT SERPL W P-5'-P-CCNC: 10 U/L (ref 1–33)
AMPHET+METHAMPHET UR QL: NEGATIVE
AMPHETAMINES UR QL: NEGATIVE
ANION GAP SERPL CALCULATED.3IONS-SCNC: 12.9 MMOL/L (ref 5–15)
APAP SERPL-MCNC: <5 MCG/ML (ref 0–30)
AST SERPL-CCNC: 15 U/L (ref 1–32)
BARBITURATES UR QL SCN: NEGATIVE
BASOPHILS # BLD AUTO: 0.07 10*3/MM3 (ref 0–0.2)
BASOPHILS NFR BLD AUTO: 0.8 % (ref 0–1.5)
BENZODIAZ UR QL SCN: NEGATIVE
BILIRUB SERPL-MCNC: 0.2 MG/DL (ref 0–1.2)
BILIRUB UR QL STRIP: NEGATIVE
BUN SERPL-MCNC: 8 MG/DL (ref 6–20)
BUN/CREAT SERPL: 10.8 (ref 7–25)
BUPRENORPHINE SERPL-MCNC: NEGATIVE NG/ML
CALCIUM SPEC-SCNC: 9.1 MG/DL (ref 8.6–10.5)
CANNABINOIDS SERPL QL: NEGATIVE
CHLORIDE SERPL-SCNC: 103 MMOL/L (ref 98–107)
CLARITY UR: CLEAR
CO2 SERPL-SCNC: 25.1 MMOL/L (ref 22–29)
COCAINE UR QL: NEGATIVE
COLOR UR: YELLOW
CREAT SERPL-MCNC: 0.74 MG/DL (ref 0.57–1)
DEPRECATED RDW RBC AUTO: 40.5 FL (ref 37–54)
EOSINOPHIL # BLD AUTO: 0.1 10*3/MM3 (ref 0–0.4)
EOSINOPHIL NFR BLD AUTO: 1.2 % (ref 0.3–6.2)
ERYTHROCYTE [DISTWIDTH] IN BLOOD BY AUTOMATED COUNT: 12.9 % (ref 12.3–15.4)
ETHANOL BLD-MCNC: <10 MG/DL (ref 0–10)
ETHANOL UR QL: <0.01 %
GFR SERPL CREATININE-BSD FRML MDRD: 89 ML/MIN/1.73
GLOBULIN UR ELPH-MCNC: 2.6 GM/DL
GLUCOSE SERPL-MCNC: 101 MG/DL (ref 65–99)
GLUCOSE UR STRIP-MCNC: NEGATIVE MG/DL
HCT VFR BLD AUTO: 41.6 % (ref 34–46.6)
HGB BLD-MCNC: 13.5 G/DL (ref 12–15.9)
HGB UR QL STRIP.AUTO: NEGATIVE
HOLD SPECIMEN: NORMAL
HOLD SPECIMEN: NORMAL
IMM GRANULOCYTES # BLD AUTO: 0.03 10*3/MM3 (ref 0–0.05)
IMM GRANULOCYTES NFR BLD AUTO: 0.4 % (ref 0–0.5)
KETONES UR QL STRIP: NEGATIVE
LEUKOCYTE ESTERASE UR QL STRIP.AUTO: NEGATIVE
LYMPHOCYTES # BLD AUTO: 1.68 10*3/MM3 (ref 0.7–3.1)
LYMPHOCYTES NFR BLD AUTO: 20.3 % (ref 19.6–45.3)
MAGNESIUM SERPL-MCNC: 1.9 MG/DL (ref 1.6–2.6)
MCH RBC QN AUTO: 28 PG (ref 26.6–33)
MCHC RBC AUTO-ENTMCNC: 32.5 G/DL (ref 31.5–35.7)
MCV RBC AUTO: 86.1 FL (ref 79–97)
METHADONE UR QL SCN: NEGATIVE
MONOCYTES # BLD AUTO: 0.67 10*3/MM3 (ref 0.1–0.9)
MONOCYTES NFR BLD AUTO: 8.1 % (ref 5–12)
NEUTROPHILS NFR BLD AUTO: 5.72 10*3/MM3 (ref 1.7–7)
NEUTROPHILS NFR BLD AUTO: 69.2 % (ref 42.7–76)
NITRITE UR QL STRIP: NEGATIVE
NRBC BLD AUTO-RTO: 0 /100 WBC (ref 0–0.2)
OPIATES UR QL: NEGATIVE
OXYCODONE UR QL SCN: NEGATIVE
PCP UR QL SCN: NEGATIVE
PH UR STRIP.AUTO: 8 [PH] (ref 5–8)
PLATELET # BLD AUTO: 234 10*3/MM3 (ref 140–450)
PMV BLD AUTO: 10.4 FL (ref 6–12)
POTASSIUM SERPL-SCNC: 4.1 MMOL/L (ref 3.5–5.2)
PROPOXYPH UR QL: NEGATIVE
PROT SERPL-MCNC: 6.9 G/DL (ref 6–8.5)
PROT UR QL STRIP: NEGATIVE
RBC # BLD AUTO: 4.83 10*6/MM3 (ref 3.77–5.28)
SALICYLATES SERPL-MCNC: <0.3 MG/DL
SODIUM SERPL-SCNC: 141 MMOL/L (ref 136–145)
SP GR UR STRIP: 1.01 (ref 1–1.03)
TRICYCLICS UR QL SCN: NEGATIVE
TROPONIN T SERPL-MCNC: <0.01 NG/ML (ref 0–0.03)
UROBILINOGEN UR QL STRIP: NORMAL
WBC # BLD AUTO: 8.27 10*3/MM3 (ref 3.4–10.8)
WHOLE BLOOD HOLD SPECIMEN: NORMAL

## 2021-08-15 PROCEDURE — 25010000002 IOPAMIDOL 61 % SOLUTION: Performed by: EMERGENCY MEDICINE

## 2021-08-15 PROCEDURE — 83735 ASSAY OF MAGNESIUM: CPT | Performed by: EMERGENCY MEDICINE

## 2021-08-15 PROCEDURE — 71045 X-RAY EXAM CHEST 1 VIEW: CPT

## 2021-08-15 PROCEDURE — 85025 COMPLETE CBC W/AUTO DIFF WBC: CPT | Performed by: EMERGENCY MEDICINE

## 2021-08-15 PROCEDURE — 84484 ASSAY OF TROPONIN QUANT: CPT | Performed by: EMERGENCY MEDICINE

## 2021-08-15 PROCEDURE — 70450 CT HEAD/BRAIN W/O DYE: CPT

## 2021-08-15 PROCEDURE — 82077 ASSAY SPEC XCP UR&BREATH IA: CPT | Performed by: EMERGENCY MEDICINE

## 2021-08-15 PROCEDURE — 80179 DRUG ASSAY SALICYLATE: CPT | Performed by: EMERGENCY MEDICINE

## 2021-08-15 PROCEDURE — 74177 CT ABD & PELVIS W/CONTRAST: CPT

## 2021-08-15 PROCEDURE — 80306 DRUG TEST PRSMV INSTRMNT: CPT | Performed by: EMERGENCY MEDICINE

## 2021-08-15 PROCEDURE — 81003 URINALYSIS AUTO W/O SCOPE: CPT | Performed by: EMERGENCY MEDICINE

## 2021-08-15 PROCEDURE — 36415 COLL VENOUS BLD VENIPUNCTURE: CPT

## 2021-08-15 PROCEDURE — 93005 ELECTROCARDIOGRAM TRACING: CPT | Performed by: EMERGENCY MEDICINE

## 2021-08-15 PROCEDURE — 99283 EMERGENCY DEPT VISIT LOW MDM: CPT

## 2021-08-15 PROCEDURE — 80143 DRUG ASSAY ACETAMINOPHEN: CPT | Performed by: EMERGENCY MEDICINE

## 2021-08-15 PROCEDURE — 80053 COMPREHEN METABOLIC PANEL: CPT | Performed by: EMERGENCY MEDICINE

## 2021-08-15 RX ORDER — SODIUM CHLORIDE 0.9 % (FLUSH) 0.9 %
10 SYRINGE (ML) INJECTION AS NEEDED
Status: DISCONTINUED | OUTPATIENT
Start: 2021-08-15 | End: 2021-08-15 | Stop reason: HOSPADM

## 2021-08-15 RX ADMIN — IOPAMIDOL 100 ML: 612 INJECTION, SOLUTION INTRAVENOUS at 14:41

## 2021-08-15 RX ADMIN — SODIUM CHLORIDE 1000 ML: 9 INJECTION, SOLUTION INTRAVENOUS at 13:15

## 2021-08-15 NOTE — ED PROVIDER NOTES
Subjective   35-year-old female presenting with multiple complaints.  She is brought in by her mother who provides most the history.  Mom states that patient was sitting at the [employer] during [employer] when she took off her glasses and lay her head down on the piano.  Her mom went to check on her and she was very slow to respond.  She did appear to be awake at the time.  At some point she complained that she was nauseous and having right lower quadrant abdominal pain.  Someone at the [employer] also checked her vital signs and noticed that her heart rate was in the 40s.  Other than this episode today patient has been in her usual state of health.  Mom reports no other concerns or issues.  Patient is essentially nonverbal and provides no history.          Review of Systems   Unable to perform ROS: Patient nonverbal       Past Medical History:   Diagnosis Date   • Abdominal pain, periumbilical    • Abdominal pain, RLQ (right lower quadrant)    • Acute pharyngitis    • Anxiety    • Appetite lost    • Chest pain    • Chest pain, atypical 6/9/2020   • Colon polyp 02/17/2017   • Depression     patient denies   • Diarrhea     Secondary to IBS   • Difficulty swallowing    • Dysphagia     Patient reported she is noticing that she gets choked easily   • Fatigue    • Fracture     RIGHT WRIST TWICE, LEFT HAND   • GERD (gastroesophageal reflux disease)    • H/O foot surgery 10/2020   • Hearing loss     No use of hearing aids   • History of bronchitis    • History of colonoscopy    • History of left salpingo-oophorectomy  2017 August 5/30/2018   • History of ovarian cyst    • History of pneumonia    • Hyperthyroidism     hyperactive taken off medicine when pregnant.  Reported she was medication at one time, but none since pregnancy.    • Ovarian cyst, bilateral    • Seasonal allergies    • RITU (stress urinary incontinence, female) 2/13/2020   • TMJ (dislocation of temporomandibular joint)    • Upper respiratory infection    • Vomiting         No Known Allergies    Past Surgical History:   Procedure Laterality Date   •  SECTION  2008    DR NAVARRETE   •  SECTION  10/30/2015    DR VASQUEZ   •  SECTION  2010    DR ESPINOZA   • CHOLECYSTECTOMY     • COLONOSCOPY N/A 2017     COLONOSCOPY WITH COLD BIOPSY POLYPECTOMY; BIOPSIES , QUICK CLIP;  Surgeon: Perfecto Mcknight MD;  Location: [facility] ENDOSCOPY;  Service:    • DIAGNOSTIC LAPAROSCOPY  2005    DR JONES NAVA/cystectomy   • DIAGNOSTIC LAPAROSCOPY  2012    DR ESPINOZA/ELIJAH   • DIAGNOSTIC LAPAROSCOPY N/A 8/10/2017     DIAGNOSTIC LAPAROSCOPY, LEFT S&O;  Keren Espinoza MD;  Location: [facility] OR;  Service:    • ENDOSCOPY N/A 10/15/2019    Procedure: ESOPHAGOGASTRODUODENOSCOPY WITH BIOPSY AND ESOPHAGEAL DILATATION;  Surgeon: Perfecto Mcknight MD;  Location: [facility] ENDOSCOPY;  Service: Gastroenterology   • FOOT SURGERY Left     ligament and tendon repair   • HERNIA REPAIR  2019    abd   • MID-URETHRAL SLING WITH CYSTOSCOPY N/A 2021     MID-URETHRAL SLING WITH CYSTOSCOPY TVT EXACT;  Luc Alicea MD;  Location:  [facility] OR;  Service: Obstetrics/Gynecology;  Laterality: N/A;   • NISSEN FUNDOPLICATION  2013   • UPPER GASTROINTESTINAL ENDOSCOPY     • VAGINAL HYSTERECTOMY N/A 2018    VAGINAL HYSTERECTOMY, BILATERAL SALPINGECTOMY;  Luc Alicea MD;  Location:  [facility] OR;  Service: Gynecology   • WISDOM TOOTH EXTRACTION         Family History   Problem Relation Age of Onset   • Coronary artery disease Mother    • Diabetes Mother    • Hypertension Mother    • Kidney disease Father    • Skin cancer Father    • Stroke Maternal Grandfather    • Hypertension Maternal Grandfather    • Diabetes Maternal Grandfather    • Colon cancer Maternal Great-Grandmother    • Osteoporosis Neg Hx        Social History     Socioeconomic History   • Marital status:      Spouse name: Not on file   • Number of children: Not on file   • Years of education: Not on file   •  Highest education level: Not on file   Tobacco Use   • Smoking status: Never Smoker   • Smokeless tobacco: Never Used   Vaping Use   • Vaping Use: Never used   Substance and Sexual Activity   • Alcohol use: No   • Drug use: No   • Sexual activity: Yes     Partners: Male     Birth control/protection: Surgical           Objective   Physical Exam  Constitutional:       General: She is not in acute distress.     Appearance: Normal appearance. She is not ill-appearing, toxic-appearing or diaphoretic.   HENT:      Head: Normocephalic and atraumatic.      Right Ear: External ear normal.      Left Ear: External ear normal.      Nose: Nose normal.      Mouth/Throat:      Mouth: Mucous membranes are moist.      Pharynx: Oropharynx is clear.   Eyes:      Extraocular Movements: Extraocular movements intact.      Conjunctiva/sclera: Conjunctivae normal.      Pupils: Pupils are equal, round, and reactive to light.   Cardiovascular:      Rate and Rhythm: Normal rate and regular rhythm.      Pulses: Normal pulses.      Heart sounds: Normal heart sounds.   Pulmonary:      Effort: Pulmonary effort is normal. No respiratory distress.      Breath sounds: Normal breath sounds.   Abdominal:      General: Bowel sounds are normal. There is no distension.      Comments: Tenderness across the lower abdomen   Musculoskeletal:         General: No swelling, tenderness or deformity. Normal range of motion.      Cervical back: Normal range of motion.   Skin:     General: Skin is warm and dry.      Capillary Refill: Capillary refill takes less than 2 seconds.      Findings: No rash.   Neurological:      Mental Status: She is alert.      Comments: Patient is awake, she follows commands albeit very slowly, she is globally weak but moves all extremities, she answers some questions quickly by shaking her head or saying yes/no, other questions are met with silence   Psychiatric:      Comments: Unable to assess         Procedures           ED Course                                            MDM  Number of Diagnoses or Management Options  Episode of altered consciousness  Mesenteric adenitis  Diagnosis management comments: 35-year-old female with altered mental status.  Well-developed, well-nourished female in no distress with exam as above.  She is awake and alert, intermittently follows commands and answers questions.  History and exam are rather perplexing.  Will obtain labs, imaging.  Will give some IV fluids and continue to monitor.  Disposition pending.    DDx: Anxiety, stress reaction, electrolyte abnormality, CVA, intra-abdominal issue, UTI    EKG interpreted by me: Sinus rhythm, normal rate, no acute ST changes, some nonspecific T wave changes, this is an abnormal EKG    Lab work and urinalysis are largely unremarkable.  CT of the head is negative.  CT of the abdomen reveals findings consistent with mesenteric adenitis, a collapsed and thickened bladder, again urinalysis is negative.  Patient without any sort of intervention has woken up and is now at her baseline.  She states that she is not sure what happened.  Her exam is nonfocal.  At this time I do feel she is safe for discharge home.  We discussed outpatient follow-up and return precautions.  She is comfortable with and understanding of the plan.      Final diagnoses:   Episode of altered consciousness   Mesenteric adenitis          Maximino Bo MD  08/15/21 [id number]

## 2021-08-17 ENCOUNTER — OFFICE VISIT (OUTPATIENT)
Dept: INTERNAL MEDICINE | Facility: CLINIC | Age: 36
End: 2021-08-17

## 2021-08-17 VITALS
TEMPERATURE: 98 F | SYSTOLIC BLOOD PRESSURE: 96 MMHG | HEART RATE: 105 BPM | WEIGHT: 186 LBS | RESPIRATION RATE: 16 BRPM | BODY MASS INDEX: 31.76 KG/M2 | HEIGHT: 64 IN | OXYGEN SATURATION: 98 % | DIASTOLIC BLOOD PRESSURE: 70 MMHG

## 2021-08-17 DIAGNOSIS — R19.7 DIARRHEA OF PRESUMED INFECTIOUS ORIGIN: Primary | ICD-10-CM

## 2021-08-17 DIAGNOSIS — I88.0 MESENTERIC ADENITIS: ICD-10-CM

## 2021-08-17 PROCEDURE — 99214 OFFICE O/P EST MOD 30 MIN: CPT | Performed by: INTERNAL MEDICINE

## 2021-08-17 PROCEDURE — 93000 ELECTROCARDIOGRAM COMPLETE: CPT | Performed by: INTERNAL MEDICINE

## 2021-08-17 RX ORDER — FAMOTIDINE 20 MG/1
20 TABLET, FILM COATED ORAL 2 TIMES DAILY PRN
Qty: 30 TABLET | Refills: 0 | Status: SHIPPED | OUTPATIENT
Start: 2021-08-17 | End: 2021-10-07 | Stop reason: ALTCHOICE

## 2021-08-17 RX ORDER — METRONIDAZOLE 500 MG/1
500 TABLET ORAL 3 TIMES DAILY
Qty: 42 TABLET | Refills: 0 | Status: SHIPPED | OUTPATIENT
Start: 2021-08-17 | End: 2021-08-26

## 2021-08-17 RX ORDER — LEVOFLOXACIN 500 MG/1
500 TABLET, FILM COATED ORAL DAILY
Qty: 10 TABLET | Refills: 0 | Status: SHIPPED | OUTPATIENT
Start: 2021-08-17 | End: 2021-08-26

## 2021-08-17 NOTE — PROGRESS NOTES
Subjective     Patient ID: Laura Thomson is a 35 y.o. female. Patient is here for management of multiple medical problems.     Chief Complaint   Patient presents with   • Hospital Follow Up Visit     History of Present Illness   Stomach burning on Friday.  syncopy on Sunday.  ER visit with enlarged LN in ab.     EGD and dialation   2 months ago bladder suspension surgery.    Saturday started to bloat.    Pt with IBS and hx of diarrhea. Bloody diarrhea.   Eating watermelon.  Not sure if that is causing blood looking stool.  BM 20 x a day x 2 weeks.    Travel hx [facility] [place]. [place] water exposure.  Diarrhea started after that.  Got back mid July.       The following portions of the patient's history were reviewed and updated as appropriate: allergies, current medications, past family history, past medical history, past social history, past surgical history and problem list.    Review of Systems   Constitutional: Positive for fatigue. Negative for diaphoresis.   Gastrointestinal: Positive for abdominal distention, abdominal pain and diarrhea.   Psychiatric/Behavioral: Positive for sleep disturbance.   All other systems reviewed and are negative.      Current Outpatient Medications:   •  acetaminophen (TYLENOL) 325 MG tablet, Take 2 tablets by mouth Every 6 (Six) Hours. For 3 to 5 days postoperatively, Disp: , Rfl:   •  albuterol sulfate  (90 Base) MCG/ACT inhaler, Inhale 2 puffs Every 6 (Six) Hours As Needed for Wheezing., Disp: 1 inhaler, Rfl: 0  •  buPROPion XL (Wellbutrin XL) 150 MG 24 hr tablet, Take 1 tablet by mouth Daily., Disp: 90 tablet, Rfl: 1  •  buPROPion XL (Wellbutrin XL) 300 MG 24 hr tablet, Take 1 tablet by mouth Every Morning., Disp: 90 tablet, Rfl: 1  •  escitalopram (LEXAPRO) 10 MG tablet, Take 1 tablet by mouth Daily., Disp: 90 tablet, Rfl: 1  •  hydrOXYzine (ATARAX) 25 MG tablet, Take 1 tablet by mouth Every 8 (Eight) Hours As Needed for Anxiety., Disp: 90 tablet, Rfl: 1  •  ibuprofen  "(ADVIL,MOTRIN) 600 MG tablet, , Disp: , Rfl:   •  lidocaine (XYLOCAINE) 5 % ointment, Apply  topically to the appropriate area as directed Every Night., Disp: 35 g, Rfl: 1  •  loratadine (Claritin) 10 MG tablet, Take 1 tablet by mouth Daily., Disp: 30 tablet, Rfl: 1  •  omeprazole (priLOSEC) 40 MG capsule, Take 40 mg by mouth Daily., Disp: , Rfl:   •  ondansetron ODT (ZOFRAN-ODT) 4 MG disintegrating tablet, Take 1 tablet by mouth Every 8 (Eight) Hours As Needed for Nausea or Vomiting. (Patient taking differently: Take 4 mg by mouth As Needed for Nausea or Vomiting.), Disp: 12 tablet, Rfl: 0  •  promethazine (PHENERGAN) 25 MG tablet, Take 1 tablet by mouth Every 6 (Six) Hours As Needed for Nausea or Vomiting. (Patient taking differently: Take 25 mg by mouth As Needed for Nausea or Vomiting.), Disp: 12 tablet, Rfl: 0  •  vitamin D (ERGOCALCIFEROL) 1.25 MG (44898 UT) capsule capsule, Take 1 capsule by mouth 1 (One) Time Per Week., Disp: 12 capsule, Rfl: 0  •  levoFLOXacin (Levaquin) 500 MG tablet, Take 1 tablet by mouth Daily., Disp: 10 tablet, Rfl: 0  •  metroNIDAZOLE (Flagyl) 500 MG tablet, Take 1 tablet by mouth 3 (Three) Times a Day., Disp: 42 tablet, Rfl: 0    Objective      Blood pressure 96/70, pulse 105, temperature 98 °F (36.7 °C), temperature source Temporal, resp. rate 16, height 161.3 cm (63.5\"), weight 84.4 kg (186 lb), last menstrual period 06/26/2018, SpO2 98 %, not currently breastfeeding.    Physical Exam     General Appearance:    Alert, cooperative, no distress, appears stated age   Head:    Normocephalic, without obvious abnormality, atraumatic   Eyes:    PERRL, conjunctiva/corneas clear, EOM's intact   Ears:    Normal TM's and external ear canals, both ears   Nose:   Nares normal, septum midline, mucosa normal, no drainage   or sinus tenderness   Throat:   Lips, mucosa, and tongue normal; teeth and gums normal   Neck:   Supple, symmetrical, trachea midline, no adenopathy;        thyroid:  No " enlargement/tenderness/nodules; no carotid    bruit or JVD   Back:     Symmetric, no curvature, ROM normal, no CVA tenderness   Lungs:     Clear to auscultation bilaterally, respirations unlabored   Chest wall:    No tenderness or deformity   Heart:    Regular rate and rhythm, S1 and S2 normal, no murmur,        rub or gallop   Abdomen:     Soft, non-tender, bowel sounds active all four quadrants,     no masses, no organomegaly   Extremities:   Extremities normal, atraumatic, no cyanosis or edema   Pulses:   2+ and symmetric all extremities   Skin:   Skin color, texture, turgor normal, no rashes or lesions   Lymph nodes:   Cervical, supraclavicular, and axillary nodes normal   Neurologic:   CNII-XII intact. Normal strength, sensation and reflexes       throughout      Results for orders placed or performed during the hospital encounter of 08/15/21   Comprehensive Metabolic Panel    Specimen: Blood   Result Value Ref Range    Glucose 101 (H) 65 - 99 mg/dL    BUN 8 6 - 20 mg/dL    Creatinine 0.74 0.57 - 1.00 mg/dL    Sodium 141 136 - 145 mmol/L    Potassium 4.1 3.5 - 5.2 mmol/L    Chloride 103 98 - 107 mmol/L    CO2 25.1 22.0 - 29.0 mmol/L    Calcium 9.1 8.6 - 10.5 mg/dL    Total Protein 6.9 6.0 - 8.5 g/dL    Albumin 4.30 3.50 - 5.20 g/dL    ALT (SGPT) 10 1 - 33 U/L    AST (SGOT) 15 1 - 32 U/L    Alkaline Phosphatase 78 39 - 117 U/L    Total Bilirubin 0.2 0.0 - 1.2 mg/dL    eGFR Non African Amer 89 >60 mL/min/1.73    Globulin 2.6 gm/dL    A/G Ratio 1.7 g/dL    BUN/Creatinine Ratio 10.8 7.0 - 25.0    Anion Gap 12.9 5.0 - 15.0 mmol/L   Troponin    Specimen: Blood   Result Value Ref Range    Troponin T <0.010 0.000 - 0.030 ng/mL   Magnesium    Specimen: Blood   Result Value Ref Range    Magnesium 1.9 1.6 - 2.6 mg/dL   Urinalysis With Microscopic If Indicated (No Culture) - Urine, Clean Catch    Specimen: Urine, Clean Catch   Result Value Ref Range    Color, UA Yellow Yellow, Straw    Appearance, UA Clear Clear    pH, UA  8.0 5.0 - 8.0    Specific Gravity, UA 1.012 1.005 - 1.030    Glucose, UA Negative Negative    Ketones, UA Negative Negative    Bilirubin, UA Negative Negative    Blood, UA Negative Negative    Protein, UA Negative Negative    Leuk Esterase, UA Negative Negative    Nitrite, UA Negative Negative    Urobilinogen, UA 0.2 E.U./dL 0.2 - 1.0 E.U./dL   CBC Auto Differential    Specimen: Blood   Result Value Ref Range    WBC 8.27 3.40 - 10.80 10*3/mm3    RBC 4.83 3.77 - 5.28 10*6/mm3    Hemoglobin 13.5 12.0 - 15.9 g/dL    Hematocrit 41.6 34.0 - 46.6 %    MCV 86.1 79.0 - 97.0 fL    MCH 28.0 26.6 - 33.0 pg    MCHC 32.5 31.5 - 35.7 g/dL    RDW 12.9 12.3 - 15.4 %    RDW-SD 40.5 37.0 - 54.0 fl    MPV 10.4 6.0 - 12.0 fL    Platelets 234 140 - 450 10*3/mm3    Neutrophil % 69.2 42.7 - 76.0 %    Lymphocyte % 20.3 19.6 - 45.3 %    Monocyte % 8.1 5.0 - 12.0 %    Eosinophil % 1.2 0.3 - 6.2 %    Basophil % 0.8 0.0 - 1.5 %    Immature Grans % 0.4 0.0 - 0.5 %    Neutrophils, Absolute 5.72 1.70 - 7.00 10*3/mm3    Lymphocytes, Absolute 1.68 0.70 - 3.10 10*3/mm3    Monocytes, Absolute 0.67 0.10 - 0.90 10*3/mm3    Eosinophils, Absolute 0.10 0.00 - 0.40 10*3/mm3    Basophils, Absolute 0.07 0.00 - 0.20 10*3/mm3    Immature Grans, Absolute 0.03 0.00 - 0.05 10*3/mm3    nRBC 0.0 0.0 - 0.2 /100 WBC   Acetaminophen Level    Specimen: Blood   Result Value Ref Range    Acetaminophen <5.0 0.0 - 30.0 mcg/mL   Ethanol    Specimen: Blood   Result Value Ref Range    Ethanol <10 0 - 10 mg/dL    Ethanol % <0.010 %   Urine Drug Screen - Urine, Clean Catch    Specimen: Urine, Clean Catch   Result Value Ref Range    THC, Screen, Urine Negative Negative    Phencyclidine (PCP), Urine Negative Negative    Cocaine Screen, Urine Negative Negative    Methamphetamine, Ur Negative Negative    Opiate Screen Negative Negative    Amphetamine Screen, Urine Negative Negative    Benzodiazepine Screen, Urine Negative Negative    Tricyclic Antidepressants Screen Negative  Negative    Methadone Screen, Urine Negative Negative    Barbiturates Screen, Urine Negative Negative    Oxycodone Screen, Urine Negative Negative    Propoxyphene Screen Negative Negative    Buprenorphine, Screen, Urine Negative Negative   Salicylate Level    Specimen: Blood   Result Value Ref Range    Salicylate <0.3 <=30.0 mg/dL   Green Top (Gel)   Result Value Ref Range    Extra Tube Hold for add-ons.    Lavender Top   Result Value Ref Range    Extra Tube hold for add-on    Gold Top - SST   Result Value Ref Range    Extra Tube Hold for add-ons.          Assessment/Plan   Pt with IBS and hx of diarrhea. Bloody diarrhea.   Eating watermelon.  Not sure if that is causing blood looking stool.  BM 20 x a day x 2 weeks.    Travel hx \[facility]". [place] water exposure       Pt will try abx at home today. If intolerant or worsening go to er tonight.  RTC in AM.        ECG 12 Lead    Date/Time: 8/17/2021 12:35 PM  Performed by: Maximino Whelan MD  Authorized by: Maximino Whelan MD   Comparison: not compared with previous ECG   Previous ECG: no previous ECG available  Rhythm: sinus rhythm    Clinical impression: normal ECG            Diagnoses and all orders for this visit:    1. Diarrhea of presumed infectious origin (Primary)  -     Gastrointestinal Panel, PCR - Stool, Per Rectum  -     Clostridium Difficile Toxin, PCR - Stool, Per Rectum  -     CBC & Differential  -     Comprehensive Metabolic Panel    2. Mesenteric adenitis  -     Gastrointestinal Panel, PCR - Stool, Per Rectum  -     Clostridium Difficile Toxin, PCR - Stool, Per Rectum  -     CBC & Differential  -     Comprehensive Metabolic Panel    Other orders  -     levoFLOXacin (Levaquin) 500 MG tablet; Take 1 tablet by mouth Daily.  Dispense: 10 tablet; Refill: 0  -     metroNIDAZOLE (Flagyl) 500 MG tablet; Take 1 tablet by mouth 3 (Three) Times a Day.  Dispense: 42 tablet; Refill: 0      Return in about 1 day (around 8/18/2021).          There are no Patient  Instructions on file for this visit.     Maximino Whelan MD    Assessment/Plan

## 2021-08-18 ENCOUNTER — OFFICE VISIT (OUTPATIENT)
Dept: INTERNAL MEDICINE | Facility: CLINIC | Age: 36
End: 2021-08-18

## 2021-08-18 ENCOUNTER — LAB (OUTPATIENT)
Dept: LAB | Facility: HOSPITAL | Age: 36
End: 2021-08-18

## 2021-08-18 VITALS
WEIGHT: 186 LBS | BODY MASS INDEX: 31.76 KG/M2 | DIASTOLIC BLOOD PRESSURE: 80 MMHG | TEMPERATURE: 98 F | RESPIRATION RATE: 16 BRPM | HEIGHT: 64 IN | SYSTOLIC BLOOD PRESSURE: 112 MMHG | HEART RATE: 79 BPM | OXYGEN SATURATION: 99 %

## 2021-08-18 DIAGNOSIS — R10.13 EPIGASTRIC PAIN: Primary | ICD-10-CM

## 2021-08-18 LAB
ALBUMIN SERPL-MCNC: 4.5 G/DL (ref 3.5–5.2)
ALBUMIN SERPL-MCNC: 4.6 G/DL (ref 3.5–5.2)
ALBUMIN/GLOB SERPL: 1.5 G/DL
ALBUMIN/GLOB SERPL: 1.8 G/DL
ALP SERPL-CCNC: 84 U/L (ref 39–117)
ALP SERPL-CCNC: 91 U/L (ref 39–117)
ALT SERPL W P-5'-P-CCNC: 10 U/L (ref 1–33)
ALT SERPL-CCNC: 11 U/L (ref 1–33)
ANION GAP SERPL CALCULATED.3IONS-SCNC: 9.5 MMOL/L (ref 5–15)
AST SERPL-CCNC: 17 U/L (ref 1–32)
AST SERPL-CCNC: 17 U/L (ref 1–32)
BASOPHILS # BLD AUTO: 0.09 10*3/MM3 (ref 0–0.2)
BASOPHILS # BLD AUTO: 0.11 10*3/MM3 (ref 0–0.2)
BASOPHILS NFR BLD AUTO: 0.9 % (ref 0–1.5)
BASOPHILS NFR BLD AUTO: 1 % (ref 0–1.5)
BILIRUB SERPL-MCNC: 0.2 MG/DL (ref 0–1.2)
BILIRUB SERPL-MCNC: 0.3 MG/DL (ref 0–1.2)
BUN SERPL-MCNC: 10 MG/DL (ref 6–20)
BUN SERPL-MCNC: 8 MG/DL (ref 6–20)
BUN/CREAT SERPL: 10.3 (ref 7–25)
BUN/CREAT SERPL: 13.2 (ref 7–25)
CALCIUM SERPL-MCNC: 9.6 MG/DL (ref 8.6–10.5)
CALCIUM SPEC-SCNC: 9.6 MG/DL (ref 8.6–10.5)
CHLORIDE SERPL-SCNC: 103 MMOL/L (ref 98–107)
CHLORIDE SERPL-SCNC: 103 MMOL/L (ref 98–107)
CO2 SERPL-SCNC: 26.5 MMOL/L (ref 22–29)
CO2 SERPL-SCNC: 26.5 MMOL/L (ref 22–29)
CREAT SERPL-MCNC: 0.76 MG/DL (ref 0.57–1)
CREAT SERPL-MCNC: 0.78 MG/DL (ref 0.57–1)
DEPRECATED RDW RBC AUTO: 40.3 FL (ref 37–54)
EOSINOPHIL # BLD AUTO: 0.11 10*3/MM3 (ref 0–0.4)
EOSINOPHIL # BLD AUTO: 0.15 10*3/MM3 (ref 0–0.4)
EOSINOPHIL NFR BLD AUTO: 1.1 % (ref 0.3–6.2)
EOSINOPHIL NFR BLD AUTO: 1.4 % (ref 0.3–6.2)
ERYTHROCYTE [DISTWIDTH] IN BLOOD BY AUTOMATED COUNT: 12.8 % (ref 12.3–15.4)
ERYTHROCYTE [DISTWIDTH] IN BLOOD BY AUTOMATED COUNT: 13 % (ref 12.3–15.4)
GFR SERPL CREATININE-BSD FRML MDRD: 86 ML/MIN/1.73
GLOBULIN SER CALC-MCNC: 2.5 GM/DL
GLOBULIN UR ELPH-MCNC: 3.1 GM/DL
GLUCOSE SERPL-MCNC: 83 MG/DL (ref 65–99)
GLUCOSE SERPL-MCNC: 96 MG/DL (ref 65–99)
HCT VFR BLD AUTO: 42.2 % (ref 34–46.6)
HCT VFR BLD AUTO: 43.7 % (ref 34–46.6)
HGB BLD-MCNC: 13.8 G/DL (ref 12–15.9)
HGB BLD-MCNC: 14.2 G/DL (ref 12–15.9)
IMM GRANULOCYTES # BLD AUTO: 0.03 10*3/MM3 (ref 0–0.05)
IMM GRANULOCYTES # BLD AUTO: 0.04 10*3/MM3 (ref 0–0.05)
IMM GRANULOCYTES NFR BLD AUTO: 0.3 % (ref 0–0.5)
IMM GRANULOCYTES NFR BLD AUTO: 0.4 % (ref 0–0.5)
LIPASE SERPL-CCNC: 29 U/L (ref 13–60)
LYMPHOCYTES # BLD AUTO: 2.03 10*3/MM3 (ref 0.7–3.1)
LYMPHOCYTES # BLD AUTO: 2.14 10*3/MM3 (ref 0.7–3.1)
LYMPHOCYTES NFR BLD AUTO: 20.1 % (ref 19.6–45.3)
LYMPHOCYTES NFR BLD AUTO: 20.4 % (ref 19.6–45.3)
MCH RBC QN AUTO: 27.7 PG (ref 26.6–33)
MCH RBC QN AUTO: 27.7 PG (ref 26.6–33)
MCHC RBC AUTO-ENTMCNC: 32.5 G/DL (ref 31.5–35.7)
MCHC RBC AUTO-ENTMCNC: 32.7 G/DL (ref 31.5–35.7)
MCV RBC AUTO: 84.6 FL (ref 79–97)
MCV RBC AUTO: 85.2 FL (ref 79–97)
MONOCYTES # BLD AUTO: 0.79 10*3/MM3 (ref 0.1–0.9)
MONOCYTES # BLD AUTO: 0.8 10*3/MM3 (ref 0.1–0.9)
MONOCYTES NFR BLD AUTO: 7.5 % (ref 5–12)
MONOCYTES NFR BLD AUTO: 7.9 % (ref 5–12)
NEUTROPHILS # BLD AUTO: 7.26 10*3/MM3 (ref 1.7–7)
NEUTROPHILS NFR BLD AUTO: 69.3 % (ref 42.7–76)
NEUTROPHILS NFR BLD AUTO: 69.7 % (ref 42.7–76)
NEUTROPHILS NFR BLD AUTO: 7.05 10*3/MM3 (ref 1.7–7)
NRBC BLD AUTO-RTO: 0 /100 WBC (ref 0–0.2)
NRBC BLD AUTO-RTO: 0 /100 WBC (ref 0–0.2)
PLATELET # BLD AUTO: 279 10*3/MM3 (ref 140–450)
PLATELET # BLD AUTO: 286 10*3/MM3 (ref 140–450)
PMV BLD AUTO: 10.3 FL (ref 6–12)
POTASSIUM SERPL-SCNC: 4.7 MMOL/L (ref 3.5–5.2)
POTASSIUM SERPL-SCNC: 4.8 MMOL/L (ref 3.5–5.2)
PROT SERPL-MCNC: 7 G/DL (ref 6–8.5)
PROT SERPL-MCNC: 7.7 G/DL (ref 6–8.5)
RBC # BLD AUTO: 4.99 10*6/MM3 (ref 3.77–5.28)
RBC # BLD AUTO: 5.13 10*6/MM3 (ref 3.77–5.28)
SODIUM SERPL-SCNC: 137 MMOL/L (ref 136–145)
SODIUM SERPL-SCNC: 139 MMOL/L (ref 136–145)
WBC # BLD AUTO: 10.11 10*3/MM3 (ref 3.4–10.8)
WBC # BLD AUTO: 10.49 10*3/MM3 (ref 3.4–10.8)

## 2021-08-18 PROCEDURE — 99214 OFFICE O/P EST MOD 30 MIN: CPT | Performed by: INTERNAL MEDICINE

## 2021-08-18 PROCEDURE — 85025 COMPLETE CBC W/AUTO DIFF WBC: CPT | Performed by: INTERNAL MEDICINE

## 2021-08-18 PROCEDURE — 86677 HELICOBACTER PYLORI ANTIBODY: CPT | Performed by: INTERNAL MEDICINE

## 2021-08-18 PROCEDURE — 80053 COMPREHEN METABOLIC PANEL: CPT | Performed by: INTERNAL MEDICINE

## 2021-08-18 PROCEDURE — 83690 ASSAY OF LIPASE: CPT | Performed by: INTERNAL MEDICINE

## 2021-08-18 PROCEDURE — 36415 COLL VENOUS BLD VENIPUNCTURE: CPT | Performed by: INTERNAL MEDICINE

## 2021-08-18 NOTE — PROGRESS NOTES
Subjective     Patient ID: Laura Thomson is a 35 y.o. female. Patient is here for management of multiple medical problems.     Chief Complaint   Patient presents with   • Follow-up     dizziness      History of Present Illness       Ab pain and discomfort.     Only one bm today watery diarrhea. No oil or slime.       The following portions of the patient's history were reviewed and updated as appropriate: allergies, current medications, past family history, past medical history, past social history, past surgical history and problem list.    Review of Systems    Current Outpatient Medications:   •  acetaminophen (TYLENOL) 325 MG tablet, Take 2 tablets by mouth Every 6 (Six) Hours. For 3 to 5 days postoperatively, Disp: , Rfl:   •  albuterol sulfate  (90 Base) MCG/ACT inhaler, Inhale 2 puffs Every 6 (Six) Hours As Needed for Wheezing., Disp: 1 inhaler, Rfl: 0  •  buPROPion XL (Wellbutrin XL) 150 MG 24 hr tablet, Take 1 tablet by mouth Daily., Disp: 90 tablet, Rfl: 1  •  buPROPion XL (Wellbutrin XL) 300 MG 24 hr tablet, Take 1 tablet by mouth Every Morning., Disp: 90 tablet, Rfl: 1  •  escitalopram (LEXAPRO) 10 MG tablet, Take 1 tablet by mouth Daily., Disp: 90 tablet, Rfl: 1  •  famotidine (Pepcid) 20 MG tablet, Take 1 tablet by mouth 2 (Two) Times a Day As Needed for Heartburn., Disp: 30 tablet, Rfl: 0  •  hydrOXYzine (ATARAX) 25 MG tablet, Take 1 tablet by mouth Every 8 (Eight) Hours As Needed for Anxiety., Disp: 90 tablet, Rfl: 1  •  ibuprofen (ADVIL,MOTRIN) 600 MG tablet, , Disp: , Rfl:   •  levoFLOXacin (Levaquin) 500 MG tablet, Take 1 tablet by mouth Daily., Disp: 10 tablet, Rfl: 0  •  lidocaine (XYLOCAINE) 5 % ointment, Apply  topically to the appropriate area as directed Every Night., Disp: 35 g, Rfl: 1  •  loratadine (Claritin) 10 MG tablet, Take 1 tablet by mouth Daily., Disp: 30 tablet, Rfl: 1  •  metroNIDAZOLE (Flagyl) 500 MG tablet, Take 1 tablet by mouth 3 (Three) Times a Day., Disp: 42  "tablet, Rfl: 0  •  omeprazole (priLOSEC) 40 MG capsule, Take 40 mg by mouth Daily., Disp: , Rfl:   •  ondansetron ODT (ZOFRAN-ODT) 4 MG disintegrating tablet, Take 1 tablet by mouth Every 8 (Eight) Hours As Needed for Nausea or Vomiting. (Patient taking differently: Take 4 mg by mouth As Needed for Nausea or Vomiting.), Disp: 12 tablet, Rfl: 0  •  promethazine (PHENERGAN) 25 MG tablet, Take 1 tablet by mouth Every 6 (Six) Hours As Needed for Nausea or Vomiting. (Patient taking differently: Take 25 mg by mouth As Needed for Nausea or Vomiting.), Disp: 12 tablet, Rfl: 0  •  vitamin D (ERGOCALCIFEROL) 1.25 MG (21166 UT) capsule capsule, Take 1 capsule by mouth 1 (One) Time Per Week., Disp: 12 capsule, Rfl: 0    Objective      Blood pressure 112/80, pulse 79, temperature 98 °F (36.7 °C), temperature source Temporal, resp. rate 16, height 161.3 cm (63.5\"), weight 84.4 kg (186 lb), last menstrual period 06/26/2018, SpO2 99 %, not currently breastfeeding.    Physical Exam     General Appearance:    Alert, cooperative, no distress, appears stated age   Head:    Normocephalic, without obvious abnormality, atraumatic   Eyes:    PERRL, conjunctiva/corneas clear, EOM's intact   Ears:    Normal TM's and external ear canals, both ears   Nose:   Nares normal, septum midline, mucosa normal, no drainage   or sinus tenderness   Throat:   Lips, mucosa, and tongue normal; teeth and gums normal   Neck:   Supple, symmetrical, trachea midline, no adenopathy;        thyroid:  No enlargement/tenderness/nodules; no carotid    bruit or JVD   Back:     Symmetric, no curvature, ROM normal, no CVA tenderness   Lungs:     Clear to auscultation bilaterally, respirations unlabored   Chest wall:    No tenderness or deformity   Heart:    Regular rate and rhythm, S1 and S2 normal, no murmur,        rub or gallop   Abdomen:     Soft, non-tender, bowel sounds active all four quadrants,     no masses, no organomegaly   Extremities:   Extremities normal, " atraumatic, no cyanosis or edema   Pulses:   2+ and symmetric all extremities   Skin:   Skin color, texture, turgor normal, no rashes or lesions   Lymph nodes:   Cervical, supraclavicular, and axillary nodes normal   Neurologic:   CNII-XII intact. Normal strength, sensation and reflexes       throughout      Results for orders placed or performed in visit on 08/17/21   Comprehensive Metabolic Panel    Specimen: Blood   Result Value Ref Range    Glucose 83 65 - 99 mg/dL    BUN 8 6 - 20 mg/dL    Creatinine 0.78 0.57 - 1.00 mg/dL    eGFR Non African Am 84 >60 mL/min/1.73    eGFR African Am 101 >60 mL/min/1.73    BUN/Creatinine Ratio 10.3 7.0 - 25.0    Sodium 137 136 - 145 mmol/L    Potassium 4.8 3.5 - 5.2 mmol/L    Chloride 103 98 - 107 mmol/L    Total CO2 26.5 22.0 - 29.0 mmol/L    Calcium 9.6 8.6 - 10.5 mg/dL    Total Protein 7.0 6.0 - 8.5 g/dL    Albumin 4.50 3.50 - 5.20 g/dL    Globulin 2.5 gm/dL    A/G Ratio 1.8 g/dL    Total Bilirubin 0.2 0.0 - 1.2 mg/dL    Alkaline Phosphatase 84 39 - 117 U/L    AST (SGOT) 17 1 - 32 U/L    ALT (SGPT) 11 1 - 33 U/L   CBC & Differential    Specimen: Blood   Result Value Ref Range    WBC 10.49 3.40 - 10.80 10*3/mm3    RBC 4.99 3.77 - 5.28 10*6/mm3    Hemoglobin 13.8 12.0 - 15.9 g/dL    Hematocrit 42.2 34.0 - 46.6 %    MCV 84.6 79.0 - 97.0 fL    MCH 27.7 26.6 - 33.0 pg    MCHC 32.7 31.5 - 35.7 g/dL    RDW 12.8 12.3 - 15.4 %    Platelets 286 140 - 450 10*3/mm3    Neutrophil Rel % 69.3 42.7 - 76.0 %    Lymphocyte Rel % 20.4 19.6 - 45.3 %    Monocyte Rel % 7.5 5.0 - 12.0 %    Eosinophil Rel % 1.4 0.3 - 6.2 %    Basophil Rel % 1.0 0.0 - 1.5 %    Neutrophils Absolute 7.26 (H) 1.70 - 7.00 10*3/mm3    Lymphocytes Absolute 2.14 0.70 - 3.10 10*3/mm3    Monocytes Absolute 0.79 0.10 - 0.90 10*3/mm3    Eosinophils Absolute 0.15 0.00 - 0.40 10*3/mm3    Basophils Absolute 0.11 0.00 - 0.20 10*3/mm3    Immature Granulocyte Rel % 0.4 0.0 - 0.5 %    Immature Grans Absolute 0.04 0.00 - 0.05 10*3/mm3     nRBC 0.0 0.0 - 0.2 /100 WBC         Assessment/Plan   Just had the stool turned in last night.  Not feeling better.        Diagnoses and all orders for this visit:    1. Epigastric pain (Primary)  -     Lipase  -     Comprehensive Metabolic Panel  -     CBC & Differential  -     Helicobacter Pylori, IgA IgG IgM      Return in about 1 day (around 8/19/2021).          There are no Patient Instructions on file for this visit.     Maximino Whelan MD    Assessment/Plan

## 2021-08-19 ENCOUNTER — OFFICE VISIT (OUTPATIENT)
Dept: INTERNAL MEDICINE | Facility: CLINIC | Age: 36
End: 2021-08-19

## 2021-08-19 VITALS
HEART RATE: 93 BPM | TEMPERATURE: 97.5 F | WEIGHT: 186 LBS | HEIGHT: 64 IN | BODY MASS INDEX: 31.76 KG/M2 | RESPIRATION RATE: 16 BRPM | SYSTOLIC BLOOD PRESSURE: 98 MMHG | OXYGEN SATURATION: 98 % | DIASTOLIC BLOOD PRESSURE: 70 MMHG

## 2021-08-19 DIAGNOSIS — R10.84 GENERALIZED ABDOMINAL PAIN: Primary | ICD-10-CM

## 2021-08-19 LAB
H PYLORI IGA SER-ACNC: <9 UNITS (ref 0–8.9)
H PYLORI IGM SER-ACNC: <9 UNITS (ref 0–8.9)

## 2021-08-19 PROCEDURE — 99213 OFFICE O/P EST LOW 20 MIN: CPT | Performed by: INTERNAL MEDICINE

## 2021-08-19 NOTE — PROGRESS NOTES
Subjective     Patient ID: Laura Thomson is a 35 y.o. female. Patient is here for management of multiple medical problems.     Chief Complaint   Patient presents with   • Follow-up     stomach still burns, still dizzy and weak at times, pt states she does feel somewhat better     History of Present Illness     Pt finially doing better. Looks better today. Bloating decreased/.          The following portions of the patient's history were reviewed and updated as appropriate: allergies, current medications, past family history, past medical history, past social history, past surgical history and problem list.    Review of Systems    Current Outpatient Medications:   •  acetaminophen (TYLENOL) 325 MG tablet, Take 2 tablets by mouth Every 6 (Six) Hours. For 3 to 5 days postoperatively, Disp: , Rfl:   •  albuterol sulfate  (90 Base) MCG/ACT inhaler, Inhale 2 puffs Every 6 (Six) Hours As Needed for Wheezing., Disp: 1 inhaler, Rfl: 0  •  buPROPion XL (Wellbutrin XL) 150 MG 24 hr tablet, Take 1 tablet by mouth Daily., Disp: 90 tablet, Rfl: 1  •  buPROPion XL (Wellbutrin XL) 300 MG 24 hr tablet, Take 1 tablet by mouth Every Morning., Disp: 90 tablet, Rfl: 1  •  escitalopram (LEXAPRO) 10 MG tablet, Take 1 tablet by mouth Daily., Disp: 90 tablet, Rfl: 1  •  famotidine (Pepcid) 20 MG tablet, Take 1 tablet by mouth 2 (Two) Times a Day As Needed for Heartburn., Disp: 30 tablet, Rfl: 0  •  hydrOXYzine (ATARAX) 25 MG tablet, Take 1 tablet by mouth Every 8 (Eight) Hours As Needed for Anxiety., Disp: 90 tablet, Rfl: 1  •  ibuprofen (ADVIL,MOTRIN) 600 MG tablet, , Disp: , Rfl:   •  levoFLOXacin (Levaquin) 500 MG tablet, Take 1 tablet by mouth Daily., Disp: 10 tablet, Rfl: 0  •  lidocaine (XYLOCAINE) 5 % ointment, Apply  topically to the appropriate area as directed Every Night., Disp: 35 g, Rfl: 1  •  loratadine (Claritin) 10 MG tablet, Take 1 tablet by mouth Daily., Disp: 30 tablet, Rfl: 1  •  metroNIDAZOLE (Flagyl) 500  "MG tablet, Take 1 tablet by mouth 3 (Three) Times a Day., Disp: 42 tablet, Rfl: 0  •  omeprazole (priLOSEC) 40 MG capsule, Take 40 mg by mouth Daily., Disp: , Rfl:   •  ondansetron ODT (ZOFRAN-ODT) 4 MG disintegrating tablet, Take 1 tablet by mouth Every 8 (Eight) Hours As Needed for Nausea or Vomiting. (Patient taking differently: Take 4 mg by mouth As Needed for Nausea or Vomiting.), Disp: 12 tablet, Rfl: 0  •  promethazine (PHENERGAN) 25 MG tablet, Take 1 tablet by mouth Every 6 (Six) Hours As Needed for Nausea or Vomiting. (Patient taking differently: Take 25 mg by mouth As Needed for Nausea or Vomiting.), Disp: 12 tablet, Rfl: 0  •  vitamin D (ERGOCALCIFEROL) 1.25 MG (31844 UT) capsule capsule, Take 1 capsule by mouth 1 (One) Time Per Week., Disp: 12 capsule, Rfl: 0    Objective      Blood pressure 98/70, pulse 93, temperature 97.5 °F (36.4 °C), temperature source Temporal, resp. rate 16, height 161.3 cm (63.5\"), weight 84.4 kg (186 lb), last menstrual period 06/26/2018, SpO2 98 %, not currently breastfeeding.    Physical Exam     General Appearance:    Alert, cooperative, no distress, appears stated age   Head:    Normocephalic, without obvious abnormality, atraumatic   Eyes:    PERRL, conjunctiva/corneas clear, EOM's intact   Ears:    Normal TM's and external ear canals, both ears   Nose:   Nares normal, septum midline, mucosa normal, no drainage   or sinus tenderness   Throat:   Lips, mucosa, and tongue normal; teeth and gums normal   Neck:   Supple, symmetrical, trachea midline, no adenopathy;        thyroid:  No enlargement/tenderness/nodules; no carotid    bruit or JVD   Back:     Symmetric, no curvature, ROM normal, no CVA tenderness   Lungs:     Clear to auscultation bilaterally, respirations unlabored   Chest wall:    No tenderness or deformity   Heart:    Regular rate and rhythm, S1 and S2 normal, no murmur,        rub or gallop   Abdomen:     Soft, non-tender, bowel sounds active all four quadrants, "     no masses, no organomegaly   Extremities:   Extremities normal, atraumatic, no cyanosis or edema   Pulses:   2+ and symmetric all extremities   Skin:   Skin color, texture, turgor normal, no rashes or lesions   Lymph nodes:   Cervical, supraclavicular, and axillary nodes normal   Neurologic:   CNII-XII intact. Normal strength, sensation and reflexes       throughout      Results for orders placed or performed in visit on 08/18/21   Lipase    Specimen: Blood   Result Value Ref Range    Lipase 29 13 - 60 U/L   Comprehensive Metabolic Panel    Specimen: Blood   Result Value Ref Range    Glucose 96 65 - 99 mg/dL    BUN 10 6 - 20 mg/dL    Creatinine 0.76 0.57 - 1.00 mg/dL    Sodium 139 136 - 145 mmol/L    Potassium 4.7 3.5 - 5.2 mmol/L    Chloride 103 98 - 107 mmol/L    CO2 26.5 22.0 - 29.0 mmol/L    Calcium 9.6 8.6 - 10.5 mg/dL    Total Protein 7.7 6.0 - 8.5 g/dL    Albumin 4.60 3.50 - 5.20 g/dL    ALT (SGPT) 10 1 - 33 U/L    AST (SGOT) 17 1 - 32 U/L    Alkaline Phosphatase 91 39 - 117 U/L    Total Bilirubin 0.3 0.0 - 1.2 mg/dL    eGFR Non African Amer 86 >60 mL/min/1.73    Globulin 3.1 gm/dL    A/G Ratio 1.5 g/dL    BUN/Creatinine Ratio 13.2 7.0 - 25.0    Anion Gap 9.5 5.0 - 15.0 mmol/L   CBC Auto Differential    Specimen: Blood   Result Value Ref Range    WBC 10.11 3.40 - 10.80 10*3/mm3    RBC 5.13 3.77 - 5.28 10*6/mm3    Hemoglobin 14.2 12.0 - 15.9 g/dL    Hematocrit 43.7 34.0 - 46.6 %    MCV 85.2 79.0 - 97.0 fL    MCH 27.7 26.6 - 33.0 pg    MCHC 32.5 31.5 - 35.7 g/dL    RDW 13.0 12.3 - 15.4 %    RDW-SD 40.3 37.0 - 54.0 fl    MPV 10.3 6.0 - 12.0 fL    Platelets 279 140 - 450 10*3/mm3    Neutrophil % 69.7 42.7 - 76.0 %    Lymphocyte % 20.1 19.6 - 45.3 %    Monocyte % 7.9 5.0 - 12.0 %    Eosinophil % 1.1 0.3 - 6.2 %    Basophil % 0.9 0.0 - 1.5 %    Immature Grans % 0.3 0.0 - 0.5 %    Neutrophils, Absolute 7.05 (H) 1.70 - 7.00 10*3/mm3    Lymphocytes, Absolute 2.03 0.70 - 3.10 10*3/mm3    Monocytes, Absolute 0.80  0.10 - 0.90 10*3/mm3    Eosinophils, Absolute 0.11 0.00 - 0.40 10*3/mm3    Basophils, Absolute 0.09 0.00 - 0.20 10*3/mm3    Immature Grans, Absolute 0.03 0.00 - 0.05 10*3/mm3    nRBC 0.0 0.0 - 0.2 /100 WBC         Assessment/Plan     Stool studies pending.  Need result to determine treatment course.           Diagnoses and all orders for this visit:    1. Generalized abdominal pain (Primary)      No follow-ups on file.          There are no Patient Instructions on file for this visit.     Maximino Whelan MD    Assessment/Plan

## 2021-08-20 ENCOUNTER — TELEPHONE (OUTPATIENT)
Dept: INTERNAL MEDICINE | Facility: CLINIC | Age: 36
End: 2021-08-20

## 2021-08-20 NOTE — TELEPHONE ENCOUNTER
PATIENT CALLED TO REPORT HER TEST RESULTS ARE BACK AND WILL HER MEDICATION BE CHANGED ACCORDING TO THEM? PLEASE CALL TO [facility] [phone]

## 2021-08-21 LAB

## 2021-08-23 ENCOUNTER — OFFICE VISIT (OUTPATIENT)
Dept: INTERNAL MEDICINE | Facility: CLINIC | Age: 36
End: 2021-08-23

## 2021-08-23 VITALS — SYSTOLIC BLOOD PRESSURE: 95 MMHG | DIASTOLIC BLOOD PRESSURE: 60 MMHG | HEART RATE: 65 BPM

## 2021-08-23 DIAGNOSIS — R11.0 NAUSEA: Primary | ICD-10-CM

## 2021-08-23 PROCEDURE — 99213 OFFICE O/P EST LOW 20 MIN: CPT | Performed by: INTERNAL MEDICINE

## 2021-08-23 NOTE — PROGRESS NOTES
Subjective     Patient ID: Laura Thomson is a 35 y.o. female. Patient is here for management of multiple medical problems.     No chief complaint on file.  dizziness and light headed.      History of Present Illness         Gi issues better.    nause now and fatigue with dizziness.      The following portions of the patient's history were reviewed and updated as appropriate: allergies, current medications, past family history, past medical history, past social history, past surgical history and problem list.    Review of Systems    Current Outpatient Medications:   •  acetaminophen (TYLENOL) 325 MG tablet, Take 2 tablets by mouth Every 6 (Six) Hours. For 3 to 5 days postoperatively, Disp: , Rfl:   •  albuterol sulfate  (90 Base) MCG/ACT inhaler, Inhale 2 puffs Every 6 (Six) Hours As Needed for Wheezing., Disp: 1 inhaler, Rfl: 0  •  buPROPion XL (Wellbutrin XL) 150 MG 24 hr tablet, Take 1 tablet by mouth Daily., Disp: 90 tablet, Rfl: 1  •  buPROPion XL (Wellbutrin XL) 300 MG 24 hr tablet, Take 1 tablet by mouth Every Morning., Disp: 90 tablet, Rfl: 1  •  escitalopram (LEXAPRO) 10 MG tablet, Take 1 tablet by mouth Daily., Disp: 90 tablet, Rfl: 1  •  famotidine (Pepcid) 20 MG tablet, Take 1 tablet by mouth 2 (Two) Times a Day As Needed for Heartburn., Disp: 30 tablet, Rfl: 0  •  hydrOXYzine (ATARAX) 25 MG tablet, Take 1 tablet by mouth Every 8 (Eight) Hours As Needed for Anxiety., Disp: 90 tablet, Rfl: 1  •  ibuprofen (ADVIL,MOTRIN) 600 MG tablet, , Disp: , Rfl:   •  levoFLOXacin (Levaquin) 500 MG tablet, Take 1 tablet by mouth Daily., Disp: 10 tablet, Rfl: 0  •  lidocaine (XYLOCAINE) 5 % ointment, Apply  topically to the appropriate area as directed Every Night., Disp: 35 g, Rfl: 1  •  loratadine (Claritin) 10 MG tablet, Take 1 tablet by mouth Daily., Disp: 30 tablet, Rfl: 1  •  metroNIDAZOLE (Flagyl) 500 MG tablet, Take 1 tablet by mouth 3 (Three) Times a Day., Disp: 42 tablet, Rfl: 0  •  omeprazole  (priLOSEC) 40 MG capsule, Take 40 mg by mouth Daily., Disp: , Rfl:   •  ondansetron ODT (ZOFRAN-ODT) 4 MG disintegrating tablet, Take 1 tablet by mouth Every 8 (Eight) Hours As Needed for Nausea or Vomiting. (Patient taking differently: Take 4 mg by mouth As Needed for Nausea or Vomiting.), Disp: 12 tablet, Rfl: 0  •  promethazine (PHENERGAN) 25 MG tablet, Take 1 tablet by mouth Every 6 (Six) Hours As Needed for Nausea or Vomiting. (Patient taking differently: Take 25 mg by mouth As Needed for Nausea or Vomiting.), Disp: 12 tablet, Rfl: 0  •  vitamin D (ERGOCALCIFEROL) 1.25 MG (23953 UT) capsule capsule, Take 1 capsule by mouth 1 (One) Time Per Week., Disp: 12 capsule, Rfl: 0    Objective      Blood pressure 95/60, pulse 65, last menstrual period 06/26/2018, not currently breastfeeding.    Physical Exam     General Appearance:    Alert, cooperative, no distress, appears stated age   Head:    Normocephalic, without obvious abnormality, atraumatic   Eyes:    PERRL, conjunctiva/corneas clear, EOM's intact   Ears:    Normal TM's and external ear canals, both ears   Nose:   Nares normal, septum midline, mucosa normal, no drainage   or sinus tenderness   Throat:   Lips, mucosa, and tongue normal; teeth and gums normal   Neck:   Supple, symmetrical, trachea midline, no adenopathy;        thyroid:  No enlargement/tenderness/nodules; no carotid    bruit or JVD   Back:     Symmetric, no curvature, ROM normal, no CVA tenderness   Lungs:     Clear to auscultation bilaterally, respirations unlabored   Chest wall:    No tenderness or deformity   Heart:    Regular rate and rhythm, S1 and S2 normal, no murmur,        rub or gallop   Abdomen:     Soft, non-tender, bowel sounds active all four quadrants,     no masses, no organomegaly   Extremities:   Extremities normal, atraumatic, no cyanosis or edema   Pulses:   2+ and symmetric all extremities   Skin:   Skin color, texture, turgor normal, no rashes or lesions   Lymph nodes:    Cervical, supraclavicular, and axillary nodes normal   Neurologic:   CNII-XII intact. Normal strength, sensation and reflexes       throughout      Results for orders placed or performed in visit on 08/18/21   Lipase    Specimen: Blood   Result Value Ref Range    Lipase 29 13 - 60 U/L   Comprehensive Metabolic Panel    Specimen: Blood   Result Value Ref Range    Glucose 96 65 - 99 mg/dL    BUN 10 6 - 20 mg/dL    Creatinine 0.76 0.57 - 1.00 mg/dL    Sodium 139 136 - 145 mmol/L    Potassium 4.7 3.5 - 5.2 mmol/L    Chloride 103 98 - 107 mmol/L    CO2 26.5 22.0 - 29.0 mmol/L    Calcium 9.6 8.6 - 10.5 mg/dL    Total Protein 7.7 6.0 - 8.5 g/dL    Albumin 4.60 3.50 - 5.20 g/dL    ALT (SGPT) 10 1 - 33 U/L    AST (SGOT) 17 1 - 32 U/L    Alkaline Phosphatase 91 39 - 117 U/L    Total Bilirubin 0.3 0.0 - 1.2 mg/dL    eGFR Non African Amer 86 >60 mL/min/1.73    Globulin 3.1 gm/dL    A/G Ratio 1.5 g/dL    BUN/Creatinine Ratio 13.2 7.0 - 25.0    Anion Gap 9.5 5.0 - 15.0 mmol/L   CBC Auto Differential    Specimen: Blood   Result Value Ref Range    WBC 10.11 3.40 - 10.80 10*3/mm3    RBC 5.13 3.77 - 5.28 10*6/mm3    Hemoglobin 14.2 12.0 - 15.9 g/dL    Hematocrit 43.7 34.0 - 46.6 %    MCV 85.2 79.0 - 97.0 fL    MCH 27.7 26.6 - 33.0 pg    MCHC 32.5 31.5 - 35.7 g/dL    RDW 13.0 12.3 - 15.4 %    RDW-SD 40.3 37.0 - 54.0 fl    MPV 10.3 6.0 - 12.0 fL    Platelets 279 140 - 450 10*3/mm3    Neutrophil % 69.7 42.7 - 76.0 %    Lymphocyte % 20.1 19.6 - 45.3 %    Monocyte % 7.9 5.0 - 12.0 %    Eosinophil % 1.1 0.3 - 6.2 %    Basophil % 0.9 0.0 - 1.5 %    Immature Grans % 0.3 0.0 - 0.5 %    Neutrophils, Absolute 7.05 (H) 1.70 - 7.00 10*3/mm3    Lymphocytes, Absolute 2.03 0.70 - 3.10 10*3/mm3    Monocytes, Absolute 0.80 0.10 - 0.90 10*3/mm3    Eosinophils, Absolute 0.11 0.00 - 0.40 10*3/mm3    Basophils, Absolute 0.09 0.00 - 0.20 10*3/mm3    Immature Grans, Absolute 0.03 0.00 - 0.05 10*3/mm3    nRBC 0.0 0.0 - 0.2 /100 WBC   H.Pylori,IgA / IgM  Antibodies    Specimen: Blood   Result Value Ref Range    H. pylori, IgA ABS <9.0 0.0 - 8.9 units    H. Pylori, IgM <9.0 0.0 - 8.9 units         Assessment/Plan   Stop abx as pt is better and now worse likely for abx.    Diagnoses and all orders for this visit:    1. Nausea (Primary)      No follow-ups on file.          There are no Patient Instructions on file for this visit.     Maximino Whelan MD    Assessment/Plan

## 2021-08-26 ENCOUNTER — OFFICE VISIT (OUTPATIENT)
Dept: INTERNAL MEDICINE | Facility: CLINIC | Age: 36
End: 2021-08-26

## 2021-08-26 VITALS
RESPIRATION RATE: 16 BRPM | DIASTOLIC BLOOD PRESSURE: 60 MMHG | HEIGHT: 64 IN | OXYGEN SATURATION: 98 % | SYSTOLIC BLOOD PRESSURE: 110 MMHG | HEART RATE: 90 BPM | BODY MASS INDEX: 31.76 KG/M2 | TEMPERATURE: 97 F | WEIGHT: 186 LBS

## 2021-08-26 DIAGNOSIS — R10.9 RIGHT FLANK PAIN: ICD-10-CM

## 2021-08-26 DIAGNOSIS — R31.29 OTHER MICROSCOPIC HEMATURIA: Primary | ICD-10-CM

## 2021-08-26 DIAGNOSIS — R39.9 UTI SYMPTOMS: ICD-10-CM

## 2021-08-26 DIAGNOSIS — Z00.00 ROUTINE GENERAL MEDICAL EXAMINATION AT A HEALTH CARE FACILITY: ICD-10-CM

## 2021-08-26 DIAGNOSIS — R14.0 ABDOMINAL DISTENSION: ICD-10-CM

## 2021-08-26 LAB
BILIRUB BLD-MCNC: NEGATIVE MG/DL
CLARITY, POC: CLEAR
COLOR UR: YELLOW
GLUCOSE UR STRIP-MCNC: NEGATIVE MG/DL
KETONES UR QL: NEGATIVE
LEUKOCYTE EST, POC: NEGATIVE
NITRITE UR-MCNC: NEGATIVE MG/ML
PH UR: 5.5 [PH] (ref 5–8)
PROT UR STRIP-MCNC: NEGATIVE MG/DL
RBC # UR STRIP: ABNORMAL /UL
SP GR UR: 1.02 (ref 1–1.03)
UROBILINOGEN UR QL: NORMAL

## 2021-08-26 PROCEDURE — 99214 OFFICE O/P EST MOD 30 MIN: CPT | Performed by: INTERNAL MEDICINE

## 2021-08-26 NOTE — PROGRESS NOTES
Subjective     Patient ID: Laura Thomson is a 35 y.o. female. Patient is here for management of multiple medical problems.     Chief Complaint   Patient presents with   • Follow-up     burning urination      History of Present Illness     Dysuria     Off flagyl.  Doing better.    Ab distension. Down some.       The following portions of the patient's history were reviewed and updated as appropriate: allergies, current medications, past family history, past medical history, past social history, past surgical history and problem list.    Review of Systems   Constitutional: Positive for fatigue.   Psychiatric/Behavioral: Positive for self-injury and sleep disturbance. The patient is nervous/anxious.    All other systems reviewed and are negative.      Current Outpatient Medications:   •  acetaminophen (TYLENOL) 325 MG tablet, Take 2 tablets by mouth Every 6 (Six) Hours. For 3 to 5 days postoperatively, Disp: , Rfl:   •  albuterol sulfate  (90 Base) MCG/ACT inhaler, Inhale 2 puffs Every 6 (Six) Hours As Needed for Wheezing., Disp: 1 inhaler, Rfl: 0  •  buPROPion XL (Wellbutrin XL) 150 MG 24 hr tablet, Take 1 tablet by mouth Daily., Disp: 90 tablet, Rfl: 1  •  buPROPion XL (Wellbutrin XL) 300 MG 24 hr tablet, Take 1 tablet by mouth Every Morning., Disp: 90 tablet, Rfl: 1  •  escitalopram (LEXAPRO) 10 MG tablet, Take 1 tablet by mouth Daily., Disp: 90 tablet, Rfl: 1  •  famotidine (Pepcid) 20 MG tablet, Take 1 tablet by mouth 2 (Two) Times a Day As Needed for Heartburn., Disp: 30 tablet, Rfl: 0  •  hydrOXYzine (ATARAX) 25 MG tablet, Take 1 tablet by mouth Every 8 (Eight) Hours As Needed for Anxiety., Disp: 90 tablet, Rfl: 1  •  ibuprofen (ADVIL,MOTRIN) 600 MG tablet, , Disp: , Rfl:   •  lidocaine (XYLOCAINE) 5 % ointment, Apply  topically to the appropriate area as directed Every Night., Disp: 35 g, Rfl: 1  •  loratadine (Claritin) 10 MG tablet, Take 1 tablet by mouth Daily., Disp: 30 tablet, Rfl: 1  •   "ondansetron ODT (ZOFRAN-ODT) 4 MG disintegrating tablet, Take 1 tablet by mouth Every 8 (Eight) Hours As Needed for Nausea or Vomiting. (Patient taking differently: Take 4 mg by mouth As Needed for Nausea or Vomiting.), Disp: 12 tablet, Rfl: 0  •  promethazine (PHENERGAN) 25 MG tablet, Take 1 tablet by mouth Every 6 (Six) Hours As Needed for Nausea or Vomiting. (Patient taking differently: Take 25 mg by mouth As Needed for Nausea or Vomiting.), Disp: 12 tablet, Rfl: 0  •  vitamin D (ERGOCALCIFEROL) 1.25 MG (14207 UT) capsule capsule, Take 1 capsule by mouth 1 (One) Time Per Week., Disp: 12 capsule, Rfl: 0    Objective      Blood pressure 110/60, pulse 90, temperature 97 °F (36.1 °C), temperature source Temporal, resp. rate 16, height 161.3 cm (63.5\"), weight 84.4 kg (186 lb), last menstrual period 06/26/2018, SpO2 98 %, not currently breastfeeding.    Physical Exam     General Appearance:    Alert, cooperative, no distress, appears stated age   Head:    Normocephalic, without obvious abnormality, atraumatic   Eyes:    PERRL, conjunctiva/corneas clear, EOM's intact   Ears:    Normal TM's and external ear canals, both ears   Nose:   Nares normal, septum midline, mucosa normal, no drainage   or sinus tenderness   Throat:   Lips, mucosa, and tongue normal; teeth and gums normal   Neck:   Supple, symmetrical, trachea midline, no adenopathy;        thyroid:  No enlargement/tenderness/nodules; no carotid    bruit or JVD   Back:     Symmetric, no curvature, ROM normal, no CVA tenderness   Lungs:     Clear to auscultation bilaterally, respirations unlabored   Chest wall:    No tenderness or deformity   Heart:    Regular rate and rhythm, S1 and S2 normal, no murmur,        rub or gallop   Abdomen:     Soft, non-tender, bowel sounds active all four quadrants,     no masses, no organomegaly   Extremities:   Extremities normal, atraumatic, no cyanosis or edema   Pulses:   2+ and symmetric all extremities   Skin:   Skin color, " texture, turgor normal, no rashes or lesions   Lymph nodes:   Cervical, supraclavicular, and axillary nodes normal   Neurologic:   CNII-XII intact. Normal strength, sensation and reflexes       throughout      Results for orders placed or performed in visit on 08/18/21   Lipase    Specimen: Blood   Result Value Ref Range    Lipase 29 13 - 60 U/L   Comprehensive Metabolic Panel    Specimen: Blood   Result Value Ref Range    Glucose 96 65 - 99 mg/dL    BUN 10 6 - 20 mg/dL    Creatinine 0.76 0.57 - 1.00 mg/dL    Sodium 139 136 - 145 mmol/L    Potassium 4.7 3.5 - 5.2 mmol/L    Chloride 103 98 - 107 mmol/L    CO2 26.5 22.0 - 29.0 mmol/L    Calcium 9.6 8.6 - 10.5 mg/dL    Total Protein 7.7 6.0 - 8.5 g/dL    Albumin 4.60 3.50 - 5.20 g/dL    ALT (SGPT) 10 1 - 33 U/L    AST (SGOT) 17 1 - 32 U/L    Alkaline Phosphatase 91 39 - 117 U/L    Total Bilirubin 0.3 0.0 - 1.2 mg/dL    eGFR Non African Amer 86 >60 mL/min/1.73    Globulin 3.1 gm/dL    A/G Ratio 1.5 g/dL    BUN/Creatinine Ratio 13.2 7.0 - 25.0    Anion Gap 9.5 5.0 - 15.0 mmol/L   CBC Auto Differential    Specimen: Blood   Result Value Ref Range    WBC 10.11 3.40 - 10.80 10*3/mm3    RBC 5.13 3.77 - 5.28 10*6/mm3    Hemoglobin 14.2 12.0 - 15.9 g/dL    Hematocrit 43.7 34.0 - 46.6 %    MCV 85.2 79.0 - 97.0 fL    MCH 27.7 26.6 - 33.0 pg    MCHC 32.5 31.5 - 35.7 g/dL    RDW 13.0 12.3 - 15.4 %    RDW-SD 40.3 37.0 - 54.0 fl    MPV 10.3 6.0 - 12.0 fL    Platelets 279 140 - 450 10*3/mm3    Neutrophil % 69.7 42.7 - 76.0 %    Lymphocyte % 20.1 19.6 - 45.3 %    Monocyte % 7.9 5.0 - 12.0 %    Eosinophil % 1.1 0.3 - 6.2 %    Basophil % 0.9 0.0 - 1.5 %    Immature Grans % 0.3 0.0 - 0.5 %    Neutrophils, Absolute 7.05 (H) 1.70 - 7.00 10*3/mm3    Lymphocytes, Absolute 2.03 0.70 - 3.10 10*3/mm3    Monocytes, Absolute 0.80 0.10 - 0.90 10*3/mm3    Eosinophils, Absolute 0.11 0.00 - 0.40 10*3/mm3    Basophils, Absolute 0.09 0.00 - 0.20 10*3/mm3    Immature Grans, Absolute 0.03 0.00 - 0.05  10*3/mm3    nRBC 0.0 0.0 - 0.2 /100 WBC   H.Pylori,IgA / IgM Antibodies    Specimen: Blood   Result Value Ref Range    H. pylori, IgA ABS <9.0 0.0 - 8.9 units    H. Pylori, IgM <9.0 0.0 - 8.9 units         Assessment/Plan   Has one ovary. Unclear etiology to ab distention and recenet events. Will get in with gyn  eval for pevic pathology.          Diagnoses and all orders for this visit:    1. Other microscopic hematuria (Primary)  -     Ambulatory Referral to Urology  -     XR Abdomen KUB    2. Right flank pain  -     Ambulatory Referral to Urology  -     XR Abdomen KUB    3. Routine general medical examination at a health care facility  -     Ambulatory Referral to Gynecology    4. Abdominal distension  -     Ambulatory Referral to Gynecology      Return in about 1 week (around 9/2/2021).          There are no Patient Instructions on file for this visit.     Maximino Whelan MD    Assessment/Plan

## 2021-08-27 ENCOUNTER — HOSPITAL ENCOUNTER (OUTPATIENT)
Dept: GENERAL RADIOLOGY | Facility: HOSPITAL | Age: 36
Discharge: HOME | End: 2021-08-27
Admitting: INTERNAL MEDICINE

## 2021-08-27 ENCOUNTER — TELEPHONE (OUTPATIENT)
Dept: INTERNAL MEDICINE | Facility: CLINIC | Age: 36
End: 2021-08-27

## 2021-08-27 PROCEDURE — 74018 RADEX ABDOMEN 1 VIEW: CPT

## 2021-08-27 NOTE — TELEPHONE ENCOUNTER
Caller: Laura Thomson    Relationship: Self    Best call back number: [phone]     Caller requesting test results: PATIENT     What test was performed: XRAY OF STOMACH TO CHECK FOR KIDNEY STONES     When was the test performed: 8/27/21    Where was the test performed: [facility]     Additional notes: PATIENT IS REQUESTING A CALLBACK WITH RESULTS AND TO HAVE RESULTS UPLOADED TO [employer].

## 2021-09-02 ENCOUNTER — TELEMEDICINE (OUTPATIENT)
Dept: INTERNAL MEDICINE | Facility: CLINIC | Age: 36
End: 2021-09-02

## 2021-09-02 ENCOUNTER — TRANSCRIBE ORDERS (OUTPATIENT)
Dept: LAB | Facility: HOSPITAL | Age: 36
End: 2021-09-02

## 2021-09-02 ENCOUNTER — LAB (OUTPATIENT)
Dept: LAB | Facility: HOSPITAL | Age: 36
End: 2021-09-02

## 2021-09-02 DIAGNOSIS — U07.1 COVID-19 VIRUS INFECTION: ICD-10-CM

## 2021-09-02 DIAGNOSIS — R10.84 GENERALIZED ABDOMINAL PAIN: ICD-10-CM

## 2021-09-02 DIAGNOSIS — U07.1 COVID-19 VIRUS INFECTION: Primary | ICD-10-CM

## 2021-09-02 PROCEDURE — C9803 HOPD COVID-19 SPEC COLLECT: HCPCS

## 2021-09-02 PROCEDURE — U0004 COV-19 TEST NON-CDC HGH THRU: HCPCS

## 2021-09-02 PROCEDURE — 99212 OFFICE O/P EST SF 10 MIN: CPT | Performed by: INTERNAL MEDICINE

## 2021-09-02 NOTE — PROGRESS NOTES
Subjective     Patient ID: Laura Thomson is a 35 y.o. female. Patient is here for management of multiple medical problems.     Chief Complaint   Patient presents with   • Follow-up     daughter tested positive for covid     History of Present Illness       Daughter tested + for covid    Loss of tast and smell. Fever.    No vaccine.               The following portions of the patient's history were reviewed and updated as appropriate: allergies, current medications, past family history, past medical history, past social history, past surgical history and problem list.    Review of Systems   Constitutional: Positive for fatigue and fever. Negative for diaphoresis.   Psychiatric/Behavioral: Positive for self-injury and sleep disturbance. The patient is nervous/anxious.    All other systems reviewed and are negative.      Current Outpatient Medications:   •  acetaminophen (TYLENOL) 325 MG tablet, Take 2 tablets by mouth Every 6 (Six) Hours. For 3 to 5 days postoperatively, Disp: , Rfl:   •  albuterol sulfate  (90 Base) MCG/ACT inhaler, Inhale 2 puffs Every 6 (Six) Hours As Needed for Wheezing., Disp: 1 inhaler, Rfl: 0  •  buPROPion XL (Wellbutrin XL) 150 MG 24 hr tablet, Take 1 tablet by mouth Daily., Disp: 90 tablet, Rfl: 1  •  buPROPion XL (Wellbutrin XL) 300 MG 24 hr tablet, Take 1 tablet by mouth Every Morning., Disp: 90 tablet, Rfl: 1  •  escitalopram (LEXAPRO) 10 MG tablet, Take 1 tablet by mouth Daily., Disp: 90 tablet, Rfl: 1  •  famotidine (Pepcid) 20 MG tablet, Take 1 tablet by mouth 2 (Two) Times a Day As Needed for Heartburn., Disp: 30 tablet, Rfl: 0  •  hydrOXYzine (ATARAX) 25 MG tablet, Take 1 tablet by mouth Every 8 (Eight) Hours As Needed for Anxiety., Disp: 90 tablet, Rfl: 1  •  ibuprofen (ADVIL,MOTRIN) 600 MG tablet, , Disp: , Rfl:   •  lidocaine (XYLOCAINE) 5 % ointment, Apply  topically to the appropriate area as directed Every Night., Disp: 35 g, Rfl: 1  •  loratadine (Claritin) 10 MG  tablet, Take 1 tablet by mouth Daily., Disp: 30 tablet, Rfl: 1  •  ondansetron ODT (ZOFRAN-ODT) 4 MG disintegrating tablet, Take 1 tablet by mouth Every 8 (Eight) Hours As Needed for Nausea or Vomiting. (Patient taking differently: Take 4 mg by mouth As Needed for Nausea or Vomiting.), Disp: 12 tablet, Rfl: 0  •  promethazine (PHENERGAN) 25 MG tablet, Take 1 tablet by mouth Every 6 (Six) Hours As Needed for Nausea or Vomiting. (Patient taking differently: Take 25 mg by mouth As Needed for Nausea or Vomiting.), Disp: 12 tablet, Rfl: 0  •  vitamin D (ERGOCALCIFEROL) 1.25 MG (83365 UT) capsule capsule, Take 1 capsule by mouth 1 (One) Time Per Week., Disp: 12 capsule, Rfl: 0    Objective      Last menstrual period 06/26/2018, not currently breastfeeding.    Physical Exam     General Appearance:    Alert, cooperative, no distress, appears stated age   Head:    Normocephalic, without obvious abnormality, atraumatic   Eyes:    PERRL, conjunctiva/corneas clear, EOM's intact   Ears:    Normal TM's and external ear canals, both ears   Nose:   Nares normal, septum midline, mucosa normal, no drainage   or sinus tenderness   Throat:   Lips, mucosa, and tongue normal; teeth and gums normal   Neck:   Supple, symmetrical, trachea midline, no adenopathy;        thyroid:  No enlargement/tenderness/nodules; no carotid    bruit or JVD   Back:     Symmetric, no curvature, ROM normal, no CVA tenderness   Lungs:     Clear to auscultation bilaterally, respirations unlabored   Chest wall:    No tenderness or deformity   Heart:    Regular rate and rhythm, S1 and S2 normal, no murmur,        rub or gallop   Abdomen:     Soft, non-tender, bowel sounds active all four quadrants,     no masses, no organomegaly   Extremities:   Extremities normal, atraumatic, no cyanosis or edema   Pulses:   2+ and symmetric all extremities   Skin:   Skin color, texture, turgor normal, no rashes or lesions   Lymph nodes:   Cervical, supraclavicular, and axillary  nodes normal   Neurologic:   CNII-XII intact. Normal strength, sensation and reflexes       throughout      Results for orders placed or performed in visit on 08/26/21   POCT urinalysis dipstick, automated    Specimen: Urine   Result Value Ref Range    Color Yellow Yellow, Straw, Dark Yellow, Pushpa    Clarity, UA Clear Clear    Specific Gravity  1.025 1.005 - 1.030    pH, Urine 5.5 5.0 - 8.0    Leukocytes Negative Negative    Nitrite, UA Negative Negative    Protein, POC Negative Negative mg/dL    Glucose, UA Negative Negative, 1000 mg/dL (3+) mg/dL    Ketones, UA Negative Negative    Urobilinogen, UA Normal Normal    Bilirubin Negative Negative    Blood, UA 1+ (A) Negative         Assessment/Plan      covid management. Discussed. Mitigation discussed.    Video visit 13 min          Diagnoses and all orders for this visit:    1. COVID-19 virus infection (Primary)  -     COVID-19 PCR, [facility], NP SWAB IN [facility]SP[facility] MEDIA/ORAL SWISH 24-30 HR TAT - Swab, Nasopharynx; Future    2. Generalized abdominal pain  -     Ambulatory Referral to Gastroenterology      No follow-ups on file.          There are no Patient Instructions on file for this visit.     Maximino Whelan MD    Assessment/Plan

## 2021-09-03 ENCOUNTER — TELEPHONE (OUTPATIENT)
Dept: INTERNAL MEDICINE | Facility: CLINIC | Age: 36
End: 2021-09-03

## 2021-09-03 LAB — SARS-COV-2 RNA NOSE QL NAA+PROBE: DETECTED

## 2021-09-03 NOTE — TELEPHONE ENCOUNTER
Spoke with pt and she is wanting to have the covid antibody infusion done. I told pt that I would see if a provider could sign off on this but that most places are backed up til Tuesday or later and if sx got worse to go to ED. Orders faxed to [facility] per [facility]

## 2021-09-03 NOTE — TELEPHONE ENCOUNTER
PATIENT LOOKING FOR COVID RESULTS. STATES WAS GOING TO SEND HER FOR INFUSION IF POSITIVE.     PLEASE CALL [phone]

## 2021-09-03 NOTE — TELEPHONE ENCOUNTER
Chele from [facility] called to give a critical result for this patient. COVID test was positive.

## 2021-09-07 ENCOUNTER — HOSPITAL ENCOUNTER (OUTPATIENT)
Dept: NURSING | Facility: HOSPITAL | Age: 36
Setting detail: INFUSION SERIES
Discharge: HOME | End: 2021-09-09
Payer: MEDICAID

## 2021-09-07 VITALS — WEIGHT: 179 LBS | HEIGHT: 63 IN | BODY MASS INDEX: 31.71 KG/M2

## 2021-09-07 VITALS
OXYGEN SATURATION: 98 % | TEMPERATURE: 97.7 F | SYSTOLIC BLOOD PRESSURE: 105 MMHG | DIASTOLIC BLOOD PRESSURE: 78 MMHG | HEART RATE: 72 BPM | RESPIRATION RATE: 18 BRPM

## 2021-09-07 DIAGNOSIS — U07.1 COVID-19: Primary | ICD-10-CM

## 2021-09-07 PROCEDURE — 96360 HYDRATION IV INFUSION INIT: CPT

## 2021-09-07 PROCEDURE — 2580000003 HC RX 258: Performed by: NURSE PRACTITIONER

## 2021-09-07 PROCEDURE — 96374 THER/PROPH/DIAG INJ IV PUSH: CPT

## 2021-09-07 PROCEDURE — 6370000000 HC RX 637 (ALT 250 FOR IP)

## 2021-09-07 PROCEDURE — 2500000003 HC RX 250 WO HCPCS: Performed by: NURSE PRACTITIONER

## 2021-09-07 PROCEDURE — 6360000002 HC RX W HCPCS

## 2021-09-07 RX ORDER — SODIUM CHLORIDE 9 MG/ML
25 INJECTION, SOLUTION INTRAVENOUS PRN
Status: CANCELLED | OUTPATIENT
Start: 2021-09-07

## 2021-09-07 RX ORDER — DIPHENHYDRAMINE HYDROCHLORIDE 50 MG/ML
25 INJECTION INTRAMUSCULAR; INTRAVENOUS ONCE
Status: CANCELLED
Start: 2021-09-07 | End: 2021-09-07

## 2021-09-07 RX ORDER — EPINEPHRINE 1 MG/ML
0.3 INJECTION, SOLUTION, CONCENTRATE INTRAVENOUS PRN
OUTPATIENT
Start: 2021-09-07

## 2021-09-07 RX ORDER — METHYLPREDNISOLONE SODIUM SUCCINATE 125 MG/2ML
125 INJECTION, POWDER, LYOPHILIZED, FOR SOLUTION INTRAMUSCULAR; INTRAVENOUS ONCE
OUTPATIENT
Start: 2021-09-07 | End: 2021-09-07

## 2021-09-07 RX ORDER — SODIUM CHLORIDE 9 MG/ML
INJECTION, SOLUTION INTRAVENOUS CONTINUOUS
OUTPATIENT
Start: 2021-09-07

## 2021-09-07 RX ORDER — ACETAMINOPHEN 325 MG/1
650 TABLET ORAL ONCE
Status: CANCELLED
Start: 2021-09-07 | End: 2021-09-07

## 2021-09-07 RX ORDER — DIPHENHYDRAMINE HYDROCHLORIDE 50 MG/ML
50 INJECTION INTRAMUSCULAR; INTRAVENOUS ONCE
OUTPATIENT
Start: 2021-09-07 | End: 2021-09-07

## 2021-09-07 RX ORDER — SODIUM CHLORIDE 9 MG/ML
25 INJECTION, SOLUTION INTRAVENOUS PRN
Status: ACTIVE | OUTPATIENT
Start: 2021-09-07 | End: 2021-09-08

## 2021-09-07 RX ORDER — DIPHENHYDRAMINE HYDROCHLORIDE 50 MG/ML
25 INJECTION INTRAMUSCULAR; INTRAVENOUS ONCE
Status: COMPLETED | OUTPATIENT
Start: 2021-09-07 | End: 2021-09-07

## 2021-09-07 RX ORDER — ACETAMINOPHEN 325 MG/1
650 TABLET ORAL ONCE
Status: COMPLETED | OUTPATIENT
Start: 2021-09-07 | End: 2021-09-07

## 2021-09-07 RX ADMIN — SODIUM CHLORIDE 25 ML: 9 INJECTION, SOLUTION INTRAVENOUS at 09:12

## 2021-09-07 RX ADMIN — CASIRIVIMAB AND IMDEVIMAB 1200 MG: 600; 600 INJECTION, SOLUTION, CONCENTRATE INTRAVENOUS at 09:08

## 2021-09-07 RX ADMIN — DIPHENHYDRAMINE HYDROCHLORIDE 25 MG: 50 INJECTION, SOLUTION INTRAMUSCULAR; INTRAVENOUS at 09:07

## 2021-09-07 RX ADMIN — ACETAMINOPHEN 650 MG: 325 TABLET, FILM COATED ORAL at 09:09

## 2021-09-07 ASSESSMENT — PAIN SCALES - GENERAL: PAINLEVEL_OUTOF10: 0

## 2021-09-07 NOTE — PROGRESS NOTES
Regen-cov infusion complete. Observed pt for 1hr post infusion to monitor for reaction. Pt tolerated without reaction. IV dc'ed. No complications at site. Pt discharged home at this time via POV.

## 2021-09-09 ENCOUNTER — TELEPHONE (OUTPATIENT)
Dept: INTERNAL MEDICINE | Facility: CLINIC | Age: 36
End: 2021-09-09

## 2021-09-09 ENCOUNTER — TELEMEDICINE (OUTPATIENT)
Dept: INTERNAL MEDICINE | Facility: CLINIC | Age: 36
End: 2021-09-09

## 2021-09-09 DIAGNOSIS — R11.2 NAUSEA AND VOMITING, INTRACTABILITY OF VOMITING NOT SPECIFIED, UNSPECIFIED VOMITING TYPE: ICD-10-CM

## 2021-09-09 DIAGNOSIS — R11.2 NAUSEA AND VOMITING, INTRACTABILITY OF VOMITING NOT SPECIFIED, UNSPECIFIED VOMITING TYPE: Primary | ICD-10-CM

## 2021-09-09 DIAGNOSIS — E86.0 DEHYDRATION: ICD-10-CM

## 2021-09-09 DIAGNOSIS — U07.1 COVID-19 VIRUS INFECTION: ICD-10-CM

## 2021-09-09 PROCEDURE — 99214 OFFICE O/P EST MOD 30 MIN: CPT | Performed by: INTERNAL MEDICINE

## 2021-09-09 RX ORDER — PROMETHAZINE HYDROCHLORIDE 25 MG/1
25 TABLET ORAL EVERY 6 HOURS PRN
Qty: 12 TABLET | Refills: 0 | Status: SHIPPED | OUTPATIENT
Start: 2021-09-09 | End: 2021-09-09 | Stop reason: SDUPTHER

## 2021-09-09 RX ORDER — PROMETHAZINE HYDROCHLORIDE 25 MG/1
25 TABLET ORAL EVERY 6 HOURS PRN
Qty: 30 TABLET | Refills: 0 | Status: SHIPPED | OUTPATIENT
Start: 2021-09-09

## 2021-09-09 RX ORDER — SUCRALFATE 1 G/1
1 TABLET ORAL 4 TIMES DAILY
Qty: 40 TABLET | Refills: 0 | Status: SHIPPED | OUTPATIENT
Start: 2021-09-09 | End: 2021-10-07 | Stop reason: ALTCHOICE

## 2021-09-09 NOTE — TELEPHONE ENCOUNTER
PATIENT EXTREMELY ANXIOUS TO GET MEDICATION.  DR LONGO TOLD HER IF SHE DIDN'T GET IT SOON SHE WOULD HAVE TO GO TO HOSPITAL.      PLEASE CALL [phone]

## 2021-09-09 NOTE — PROGRESS NOTES
Subjective     Patient ID: Laura Thomson is a 35 y.o. female. Patient is here for management of multiple medical problems.     covid    Symptoms last week wendsday.   Infusion 2 days ago.    Dizziness.   Sick to stomach. Unable to taste food.  Unable to keep fluid down.      History of Present Illness     The following portions of the patient's history were reviewed and updated as appropriate: allergies, current medications, past family history, past medical history, past social history, past surgical history and problem list.    Review of Systems   Constitutional: Negative for fatigue and fever.   HENT: Negative for ear pain and facial swelling.    Cardiovascular: Negative for chest pain and leg swelling.   Psychiatric/Behavioral: Negative for self-injury and sleep disturbance. The patient is not nervous/anxious.    All other systems reviewed and are negative.      Current Outpatient Medications:   •  acetaminophen (TYLENOL) 325 MG tablet, Take 2 tablets by mouth Every 6 (Six) Hours. For 3 to 5 days postoperatively, Disp: , Rfl:   •  albuterol sulfate  (90 Base) MCG/ACT inhaler, Inhale 2 puffs Every 6 (Six) Hours As Needed for Wheezing., Disp: 1 inhaler, Rfl: 0  •  escitalopram (LEXAPRO) 10 MG tablet, Take 1 tablet by mouth Daily., Disp: 90 tablet, Rfl: 1  •  famotidine (Pepcid) 20 MG tablet, Take 1 tablet by mouth 2 (Two) Times a Day As Needed for Heartburn., Disp: 30 tablet, Rfl: 0  •  ibuprofen (ADVIL,MOTRIN) 600 MG tablet, , Disp: , Rfl:   •  lidocaine (XYLOCAINE) 5 % ointment, Apply  topically to the appropriate area as directed Every Night., Disp: 35 g, Rfl: 1  •  loratadine (Claritin) 10 MG tablet, Take 1 tablet by mouth Daily., Disp: 30 tablet, Rfl: 1  •  ondansetron ODT (ZOFRAN-ODT) 4 MG disintegrating tablet, Take 1 tablet by mouth Every 8 (Eight) Hours As Needed for Nausea or Vomiting. (Patient taking differently: Take 4 mg by mouth As Needed for Nausea or Vomiting.), Disp: 12 tablet, Rfl:  0  •  vitamin D (ERGOCALCIFEROL) 1.25 MG (13191 UT) capsule capsule, Take 1 capsule by mouth 1 (One) Time Per Week., Disp: 12 capsule, Rfl: 0  •  buPROPion XL (Wellbutrin XL) 150 MG 24 hr tablet, Take 1 tablet by mouth Daily., Disp: 90 tablet, Rfl: 1  •  buPROPion XL (Wellbutrin XL) 300 MG 24 hr tablet, Take 1 tablet by mouth Every Morning., Disp: 90 tablet, Rfl: 1  •  hydrOXYzine (ATARAX) 25 MG tablet, Take 1 tablet by mouth Every 8 (Eight) Hours As Needed for Anxiety., Disp: 90 tablet, Rfl: 1  •  promethazine (PHENERGAN) 25 MG tablet, Take 1 tablet by mouth Every 6 (Six) Hours As Needed for Nausea or Vomiting., Disp: 12 tablet, Rfl: 0  •  sucralfate (CARAFATE) 1 g tablet, Take 1 tablet by mouth 4 (Four) Times a Day., Disp: 40 tablet, Rfl: 0    Objective      Last menstrual period 06/26/2018, not currently breastfeeding.    Physical Exam     General Appearance:    Alert, cooperative, no distress, appears stated age   Head:    Normocephalic, without obvious abnormality, atraumatic   Eyes:    PERRL, conjunctiva/corneas clear, EOM's intact   Ears:    Normal TM's and external ear canals, both ears   Nose:   Nares normal, septum midline, mucosa normal, no drainage   or sinus tenderness   Throat:   Lips, mucosa, and tongue normal; teeth and gums normal   Neck:   Supple, symmetrical, trachea midline, no adenopathy;        thyroid:  No enlargement/tenderness/nodules; no carotid    bruit or JVD   Back:     Symmetric, no curvature, ROM normal, no CVA tenderness   Lungs:     Clear to auscultation bilaterally, respirations unlabored   Chest wall:    No tenderness or deformity   Heart:    Regular rate and rhythm, S1 and S2 normal, no murmur,        rub or gallop   Abdomen:     Soft, non-tender, bowel sounds active all four quadrants,     no masses, no organomegaly   Extremities:   Extremities normal, atraumatic, no cyanosis or edema   Pulses:   2+ and symmetric all extremities   Skin:   Skin color, texture, turgor normal, no  rashes or lesions   Lymph nodes:   Cervical, supraclavicular, and axillary nodes normal   Neurologic:   CNII-XII intact. Normal strength, sensation and reflexes       throughout      Results for orders placed or performed in visit on 09/02/21   COVID-19 PCR, [facility], NP SWAB IN [facility]SPORT MEDIA/ORAL SWISH 24-30 HR TAT - Swab, Nasopharynx    Specimen: Nasopharynx; Swab   Result Value Ref Range    SARS-CoV-2 ZARINA Detected (C) Not Detected         Assessment/Plan        Diagnoses and all orders for this visit:    1. Nausea and vomiting, intractability of vomiting not specified, unspecified vomiting type (Primary)    2. COVID-19 virus infection    3. Dehydration    Other orders  -     promethazine (PHENERGAN) 25 MG tablet; Take 1 tablet by mouth Every 6 (Six) Hours As Needed for Nausea or Vomiting.  Dispense: 12 tablet; Refill: 0  -     sucralfate (CARAFATE) 1 g tablet; Take 1 tablet by mouth 4 (Four) Times a Day.  Dispense: 40 tablet; Refill: 0      No follow-ups on file.     video visiti 12 min       There are no Patient Instructions on file for this visit.     Maximino Whelan MD    Assessment/Plan

## 2021-09-09 NOTE — TELEPHONE ENCOUNTER
PT CALLED STATED THAT PHARMACY HAS NOT RECEIVED RX   promethazine (PHENERGAN) 25 MG tablet     [facility] [address] [place] [address] [address] [facility]         PLEASE ADVISE.  CALL BACK:[phone]

## 2021-09-21 ENCOUNTER — OFFICE VISIT (OUTPATIENT)
Dept: OBSTETRICS AND GYNECOLOGY | Facility: CLINIC | Age: 36
End: 2021-09-21

## 2021-09-21 VITALS
BODY MASS INDEX: 31.76 KG/M2 | DIASTOLIC BLOOD PRESSURE: 78 MMHG | HEIGHT: 64 IN | SYSTOLIC BLOOD PRESSURE: 118 MMHG | WEIGHT: 186 LBS

## 2021-09-21 DIAGNOSIS — Z90.710 H/O VAGINAL HYSTERECTOMY: ICD-10-CM

## 2021-09-21 DIAGNOSIS — Z90.79 H/O UNILATERAL SALPINGECTOMY: ICD-10-CM

## 2021-09-21 DIAGNOSIS — R10.2 CHRONIC PELVIC PAIN IN FEMALE: Primary | ICD-10-CM

## 2021-09-21 DIAGNOSIS — G89.29 CHRONIC PELVIC PAIN IN FEMALE: Primary | ICD-10-CM

## 2021-09-21 PROCEDURE — 99214 OFFICE O/P EST MOD 30 MIN: CPT | Performed by: OBSTETRICS & GYNECOLOGY

## 2021-09-21 NOTE — PROGRESS NOTES
GYN Office Visit    Subjective   Chief Complaint   Patient presents with   • Ovarian Cyst     Possible ovarian cyst, abominal pain.     Laura Walls Workman is a 36 y.o. year old  presenting to be seen for pelvic pain.    She reports chronic pelvic pain.  Pain is worse on the right side.  Pain is daily with episodic flares, typically following activity.  Previous hysterectomy and LSO.  She does have hot flashes and night sweats.  No current hormonal medication.  She does have pain with sex.  No clear cyclical pattern to her pain symptoms that she has noticed.    TVT sling placed  of this year.  No incontinence now.    She recently had an episode of syncope during [place] for which she was taken to the emergency room.  Imaging and blood work was overall reassuring at that time.  She also had Covid 2 weeks ago with mild symptoms.  Her PCP treated her for infection of unknown origin for 2 weeks with antibiotics.    OB Hx: 3 C-sections  Pap smear: Not sure, no history of cervical dysplasia, normal cervix on hysterectomy path specimen  Mammogram: Never  Colonoscopy: Never  DEXA Scan: Never    Past Medical History:   Diagnosis Date   • Abdominal pain, periumbilical    • Abdominal pain, RLQ (right lower quadrant)    • Acute pharyngitis    • Anxiety    • Appetite lost    • Chest pain    • Chest pain, atypical 2020   • Colon polyp 2017   • Depression     patient denies   • Diarrhea     Secondary to IBS   • Difficulty swallowing    • Dysphagia     Patient reported she is noticing that she gets choked easily   • Fatigue    • Fracture     RIGHT WRIST TWICE, LEFT HAND   • GERD (gastroesophageal reflux disease)    • H/O foot surgery 10/2020   • Hearing loss     No use of hearing aids   • History of bronchitis    • History of colonoscopy    • History of left salpingo-oophorectomy  2018   • History of ovarian cyst    • History of pneumonia    • Hyperthyroidism     hyperactive taken off medicine  when pregnant.  Reported she was medication at one time, but none since pregnancy.    • Ovarian cyst, bilateral    • Seasonal allergies    • RITU (stress urinary incontinence, female) 2020   • TMJ (dislocation of temporomandibular joint)    • Upper respiratory infection    • Vomiting        Past Surgical History:   Procedure Laterality Date   •  SECTION  2008    DR NAVARRETE   •  SECTION  10/30/2015    DR VASQUEZ   •  SECTION  2010    DR ESPINOZA   • CHOLECYSTECTOMY     • COLONOSCOPY N/A 2017     COLONOSCOPY WITH COLD BIOPSY POLYPECTOMY; BIOPSIES , QUICK CLIP;  Surgeon: Perfecto Mcknight MD;  Location: [facility] ENDOSCOPY;  Service:    • DIAGNOSTIC LAPAROSCOPY  2005    DR JONES NAVA/cystectomy   • DIAGNOSTIC LAPAROSCOPY  2012    DR ESPINOZA/ELIJAH   • DIAGNOSTIC LAPAROSCOPY N/A 8/10/2017     DIAGNOSTIC LAPAROSCOPY, LEFT S&O;  Keren Espinoza MD;  Location: [facility] OR;  Service:    • ENDOSCOPY N/A 10/15/2019    Procedure: ESOPHAGOGASTRODUODENOSCOPY WITH BIOPSY AND ESOPHAGEAL DILATATION;  Surgeon: Perfecto Mcknight MD;  Location: [facility] ENDOSCOPY;  Service: Gastroenterology   • FOOT SURGERY Left     ligament and tendon repair   • HERNIA REPAIR  2019    abd   • MID-URETHRAL SLING WITH CYSTOSCOPY N/A 2021     MID-URETHRAL SLING WITH CYSTOSCOPY TVT EXACT;  Luc Alicea MD;  Location:  [facility] OR;  Service: Obstetrics/Gynecology;  Laterality: N/A;   • NISSEN FUNDOPLICATION  2013   • UPPER GASTROINTESTINAL ENDOSCOPY     • VAGINAL HYSTERECTOMY N/A 2018    VAGINAL HYSTERECTOMY, BILATERAL SALPINGECTOMY;  Luc Alicea MD;  Location:  [facility] OR;  Service: Gynecology   • WISDOM TOOTH EXTRACTION         Family History   Problem Relation Age of Onset   • Coronary artery disease Mother    • Diabetes Mother    • Hypertension Mother    • Kidney disease Father    • Skin cancer Father    • Stroke Maternal Grandfather    • Hypertension Maternal Grandfather    • Diabetes Maternal  Grandfather    • Colon cancer Maternal Great-Grandmother    • Osteoporosis Neg Hx         Social History     Tobacco Use   • Smoking status: Never Smoker   • Smokeless tobacco: Never Used   Vaping Use   • Vaping Use: Never used   Substance Use Topics   • Alcohol use: No   • Drug use: No       (Not in a hospital admission)      Patient has no known allergies.    Current Outpatient Medications on File Prior to Visit   Medication Sig Dispense Refill   • acetaminophen (TYLENOL) 325 MG tablet Take 2 tablets by mouth Every 6 (Six) Hours. For 3 to 5 days postoperatively     • albuterol sulfate  (90 Base) MCG/ACT inhaler Two puffs per day every 4-6 hours as needed for wheeze or shortness of breath. 18 g 0   • Ascorbic Acid (NICOLETTE-C PO) Take  by mouth every night at bedtime.     • aspirin 81 MG chewable tablet Chew 81 mg Daily.     • buPROPion XL (Wellbutrin XL) 150 MG 24 hr tablet Take 1 tablet by mouth Daily. 90 tablet 1   • buPROPion XL (Wellbutrin XL) 300 MG 24 hr tablet Take 1 tablet by mouth Every Morning. 90 tablet 1   • escitalopram (LEXAPRO) 10 MG tablet Take 1 tablet by mouth Daily. 90 tablet 1   • famotidine (Pepcid) 20 MG tablet Take 1 tablet by mouth 2 (Two) Times a Day As Needed for Heartburn. 30 tablet 0   • hydrOXYzine (ATARAX) 25 MG tablet Take 1 tablet by mouth Every 8 (Eight) Hours As Needed for Anxiety. 90 tablet 1   • ibuprofen (ADVIL,MOTRIN) 600 MG tablet      • lidocaine (XYLOCAINE) 5 % ointment Apply  topically to the appropriate area as directed Every Night. 35 g 1   • loratadine (Claritin) 10 MG tablet Take 1 tablet by mouth Daily. 30 tablet 1   • Nutritional Supplements (VITAMIN D BOOSTER PO) Take  by mouth every night at bedtime.     • predniSONE (DELTASONE) 10 MG (21) dose pack Daily taper 6,5,4,3,2,1 21 each 0   • promethazine (PHENERGAN) 25 MG tablet Take 1 tablet by mouth Every 6 (Six) Hours As Needed for Nausea or Vomiting. 30 tablet 0   • promethazine-dextromethorphan (PROMETHAZINE-DM)  "6.25-15 MG/5ML syrup Take 5 mL by mouth 4 (Four) Times a Day As Needed for Cough. 118 mL 0   • sucralfate (CARAFATE) 1 g tablet Take 1 tablet by mouth 4 (Four) Times a Day. 40 tablet 0   • Zinc Sulfate (ZINC 15 PO) Take  by mouth every night at bedtime.     • [DISCONTINUED] fluticasone (Flonase) 50 MCG/ACT nasal spray 2 sprays into the nostril(s) as directed by provider Daily. 15.8 mL 1     No current facility-administered medications on file prior to visit.       Social History    Tobacco Use      Smoking status: Never Smoker      Smokeless tobacco: Never Used         Objective   /78   Ht 162.6 cm (64\")   Wt 84.4 kg (186 lb)   LMP 06/26/2018   BMI 31.93 kg/m²     Physical Exam:  General Appearance: alert, pleasant, appears stated age, interactive and cooperative  Breasts: Not performed.  Abdomen: no masses, no hepatomegaly, no splenomegaly, soft non-tender, no guarding and no rebound tenderness  Pelvis:  Pelvic: Clinical staff was present for exam  External genitalia:  normal appearance of the external genitalia including Bartholin's and [place]her[place]'s glands.  :  urethral meatus normal;  Vagina:  normal pink mucosa without prolapse or lesions.  Pain with speculum and bimanual exam.  Cervix:  absent.  Uterus:  absent.  Adnexa:  normal bimanual exam of the adnexa.  Rectal:  digital rectal exam not performed; anus visually normal appearing.         Medical Decision Making:    I reviewed her hysterectomy operative note and pathology results.    Assessment   Chronic pelvic pain  Right lower quadrant pain  Previous hysterectomy + LSO     Plan    Orders Placed This Encounter   Procedures   • NuSwab VG+ - Swab, Vagina     Order Specific Question:   Release to patient     Answer:   Immediate       Medication Management: None    Procedures Performed: None    We reviewed her situation in detail.  Vaginal cultures obtained to rule out infectious etiology for pain.  Return next week for pelvic ultrasound to further assess " the remaining right ovary.  She would very much like the remaining ovary removed, but we discussed the implications of surgical menopause today.  We discussed possible ovarian suppression with hormonal medications.  We will continue treatment option discussions next week after obtaining the ultrasound.  All questions answered.    Kem Sahni MD  Obstetrics and Gynecology  [facility]

## 2021-09-23 LAB
A VAGINAE DNA VAG QL NAA+PROBE: NORMAL SCORE
BVAB2 DNA VAG QL NAA+PROBE: NORMAL SCORE
C ALBICANS DNA VAG QL NAA+PROBE: NEGATIVE
C GLABRATA DNA VAG QL NAA+PROBE: NEGATIVE
C TRACH DNA VAG QL NAA+PROBE: NEGATIVE
MEGA1 DNA VAG QL NAA+PROBE: NORMAL SCORE
N GONORRHOEA DNA VAG QL NAA+PROBE: NEGATIVE
T VAGINALIS DNA VAG QL NAA+PROBE: NEGATIVE

## 2021-09-29 ENCOUNTER — OFFICE VISIT (OUTPATIENT)
Dept: OBSTETRICS AND GYNECOLOGY | Facility: CLINIC | Age: 36
End: 2021-09-29

## 2021-09-29 VITALS
DIASTOLIC BLOOD PRESSURE: 70 MMHG | HEIGHT: 64 IN | SYSTOLIC BLOOD PRESSURE: 122 MMHG | WEIGHT: 186 LBS | BODY MASS INDEX: 31.76 KG/M2

## 2021-09-29 DIAGNOSIS — G89.29 CHRONIC PELVIC PAIN IN FEMALE: Primary | ICD-10-CM

## 2021-09-29 DIAGNOSIS — Z90.79 H/O UNILATERAL SALPINGECTOMY: ICD-10-CM

## 2021-09-29 DIAGNOSIS — Z90.710 H/O VAGINAL HYSTERECTOMY: ICD-10-CM

## 2021-09-29 DIAGNOSIS — R10.2 CHRONIC PELVIC PAIN IN FEMALE: Primary | ICD-10-CM

## 2021-09-29 PROCEDURE — 99214 OFFICE O/P EST MOD 30 MIN: CPT | Performed by: OBSTETRICS & GYNECOLOGY

## 2021-09-29 RX ORDER — NORGESTIMATE AND ETHINYL ESTRADIOL 0.25-0.035
1 KIT ORAL DAILY
Qty: 90 TABLET | Refills: 5 | Status: SHIPPED | OUTPATIENT
Start: 2021-09-29 | End: 2022-01-19 | Stop reason: HOSPADM

## 2021-10-07 ENCOUNTER — OFFICE VISIT (OUTPATIENT)
Dept: GASTROENTEROLOGY | Facility: CLINIC | Age: 36
End: 2021-10-07

## 2021-10-07 ENCOUNTER — OFFICE VISIT (OUTPATIENT)
Dept: UROLOGY | Facility: CLINIC | Age: 36
End: 2021-10-07

## 2021-10-07 ENCOUNTER — TELEPHONE (OUTPATIENT)
Dept: UROLOGY | Facility: CLINIC | Age: 36
End: 2021-10-07

## 2021-10-07 VITALS
RESPIRATION RATE: 16 BRPM | WEIGHT: 187 LBS | HEART RATE: 75 BPM | BODY MASS INDEX: 31.92 KG/M2 | DIASTOLIC BLOOD PRESSURE: 74 MMHG | HEIGHT: 64 IN | SYSTOLIC BLOOD PRESSURE: 117 MMHG | TEMPERATURE: 97.8 F

## 2021-10-07 VITALS
TEMPERATURE: 96.8 F | SYSTOLIC BLOOD PRESSURE: 112 MMHG | WEIGHT: 187 LBS | DIASTOLIC BLOOD PRESSURE: 64 MMHG | OXYGEN SATURATION: 96 % | HEART RATE: 92 BPM | BODY MASS INDEX: 31.92 KG/M2 | HEIGHT: 64 IN

## 2021-10-07 DIAGNOSIS — R11.0 NAUSEA: ICD-10-CM

## 2021-10-07 DIAGNOSIS — K21.9 GASTROESOPHAGEAL REFLUX DISEASE, UNSPECIFIED WHETHER ESOPHAGITIS PRESENT: ICD-10-CM

## 2021-10-07 DIAGNOSIS — R13.10 DYSPHAGIA, UNSPECIFIED TYPE: Primary | ICD-10-CM

## 2021-10-07 DIAGNOSIS — K59.1 FUNCTIONAL DIARRHEA: ICD-10-CM

## 2021-10-07 DIAGNOSIS — Z86.010 PERSONAL HISTORY OF COLONIC POLYPS: ICD-10-CM

## 2021-10-07 DIAGNOSIS — N39.41 URGE INCONTINENCE OF URINE: ICD-10-CM

## 2021-10-07 DIAGNOSIS — R31.9 HEMATURIA, UNSPECIFIED TYPE: Primary | ICD-10-CM

## 2021-10-07 DIAGNOSIS — R10.13 EPIGASTRIC PAIN: ICD-10-CM

## 2021-10-07 LAB
BILIRUB BLD-MCNC: ABNORMAL MG/DL
CLARITY, POC: CLEAR
COLOR UR: YELLOW
GLUCOSE UR STRIP-MCNC: NEGATIVE MG/DL
KETONES UR QL: NEGATIVE
LEUKOCYTE EST, POC: NEGATIVE
NITRITE UR-MCNC: NEGATIVE MG/ML
PH UR: 5 [PH] (ref 5–8)
PROT UR STRIP-MCNC: NEGATIVE MG/DL
RBC # UR STRIP: NEGATIVE /UL
SP GR UR: 1.03 (ref 1–1.03)
UROBILINOGEN UR QL: NORMAL

## 2021-10-07 PROCEDURE — 99204 OFFICE O/P NEW MOD 45 MIN: CPT | Performed by: UROLOGY

## 2021-10-07 PROCEDURE — 99214 OFFICE O/P EST MOD 30 MIN: CPT | Performed by: NURSE PRACTITIONER

## 2021-10-07 RX ORDER — PANTOPRAZOLE SODIUM 40 MG/1
TABLET, DELAYED RELEASE ORAL
Qty: 30 TABLET | Refills: 3 | Status: SHIPPED | OUTPATIENT
Start: 2021-10-07 | End: 2022-01-17 | Stop reason: SDUPTHER

## 2021-10-07 RX ORDER — MONTELUKAST SODIUM 4 MG/1
1 TABLET, CHEWABLE ORAL 2 TIMES DAILY
Qty: 60 TABLET | Refills: 2 | Status: SHIPPED | OUTPATIENT
Start: 2021-10-07 | End: 2022-03-21

## 2021-10-07 RX ORDER — ONDANSETRON 4 MG/1
4 TABLET, FILM COATED ORAL EVERY 8 HOURS PRN
Qty: 30 TABLET | Refills: 1 | Status: SHIPPED | OUTPATIENT
Start: 2021-10-07 | End: 2022-10-31

## 2021-10-07 RX ORDER — SODIUM CHLORIDE 9 MG/ML
70 INJECTION, SOLUTION INTRAVENOUS CONTINUOUS PRN
Status: CANCELLED | OUTPATIENT
Start: 2021-10-07

## 2021-10-07 NOTE — PROGRESS NOTES
"     Follow Up Note     Date: 10/07/2021   Patient Name: Laura Thomson  MRN: [id number]  : 1985     Primary Care Provider: Megha Fregoso APRN     Chief Complaint   Patient presents with   • Abdominal Pain     History of present illness:   10/7/2021  Laura Thomson is a 36 y.o. female who is here today for follow up for abdominal pain.     She has been having epigastric pain for the past few months that has not improved. Eating does not affect the pain. She has occasional nausea associated with the pain. No vomiting. She has been taking Pepcid 20 mg twice a day as needed for acid reflux, she may take 1 every other day or so. She has intermittent difficulty swallowing.that occurs with every meal. She has difficulty swallowing solids, no significant problem with liquids.     She has a history of diarrhea for several years. She has 3-4 episodes of diarrhea every time she eats a meal. No history of rectal bleeding.     Interval History:  2019  There is a long standing history of epigastric pain. The pain occurs daily. The pain is moderate and described as cramping and a sensation of fullness. She constantly feels bloated and \"swollen\". Eating makes the pain worse. Nothing improves the pain.      There is a long standing history of nausea. The patient has nausea daily. Eating makes nausea worse. Nausea is described as mild. She is not taking anything for nausea. The patient denies vomiting.      There is a long standing history of heartburn. The patient may have heartburn 1-2 times per week. Heartburn is mild to moderate. Reflux is worse at night. The patient is not taking anything for heartburn. Of interest, the patient has a history of Nissen wrap in the past.      There is a history of difficulty swallowing for the past 6 months. The patient has difficulty swallowing solids and liquids several times per day. She frequently \"gets choked\" when swallowing saliva. There is no history of " weight loss.      There is a long standing history of diarrhea. The patient may have diarrhea several times per day. Eating makes diarrhea worse. The patient is not taking anything for diarrhea at this time. The patient had colonoscopy in 2017 and biopsies were negative for inflammatory bowel disease. She was recommended to take Imodium in the past, but she stopped taking it as it caused constipation. There is no history of bright red blood per rectum or melena.     Subjective      Past Medical History:   Diagnosis Date   • Abdominal pain, periumbilical    • Abdominal pain, RLQ (right lower quadrant)    • Acute pharyngitis    • Anxiety    • Appetite lost    • Chest pain    • Chest pain, atypical 2020   • Colon polyp 2017   • Depression     patient denies   • Diarrhea     Secondary to IBS   • Difficulty swallowing    • Dysphagia     Patient reported she is noticing that she gets choked easily   • Fatigue    • Fracture     RIGHT WRIST TWICE, LEFT HAND   • GERD (gastroesophageal reflux disease)    • H/O foot surgery 10/2020   • Hearing loss     No use of hearing aids   • History of bronchitis    • History of colonoscopy    • History of left salpingo-oophorectomy  2018   • History of ovarian cyst    • History of pneumonia    • Hyperthyroidism     hyperactive taken off medicine when pregnant.  Reported she was medication at one time, but none since pregnancy.    • Irritable bowel syndrome    • Ovarian cyst, bilateral    • Seasonal allergies    • RITU (stress urinary incontinence, female) 2020   • TMJ (dislocation of temporomandibular joint)    • Upper respiratory infection    • Vomiting      Past Surgical History:   Procedure Laterality Date   •  SECTION  2008    DR NAVARRETE   •  SECTION  10/30/2015    DR VASQUEZ   •  SECTION  2010    DR ESPINOZA   • CHOLECYSTECTOMY     • COLONOSCOPY N/A 2017     COLONOSCOPY WITH COLD BIOPSY POLYPECTOMY; BIOPSIES ,  QUICK CLIP;  Surgeon: Perfecto Mcknight MD;  Location: [facility] ENDOSCOPY;  Service:    • DIAGNOSTIC LAPAROSCOPY  07/07/2005    DR JONES NAVA/cystectomy   • DIAGNOSTIC LAPAROSCOPY  02/14/2012    DR ESPINOZA/ELIJAH   • DIAGNOSTIC LAPAROSCOPY N/A 8/10/2017     DIAGNOSTIC LAPAROSCOPY, LEFT S&O;  Keren Espinoza MD;  Location: [facility] OR;  Service:    • ENDOSCOPY N/A 10/15/2019    Procedure: ESOPHAGOGASTRODUODENOSCOPY WITH BIOPSY AND ESOPHAGEAL DILATATION;  Surgeon: Perfecto Mcknight MD;  Location: [facility] ENDOSCOPY;  Service: Gastroenterology   • FOOT SURGERY Left     ligament and tendon repair   • HERNIA REPAIR  12/20/2019    abd   • MID-URETHRAL SLING WITH CYSTOSCOPY N/A 6/9/2021     MID-URETHRAL SLING WITH CYSTOSCOPY TVT EXACT;  Luc Alicea MD;  Location: [facility] OR;  Service: Obstetrics/Gynecology;  Laterality: N/A;   • NISSEN FUNDOPLICATION  06/2013   • UPPER GASTROINTESTINAL ENDOSCOPY  2013   • UPPER GASTROINTESTINAL ENDOSCOPY  06/19/2020   • VAGINAL HYSTERECTOMY N/A 7/9/2018    VAGINAL HYSTERECTOMY, BILATERAL SALPINGECTOMY;  Luc Alicea MD;  Location:  [facility] OR;  Service: Gynecology   • WISDOM TOOTH EXTRACTION  2000     Family History   Problem Relation Age of Onset   • Coronary artery disease Mother    • Diabetes Mother    • Hypertension Mother    • Kidney disease Father    • Skin cancer Father    • Stroke Maternal Grandfather    • Hypertension Maternal Grandfather    • Diabetes Maternal Grandfather    • Colon cancer Maternal Great-Grandmother    • Osteoporosis Neg Hx      Social History     Socioeconomic History   • Marital status:      Spouse name: Not on file   • Number of children: Not on file   • Years of education: Not on file   • Highest education level: Not on file   Tobacco Use   • Smoking status: Never Smoker   • Smokeless tobacco: Never Used   Vaping Use   • Vaping Use: Never used   Substance and Sexual Activity   • Alcohol use: No   • Drug use: No   • Sexual activity: Yes     Partners: Male     Birth  control/protection: Surgical       Current Outpatient Medications:   •  acetaminophen (TYLENOL) 325 MG tablet, Take 2 tablets by mouth Every 6 (Six) Hours. For 3 to 5 days postoperatively, Disp: , Rfl:   •  buPROPion XL (Wellbutrin XL) 150 MG 24 hr tablet, Take 1 tablet by mouth Daily., Disp: 90 tablet, Rfl: 1  •  buPROPion XL (Wellbutrin XL) 300 MG 24 hr tablet, Take 1 tablet by mouth Every Morning., Disp: 90 tablet, Rfl: 1  •  escitalopram (LEXAPRO) 10 MG tablet, Take 1 tablet by mouth Daily., Disp: 90 tablet, Rfl: 1  •  hydrOXYzine (ATARAX) 25 MG tablet, Take 1 tablet by mouth Every 8 (Eight) Hours As Needed for Anxiety., Disp: 90 tablet, Rfl: 1  •  ibuprofen (ADVIL,MOTRIN) 600 MG tablet, , Disp: , Rfl:   •  lidocaine (XYLOCAINE) 5 % ointment, Apply  topically to the appropriate area as directed Every Night., Disp: 35 g, Rfl: 1  •  loratadine (Claritin) 10 MG tablet, Take 1 tablet by mouth Daily. (Patient taking differently: Take 10 mg by mouth As Needed.), Disp: 30 tablet, Rfl: 1  •  norgestimate-ethinyl estradiol (ORTHO-CYCLEN) 0.25-35 MG-MCG per tablet, Take 1 tablet by mouth Daily., Disp: 90 tablet, Rfl: 5  •  promethazine (PHENERGAN) 25 MG tablet, Take 1 tablet by mouth Every 6 (Six) Hours As Needed for Nausea or Vomiting., Disp: 30 tablet, Rfl: 0  •  colestipol (COLESTID) 1 g tablet, Take 1 tablet by mouth 2 (Two) Times a Day., Disp: 60 tablet, Rfl: 2  •  ondansetron (ZOFRAN) 4 MG tablet, Take 1 tablet by mouth Every 8 (Eight) Hours As Needed for Nausea or Vomiting., Disp: 30 tablet, Rfl: 1  •  pantoprazole (PROTONIX) 40 MG EC tablet, 1 po daily in the am 30 minutes before breakfast, Disp: 30 tablet, Rfl: 3     No Known Allergies     The following portions of the patient's history were reviewed and updated as appropriate: allergies, current medications, past family history, past medical history, past social history, past surgical history and problem list.  Objective     Physical Exam  Vitals and nursing  "note reviewed.   Constitutional:       General: She is not in acute distress.     Appearance: Normal appearance. She is well-developed.   HENT:      Head: Normocephalic and atraumatic.      Right Ear: Hearing normal.      Left Ear: Hearing normal.      Mouth/Throat:      Mouth: Mucous membranes are not pale, not dry and not cyanotic.   Eyes:      General: Lids are normal.      Conjunctiva/sclera: Conjunctivae normal.   Neck:      Trachea: Trachea normal.   Cardiovascular:      Rate and Rhythm: Normal rate and regular rhythm.      Heart sounds: Normal heart sounds.   Pulmonary:      Effort: Pulmonary effort is normal. No respiratory distress.      Breath sounds: Normal breath sounds.   Abdominal:      General: Bowel sounds are normal.      Palpations: Abdomen is soft. There is no mass.      Tenderness: There is abdominal tenderness in the epigastric area.      Hernia: No hernia is present.   Skin:     General: Skin is warm and dry.   Neurological:      Mental Status: She is alert and oriented to person, place, and time.   Psychiatric:         Mood and Affect: Mood normal.         Speech: Speech normal.         Behavior: Behavior normal. Behavior is cooperative.       Vitals:    10/07/21 1107   BP: 117/74   Pulse: 75   Resp: 16   Temp: 97.8 °F (36.6 °C)   Weight: 84.8 kg (187 lb)   Height: 162.6 cm (64\")     Body mass index is 32.1 kg/m².     Results Review:   I reviewed the patient's new clinical results.    Office Visit on 09/21/2021   Component Date Value Ref Range Status   • Atopobium Vaginae 09/21/2021 Low - 0  Score Final   • BVAB 2 09/21/2021 Low - 0  Score Final   • Megasphaera 1 09/21/2021 Low - 0  Score Final    Comment: Calculate total score by adding the 3 individual bacterial  vaginosis (BV) marker scores together.  Total score is  interpreted as follows:  Total score 0-1: Indicates the absence of BV.  Total score   2: Indeterminate for BV. Additional clinical                   data should be evaluated to " establish a                   diagnosis.  Total score 3-6: Indicates the presence of BV.  This test was developed and its performance characteristics  determined by [facility].  It has not been cleared or approved  by the Food and Drug Administration.     • Minoo Albicans, ZARINA 09/21/2021 Negative  Negative Final   • Minoo Glabrata, ZARINA 09/21/2021 Negative  Negative Final   • Trichomonas vaginosis 09/21/2021 Negative  Negative Final   • Chlamydia trachomatis, ZARINA 09/21/2021 Negative  Negative Final   • Neisseria gonorrhoeae, ZARINA 09/21/2021 Negative  Negative Final   Lab on 09/02/2021   Component Date Value Ref Range Status   • SARS-CoV-2 ZARINA 09/02/2021 Detected* Not Detected Final   Office Visit on 08/26/2021   Component Date Value Ref Range Status   • Color 08/26/2021 Yellow  Yellow, Straw, Dark Yellow, Pushpa Final   • Clarity, UA 08/26/2021 Clear  Clear Final   • Specific Gravity  08/26/2021 1.025  1.005 - 1.030 Final   • pH, Urine 08/26/2021 5.5  5.0 - 8.0 Final   • Leukocytes 08/26/2021 Negative  Negative Final   • Nitrite, UA 08/26/2021 Negative  Negative Final   • Protein, POC 08/26/2021 Negative  Negative mg/dL Final   • Glucose, UA 08/26/2021 Negative  Negative, 1000 mg/dL (3+) mg/dL Final   • Ketones, UA 08/26/2021 Negative  Negative Final   • Urobilinogen, UA 08/26/2021 Normal  Normal Final   • Bilirubin 08/26/2021 Negative  Negative Final   • Blood, UA 08/26/2021 1+* Negative Final   Office Visit on 08/18/2021   Component Date Value Ref Range Status   • Lipase 08/18/2021 29  13 - 60 U/L Final   • Glucose 08/18/2021 96  65 - 99 mg/dL Final   • BUN 08/18/2021 10  6 - 20 mg/dL Final   • Creatinine 08/18/2021 0.76  0.57 - 1.00 mg/dL Final   • Sodium 08/18/2021 139  136 - 145 mmol/L Final   • Potassium 08/18/2021 4.7  3.5 - 5.2 mmol/L Final   • Chloride 08/18/2021 103  98 - 107 mmol/L Final   • CO2 08/18/2021 26.5  22.0 - 29.0 mmol/L Final   • Calcium 08/18/2021 9.6  8.6 - 10.5 mg/dL Final   • Total Protein  08/18/2021 7.7  6.0 - 8.5 g/dL Final   • Albumin 08/18/2021 4.60  3.50 - 5.20 g/dL Final   • ALT (SGPT) 08/18/2021 10  1 - 33 U/L Final   • AST (SGOT) 08/18/2021 17  1 - 32 U/L Final   • Alkaline Phosphatase 08/18/2021 91  39 - 117 U/L Final   • Total Bilirubin 08/18/2021 0.3  0.0 - 1.2 mg/dL Final   • eGFR Non  Amer 08/18/2021 86  >60 mL/min/1.73 Final   • Globulin 08/18/2021 3.1  gm/dL Final   • A/G Ratio 08/18/2021 1.5  g/dL Final   • BUN/Creatinine Ratio 08/18/2021 13.2  7.0 - 25.0 Final   • Anion Gap 08/18/2021 9.5  5.0 - 15.0 mmol/L Final   • WBC 08/18/2021 10.11  3.40 - 10.80 10*3/mm3 Final   • RBC 08/18/2021 5.13  3.77 - 5.28 10*6/mm3 Final   • Hemoglobin 08/18/2021 14.2  12.0 - 15.9 g/dL Final   • Hematocrit 08/18/2021 43.7  34.0 - 46.6 % Final   • MCV 08/18/2021 85.2  79.0 - 97.0 fL Final   • MCH 08/18/2021 27.7  26.6 - 33.0 pg Final   • MCHC 08/18/2021 32.5  31.5 - 35.7 g/dL Final   • RDW 08/18/2021 13.0  12.3 - 15.4 % Final   • RDW-SD 08/18/2021 40.3  37.0 - 54.0 fl Final   • MPV 08/18/2021 10.3  6.0 - 12.0 fL Final   • Platelets 08/18/2021 279  140 - 450 10*3/mm3 Final   • Neutrophil % 08/18/2021 69.7  42.7 - 76.0 % Final   • Lymphocyte % 08/18/2021 20.1  19.6 - 45.3 % Final   • Monocyte % 08/18/2021 7.9  5.0 - 12.0 % Final   • Eosinophil % 08/18/2021 1.1  0.3 - 6.2 % Final   • Basophil % 08/18/2021 0.9  0.0 - 1.5 % Final   • Immature Grans % 08/18/2021 0.3  0.0 - 0.5 % Final   • Neutrophils, Absolute 08/18/2021 7.05* 1.70 - 7.00 10*3/mm3 Final   • Lymphocytes, Absolute 08/18/2021 2.03  0.70 - 3.10 10*3/mm3 Final   • Monocytes, Absolute 08/18/2021 0.80  0.10 - 0.90 10*3/mm3 Final   • Eosinophils, Absolute 08/18/2021 0.11  0.00 - 0.40 10*3/mm3 Final   • Basophils, Absolute 08/18/2021 0.09  0.00 - 0.20 10*3/mm3 Final   • Immature Grans, Absolute 08/18/2021 0.03  0.00 - 0.05 10*3/mm3 Final   • nRBC 08/18/2021 0.0  0.0 - 0.2 /100 WBC Final   • H. pylori, IgA ABS 08/18/2021 <9.0  0.0 - 8.9 units Final                                     Negative          <9.0                                  Equivocal   9.0 - 11.0                                  Positive         >11.0   • H. Pylori, IgM 08/18/2021 <9.0  0.0 - 8.9 units Final                                    Negative          <9.0                                  Equivocal   9.0 - 11.0                                  Positive         >11.0  This test was developed and its performance characteristics  determined by [facility]. It has not been cleared or approved  by the Food and Drug Administration.   Orders Only on 08/17/2021   Component Date Value Ref Range Status   • Campylobacter 08/17/2021 Not Detected  Not Detected Final   • C.difficile toxin A/B 08/17/2021 Not Detected  Not Detected Final   • Plesiomonas shigelloides 08/17/2021 Not Detected  Not Detected Final   • Salmonella 08/17/2021 Not Detected  Not Detected Final   • Vibrio 08/17/2021 Not Detected  Not Detected Final   • Vibrio cholerae 08/17/2021 Not Detected  Not Detected Final   • Yersinia enterocolitica 08/17/2021 Not Detected  Not Detected Final   • Enteroaggregative E. coli 08/17/2021 Not Detected  Not Detected Final   • Enteropathogenic E. coli 08/17/2021 Not Detected  Not Detected Final   • Enterotoxigenic E. coli 08/17/2021 Not Detected  Not Detected Final   • Shiga-like toxin-producing E. coli  08/17/2021 Not Detected  Not Detected Final   • E. coli O157 08/17/2021 Not applicable  Not Detected Final   • Shigella enteroinvasive E. coli 08/17/2021 Not Detected  Not Detected Final   • Cryptosporidium 08/17/2021 Not Detected  Not Detected Final   • Cyclospora cayetanensis 08/17/2021 Not Detected  Not Detected Final   • Entamoeba Histolytica Ag 08/17/2021 Not Detected  Not Detected Final   • Giardia lamblia 08/17/2021 Not Detected  Not Detected Final   • Adenovirus 40/41 Ag 08/17/2021 Not Detected  Not Detected Final   • Astrovirus 08/17/2021 Not Detected  Not Detected Final   • Norovirus GI/GII  08/17/2021 Not Detected  Not Detected Final   • Rotavirus A 08/17/2021 Not Detected  Not Detected Final   • Sapovirus 08/17/2021 Not Detected  Not Detected Final   • C difficile Toxin Gene NAAT 08/17/2021 Negative  Negative Final   Office Visit on 08/17/2021   Component Date Value Ref Range Status   • WBC 08/17/2021 10.49  3.40 - 10.80 10*3/mm3 Final   • RBC 08/17/2021 4.99  3.77 - 5.28 10*6/mm3 Final   • Hemoglobin 08/17/2021 13.8  12.0 - 15.9 g/dL Final   • Hematocrit 08/17/2021 42.2  34.0 - 46.6 % Final   • MCV 08/17/2021 84.6  79.0 - 97.0 fL Final   • MCH 08/17/2021 27.7  26.6 - 33.0 pg Final   • MCHC 08/17/2021 32.7  31.5 - 35.7 g/dL Final   • RDW 08/17/2021 12.8  12.3 - 15.4 % Final   • Platelets 08/17/2021 286  140 - 450 10*3/mm3 Final   • Neutrophil Rel % 08/17/2021 69.3  42.7 - 76.0 % Final   • Lymphocyte Rel % 08/17/2021 20.4  19.6 - 45.3 % Final   • Monocyte Rel % 08/17/2021 7.5  5.0 - 12.0 % Final   • Eosinophil Rel % 08/17/2021 1.4  0.3 - 6.2 % Final   • Basophil Rel % 08/17/2021 1.0  0.0 - 1.5 % Final   • Neutrophils Absolute 08/17/2021 7.26* 1.70 - 7.00 10*3/mm3 Final   • Lymphocytes Absolute 08/17/2021 2.14  0.70 - 3.10 10*3/mm3 Final   • Monocytes Absolute 08/17/2021 0.79  0.10 - 0.90 10*3/mm3 Final   • Eosinophils Absolute 08/17/2021 0.15  0.00 - 0.40 10*3/mm3 Final   • Basophils Absolute 08/17/2021 0.11  0.00 - 0.20 10*3/mm3 Final   • Immature Granulocyte Rel % 08/17/2021 0.4  0.0 - 0.5 % Final   • Immature Grans Absolute 08/17/2021 0.04  0.00 - 0.05 10*3/mm3 Final   • nRBC 08/17/2021 0.0  0.0 - 0.2 /100 WBC Final   • Glucose 08/17/2021 83  65 - 99 mg/dL Final   • BUN 08/17/2021 8  6 - 20 mg/dL Final   • Creatinine 08/17/2021 0.78  0.57 - 1.00 mg/dL Final   • eGFR Non  Am 08/17/2021 84  >60 mL/min/1.73 Final    Comment: GFR Normal >60  Chronic Kidney Disease <60  Kidney Failure <15     • eGFR  Am 08/17/2021 101  >60 mL/min/1.73 Final   • BUN/Creatinine Ratio 08/17/2021 10.3  7.0 -  25.0 Final   • Sodium 08/17/2021 137  136 - 145 mmol/L Final   • Potassium 08/17/2021 4.8  3.5 - 5.2 mmol/L Final   • Chloride 08/17/2021 103  98 - 107 mmol/L Final   • Total CO2 08/17/2021 26.5  22.0 - 29.0 mmol/L Final   • Calcium 08/17/2021 9.6  8.6 - 10.5 mg/dL Final   • Total Protein 08/17/2021 7.0  6.0 - 8.5 g/dL Final   • Albumin 08/17/2021 4.50  3.50 - 5.20 g/dL Final   • Globulin 08/17/2021 2.5  gm/dL Final   • A/G Ratio 08/17/2021 1.8  g/dL Final   • Total Bilirubin 08/17/2021 0.2  0.0 - 1.2 mg/dL Final   • Alkaline Phosphatase 08/17/2021 84  39 - 117 U/L Final   • AST (SGOT) 08/17/2021 17  1 - 32 U/L Final   • ALT (SGPT) 08/17/2021 11  1 - 33 U/L Final   Admission on 08/15/2021, Discharged on 08/15/2021   Component Date Value Ref Range Status   • Glucose 08/15/2021 101* 65 - 99 mg/dL Final   • BUN 08/15/2021 8  6 - 20 mg/dL Final   • Creatinine 08/15/2021 0.74  0.57 - 1.00 mg/dL Final   • Sodium 08/15/2021 141  136 - 145 mmol/L Final   • Potassium 08/15/2021 4.1  3.5 - 5.2 mmol/L Final   • Chloride 08/15/2021 103  98 - 107 mmol/L Final   • CO2 08/15/2021 25.1  22.0 - 29.0 mmol/L Final   • Calcium 08/15/2021 9.1  8.6 - 10.5 mg/dL Final   • Total Protein 08/15/2021 6.9  6.0 - 8.5 g/dL Final   • Albumin 08/15/2021 4.30  3.50 - 5.20 g/dL Final   • ALT (SGPT) 08/15/2021 10  1 - 33 U/L Final   • AST (SGOT) 08/15/2021 15  1 - 32 U/L Final   • Alkaline Phosphatase 08/15/2021 78  39 - 117 U/L Final   • Total Bilirubin 08/15/2021 0.2  0.0 - 1.2 mg/dL Final   • eGFR Non  Amer 08/15/2021 89  >60 mL/min/1.73 Final   • Globulin 08/15/2021 2.6  gm/dL Final   • A/G Ratio 08/15/2021 1.7  g/dL Final   • BUN/Creatinine Ratio 08/15/2021 10.8  7.0 - 25.0 Final   • Anion Gap 08/15/2021 12.9  5.0 - 15.0 mmol/L Final   • Troponin T 08/15/2021 <0.010  0.000 - 0.030 ng/mL Final   • Magnesium 08/15/2021 1.9  1.6 - 2.6 mg/dL Final   • Color, UA 08/15/2021 Yellow  Yellow, Straw Final   • Appearance, UA 08/15/2021 Clear  Clear  Final   • pH, UA 08/15/2021 8.0  5.0 - 8.0 Final   • Specific Gravity, UA 08/15/2021 1.012  1.005 - 1.030 Final   • Glucose, UA 08/15/2021 Negative  Negative Final   • Ketones, UA 08/15/2021 Negative  Negative Final   • Bilirubin, UA 08/15/2021 Negative  Negative Final   • Blood, UA 08/15/2021 Negative  Negative Final   • Protein, UA 08/15/2021 Negative  Negative Final   • Leuk Esterase, UA 08/15/2021 Negative  Negative Final   • Nitrite, UA 08/15/2021 Negative  Negative Final   • Urobilinogen, UA 08/15/2021 0.2 E.U./dL  0.2 - 1.0 E.U./dL Final   • Extra Tube 08/15/2021 Hold for add-ons.   Final    Auto resulted.   • Extra Tube 08/15/2021 hold for add-on   Final    Auto resulted   • Extra Tube 08/15/2021 Hold for add-ons.   Final    Auto resulted.   • WBC 08/15/2021 8.27  3.40 - 10.80 10*3/mm3 Final   • RBC 08/15/2021 4.83  3.77 - 5.28 10*6/mm3 Final   • Hemoglobin 08/15/2021 13.5  12.0 - 15.9 g/dL Final   • Hematocrit 08/15/2021 41.6  34.0 - 46.6 % Final   • MCV 08/15/2021 86.1  79.0 - 97.0 fL Final   • MCH 08/15/2021 28.0  26.6 - 33.0 pg Final   • MCHC 08/15/2021 32.5  31.5 - 35.7 g/dL Final   • RDW 08/15/2021 12.9  12.3 - 15.4 % Final   • RDW-SD 08/15/2021 40.5  37.0 - 54.0 fl Final   • MPV 08/15/2021 10.4  6.0 - 12.0 fL Final   • Platelets 08/15/2021 234  140 - 450 10*3/mm3 Final   • Neutrophil % 08/15/2021 69.2  42.7 - 76.0 % Final   • Lymphocyte % 08/15/2021 20.3  19.6 - 45.3 % Final   • Monocyte % 08/15/2021 8.1  5.0 - 12.0 % Final   • Eosinophil % 08/15/2021 1.2  0.3 - 6.2 % Final   • Basophil % 08/15/2021 0.8  0.0 - 1.5 % Final   • Immature Grans % 08/15/2021 0.4  0.0 - 0.5 % Final   • Neutrophils, Absolute 08/15/2021 5.72  1.70 - 7.00 10*3/mm3 Final   • Lymphocytes, Absolute 08/15/2021 1.68  0.70 - 3.10 10*3/mm3 Final   • Monocytes, Absolute 08/15/2021 0.67  0.10 - 0.90 10*3/mm3 Final   • Eosinophils, Absolute 08/15/2021 0.10  0.00 - 0.40 10*3/mm3 Final   • Basophils, Absolute 08/15/2021 0.07  0.00 - 0.20  10*3/mm3 Final   • Immature Grans, Absolute 08/15/2021 0.03  0.00 - 0.05 10*3/mm3 Final   • nRBC 08/15/2021 0.0  0.0 - 0.2 /100 WBC Final   • Acetaminophen 08/15/2021 <5.0  0.0 - 30.0 mcg/mL Final   • Ethanol 08/15/2021 <10  0 - 10 mg/dL Final   • Ethanol % 08/15/2021 <0.010  % Final   • THC, Screen, Urine 08/15/2021 Negative  Negative Final   • Phencyclidine (PCP), Urine 08/15/2021 Negative  Negative Final   • Cocaine Screen, Urine 08/15/2021 Negative  Negative Final   • Methamphetamine, Ur 08/15/2021 Negative  Negative Final   • Opiate Screen 08/15/2021 Negative  Negative Final   • Amphetamine Screen, Urine 08/15/2021 Negative  Negative Final   • Benzodiazepine Screen, Urine 08/15/2021 Negative  Negative Final   • Tricyclic Antidepressants Screen 08/15/2021 Negative  Negative Final   • Methadone Screen, Urine 08/15/2021 Negative  Negative Final   • Barbiturates Screen, Urine 08/15/2021 Negative  Negative Final   • Oxycodone Screen, Urine 08/15/2021 Negative  Negative Final   • Propoxyphene Screen 08/15/2021 Negative  Negative Final   • Buprenorphine, Screen, Urine 08/15/2021 Negative  Negative Final   • Salicylate 08/15/2021 <0.3  <=30.0 mg/dL Final      CT Head Without Contrast     Result Date: 8/15/2021  No acute intracranial process.    This report was finalized on 8/15/2021 2:31 PM by Nimisha Wright MD.    CT Abdomen Pelvis With Contrast     Result Date: 8/15/2021  Moderate wall thickening of the partially filled bladder, cystitis not excluded.  Multiple small mesenteric lymph nodes, consider mesenteric adenitis  This report was finalized on 8/15/2021 2:37 PM by Nimisha Wright MD.    XR Chest 1 View     Result Date: 8/15/2021  No acute cardiopulmonary process.  .  This report was finalized on 8/15/2021 4:21 PM by Nimisha Wright MD.    XR Abdomen KUB     Result Date: 8/27/2021  Gaseous distention of the stomach.        Images were reviewed, interpreted, and dictated by Dr. Nahid Parks M.D. Transcribed by  Amber Jack PA-C.  This report was finalized on 8/27/2021 11:41 AM by Nahid Parks M.D..     Assessment / Plan      1. Dysphagia, unspecified type  2. Gastroesophageal reflux disease, unspecified whether esophagitis present  Patient has been having difficulty swallowing solids every time she tries to eat, no significant problem swallowing liquids.  She has a history of reflux for several years and has been taking Pepcid 20 mg twice a day as needed with reasonable control.  No history of weight loss.  She had EGD in 2019 with subtle concentric rings involving the proximal mid and distal esophagus noted.  Antireflux measures.  Advised to eat a softer diet, chew food very well, and take sips of water between bites.  Pantoprazole 40 mg 1 p.o. daily  EGD to rule out esophageal abnormality.    - Case Request; Standing  - Implement Anesthesia Orders Day of Procedure; Standing  - Obtain Informed Consent; Standing  - Verify NPO; Standing  - Oxygen Therapy- Nasal Cannula; 2 LPM; Titrate for SPO2: equal to or greater than, 90%; Standing  - POC Glucose Once; Standing  - Pregnancy, Urine -; Standing  - sodium chloride 0.9 % infusion  - Case Request  - pantoprazole (PROTONIX) 40 MG EC tablet; 1 po daily in the am 30 minutes before breakfast  Dispense: 30 tablet; Refill: 3    3. Epigastric pain  4. Nausea  She has been having epigastric pain and nausea for the past few months that has not improved.  Eating does not affect her pain.  No history of vomiting.  She has been taking Pepcid 20 mg twice a day as needed but it has not improved the pain.  No history of melena.  CTAP in August 2021 with multiple small mesenteric lymph nodes noted, possible mesenteric adenitis, and cystitis.  Basic labs unremarkable.  Lipase normal.  Pantoprazole 40 mg 1 p.o. daily x3 months  Zofran 4 mg 1 p.o. every 8 hours as needed for nausea    - ondansetron (ZOFRAN) 4 MG tablet; Take 1 tablet by mouth Every 8 (Eight) Hours As Needed for Nausea  or Vomiting.  Dispense: 30 tablet; Refill: 1    5. Functional diarrhea  The patient has a history of diarrhea for several years since having cholecystectomy.  She has 3-4 episodes of loose to watery stool every time she eats a meal.  No history of rectal bleeding.  No history of lower abdominal pain.  Basic labs unremarkable.  Previous TSH normal.  Stool studies with negative gastrointestinal panel and negative C. difficile toxin.  CTAP with cystitis, mesenteric adenitis, otherwise unremarkable GI tract.  Suspect functional diarrhea secondary to cholecystectomy.  Colestid 1 gm 1 po twice a day. Advised to decrease to once a day if constipated.    - colestipol (COLESTID) 1 g tablet; Take 1 tablet by mouth 2 (Two) Times a Day.  Dispense: 60 tablet; Refill: 2    6. Personal history of colonic polyps  The patient's last colonoscopy was in 2017 with polyps removed, unknown pathology.  There is a family history in a maternal great grandmother.   Colonoscopy for surveillance.    - Case Request; Standing  - Implement Anesthesia Orders Day of Procedure; Standing  - Obtain Informed Consent; Standing  - Verify NPO; Standing  - Verify Bowel Prep Was Successful; Standing  - Oxygen Therapy- Nasal Cannula; 2 LPM; Titrate for SPO2: equal to or greater than, 90%; Standing  - POC Glucose Once; Standing  - Pregnancy, Urine -; Standing  - sodium chloride 0.9 % infusion  - Case Request    Patient Instructions   Antireflux measures: Avoid fried, fatty foods, alcohol, chocolate, coffee, tea,  soft drinks, peppermint and spearmint, spicy foods, tomatoes and tomato based foods, onions, peppers, and smoking.   Other antireflux measures include weight reduction if overweight, avoiding tight clothing around the abdomen, elevating the head of the bed 6 inches with blocks under the head board, and don't drink or eat before going to bed and avoid lying down immediately after meals.  Pantoprazole 40 mg 1 by mouth in the am 30 minutes before  breakfast x 3 months.  Stop Pepcid for now.  Zofran 4 mg 1 po every 8 hours as needed for nausea.  Avoid NSAIDs for now.  Colestid 1 gm 1 po twice a day. Decrease to once a day if constipated.  Take other medications 1 hour prior to taking Colestid or 4 hours after taking Colestid.  The patient should eat relatively soft diet, and should eat in upright position and chew well.  The patient should drink water after 2-3 bites and take medications with liberal amounts of water. Furthermore after eating and taking medications, the patient should remain in upright position for 5-10 minutes.  Upper endoscopy-EGD: The indications, technique, alternatives and potential risk and complications were discussed with the patient including but not limited to bleeding, perforations, missing lesions and anesthetic complications. The patient understands and wishes to proceed with the procedure and has given their verbal consent. Written patient education information was given to the patient.   The patient will call if they have further questions before procedure.   COVID-19 testing prior to procedure. The patient will need to self-quarantine after testing until the procedure. Instructions given to patient.   Colonoscopy: The indications, technique, alternatives and potential risk and complications were discussed with the patient including but not limited to bleeding, perforations, missing lesions and anesthetic complications. The patient understands and wishes to proceed with the procedure and has given their verbal consent. Written patient education information was given to the patient.   The patient will call if they have further questions before procedure.     Magdalena Richardson, APRN  10/7/2021    Please note that portions of this note may have been completed with a voice recognition program. Efforts were made to edit the dictations, but occasionally words are mistranscribed.

## 2021-10-07 NOTE — TELEPHONE ENCOUNTER
Pt states her insurance is not covering Mirabegron ER (Myrbetriq) 25 MG tablet sustained-release 24 hour 24 hr tablet and needs a PA. Pt wants Dr. Cardona to advice her on what to do. It's approx. $500.

## 2021-10-07 NOTE — PATIENT INSTRUCTIONS
Antireflux measures: Avoid fried, fatty foods, alcohol, chocolate, coffee, tea,  soft drinks, peppermint and spearmint, spicy foods, tomatoes and tomato based foods, onions, peppers, and smoking.   Other antireflux measures include weight reduction if overweight, avoiding tight clothing around the abdomen, elevating the head of the bed 6 inches with blocks under the head board, and don't drink or eat before going to bed and avoid lying down immediately after meals.  Pantoprazole 40 mg 1 by mouth in the am 30 minutes before breakfast x 3 months.  Stop Pepcid for now.  Zofran 4 mg 1 po every 8 hours as needed for nausea.  Avoid NSAIDs for now.  Colestid 1 gm 1 po twice a day. Decrease to once a day if constipated.  Take other medications 1 hour prior to taking Colestid or 4 hours after taking Colestid.  The patient should eat relatively soft diet, and should eat in upright position and chew well.  The patient should drink water after 2-3 bites and take medications with liberal amounts of water. Furthermore after eating and taking medications, the patient should remain in upright position for 5-10 minutes.  Upper endoscopy-EGD: The indications, technique, alternatives and potential risk and complications were discussed with the patient including but not limited to bleeding, perforations, missing lesions and anesthetic complications. The patient understands and wishes to proceed with the procedure and has given their verbal consent. Written patient education information was given to the patient.   The patient will call if they have further questions before procedure.   COVID-19 testing prior to procedure. The patient will need to self-quarantine after testing until the procedure. Instructions given to patient.   Colonoscopy: The indications, technique, alternatives and potential risk and complications were discussed with the patient including but not limited to bleeding, perforations, missing lesions and anesthetic  complications. The patient understands and wishes to proceed with the procedure and has given their verbal consent. Written patient education information was given to the patient.   The patient will call if they have further questions before procedure.

## 2021-10-07 NOTE — PROGRESS NOTES
New Patient Consult      Date: 10/07/2021   Patient Name: Laura Thomson  MRN: [id number]  : 1985     Primary Care Provider: Megha Fregoso APRN    Chief Complaint   Patient presents with   • Abdominal Pain   • Difficulty Swallowing   • Heartburn     History of Present Illness: Laura Thomson is a 36 y.o. female who is here today to establish care with Gastroenterology for ***              Subjective      Past Medical History:   Diagnosis Date   • Abdominal pain, periumbilical    • Abdominal pain, RLQ (right lower quadrant)    • Acute pharyngitis    • Anxiety    • Appetite lost    • Chest pain    • Chest pain, atypical 2020   • Colon polyp 2017   • Depression     patient denies   • Diarrhea     Secondary to IBS   • Difficulty swallowing    • Dysphagia     Patient reported she is noticing that she gets choked easily   • Fatigue    • Fracture     RIGHT WRIST TWICE, LEFT HAND   • GERD (gastroesophageal reflux disease)    • H/O foot surgery 10/2020   • Hearing loss     No use of hearing aids   • History of bronchitis    • History of colonoscopy    • History of left salpingo-oophorectomy  2018   • History of ovarian cyst    • History of pneumonia    • Hyperthyroidism     hyperactive taken off medicine when pregnant.  Reported she was medication at one time, but none since pregnancy.    • Irritable bowel syndrome    • Ovarian cyst, bilateral    • Seasonal allergies    • RITU (stress urinary incontinence, female) 2020   • TMJ (dislocation of temporomandibular joint)    • Upper respiratory infection    • Vomiting      Past Surgical History:   Procedure Laterality Date   •  SECTION  2008    DR NAVARRETE   •  SECTION  10/30/2015    DR VASQUEZ   •  SECTION  2010    DR ESPINOZA   • CHOLECYSTECTOMY     • COLONOSCOPY N/A 2017     COLONOSCOPY WITH COLD BIOPSY POLYPECTOMY; BIOPSIES , QUICK CLIP;  Surgeon: Perfecto Mcknight MD;   Location: [facility] ENDOSCOPY;  Service:    • DIAGNOSTIC LAPAROSCOPY  07/07/2005    DR JONES NAVA/cystectomy   • DIAGNOSTIC LAPAROSCOPY  02/14/2012    DR ESPINOZA/ELIJAH   • DIAGNOSTIC LAPAROSCOPY N/A 8/10/2017     DIAGNOSTIC LAPAROSCOPY, LEFT S&O;  Keren Espinoza MD;  Location: [facility] OR;  Service:    • ENDOSCOPY N/A 10/15/2019    Procedure: ESOPHAGOGASTRODUODENOSCOPY WITH BIOPSY AND ESOPHAGEAL DILATATION;  Surgeon: Perfecto Mcknight MD;  Location: [facility] ENDOSCOPY;  Service: Gastroenterology   • FOOT SURGERY Left     ligament and tendon repair   • HERNIA REPAIR  12/20/2019    abd   • MID-URETHRAL SLING WITH CYSTOSCOPY N/A 6/9/2021     MID-URETHRAL SLING WITH CYSTOSCOPY TVT EXACT;  Luc Alicea MD;  Location: [facility] OR;  Service: Obstetrics/Gynecology;  Laterality: N/A;   • NISSEN FUNDOPLICATION  06/2013   • UPPER GASTROINTESTINAL ENDOSCOPY  2013   • VAGINAL HYSTERECTOMY N/A 7/9/2018    VAGINAL HYSTERECTOMY, BILATERAL SALPINGECTOMY;  Luc Alicea MD;  Location: [facility] OR;  Service: Gynecology   • WISDOM TOOTH EXTRACTION  2000     Family History   Problem Relation Age of Onset   • Coronary artery disease Mother    • Diabetes Mother    • Hypertension Mother    • Kidney disease Father    • Skin cancer Father    • Stroke Maternal Grandfather    • Hypertension Maternal Grandfather    • Diabetes Maternal Grandfather    • Colon cancer Maternal Great-Grandmother    • Osteoporosis Neg Hx      Social History     Socioeconomic History   • Marital status:      Spouse name: Not on file   • Number of children: Not on file   • Years of education: Not on file   • Highest education level: Not on file   Tobacco Use   • Smoking status: Never Smoker   • Smokeless tobacco: Never Used   Vaping Use   • Vaping Use: Never used   Substance and Sexual Activity   • Alcohol use: No   • Drug use: No   • Sexual activity: Yes     Partners: Male     Birth control/protection: Surgical       Current Outpatient Medications:   •  acetaminophen (TYLENOL) 325 MG  tablet, Take 2 tablets by mouth Every 6 (Six) Hours. For 3 to 5 days postoperatively, Disp: , Rfl:   •  buPROPion XL (Wellbutrin XL) 150 MG 24 hr tablet, Take 1 tablet by mouth Daily., Disp: 90 tablet, Rfl: 1  •  buPROPion XL (Wellbutrin XL) 300 MG 24 hr tablet, Take 1 tablet by mouth Every Morning., Disp: 90 tablet, Rfl: 1  •  escitalopram (LEXAPRO) 10 MG tablet, Take 1 tablet by mouth Daily., Disp: 90 tablet, Rfl: 1  •  famotidine (Pepcid) 20 MG tablet, Take 1 tablet by mouth 2 (Two) Times a Day As Needed for Heartburn., Disp: 30 tablet, Rfl: 0  •  hydrOXYzine (ATARAX) 25 MG tablet, Take 1 tablet by mouth Every 8 (Eight) Hours As Needed for Anxiety., Disp: 90 tablet, Rfl: 1  •  ibuprofen (ADVIL,MOTRIN) 600 MG tablet, , Disp: , Rfl:   •  lidocaine (XYLOCAINE) 5 % ointment, Apply  topically to the appropriate area as directed Every Night., Disp: 35 g, Rfl: 1  •  loratadine (Claritin) 10 MG tablet, Take 1 tablet by mouth Daily. (Patient taking differently: Take 10 mg by mouth As Needed.), Disp: 30 tablet, Rfl: 1  •  norgestimate-ethinyl estradiol (ORTHO-CYCLEN) 0.25-35 MG-MCG per tablet, Take 1 tablet by mouth Daily., Disp: 90 tablet, Rfl: 5  •  promethazine (PHENERGAN) 25 MG tablet, Take 1 tablet by mouth Every 6 (Six) Hours As Needed for Nausea or Vomiting., Disp: 30 tablet, Rfl: 0  •  albuterol sulfate  (90 Base) MCG/ACT inhaler, Two puffs per day every 4-6 hours as needed for wheeze or shortness of breath., Disp: 18 g, Rfl: 0  •  Ascorbic Acid (NICOLETTE-C PO), Take  by mouth every night at bedtime., Disp: , Rfl:   •  aspirin 81 MG chewable tablet, Chew 81 mg Daily., Disp: , Rfl:   •  Nutritional Supplements (VITAMIN D BOOSTER PO), Take  by mouth every night at bedtime., Disp: , Rfl:   •  predniSONE (DELTASONE) 10 MG (21) dose pack, Daily taper 6,5,4,3,2,1, Disp: 21 each, Rfl: 0  •  promethazine-dextromethorphan (PROMETHAZINE-DM) 6.25-15 MG/5ML syrup, Take 5 mL by mouth 4 (Four) Times a Day As Needed for  "Cough., Disp: 118 mL, Rfl: 0  •  sucralfate (CARAFATE) 1 g tablet, Take 1 tablet by mouth 4 (Four) Times a Day., Disp: 40 tablet, Rfl: 0  •  Zinc Sulfate (ZINC 15 PO), Take  by mouth every night at bedtime., Disp: , Rfl:     No Known Allergies  The following portions of the patient's history were reviewed and updated as appropriate: allergies, current medications, past family history, past medical history, past social history, past surgical history and problem list.    Objective     Physical Exam    Vitals:    10/07/21 1107   BP: 117/74   Pulse: 75   Resp: 16   Temp: 97.8 °F (36.6 °C)   Weight: 84.8 kg (187 lb)   Height: 162.6 cm (64\")     Body mass index is 32.1 kg/m².     Results Review:   I have reviewed the patient's new clinical and imaging results.    Office Visit on 09/21/2021   Component Date Value Ref Range Status   • Atopobium Vaginae 09/21/2021 Low - 0  Score Final   • BVAB 2 09/21/2021 Low - 0  Score Final   • Megasphaera 1 09/21/2021 Low - 0  Score Final    Comment: Calculate total score by adding the 3 individual bacterial  vaginosis (BV) marker scores together.  Total score is  interpreted as follows:  Total score 0-1: Indicates the absence of BV.  Total score   2: Indeterminate for BV. Additional clinical                   data should be evaluated to establish a                   diagnosis.  Total score 3-6: Indicates the presence of BV.  This test was developed and its performance characteristics  determined by [facility].  It has not been cleared or approved  by the Food and Drug Administration.     • Minoo Albicans, ZARINA 09/21/2021 Negative  Negative Final   • Minoo Glabrata, ZARINA 09/21/2021 Negative  Negative Final   • Trichomonas vaginosis 09/21/2021 Negative  Negative Final   • Chlamydia trachomatis, ZARINA 09/21/2021 Negative  Negative Final   • Neisseria gonorrhoeae, ZARINA 09/21/2021 Negative  Negative Final   Lab on 09/02/2021   Component Date Value Ref Range Status   • SARS-CoV-2 ZARINA 09/02/2021 " Detected* Not Detected Final   Office Visit on 08/26/2021   Component Date Value Ref Range Status   • Color 08/26/2021 Yellow  Yellow, Straw, Dark Yellow, Pushpa Final   • Clarity, UA 08/26/2021 Clear  Clear Final   • Specific Gravity  08/26/2021 1.025  1.005 - 1.030 Final   • pH, Urine 08/26/2021 5.5  5.0 - 8.0 Final   • Leukocytes 08/26/2021 Negative  Negative Final   • Nitrite, UA 08/26/2021 Negative  Negative Final   • Protein, POC 08/26/2021 Negative  Negative mg/dL Final   • Glucose, UA 08/26/2021 Negative  Negative, 1000 mg/dL (3+) mg/dL Final   • Ketones, UA 08/26/2021 Negative  Negative Final   • Urobilinogen, UA 08/26/2021 Normal  Normal Final   • Bilirubin 08/26/2021 Negative  Negative Final   • Blood, UA 08/26/2021 1+* Negative Final   Office Visit on 08/18/2021   Component Date Value Ref Range Status   • Lipase 08/18/2021 29  13 - 60 U/L Final   • Glucose 08/18/2021 96  65 - 99 mg/dL Final   • BUN 08/18/2021 10  6 - 20 mg/dL Final   • Creatinine 08/18/2021 0.76  0.57 - 1.00 mg/dL Final   • Sodium 08/18/2021 139  136 - 145 mmol/L Final   • Potassium 08/18/2021 4.7  3.5 - 5.2 mmol/L Final   • Chloride 08/18/2021 103  98 - 107 mmol/L Final   • CO2 08/18/2021 26.5  22.0 - 29.0 mmol/L Final   • Calcium 08/18/2021 9.6  8.6 - 10.5 mg/dL Final   • Total Protein 08/18/2021 7.7  6.0 - 8.5 g/dL Final   • Albumin 08/18/2021 4.60  3.50 - 5.20 g/dL Final   • ALT (SGPT) 08/18/2021 10  1 - 33 U/L Final   • AST (SGOT) 08/18/2021 17  1 - 32 U/L Final   • Alkaline Phosphatase 08/18/2021 91  39 - 117 U/L Final   • Total Bilirubin 08/18/2021 0.3  0.0 - 1.2 mg/dL Final   • eGFR Non  Amer 08/18/2021 86  >60 mL/min/1.73 Final   • Globulin 08/18/2021 3.1  gm/dL Final   • A/G Ratio 08/18/2021 1.5  g/dL Final   • BUN/Creatinine Ratio 08/18/2021 13.2  7.0 - 25.0 Final   • Anion Gap 08/18/2021 9.5  5.0 - 15.0 mmol/L Final   • WBC 08/18/2021 10.11  3.40 - 10.80 10*3/mm3 Final   • RBC 08/18/2021 5.13  3.77 - 5.28 10*6/mm3 Final    • Hemoglobin 08/18/2021 14.2  12.0 - 15.9 g/dL Final   • Hematocrit 08/18/2021 43.7  34.0 - 46.6 % Final   • MCV 08/18/2021 85.2  79.0 - 97.0 fL Final   • MCH 08/18/2021 27.7  26.6 - 33.0 pg Final   • MCHC 08/18/2021 32.5  31.5 - 35.7 g/dL Final   • RDW 08/18/2021 13.0  12.3 - 15.4 % Final   • RDW-SD 08/18/2021 40.3  37.0 - 54.0 fl Final   • MPV 08/18/2021 10.3  6.0 - 12.0 fL Final   • Platelets 08/18/2021 279  140 - 450 10*3/mm3 Final   • Neutrophil % 08/18/2021 69.7  42.7 - 76.0 % Final   • Lymphocyte % 08/18/2021 20.1  19.6 - 45.3 % Final   • Monocyte % 08/18/2021 7.9  5.0 - 12.0 % Final   • Eosinophil % 08/18/2021 1.1  0.3 - 6.2 % Final   • Basophil % 08/18/2021 0.9  0.0 - 1.5 % Final   • Immature Grans % 08/18/2021 0.3  0.0 - 0.5 % Final   • Neutrophils, Absolute 08/18/2021 7.05* 1.70 - 7.00 10*3/mm3 Final   • Lymphocytes, Absolute 08/18/2021 2.03  0.70 - 3.10 10*3/mm3 Final   • Monocytes, Absolute 08/18/2021 0.80  0.10 - 0.90 10*3/mm3 Final   • Eosinophils, Absolute 08/18/2021 0.11  0.00 - 0.40 10*3/mm3 Final   • Basophils, Absolute 08/18/2021 0.09  0.00 - 0.20 10*3/mm3 Final   • Immature Grans, Absolute 08/18/2021 0.03  0.00 - 0.05 10*3/mm3 Final   • nRBC 08/18/2021 0.0  0.0 - 0.2 /100 WBC Final   • H. pylori, IgA ABS 08/18/2021 <9.0  0.0 - 8.9 units Final                                    Negative          <9.0                                  Equivocal   9.0 - 11.0                                  Positive         >11.0   • H. Pylori, IgM 08/18/2021 <9.0  0.0 - 8.9 units Final                                    Negative          <9.0                                  Equivocal   9.0 - 11.0                                  Positive         >11.0  This test was developed and its performance characteristics  determined by [facility]. It has not been cleared or approved  by the Food and Drug Administration.   Orders Only on 08/17/2021   Component Date Value Ref Range Status   • Campylobacter 08/17/2021 Not  Detected  Not Detected Final   • C.difficile toxin A/B 08/17/2021 Not Detected  Not Detected Final   • Plesiomonas shigelloides 08/17/2021 Not Detected  Not Detected Final   • Salmonella 08/17/2021 Not Detected  Not Detected Final   • Vibrio 08/17/2021 Not Detected  Not Detected Final   • Vibrio cholerae 08/17/2021 Not Detected  Not Detected Final   • Yersinia enterocolitica 08/17/2021 Not Detected  Not Detected Final   • Enteroaggregative E. coli 08/17/2021 Not Detected  Not Detected Final   • Enteropathogenic E. coli 08/17/2021 Not Detected  Not Detected Final   • Enterotoxigenic E. coli 08/17/2021 Not Detected  Not Detected Final   • Shiga-like toxin-producing E. coli  08/17/2021 Not Detected  Not Detected Final   • E. coli O157 08/17/2021 Not applicable  Not Detected Final   • Shigella enteroinvasive E. coli 08/17/2021 Not Detected  Not Detected Final   • Cryptosporidium 08/17/2021 Not Detected  Not Detected Final   • Cyclospora cayetanensis 08/17/2021 Not Detected  Not Detected Final   • Entamoeba Histolytica Ag 08/17/2021 Not Detected  Not Detected Final   • Giardia lamblia 08/17/2021 Not Detected  Not Detected Final   • Adenovirus 40/41 Ag 08/17/2021 Not Detected  Not Detected Final   • Astrovirus 08/17/2021 Not Detected  Not Detected Final   • Norovirus GI/GII 08/17/2021 Not Detected  Not Detected Final   • Rotavirus A 08/17/2021 Not Detected  Not Detected Final   • Sapovirus 08/17/2021 Not Detected  Not Detected Final   • C difficile Toxin Gene NAAT 08/17/2021 Negative  Negative Final   Office Visit on 08/17/2021   Component Date Value Ref Range Status   • WBC 08/17/2021 10.49  3.40 - 10.80 10*3/mm3 Final   • RBC 08/17/2021 4.99  3.77 - 5.28 10*6/mm3 Final   • Hemoglobin 08/17/2021 13.8  12.0 - 15.9 g/dL Final   • Hematocrit 08/17/2021 42.2  34.0 - 46.6 % Final   • MCV 08/17/2021 84.6  79.0 - 97.0 fL Final   • MCH 08/17/2021 27.7  26.6 - 33.0 pg Final   • MCHC 08/17/2021 32.7  31.5 - 35.7 g/dL Final   • RDW  08/17/2021 12.8  12.3 - 15.4 % Final   • Platelets 08/17/2021 286  140 - 450 10*3/mm3 Final   • Neutrophil Rel % 08/17/2021 69.3  42.7 - 76.0 % Final   • Lymphocyte Rel % 08/17/2021 20.4  19.6 - 45.3 % Final   • Monocyte Rel % 08/17/2021 7.5  5.0 - 12.0 % Final   • Eosinophil Rel % 08/17/2021 1.4  0.3 - 6.2 % Final   • Basophil Rel % 08/17/2021 1.0  0.0 - 1.5 % Final   • Neutrophils Absolute 08/17/2021 7.26* 1.70 - 7.00 10*3/mm3 Final   • Lymphocytes Absolute 08/17/2021 2.14  0.70 - 3.10 10*3/mm3 Final   • Monocytes Absolute 08/17/2021 0.79  0.10 - 0.90 10*3/mm3 Final   • Eosinophils Absolute 08/17/2021 0.15  0.00 - 0.40 10*3/mm3 Final   • Basophils Absolute 08/17/2021 0.11  0.00 - 0.20 10*3/mm3 Final   • Immature Granulocyte Rel % 08/17/2021 0.4  0.0 - 0.5 % Final   • Immature Grans Absolute 08/17/2021 0.04  0.00 - 0.05 10*3/mm3 Final   • nRBC 08/17/2021 0.0  0.0 - 0.2 /100 WBC Final   • Glucose 08/17/2021 83  65 - 99 mg/dL Final   • BUN 08/17/2021 8  6 - 20 mg/dL Final   • Creatinine 08/17/2021 0.78  0.57 - 1.00 mg/dL Final   • eGFR Non  Am 08/17/2021 84  >60 mL/min/1.73 Final    Comment: GFR Normal >60  Chronic Kidney Disease <60  Kidney Failure <15     • eGFR  Am 08/17/2021 101  >60 mL/min/1.73 Final   • BUN/Creatinine Ratio 08/17/2021 10.3  7.0 - 25.0 Final   • Sodium 08/17/2021 137  136 - 145 mmol/L Final   • Potassium 08/17/2021 4.8  3.5 - 5.2 mmol/L Final   • Chloride 08/17/2021 103  98 - 107 mmol/L Final   • Total CO2 08/17/2021 26.5  22.0 - 29.0 mmol/L Final   • Calcium 08/17/2021 9.6  8.6 - 10.5 mg/dL Final   • Total Protein 08/17/2021 7.0  6.0 - 8.5 g/dL Final   • Albumin 08/17/2021 4.50  3.50 - 5.20 g/dL Final   • Globulin 08/17/2021 2.5  gm/dL Final   • A/G Ratio 08/17/2021 1.8  g/dL Final   • Total Bilirubin 08/17/2021 0.2  0.0 - 1.2 mg/dL Final   • Alkaline Phosphatase 08/17/2021 84  39 - 117 U/L Final   • AST (SGOT) 08/17/2021 17  1 - 32 U/L Final   • ALT (SGPT) 08/17/2021 11  1 - 33  U/L Final   Admission on 08/15/2021, Discharged on 08/15/2021   Component Date Value Ref Range Status   • Glucose 08/15/2021 101* 65 - 99 mg/dL Final   • BUN 08/15/2021 8  6 - 20 mg/dL Final   • Creatinine 08/15/2021 0.74  0.57 - 1.00 mg/dL Final   • Sodium 08/15/2021 141  136 - 145 mmol/L Final   • Potassium 08/15/2021 4.1  3.5 - 5.2 mmol/L Final   • Chloride 08/15/2021 103  98 - 107 mmol/L Final   • CO2 08/15/2021 25.1  22.0 - 29.0 mmol/L Final   • Calcium 08/15/2021 9.1  8.6 - 10.5 mg/dL Final   • Total Protein 08/15/2021 6.9  6.0 - 8.5 g/dL Final   • Albumin 08/15/2021 4.30  3.50 - 5.20 g/dL Final   • ALT (SGPT) 08/15/2021 10  1 - 33 U/L Final   • AST (SGOT) 08/15/2021 15  1 - 32 U/L Final   • Alkaline Phosphatase 08/15/2021 78  39 - 117 U/L Final   • Total Bilirubin 08/15/2021 0.2  0.0 - 1.2 mg/dL Final   • eGFR Non  Amer 08/15/2021 89  >60 mL/min/1.73 Final   • Globulin 08/15/2021 2.6  gm/dL Final   • A/G Ratio 08/15/2021 1.7  g/dL Final   • BUN/Creatinine Ratio 08/15/2021 10.8  7.0 - 25.0 Final   • Anion Gap 08/15/2021 12.9  5.0 - 15.0 mmol/L Final   • Troponin T 08/15/2021 <0.010  0.000 - 0.030 ng/mL Final   • Magnesium 08/15/2021 1.9  1.6 - 2.6 mg/dL Final   • Color, UA 08/15/2021 Yellow  Yellow, Straw Final   • Appearance, UA 08/15/2021 Clear  Clear Final   • pH, UA 08/15/2021 8.0  5.0 - 8.0 Final   • Specific Gravity, UA 08/15/2021 1.012  1.005 - 1.030 Final   • Glucose, UA 08/15/2021 Negative  Negative Final   • Ketones, UA 08/15/2021 Negative  Negative Final   • Bilirubin, UA 08/15/2021 Negative  Negative Final   • Blood, UA 08/15/2021 Negative  Negative Final   • Protein, UA 08/15/2021 Negative  Negative Final   • Leuk Esterase, UA 08/15/2021 Negative  Negative Final   • Nitrite, UA 08/15/2021 Negative  Negative Final   • Urobilinogen, UA 08/15/2021 0.2 E.U./dL  0.2 - 1.0 E.U./dL Final   • Extra Tube 08/15/2021 Hold for add-ons.   Final    Auto resulted.   • Extra Tube 08/15/2021 hold for add-on    Final    Auto resulted   • Extra Tube 08/15/2021 Hold for add-ons.   Final    Auto resulted.   • WBC 08/15/2021 8.27  3.40 - 10.80 10*3/mm3 Final   • RBC 08/15/2021 4.83  3.77 - 5.28 10*6/mm3 Final   • Hemoglobin 08/15/2021 13.5  12.0 - 15.9 g/dL Final   • Hematocrit 08/15/2021 41.6  34.0 - 46.6 % Final   • MCV 08/15/2021 86.1  79.0 - 97.0 fL Final   • MCH 08/15/2021 28.0  26.6 - 33.0 pg Final   • MCHC 08/15/2021 32.5  31.5 - 35.7 g/dL Final   • RDW 08/15/2021 12.9  12.3 - 15.4 % Final   • RDW-SD 08/15/2021 40.5  37.0 - 54.0 fl Final   • MPV 08/15/2021 10.4  6.0 - 12.0 fL Final   • Platelets 08/15/2021 234  140 - 450 10*3/mm3 Final   • Neutrophil % 08/15/2021 69.2  42.7 - 76.0 % Final   • Lymphocyte % 08/15/2021 20.3  19.6 - 45.3 % Final   • Monocyte % 08/15/2021 8.1  5.0 - 12.0 % Final   • Eosinophil % 08/15/2021 1.2  0.3 - 6.2 % Final   • Basophil % 08/15/2021 0.8  0.0 - 1.5 % Final   • Immature Grans % 08/15/2021 0.4  0.0 - 0.5 % Final   • Neutrophils, Absolute 08/15/2021 5.72  1.70 - 7.00 10*3/mm3 Final   • Lymphocytes, Absolute 08/15/2021 1.68  0.70 - 3.10 10*3/mm3 Final   • Monocytes, Absolute 08/15/2021 0.67  0.10 - 0.90 10*3/mm3 Final   • Eosinophils, Absolute 08/15/2021 0.10  0.00 - 0.40 10*3/mm3 Final   • Basophils, Absolute 08/15/2021 0.07  0.00 - 0.20 10*3/mm3 Final   • Immature Grans, Absolute 08/15/2021 0.03  0.00 - 0.05 10*3/mm3 Final   • nRBC 08/15/2021 0.0  0.0 - 0.2 /100 WBC Final   • Acetaminophen 08/15/2021 <5.0  0.0 - 30.0 mcg/mL Final   • Ethanol 08/15/2021 <10  0 - 10 mg/dL Final   • Ethanol % 08/15/2021 <0.010  % Final   • THC, Screen, Urine 08/15/2021 Negative  Negative Final   • Phencyclidine (PCP), Urine 08/15/2021 Negative  Negative Final   • Cocaine Screen, Urine 08/15/2021 Negative  Negative Final   • Methamphetamine, Ur 08/15/2021 Negative  Negative Final   • Opiate Screen 08/15/2021 Negative  Negative Final   • Amphetamine Screen, Urine 08/15/2021 Negative  Negative Final   •  Benzodiazepine Screen, Urine 08/15/2021 Negative  Negative Final   • Tricyclic Antidepressants Screen 08/15/2021 Negative  Negative Final   • Methadone Screen, Urine 08/15/2021 Negative  Negative Final   • Barbiturates Screen, Urine 08/15/2021 Negative  Negative Final   • Oxycodone Screen, Urine 08/15/2021 Negative  Negative Final   • Propoxyphene Screen 08/15/2021 Negative  Negative Final   • Buprenorphine, Screen, Urine 08/15/2021 Negative  Negative Final   • Salicylate 08/15/2021 <0.3  <=30.0 mg/dL Final      CT Abdomen Pelvis With Contrast    Result Date: 8/15/2021  Moderate wall thickening of the partially filled bladder, cystitis not excluded.  Multiple small mesenteric lymph nodes, consider mesenteric adenitis  This report was finalized on 8/15/2021 2:37 PM by Nimisha Wright MD.    XR Chest 1 View     Result Date: 8/15/2021  No acute cardiopulmonary process.  .  This report was finalized on 8/15/2021 4:21 PM by Nimisha Wright MD.    XR Abdomen KUB     Result Date: 8/27/2021  Gaseous distention of the stomach.        Images were reviewed, interpreted, and dictated by Dr. Nahid Parks M.D. Transcribed by Amber Jack PA-C.  This report was finalized on 8/27/2021 11:41 AM by Nahid Parks M.D..     EGD dated 10/15/2019 per Dr. Perfecto Mcknight  1. Erythematous distal esophagitis.  Subtle concentric rings involving the proximal mid and distal esophagus.  Status post dilation to 18 mm.  2. Small sliding hiatal hernia less than 3 cm.  3. Erythematous-erosive gastritis.  4. Gastric antral deformity.  5. Gastric antral nodule.   6. Erythematous bulbar duodenitis.  - Second portion of duodenum biopsy unremarkable. Antrum body and fundus biopsies unremarkable. Antrum biopsy of nodule with hyperplastic polyp. Esophagus biopsy unremarkable.    EGD dated 6/19/2020 per Dr. Franck King  1. Gastritis  2. Unspecified duodenal ulcer.   - Gastric antrum biopsy unremarkable.        ***Normal lipase, CBC, CMP, c-diff/GI  panel negative  ***CTAP in August with mesenteric adenitis and cystitis, otherwise unremarkable GI tract    Assessment / Plan      There are no diagnoses linked to this encounter.  There are no Patient Instructions on file for this visit.   Angela Hernandez MA  10/7/2021    Please note that portions of this note may have been completed with a voice recognition program. Efforts were made to edit the dictations, but occasionally words are mistranscribed.

## 2021-10-07 NOTE — PROGRESS NOTES
Chief Complaint  Microscopic hematuria    Referring  Maximino Whelan MD    HPI  Ms. Thomson is a 36 y.o. female with history of recent diarrheal infection who presents with microscopic hematuria.  Patient does not have current gross hematuria.    History of smoking?    no  History of second-hand smoking exposure? no  History of chemotherapy?   no  History of radiation?    no  History of kidney or bladder stones?  no  History of frequent urinary tract infections? no    She currently denies fevers, chills, nausea, vomiting, constipation or flank pain.    Hx of MUS 2021 for RITU. She says she now has UUI. C/o urinary urgency q 1 hr    Past Medical History  Past Medical History:   Diagnosis Date   • Abdominal pain, periumbilical    • Abdominal pain, RLQ (right lower quadrant)    • Acute pharyngitis    • Anxiety    • Appetite lost    • Chest pain    • Chest pain, atypical 2020   • Colon polyp 2017   • Depression     patient denies   • Diarrhea     Secondary to IBS   • Difficulty swallowing    • Dysphagia     Patient reported she is noticing that she gets choked easily   • Fatigue    • Fracture     RIGHT WRIST TWICE, LEFT HAND   • GERD (gastroesophageal reflux disease)    • H/O foot surgery 10/2020   • Hearing loss     No use of hearing aids   • History of bronchitis    • History of colonoscopy    • History of left salpingo-oophorectomy  2018   • History of ovarian cyst    • History of pneumonia    • Hyperthyroidism     hyperactive taken off medicine when pregnant.  Reported she was medication at one time, but none since pregnancy.    • Irritable bowel syndrome    • Ovarian cyst, bilateral    • Seasonal allergies    • RITU (stress urinary incontinence, female) 2020   • TMJ (dislocation of temporomandibular joint)    • Upper respiratory infection    • Vomiting        Past Surgical History  Past Surgical History:   Procedure Laterality Date   •  SECTION  2008    DR NAVARRETE    •  SECTION  10/30/2015    DR VASQUEZ   •  SECTION  2010    DR ESPINOZA   • CHOLECYSTECTOMY     • COLONOSCOPY N/A 2017     COLONOSCOPY WITH COLD BIOPSY POLYPECTOMY; BIOPSIES , QUICK CLIP;  Surgeon: Perfecto Mcknight MD;  Location: [facility] ENDOSCOPY;  Service:    • DIAGNOSTIC LAPAROSCOPY  2005    DR ESPINOZA- ELIJAH/cystectomy   • DIAGNOSTIC LAPAROSCOPY  2012    DR ESPINOZA/ELIJAH   • DIAGNOSTIC LAPAROSCOPY N/A 8/10/2017     DIAGNOSTIC LAPAROSCOPY, LEFT S&O;  Keren Espinoza MD;  Location: [facility] OR;  Service:    • ENDOSCOPY N/A 10/15/2019    Procedure: ESOPHAGOGASTRODUODENOSCOPY WITH BIOPSY AND ESOPHAGEAL DILATATION;  Surgeon: Perfecto Mcknight MD;  Location: [facility] ENDOSCOPY;  Service: Gastroenterology   • FOOT SURGERY Left     ligament and tendon repair   • HERNIA REPAIR  2019    abd   • MID-URETHRAL SLING WITH CYSTOSCOPY N/A 2021     MID-URETHRAL SLING WITH CYSTOSCOPY TVT EXACT;  Luc Alicea MD;  Location:  [facility] OR;  Service: Obstetrics/Gynecology;  Laterality: N/A;   • NISSEN FUNDOPLICATION  2013   • UPPER GASTROINTESTINAL ENDOSCOPY     • UPPER GASTROINTESTINAL ENDOSCOPY  2020   • VAGINAL HYSTERECTOMY N/A 2018    VAGINAL HYSTERECTOMY, BILATERAL SALPINGECTOMY;  Luc Alicea MD;  Location:  [facility] OR;  Service: Gynecology   • WISDOM TOOTH EXTRACTION         Medications    Current Outpatient Medications:   •  acetaminophen (TYLENOL) 325 MG tablet, Take 2 tablets by mouth Every 6 (Six) Hours. For 3 to 5 days postoperatively, Disp: , Rfl:   •  buPROPion XL (Wellbutrin XL) 150 MG 24 hr tablet, Take 1 tablet by mouth Daily., Disp: 90 tablet, Rfl: 1  •  buPROPion XL (Wellbutrin XL) 300 MG 24 hr tablet, Take 1 tablet by mouth Every Morning., Disp: 90 tablet, Rfl: 1  •  colestipol (COLESTID) 1 g tablet, Take 1 tablet by mouth 2 (Two) Times a Day., Disp: 60 tablet, Rfl: 2  •  escitalopram (LEXAPRO) 10 MG tablet, Take 1 tablet by mouth Daily., Disp: 90 tablet, Rfl: 1  •   hydrOXYzine (ATARAX) 25 MG tablet, Take 1 tablet by mouth Every 8 (Eight) Hours As Needed for Anxiety., Disp: 90 tablet, Rfl: 1  •  ibuprofen (ADVIL,MOTRIN) 600 MG tablet, , Disp: , Rfl:   •  lidocaine (XYLOCAINE) 5 % ointment, Apply  topically to the appropriate area as directed Every Night., Disp: 35 g, Rfl: 1  •  loratadine (Claritin) 10 MG tablet, Take 1 tablet by mouth Daily. (Patient taking differently: Take 10 mg by mouth As Needed.), Disp: 30 tablet, Rfl: 1  •  norgestimate-ethinyl estradiol (ORTHO-CYCLEN) 0.25-35 MG-MCG per tablet, Take 1 tablet by mouth Daily., Disp: 90 tablet, Rfl: 5  •  ondansetron (ZOFRAN) 4 MG tablet, Take 1 tablet by mouth Every 8 (Eight) Hours As Needed for Nausea or Vomiting., Disp: 30 tablet, Rfl: 1  •  pantoprazole (PROTONIX) 40 MG EC tablet, 1 po daily in the am 30 minutes before breakfast, Disp: 30 tablet, Rfl: 3  •  promethazine (PHENERGAN) 25 MG tablet, Take 1 tablet by mouth Every 6 (Six) Hours As Needed for Nausea or Vomiting., Disp: 30 tablet, Rfl: 0    Allergies  No Known Allergies    Social History  Social History     Socioeconomic History   • Marital status:      Spouse name: Not on file   • Number of children: Not on file   • Years of education: Not on file   • Highest education level: Not on file   Tobacco Use   • Smoking status: Never Smoker   • Smokeless tobacco: Never Used   Vaping Use   • Vaping Use: Never used   Substance and Sexual Activity   • Alcohol use: No   • Drug use: No   • Sexual activity: Yes     Partners: Male     Birth control/protection: Surgical       Family History  She has no family history of bladder or kidney cancer    Review of Systems  Constitutional: No fevers or chills  Skin: Negative for rash  Endocrine: No heat/cold intolerance   Cardiovascular: Negative for chest pain or dyspnea on exertion  Respiratory: Negative for shortness of breath or wheezing  Gastrointestinal: No constipation, nausea or vomiting  Genitourinary: Negative for  "current gross hematuria, + lower urinary tract symptoms but no dysuria.  Musculoskeletal: No flank pain  Neurological:  Negative for frequent headaches or dizziness  Lymph/Heme: Negative for leg swelling or calf pain.    Physical Exam  Visit Vitals  /64 (BP Location: Left arm, Patient Position: Sitting, Cuff Size: Adult)   Pulse 92   Temp 96.8 °F (36 °C)   Ht 162.6 cm (64\")   Wt 84.8 kg (187 lb)   LMP 06/26/2018   SpO2 96%   Breastfeeding No   BMI 32.10 kg/m²     Constitutional: NAD, WDWN.   HEENT: NCAT. Conjunctivae normal.  MMM.    Cardiovascular: Regular rate.  Pulmonary/Chest: Respirations are even and non-labored bilaterally.  Abdominal: Soft. No distension, tenderness, masses or guarding. No CVA tenderness.  Neurological: A + O x 3.  Cranial Nerves II-XII grossly intact.  Extremities: JOSE x 4, Warm. No clubbing.  No cyanosis.    Skin: Pink, warm and dry.  No rashes noted.  Psychiatric:  Normal mood and affect  Genitourinary  Nonpalpable bladder    Labs  Brief Urine Lab Results  (Last result in the past 365 days)      Color   Clarity   Blood   Leuk Est   Nitrite   Protein   CREAT   Urine HCG        10/07/21 1323 Yellow Clear Negative Negative Negative Negative                 Chemistry        Component Value Date/Time     08/18/2021 1341    K 4.7 08/18/2021 1341     08/18/2021 1341    CO2 26.5 08/18/2021 1341    BUN 10 08/18/2021 1341    CREATININE 0.76 08/18/2021 1341    GLU 83 08/17/2021 1226        Component Value Date/Time    CALCIUM 9.6 08/18/2021 1341    ALKPHOS 91 08/18/2021 1341    AST 17 08/18/2021 1341    ALT 10 08/18/2021 1341    BILITOT 0.3 08/18/2021 1341          Lab Results   Component Value Date    WBC 10.11 08/18/2021    HGB 14.2 08/18/2021    HCT 43.7 08/18/2021    MCV 85.2 08/18/2021     08/18/2021       No results found for: URINECX    Radiologic Studies  CT Abdomen Pelvis With Contrast  Result Date: 8/15/2021  Moderate wall thickening of the partially filled " bladder, cystitis not excluded.  Multiple small mesenteric lymph nodes, consider mesenteric adenitis  This report was finalized on 8/15/2021 2:37 PM by Nimisha Wright MD.      Assessment  36 y.o. female who presents with microscopic hematuria, bladder   Risk factors include prior TVT sling in [place].  In order to complete the hematuria work up we need to perform a flexible cystoscopy and acquire upper tract imaging.  This is a diagnosis of uncertain clinical prognosis.  She also has chronic stable urge urinary incontinence.    Plan  1.  We discussed the indications for diagnostic flexible cystoscopy to be performed at the next clinic visit.  2.  Start Myrbetriq 25 mg p.o. daily      Tab Cardona MD

## 2021-10-08 ENCOUNTER — TELEPHONE (OUTPATIENT)
Dept: GASTROENTEROLOGY | Facility: CLINIC | Age: 36
End: 2021-10-08

## 2021-10-08 ENCOUNTER — LAB (OUTPATIENT)
Dept: LAB | Facility: HOSPITAL | Age: 36
End: 2021-10-08

## 2021-10-08 DIAGNOSIS — Z01.818 PREOP TESTING: Primary | ICD-10-CM

## 2021-10-08 DIAGNOSIS — Z01.818 PREOP TESTING: ICD-10-CM

## 2021-10-08 PROCEDURE — U0004 COV-19 TEST NON-CDC HGH THRU: HCPCS

## 2021-10-08 PROCEDURE — C9803 HOPD COVID-19 SPEC COLLECT: HCPCS

## 2021-10-09 LAB — SARS-COV-2 RNA NOSE QL NAA+PROBE: NOT DETECTED

## 2021-10-11 ENCOUNTER — ANESTHESIA (OUTPATIENT)
Dept: GASTROENTEROLOGY | Facility: HOSPITAL | Age: 36
End: 2021-10-11

## 2021-10-11 ENCOUNTER — HOSPITAL ENCOUNTER (OUTPATIENT)
Facility: HOSPITAL | Age: 36
Setting detail: HOSPITAL OUTPATIENT SURGERY
Discharge: HOME | End: 2021-10-11
Attending: INTERNAL MEDICINE | Admitting: INTERNAL MEDICINE

## 2021-10-11 ENCOUNTER — ANESTHESIA EVENT (OUTPATIENT)
Dept: GASTROENTEROLOGY | Facility: HOSPITAL | Age: 36
End: 2021-10-11

## 2021-10-11 VITALS
RESPIRATION RATE: 16 BRPM | HEIGHT: 63 IN | SYSTOLIC BLOOD PRESSURE: 96 MMHG | WEIGHT: 184 LBS | HEART RATE: 58 BPM | TEMPERATURE: 96.8 F | DIASTOLIC BLOOD PRESSURE: 49 MMHG | BODY MASS INDEX: 32.6 KG/M2 | OXYGEN SATURATION: 100 %

## 2021-10-11 DIAGNOSIS — R13.10 DYSPHAGIA, UNSPECIFIED TYPE: ICD-10-CM

## 2021-10-11 DIAGNOSIS — R19.7 DIARRHEA: ICD-10-CM

## 2021-10-11 PROCEDURE — 25010000003 MEPERIDINE PER 100 MG: Performed by: NURSE ANESTHETIST, CERTIFIED REGISTERED

## 2021-10-11 PROCEDURE — C1726 CATH, BAL DIL, NON-VASCULAR: HCPCS | Performed by: INTERNAL MEDICINE

## 2021-10-11 PROCEDURE — 43249 ESOPH EGD DILATION <30 MM: CPT | Performed by: INTERNAL MEDICINE

## 2021-10-11 PROCEDURE — 25010000002 MIDAZOLAM PER 1MG: Performed by: NURSE ANESTHETIST, CERTIFIED REGISTERED

## 2021-10-11 PROCEDURE — 43239 EGD BIOPSY SINGLE/MULTIPLE: CPT | Performed by: INTERNAL MEDICINE

## 2021-10-11 PROCEDURE — 25010000002 PROPOFOL 10 MG/ML EMULSION: Performed by: NURSE ANESTHETIST, CERTIFIED REGISTERED

## 2021-10-11 RX ORDER — MIDAZOLAM HYDROCHLORIDE 2 MG/2ML
INJECTION, SOLUTION INTRAMUSCULAR; INTRAVENOUS AS NEEDED
Status: DISCONTINUED | OUTPATIENT
Start: 2021-10-11 | End: 2021-10-11 | Stop reason: SURG

## 2021-10-11 RX ORDER — PROPOFOL 10 MG/ML
VIAL (ML) INTRAVENOUS AS NEEDED
Status: DISCONTINUED | OUTPATIENT
Start: 2021-10-11 | End: 2021-10-11 | Stop reason: SURG

## 2021-10-11 RX ORDER — MEPERIDINE HYDROCHLORIDE 50 MG/ML
INJECTION INTRAMUSCULAR; INTRAVENOUS; SUBCUTANEOUS AS NEEDED
Status: DISCONTINUED | OUTPATIENT
Start: 2021-10-11 | End: 2021-10-11 | Stop reason: SURG

## 2021-10-11 RX ORDER — SODIUM CHLORIDE 9 MG/ML
INJECTION, SOLUTION INTRAVENOUS CONTINUOUS PRN
Status: DISCONTINUED | OUTPATIENT
Start: 2021-10-11 | End: 2021-10-11 | Stop reason: SURG

## 2021-10-11 RX ORDER — KETAMINE HCL IN NACL, ISO-OSM 100MG/10ML
SYRINGE (ML) INJECTION AS NEEDED
Status: DISCONTINUED | OUTPATIENT
Start: 2021-10-11 | End: 2021-10-11 | Stop reason: SURG

## 2021-10-11 RX ORDER — LIDOCAINE HYDROCHLORIDE 20 MG/ML
INJECTION, SOLUTION INTRAVENOUS AS NEEDED
Status: DISCONTINUED | OUTPATIENT
Start: 2021-10-11 | End: 2021-10-11 | Stop reason: SURG

## 2021-10-11 RX ORDER — SODIUM CHLORIDE 9 MG/ML
70 INJECTION, SOLUTION INTRAVENOUS CONTINUOUS PRN
Status: DISCONTINUED | OUTPATIENT
Start: 2021-10-11 | End: 2021-10-11 | Stop reason: HOSPADM

## 2021-10-11 RX ADMIN — Medication 25 MG: at 07:53

## 2021-10-11 RX ADMIN — MIDAZOLAM HYDROCHLORIDE 2 MG: 1 INJECTION, SOLUTION INTRAMUSCULAR; INTRAVENOUS at 07:48

## 2021-10-11 RX ADMIN — LIDOCAINE HYDROCHLORIDE 40 MG: 20 INJECTION, SOLUTION INTRAVENOUS at 07:51

## 2021-10-11 RX ADMIN — PROPOFOL 30 MG: 10 INJECTION, EMULSION INTRAVENOUS at 07:53

## 2021-10-11 RX ADMIN — SODIUM CHLORIDE 70 ML/HR: 9 INJECTION, SOLUTION INTRAVENOUS at 07:08

## 2021-10-11 RX ADMIN — PROPOFOL 30 MG: 10 INJECTION, EMULSION INTRAVENOUS at 07:52

## 2021-10-11 RX ADMIN — SODIUM CHLORIDE: 9 INJECTION, SOLUTION INTRAVENOUS at 07:45

## 2021-10-11 RX ADMIN — PROPOFOL 30 MG: 10 INJECTION, EMULSION INTRAVENOUS at 07:55

## 2021-10-11 RX ADMIN — PROPOFOL 30 MG: 10 INJECTION, EMULSION INTRAVENOUS at 07:58

## 2021-10-11 RX ADMIN — PROPOFOL 30 MG: 10 INJECTION, EMULSION INTRAVENOUS at 07:54

## 2021-10-11 RX ADMIN — MEPERIDINE HYDROCHLORIDE 25 MG: 50 INJECTION, SOLUTION INTRAMUSCULAR; INTRAVENOUS; SUBCUTANEOUS at 07:50

## 2021-10-11 RX ADMIN — PROPOFOL 30 MG: 10 INJECTION, EMULSION INTRAVENOUS at 08:00

## 2021-10-11 RX ADMIN — PROPOFOL 30 MG: 10 INJECTION, EMULSION INTRAVENOUS at 08:02

## 2021-10-11 RX ADMIN — PROPOFOL 30 MG: 10 INJECTION, EMULSION INTRAVENOUS at 08:04

## 2021-10-11 NOTE — DISCHARGE INSTRUCTIONS
Please follow all post op instructions and follow up appointment time from your physician's office included in your discharge packet.  .   No pushing, pulling, tugging,  heavy lifting, or strenuous activity.  No major decision making, driving, or drinking alcoholic beverages for 24 hours. ( due to the medications you have  received)  Always use good hand hygiene/washing techniques.  NO driving while taking pain medications.    * if you have an incision:  Check your incision area every day for signs of infection.   Check for:  * more redness, swelling, or pain  *more fluid or blood  *warmth  *pus or bad smell  To assist you in voiding:  Drink plenty of fluids  Listen to running water while attempting to void.    If you are unable to urinate and you have an uncomfortable urge to void or it has been   6 hours since you were discharged, return to the Emergency Room    - Discharge patient to home (ambulatory).   - Low fiber small meals.   - Continue present medications.   - Await pathology results.   - Esophageal manometry as op  - Return to my office in 8 weeks.

## 2021-10-11 NOTE — ANESTHESIA PREPROCEDURE EVALUATION
Anesthesia Evaluation     Patient summary reviewed and Nursing notes reviewed   no history of anesthetic complications:  NPO Solid Status: > 8 hours  NPO Liquid Status: > 2 hours           Airway   Mallampati: II  TM distance: >3 FB  Neck ROM: full  No difficulty expected  Dental - normal exam     Pulmonary - normal exam    breath sounds clear to auscultation  (+) recent URI,   Cardiovascular - negative cardio ROS and normal exam    Rhythm: regular  Rate: normal      ROS comment: 5/20 Negative stress test - had chest/upper abdominal pain later diagnosed as GERD    Neuro/Psych  (+) psychiatric history Anxiety,     GI/Hepatic/Renal/Endo    (+) obesity,  GERD well controlled,      Musculoskeletal (-) negative ROS    Abdominal    Substance History      OB/GYN      Comment: Pelvic prolapse      Other - negative ROS                         Anesthesia Plan    ASA 3     MAC   (Risks and benefits discussed including risk of aspiration, recall and dental damage. All patient questions answered.    Will continue with plan of care.)  intravenous induction     Anesthetic plan, all risks, benefits, and alternatives have been provided, discussed and informed consent has been obtained with: patient.    Plan discussed with CRNA.

## 2021-10-11 NOTE — H&P
[facility]  HISTORY AND PHYSICAL    Patient Name: Laura Walls Workman  : 1985  MRN: [id number]    Chief Complaint:   ForEGD    History Of Presenting Illness:    Epigastric pain  Nausea  Diarrhea  GERD  Dysphagia    Past Medical History:   Diagnosis Date   • Abdominal pain, periumbilical    • Abdominal pain, RLQ (right lower quadrant)    • Acute pharyngitis    • Anxiety    • Appetite lost    • Chest pain    • Chest pain, atypical 2020   • Colon polyp 2017   • Depression     patient denies   • Diarrhea     Secondary to IBS   • Difficulty swallowing    • Dysphagia     Patient reported she is noticing that she gets choked easily   • Fatigue    • Fracture     RIGHT WRIST TWICE, LEFT HAND   • GERD (gastroesophageal reflux disease)    • H/O foot surgery 10/2020   • Hearing loss     No use of hearing aids   • History of bronchitis    • History of colonoscopy    • History of left salpingo-oophorectomy  2018   • History of ovarian cyst    • History of pneumonia    • Hyperthyroidism     hyperactive taken off medicine when pregnant.  Reported she was medication at one time, but none since pregnancy.    • Irritable bowel syndrome    • Ovarian cyst, bilateral    • Seasonal allergies    • RITU (stress urinary incontinence, female) 2020   • TMJ (dislocation of temporomandibular joint)    • Upper respiratory infection    • Vomiting        Past Surgical History:   Procedure Laterality Date   •  SECTION  2008    DR NAVARRETE   •  SECTION  10/30/2015    DR VASQUEZ   •  SECTION  2010    DR ESPINOZA   • CHOLECYSTECTOMY     • COLONOSCOPY N/A 2017     COLONOSCOPY WITH COLD BIOPSY POLYPECTOMY; BIOPSIES , QUICK CLIP;  Surgeon: Perfecto Mcknight MD;  Location: [facility] ENDOSCOPY;  Service:    • DIAGNOSTIC LAPAROSCOPY  2005    DR JONES NAVA/cystectomy   • DIAGNOSTIC LAPAROSCOPY  2012    DR ESPINOZA/ELIJAH   • DIAGNOSTIC LAPAROSCOPY N/A 8/10/2017      DIAGNOSTIC LAPAROSCOPY, LEFT S&O;  Keren Thomas MD;  Location:  [facility] OR;  Service:    • ENDOSCOPY N/A 10/15/2019    Procedure: ESOPHAGOGASTRODUODENOSCOPY WITH BIOPSY AND ESOPHAGEAL DILATATION;  Surgeon: Perfecto Mcknight MD;  Location: [facility] ENDOSCOPY;  Service: Gastroenterology   • FOOT SURGERY Left     ligament and tendon repair   • HERNIA REPAIR  12/20/2019    abd   • MID-URETHRAL SLING WITH CYSTOSCOPY N/A 6/9/2021     MID-URETHRAL SLING WITH CYSTOSCOPY TVT EXACT;  Luc Alicea MD;  Location:  [facility] OR;  Service: Obstetrics/Gynecology;  Laterality: N/A;   • NISSEN FUNDOPLICATION  06/2013   • UPPER GASTROINTESTINAL ENDOSCOPY  2013   • UPPER GASTROINTESTINAL ENDOSCOPY  06/19/2020   • VAGINAL HYSTERECTOMY N/A 7/9/2018    VAGINAL HYSTERECTOMY, BILATERAL SALPINGECTOMY;  Luc Alicea MD;  Location:  [facility] OR;  Service: Gynecology   • WISDOM TOOTH EXTRACTION  2000       Social History     Socioeconomic History   • Marital status:    Tobacco Use   • Smoking status: Never Smoker   • Smokeless tobacco: Never Used   Vaping Use   • Vaping Use: Never used   Substance and Sexual Activity   • Alcohol use: No   • Drug use: No   • Sexual activity: Yes     Partners: Male     Birth control/protection: Surgical       Family History   Problem Relation Age of Onset   • Coronary artery disease Mother    • Diabetes Mother    • Hypertension Mother    • Kidney disease Father    • Skin cancer Father    • Stroke Maternal Grandfather    • Hypertension Maternal Grandfather    • Diabetes Maternal Grandfather    • Colon cancer Maternal Great-Grandmother    • Osteoporosis Neg Hx        Prior to Admission Medications:  Medications Prior to Admission   Medication Sig Dispense Refill Last Dose   • acetaminophen (TYLENOL) 325 MG tablet Take 2 tablets by mouth Every 6 (Six) Hours. For 3 to 5 days postoperatively   Past Month at Unknown time   • buPROPion XL (Wellbutrin XL) 150 MG 24 hr tablet Take 1 tablet by mouth Daily. 90 tablet 1  10/9/2021 at Unknown time   • buPROPion XL (Wellbutrin XL) 300 MG 24 hr tablet Take 1 tablet by mouth Every Morning. 90 tablet 1 10/8/2021 at Unknown time   • colestipol (COLESTID) 1 g tablet Take 1 tablet by mouth 2 (Two) Times a Day. 60 tablet 2 10/10/2021 at 0800   • escitalopram (LEXAPRO) 10 MG tablet Take 1 tablet by mouth Daily. 90 tablet 1 10/9/2021 at Unknown time   • hydrOXYzine (ATARAX) 25 MG tablet Take 1 tablet by mouth Every 8 (Eight) Hours As Needed for Anxiety. 90 tablet 1 10/9/2021 at Unknown time   • norgestimate-ethinyl estradiol (ORTHO-CYCLEN) 0.25-35 MG-MCG per tablet Take 1 tablet by mouth Daily. 90 tablet 5 10/9/2021 at Unknown time   • pantoprazole (PROTONIX) 40 MG EC tablet 1 po daily in the am 30 minutes before breakfast 30 tablet 3 10/10/2021 at 0800   • ibuprofen (ADVIL,MOTRIN) 600 MG tablet    More than a month at Unknown time   • lidocaine (XYLOCAINE) 5 % ointment Apply  topically to the appropriate area as directed Every Night. 35 g 1 More than a month at Unknown time   • loratadine (Claritin) 10 MG tablet Take 1 tablet by mouth Daily. (Patient taking differently: Take 10 mg by mouth As Needed.) 30 tablet 1 More than a month at Unknown time   • Mirabegron ER (Myrbetriq) 25 MG tablet sustained-release 24 hour 24 hr tablet Take 1 tablet by mouth Daily. 30 tablet 11 Unknown at Unknown time   • ondansetron (ZOFRAN) 4 MG tablet Take 1 tablet by mouth Every 8 (Eight) Hours As Needed for Nausea or Vomiting. 30 tablet 1 More than a month at Unknown time   • promethazine (PHENERGAN) 25 MG tablet Take 1 tablet by mouth Every 6 (Six) Hours As Needed for Nausea or Vomiting. 30 tablet 0 More than a month at Unknown time       Allergies:  No Known Allergies     Vitals: Temp:  [97.2 °F (36.2 °C)] 97.2 °F (36.2 °C)  Heart Rate:  [72] 72  Resp:  [16] 16  BP: (107)/(76) 107/76    Review Of Systems:  Constitutional:  Negative for chills, fever, and unexpected weight change.  Respiratory:  Negative for  cough, chest tightness, shortness of breath, and wheezing.  Cardiovascular:  Negative for chest pain, palpitations, and leg swelling.  Gastrointestinal:  Negative for abdominal distention, abdominal pain, Nausea, vomiting.  Neurological:  Negative for Weakness, numbness, and headaches.     Physical Exam:    General Appearance:  Alert, cooperative, in no acute distress.   Lungs:   Clear to auscultation, respirations regular, even and                 unlabored.   Heart:  Regular rhythm and normal rate.   Abdomen:   Normal bowel sounds, no masses, no organomegaly. Soft, non-tender, non-distended   Neurologic: Alert and oriented x 3. Moves all four limbs equally       Plan: ESOPHAGOGASTRODUODENOSCOPY (N/A)     Hieu Shultz MD  10/11/2021

## 2021-10-11 NOTE — ANESTHESIA POSTPROCEDURE EVALUATION
Patient: Laura Thomson    Procedure Summary     Date: 10/11/21 Room / Location: [facility] ENDOSCOPY 2 / [facility] ENDOSCOPY    Anesthesia Start: 0746 Anesthesia Stop:     Procedure: ESOPHAGOGASTRODUODENOSCOPY with dilatation and biopsies (N/A Esophagus) Diagnosis:       Dysphagia, unspecified type      (Dysphagia, unspecified type [R13.10])    Surgeons: Hieu Shultz MD Provider: Wojciech Amaro CRNA    Anesthesia Type: MAC ASA Status: 3          Anesthesia Type: MAC    Vitals  No vitals data found for the desired time range.          Post Anesthesia Care and Evaluation    Patient location during evaluation: PHASE II  Patient participation: complete - patient participated  Level of consciousness: awake  Pain score: 0  Pain management: adequate  Airway patency: patent  Anesthetic complications: No anesthetic complications  PONV Status: none  Cardiovascular status: acceptable  Respiratory status: acceptable and nasal cannula  Hydration status: acceptable    Comments: vsss resp spont, reflexes intact, responsive, report given to pacu nurse

## 2021-10-13 LAB
LAB AP CASE REPORT: NORMAL
PATH REPORT.FINAL DX SPEC: NORMAL

## 2021-10-25 ENCOUNTER — TRANSCRIBE ORDERS (OUTPATIENT)
Dept: ADMINISTRATIVE | Facility: HOSPITAL | Age: 36
End: 2021-10-25

## 2021-10-25 DIAGNOSIS — Z11.59 ENCOUNTER FOR SCREENING FOR VIRAL DISEASE: Primary | ICD-10-CM

## 2021-11-01 ENCOUNTER — OFFICE VISIT (OUTPATIENT)
Dept: UROLOGY | Facility: CLINIC | Age: 36
End: 2021-11-01

## 2021-11-01 DIAGNOSIS — R31.9 HEMATURIA, UNSPECIFIED TYPE: ICD-10-CM

## 2021-11-01 DIAGNOSIS — N39.41 URGE INCONTINENCE OF URINE: Primary | ICD-10-CM

## 2021-11-01 PROCEDURE — 99213 OFFICE O/P EST LOW 20 MIN: CPT | Performed by: UROLOGY

## 2021-11-01 PROCEDURE — 52000 CYSTOURETHROSCOPY: CPT | Performed by: UROLOGY

## 2021-11-01 PROCEDURE — 51798 US URINE CAPACITY MEASURE: CPT | Performed by: UROLOGY

## 2021-11-01 NOTE — PROGRESS NOTES
Preprocedure diagnosis  UUI / LUTS  Micro hematuria    Postprocedure diagnosis  none    Procedure  Flexible Cystourethroscopy    Attending surgeon  Tab Cardona MD    Anesthesia  2% lidocaine jelly intraurethrally    Complications  None    Indications  36 y.o. female undergoing a flexible cystoscopy for the above mentioned indications.  Informed consent was obtained.      CT Abdomen Pelvis With Contrast  Result Date: 8/15/2021  Moderate wall thickening of the partially filled bladder, cystitis not excluded.  Multiple small mesenteric lymph nodes, consider mesenteric adenitis  This report was finalized on 8/15/2021 2:37 PM by Nimisha Wright MD.    Findings  Cystoscopy revealed one right and left ureteral orifice in the normal anatomic position, normal bladder mucosa and no tumors, masses or stones.      Procedure  The patient was placed in supine position and prepped and draped in sterile fashion with lidocaine jelly per urethra for anesthesia.  A timeout was performed.  The 14F flexible cystoscope was lubricated and gently placed through the urethra and into the bladder.  The bladder was completely visualized.  The cystoscope was retroflexed and the bladder neck visualized.  The scope was withdrawn and the procedure terminated.  The patient tolerated the procedure well.      Chief Complaint   Patient presents with   • Cystoscopy     HPI  Ms. Thomson is a 36 y.o. female with history below in assessment, who presents for follow up.     At this visit she says the Myrbetriq did not work for her at all.  She feels like she does not empty her bladder completely and still needs to go after she urinates.    Past Medical History:   Diagnosis Date   • Abdominal pain, periumbilical    • Abdominal pain, RLQ (right lower quadrant)    • Acute pharyngitis    • Anxiety    • Appetite lost    • Chest pain    • Chest pain, atypical 6/9/2020   • Colon polyp 02/17/2017   • Depression     patient denies   • Diarrhea     Secondary  to IBS   • Difficulty swallowing    • Dysphagia     Patient reported she is noticing that she gets choked easily   • Fatigue    • Fracture     RIGHT WRIST TWICE, LEFT HAND   • GERD (gastroesophageal reflux disease)    • H/O foot surgery 10/2020   • Hearing loss     No use of hearing aids   • History of bronchitis    • History of colonoscopy    • History of left salpingo-oophorectomy  2018   • History of ovarian cyst    • History of pneumonia    • Hyperthyroidism     hyperactive taken off medicine when pregnant.  Reported she was medication at one time, but none since pregnancy.    • Irritable bowel syndrome    • Ovarian cyst, bilateral    • Seasonal allergies    • RITU (stress urinary incontinence, female) 2020   • TMJ (dislocation of temporomandibular joint)    • Upper respiratory infection    • Vomiting        Past Surgical History:   Procedure Laterality Date   •  SECTION  2008    DR NAVARRETE   •  SECTION  10/30/2015    DR VASQUEZ   •  SECTION  2010    DR ESPINOZA   • CHOLECYSTECTOMY     • COLONOSCOPY N/A 2017     COLONOSCOPY WITH COLD BIOPSY POLYPECTOMY; BIOPSIES , QUICK CLIP;  Surgeon: Perfecto Mcknight MD;  Location: [facility] ENDOSCOPY;  Service:    • DIAGNOSTIC LAPAROSCOPY  2005    DR JONES NAVA/cystectomy   • DIAGNOSTIC LAPAROSCOPY  2012    DR ESPINOZA/ELIJAH   • DIAGNOSTIC LAPAROSCOPY N/A 8/10/2017     DIAGNOSTIC LAPAROSCOPY, LEFT S&O;  Keren Esipnoza MD;  Location: [facility] OR;  Service:    • ENDOSCOPY N/A 10/15/2019    Procedure: ESOPHAGOGASTRODUODENOSCOPY WITH BIOPSY AND ESOPHAGEAL DILATATION;  Surgeon: Perfecto Mcknight MD;  Location: [facility] ENDOSCOPY;  Service: Gastroenterology   • ENDOSCOPY N/A 10/11/2021    Procedure: ESOPHAGOGASTRODUODENOSCOPY with dilatation and biopsies;  Surgeon: Hieu Shultz MD;  Location: [facility] ENDOSCOPY;  Service: Gastroenterology;  Laterality: N/A;   • FOOT SURGERY Left     ligament and tendon repair   •  HERNIA REPAIR  12/20/2019    abd   • MID-URETHRAL SLING WITH CYSTOSCOPY N/A 6/9/2021     MID-URETHRAL SLING WITH CYSTOSCOPY TVT EXACT;  Luc Alicea MD;  Location:  [facility] OR;  Service: Obstetrics/Gynecology;  Laterality: N/A;   • NISSEN FUNDOPLICATION  06/2013   • UPPER GASTROINTESTINAL ENDOSCOPY  2013   • UPPER GASTROINTESTINAL ENDOSCOPY  06/19/2020   • VAGINAL HYSTERECTOMY N/A 7/9/2018    VAGINAL HYSTERECTOMY, BILATERAL SALPINGECTOMY;  Luc Alicea MD;  Location:  [facility] OR;  Service: Gynecology   • WISDOM TOOTH EXTRACTION  2000         Current Outpatient Medications:   •  acetaminophen (TYLENOL) 325 MG tablet, Take 2 tablets by mouth Every 6 (Six) Hours. For 3 to 5 days postoperatively, Disp: , Rfl:   •  buPROPion XL (Wellbutrin XL) 150 MG 24 hr tablet, Take 1 tablet by mouth Daily., Disp: 90 tablet, Rfl: 1  •  buPROPion XL (Wellbutrin XL) 300 MG 24 hr tablet, Take 1 tablet by mouth Every Morning., Disp: 90 tablet, Rfl: 1  •  colestipol (COLESTID) 1 g tablet, Take 1 tablet by mouth 2 (Two) Times a Day., Disp: 60 tablet, Rfl: 2  •  escitalopram (LEXAPRO) 10 MG tablet, Take 1 tablet by mouth Daily., Disp: 90 tablet, Rfl: 1  •  hydrOXYzine (ATARAX) 25 MG tablet, Take 1 tablet by mouth Every 8 (Eight) Hours As Needed for Anxiety., Disp: 90 tablet, Rfl: 1  •  ibuprofen (ADVIL,MOTRIN) 600 MG tablet, , Disp: , Rfl:   •  lidocaine (XYLOCAINE) 5 % ointment, Apply  topically to the appropriate area as directed Every Night., Disp: 35 g, Rfl: 1  •  loratadine (Claritin) 10 MG tablet, Take 1 tablet by mouth Daily. (Patient taking differently: Take 10 mg by mouth As Needed.), Disp: 30 tablet, Rfl: 1  •  Mirabegron ER (Myrbetriq) 25 MG tablet sustained-release 24 hour 24 hr tablet, Take 1 tablet by mouth Daily., Disp: 30 tablet, Rfl: 11  •  norgestimate-ethinyl estradiol (ORTHO-CYCLEN) 0.25-35 MG-MCG per tablet, Take 1 tablet by mouth Daily., Disp: 90 tablet, Rfl: 5  •  ondansetron (ZOFRAN) 4 MG tablet, Take 1 tablet by mouth  Every 8 (Eight) Hours As Needed for Nausea or Vomiting., Disp: 30 tablet, Rfl: 1  •  pantoprazole (PROTONIX) 40 MG EC tablet, 1 po daily in the am 30 minutes before breakfast, Disp: 30 tablet, Rfl: 3  •  promethazine (PHENERGAN) 25 MG tablet, Take 1 tablet by mouth Every 6 (Six) Hours As Needed for Nausea or Vomiting., Disp: 30 tablet, Rfl: 0     Physical Exam  Visit Vitals  LMP 06/26/2018       Labs  Brief Urine Lab Results  (Last result in the past 365 days)      Color   Clarity   Blood   Leuk Est   Nitrite   Protein   CREAT   Urine HCG        10/07/21 1323 Yellow   Clear   Negative   Negative   Negative   Negative                 Lab Results   Component Value Date    GLUCOSE 96 08/18/2021    CALCIUM 9.6 08/18/2021     08/18/2021    K 4.7 08/18/2021    CO2 26.5 08/18/2021     08/18/2021    BUN 10 08/18/2021    CREATININE 0.76 08/18/2021    EGFRIFAFRI 101 08/17/2021    EGFRIFNONA 86 08/18/2021    BCR 13.2 08/18/2021    ANIONGAP 9.5 08/18/2021       Lab Results   Component Value Date    WBC 10.11 08/18/2021    HGB 14.2 08/18/2021    HCT 43.7 08/18/2021    MCV 85.2 08/18/2021     08/18/2021            Radiographic Studies  CT Head Without Contrast    Result Date: 8/15/2021  No acute intracranial process.    This report was finalized on 8/15/2021 2:31 PM by Nimisha Wright MD.    CT Abdomen Pelvis With Contrast    Result Date: 8/15/2021  Moderate wall thickening of the partially filled bladder, cystitis not excluded.  Multiple small mesenteric lymph nodes, consider mesenteric adenitis  This report was finalized on 8/15/2021 2:37 PM by Nimisha Wright MD.    XR Chest 1 View    Result Date: 8/15/2021  No acute cardiopulmonary process.  .  This report was finalized on 8/15/2021 4:21 PM by Nimisha Wright MD.    XR Abdomen KUB    Result Date: 8/27/2021  Gaseous distention of the stomach.        Images were reviewed, interpreted, and dictated by Dr. Nahid Parks M.D. Transcribed by Amber Jack PA-C.   This report was finalized on 8/27/2021 11:41 AM by Nahid Parks M.D..    PVR  Post-void residual performed with ultrasound scanner by staff and interpreted by me -Luigi mL      I have reviewed the above labs and imaging.     Assessment  36 y.o. female with mixed UI. S/p TVT with continued OAB/UUI, refractory to Rx.  She is emptying her bladder completely.    In a separate room in separate encounter we discussed third line therapies for overactive bladder/urge incontinence, such as Botox and InterStim, since the Myrbetriq did not work for her.    Plan  1. FU in 3 weeks tele to discuss options.    I spent a total of 20 minutes with the patient and the chart engaging in data gathering and interpretation, patient interaction, as well as counseling on the risks, benefits, and alternatives of the therapy and coordinating care.

## 2021-11-04 ENCOUNTER — OFFICE VISIT (OUTPATIENT)
Dept: GASTROENTEROLOGY | Facility: CLINIC | Age: 36
End: 2021-11-04

## 2021-11-04 VITALS
DIASTOLIC BLOOD PRESSURE: 71 MMHG | HEART RATE: 72 BPM | RESPIRATION RATE: 16 BRPM | BODY MASS INDEX: 32.78 KG/M2 | SYSTOLIC BLOOD PRESSURE: 114 MMHG | TEMPERATURE: 97.7 F | WEIGHT: 192 LBS | HEIGHT: 64 IN

## 2021-11-04 DIAGNOSIS — Z86.010 PERSONAL HISTORY OF COLONIC POLYPS: ICD-10-CM

## 2021-11-04 DIAGNOSIS — K59.1 FUNCTIONAL DIARRHEA: Chronic | ICD-10-CM

## 2021-11-04 DIAGNOSIS — R10.13 EPIGASTRIC PAIN: Chronic | ICD-10-CM

## 2021-11-04 DIAGNOSIS — K21.9 GASTROESOPHAGEAL REFLUX DISEASE WITHOUT ESOPHAGITIS: Chronic | ICD-10-CM

## 2021-11-04 DIAGNOSIS — K58.0 IRRITABLE BOWEL SYNDROME WITH DIARRHEA: Chronic | ICD-10-CM

## 2021-11-04 DIAGNOSIS — R11.0 NAUSEA: Chronic | ICD-10-CM

## 2021-11-04 DIAGNOSIS — R13.19 ESOPHAGEAL DYSPHAGIA: Primary | ICD-10-CM

## 2021-11-04 PROCEDURE — 99214 OFFICE O/P EST MOD 30 MIN: CPT | Performed by: NURSE PRACTITIONER

## 2021-11-04 RX ORDER — SODIUM CHLORIDE 9 MG/ML
70 INJECTION, SOLUTION INTRAVENOUS CONTINUOUS PRN
Status: CANCELLED | OUTPATIENT
Start: 2021-11-04

## 2021-11-04 RX ORDER — POLYETHYLENE GLYCOL 3350 17 G/17G
POWDER, FOR SOLUTION ORAL
Qty: 238 G | Refills: 0 | Status: SHIPPED | OUTPATIENT
Start: 2021-11-04 | End: 2022-03-21

## 2021-11-04 RX ORDER — BISACODYL 5 MG/1
TABLET, DELAYED RELEASE ORAL
Qty: 4 TABLET | Refills: 0 | Status: SHIPPED | OUTPATIENT
Start: 2021-11-04 | End: 2022-03-21

## 2021-11-04 NOTE — PATIENT INSTRUCTIONS
Antireflux measures: Avoid fried, fatty foods, alcohol, chocolate, coffee, tea,  soft drinks, peppermint and spearmint, spicy foods, tomatoes and tomato based foods, onions, peppers, and smoking.   Other antireflux measures include weight reduction if overweight, avoiding tight clothing around the abdomen, elevating the head of the bed 6 inches with blocks under the head board, and don't drink or eat before going to bed and avoid lying down immediately after meals.  Pantoprazole 40 mg 1 by mouth in the am 30 minutes before breakfast.  Zofran 4 mg 1 po every 8 hours as needed for nausea.   The patient should eat 4-5 very small meals throughout the day. Avoid large meals.   It is recommended to eat a softer diet. Meats are best consumed ground. Fruits and vegetables are best consumed cooked or steamed and then mashed.   Low fiber, low fat diet with liberal water intake.   Colestid 1 gm 1 po twice a day. Decrease to once a day if constipated.   Low FODMAP diet - avoid dairy.   Esophageal manometry.   Colonoscopy: The indications, technique, alternatives and potential risk and complications were discussed with the patient including but not limited to bleeding, perforations, missing lesions and anesthetic complications. The patient understands and wishes to proceed with the procedure and has given their verbal consent. Written patient education information was given to the patient.   The patient will call if they have further questions before procedure.     Low-FODMAP Eating Plan    FODMAP stands for fermentable oligosaccharides, disaccharides, monosaccharides, and polyols. These are sugars that are hard for some people to digest. A low-FODMAP eating plan may help some people who have irritable bowel syndrome (IBS) and certain other bowel (intestinal) diseases to manage their symptoms.  This meal plan can be complicated to follow. Work with a diet and nutrition specialist (dietitian) to make a low-FODMAP eating plan that  is right for you. A dietitian can help make sure that you get enough nutrition from this diet.  What are tips for following this plan?  Reading food labels  · Check labels for hidden FODMAPs such as:  ? High-fructose syrup.  ? Honey.  ? Agave.  ? Natural fruit flavors.  ? Onion or garlic powder.  · Choose low-FODMAP foods that contain 3-4 grams of fiber per serving.  · Check food labels for serving sizes. Eat only one serving at a time to make sure FODMAP levels stay low.  Shopping  · Shop with a list of foods that are recommended on this diet and make a meal plan.  Meal planning  · Follow a low-FODMAP eating plan for up to 6 weeks, or as told by your health care provider or dietitian.  · To follow the eating plan:  1. Eliminate high-FODMAP foods from your diet completely. Choose only low-FODMAP foods to eat. You will do this for 2-6 weeks.  2. Gradually reintroduce high-FODMAP foods into your diet one at a time. Most people should wait a few days before introducing the next new high-FODMAP food into their meal plan. Your dietitian can recommend how quickly you may reintroduce foods.  3. Keep a daily record of what and how much you eat and drink. Make note of any symptoms that you have after eating.  4. Review your daily record with a dietitian regularly to identify which foods you can eat and which foods you should avoid.  General tips  · Drink enough fluid each day to keep your urine pale yellow.  · Avoid processed foods. These often have added sugar and may be high in FODMAPs.  · Avoid most dairy products, whole grains, and sweeteners.  · Work with a dietitian to make sure you get enough fiber in your diet.  · Avoid high FODMAP foods at meals to manage symptoms.  Recommended foods  Fruits  Bananas, oranges, tangerines, stefan, limes, blueberries, raspberries, strawberries, grapes, cantaloupe, honeydew melon, kiwi, papaya, passion fruit, and pineapple. Limited amounts of dried cranberries, banana chips, and  "shredded coconut.  Vegetables  Eggplant, zucchini, cucumber, peppers, green beans, bean sprouts, lettuce, arugula, kale, Swiss chard, spinach, jeny greens, bok fernanda, summer squash, potato, and tomato. Limited amounts of corn, carrot, and sweet potato. Green parts of scallions.  Grains  Gluten-free grains, such as rice, oats, buckwheat, quinoa, corn, polenta, and millet. Gluten-free pasta, bread, or cereal. Rice noodles. Corn tortillas.  Meats and other proteins  Unseasoned beef, pork, poultry, or fish. Eggs. Lema. Tofu (firm) and tempeh. Limited amounts of nuts and seeds, such as almonds, walnuts, brazil nuts, pecans, peanuts, nut butters, pumpkin seeds, maría elena seeds, and sunflower seeds.  Dairy  Lactose-free milk, yogurt, and kefir. Lactose-free cottage cheese and ice cream. Non-dairy milks, such as almond, coconut, hemp, and rice milk. Non-dairy yogurt. Limited amounts of goat cheese, brie, mozzarella, parmesan, swiss, and other hard cheeses.  Fats and oils  Butter-free spreads. Vegetable oils, such as olive, canola, and sunflower oil.  Seasoning and other foods  Artificial sweeteners with names that do not end in \"ol,\" such as aspartame, saccharine, and stevia. Maple syrup, white table sugar, raw sugar, brown sugar, and molasses. Mayonnaise, soy sauce, and tamari. Fresh basil, coriander, parsley, rosemary, and thyme.  Beverages  Water and mineral water. Sugar-sweetened soft drinks. Small amounts of orange juice or cranberry juice. Black and green tea. Most dry shayla. Coffee.  The items listed above may not be a complete list of foods and beverages you can eat. Contact a dietitian for more information.  Foods to avoid  Fruits  Fresh, dried, and juiced forms of apple, pear, watermelon, peach, plum, cherries, apricots, blackberries, boysenberries, figs, nectarines, and candice. Avocado.  Vegetables  Chicory root, artichoke, asparagus, cabbage, snow peas, [place] sprouts, broccoli, sugar snap peas, mushrooms, " celery, and cauliflower. Onions, garlic, leeks, and the white part of scallions.  Grains  Wheat, including kamut, durum, and semolina. Barley and bulgur. Couscous. Wheat-based cereals. Wheat noodles, bread, crackers, and pastries.  Meats and other proteins  Fried or fatty meat. Sausage. Cashews and pistachios. Soybeans, baked beans, black beans, chickpeas, kidney beans, franklin beans, navy beans, lentils, black-eyed peas, and split peas.  Dairy  Milk, yogurt, ice cream, and soft cheese. Cream and sour cream. Milk-based sauces. Custard. Buttermilk. Soy milk.  Seasoning and other foods  Any sugar-free gum or candy. Foods that contain artificial sweeteners such as sorbitol, mannitol, isomalt, or xylitol. Foods that contain honey, high-fructose corn syrup, or agave. Bouillon, vegetable stock, beef stock, and chicken stock. Garlic and onion powder. Condiments made with onion, such as hummus, chutney, pickles, relish, salad dressing, and salsa. Tomato paste.  Beverages  Chicory-based drinks. Coffee substitutes. Chamomile tea. Fennel tea. Sweet or fortified shayla such as port or sadia. Diet soft drinks made with isomalt, mannitol, maltitol, sorbitol, or xylitol. Apple, pear, and candice juice. Juices with high-fructose corn syrup.  The items listed above may not be a complete list of foods and beverages you should avoid. Contact a dietitian for more information.  Summary  · FODMAP stands for fermentable oligosaccharides, disaccharides, monosaccharides, and polyols. These are sugars that are hard for some people to digest.  · A low-FODMAP eating plan is a short-term diet that helps to ease symptoms of certain bowel diseases.  · The eating plan usually lasts up to 6 weeks. After that, high-FODMAP foods are reintroduced gradually and one at a time. This can help you find out which foods may be causing symptoms.  · A low-FODMAP eating plan can be complicated. It is best to work with a dietitian who has experience with this type of  plan.  This information is not intended to replace advice given to you by your health care provider. Make sure you discuss any questions you have with your health care provider.  Document Revised: 05/06/2021 Document Reviewed: 05/06/2021  [employer]vier Patient Education © 2021 [employer]

## 2021-11-04 NOTE — PROGRESS NOTES
Follow Up Note     Date: 2021   Patient Name: Laura Thomson  MRN: [id number]  : 1985     Primary Care Provider: Megha Fregoso APRN     Chief Complaint   Patient presents with   • Follow-up     History of present illness:   2021  Laura Thomson is a 36 y.o. female who is here today for follow up for ***          Interval History:      Subjective      Past Medical History:   Diagnosis Date   • Abdominal pain, periumbilical    • Abdominal pain, RLQ (right lower quadrant)    • Acute pharyngitis    • Anxiety    • Appetite lost    • Chest pain    • Chest pain, atypical 2020   • Colon polyp 2017   • Depression     patient denies   • Diarrhea     Secondary to IBS   • Difficulty swallowing    • Dysphagia     Patient reported she is noticing that she gets choked easily   • Fatigue    • Fracture     RIGHT WRIST TWICE, LEFT HAND   • GERD (gastroesophageal reflux disease)    • H/O foot surgery 10/2020   • Hearing loss     No use of hearing aids   • History of bronchitis    • History of colonoscopy    • History of left salpingo-oophorectomy  2018   • History of ovarian cyst    • History of pneumonia    • Hyperthyroidism     hyperactive taken off medicine when pregnant.  Reported she was medication at one time, but none since pregnancy.    • Irritable bowel syndrome    • Ovarian cyst, bilateral    • Seasonal allergies    • RITU (stress urinary incontinence, female) 2020   • TMJ (dislocation of temporomandibular joint)    • Upper respiratory infection    • Vomiting      Past Surgical History:   Procedure Laterality Date   •  SECTION  2008    DR NAVARRETE   •  SECTION  10/30/2015    DR VASQUEZ   •  SECTION  2010    DR ESPINOZA   • CHOLECYSTECTOMY     • COLONOSCOPY N/A 2017     COLONOSCOPY WITH COLD BIOPSY POLYPECTOMY; BIOPSIES , QUICK CLIP;  Surgeon: Perfecto Mcknight MD;  Location: [facility] ENDOSCOPY;  Service:    •  DIAGNOSTIC LAPAROSCOPY  07/07/2005    DR JONES NAVA/cystectomy   • DIAGNOSTIC LAPAROSCOPY  02/14/2012    DR ESPINOZA/ELIJAH   • DIAGNOSTIC LAPAROSCOPY N/A 8/10/2017     DIAGNOSTIC LAPAROSCOPY, LEFT S&O;  Keren Espinoza MD;  Location: [facility] OR;  Service:    • ENDOSCOPY N/A 10/15/2019    Procedure: ESOPHAGOGASTRODUODENOSCOPY WITH BIOPSY AND ESOPHAGEAL DILATATION;  Surgeon: Perfecto Mcknight MD;  Location: [facility] ENDOSCOPY;  Service: Gastroenterology   • ENDOSCOPY N/A 10/11/2021    Procedure: ESOPHAGOGASTRODUODENOSCOPY with dilatation and biopsies;  Surgeon: Hieu Shultz MD;  Location: [facility] ENDOSCOPY;  Service: Gastroenterology;  Laterality: N/A;   • FOOT SURGERY Left     ligament and tendon repair   • HERNIA REPAIR  12/20/2019    abd   • MID-URETHRAL SLING WITH CYSTOSCOPY N/A 6/9/2021     MID-URETHRAL SLING WITH CYSTOSCOPY TVT EXACT;  Luc Alicea MD;  Location:  [facility] OR;  Service: Obstetrics/Gynecology;  Laterality: N/A;   • NISSEN FUNDOPLICATION  06/2013   • UPPER GASTROINTESTINAL ENDOSCOPY  2013   • UPPER GASTROINTESTINAL ENDOSCOPY  06/19/2020   • VAGINAL HYSTERECTOMY N/A 7/9/2018    VAGINAL HYSTERECTOMY, BILATERAL SALPINGECTOMY;  Luc Alicea MD;  Location: [facility] OR;  Service: Gynecology   • WISDOM TOOTH EXTRACTION  2000     Family History   Problem Relation Age of Onset   • Coronary artery disease Mother    • Diabetes Mother    • Hypertension Mother    • Kidney disease Father    • Skin cancer Father    • Stroke Maternal Grandfather    • Hypertension Maternal Grandfather    • Diabetes Maternal Grandfather    • Colon cancer Maternal Great-Grandmother    • Osteoporosis Neg Hx      Social History     Socioeconomic History   • Marital status:    Tobacco Use   • Smoking status: Never Smoker   • Smokeless tobacco: Never Used   Vaping Use   • Vaping Use: Never used   Substance and Sexual Activity   • Alcohol use: No   • Drug use: No   • Sexual activity: Yes     Partners: Male     Birth control/protection:  Surgical       Current Outpatient Medications:   •  acetaminophen (TYLENOL) 325 MG tablet, Take 2 tablets by mouth Every 6 (Six) Hours. For 3 to 5 days postoperatively, Disp: , Rfl:   •  buPROPion XL (Wellbutrin XL) 150 MG 24 hr tablet, Take 1 tablet by mouth Daily., Disp: 90 tablet, Rfl: 1  •  buPROPion XL (Wellbutrin XL) 300 MG 24 hr tablet, Take 1 tablet by mouth Every Morning., Disp: 90 tablet, Rfl: 1  •  colestipol (COLESTID) 1 g tablet, Take 1 tablet by mouth 2 (Two) Times a Day., Disp: 60 tablet, Rfl: 2  •  escitalopram (LEXAPRO) 10 MG tablet, Take 1 tablet by mouth Daily., Disp: 90 tablet, Rfl: 1  •  hydrOXYzine (ATARAX) 25 MG tablet, Take 1 tablet by mouth Every 8 (Eight) Hours As Needed for Anxiety., Disp: 90 tablet, Rfl: 1  •  ibuprofen (ADVIL,MOTRIN) 600 MG tablet, , Disp: , Rfl:   •  lidocaine (XYLOCAINE) 5 % ointment, Apply  topically to the appropriate area as directed Every Night., Disp: 35 g, Rfl: 1  •  loratadine (Claritin) 10 MG tablet, Take 1 tablet by mouth Daily. (Patient taking differently: Take 10 mg by mouth As Needed.), Disp: 30 tablet, Rfl: 1  •  norgestimate-ethinyl estradiol (ORTHO-CYCLEN) 0.25-35 MG-MCG per tablet, Take 1 tablet by mouth Daily., Disp: 90 tablet, Rfl: 5  •  ondansetron (ZOFRAN) 4 MG tablet, Take 1 tablet by mouth Every 8 (Eight) Hours As Needed for Nausea or Vomiting., Disp: 30 tablet, Rfl: 1  •  pantoprazole (PROTONIX) 40 MG EC tablet, 1 po daily in the am 30 minutes before breakfast, Disp: 30 tablet, Rfl: 3  •  promethazine (PHENERGAN) 25 MG tablet, Take 1 tablet by mouth Every 6 (Six) Hours As Needed for Nausea or Vomiting., Disp: 30 tablet, Rfl: 0  •  Mirabegron ER (Myrbetriq) 25 MG tablet sustained-release 24 hour 24 hr tablet, Take 1 tablet by mouth Daily., Disp: 30 tablet, Rfl: 11  No Known Allergies  The following portions of the patient's history were reviewed and updated as appropriate: allergies, current medications, past family history, past medical history,  "past social history, past surgical history and problem list.  Objective     Physical Exam  Vitals:    11/04/21 1107   BP: 114/71   Pulse: 72   Resp: 16   Temp: 97.7 °F (36.5 °C)   Weight: 87.1 kg (192 lb)   Height: 162.6 cm (64\")     Body mass index is 32.96 kg/m².     Results Review:   I reviewed the patient's new clinical results.    Admission on 10/11/2021, Discharged on 10/11/2021   Component Date Value Ref Range Status   • Case Report 10/11/2021    Final                    Value:Surgical Pathology Report                         Case: [id number]                                  Authorizing Provider:  Hieu Shultz MD  Collected:           10/11/2021 07:56 AM          Ordering Location:     [facility]    Received:            10/11/2021 09:39 AM                                 [facility]                                                                    Pathologist:           Alpesh Kellogg MD                                                            Specimens:   1) - Small Intestine, Duodenum, for chronic diarrhea                                                2) - Gastric, Antrum, antrum and body                                                               3) - Esophagus, Distal, distal, mid, prox                                                 • Final Diagnosis 10/11/2021    Final                    Value:This result contains rich text formatting which cannot be displayed here.   Lab on 10/08/2021   Component Date Value Ref Range Status   • SARS-CoV-2 ZARINA 10/08/2021 Not Detected  Not Detected Final   Office Visit on 10/07/2021   Component Date Value Ref Range Status   • Color 10/07/2021 Yellow  Yellow, Straw, Dark Yellow, Pushpa Final   • Clarity, UA 10/07/2021 Clear  Clear Final   • Specific Gravity  10/07/2021 1.030  1.005 - 1.030 Final   • pH, Urine 10/07/2021 5.0  5.0 - 8.0 Final   • Leukocytes 10/07/2021 Negative  Negative Final   • Nitrite, UA 10/07/2021 Negative  Negative Final   • " Protein, POC 10/07/2021 Negative  Negative mg/dL Final   • Glucose, UA 10/07/2021 Negative  Negative, 1000 mg/dL (3+) mg/dL Final   • Ketones, UA 10/07/2021 Negative  Negative Final   • Urobilinogen, UA 10/07/2021 Normal  Normal Final   • Bilirubin 10/07/2021 Small (1+)* Negative Final   • Blood, UA 10/07/2021 Negative  Negative Final   Office Visit on 09/21/2021   Component Date Value Ref Range Status   • Atopobium Vaginae 09/21/2021 Low - 0  Score Final   • BVAB 2 09/21/2021 Low - 0  Score Final   • Megasphaera 1 09/21/2021 Low - 0  Score Final    Comment: Calculate total score by adding the 3 individual bacterial  vaginosis (BV) marker scores together.  Total score is  interpreted as follows:  Total score 0-1: Indicates the absence of BV.  Total score   2: Indeterminate for BV. Additional clinical                   data should be evaluated to establish a                   diagnosis.  Total score 3-6: Indicates the presence of BV.  This test was developed and its performance characteristics  determined by [facility].  It has not been cleared or approved  by the Food and Drug Administration.     • Minoo Albicans, ZARINA 09/21/2021 Negative  Negative Final   • Minoo Glabrata, ZARINA 09/21/2021 Negative  Negative Final   • Trichomonas vaginosis 09/21/2021 Negative  Negative Final   • Chlamydia trachomatis, ZARINA 09/21/2021 Negative  Negative Final   • Neisseria gonorrhoeae, ZARINA 09/21/2021 Negative  Negative Final   Lab on 09/02/2021   Component Date Value Ref Range Status   • SARS-CoV-2 ZARINA 09/02/2021 Detected* Not Detected Final   Office Visit on 08/26/2021   Component Date Value Ref Range Status   • Color 08/26/2021 Yellow  Yellow, Straw, Dark Yellow, Pushpa Final   • Clarity, UA 08/26/2021 Clear  Clear Final   • Specific Gravity  08/26/2021 1.025  1.005 - 1.030 Final   • pH, Urine 08/26/2021 5.5  5.0 - 8.0 Final   • Leukocytes 08/26/2021 Negative  Negative Final   • Nitrite, UA 08/26/2021 Negative  Negative Final   •  Protein, POC 08/26/2021 Negative  Negative mg/dL Final   • Glucose, UA 08/26/2021 Negative  Negative, 1000 mg/dL (3+) mg/dL Final   • Ketones, UA 08/26/2021 Negative  Negative Final   • Urobilinogen, UA 08/26/2021 Normal  Normal Final   • Bilirubin 08/26/2021 Negative  Negative Final   • Blood, UA 08/26/2021 1+* Negative Final   Office Visit on 08/18/2021   Component Date Value Ref Range Status   • Lipase 08/18/2021 29  13 - 60 U/L Final   • Glucose 08/18/2021 96  65 - 99 mg/dL Final   • BUN 08/18/2021 10  6 - 20 mg/dL Final   • Creatinine 08/18/2021 0.76  0.57 - 1.00 mg/dL Final   • Sodium 08/18/2021 139  136 - 145 mmol/L Final   • Potassium 08/18/2021 4.7  3.5 - 5.2 mmol/L Final   • Chloride 08/18/2021 103  98 - 107 mmol/L Final   • CO2 08/18/2021 26.5  22.0 - 29.0 mmol/L Final   • Calcium 08/18/2021 9.6  8.6 - 10.5 mg/dL Final   • Total Protein 08/18/2021 7.7  6.0 - 8.5 g/dL Final   • Albumin 08/18/2021 4.60  3.50 - 5.20 g/dL Final   • ALT (SGPT) 08/18/2021 10  1 - 33 U/L Final   • AST (SGOT) 08/18/2021 17  1 - 32 U/L Final   • Alkaline Phosphatase 08/18/2021 91  39 - 117 U/L Final   • Total Bilirubin 08/18/2021 0.3  0.0 - 1.2 mg/dL Final   • eGFR Non  Amer 08/18/2021 86  >60 mL/min/1.73 Final   • Globulin 08/18/2021 3.1  gm/dL Final   • A/G Ratio 08/18/2021 1.5  g/dL Final   • BUN/Creatinine Ratio 08/18/2021 13.2  7.0 - 25.0 Final   • Anion Gap 08/18/2021 9.5  5.0 - 15.0 mmol/L Final   • WBC 08/18/2021 10.11  3.40 - 10.80 10*3/mm3 Final   • RBC 08/18/2021 5.13  3.77 - 5.28 10*6/mm3 Final   • Hemoglobin 08/18/2021 14.2  12.0 - 15.9 g/dL Final   • Hematocrit 08/18/2021 43.7  34.0 - 46.6 % Final   • MCV 08/18/2021 85.2  79.0 - 97.0 fL Final   • MCH 08/18/2021 27.7  26.6 - 33.0 pg Final   • MCHC 08/18/2021 32.5  31.5 - 35.7 g/dL Final   • RDW 08/18/2021 13.0  12.3 - 15.4 % Final   • RDW-SD 08/18/2021 40.3  37.0 - 54.0 fl Final   • MPV 08/18/2021 10.3  6.0 - 12.0 fL Final   • Platelets 08/18/2021 279  140 -  450 10*3/mm3 Final   • Neutrophil % 08/18/2021 69.7  42.7 - 76.0 % Final   • Lymphocyte % 08/18/2021 20.1  19.6 - 45.3 % Final   • Monocyte % 08/18/2021 7.9  5.0 - 12.0 % Final   • Eosinophil % 08/18/2021 1.1  0.3 - 6.2 % Final   • Basophil % 08/18/2021 0.9  0.0 - 1.5 % Final   • Immature Grans % 08/18/2021 0.3  0.0 - 0.5 % Final   • Neutrophils, Absolute 08/18/2021 7.05* 1.70 - 7.00 10*3/mm3 Final   • Lymphocytes, Absolute 08/18/2021 2.03  0.70 - 3.10 10*3/mm3 Final   • Monocytes, Absolute 08/18/2021 0.80  0.10 - 0.90 10*3/mm3 Final   • Eosinophils, Absolute 08/18/2021 0.11  0.00 - 0.40 10*3/mm3 Final   • Basophils, Absolute 08/18/2021 0.09  0.00 - 0.20 10*3/mm3 Final   • Immature Grans, Absolute 08/18/2021 0.03  0.00 - 0.05 10*3/mm3 Final   • nRBC 08/18/2021 0.0  0.0 - 0.2 /100 WBC Final   • H. pylori, IgA ABS 08/18/2021 <9.0  0.0 - 8.9 units Final                                    Negative          <9.0                                  Equivocal   9.0 - 11.0                                  Positive         >11.0   • H. Pylori, IgM 08/18/2021 <9.0  0.0 - 8.9 units Final                                    Negative          <9.0                                  Equivocal   9.0 - 11.0                                  Positive         >11.0  This test was developed and its performance characteristics  determined by [facility]. It has not been cleared or approved  by the Food and Drug Administration.   Orders Only on 08/17/2021   Component Date Value Ref Range Status   • Campylobacter 08/17/2021 Not Detected  Not Detected Final   • C.difficile toxin A/B 08/17/2021 Not Detected  Not Detected Final   • Plesiomonas shigelloides 08/17/2021 Not Detected  Not Detected Final   • Salmonella 08/17/2021 Not Detected  Not Detected Final   • Vibrio 08/17/2021 Not Detected  Not Detected Final   • Vibrio cholerae 08/17/2021 Not Detected  Not Detected Final   • Yersinia enterocolitica 08/17/2021 Not Detected  Not Detected Final   •  Enteroaggregative E. coli 08/17/2021 Not Detected  Not Detected Final   • Enteropathogenic E. coli 08/17/2021 Not Detected  Not Detected Final   • Enterotoxigenic E. coli 08/17/2021 Not Detected  Not Detected Final   • Shiga-like toxin-producing E. coli  08/17/2021 Not Detected  Not Detected Final   • E. coli O157 08/17/2021 Not applicable  Not Detected Final   • Shigella enteroinvasive E. coli 08/17/2021 Not Detected  Not Detected Final   • Cryptosporidium 08/17/2021 Not Detected  Not Detected Final   • Cyclospora cayetanensis 08/17/2021 Not Detected  Not Detected Final   • Entamoeba Histolytica Ag 08/17/2021 Not Detected  Not Detected Final   • Giardia lamblia 08/17/2021 Not Detected  Not Detected Final   • Adenovirus 40/41 Ag 08/17/2021 Not Detected  Not Detected Final   • Astrovirus 08/17/2021 Not Detected  Not Detected Final   • Norovirus GI/GII 08/17/2021 Not Detected  Not Detected Final   • Rotavirus A 08/17/2021 Not Detected  Not Detected Final   • Sapovirus 08/17/2021 Not Detected  Not Detected Final   • C difficile Toxin Gene NAAT 08/17/2021 Negative  Negative Final   Office Visit on 08/17/2021   Component Date Value Ref Range Status   • WBC 08/17/2021 10.49  3.40 - 10.80 10*3/mm3 Final   • RBC 08/17/2021 4.99  3.77 - 5.28 10*6/mm3 Final   • Hemoglobin 08/17/2021 13.8  12.0 - 15.9 g/dL Final   • Hematocrit 08/17/2021 42.2  34.0 - 46.6 % Final   • MCV 08/17/2021 84.6  79.0 - 97.0 fL Final   • MCH 08/17/2021 27.7  26.6 - 33.0 pg Final   • MCHC 08/17/2021 32.7  31.5 - 35.7 g/dL Final   • RDW 08/17/2021 12.8  12.3 - 15.4 % Final   • Platelets 08/17/2021 286  140 - 450 10*3/mm3 Final   • Neutrophil Rel % 08/17/2021 69.3  42.7 - 76.0 % Final   • Lymphocyte Rel % 08/17/2021 20.4  19.6 - 45.3 % Final   • Monocyte Rel % 08/17/2021 7.5  5.0 - 12.0 % Final   • Eosinophil Rel % 08/17/2021 1.4  0.3 - 6.2 % Final   • Basophil Rel % 08/17/2021 1.0  0.0 - 1.5 % Final   • Neutrophils Absolute 08/17/2021 7.26* 1.70 - 7.00  10*3/mm3 Final   • Lymphocytes Absolute 08/17/2021 2.14  0.70 - 3.10 10*3/mm3 Final   • Monocytes Absolute 08/17/2021 0.79  0.10 - 0.90 10*3/mm3 Final   • Eosinophils Absolute 08/17/2021 0.15  0.00 - 0.40 10*3/mm3 Final   • Basophils Absolute 08/17/2021 0.11  0.00 - 0.20 10*3/mm3 Final   • Immature Granulocyte Rel % 08/17/2021 0.4  0.0 - 0.5 % Final   • Immature Grans Absolute 08/17/2021 0.04  0.00 - 0.05 10*3/mm3 Final   • nRBC 08/17/2021 0.0  0.0 - 0.2 /100 WBC Final   • Glucose 08/17/2021 83  65 - 99 mg/dL Final   • BUN 08/17/2021 8  6 - 20 mg/dL Final   • Creatinine 08/17/2021 0.78  0.57 - 1.00 mg/dL Final   • eGFR Non  Am 08/17/2021 84  >60 mL/min/1.73 Final    Comment: GFR Normal >60  Chronic Kidney Disease <60  Kidney Failure <15     • eGFR  Am 08/17/2021 101  >60 mL/min/1.73 Final   • BUN/Creatinine Ratio 08/17/2021 10.3  7.0 - 25.0 Final   • Sodium 08/17/2021 137  136 - 145 mmol/L Final   • Potassium 08/17/2021 4.8  3.5 - 5.2 mmol/L Final   • Chloride 08/17/2021 103  98 - 107 mmol/L Final   • Total CO2 08/17/2021 26.5  22.0 - 29.0 mmol/L Final   • Calcium 08/17/2021 9.6  8.6 - 10.5 mg/dL Final   • Total Protein 08/17/2021 7.0  6.0 - 8.5 g/dL Final   • Albumin 08/17/2021 4.50  3.50 - 5.20 g/dL Final   • Globulin 08/17/2021 2.5  gm/dL Final   • A/G Ratio 08/17/2021 1.8  g/dL Final   • Total Bilirubin 08/17/2021 0.2  0.0 - 1.2 mg/dL Final   • Alkaline Phosphatase 08/17/2021 84  39 - 117 U/L Final   • AST (SGOT) 08/17/2021 17  1 - 32 U/L Final   • ALT (SGPT) 08/17/2021 11  1 - 33 U/L Final   Admission on 08/15/2021, Discharged on 08/15/2021   Component Date Value Ref Range Status   • Glucose 08/15/2021 101* 65 - 99 mg/dL Final   • BUN 08/15/2021 8  6 - 20 mg/dL Final   • Creatinine 08/15/2021 0.74  0.57 - 1.00 mg/dL Final   • Sodium 08/15/2021 141  136 - 145 mmol/L Final   • Potassium 08/15/2021 4.1  3.5 - 5.2 mmol/L Final   • Chloride 08/15/2021 103  98 - 107 mmol/L Final   • CO2 08/15/2021 25.1   22.0 - 29.0 mmol/L Final   • Calcium 08/15/2021 9.1  8.6 - 10.5 mg/dL Final   • Total Protein 08/15/2021 6.9  6.0 - 8.5 g/dL Final   • Albumin 08/15/2021 4.30  3.50 - 5.20 g/dL Final   • ALT (SGPT) 08/15/2021 10  1 - 33 U/L Final   • AST (SGOT) 08/15/2021 15  1 - 32 U/L Final   • Alkaline Phosphatase 08/15/2021 78  39 - 117 U/L Final   • Total Bilirubin 08/15/2021 0.2  0.0 - 1.2 mg/dL Final   • eGFR Non  Amer 08/15/2021 89  >60 mL/min/1.73 Final   • Globulin 08/15/2021 2.6  gm/dL Final   • A/G Ratio 08/15/2021 1.7  g/dL Final   • BUN/Creatinine Ratio 08/15/2021 10.8  7.0 - 25.0 Final   • Anion Gap 08/15/2021 12.9  5.0 - 15.0 mmol/L Final   • Troponin T 08/15/2021 <0.010  0.000 - 0.030 ng/mL Final   • Magnesium 08/15/2021 1.9  1.6 - 2.6 mg/dL Final   • Color, UA 08/15/2021 Yellow  Yellow, Straw Final   • Appearance, UA 08/15/2021 Clear  Clear Final   • pH, UA 08/15/2021 8.0  5.0 - 8.0 Final   • Specific Gravity, UA 08/15/2021 1.012  1.005 - 1.030 Final   • Glucose, UA 08/15/2021 Negative  Negative Final   • Ketones, UA 08/15/2021 Negative  Negative Final   • Bilirubin, UA 08/15/2021 Negative  Negative Final   • Blood, UA 08/15/2021 Negative  Negative Final   • Protein, UA 08/15/2021 Negative  Negative Final   • Leuk Esterase, UA 08/15/2021 Negative  Negative Final   • Nitrite, UA 08/15/2021 Negative  Negative Final   • Urobilinogen, UA 08/15/2021 0.2 E.U./dL  0.2 - 1.0 E.U./dL Final   • Extra Tube 08/15/2021 Hold for add-ons.   Final    Auto resulted.   • Extra Tube 08/15/2021 hold for add-on   Final    Auto resulted   • Extra Tube 08/15/2021 Hold for add-ons.   Final    Auto resulted.   • WBC 08/15/2021 8.27  3.40 - 10.80 10*3/mm3 Final   • RBC 08/15/2021 4.83  3.77 - 5.28 10*6/mm3 Final   • Hemoglobin 08/15/2021 13.5  12.0 - 15.9 g/dL Final   • Hematocrit 08/15/2021 41.6  34.0 - 46.6 % Final   • MCV 08/15/2021 86.1  79.0 - 97.0 fL Final   • MCH 08/15/2021 28.0  26.6 - 33.0 pg Final   • MCHC 08/15/2021 32.5   31.5 - 35.7 g/dL Final   • RDW 08/15/2021 12.9  12.3 - 15.4 % Final   • RDW-SD 08/15/2021 40.5  37.0 - 54.0 fl Final   • MPV 08/15/2021 10.4  6.0 - 12.0 fL Final   • Platelets 08/15/2021 234  140 - 450 10*3/mm3 Final   • Neutrophil % 08/15/2021 69.2  42.7 - 76.0 % Final   • Lymphocyte % 08/15/2021 20.3  19.6 - 45.3 % Final   • Monocyte % 08/15/2021 8.1  5.0 - 12.0 % Final   • Eosinophil % 08/15/2021 1.2  0.3 - 6.2 % Final   • Basophil % 08/15/2021 0.8  0.0 - 1.5 % Final   • Immature Grans % 08/15/2021 0.4  0.0 - 0.5 % Final   • Neutrophils, Absolute 08/15/2021 5.72  1.70 - 7.00 10*3/mm3 Final   • Lymphocytes, Absolute 08/15/2021 1.68  0.70 - 3.10 10*3/mm3 Final   • Monocytes, Absolute 08/15/2021 0.67  0.10 - 0.90 10*3/mm3 Final   • Eosinophils, Absolute 08/15/2021 0.10  0.00 - 0.40 10*3/mm3 Final   • Basophils, Absolute 08/15/2021 0.07  0.00 - 0.20 10*3/mm3 Final   • Immature Grans, Absolute 08/15/2021 0.03  0.00 - 0.05 10*3/mm3 Final   • nRBC 08/15/2021 0.0  0.0 - 0.2 /100 WBC Final   • Acetaminophen 08/15/2021 <5.0  0.0 - 30.0 mcg/mL Final   • Ethanol 08/15/2021 <10  0 - 10 mg/dL Final   • Ethanol % 08/15/2021 <0.010  % Final   • THC, Screen, Urine 08/15/2021 Negative  Negative Final   • Phencyclidine (PCP), Urine 08/15/2021 Negative  Negative Final   • Cocaine Screen, Urine 08/15/2021 Negative  Negative Final   • Methamphetamine, Ur 08/15/2021 Negative  Negative Final   • Opiate Screen 08/15/2021 Negative  Negative Final   • Amphetamine Screen, Urine 08/15/2021 Negative  Negative Final   • Benzodiazepine Screen, Urine 08/15/2021 Negative  Negative Final   • Tricyclic Antidepressants Screen 08/15/2021 Negative  Negative Final   • Methadone Screen, Urine 08/15/2021 Negative  Negative Final   • Barbiturates Screen, Urine 08/15/2021 Negative  Negative Final   • Oxycodone Screen, Urine 08/15/2021 Negative  Negative Final   • Propoxyphene Screen 08/15/2021 Negative  Negative Final   • Buprenorphine, Screen, Urine  08/15/2021 Negative  Negative Final   • Salicylate 08/15/2021 <0.3  <=30.0 mg/dL Final   There may be more visits with results that are not included.      CT Head Without Contrast    Result Date: 8/15/2021  No acute intracranial process.    This report was finalized on 8/15/2021 2:31 PM by Nimisha Wright MD.    CT Abdomen Pelvis With Contrast    Result Date: 8/15/2021  Moderate wall thickening of the partially filled bladder, cystitis not excluded.  Multiple small mesenteric lymph nodes, consider mesenteric adenitis  This report was finalized on 8/15/2021 2:37 PM by Nimisha Wright MD.    XR Chest 1 View    Result Date: 8/15/2021  No acute cardiopulmonary process.  .  This report was finalized on 8/15/2021 4:21 PM by Nimisha Wright MD.    XR Abdomen KUB    Result Date: 8/27/2021  Gaseous distention of the stomach.        Images were reviewed, interpreted, and dictated by Dr. Nahid Parks M.D. Transcribed by Amber Jack PA-C.  This report was finalized on 8/27/2021 11:41 AM by Nahid Parks M.D..     Assessment / Plan      There are no diagnoses linked to this encounter.  There are no Patient Instructions on file for this visit.  Angela Hernandez MA  11/4/2021    Please note that portions of this note may have been completed with a voice recognition program. Efforts were made to edit the dictations, but occasionally words are mistranscribed.

## 2021-11-07 ENCOUNTER — LAB (OUTPATIENT)
Dept: PREADMISSION TESTING | Facility: HOSPITAL | Age: 36
End: 2021-11-07

## 2021-11-07 DIAGNOSIS — Z11.59 ENCOUNTER FOR SCREENING FOR VIRAL DISEASE: ICD-10-CM

## 2021-11-07 LAB — SARS-COV-2 RNA PNL SPEC NAA+PROBE: NOT DETECTED

## 2021-11-07 PROCEDURE — C9803 HOPD COVID-19 SPEC COLLECT: HCPCS

## 2021-11-07 PROCEDURE — U0004 COV-19 TEST NON-CDC HGH THRU: HCPCS

## 2021-11-08 ENCOUNTER — TELEPHONE (OUTPATIENT)
Dept: GASTROENTEROLOGY | Facility: CLINIC | Age: 36
End: 2021-11-08

## 2021-11-08 DIAGNOSIS — Z01.812 PRE-PROCEDURE LAB EXAM: Primary | ICD-10-CM

## 2021-11-08 NOTE — TELEPHONE ENCOUNTER
----- Message from Cherie Greene sent at 11/5/2021  2:52 PM EDT -----  Regarding: COVID ORDER  Can you order Covid test ?  (she will need for the manometry testing)    Thank you

## 2021-11-24 ENCOUNTER — TELEPHONE (OUTPATIENT)
Dept: GASTROENTEROLOGY | Facility: CLINIC | Age: 36
End: 2021-11-24

## 2021-12-06 ENCOUNTER — OFFICE VISIT (OUTPATIENT)
Dept: INTERNAL MEDICINE | Facility: CLINIC | Age: 36
End: 2021-12-06

## 2021-12-06 VITALS
HEIGHT: 64 IN | SYSTOLIC BLOOD PRESSURE: 110 MMHG | BODY MASS INDEX: 32.78 KG/M2 | OXYGEN SATURATION: 98 % | RESPIRATION RATE: 16 BRPM | HEART RATE: 74 BPM | TEMPERATURE: 98.4 F | WEIGHT: 192 LBS | DIASTOLIC BLOOD PRESSURE: 84 MMHG

## 2021-12-06 DIAGNOSIS — K31.84 GASTROPARESIS: Primary | ICD-10-CM

## 2021-12-06 DIAGNOSIS — R53.83 FATIGUE, UNSPECIFIED TYPE: ICD-10-CM

## 2021-12-06 PROBLEM — U07.1 COVID-19: Status: ACTIVE | Noted: 2021-09-07

## 2021-12-06 PROCEDURE — 99214 OFFICE O/P EST MOD 30 MIN: CPT | Performed by: INTERNAL MEDICINE

## 2021-12-06 RX ORDER — METOCLOPRAMIDE 5 MG/1
5 TABLET ORAL
Qty: 120 TABLET | Refills: 11 | Status: SHIPPED | OUTPATIENT
Start: 2021-12-06 | End: 2022-02-07 | Stop reason: SDUPTHER

## 2021-12-06 RX ORDER — TRAMADOL HYDROCHLORIDE 50 MG/1
50 TABLET ORAL EVERY 6 HOURS PRN
COMMUNITY
Start: 2021-11-15 | End: 2022-01-19 | Stop reason: HOSPADM

## 2021-12-06 RX ORDER — OXYCODONE HYDROCHLORIDE 5 MG/1
5 TABLET ORAL EVERY 8 HOURS PRN
COMMUNITY
Start: 2021-11-09 | End: 2022-01-19 | Stop reason: HOSPADM

## 2021-12-06 NOTE — PROGRESS NOTES
Subjective     Patient ID: Laura Thomson is a 36 y.o. female. Patient is here for management of multiple medical problems.     Chief Complaint   Patient presents with   • Blood Infection     Follow up   • Loss of Consciousness   • Dizziness   • Abdominal Pain     History of Present Illness     Recent repair of left foot injury.    August;.  Illness coming back. Last last time.  Urology visit all clear. Dr Sahni.    LOC at [facility].      EGD full of bili.            The following portions of the patient's history were reviewed and updated as appropriate: allergies, current medications, past family history, past medical history, past social history, past surgical history and problem list.    Review of Systems   Constitutional: Negative for fatigue.   Psychiatric/Behavioral: Negative for self-injury and sleep disturbance. The patient is not nervous/anxious.    All other systems reviewed and are negative.      Current Outpatient Medications:   •  acetaminophen (TYLENOL) 325 MG tablet, Take 2 tablets by mouth Every 6 (Six) Hours. For 3 to 5 days postoperatively, Disp: , Rfl:   •  bisacodyl (DULCOLAX) 5 MG EC tablet, Take as directed for colon prep, Disp: 4 tablet, Rfl: 0  •  buPROPion XL (Wellbutrin XL) 150 MG 24 hr tablet, Take 1 tablet by mouth Daily., Disp: 90 tablet, Rfl: 1  •  buPROPion XL (Wellbutrin XL) 300 MG 24 hr tablet, Take 1 tablet by mouth Every Morning., Disp: 90 tablet, Rfl: 1  •  colestipol (COLESTID) 1 g tablet, Take 1 tablet by mouth 2 (Two) Times a Day., Disp: 60 tablet, Rfl: 2  •  escitalopram (LEXAPRO) 10 MG tablet, Take 1 tablet by mouth Daily., Disp: 90 tablet, Rfl: 1  •  hydrOXYzine (ATARAX) 25 MG tablet, Take 1 tablet by mouth Every 8 (Eight) Hours As Needed for Anxiety., Disp: 90 tablet, Rfl: 1  •  ibuprofen (ADVIL,MOTRIN) 600 MG tablet, , Disp: , Rfl:   •  lidocaine (XYLOCAINE) 5 % ointment, Apply  topically to the appropriate area as directed Every Night., Disp: 35 g, Rfl: 1  •   "loratadine (Claritin) 10 MG tablet, Take 1 tablet by mouth Daily. (Patient taking differently: Take 10 mg by mouth As Needed.), Disp: 30 tablet, Rfl: 1  •  norgestimate-ethinyl estradiol (ORTHO-CYCLEN) 0.25-35 MG-MCG per tablet, Take 1 tablet by mouth Daily., Disp: 90 tablet, Rfl: 5  •  ondansetron (ZOFRAN) 4 MG tablet, Take 1 tablet by mouth Every 8 (Eight) Hours As Needed for Nausea or Vomiting., Disp: 30 tablet, Rfl: 1  •  oxyCODONE (ROXICODONE) 5 MG immediate release tablet, , Disp: , Rfl:   •  pantoprazole (PROTONIX) 40 MG EC tablet, 1 po daily in the am 30 minutes before breakfast, Disp: 30 tablet, Rfl: 3  •  polyethylene glycol (MiraLax) 17 GM/SCOOP powder, Take as directed for colonoscopy prep, Disp: 238 g, Rfl: 0  •  promethazine (PHENERGAN) 25 MG tablet, Take 1 tablet by mouth Every 6 (Six) Hours As Needed for Nausea or Vomiting., Disp: 30 tablet, Rfl: 0  •  traMADol (ULTRAM) 50 MG tablet, , Disp: , Rfl:   •  metoclopramide (Reglan) 5 MG tablet, Take 1 tablet by mouth 4 (Four) Times a Day Before Meals & at Bedtime., Disp: 120 tablet, Rfl: 11    Objective      Blood pressure 110/84, pulse 74, temperature 98.4 °F (36.9 °C), resp. rate 16, height 162.6 cm (64\"), weight 87.1 kg (192 lb), last menstrual period 06/26/2018, SpO2 98 %, not currently breastfeeding.    Physical Exam     General Appearance:    Alert, cooperative, no distress, appears stated age   Head:    Normocephalic, without obvious abnormality, atraumatic   Eyes:    PERRL, conjunctiva/corneas clear, EOM's intact   Ears:    Normal TM's and external ear canals, both ears   Nose:   Nares normal, septum midline, mucosa normal, no drainage   or sinus tenderness   Throat:   Lips, mucosa, and tongue normal; teeth and gums normal   Neck:   Supple, symmetrical, trachea midline, no adenopathy;        thyroid:  No enlargement/tenderness/nodules; no carotid    bruit or JVD   Back:     Symmetric, no curvature, ROM normal, no CVA tenderness   Lungs:     Clear " to auscultation bilaterally, respirations unlabored   Chest wall:    No tenderness or deformity   Heart:    Regular rate and rhythm, S1 and S2 normal, no murmur,        rub or gallop   Abdomen:     Soft, non-tender, bowel sounds active all four quadrants,     no masses, no organomegaly   Extremities:   Extremities normal, atraumatic, no cyanosis or edema   Pulses:   2+ and symmetric all extremities   Skin:   Skin color, texture, turgor normal, no rashes or lesions   Lymph nodes:   Cervical, supraclavicular, and axillary nodes normal   Neurologic:   CNII-XII intact. Normal strength, sensation and reflexes       throughout      Results for orders placed or performed in visit on 11/07/21   COVID-19, APTIMA [facility] IN-HOUSE NP/OP SWAB IN UTM/VTM/SALINE TRANSPORT MEDIA 24HR TAT - Swab, Nasal Cavity    Specimen: Nasal Cavity; Swab   Result Value Ref Range    COVID19 Not Detected Not Detected - Ref. Range         Assessment/Plan       Diagnoses and all orders for this visit:    1. Gastroparesis (Primary)  -     Glia(IgA / G) & TTG(IgA / G)  -     Endomysial Antibody, IgA / IGG Titer  -     Helicobacter Pylori, IgA IgG IgM  -     TSH  -     T4, Free  -     Comprehensive Metabolic Panel  -     Vitamin B12  -     Lipid Panel  -     CBC & Differential  -     Urinalysis With Microscopic - Urine, Clean Catch  -     Food Allergy Profile  -     Immunoglobulin Free LT Chains Blood  -     ADRIEN + PE  -     [place] Spotted Fever, IgM  -     [place] Spotted Fever, IgG  -     Sedimentation Rate  -     C-reactive Protein  -     Antistreptolysin O Titer  -     Lyme Disease, PCR - , Arm, Right  -     Ehrlichia Antibody Panel    2. Fatigue, unspecified type  -     Glia(IgA / G) & TTG(IgA / G)  -     Endomysial Antibody, IgA / IGG Titer  -     Helicobacter Pylori, IgA IgG IgM  -     TSH  -     T4, Free  -     Comprehensive Metabolic Panel  -     Vitamin B12  -     Lipid Panel  -     CBC & Differential  -     Urinalysis With  Microscopic - Urine, Clean Catch  -     Food Allergy Profile  -     Immunoglobulin Free LT Chains Blood  -     ADRIEN + PE  -     [place] Spotted Fever, IgM  -     [place] Spotted Fever, IgG  -     Sedimentation Rate  -     C-reactive Protein  -     Antistreptolysin O Titer  -     Lyme Disease, PCR - , Arm, Right  -     Ehrlichia Antibody Panel    Other orders  -     metoclopramide (Reglan) 5 MG tablet; Take 1 tablet by mouth 4 (Four) Times a Day Before Meals & at Bedtime.  Dispense: 120 tablet; Refill: 11      Return in about 6 weeks (around 1/17/2022).          There are no Patient Instructions on file for this visit.     Maximino Whelan MD    Assessment/Plan

## 2021-12-09 LAB
A PHAGOCYTOPH IGG TITR SER IF: NEGATIVE {TITER}
A PHAGOCYTOPH IGM TITR SER IF: NEGATIVE {TITER}
ASO AB SERPL-ACNC: 73.5 IU/ML (ref 0–200)
B BURGDOR DNA SPEC QL NAA+PROBE: NEGATIVE
CRP SERPL-MCNC: 0.55 MG/DL (ref 0–0.5)
E CHAFFEENSIS IGG TITR SER IF: NEGATIVE {TITER}
E CHAFFEENSIS IGM TITR SER IF: NEGATIVE {TITER}
ERYTHROCYTE [SEDIMENTATION RATE] IN BLOOD BY WESTERGREN METHOD: 10 MM/HR (ref 0–20)
R RICKETTSI IGG SER QL IA: NEGATIVE
R RICKETTSI IGM SER-ACNC: 0.42 INDEX (ref 0–0.89)

## 2021-12-13 LAB
ALBUMIN SERPL ELPH-MCNC: 3.8 G/DL (ref 2.9–4.4)
ALBUMIN SERPL-MCNC: 4.4 G/DL (ref 3.8–4.8)
ALBUMIN/GLOB SERPL: 1.5 {RATIO} (ref 0.7–1.7)
ALBUMIN/GLOB SERPL: 2.2 {RATIO} (ref 1.2–2.2)
ALP SERPL-CCNC: 69 IU/L (ref 44–121)
ALPHA1 GLOB SERPL ELPH-MCNC: 0.2 G/DL (ref 0–0.4)
ALPHA2 GLOB SERPL ELPH-MCNC: 0.7 G/DL (ref 0.4–1)
ALT SERPL-CCNC: 9 IU/L (ref 0–32)
APPEARANCE UR: CLEAR
AST SERPL-CCNC: 14 IU/L (ref 0–40)
B-GLOBULIN SERPL ELPH-MCNC: 0.9 G/DL (ref 0.7–1.3)
BACTERIA #/AREA URNS HPF: ABNORMAL /[HPF]
BASOPHILS # BLD AUTO: 0.1 X10E3/UL (ref 0–0.2)
BASOPHILS NFR BLD AUTO: 1 %
BILIRUB SERPL-MCNC: 0.3 MG/DL (ref 0–1.2)
BILIRUB UR QL STRIP: NEGATIVE
BUN SERPL-MCNC: 10 MG/DL (ref 6–20)
BUN/CREAT SERPL: 19 (ref 9–23)
CALCIUM SERPL-MCNC: 9.3 MG/DL (ref 8.7–10.2)
CASTS URNS QL MICRO: ABNORMAL /LPF
CHLORIDE SERPL-SCNC: 104 MMOL/L (ref 96–106)
CHOLEST SERPL-MCNC: 162 MG/DL (ref 100–199)
CLAM IGE QN: <0.1 KU/L
CO2 SERPL-SCNC: 24 MMOL/L (ref 20–29)
CODFISH IGE QN: <0.1 KU/L
COLOR UR: YELLOW
CONV CLASS DESCRIPTION: NORMAL
CORN IGE QN: <0.1 KU/L
COW MILK IGE QN: <0.1 KU/L
CREAT SERPL-MCNC: 0.52 MG/DL (ref 0.57–1)
CRYSTALS URNS MICRO: ABNORMAL
EGG WHITE IGE QN: <0.1 KU/L
ENDOMYSIAL ANTIBODY TITER IGA: NORMAL TITER
ENDOMYSIAL ANTIBODY TITER IGG: NORMAL TITER
EOSINOPHIL # BLD AUTO: 0.1 X10E3/UL (ref 0–0.4)
EOSINOPHIL NFR BLD AUTO: 1 %
EPI CELLS #/AREA URNS HPF: ABNORMAL /HPF (ref 0–10)
ERYTHROCYTE [DISTWIDTH] IN BLOOD BY AUTOMATED COUNT: 13.4 % (ref 11.7–15.4)
GAMMA GLOB SERPL ELPH-MCNC: 0.8 G/DL (ref 0.4–1.8)
GLIADIN PEPTIDE IGA SER-ACNC: 4 UNITS (ref 0–19)
GLIADIN PEPTIDE IGG SER-ACNC: <1 UNITS (ref 0–19)
GLOBULIN SER CALC-MCNC: 2 G/DL (ref 1.5–4.5)
GLOBULIN SER-MCNC: 2.6 G/DL (ref 2.2–3.9)
GLUCOSE SERPL-MCNC: 85 MG/DL (ref 65–99)
GLUCOSE UR QL: NEGATIVE
H PYLORI IGA SER-ACNC: <9 UNITS (ref 0–8.9)
H PYLORI IGG SER IA-ACNC: 0.16 INDEX VALUE (ref 0–0.79)
H PYLORI IGM SER-ACNC: <9 UNITS (ref 0–8.9)
HCT VFR BLD AUTO: 40.3 % (ref 34–46.6)
HDLC SERPL-MCNC: 46 MG/DL
HGB BLD-MCNC: 13.4 G/DL (ref 11.1–15.9)
HGB UR QL STRIP: NEGATIVE
IGA SERPL-MCNC: 179 MG/DL (ref 87–352)
IGG SERPL-MCNC: 861 MG/DL (ref 586–1602)
IGM SERPL-MCNC: 88 MG/DL (ref 26–217)
IMM GRANULOCYTES # BLD AUTO: 0 X10E3/UL (ref 0–0.1)
IMM GRANULOCYTES NFR BLD AUTO: 0 %
INTERPRETATION SERPL IEP-IMP: NORMAL
KAPPA LC FREE SER-MCNC: 12.7 MG/L (ref 3.3–19.4)
KAPPA LC FREE/LAMBDA FREE SER: 1.26 {RATIO} (ref 0.26–1.65)
KETONES UR QL STRIP: NEGATIVE
LABORATORY COMMENT REPORT: NORMAL
LAMBDA LC FREE SERPL-MCNC: 10.1 MG/L (ref 5.7–26.3)
LDLC SERPL CALC-MCNC: 93 MG/DL (ref 0–99)
LEUKOCYTE ESTERASE UR QL STRIP: NEGATIVE
LYMPHOCYTES # BLD AUTO: 2.1 X10E3/UL (ref 0.7–3.1)
LYMPHOCYTES NFR BLD AUTO: 22 %
M PROTEIN SERPL ELPH-MCNC: NORMAL G/DL
MCH RBC QN AUTO: 28.2 PG (ref 26.6–33)
MCHC RBC AUTO-ENTMCNC: 33.3 G/DL (ref 31.5–35.7)
MCV RBC AUTO: 85 FL (ref 79–97)
MICRO URNS: NORMAL
MICRO URNS: NORMAL
MONOCYTES # BLD AUTO: 0.7 X10E3/UL (ref 0.1–0.9)
MONOCYTES NFR BLD AUTO: 8 %
NEUTROPHILS # BLD AUTO: 6.2 X10E3/UL (ref 1.4–7)
NEUTROPHILS NFR BLD AUTO: 68 %
NITRITE UR QL STRIP: NEGATIVE
PEANUT IGE QN: <0.1 KU/L
PH UR STRIP: 5 [PH] (ref 5–7.5)
PLATELET # BLD AUTO: 298 X10E3/UL (ref 150–450)
POTASSIUM SERPL-SCNC: 4.4 MMOL/L (ref 3.5–5.2)
PROT SERPL-MCNC: 6.4 G/DL (ref 6–8.5)
PROT UR QL STRIP: NEGATIVE
RBC # BLD AUTO: 4.75 X10E6/UL (ref 3.77–5.28)
RBC #/AREA URNS HPF: ABNORMAL /HPF (ref 0–2)
SCALLOP IGE QN: <0.1 KU/L
SESAME SEED IGE QN: <0.1 KU/L
SHRIMP IGE QN: <0.1 KU/L
SODIUM SERPL-SCNC: 141 MMOL/L (ref 134–144)
SOYBEAN IGE QN: <0.1 KU/L
SP GR UR: 1.03 (ref 1–1.03)
T4 FREE SERPL-MCNC: 1.15 NG/DL (ref 0.82–1.77)
TRIGL SERPL-MCNC: 131 MG/DL (ref 0–149)
TSH SERPL DL<=0.005 MIU/L-ACNC: 0.77 UIU/ML (ref 0.45–4.5)
TTG IGA SER-ACNC: <2 U/ML (ref 0–3)
TTG IGG SER-ACNC: <2 U/ML (ref 0–5)
UNIDENT CRYS URNS QL MICRO: PRESENT
UROBILINOGEN UR STRIP-MCNC: 0.2 MG/DL (ref 0.2–1)
VIT B12 SERPL-MCNC: 254 PG/ML (ref 232–1245)
VLDLC SERPL CALC-MCNC: 23 MG/DL (ref 5–40)
WALNUT IGE QN: <0.1 KU/L
WBC # BLD AUTO: 9.3 X10E3/UL (ref 3.4–10.8)
WBC #/AREA URNS HPF: ABNORMAL /HPF (ref 0–5)
WHEAT IGE QN: <0.1 KU/L

## 2021-12-28 ENCOUNTER — OFFICE VISIT (OUTPATIENT)
Dept: OBSTETRICS AND GYNECOLOGY | Facility: CLINIC | Age: 36
End: 2021-12-28

## 2021-12-28 ENCOUNTER — PREP FOR SURGERY (OUTPATIENT)
Dept: OTHER | Facility: HOSPITAL | Age: 36
End: 2021-12-28

## 2021-12-28 VITALS
WEIGHT: 192 LBS | SYSTOLIC BLOOD PRESSURE: 112 MMHG | BODY MASS INDEX: 32.78 KG/M2 | DIASTOLIC BLOOD PRESSURE: 70 MMHG | HEIGHT: 64 IN

## 2021-12-28 DIAGNOSIS — Z90.79 HISTORY OF LEFT SALPINGO-OOPHORECTOMY: ICD-10-CM

## 2021-12-28 DIAGNOSIS — Z90.721 HISTORY OF LEFT SALPINGO-OOPHORECTOMY: ICD-10-CM

## 2021-12-28 DIAGNOSIS — Z90.710 H/O TOTAL HYSTERECTOMY: ICD-10-CM

## 2021-12-28 DIAGNOSIS — Z90.710 H/O VAGINAL HYSTERECTOMY: ICD-10-CM

## 2021-12-28 DIAGNOSIS — R10.2 CHRONIC PELVIC PAIN IN FEMALE: Primary | ICD-10-CM

## 2021-12-28 DIAGNOSIS — G89.29 CHRONIC PELVIC PAIN IN FEMALE: Primary | ICD-10-CM

## 2021-12-28 DIAGNOSIS — Z90.79 H/O UNILATERAL SALPINGECTOMY: ICD-10-CM

## 2021-12-28 DIAGNOSIS — R10.2 PELVIC PAIN: Primary | ICD-10-CM

## 2021-12-28 PROCEDURE — 99214 OFFICE O/P EST MOD 30 MIN: CPT | Performed by: OBSTETRICS & GYNECOLOGY

## 2021-12-28 RX ORDER — SODIUM CHLORIDE 0.9 % (FLUSH) 0.9 %
10 SYRINGE (ML) INJECTION AS NEEDED
Status: CANCELLED | OUTPATIENT
Start: 2021-12-28

## 2021-12-28 RX ORDER — SODIUM CHLORIDE 0.9 % (FLUSH) 0.9 %
3 SYRINGE (ML) INJECTION EVERY 12 HOURS SCHEDULED
Status: CANCELLED | OUTPATIENT
Start: 2021-12-28

## 2021-12-28 NOTE — PROGRESS NOTES
GYN Office Visit    Subjective   Chief Complaint   Patient presents with   • Follow-up     States that she has not been able to tell a difference since taking the medication, still having pain.     Laura Thomson is a 36 y.o. year old  presenting to be seen for follow-up pelvic pain.    No interval changes to history.  She reports compliance with OCPs, but no improvement in pain symptoms.  She continues to have daily pain along the right side.  Pain is daily with episodic flares, typically following activity.  Previous hysterectomy and LSO.  She does have hot flashes and night sweats. She does have pain with sex.  No clear cyclical pattern to her pain symptoms that she has noticed.  Recent vaginal cultures were negative.  Recently reassuring.    TVT sling placed  of this year.  No incontinence now.    OB Hx: 3 C-sections  Pap smear: Not sure, no history of cervical dysplasia, normal cervix on hysterectomy path specimen  Mammogram: Never  Colonoscopy: Never  DEXA Scan: Never    Past Medical History:   Diagnosis Date   • Abdominal pain, periumbilical    • Abdominal pain, RLQ (right lower quadrant)    • Acute pharyngitis    • Anxiety    • Appetite lost    • Chest pain    • Chest pain, atypical 2020   • Colon polyp 2017   • Depression     patient denies   • Diarrhea     Secondary to IBS   • Difficulty swallowing    • Dysphagia     Patient reported she is noticing that she gets choked easily   • Fatigue    • Fracture     RIGHT WRIST TWICE, LEFT HAND   • GERD (gastroesophageal reflux disease)    • H/O foot surgery 10/2020   • Hearing loss     No use of hearing aids   • History of bronchitis    • History of colonoscopy    • History of left salpingo-oophorectomy  2018   • History of ovarian cyst    • History of pneumonia    • Hyperthyroidism     hyperactive taken off medicine when pregnant.  Reported she was medication at one time, but none since pregnancy.    • Irritable bowel  syndrome    • Ovarian cyst, bilateral    • Seasonal allergies    • RITU (stress urinary incontinence, female) 2020   • TMJ (dislocation of temporomandibular joint)    • Upper respiratory infection    • Vomiting        Past Surgical History:   Procedure Laterality Date   •  SECTION  2008    DR NAVARRETE   •  SECTION  10/30/2015    DR VASQUEZ   •  SECTION  2010    DR ESPINOZA   • CHOLECYSTECTOMY     • COLONOSCOPY N/A 2017     COLONOSCOPY WITH COLD BIOPSY POLYPECTOMY; BIOPSIES , QUICK CLIP;  Surgeon: Perfecto Mcknight MD;  Location: [facility] ENDOSCOPY;  Service:    • DIAGNOSTIC LAPAROSCOPY  2005    DR JONES NAVA/cystectomy   • DIAGNOSTIC LAPAROSCOPY  2012    DR ESPINOZA/ELIJAH   • DIAGNOSTIC LAPAROSCOPY N/A 8/10/2017     DIAGNOSTIC LAPAROSCOPY, LEFT S&O;  Keren Espinoza MD;  Location: [facility] OR;  Service:    • ENDOSCOPY N/A 10/15/2019    Procedure: ESOPHAGOGASTRODUODENOSCOPY WITH BIOPSY AND ESOPHAGEAL DILATATION;  Surgeon: Perfecto Mcknight MD;  Location: [facility] ENDOSCOPY;  Service: Gastroenterology   • ENDOSCOPY N/A 10/11/2021    Procedure: ESOPHAGOGASTRODUODENOSCOPY with dilatation and biopsies;  Surgeon: Hieu Shultz MD;  Location: [facility] ENDOSCOPY;  Service: Gastroenterology;  Laterality: N/A;   • FOOT SURGERY Left     ligament and tendon repair   • HERNIA REPAIR  2019    abd   • MID-URETHRAL SLING WITH CYSTOSCOPY N/A 2021     MID-URETHRAL SLING WITH CYSTOSCOPY TVT EXACT;  Luc Alicea MD;  Location:  [facility] OR;  Service: Obstetrics/Gynecology;  Laterality: N/A;   • NISSEN FUNDOPLICATION  2013   • UPPER GASTROINTESTINAL ENDOSCOPY     • UPPER GASTROINTESTINAL ENDOSCOPY  2020   • VAGINAL HYSTERECTOMY N/A 2018    VAGINAL HYSTERECTOMY, BILATERAL SALPINGECTOMY;  Luc Alicea MD;  Location:  [facility] OR;  Service: Gynecology   • WISDOM TOOTH EXTRACTION         Family History   Problem Relation Age of Onset   • Coronary artery disease Mother    •  Diabetes Mother    • Hypertension Mother    • Kidney disease Father    • Skin cancer Father    • Stroke Maternal Grandfather    • Hypertension Maternal Grandfather    • Diabetes Maternal Grandfather    • Colon cancer Maternal Great-Grandmother    • Osteoporosis Neg Hx         Social History     Tobacco Use   • Smoking status: Never Smoker   • Smokeless tobacco: Never Used   Vaping Use   • Vaping Use: Never used   Substance Use Topics   • Alcohol use: No   • Drug use: No       (Not in a hospital admission)      Patient has no known allergies.    Current Outpatient Medications on File Prior to Visit   Medication Sig Dispense Refill   • acetaminophen (TYLENOL) 325 MG tablet Take 2 tablets by mouth Every 6 (Six) Hours. For 3 to 5 days postoperatively     • bisacodyl (DULCOLAX) 5 MG EC tablet Take as directed for colon prep 4 tablet 0   • buPROPion XL (Wellbutrin XL) 150 MG 24 hr tablet Take 1 tablet by mouth Daily. 90 tablet 1   • buPROPion XL (Wellbutrin XL) 300 MG 24 hr tablet Take 1 tablet by mouth Every Morning. 90 tablet 1   • colestipol (COLESTID) 1 g tablet Take 1 tablet by mouth 2 (Two) Times a Day. 60 tablet 2   • escitalopram (LEXAPRO) 10 MG tablet Take 1 tablet by mouth Daily. 90 tablet 1   • hydrOXYzine (ATARAX) 25 MG tablet Take 1 tablet by mouth Every 8 (Eight) Hours As Needed for Anxiety. 90 tablet 1   • ibuprofen (ADVIL,MOTRIN) 600 MG tablet      • lidocaine (XYLOCAINE) 5 % ointment Apply  topically to the appropriate area as directed Every Night. 35 g 1   • loratadine (Claritin) 10 MG tablet Take 1 tablet by mouth Daily. (Patient taking differently: Take 10 mg by mouth As Needed.) 30 tablet 1   • metoclopramide (Reglan) 5 MG tablet Take 1 tablet by mouth 4 (Four) Times a Day Before Meals & at Bedtime. 120 tablet 11   • norgestimate-ethinyl estradiol (ORTHO-CYCLEN) 0.25-35 MG-MCG per tablet Take 1 tablet by mouth Daily. 90 tablet 5   • ondansetron (ZOFRAN) 4 MG tablet Take 1 tablet by mouth Every 8  "(Eight) Hours As Needed for Nausea or Vomiting. 30 tablet 1   • oxyCODONE (ROXICODONE) 5 MG immediate release tablet      • pantoprazole (PROTONIX) 40 MG EC tablet 1 po daily in the am 30 minutes before breakfast 30 tablet 3   • polyethylene glycol (MiraLax) 17 GM/SCOOP powder Take as directed for colonoscopy prep 238 g 0   • promethazine (PHENERGAN) 25 MG tablet Take 1 tablet by mouth Every 6 (Six) Hours As Needed for Nausea or Vomiting. 30 tablet 0   • traMADol (ULTRAM) 50 MG tablet      • [DISCONTINUED] fluticasone (Flonase) 50 MCG/ACT nasal spray 2 sprays into the nostril(s) as directed by provider Daily. 15.8 mL 1     No current facility-administered medications on file prior to visit.       Social History    Tobacco Use      Smoking status: Never Smoker      Smokeless tobacco: Never Used         Objective   /70   Ht 162.6 cm (64\")   Wt 87.1 kg (192 lb)   LMP 06/26/2018   BMI 32.96 kg/m²     Physical Exam:  General Appearance: alert, pleasant, appears stated age, interactive and cooperative         Medical Decision Making:    I reviewed my most recent note, her vaginal culture results and ultrasound from today.    Assessment   Chronic pelvic pain  Right lower quadrant pain  Previous hysterectomy + LSO  Menopausal symptoms     Plan    No orders of the defined types were placed in this encounter.      Medication Management: None    Procedures Performed: None    We reviewed her situation in detail.  Given that she has not experienced improvement with medication, we will proceed with oophorectomy which she strongly desires.  Plan to proceed with diagnostic laparoscopy, possible lysis of adhesions, RSO and all other indicated procedures.  Risks for the procedure were reviewed today.  We discussed that she will need hormone replacement following surgery.  All questions answered.    Kem Sahni MD  Obstetrics and Gynecology  [facility]  "

## 2022-01-08 ENCOUNTER — APPOINTMENT (OUTPATIENT)
Dept: ULTRASOUND IMAGING | Facility: HOSPITAL | Age: 37
End: 2022-01-08

## 2022-01-08 ENCOUNTER — HOSPITAL ENCOUNTER (EMERGENCY)
Facility: HOSPITAL | Age: 37
Discharge: HOME | End: 2022-01-08
Attending: EMERGENCY MEDICINE | Admitting: EMERGENCY MEDICINE

## 2022-01-08 VITALS
SYSTOLIC BLOOD PRESSURE: 112 MMHG | BODY MASS INDEX: 34.87 KG/M2 | WEIGHT: 196.8 LBS | HEART RATE: 75 BPM | OXYGEN SATURATION: 98 % | TEMPERATURE: 98.1 F | RESPIRATION RATE: 16 BRPM | HEIGHT: 63 IN | DIASTOLIC BLOOD PRESSURE: 76 MMHG

## 2022-01-08 DIAGNOSIS — M79.662 PAIN OF LEFT CALF: Primary | ICD-10-CM

## 2022-01-08 PROCEDURE — 99282 EMERGENCY DEPT VISIT SF MDM: CPT

## 2022-01-08 PROCEDURE — 93971 EXTREMITY STUDY: CPT

## 2022-01-08 NOTE — ED PROVIDER NOTES
Subjective   Chief Complaint: Left calf pain  History of Present Illness: 36-year-old female comes in for evaluation of above complaint.  Dr. Alvarez performed left ankle surgery 2 months ago and she was in a boot initially and just transition to a ankle brace.  She states for about 2 weeks she had some pain in her left distal calf.  No other complaints.  No ankle pain.  No chest pain or shortness of breath.  No history of DVT.  Not on any anticoagulants or aspirin.  She states she is just here to rule out DVT.  Onset: 2 weeks ago  Timing: Persistent  Exacerbating / Alleviating factors: None  Associated symptoms: None      Nurses Notes reviewed and agree, including vitals, allergies, social history and prior medical history.          Review of Systems   Constitutional: Negative.    HENT: Negative.    Eyes: Negative.    Respiratory: Negative.    Cardiovascular: Negative.    Gastrointestinal: Negative.    Genitourinary: Negative.    Musculoskeletal:        Left calf pain   Skin: Negative.    Neurological: Negative.    Psychiatric/Behavioral: Negative.        Past Medical History:   Diagnosis Date   • Abdominal pain, periumbilical    • Abdominal pain, RLQ (right lower quadrant)    • Acute pharyngitis    • Anxiety    • Appetite lost    • Chest pain    • Chest pain, atypical 6/9/2020   • Colon polyp 02/17/2017   • Depression     patient denies   • Diarrhea     Secondary to IBS   • Difficulty swallowing    • Dysphagia     Patient reported she is noticing that she gets choked easily   • Fatigue    • Fracture     RIGHT WRIST TWICE, LEFT HAND   • GERD (gastroesophageal reflux disease)    • H/O foot surgery 10/2020   • Hearing loss     No use of hearing aids   • History of bronchitis    • History of colonoscopy    • History of left salpingo-oophorectomy  2017 August 5/30/2018   • History of ovarian cyst    • History of pneumonia    • Hyperthyroidism     hyperactive taken off medicine when pregnant.  Reported she was  medication at one time, but none since pregnancy.    • Irritable bowel syndrome    • Ovarian cyst, bilateral    • Seasonal allergies    • RITU (stress urinary incontinence, female) 2020   • TMJ (dislocation of temporomandibular joint)    • Upper respiratory infection    • Vomiting        No Known Allergies    Past Surgical History:   Procedure Laterality Date   •  SECTION  2008    DR NAVARRETE   •  SECTION  10/30/2015    DR VASQUEZ   •  SECTION  2010    DR ESPINOZA   • CHOLECYSTECTOMY     • COLONOSCOPY N/A 2017     COLONOSCOPY WITH COLD BIOPSY POLYPECTOMY; BIOPSIES , QUICK CLIP;  Surgeon: Perfecto Mcknight MD;  Location: [facility] ENDOSCOPY;  Service:    • DIAGNOSTIC LAPAROSCOPY  2005    DR JONES NAVA/cystectomy   • DIAGNOSTIC LAPAROSCOPY  2012    DR ESPINOZA/ELIJAH   • DIAGNOSTIC LAPAROSCOPY N/A 8/10/2017     DIAGNOSTIC LAPAROSCOPY, LEFT S&O;  Keren Espinoza MD;  Location: [facility] OR;  Service:    • ENDOSCOPY N/A 10/15/2019    Procedure: ESOPHAGOGASTRODUODENOSCOPY WITH BIOPSY AND ESOPHAGEAL DILATATION;  Surgeon: Perfecto Mcknight MD;  Location: [facility] ENDOSCOPY;  Service: Gastroenterology   • ENDOSCOPY N/A 10/11/2021    Procedure: ESOPHAGOGASTRODUODENOSCOPY with dilatation and biopsies;  Surgeon: Hieu Shultz MD;  Location: [facility] ENDOSCOPY;  Service: Gastroenterology;  Laterality: N/A;   • FOOT SURGERY Left     ligament and tendon repair   • HERNIA REPAIR  2019    abd   • MID-URETHRAL SLING WITH CYSTOSCOPY N/A 2021     MID-URETHRAL SLING WITH CYSTOSCOPY TVT EXACT;  Luc Alicea MD;  Location:  [facility] OR;  Service: Obstetrics/Gynecology;  Laterality: N/A;   • NISSEN FUNDOPLICATION  2013   • UPPER GASTROINTESTINAL ENDOSCOPY     • UPPER GASTROINTESTINAL ENDOSCOPY  2020   • VAGINAL HYSTERECTOMY N/A 2018    VAGINAL HYSTERECTOMY, BILATERAL SALPINGECTOMY;  Luc Alicea MD;  Location:  [facility] OR;  Service: Gynecology   • WISDOM TOOTH EXTRACTION  2000        Family History   Problem Relation Age of Onset   • Coronary artery disease Mother    • Diabetes Mother    • Hypertension Mother    • Kidney disease Father    • Skin cancer Father    • Stroke Maternal Grandfather    • Hypertension Maternal Grandfather    • Diabetes Maternal Grandfather    • Colon cancer Maternal Great-Grandmother    • Osteoporosis Neg Hx        Social History     Socioeconomic History   • Marital status:    Tobacco Use   • Smoking status: Never Smoker   • Smokeless tobacco: Never Used   Vaping Use   • Vaping Use: Never used   Substance and Sexual Activity   • Alcohol use: No   • Drug use: No   • Sexual activity: Yes     Partners: Male     Birth control/protection: Surgical           Objective   Physical Exam  Vitals and nursing note reviewed.   Constitutional:       General: She is not in acute distress.     Appearance: Normal appearance. She is normal weight. She is not ill-appearing, toxic-appearing or diaphoretic.   HENT:      Head: Normocephalic and atraumatic.   Eyes:      Extraocular Movements: Extraocular movements intact.   Cardiovascular:      Rate and Rhythm: Normal rate and regular rhythm.      Pulses: Normal pulses.   Pulmonary:      Effort: Pulmonary effort is normal.   Abdominal:      General: Abdomen is flat.   Musculoskeletal:         General: Normal range of motion.      Cervical back: Normal range of motion.      Left lower leg: Tenderness present. No swelling. No edema.      Left foot: Normal pulse.        Legs:    Skin:     General: Skin is warm and dry.      Capillary Refill: Capillary refill takes less than 2 seconds.      Findings: No rash.   Neurological:      General: No focal deficit present.      Mental Status: She is alert. Mental status is at baseline.   Psychiatric:         Mood and Affect: Mood normal.         Behavior: Behavior normal.         Procedures           ED Course                                                 MDM    Final diagnoses:   Pain of  left calf       ED Disposition  ED Disposition     ED Disposition Condition Comment    Discharge Stable           Maximino Whelan MD  [address] [place] [id number] [phone]      As needed         Medication List      Changed    loratadine 10 MG tablet  Commonly known as: Claritin  Take 1 tablet by mouth Daily.  What changed:   · when to take this  · reasons to take this             Eric Natarajan PA-C  01/08/22 1213

## 2022-01-08 NOTE — ED NOTES
Pt received discharge instructions and verbalized understanding; Breathing even and non labored with no signs of distress; AOx4; GCS 15; Pt ambulated off unit with steady gait     Amelie Becerra RN  01/08/22 [id number]

## 2022-01-17 ENCOUNTER — PRE-ADMISSION TESTING (OUTPATIENT)
Dept: PREADMISSION TESTING | Facility: HOSPITAL | Age: 37
End: 2022-01-17

## 2022-01-17 VITALS — HEIGHT: 63 IN | BODY MASS INDEX: 34.48 KG/M2 | WEIGHT: 194.6 LBS

## 2022-01-17 DIAGNOSIS — R10.2 PELVIC PAIN: ICD-10-CM

## 2022-01-17 DIAGNOSIS — Z90.710 H/O TOTAL HYSTERECTOMY: ICD-10-CM

## 2022-01-17 DIAGNOSIS — Z90.79 HISTORY OF LEFT SALPINGO-OOPHORECTOMY: ICD-10-CM

## 2022-01-17 DIAGNOSIS — Z90.721 HISTORY OF LEFT SALPINGO-OOPHORECTOMY: ICD-10-CM

## 2022-01-17 DIAGNOSIS — K21.9 GASTROESOPHAGEAL REFLUX DISEASE, UNSPECIFIED WHETHER ESOPHAGITIS PRESENT: ICD-10-CM

## 2022-01-17 LAB
ALBUMIN SERPL-MCNC: 4.5 G/DL (ref 3.5–5.2)
ALBUMIN/GLOB SERPL: 1.9 G/DL
ALP SERPL-CCNC: 67 U/L (ref 39–117)
ALT SERPL W P-5'-P-CCNC: 9 U/L (ref 1–33)
ANION GAP SERPL CALCULATED.3IONS-SCNC: 11.9 MMOL/L (ref 5–15)
AST SERPL-CCNC: 13 U/L (ref 1–32)
BASOPHILS # BLD AUTO: 0.07 10*3/MM3 (ref 0–0.2)
BASOPHILS NFR BLD AUTO: 0.7 % (ref 0–1.5)
BILIRUB SERPL-MCNC: 0.4 MG/DL (ref 0–1.2)
BILIRUB UR QL STRIP: NEGATIVE
BUN SERPL-MCNC: 10 MG/DL (ref 6–20)
BUN/CREAT SERPL: 15.4 (ref 7–25)
CALCIUM SPEC-SCNC: 8.9 MG/DL (ref 8.6–10.5)
CHLORIDE SERPL-SCNC: 103 MMOL/L (ref 98–107)
CLARITY UR: ABNORMAL
CO2 SERPL-SCNC: 24.1 MMOL/L (ref 22–29)
COLOR UR: YELLOW
CREAT SERPL-MCNC: 0.65 MG/DL (ref 0.57–1)
DEPRECATED RDW RBC AUTO: 42.4 FL (ref 37–54)
EOSINOPHIL # BLD AUTO: 0.07 10*3/MM3 (ref 0–0.4)
EOSINOPHIL NFR BLD AUTO: 0.7 % (ref 0.3–6.2)
ERYTHROCYTE [DISTWIDTH] IN BLOOD BY AUTOMATED COUNT: 13 % (ref 12.3–15.4)
GFR SERPL CREATININE-BSD FRML MDRD: 103 ML/MIN/1.73
GLOBULIN UR ELPH-MCNC: 2.4 GM/DL
GLUCOSE SERPL-MCNC: 96 MG/DL (ref 65–99)
GLUCOSE UR STRIP-MCNC: NEGATIVE MG/DL
HCT VFR BLD AUTO: 42.1 % (ref 34–46.6)
HGB BLD-MCNC: 13.2 G/DL (ref 12–15.9)
HGB UR QL STRIP.AUTO: NEGATIVE
IMM GRANULOCYTES # BLD AUTO: 0.04 10*3/MM3 (ref 0–0.05)
IMM GRANULOCYTES NFR BLD AUTO: 0.4 % (ref 0–0.5)
KETONES UR QL STRIP: NEGATIVE
LEUKOCYTE ESTERASE UR QL STRIP.AUTO: NEGATIVE
LYMPHOCYTES # BLD AUTO: 2.24 10*3/MM3 (ref 0.7–3.1)
LYMPHOCYTES NFR BLD AUTO: 22 % (ref 19.6–45.3)
MCH RBC QN AUTO: 27.8 PG (ref 26.6–33)
MCHC RBC AUTO-ENTMCNC: 31.4 G/DL (ref 31.5–35.7)
MCV RBC AUTO: 88.6 FL (ref 79–97)
MONOCYTES # BLD AUTO: 0.6 10*3/MM3 (ref 0.1–0.9)
MONOCYTES NFR BLD AUTO: 5.9 % (ref 5–12)
NEUTROPHILS NFR BLD AUTO: 7.18 10*3/MM3 (ref 1.7–7)
NEUTROPHILS NFR BLD AUTO: 70.3 % (ref 42.7–76)
NITRITE UR QL STRIP: NEGATIVE
NRBC BLD AUTO-RTO: 0 /100 WBC (ref 0–0.2)
PH UR STRIP.AUTO: <=5 [PH] (ref 5–8)
PLATELET # BLD AUTO: 286 10*3/MM3 (ref 140–450)
PMV BLD AUTO: 10.7 FL (ref 6–12)
POTASSIUM SERPL-SCNC: 3.9 MMOL/L (ref 3.5–5.2)
PROT SERPL-MCNC: 6.9 G/DL (ref 6–8.5)
PROT UR QL STRIP: NEGATIVE
RBC # BLD AUTO: 4.75 10*6/MM3 (ref 3.77–5.28)
SODIUM SERPL-SCNC: 139 MMOL/L (ref 136–145)
SP GR UR STRIP: 1.03 (ref 1–1.03)
UROBILINOGEN UR QL STRIP: ABNORMAL
WBC NRBC COR # BLD: 10.2 10*3/MM3 (ref 3.4–10.8)

## 2022-01-17 PROCEDURE — 81003 URINALYSIS AUTO W/O SCOPE: CPT

## 2022-01-17 PROCEDURE — U0004 COV-19 TEST NON-CDC HGH THRU: HCPCS

## 2022-01-17 PROCEDURE — 80053 COMPREHEN METABOLIC PANEL: CPT

## 2022-01-17 PROCEDURE — 36415 COLL VENOUS BLD VENIPUNCTURE: CPT

## 2022-01-17 PROCEDURE — 85025 COMPLETE CBC W/AUTO DIFF WBC: CPT

## 2022-01-17 PROCEDURE — C9803 HOPD COVID-19 SPEC COLLECT: HCPCS

## 2022-01-17 RX ORDER — PANTOPRAZOLE SODIUM 40 MG/1
TABLET, DELAYED RELEASE ORAL
Qty: 30 TABLET | Refills: 3 | Status: SHIPPED | OUTPATIENT
Start: 2022-01-17 | End: 2022-10-31

## 2022-01-17 NOTE — DISCHARGE INSTRUCTIONS
PAT PASS GIVEN/REVIEWED WITH PT.  VERBALIZED UNDERSTANDING OF THE FOLLOWING:  DO NOT EAT, DRINK, SMOKE, USE SMOKELESS TOBACCO OR CHEW GUM AFTER MIDNIGHT THE NIGHT BEFORE SURGERY.  THIS ALSO INCLUDES HARD CANDIES AND MINTS.    DO NOT SHAVE THE AREA TO BE OPERATED ON AT LEAST 48 HOURS PRIOR TO THE PROCEDURE.  DO NOT WEAR MAKE UP OR NAIL POLISH.  DO NOT LEAVE IN ANY PIERCING OR WEAR JEWELRY THE DAY OF SURGERY.      DO NOT USE ADHESIVES IF YOU WEAR DENTURES.    DO NOT WEAR EYE CONTACTS; BRING IN YOUR GLASSES.    ONLY TAKE MEDICATION THE MORNING OF YOUR PROCEDURE IF INSTRUCTED BY YOUR SURGEON WITH ENOUGH WATER TO SWALLOW THE MEDICATION.  IF YOUR SURGEON DID NOT SPECIFY WHICH MEDICATIONS TO TAKE, YOU WILL NEED TO CALL THEIR OFFICE FOR FURTHER INSTRUCTIONS AND DO AS THEY INSTRUCT.    LEAVE ANYTHING YOU CONSIDER VALUABLE AT HOME.    YOU WILL NEED TO ARRANGE FOR SOMEONE TO DRIVE YOU HOME AFTER SURGERY.  IT IS RECOMMENDED THAT YOU DO NOT DRIVE, WORK, DRINK ALCOHOL OR MAKE MAJOR DECISIONS FOR AT LEAST 24 HOURS AFTER YOUR PROCEDURE IS COMPLETE.      THE DAY OF YOUR PROCEDURE, BRING IN THE FOLLOWING IF APPLICABLE:   PICTURE ID AND INSURANCE/MEDICARE OR MEDICAID CARDS/ANY CO-PAY THAT MAY BE DUE   COPY OF ADVANCED DIRECTIVE/LIVING WILL/POWER OR    CPAP/BIPAP/INHALERS   SKIN PREP SHEET   YOUR PREADMISSION TESTING PASS (IF NOT A PHONE HISTORY)        Chlorhexidine wipes along with instruction/verification sheet given to pt.  Instructed pt to date, time, and initial the verification sheet once skin prep has been  completed, and to return to [facility] the day of the procedure.  Pt. Verbalizes understanding.      COVID self-quarantine instructions reviewed with the pt.  Verbalized understanding.

## 2022-01-18 LAB — SARS-COV-2 RNA NOSE QL NAA+PROBE: NOT DETECTED

## 2022-01-19 ENCOUNTER — ANESTHESIA EVENT (OUTPATIENT)
Dept: PERIOP | Facility: HOSPITAL | Age: 37
End: 2022-01-19

## 2022-01-19 ENCOUNTER — ANESTHESIA (OUTPATIENT)
Dept: PERIOP | Facility: HOSPITAL | Age: 37
End: 2022-01-19

## 2022-01-19 ENCOUNTER — HOSPITAL ENCOUNTER (OUTPATIENT)
Facility: HOSPITAL | Age: 37
Setting detail: HOSPITAL OUTPATIENT SURGERY
Discharge: HOME | End: 2022-01-19
Attending: OBSTETRICS & GYNECOLOGY | Admitting: OBSTETRICS & GYNECOLOGY

## 2022-01-19 ENCOUNTER — APPOINTMENT (OUTPATIENT)
Dept: PREADMISSION TESTING | Facility: HOSPITAL | Age: 37
End: 2022-01-19

## 2022-01-19 VITALS
DIASTOLIC BLOOD PRESSURE: 58 MMHG | HEART RATE: 66 BPM | SYSTOLIC BLOOD PRESSURE: 114 MMHG | TEMPERATURE: 99.6 F | OXYGEN SATURATION: 94 % | RESPIRATION RATE: 16 BRPM

## 2022-01-19 DIAGNOSIS — Z90.722 HX OF BSO (BILATERAL SALPINGO-OOPHORECTOMY): Primary | ICD-10-CM

## 2022-01-19 DIAGNOSIS — Z90.79 HX OF BSO (BILATERAL SALPINGO-OOPHORECTOMY): Primary | ICD-10-CM

## 2022-01-19 DIAGNOSIS — Z90.710 H/O TOTAL HYSTERECTOMY: ICD-10-CM

## 2022-01-19 DIAGNOSIS — Z90.721 HISTORY OF LEFT SALPINGO-OOPHORECTOMY: ICD-10-CM

## 2022-01-19 DIAGNOSIS — Z90.79 HISTORY OF LEFT SALPINGO-OOPHORECTOMY: ICD-10-CM

## 2022-01-19 DIAGNOSIS — R10.2 PELVIC PAIN: ICD-10-CM

## 2022-01-19 PROCEDURE — 25010000002 ONDANSETRON PER 1 MG: Performed by: NURSE ANESTHETIST, CERTIFIED REGISTERED

## 2022-01-19 PROCEDURE — 25010000002 DEXAMETHASONE PER 1 MG: Performed by: NURSE ANESTHETIST, CERTIFIED REGISTERED

## 2022-01-19 PROCEDURE — 25010000002 HYDROMORPHONE PER 4 MG: Performed by: NURSE ANESTHETIST, CERTIFIED REGISTERED

## 2022-01-19 PROCEDURE — 25010000002 HYDROMORPHONE 1 MG/ML SOLUTION: Performed by: NURSE ANESTHETIST, CERTIFIED REGISTERED

## 2022-01-19 PROCEDURE — 58661 LAPAROSCOPY REMOVE ADNEXA: CPT | Performed by: OBSTETRICS & GYNECOLOGY

## 2022-01-19 PROCEDURE — 25010000002 MIDAZOLAM PER 1MG: Performed by: NURSE ANESTHETIST, CERTIFIED REGISTERED

## 2022-01-19 PROCEDURE — S0260 H&P FOR SURGERY: HCPCS | Performed by: OBSTETRICS & GYNECOLOGY

## 2022-01-19 PROCEDURE — C1889 IMPLANT/INSERT DEVICE, NOC: HCPCS | Performed by: OBSTETRICS & GYNECOLOGY

## 2022-01-19 PROCEDURE — 25010000002 PROPOFOL 200 MG/20ML EMULSION: Performed by: NURSE ANESTHETIST, CERTIFIED REGISTERED

## 2022-01-19 PROCEDURE — 25010000002 HALOPERIDOL LACTATE PER 5 MG: Performed by: NURSE ANESTHETIST, CERTIFIED REGISTERED

## 2022-01-19 PROCEDURE — 94799 UNLISTED PULMONARY SVC/PX: CPT

## 2022-01-19 PROCEDURE — 25010000002 FENTANYL CITRATE (PF) 100 MCG/2ML SOLUTION: Performed by: NURSE ANESTHETIST, CERTIFIED REGISTERED

## 2022-01-19 PROCEDURE — 25010000002 KETOROLAC TROMETHAMINE PER 15 MG: Performed by: NURSE ANESTHETIST, CERTIFIED REGISTERED

## 2022-01-19 DEVICE — ABSORBABLE HEMOSTAT (OXIDIZED REGENERATED CELLULOSE, U.S.P.)
Type: IMPLANTABLE DEVICE | Site: ABDOMEN | Status: FUNCTIONAL
Brand: SURGICEL

## 2022-01-19 RX ORDER — ONDANSETRON 2 MG/ML
4 INJECTION INTRAMUSCULAR; INTRAVENOUS ONCE AS NEEDED
Status: DISCONTINUED | OUTPATIENT
Start: 2022-01-19 | End: 2022-01-19 | Stop reason: HOSPADM

## 2022-01-19 RX ORDER — SODIUM CHLORIDE 0.9 % (FLUSH) 0.9 %
3 SYRINGE (ML) INJECTION EVERY 12 HOURS SCHEDULED
Status: DISCONTINUED | OUTPATIENT
Start: 2022-01-19 | End: 2022-01-19 | Stop reason: HOSPADM

## 2022-01-19 RX ORDER — FAMOTIDINE 10 MG/ML
INJECTION, SOLUTION INTRAVENOUS
Status: COMPLETED
Start: 2022-01-19 | End: 2022-01-19

## 2022-01-19 RX ORDER — PROMETHAZINE HYDROCHLORIDE 25 MG/1
25 SUPPOSITORY RECTAL ONCE AS NEEDED
Status: DISCONTINUED | OUTPATIENT
Start: 2022-01-19 | End: 2022-01-19 | Stop reason: HOSPADM

## 2022-01-19 RX ORDER — SODIUM CHLORIDE 0.9 % (FLUSH) 0.9 %
10 SYRINGE (ML) INJECTION AS NEEDED
Status: DISCONTINUED | OUTPATIENT
Start: 2022-01-19 | End: 2022-01-19 | Stop reason: HOSPADM

## 2022-01-19 RX ORDER — SCOLOPAMINE TRANSDERMAL SYSTEM 1 MG/1
1 PATCH, EXTENDED RELEASE TRANSDERMAL
Status: DISCONTINUED | OUTPATIENT
Start: 2022-01-19 | End: 2022-01-19 | Stop reason: HOSPADM

## 2022-01-19 RX ORDER — MIDAZOLAM HYDROCHLORIDE 2 MG/2ML
INJECTION, SOLUTION INTRAMUSCULAR; INTRAVENOUS AS NEEDED
Status: DISCONTINUED | OUTPATIENT
Start: 2022-01-19 | End: 2022-01-19 | Stop reason: SURG

## 2022-01-19 RX ORDER — SODIUM CHLORIDE, SODIUM LACTATE, POTASSIUM CHLORIDE, CALCIUM CHLORIDE 600; 310; 30; 20 MG/100ML; MG/100ML; MG/100ML; MG/100ML
1000 INJECTION, SOLUTION INTRAVENOUS CONTINUOUS
Status: DISCONTINUED | OUTPATIENT
Start: 2022-01-19 | End: 2022-01-19 | Stop reason: HOSPADM

## 2022-01-19 RX ORDER — PROPOFOL 10 MG/ML
INJECTION, EMULSION INTRAVENOUS AS NEEDED
Status: DISCONTINUED | OUTPATIENT
Start: 2022-01-19 | End: 2022-01-19 | Stop reason: SURG

## 2022-01-19 RX ORDER — NEOSTIGMINE METHYLSULFATE 5 MG/5 ML
SYRINGE (ML) INTRAVENOUS AS NEEDED
Status: DISCONTINUED | OUTPATIENT
Start: 2022-01-19 | End: 2022-01-19 | Stop reason: SURG

## 2022-01-19 RX ORDER — LIDOCAINE HYDROCHLORIDE 20 MG/ML
INJECTION, SOLUTION INTRAVENOUS AS NEEDED
Status: DISCONTINUED | OUTPATIENT
Start: 2022-01-19 | End: 2022-01-19 | Stop reason: SURG

## 2022-01-19 RX ORDER — PROMETHAZINE HYDROCHLORIDE 25 MG/1
25 TABLET ORAL ONCE AS NEEDED
Status: DISCONTINUED | OUTPATIENT
Start: 2022-01-19 | End: 2022-01-19 | Stop reason: HOSPADM

## 2022-01-19 RX ORDER — HALOPERIDOL 5 MG/ML
INJECTION INTRAMUSCULAR AS NEEDED
Status: DISCONTINUED | OUTPATIENT
Start: 2022-01-19 | End: 2022-01-19 | Stop reason: SURG

## 2022-01-19 RX ORDER — OXYCODONE HYDROCHLORIDE 5 MG/1
5 TABLET ORAL EVERY 4 HOURS PRN
Qty: 10 TABLET | Refills: 0 | Status: SHIPPED | OUTPATIENT
Start: 2022-01-19 | End: 2022-02-07

## 2022-01-19 RX ORDER — ONDANSETRON 2 MG/ML
INJECTION INTRAMUSCULAR; INTRAVENOUS AS NEEDED
Status: DISCONTINUED | OUTPATIENT
Start: 2022-01-19 | End: 2022-01-19 | Stop reason: SURG

## 2022-01-19 RX ORDER — ACETAMINOPHEN 500 MG
1000 TABLET ORAL EVERY 8 HOURS PRN
Qty: 60 TABLET | Refills: 0 | Status: SHIPPED | OUTPATIENT
Start: 2022-01-19 | End: 2023-01-19

## 2022-01-19 RX ORDER — IPRATROPIUM BROMIDE AND ALBUTEROL SULFATE 2.5; .5 MG/3ML; MG/3ML
3 SOLUTION RESPIRATORY (INHALATION) ONCE AS NEEDED
Status: DISCONTINUED | OUTPATIENT
Start: 2022-01-19 | End: 2022-01-19 | Stop reason: HOSPADM

## 2022-01-19 RX ORDER — IBUPROFEN 600 MG/1
600 TABLET ORAL EVERY 6 HOURS PRN
Status: DISCONTINUED | OUTPATIENT
Start: 2022-01-19 | End: 2022-01-19 | Stop reason: HOSPADM

## 2022-01-19 RX ORDER — MAGNESIUM HYDROXIDE 1200 MG/15ML
LIQUID ORAL AS NEEDED
Status: DISCONTINUED | OUTPATIENT
Start: 2022-01-19 | End: 2022-01-19 | Stop reason: HOSPADM

## 2022-01-19 RX ORDER — KETOROLAC TROMETHAMINE 30 MG/ML
INJECTION, SOLUTION INTRAMUSCULAR; INTRAVENOUS AS NEEDED
Status: DISCONTINUED | OUTPATIENT
Start: 2022-01-19 | End: 2022-01-19 | Stop reason: SURG

## 2022-01-19 RX ORDER — FENTANYL CITRATE 50 UG/ML
INJECTION, SOLUTION INTRAMUSCULAR; INTRAVENOUS AS NEEDED
Status: DISCONTINUED | OUTPATIENT
Start: 2022-01-19 | End: 2022-01-19 | Stop reason: SURG

## 2022-01-19 RX ORDER — FAMOTIDINE 10 MG/ML
20 INJECTION, SOLUTION INTRAVENOUS 2 TIMES DAILY
Status: DISCONTINUED | OUTPATIENT
Start: 2022-01-19 | End: 2022-01-19 | Stop reason: HOSPADM

## 2022-01-19 RX ORDER — ACETAMINOPHEN 500 MG
1000 TABLET ORAL ONCE
Status: COMPLETED | OUTPATIENT
Start: 2022-01-19 | End: 2022-01-19

## 2022-01-19 RX ORDER — ROCURONIUM BROMIDE 10 MG/ML
INJECTION, SOLUTION INTRAVENOUS AS NEEDED
Status: DISCONTINUED | OUTPATIENT
Start: 2022-01-19 | End: 2022-01-19 | Stop reason: SURG

## 2022-01-19 RX ORDER — IBUPROFEN 800 MG/1
800 TABLET ORAL EVERY 8 HOURS PRN
Qty: 30 TABLET | Refills: 0 | Status: SHIPPED | OUTPATIENT
Start: 2022-01-19 | End: 2022-03-21

## 2022-01-19 RX ORDER — ESTRADIOL 0.07 MG/D
1 PATCH TRANSDERMAL WEEKLY
Qty: 4 EACH | Refills: 5 | Status: SHIPPED | OUTPATIENT
Start: 2022-01-19 | End: 2022-02-03

## 2022-01-19 RX ORDER — HYDROCODONE BITARTRATE AND ACETAMINOPHEN 5; 325 MG/1; MG/1
1 TABLET ORAL ONCE AS NEEDED
Status: DISCONTINUED | OUTPATIENT
Start: 2022-01-19 | End: 2022-01-19 | Stop reason: HOSPADM

## 2022-01-19 RX ORDER — MEPERIDINE HYDROCHLORIDE 25 MG/ML
12.5 INJECTION INTRAMUSCULAR; INTRAVENOUS; SUBCUTANEOUS
Status: DISCONTINUED | OUTPATIENT
Start: 2022-01-19 | End: 2022-01-19 | Stop reason: HOSPADM

## 2022-01-19 RX ORDER — DEXAMETHASONE SODIUM PHOSPHATE 4 MG/ML
INJECTION, SOLUTION INTRA-ARTICULAR; INTRALESIONAL; INTRAMUSCULAR; INTRAVENOUS; SOFT TISSUE AS NEEDED
Status: DISCONTINUED | OUTPATIENT
Start: 2022-01-19 | End: 2022-01-19 | Stop reason: SURG

## 2022-01-19 RX ORDER — HYDROMORPHONE HCL 110MG/55ML
PATIENT CONTROLLED ANALGESIA SYRINGE INTRAVENOUS AS NEEDED
Status: DISCONTINUED | OUTPATIENT
Start: 2022-01-19 | End: 2022-01-19 | Stop reason: SURG

## 2022-01-19 RX ADMIN — ACETAMINOPHEN 1000 MG: 500 TABLET ORAL at 08:31

## 2022-01-19 RX ADMIN — FENTANYL CITRATE 50 MCG: 50 INJECTION, SOLUTION INTRAMUSCULAR; INTRAVENOUS at 09:08

## 2022-01-19 RX ADMIN — FAMOTIDINE 20 MG: 10 INJECTION, SOLUTION INTRAVENOUS at 10:44

## 2022-01-19 RX ADMIN — SCOPALAMINE 1 PATCH: 1 PATCH, EXTENDED RELEASE TRANSDERMAL at 08:32

## 2022-01-19 RX ADMIN — MIDAZOLAM HYDROCHLORIDE 2 MG: 1 INJECTION, SOLUTION INTRAMUSCULAR; INTRAVENOUS at 08:57

## 2022-01-19 RX ADMIN — Medication 3 MG: at 10:01

## 2022-01-19 RX ADMIN — DEXAMETHASONE SODIUM PHOSPHATE 4 MG: 4 INJECTION, SOLUTION INTRAMUSCULAR; INTRAVENOUS at 09:04

## 2022-01-19 RX ADMIN — HALOPERIDOL LACTATE 0.5 MG: 5 INJECTION, SOLUTION INTRAMUSCULAR at 09:04

## 2022-01-19 RX ADMIN — HYDROMORPHONE HYDROCHLORIDE 0.5 MG: 2 INJECTION, SOLUTION INTRAMUSCULAR; INTRAVENOUS; SUBCUTANEOUS at 09:43

## 2022-01-19 RX ADMIN — SODIUM CHLORIDE, POTASSIUM CHLORIDE, SODIUM LACTATE AND CALCIUM CHLORIDE 1000 ML: 600; 310; 30; 20 INJECTION, SOLUTION INTRAVENOUS at 07:46

## 2022-01-19 RX ADMIN — LIDOCAINE HYDROCHLORIDE 60 MG: 20 INJECTION, SOLUTION INTRAVENOUS at 09:00

## 2022-01-19 RX ADMIN — ONDANSETRON 4 MG: 2 INJECTION INTRAMUSCULAR; INTRAVENOUS at 09:04

## 2022-01-19 RX ADMIN — SODIUM CHLORIDE, POTASSIUM CHLORIDE, SODIUM LACTATE AND CALCIUM CHLORIDE: 600; 310; 30; 20 INJECTION, SOLUTION INTRAVENOUS at 09:46

## 2022-01-19 RX ADMIN — HYDROMORPHONE HYDROCHLORIDE 0.5 MG: 1 INJECTION, SOLUTION INTRAMUSCULAR; INTRAVENOUS; SUBCUTANEOUS at 10:22

## 2022-01-19 RX ADMIN — ROCURONIUM BROMIDE 30 MG: 10 INJECTION INTRAVENOUS at 09:00

## 2022-01-19 RX ADMIN — ROCURONIUM BROMIDE 10 MG: 10 INJECTION INTRAVENOUS at 09:44

## 2022-01-19 RX ADMIN — KETOROLAC TROMETHAMINE 15 MG: 30 INJECTION, SOLUTION INTRAMUSCULAR at 09:56

## 2022-01-19 RX ADMIN — FENTANYL CITRATE 50 MCG: 50 INJECTION, SOLUTION INTRAMUSCULAR; INTRAVENOUS at 08:57

## 2022-01-19 RX ADMIN — PROPOFOL 200 MG: 10 INJECTION, EMULSION INTRAVENOUS at 09:00

## 2022-01-19 RX ADMIN — GLYCOPYRROLATE 0.4 MG: 0.2 INJECTION, SOLUTION INTRAMUSCULAR; INTRAVENOUS at 10:01

## 2022-01-19 RX ADMIN — FAMOTIDINE 20 MG: 10 INJECTION INTRAVENOUS at 10:44

## 2022-01-19 NOTE — ANESTHESIA PREPROCEDURE EVALUATION
Anesthesia Evaluation     Patient summary reviewed and Nursing notes reviewed   history of anesthetic complications: PONV  NPO Solid Status: > 8 hours  NPO Liquid Status: > 8 hours           Airway   Mallampati: I  TM distance: >3 FB  Neck ROM: full  no difficulty expected  Dental - normal exam     Pulmonary - normal exam   (+) recent URI,   Cardiovascular - negative cardio ROS and normal exam        Neuro/Psych  (+) psychiatric history Anxiety,     GI/Hepatic/Renal/Endo    (+)  GERD,  thyroid problem hyperthyroidism    ROS Comment: Hx of nissen fundo    No thyroid meds since pregnancy    Musculoskeletal (-) negative ROS    Abdominal    Substance History - negative use     OB/GYN negative ob/gyn ROS         Other - negative ROS         Other Comment: Hx of TMJ                  Anesthesia Plan    ASA 2     general     intravenous induction     Anesthetic plan, all risks, benefits, and alternatives have been provided, discussed and informed consent has been obtained with: patient.        CODE STATUS:

## 2022-01-19 NOTE — H&P
GYNECOLOGY HISTORY AND PHYSICAL    CHIEF COMPLAINT: Scheduled surgery    DIAGNOSIS:  Chronic pelvic pain  Right lower quadrant pain  Previous hysterectomy + LSO  Menopausal symptoms    ASSESSMENT/PLAN     36 y.o.  female who presents for scheduled diagnostic laparoscopy, ELIJAH, RSO and all other indicated procedures.    - Proceed to the OR for scheduled surgery  - Risks for the procedure reviewed. We discussed surgical menopause and the need for HRT moving forward.  - SCDs for DVT ppx  - Antibiotics: none    HISTORY OF PRESENT ILLNESS     36 y.o.  female who presents for the scheduled procedure listed above.    She has no complaints today and there are no interval changes to the history.    REVIEW OF SYSTEMS: A complete review of systems was performed and was specifically negative for headache, changes in vision, RUQ pain, shortness of breath, chest pain, lower extremity edema and dysuria.     HISTORY:  Obstetrical History:  OB History    Para Term  AB Living   3 3 3     3   SAB IAB Ectopic Molar Multiple Live Births             3      # Outcome Date GA Lbr Bishnu/2nd Weight Sex Delivery Anes PTL Lv   3 Term 10/30/15 37w0d  3430 g (7 lb 9 oz) M CS-Unspec  N LUMA   2 Term 04/27/10 39w0d  3345 g (7 lb 6 oz)  CS-Unspec  N LUMA   1 Term 08 40w0d  3402 g (7 lb 8 oz) F CS-Unspec  N LUMA       Past Medical History:  Past Medical History:   Diagnosis Date   • Abdominal pain, periumbilical    • Abdominal pain, RLQ (right lower quadrant)    • Acute pharyngitis    • Anxiety    • Appetite lost    • Chest pain ?    states had a heart monitor and was WNL.  States was told pain was attributed to acid reflux.     • Colon polyp 2017   • Depression     patient denies   • Diarrhea     Secondary to IBS   • Difficulty swallowing     food and liquids   • Fatigue    • Fracture     RIGHT WRIST TWICE, LEFT HAND   • GERD (gastroesophageal reflux disease)    • H/O foot surgery 10/2020    left x 2   •  Hearing loss     No use of hearing aids   • History of bronchitis    • History of colonoscopy    • History of exercise stress test 2018?   • History of left salpingo-oophorectomy  2018   • History of ovarian cyst    • History of pneumonia    • Hyperthyroidism     hyperactive taken off medicine when pregnant.  Reported she was medication at one time, but none since pregnancy.    • Irritable bowel syndrome    • Ovarian cyst, bilateral    • PONV (postoperative nausea and vomiting)    • Seasonal allergies    • Seasonal allergies    • RITU (stress urinary incontinence, female) 2020   • TMJ (dislocation of temporomandibular joint)    • Upper respiratory infection    • Vomiting    • Wears glasses        Past Surgical History:  Past Surgical History:   Procedure Laterality Date   •  SECTION  2008    DR NAVARRETE   •  SECTION  10/30/2015    DR VASQUEZ   •  SECTION  2010    DR ESPINOZA   • CHOLECYSTECTOMY     • COLONOSCOPY N/A 2017     COLONOSCOPY WITH COLD BIOPSY POLYPECTOMY; BIOPSIES , QUICK CLIP;  Surgeon: Perfecto Mcknight MD;  Location: [facility] ENDOSCOPY;  Service:    • DIAGNOSTIC LAPAROSCOPY  2005    DR JONES NAVA/cystectomy   • DIAGNOSTIC LAPAROSCOPY  2012    DR ESPINOZA/ELIJAH   • DIAGNOSTIC LAPAROSCOPY N/A 8/10/2017     DIAGNOSTIC LAPAROSCOPY, LEFT S&O;  Keren Espinoza MD;  Location: [facility] OR;  Service:    • ENDOSCOPY N/A 10/15/2019    Procedure: ESOPHAGOGASTRODUODENOSCOPY WITH BIOPSY AND ESOPHAGEAL DILATATION;  Surgeon: Perfecto Mcknight MD;  Location: [facility] ENDOSCOPY;  Service: Gastroenterology   • ENDOSCOPY N/A 10/11/2021    Procedure: ESOPHAGOGASTRODUODENOSCOPY with dilatation and biopsies;  Surgeon: Hieu Shultz MD;  Location: [facility] ENDOSCOPY;  Service: Gastroenterology;  Laterality: N/A;   • FOOT SURGERY Left     ligament and tendon repair   • HERNIA REPAIR  2019    abd   • MID-URETHRAL SLING WITH CYSTOSCOPY N/A 2021      MID-URETHRAL SLING WITH CYSTOSCOPY TVT EXACT;  Luc Alicea MD;  Location:  [facility] OR;  Service: Obstetrics/Gynecology;  Laterality: N/A;   • NISSEN FUNDOPLICATION  06/2013   • UPPER GASTROINTESTINAL ENDOSCOPY  2013   • UPPER GASTROINTESTINAL ENDOSCOPY  06/19/2020   • VAGINAL HYSTERECTOMY N/A 7/9/2018    VAGINAL HYSTERECTOMY, BILATERAL SALPINGECTOMY;  Luc Alicea MD;  Location:  [facility] OR;  Service: Gynecology   • WISDOM TOOTH EXTRACTION  2000       Social History:  Social History     Tobacco Use   • Smoking status: Never Smoker   • Smokeless tobacco: Never Used   Vaping Use   • Vaping Use: Never used   Substance Use Topics   • Alcohol use: No   • Drug use: No       Family History  Family History   Problem Relation Age of Onset   • Coronary artery disease Mother    • Diabetes Mother    • Hypertension Mother    • Kidney disease Father    • Skin cancer Father    • Stroke Maternal Grandfather    • Hypertension Maternal Grandfather    • Diabetes Maternal Grandfather    • Colon cancer Maternal Great-Grandmother    • Osteoporosis Neg Hx         Allergies: No Known Allergies    OBJECTIVE   VITALS:  Temp:  [98.6 °F (37 °C)] 98.6 °F (37 °C)  Heart Rate:  [73] 73  Resp:  [16] 16  BP: (112)/(75) 112/75    PHYSICAL EXAM:  GENERAL: NAD, alert  CHEST: No increased work of breathing, CTAB  CV: RRR, WWP  ABDOMEN: Soft, NTTP  EXTREMITIES:  Warm and well-perfused, nontender, nonedematous  NEURO: AAO x 3, CN II-XII grossly intact    No current facility-administered medications on file prior to encounter.     Current Outpatient Medications on File Prior to Encounter   Medication Sig Dispense Refill   • acetaminophen (TYLENOL) 325 MG tablet Take 2 tablets by mouth Every 6 (Six) Hours. For 3 to 5 days postoperatively     • bisacodyl (DULCOLAX) 5 MG EC tablet Take as directed for colon prep (Patient not taking: Reported on 1/17/2022) 4 tablet 0   • buPROPion XL (Wellbutrin XL) 150 MG 24 hr tablet Take 1 tablet by mouth Daily. 90 tablet 1    • buPROPion XL (Wellbutrin XL) 300 MG 24 hr tablet Take 1 tablet by mouth Every Morning. (Patient taking differently: Take 300 mg by mouth Every Morning. Total of 450 mg po daily (takes with a 150 mg tab)) 90 tablet 1   • colestipol (COLESTID) 1 g tablet Take 1 tablet by mouth 2 (Two) Times a Day. (Patient not taking: Reported on 1/17/2022) 60 tablet 2   • escitalopram (LEXAPRO) 10 MG tablet Take 1 tablet by mouth Daily. 90 tablet 1   • hydrOXYzine (ATARAX) 25 MG tablet Take 1 tablet by mouth Every 8 (Eight) Hours As Needed for Anxiety. 90 tablet 1   • ibuprofen (ADVIL,MOTRIN) 600 MG tablet Take 600 mg by mouth Every 6 (Six) Hours As Needed.     • lidocaine (XYLOCAINE) 5 % ointment Apply  topically to the appropriate area as directed Every Night. (Patient taking differently: Apply 1 application topically to the appropriate area as directed At Night As Needed.) 35 g 1   • loratadine (Claritin) 10 MG tablet Take 1 tablet by mouth Daily. (Patient taking differently: Take 10 mg by mouth As Needed.) 30 tablet 1   • metoclopramide (Reglan) 5 MG tablet Take 1 tablet by mouth 4 (Four) Times a Day Before Meals & at Bedtime. 120 tablet 11   • norgestimate-ethinyl estradiol (ORTHO-CYCLEN) 0.25-35 MG-MCG per tablet Take 1 tablet by mouth Daily. (Patient not taking: Reported on 1/17/2022) 90 tablet 5   • ondansetron (ZOFRAN) 4 MG tablet Take 1 tablet by mouth Every 8 (Eight) Hours As Needed for Nausea or Vomiting. 30 tablet 1   • oxyCODONE (ROXICODONE) 5 MG immediate release tablet Take 5 mg by mouth Every 8 (Eight) Hours As Needed.     • polyethylene glycol (MiraLax) 17 GM/SCOOP powder Take as directed for colonoscopy prep (Patient not taking: Reported on 1/17/2022) 238 g 0   • promethazine (PHENERGAN) 25 MG tablet Take 1 tablet by mouth Every 6 (Six) Hours As Needed for Nausea or Vomiting. 30 tablet 0   • traMADol (ULTRAM) 50 MG tablet Take 50 mg by mouth Every 6 (Six) Hours As Needed.     • [DISCONTINUED] fluticasone  (Flonase) 50 MCG/ACT nasal spray 2 sprays into the nostril(s) as directed by provider Daily. 15.8 mL 1       DIAGNOSTIC STUDIES:  Results from last 7 days   Lab Units 01/17/22  1424   WBC 10*3/mm3 10.20   HEMOGLOBIN g/dL 13.2   HEMATOCRIT % 42.1   PLATELETS 10*3/mm3 286   SODIUM mmol/L 139   POTASSIUM mmol/L 3.9   CHLORIDE mmol/L 103   CO2 mmol/L 24.1   BUN mg/dL 10   CREATININE mg/dL 0.65   GLUCOSE mg/dL 96   CALCIUM mg/dL 8.9   AST (SGOT) U/L 13   ALT (SGPT) U/L 9       No results found for this or any previous visit (from the past 24 hour(s)).    Kem Sahni MD  Obstetrics and Gynecology  [facility]

## 2022-01-19 NOTE — ANESTHESIA POSTPROCEDURE EVALUATION
Patient: Laura Thomson    Procedure Summary     Date: 01/19/22 Room / Location: [facility] OR 2 /  [facility]    Anesthesia Start: 0855 Anesthesia Stop: 1015    Procedure: DIAGNOSTIC LAPAROSCOPY,  LYSIS OF ADHESIONS, RIGHT SALPINGO-OOPHORECTOMY (N/A Abdomen) Diagnosis:       Pelvic pain      H/O total hysterectomy      History of left salpingo-oophorectomy      (Pelvic pain [R10.2])      (H/O total hysterectomy [Z90.710])      (History of left salpingo-oophorectomy [Z90.79, Z90.721])    Surgeons: Kem Sahni MD Provider: Lali Mcintosh CRNA    Anesthesia Type: general ASA Status: 2          Anesthesia Type: general    Vitals  Vitals Value Taken Time   /63 01/19/22 1120   Temp 98.5 °F (36.9 °C) 01/19/22 1110   Pulse 68 01/19/22 1120   Resp 18 01/19/22 1120   SpO2 94 % 01/19/22 1121   Vitals shown include unvalidated device data.        Post Anesthesia Care and Evaluation    Patient location during evaluation: PHASE II  Patient participation: complete - patient participated  Level of consciousness: awake and alert  Pain score: 2  Pain management: satisfactory to patient  Airway patency: patent  Anesthetic complications: No anesthetic complications  PONV Status: none  Cardiovascular status: acceptable and stable  Respiratory status: acceptable  Hydration status: acceptable

## 2022-01-19 NOTE — DISCHARGE INSTRUCTIONS
Pelvic Surgery  Home Care Instructions:  Dr. Kem Sahni      Thank you for choosing [facility]. Please let us know if you have questions or concerns or do not understand the information we give you. Always ask us to explain words or phrases you do not understand.    You have had pelvic surgery. Here are some basic guidelines to help you recover more quickly at home. Your doctor may give you more guidelines. If you have any questions after you go home, please call the numbers listed on the next page.    General Guidelines    1. You may resume normal daily activities.  2. Do not put anything in the vagina (no tampons or douching).    3.   Do not have sex until cleared by your physician.  4.   You may climb steps.  5. You may bathe or shower.  6. The only exercise you should do is walking. This is important for your recovery.  7. Do not drive while taking narcotics.  8. Take the medicines you were taking before surgery. Other medicines you need to take at home are:    Alternate Motrin and Tylenol around the clock. Take each every 8 hours in alternation so that you are getting one of the medications every 4 hours.   Roxicodone 5mg tab  - One tablet by mouth every 4-6 hours as needed for breakthrough pain   Metamucil + Colace daily to keep bowel movements soft        If you have questions about your medicines, let us know. Or, talk to your local pharmacist.    9. Surgery, lack of activity, pain medicines and iron pills tend to cause constipation. Take something every day to prevent constipation and straining.  Taking something like Metamucil® every day to increase fiber may prevent constipation. Follow the instructions on the container. If you need a gentle laxative, then something like Milk of Magnesia® or Correctol® work fairly well. You should not need to take laxatives often.    10. Call your doctor if you have:     a. Fever of 101 degrees or higher.  b. Heavy vaginal bleeding.  c. Increased  redness around your incision (cut); incision pulling apart; drainage (pus), bleeding, or a large amount of fluid from your incision.  d. Worsening nausea or pain.  e. Other problems or concern.    If you have any questions or concerns about your usual routines, please talk to the nurse or doctor.       To talk with your doctor at [facility], call:  Obstetrics and Gynecology Outpatient Clinic  Phone: [phone]      We appreciate the opportunity you have given us to participate in your care.    Please follow all post op instructions and follow up appointment time from your physician's office included in your discharge packet.  .   Pelvic rest is best described as not putting anything in your vagina. This includes tampons, douching, tub bathing or sexual activity.  No pushing, pulling, tugging,  heavy lifting, or strenuous activity.  No major decision making, driving, or drinking alcoholic beverages for 24 hours. ( due to the medications you have  received)  Always use good hand hygiene/washing techniques.  NO driving while taking pain medications.    * if you have an incision:  Check your incision area every day for signs of infection.   Check for:  * more redness, swelling, or pain  *more fluid or blood  *warmth  *pus or bad smell  To assist you in voiding:  Drink plenty of fluids  Listen to running water while attempting to void.    If you are unable to urinate and you have an uncomfortable urge to void or it has been   6 hours since you were discharged, return to the Emergency Room

## 2022-01-19 NOTE — OP NOTE
Edgar Thomson  : 1985  MRN: [id number]  CSN: [id number]  Date: 2022    Operative Report    Pre-op Diagnosis:  Chronic pelvic pain  Right lower quadrant pain  Previous hysterectomy + LSO   Post-op Diagnosis:  Same  Abdominal/pelvic adhesive disease   Procedure: Diagnostic laparoscopy  Lysis of adhesions  Right salpingo-oophorectomy   Surgeon:  Surgical assistant: JOY Berrios was responsible for performing the following activities: Retraction, Suction, Irrigation, Closing, Placing Dressing and Manipulation of laparoscopic instruments and their skilled assistance was necessary for the success of this case.     OR Staff: Circulator: Deanna Moss RN; Martita Jeffers RN  Scrub Person: Rojas Kee CSA; Martha Finley     Anesthesia: General   Estimated Blood Loss: 20 mls   ABx: none   Specimens:  Right ovary and fallopian tube remnant       Complications: None         Findings:   Significant abdominal and pelvic adhesive disease.  Omental and small bowel adhesions to the anterior abdominal wall, vaginal cuff and pelvic sidewalls.  Previous hysterectomy + LSO.  Right ovary and fallopian tube remnant with dense scarring to the omentum, small bowel and pelvic sidewall.    Description of Procedure:   After the appropriate time out and adequate dosing of her anesthesia, the patient was prepped and draped in the usual sterile fashion. The patient was placed in the dorsal lithotomy position using Jake stirrups. A caraballo catheter had been placed in the bladder for drainage during the procedure. A sponge stick was placed in the vagina for uterine manipulation. The manipulator was covered over with a sterile towel.     Attention was then turned to the abdomen. An infraumbilical skin incision was made with the scalpel and an 11 mm trocar was inserted under direct visualization without any complications. The abdomen was insufflated with CO2 being sure to keep the  pressure less than 15 mmHg. The patient was placed in Trendelenburg position. A 5 mm trocar was then inserted in the left lower quadrant with the aid of transillumination and after identification of the inferior epigastric vessels.  A similar process was repeated on the patient's right side to place a third trocar.  The ports were placed under direct visualization without any complications and all entry sites were inspected and found to be atraumatic. The abdomen and pelvis were then explored with the above findings noted.     The Enseal device was used to take down omental adhesions along the anterior abdominal wall.  This device was also used to take down adhesions surrounding the right adnexal structures.  Careful dissection was required so as not to damage the small bowel.  Once the right adnexal structures were accessible, the ureter on this side was identified.  The right IP ligament was then deviated medially with an atraumatic grasper and the Enseal device was used to cauterize and transect this structure.  The ovary and tubal remnant was then carefully peeled away from the sidewall and omental adhesions using both blunt dissection and the Enseal device.  Once the specimen was fully freed, an Endocatch bag was inserted through the 11 mm umbilical trocar.  The specimen was then placed into the bag and removed from the patient.  The specimen was labeled and sent to pathology for review.  The surgical plan was carefully inspected and hemostasis was achieved with the Enseal device and Surgicel.  The pelvis was irrigated and suctioned.    Adequate hemostasis was noted at all surgical sites.  All instruments were removed from the patient and all trocars were removed under direct visualization. The pneumoperitoneum was evacuated and the skin incisions were made hemostatic with the Bovie.  The fascia underlying the umbilical incision was closed with 0 Vicryl suture.  All skin incisions were closed with a 3-0  Monocryl suture in a subcuticular fashion. Steri-Strips were applied. All counts were correct at the end of the procedure.  There were no complications. The patient tolerated the procedure well.  She was taken to the postoperative recovery room in stable condition.    Kem Sahni MD  Obstetrics and Gynecology  [facility]

## 2022-01-19 NOTE — ANESTHESIA PROCEDURE NOTES
Airway  Urgency: elective    Date/Time: 1/19/2022 9:01 AM  Airway not difficult    General Information and Staff    Patient location during procedure: OR  CRNA: Lali Mcintosh CRNA    Indications and Patient Condition  Indications for airway management: airway protection    Preoxygenated: yes  MILS not maintained throughout  Mask difficulty assessment: 1 - vent by mask    Final Airway Details  Final airway type: endotracheal airway      Successful airway: ETT  Cuffed: yes   Successful intubation technique: direct laryngoscopy  Facilitating devices/methods: intubating stylet  Endotracheal tube insertion site: oral  Blade: Asher  Blade size: 3  ETT size (mm): 7.0  Cormack-Lehane Classification: grade I - full view of glottis  Placement verified by: chest auscultation and capnometry   Cuff volume (mL): 5  Measured from: lips  ETT/EBT  to lips (cm): 20  Number of attempts at approach: 1  Assessment: lips, teeth, and gum same as pre-op and atraumatic intubation    Additional Comments  Negative epigastric sounds, Breath sound equal bilaterally with symmetric chest rise and fall

## 2022-01-24 LAB
LAB AP CASE REPORT: NORMAL
PATH REPORT.FINAL DX SPEC: NORMAL

## 2022-02-02 ENCOUNTER — TELEPHONE (OUTPATIENT)
Dept: GASTROENTEROLOGY | Facility: CLINIC | Age: 37
End: 2022-02-02

## 2022-02-02 ENCOUNTER — TELEPHONE (OUTPATIENT)
Dept: OBSTETRICS AND GYNECOLOGY | Facility: CLINIC | Age: 37
End: 2022-02-02

## 2022-02-02 NOTE — TELEPHONE ENCOUNTER
Called and spoke with patient.  Procedure for 02/07/22 cancelled due to COVID increase.  We will call patient back to R/S.  Patient is aware.

## 2022-02-02 NOTE — TELEPHONE ENCOUNTER
Patient stated she is on the patch for hormones, suppose to be on from Wednesday-Wednesday but by Sunday/monday the patch isn't working. Having severe hot flashes. Wants to know what she can do.

## 2022-02-02 NOTE — TELEPHONE ENCOUNTER
----- Message from Sunitha Avila sent at 2/2/2022  2:10 PM EST -----  Regarding: DR FERNANDEZ'S PT  Patient needs to speak  with someone regarding her hormions.    Thanks,  .Sunitha

## 2022-02-03 DIAGNOSIS — Z79.890 HORMONE REPLACEMENT THERAPY (HRT): Primary | ICD-10-CM

## 2022-02-03 NOTE — TELEPHONE ENCOUNTER
I have increased the dose of her patch.  She should pick the new patch up today and replace the old one.  Thanks

## 2022-02-07 ENCOUNTER — OFFICE VISIT (OUTPATIENT)
Dept: INTERNAL MEDICINE | Facility: CLINIC | Age: 37
End: 2022-02-07

## 2022-02-07 VITALS
WEIGHT: 197 LBS | TEMPERATURE: 98 F | RESPIRATION RATE: 16 BRPM | HEIGHT: 63 IN | BODY MASS INDEX: 34.91 KG/M2 | OXYGEN SATURATION: 97 % | DIASTOLIC BLOOD PRESSURE: 62 MMHG | HEART RATE: 88 BPM | SYSTOLIC BLOOD PRESSURE: 98 MMHG

## 2022-02-07 DIAGNOSIS — F33.0 MILD EPISODE OF RECURRENT MAJOR DEPRESSIVE DISORDER: ICD-10-CM

## 2022-02-07 DIAGNOSIS — F41.9 ANXIETY: ICD-10-CM

## 2022-02-07 DIAGNOSIS — J30.2 SEASONAL ALLERGIC RHINITIS, UNSPECIFIED TRIGGER: ICD-10-CM

## 2022-02-07 DIAGNOSIS — R11.14 BILIOUS VOMITING WITH NAUSEA: Primary | ICD-10-CM

## 2022-02-07 PROCEDURE — 99214 OFFICE O/P EST MOD 30 MIN: CPT | Performed by: INTERNAL MEDICINE

## 2022-02-07 RX ORDER — BUPROPION HYDROCHLORIDE 300 MG/1
300 TABLET ORAL EVERY MORNING
Qty: 90 TABLET | Refills: 1 | Status: SHIPPED | OUTPATIENT
Start: 2022-02-07 | End: 2022-09-21

## 2022-02-07 RX ORDER — METOCLOPRAMIDE 10 MG/1
10 TABLET ORAL
Qty: 120 TABLET | Refills: 5 | Status: SHIPPED | OUTPATIENT
Start: 2022-02-07 | End: 2023-03-24 | Stop reason: HOSPADM

## 2022-02-07 RX ORDER — HYDROXYZINE HYDROCHLORIDE 25 MG/1
25 TABLET, FILM COATED ORAL EVERY 8 HOURS PRN
Qty: 90 TABLET | Refills: 1 | Status: SHIPPED | OUTPATIENT
Start: 2022-02-07 | End: 2022-09-21

## 2022-02-07 RX ORDER — BUPROPION HYDROCHLORIDE 150 MG/1
150 TABLET ORAL DAILY
Qty: 90 TABLET | Refills: 1 | Status: SHIPPED | OUTPATIENT
Start: 2022-02-07 | End: 2022-09-21

## 2022-02-07 RX ORDER — ESCITALOPRAM OXALATE 10 MG/1
10 TABLET ORAL DAILY
Qty: 90 TABLET | Refills: 1 | Status: SHIPPED | OUTPATIENT
Start: 2022-02-07 | End: 2022-09-21

## 2022-02-07 NOTE — PROGRESS NOTES
Subjective     Patient ID: Laura Thomson is a 36 y.o. female. Patient is here for management of multiple medical problems.     Chief Complaint   Patient presents with   • Gastroparesis     History of Present Illness     Gastroparesis.            The following portions of the patient's history were reviewed and updated as appropriate: allergies, current medications, past family history, past medical history, past social history, past surgical history and problem list.    Review of Systems   Constitutional: Negative for fatigue.   Respiratory: Negative for shortness of breath.    Psychiatric/Behavioral: Negative for self-injury and sleep disturbance. The patient is not nervous/anxious.    All other systems reviewed and are negative.      Current Outpatient Medications:   •  acetaminophen (TYLENOL) 500 MG tablet, Take 2 tablets by mouth Every 8 (Eight) Hours As Needed for Mild Pain ., Disp: 60 tablet, Rfl: 0  •  bisacodyl (DULCOLAX) 5 MG EC tablet, Take as directed for colon prep, Disp: 4 tablet, Rfl: 0  •  buPROPion XL (Wellbutrin XL) 150 MG 24 hr tablet, Take 1 tablet by mouth Daily., Disp: 90 tablet, Rfl: 1  •  buPROPion XL (Wellbutrin XL) 300 MG 24 hr tablet, Take 1 tablet by mouth Every Morning. Total of 450 mg po daily (takes with a 150 mg tab), Disp: 90 tablet, Rfl: 1  •  colestipol (COLESTID) 1 g tablet, Take 1 tablet by mouth 2 (Two) Times a Day., Disp: 60 tablet, Rfl: 2  •  escitalopram (LEXAPRO) 10 MG tablet, Take 1 tablet by mouth Daily., Disp: 90 tablet, Rfl: 1  •  estradiol (Climara) 0.1 MG/24HR patch, Place 1 patch on the skin as directed by provider 1 (One) Time Per Week., Disp: 4 each, Rfl: 11  •  hydrOXYzine (ATARAX) 25 MG tablet, Take 1 tablet by mouth Every 8 (Eight) Hours As Needed for Anxiety., Disp: 90 tablet, Rfl: 1  •  ibuprofen (ADVIL,MOTRIN) 800 MG tablet, Take 1 tablet by mouth Every 8 (Eight) Hours As Needed for Mild Pain, Disp: 30 tablet, Rfl: 0  •  lidocaine (XYLOCAINE) 5 %  "ointment, Apply  topically to the appropriate area as directed Every Night. (Patient taking differently: Apply 1 application topically to the appropriate area as directed At Night As Needed.), Disp: 35 g, Rfl: 1  •  loratadine (Claritin) 10 MG tablet, Take 1 tablet by mouth Daily. (Patient taking differently: Take 10 mg by mouth As Needed.), Disp: 30 tablet, Rfl: 1  •  metoclopramide (Reglan) 10 MG tablet, Take 1 tablet by mouth 4 (Four) Times a Day Before Meals & at Bedtime., Disp: 120 tablet, Rfl: 5  •  ondansetron (ZOFRAN) 4 MG tablet, Take 1 tablet by mouth Every 8 (Eight) Hours As Needed for Nausea or Vomiting., Disp: 30 tablet, Rfl: 1  •  pantoprazole (PROTONIX) 40 MG EC tablet, 1 po daily in the am 30 minutes before breakfast (Patient taking differently: Take 40 mg by mouth Daily. 1 po daily in the am 30 minutes before breakfast), Disp: 30 tablet, Rfl: 3  •  polyethylene glycol (MiraLax) 17 GM/SCOOP powder, Take as directed for colonoscopy prep, Disp: 238 g, Rfl: 0  •  promethazine (PHENERGAN) 25 MG tablet, Take 1 tablet by mouth Every 6 (Six) Hours As Needed for Nausea or Vomiting., Disp: 30 tablet, Rfl: 0    Objective      Blood pressure 98/62, pulse 88, temperature 98 °F (36.7 °C), resp. rate 16, height 160 cm (63\"), weight 89.4 kg (197 lb), last menstrual period 06/26/2018, SpO2 97 %, not currently breastfeeding.    Physical Exam     General Appearance:    Alert, cooperative, no distress, appears stated age   Head:    Normocephalic, without obvious abnormality, atraumatic   Eyes:    PERRL, conjunctiva/corneas clear, EOM's intact   Ears:    Normal TM's and external ear canals, both ears   Nose:   Nares normal, septum midline, mucosa normal, no drainage   or sinus tenderness   Throat:   Lips, mucosa, and tongue normal; teeth and gums normal   Neck:   Supple, symmetrical, trachea midline, no adenopathy;        thyroid:  No enlargement/tenderness/nodules; no carotid    bruit or JVD   Back:     Symmetric, no " curvature, ROM normal, no CVA tenderness   Lungs:     Clear to auscultation bilaterally, respirations unlabored   Chest wall:    No tenderness or deformity   Heart:    Regular rate and rhythm, S1 and S2 normal, no murmur,        rub or gallop   Abdomen:     Soft, non-tender, bowel sounds active all four quadrants,     no masses, no organomegaly   Extremities:   Extremities normal, atraumatic, no cyanosis or edema   Pulses:   2+ and symmetric all extremities   Skin:   Skin color, texture, turgor normal, no rashes or lesions   Lymph nodes:   Cervical, supraclavicular, and axillary nodes normal   Neurologic:   CNII-XII intact. Normal strength, sensation and reflexes       throughout      Results for orders placed or performed during the hospital encounter of 01/19/22   Tissue Pathology Exam    Specimen: Ovary, Right with Fallopian Tube; Tissue   Result Value Ref Range    Case Report       Surgical Pathology Report                         Case: [id number]                                  Authorizing Provider:  Kem Sahni MD    Collected:           01/19/2022 09:50 AM          Ordering Location:     [facility] OR Received:            01/19/2022 01:59 PM          Pathologist:           Ac Francois MD                                                          Specimen:    Ovary, Right with Fallopian Tube, right tube and ovary                                     Final Diagnosis       See Scanned Report             Assessment/Plan     gerd not better. Extensive eval neg.     Diagnoses and all orders for this visit:    1. Bilious vomiting with nausea (Primary)  -     CBC & Differential  -     Urinalysis With Microscopic - Urine, Clean Catch  -     Comprehensive Metabolic Panel  -     Alpha-Gal IgE Panel    2. Mild episode of recurrent major depressive disorder (HCC)  -     buPROPion XL (Wellbutrin XL) 300 MG 24 hr tablet; Take 1 tablet by mouth Every Morning. Total of 450 mg po daily (takes with a  150 mg tab)  Dispense: 90 tablet; Refill: 1  -     Mammo Screening Digital Tomosynthesis Bilateral With CAD; Future  -     Ambulatory Referral to Gastroenterology    3. Anxiety  -     escitalopram (LEXAPRO) 10 MG tablet; Take 1 tablet by mouth Daily.  Dispense: 90 tablet; Refill: 1  -     hydrOXYzine (ATARAX) 25 MG tablet; Take 1 tablet by mouth Every 8 (Eight) Hours As Needed for Anxiety.  Dispense: 90 tablet; Refill: 1    4. Seasonal allergic rhinitis, unspecified trigger  -     hydrOXYzine (ATARAX) 25 MG tablet; Take 1 tablet by mouth Every 8 (Eight) Hours As Needed for Anxiety.  Dispense: 90 tablet; Refill: 1    Other orders  -     buPROPion XL (Wellbutrin XL) 150 MG 24 hr tablet; Take 1 tablet by mouth Daily.  Dispense: 90 tablet; Refill: 1  -     metoclopramide (Reglan) 10 MG tablet; Take 1 tablet by mouth 4 (Four) Times a Day Before Meals & at Bedtime.  Dispense: 120 tablet; Refill: 5      Return in about 6 weeks (around 3/21/2022).          There are no Patient Instructions on file for this visit.     Maximino Whelan MD    Assessment/Plan

## 2022-02-09 ENCOUNTER — TELEPHONE (OUTPATIENT)
Dept: INTERNAL MEDICINE | Facility: CLINIC | Age: 37
End: 2022-02-09

## 2022-02-09 NOTE — TELEPHONE ENCOUNTER
Caller: Laura Thomson    Relationship to patient: Self    Best call back number: [phone]    Patient is needing: PATIENT WAS CALLING TO CHECK TO SEE IF REFERRAL FOR STOMACH SPECIALIST HAS BEEN SCHEDULED    PLEASE ADVISE

## 2022-02-11 LAB
ALBUMIN SERPL-MCNC: 4.2 G/DL (ref 3.5–5.2)
ALBUMIN/GLOB SERPL: 1.8 G/DL
ALP SERPL-CCNC: 72 U/L (ref 39–117)
ALPHA-GAL IGE QN: <0.1 KU/L
ALT SERPL-CCNC: 37 U/L (ref 1–33)
APPEARANCE UR: ABNORMAL
AST SERPL-CCNC: 35 U/L (ref 1–32)
BACTERIA #/AREA URNS HPF: NORMAL /HPF
BASOPHILS # BLD AUTO: 0.11 10*3/MM3 (ref 0–0.2)
BASOPHILS NFR BLD AUTO: 1.3 % (ref 0–1.5)
BEEF IGE QN: <0.1 KU/L
BILIRUB SERPL-MCNC: 0.3 MG/DL (ref 0–1.2)
BILIRUB UR QL STRIP: NEGATIVE
BUN SERPL-MCNC: 10 MG/DL (ref 6–20)
BUN/CREAT SERPL: 14.7 (ref 7–25)
CALCIUM SERPL-MCNC: 9.1 MG/DL (ref 8.6–10.5)
CHLORIDE SERPL-SCNC: 103 MMOL/L (ref 98–107)
CO2 SERPL-SCNC: 26.9 MMOL/L (ref 22–29)
COLOR UR: YELLOW
CONV CLASS DESCRIPTION: NORMAL
CREAT SERPL-MCNC: 0.68 MG/DL (ref 0.57–1)
CRYSTALS URNS MICRO: NORMAL
EOSINOPHIL # BLD AUTO: 0.32 10*3/MM3 (ref 0–0.4)
EOSINOPHIL NFR BLD AUTO: 3.8 % (ref 0.3–6.2)
EPI CELLS #/AREA URNS HPF: NORMAL /HPF
ERYTHROCYTE [DISTWIDTH] IN BLOOD BY AUTOMATED COUNT: 12.9 % (ref 12.3–15.4)
GLOBULIN SER CALC-MCNC: 2.4 GM/DL
GLUCOSE SERPL-MCNC: 105 MG/DL (ref 65–99)
GLUCOSE UR QL STRIP: NEGATIVE
HCT VFR BLD AUTO: 39.9 % (ref 34–46.6)
HGB BLD-MCNC: 13.1 G/DL (ref 12–15.9)
HGB UR QL STRIP: NEGATIVE
IGE SERPL-ACNC: 7 IU/ML (ref 6–495)
IMM GRANULOCYTES # BLD AUTO: 0.02 10*3/MM3 (ref 0–0.05)
IMM GRANULOCYTES NFR BLD AUTO: 0.2 % (ref 0–0.5)
KETONES UR QL STRIP: NEGATIVE
LAMB IGE QN: <0.1 KU/L
LEUKOCYTE ESTERASE UR QL STRIP: NEGATIVE
LYMPHOCYTES # BLD AUTO: 1.92 10*3/MM3 (ref 0.7–3.1)
LYMPHOCYTES NFR BLD AUTO: 22.6 % (ref 19.6–45.3)
MCH RBC QN AUTO: 28.1 PG (ref 26.6–33)
MCHC RBC AUTO-ENTMCNC: 32.8 G/DL (ref 31.5–35.7)
MCV RBC AUTO: 85.4 FL (ref 79–97)
MONOCYTES # BLD AUTO: 0.79 10*3/MM3 (ref 0.1–0.9)
MONOCYTES NFR BLD AUTO: 9.3 % (ref 5–12)
NEUTROPHILS # BLD AUTO: 5.35 10*3/MM3 (ref 1.7–7)
NEUTROPHILS NFR BLD AUTO: 62.8 % (ref 42.7–76)
NITRITE UR QL STRIP: NEGATIVE
NRBC BLD AUTO-RTO: 0 /100 WBC (ref 0–0.2)
PH UR STRIP: <=5 [PH] (ref 5–8)
PLATELET # BLD AUTO: 273 10*3/MM3 (ref 140–450)
PORK IGE QN: <0.1 KU/L
POTASSIUM SERPL-SCNC: 4.1 MMOL/L (ref 3.5–5.2)
PROT SERPL-MCNC: 6.6 G/DL (ref 6–8.5)
PROT UR QL STRIP: NEGATIVE
RBC # BLD AUTO: 4.67 10*6/MM3 (ref 3.77–5.28)
RBC #/AREA URNS HPF: NORMAL /HPF
SODIUM SERPL-SCNC: 140 MMOL/L (ref 136–145)
SP GR UR STRIP: ABNORMAL (ref 1–1.03)
UROBILINOGEN UR STRIP-MCNC: ABNORMAL MG/DL
WBC # BLD AUTO: 8.51 10*3/MM3 (ref 3.4–10.8)
WBC #/AREA URNS HPF: NORMAL /HPF

## 2022-03-21 ENCOUNTER — HOSPITAL ENCOUNTER (OUTPATIENT)
Dept: MAMMOGRAPHY | Facility: HOSPITAL | Age: 37
Discharge: HOME | End: 2022-03-21
Admitting: INTERNAL MEDICINE

## 2022-03-21 ENCOUNTER — OFFICE VISIT (OUTPATIENT)
Dept: INTERNAL MEDICINE | Facility: CLINIC | Age: 37
End: 2022-03-21

## 2022-03-21 VITALS
BODY MASS INDEX: 35.08 KG/M2 | DIASTOLIC BLOOD PRESSURE: 70 MMHG | RESPIRATION RATE: 17 BRPM | WEIGHT: 198 LBS | SYSTOLIC BLOOD PRESSURE: 104 MMHG | HEIGHT: 63 IN | OXYGEN SATURATION: 98 % | TEMPERATURE: 97.8 F | HEART RATE: 82 BPM

## 2022-03-21 DIAGNOSIS — K31.84 GASTROPARESIS: Primary | ICD-10-CM

## 2022-03-21 DIAGNOSIS — F33.0 MILD EPISODE OF RECURRENT MAJOR DEPRESSIVE DISORDER: ICD-10-CM

## 2022-03-21 DIAGNOSIS — K29.30 SUPERFICIAL GASTRITIS WITHOUT HEMORRHAGE, UNSPECIFIED CHRONICITY: ICD-10-CM

## 2022-03-21 DIAGNOSIS — G47.33 OSA (OBSTRUCTIVE SLEEP APNEA): ICD-10-CM

## 2022-03-21 PROCEDURE — 77063 BREAST TOMOSYNTHESIS BI: CPT

## 2022-03-21 PROCEDURE — 99214 OFFICE O/P EST MOD 30 MIN: CPT | Performed by: INTERNAL MEDICINE

## 2022-03-21 PROCEDURE — 77067 SCR MAMMO BI INCL CAD: CPT

## 2022-03-21 RX ORDER — SUCRALFATE 1 G/1
1 TABLET ORAL 4 TIMES DAILY
Qty: 120 TABLET | Refills: 3 | Status: SHIPPED | OUTPATIENT
Start: 2022-03-21 | End: 2022-10-19

## 2022-03-21 NOTE — PROGRESS NOTES
Subjective     Patient ID: Laura Walls Workman is a 36 y.o. female. Patient is here for management of multiple medical problems.     Chief Complaint   Patient presents with   • Nausea   • Allergies     History of Present Illness       Ab pain and bile build up and gerd.  regland not working.   Wt loss not going well.    1-2 sodas a week. Diet change not hleping.            The following portions of the patient's history were reviewed and updated as appropriate: allergies, current medications, past family history, past medical history, past social history, past surgical history and problem list.    Review of Systems    Current Outpatient Medications:   •  acetaminophen (TYLENOL) 500 MG tablet, Take 2 tablets by mouth Every 8 (Eight) Hours As Needed for Mild Pain ., Disp: 60 tablet, Rfl: 0  •  buPROPion XL (Wellbutrin XL) 150 MG 24 hr tablet, Take 1 tablet by mouth Daily., Disp: 90 tablet, Rfl: 1  •  buPROPion XL (Wellbutrin XL) 300 MG 24 hr tablet, Take 1 tablet by mouth Every Morning. Total of 450 mg po daily (takes with a 150 mg tab), Disp: 90 tablet, Rfl: 1  •  escitalopram (LEXAPRO) 10 MG tablet, Take 1 tablet by mouth Daily., Disp: 90 tablet, Rfl: 1  •  estradiol (Climara) 0.1 MG/24HR patch, Place 1 patch on the skin as directed by provider 1 (One) Time Per Week., Disp: 4 each, Rfl: 11  •  hydrOXYzine (ATARAX) 25 MG tablet, Take 1 tablet by mouth Every 8 (Eight) Hours As Needed for Anxiety., Disp: 90 tablet, Rfl: 1  •  lidocaine (XYLOCAINE) 5 % ointment, Apply  topically to the appropriate area as directed Every Night. (Patient taking differently: Apply 1 application topically to the appropriate area as directed At Night As Needed.), Disp: 35 g, Rfl: 1  •  loratadine (Claritin) 10 MG tablet, Take 1 tablet by mouth Daily. (Patient taking differently: Take 10 mg by mouth As Needed.), Disp: 30 tablet, Rfl: 1  •  metoclopramide (Reglan) 10 MG tablet, Take 1 tablet by mouth 4 (Four) Times a Day Before Meals & at  "Bedtime., Disp: 120 tablet, Rfl: 5  •  ondansetron (ZOFRAN) 4 MG tablet, Take 1 tablet by mouth Every 8 (Eight) Hours As Needed for Nausea or Vomiting., Disp: 30 tablet, Rfl: 1  •  pantoprazole (PROTONIX) 40 MG EC tablet, 1 po daily in the am 30 minutes before breakfast (Patient taking differently: Take 40 mg by mouth Daily. 1 po daily in the am 30 minutes before breakfast), Disp: 30 tablet, Rfl: 3  •  promethazine (PHENERGAN) 25 MG tablet, Take 1 tablet by mouth Every 6 (Six) Hours As Needed for Nausea or Vomiting., Disp: 30 tablet, Rfl: 0  •  sucralfate (Carafate) 1 g tablet, Take 1 tablet by mouth 4 (Four) Times a Day., Disp: 120 tablet, Rfl: 3    Objective      Blood pressure 104/70, pulse 82, temperature 97.8 °F (36.6 °C), resp. rate 17, height 160 cm (63\"), weight 89.8 kg (198 lb), last menstrual period 06/26/2018, SpO2 98 %, not currently breastfeeding.    Physical Exam     General Appearance:    Alert, cooperative, no distress, appears stated age   Head:    Normocephalic, without obvious abnormality, atraumatic   Eyes:    PERRL, conjunctiva/corneas clear, EOM's intact   Ears:    Normal TM's and external ear canals, both ears   Nose:   Nares normal, septum midline, mucosa normal, no drainage   or sinus tenderness   Throat:   Lips, mucosa, and tongue normal; teeth and gums normal   Neck:   Supple, symmetrical, trachea midline, no adenopathy;        thyroid:  No enlargement/tenderness/nodules; no carotid    bruit or JVD   Back:     Symmetric, no curvature, ROM normal, no CVA tenderness   Lungs:     Clear to auscultation bilaterally, respirations unlabored   Chest wall:    No tenderness or deformity   Heart:    Regular rate and rhythm, S1 and S2 normal, no murmur,        rub or gallop   Abdomen:     Soft, non-tender, bowel sounds active all four quadrants,     no masses, no organomegaly   Extremities:   Extremities normal, atraumatic, no cyanosis or edema   Pulses:   2+ and symmetric all extremities   Skin:   " Skin color, texture, turgor normal, no rashes or lesions   Lymph nodes:   Cervical, supraclavicular, and axillary nodes normal   Neurologic:   CNII-XII intact. Normal strength, sensation and reflexes       throughout      Results for orders placed or performed in visit on 02/07/22   Comprehensive Metabolic Panel    Specimen: Blood   Result Value Ref Range    Glucose 105 (H) 65 - 99 mg/dL    BUN 10 6 - 20 mg/dL    Creatinine 0.68 0.57 - 1.00 mg/dL    eGFR Non African Am 97 >60 mL/min/1.73    eGFR African Am 118 >60 mL/min/1.73    BUN/Creatinine Ratio 14.7 7.0 - 25.0    Sodium 140 136 - 145 mmol/L    Potassium 4.1 3.5 - 5.2 mmol/L    Chloride 103 98 - 107 mmol/L    Total CO2 26.9 22.0 - 29.0 mmol/L    Calcium 9.1 8.6 - 10.5 mg/dL    Total Protein 6.6 6.0 - 8.5 g/dL    Albumin 4.20 3.50 - 5.20 g/dL    Globulin 2.4 gm/dL    A/G Ratio 1.8 g/dL    Total Bilirubin 0.3 0.0 - 1.2 mg/dL    Alkaline Phosphatase 72 39 - 117 U/L    AST (SGOT) 35 (H) 1 - 32 U/L    ALT (SGPT) 37 (H) 1 - 33 U/L   Alpha-Gal IgE Panel    Specimen: Blood   Result Value Ref Range    Class Description Comment     IgE 7 6 - 495 IU/mL    [id number] Alpha-Gal <0.10 Class 0 kU/L    Beef <0.10 Class 0 kU/L    Pork <0.10 Class 0 kU/L    Lamb <0.10 Class 0 kU/L   Microscopic Examination -   Result Value Ref Range    WBC, UA Comment /HPF    RBC, UA Comment /HPF    Epithelial Cells (non renal) Comment /HPF    Crystal Type Comment     Bacteria, UA Comment None Seen /HPF   CBC & Differential    Specimen: Blood   Result Value Ref Range    WBC 8.51 3.40 - 10.80 10*3/mm3    RBC 4.67 3.77 - 5.28 10*6/mm3    Hemoglobin 13.1 12.0 - 15.9 g/dL    Hematocrit 39.9 34.0 - 46.6 %    MCV 85.4 79.0 - 97.0 fL    MCH 28.1 26.6 - 33.0 pg    MCHC 32.8 31.5 - 35.7 g/dL    RDW 12.9 12.3 - 15.4 %    Platelets 273 140 - 450 10*3/mm3    Neutrophil Rel % 62.8 42.7 - 76.0 %    Lymphocyte Rel % 22.6 19.6 - 45.3 %    Monocyte Rel % 9.3 5.0 - 12.0 %    Eosinophil Rel % 3.8 0.3 - 6.2 %     Basophil Rel % 1.3 0.0 - 1.5 %    Neutrophils Absolute 5.35 1.70 - 7.00 10*3/mm3    Lymphocytes Absolute 1.92 0.70 - 3.10 10*3/mm3    Monocytes Absolute 0.79 0.10 - 0.90 10*3/mm3    Eosinophils Absolute 0.32 0.00 - 0.40 10*3/mm3    Basophils Absolute 0.11 0.00 - 0.20 10*3/mm3    Immature Granulocyte Rel % 0.2 0.0 - 0.5 %    Immature Grans Absolute 0.02 0.00 - 0.05 10*3/mm3    nRBC 0.0 0.0 - 0.2 /100 WBC   Urinalysis With Microscopic - Urine, Clean Catch    Specimen: Urine, Clean Catch   Result Value Ref Range    Specific Gravity, UA Comment 1.005 - 1.030    pH, UA <=5.0 5.0 - 8.0    Color, UA Yellow     Appearance, UA Turbid (A) Clear    Leukocytes, UA Negative Negative    Protein Negative Negative    Glucose, UA Negative Negative    Ketones Negative Negative    Blood, UA Negative Negative    Bilirubin, UA Negative Negative    Urobilinogen, UA Comment     Nitrite, UA Negative Negative         Assessment/Plan   Weight up and lft's up.    Labs other wise ok.    Chicken bisquet from [employer]. This am.  Last night baked chicken. Only ate one    Saturday. Roast and potatoes. 2-3 pm.  Not eating that am.   Had 4 slices and small portion.     Wt up.   Not sure what is going on.    Drinking.   Spring water from [employer].   Will change water source.      Poor sleep.    Stomach on fire and it hurts.    Had nissles fundoplication.       + snoring. Tired all the time. Poor sleep. eval for ron causing all her problems that are gi related.                  Diagnoses and all orders for this visit:    1. Gastroparesis (Primary)    2. Superficial gastritis without hemorrhage, unspecified chronicity    3. RON (obstructive sleep apnea)  -     Home Sleep Study  -     Ambulatory Referral to Neurology    Other orders  -     sucralfate (Carafate) 1 g tablet; Take 1 tablet by mouth 4 (Four) Times a Day.  Dispense: 120 tablet; Refill: 3      Return in about 6 weeks (around 5/2/2022).          There are no Patient Instructions on file for  this visit.     Maximino Whelan MD    Assessment/Plan

## 2022-03-25 ENCOUNTER — TELEPHONE (OUTPATIENT)
Dept: GASTROENTEROLOGY | Facility: CLINIC | Age: 37
End: 2022-03-25

## 2022-03-25 NOTE — TELEPHONE ENCOUNTER
CALLED PATIENT TO SCHEDULE COLONOSCOPY. PATIENT STATES THAT SHE DOES NOT WANT TO R/S AT THIS TIME.

## 2022-03-30 ENCOUNTER — TRANSCRIBE ORDERS (OUTPATIENT)
Dept: ADMINISTRATIVE | Facility: HOSPITAL | Age: 37
End: 2022-03-30

## 2022-03-30 DIAGNOSIS — R19.7 DIARRHEA, UNSPECIFIED TYPE: ICD-10-CM

## 2022-03-30 DIAGNOSIS — R10.13 ABDOMINAL PAIN, EPIGASTRIC: ICD-10-CM

## 2022-03-30 DIAGNOSIS — R10.84 ABDOMINAL PAIN, GENERALIZED: Primary | ICD-10-CM

## 2022-03-30 DIAGNOSIS — R10.13 ABDOMINAL PAIN, EPIGASTRIC: Primary | ICD-10-CM

## 2022-03-30 DIAGNOSIS — R13.10 DYSPHAGIA, UNSPECIFIED TYPE: ICD-10-CM

## 2022-03-30 DIAGNOSIS — R11.0 NAUSEA: ICD-10-CM

## 2022-03-31 LAB

## 2022-03-31 PROCEDURE — 87338 HPYLORI STOOL AG IA: CPT | Performed by: NURSE PRACTITIONER

## 2022-03-31 PROCEDURE — 82653 EL-1 FECAL QUANTITATIVE: CPT | Performed by: NURSE PRACTITIONER

## 2022-03-31 PROCEDURE — 87209 SMEAR COMPLEX STAIN: CPT | Performed by: NURSE PRACTITIONER

## 2022-03-31 PROCEDURE — 83993 ASSAY FOR CALPROTECTIN FECAL: CPT | Performed by: NURSE PRACTITIONER

## 2022-03-31 PROCEDURE — 87177 OVA AND PARASITES SMEARS: CPT | Performed by: NURSE PRACTITIONER

## 2022-03-31 PROCEDURE — 0097U: CPT | Performed by: NURSE PRACTITIONER

## 2022-04-04 LAB — H PYLORI AG STL QL IA: NEGATIVE

## 2022-04-05 LAB — ELASTASE PANC STL-MCNT: >500 UG ELAST./G

## 2022-04-07 LAB
O+P SPEC MICRO: NORMAL
O+P STL CONC: NORMAL

## 2022-04-08 ENCOUNTER — APPOINTMENT (OUTPATIENT)
Dept: GENERAL RADIOLOGY | Facility: HOSPITAL | Age: 37
End: 2022-04-08

## 2022-04-08 LAB — CALPROTECTIN STL-MCNT: <16 UG/G (ref 0–120)

## 2022-04-12 ENCOUNTER — APPOINTMENT (OUTPATIENT)
Dept: NUCLEAR MEDICINE | Facility: HOSPITAL | Age: 37
End: 2022-04-12

## 2022-04-13 ENCOUNTER — HOSPITAL ENCOUNTER (OUTPATIENT)
Dept: ULTRASOUND IMAGING | Facility: HOSPITAL | Age: 37
Discharge: HOME | End: 2022-04-13

## 2022-04-13 ENCOUNTER — TRANSCRIBE ORDERS (OUTPATIENT)
Dept: LAB | Facility: HOSPITAL | Age: 37
End: 2022-04-13

## 2022-04-13 ENCOUNTER — LAB (OUTPATIENT)
Dept: LAB | Facility: HOSPITAL | Age: 37
End: 2022-04-13

## 2022-04-13 ENCOUNTER — HOSPITAL ENCOUNTER (OUTPATIENT)
Dept: GENERAL RADIOLOGY | Facility: HOSPITAL | Age: 37
Discharge: HOME | End: 2022-04-13

## 2022-04-13 DIAGNOSIS — R10.13 ABDOMINAL PAIN, EPIGASTRIC: ICD-10-CM

## 2022-04-13 DIAGNOSIS — R13.10 DYSPHAGIA, UNSPECIFIED TYPE: ICD-10-CM

## 2022-04-13 DIAGNOSIS — R11.0 NAUSEA: ICD-10-CM

## 2022-04-13 DIAGNOSIS — R10.84 ABDOMINAL PAIN, GENERALIZED: ICD-10-CM

## 2022-04-13 DIAGNOSIS — R19.7 DIARRHEA, UNSPECIFIED TYPE: ICD-10-CM

## 2022-04-13 DIAGNOSIS — Z01.818 PRE-OPERATIVE CLEARANCE: ICD-10-CM

## 2022-04-13 DIAGNOSIS — Z01.818 PRE-OPERATIVE CLEARANCE: Primary | ICD-10-CM

## 2022-04-13 LAB
FLUAV RNA RESP QL NAA+PROBE: NOT DETECTED
FLUBV RNA RESP QL NAA+PROBE: NOT DETECTED
SARS-COV-2 RNA RESP QL NAA+PROBE: NOT DETECTED

## 2022-04-13 PROCEDURE — 87636 SARSCOV2 & INF A&B AMP PRB: CPT

## 2022-04-13 PROCEDURE — 76705 ECHO EXAM OF ABDOMEN: CPT

## 2022-04-13 PROCEDURE — 74220 X-RAY XM ESOPHAGUS 1CNTRST: CPT

## 2022-04-13 PROCEDURE — C9803 HOPD COVID-19 SPEC COLLECT: HCPCS

## 2022-04-18 ENCOUNTER — HOSPITAL ENCOUNTER (OUTPATIENT)
Dept: NUCLEAR MEDICINE | Facility: HOSPITAL | Age: 37
Discharge: HOME | End: 2022-04-18

## 2022-04-18 DIAGNOSIS — R19.7 DIARRHEA, UNSPECIFIED TYPE: ICD-10-CM

## 2022-04-18 DIAGNOSIS — R11.0 NAUSEA: ICD-10-CM

## 2022-04-18 DIAGNOSIS — R10.84 ABDOMINAL PAIN, GENERALIZED: ICD-10-CM

## 2022-04-18 DIAGNOSIS — R10.13 ABDOMINAL PAIN, EPIGASTRIC: ICD-10-CM

## 2022-04-18 PROCEDURE — 0 TECHNETIUM SULFUR COLLOID: Performed by: NURSE PRACTITIONER

## 2022-04-18 PROCEDURE — 78264 GASTRIC EMPTYING IMG STUDY: CPT

## 2022-04-18 PROCEDURE — A9541 TC99M SULFUR COLLOID: HCPCS | Performed by: NURSE PRACTITIONER

## 2022-04-18 RX ADMIN — TECHNETIUM TC 99M SULFUR COLLOID 1 DOSE: KIT at 08:23

## 2022-04-23 ENCOUNTER — TELEPHONE (OUTPATIENT)
Dept: URGENT CARE | Facility: CLINIC | Age: 37
End: 2022-04-23

## 2022-04-23 NOTE — TELEPHONE ENCOUNTER
Pt was seen yesterday for strep throat. She was given an injection of antibiotics. She called today requesting an additional prescription for oral antibiotics. She states that she is feeling worse and believes she needs oral rx as well.

## 2022-04-23 NOTE — TELEPHONE ENCOUNTER
Review of patient chart reveals that she received the correct dose of penicillin G IM yesterday for treatment of her strep infection.  She does not need an additional oral antibiotic for treatment.  She is advised to use ibuprofen if not contraindicated by her primary care.

## 2022-04-29 ENCOUNTER — TELEPHONE (OUTPATIENT)
Dept: INTERNAL MEDICINE | Facility: CLINIC | Age: 37
End: 2022-04-29

## 2022-04-29 NOTE — TELEPHONE ENCOUNTER
Caller: Laura Thomson    Relationship: Self    Best call back number: [phone]    What medication are you requesting: ANTIBIOTICS     What are your current symptoms: ITCHING AND DISCOMFORT    How long have you been experiencing symptoms:     Have you had these symptoms before:    [] Yes  [x] No    Have you been treated for these symptoms before:   [] Yes  [x] No    If a prescription is needed, what is your preferred pharmacy and phone number: [facility] [address] [place] [address] [phone] [facility] [phone]      Additional notes:

## 2022-05-02 NOTE — TELEPHONE ENCOUNTER
Called patient she states she had gone to urgent care last week for strep throat and was given a penicillin shot and patient states she is having symptoms of a yeast infection and is requesting some diflucan if possible.

## 2022-05-03 RX ORDER — FLUCONAZOLE 150 MG/1
150 TABLET ORAL ONCE
Qty: 1 TABLET | Refills: 0 | Status: SHIPPED | OUTPATIENT
Start: 2022-05-03 | End: 2022-05-03

## 2022-05-26 ENCOUNTER — TRANSCRIBE ORDERS (OUTPATIENT)
Dept: LAB | Facility: HOSPITAL | Age: 37
End: 2022-05-26

## 2022-05-26 DIAGNOSIS — Z01.818 OTHER SPECIFIED PRE-OPERATIVE EXAMINATION: Primary | ICD-10-CM

## 2022-05-27 ENCOUNTER — LAB (OUTPATIENT)
Dept: LAB | Facility: HOSPITAL | Age: 37
End: 2022-05-27

## 2022-05-27 DIAGNOSIS — Z01.818 OTHER SPECIFIED PRE-OPERATIVE EXAMINATION: ICD-10-CM

## 2022-05-27 PROCEDURE — C9803 HOPD COVID-19 SPEC COLLECT: HCPCS

## 2022-05-27 PROCEDURE — U0004 COV-19 TEST NON-CDC HGH THRU: HCPCS

## 2022-05-28 LAB — SARS-COV-2 RNA NOSE QL NAA+PROBE: NOT DETECTED

## 2022-06-05 ENCOUNTER — HOSPITAL ENCOUNTER (EMERGENCY)
Facility: HOSPITAL | Age: 37
Discharge: HOME | End: 2022-06-05
Attending: EMERGENCY MEDICINE | Admitting: EMERGENCY MEDICINE

## 2022-06-05 ENCOUNTER — APPOINTMENT (OUTPATIENT)
Dept: GENERAL RADIOLOGY | Facility: HOSPITAL | Age: 37
End: 2022-06-05

## 2022-06-05 VITALS
RESPIRATION RATE: 18 BRPM | TEMPERATURE: 98.6 F | OXYGEN SATURATION: 98 % | SYSTOLIC BLOOD PRESSURE: 118 MMHG | HEIGHT: 64 IN | WEIGHT: 204 LBS | DIASTOLIC BLOOD PRESSURE: 94 MMHG | HEART RATE: 77 BPM | BODY MASS INDEX: 34.83 KG/M2

## 2022-06-05 DIAGNOSIS — S93.602A SPRAIN OF LEFT FOOT, INITIAL ENCOUNTER: Primary | ICD-10-CM

## 2022-06-05 PROCEDURE — 99283 EMERGENCY DEPT VISIT LOW MDM: CPT

## 2022-06-05 PROCEDURE — 73630 X-RAY EXAM OF FOOT: CPT

## 2022-06-05 NOTE — ED PROVIDER NOTES
"Subjective   36-year-old female presents with left foot pain, she states she woke up with pain approximately 6 days ago on the left inner portion of her foot.  She states a couple of days later it became painful to walk on.  No known injury.  It is painful with bearing weight and with touching the area, she feels a small \"knot\"      History provided by:  Patient   used: No        Review of Systems   Musculoskeletal:        Left foot pain   All other systems reviewed and are negative.      Past Medical History:   Diagnosis Date   • Abdominal pain, periumbilical    • Abdominal pain, RLQ (right lower quadrant)    • Acute pharyngitis    • Anxiety    • Appetite lost    • Chest pain 2018?    states had a heart monitor and was WNL.  States was told pain was attributed to acid reflux.     • Colon polyp 2017   • Depression     patient denies   • Diarrhea     Secondary to IBS   • Difficulty swallowing     food and liquids   • Fatigue    • Fracture     RIGHT WRIST TWICE, LEFT HAND   • GERD (gastroesophageal reflux disease)    • H/O foot surgery 10/2020    left x 2   • Hearing loss     No use of hearing aids   • History of bronchitis    • History of colonoscopy    • History of exercise stress test 2018?   • History of left salpingo-oophorectomy  2018   • History of ovarian cyst    • History of pneumonia    • Hyperthyroidism     hyperactive taken off medicine when pregnant.  Reported she was medication at one time, but none since pregnancy.    • Irritable bowel syndrome    • Ovarian cyst, bilateral    • PONV (postoperative nausea and vomiting)    • Seasonal allergies    • Seasonal allergies    • RITU (stress urinary incontinence, female) 2020   • TMJ (dislocation of temporomandibular joint)    • Upper respiratory infection    • Vomiting    • Wears glasses        No Known Allergies    Past Surgical History:   Procedure Laterality Date   •  SECTION  2008    DR NAVARRETE "   •  SECTION  10/30/2015    DR VASQUEZ   •  SECTION  2010    DR ESPINOZA   • CHOLECYSTECTOMY     • COLONOSCOPY N/A 2017     COLONOSCOPY WITH COLD BIOPSY POLYPECTOMY; BIOPSIES , QUICK CLIP;  Surgeon: Perfecto Mcknight MD;  Location: [facility] ENDOSCOPY;  Service:    • DIAGNOSTIC LAPAROSCOPY  2005    DR JONES NAVA/cystectomy   • DIAGNOSTIC LAPAROSCOPY  2012    DR ESPINOZA/ELIJAH   • DIAGNOSTIC LAPAROSCOPY N/A 8/10/2017     DIAGNOSTIC LAPAROSCOPY, LEFT S&O;  Keren Espinoza MD;  Location: [facility] OR;  Service:    • DIAGNOSTIC LAPAROSCOPY N/A 2022    Procedure: DIAGNOSTIC LAPAROSCOPY,  LYSIS OF ADHESIONS, RIGHT SALPINGO-OOPHORECTOMY;  Surgeon: Kem Sahni MD;  Location: [facility] OR;  Service: Obstetrics/Gynecology;  Laterality: N/A;   • ENDOSCOPY N/A 10/15/2019    Procedure: ESOPHAGOGASTRODUODENOSCOPY WITH BIOPSY AND ESOPHAGEAL DILATATION;  Surgeon: Perfecto Mcknight MD;  Location: [facility] ENDOSCOPY;  Service: Gastroenterology   • ENDOSCOPY N/A 10/11/2021    Procedure: ESOPHAGOGASTRODUODENOSCOPY with dilatation and biopsies;  Surgeon: Hieu Shultz MD;  Location: [facility] ENDOSCOPY;  Service: Gastroenterology;  Laterality: N/A;   • FOOT SURGERY Left     ligament and tendon repair   • HERNIA REPAIR  2019    abd   • MID-URETHRAL SLING WITH CYSTOSCOPY N/A 2021     MID-URETHRAL SLING WITH CYSTOSCOPY TVT EXACT;  Luc Alicea MD;  Location: [facility] OR;  Service: Obstetrics/Gynecology;  Laterality: N/A;   • NISSEN FUNDOPLICATION  2013   • UPPER GASTROINTESTINAL ENDOSCOPY     • UPPER GASTROINTESTINAL ENDOSCOPY  2020   • VAGINAL HYSTERECTOMY N/A 2018    VAGINAL HYSTERECTOMY, BILATERAL SALPINGECTOMY;  Luc Alicea MD;  Location: [facility] OR;  Service: Gynecology   • WISDOM TOOTH EXTRACTION         Family History   Problem Relation Age of Onset   • Coronary artery disease Mother    • Diabetes Mother    • Hypertension Mother    • Kidney disease Father    • Skin cancer  Father    • Stroke Maternal Grandfather    • Hypertension Maternal Grandfather    • Diabetes Maternal Grandfather    • Colon cancer Maternal Great-Grandmother    • Osteoporosis Neg Hx        Social History     Socioeconomic History   • Marital status:    Tobacco Use   • Smoking status: Never Smoker   • Smokeless tobacco: Never Used   Vaping Use   • Vaping Use: Never used   Substance and Sexual Activity   • Alcohol use: No   • Drug use: No   • Sexual activity: Defer           Objective   Physical Exam  Vitals and nursing note reviewed.   Constitutional:       Appearance: She is well-developed.   HENT:      Head: Normocephalic.   Cardiovascular:      Rate and Rhythm: Normal rate.   Pulmonary:      Effort: Pulmonary effort is normal.   Musculoskeletal:         General: Tenderness present.      Cervical back: Normal range of motion and neck supple.        Feet:       Comments: Tender to palpation, mild swelling   Skin:     General: Skin is warm and dry.   Neurological:      Mental Status: She is alert and oriented to person, place, and time.      Deep Tendon Reflexes: Reflexes are normal and symmetric.         Procedures           ED Course                                                 MDM  Number of Diagnoses or Management Options  Sprain of left foot, initial encounter: new and requires workup     Amount and/or Complexity of Data Reviewed  Tests in the radiology section of CPT®: reviewed  Independent visualization of images, tracings, or specimens: yes    Risk of Complications, Morbidity, and/or Mortality  Presenting problems: minimal  Diagnostic procedures: minimal  Management options: minimal    Patient Progress  Patient progress: stable      Final diagnoses:   Sprain of left foot, initial encounter       ED Disposition  ED Disposition     ED Disposition   Discharge    Condition   Stable    Comment   --             [facility] Emergency Department  [address] [place]  [id number] [phone]    If symptoms worsen         Medication List      Changed    lidocaine 5 % ointment  Commonly known as: XYLOCAINE  Apply  topically to the appropriate area as directed Every Night.  What changed:   · how much to take  · when to take this  · reasons to take this     loratadine 10 MG tablet  Commonly known as: Claritin  Take 1 tablet by mouth Daily.  What changed:   · when to take this  · reasons to take this     pantoprazole 40 MG EC tablet  Commonly known as: PROTONIX  1 po daily in the am 30 minutes before breakfast  What changed:   · how much to take  · how to take this  · when to take this             Dami Branham Jr., PA-C  06/05/22 [id number]

## 2022-06-29 ENCOUNTER — HOSPITAL ENCOUNTER (EMERGENCY)
Facility: HOSPITAL | Age: 37
Discharge: HOME | End: 2022-06-29
Attending: FAMILY MEDICINE | Admitting: FAMILY MEDICINE

## 2022-06-29 ENCOUNTER — TRANSCRIBE ORDERS (OUTPATIENT)
Dept: GENERAL RADIOLOGY | Facility: HOSPITAL | Age: 37
End: 2022-06-29

## 2022-06-29 ENCOUNTER — APPOINTMENT (OUTPATIENT)
Dept: CT IMAGING | Facility: HOSPITAL | Age: 37
End: 2022-06-29

## 2022-06-29 ENCOUNTER — HOSPITAL ENCOUNTER (OUTPATIENT)
Dept: GENERAL RADIOLOGY | Facility: HOSPITAL | Age: 37
Discharge: HOME | End: 2022-06-29
Admitting: NURSE PRACTITIONER

## 2022-06-29 VITALS
WEIGHT: 205 LBS | BODY MASS INDEX: 35 KG/M2 | SYSTOLIC BLOOD PRESSURE: 126 MMHG | HEART RATE: 67 BPM | TEMPERATURE: 98.4 F | RESPIRATION RATE: 17 BRPM | OXYGEN SATURATION: 100 % | DIASTOLIC BLOOD PRESSURE: 78 MMHG | HEIGHT: 64 IN

## 2022-06-29 DIAGNOSIS — Z87.19 HISTORY OF IBS: ICD-10-CM

## 2022-06-29 DIAGNOSIS — R93.5 ABNORMAL ABDOMINAL X-RAY: ICD-10-CM

## 2022-06-29 DIAGNOSIS — R93.5 ABNORMAL ABDOMINAL ULTRASOUND: Primary | ICD-10-CM

## 2022-06-29 DIAGNOSIS — R10.84 GENERALIZED ABDOMINAL PAIN: Primary | ICD-10-CM

## 2022-06-29 LAB
ALBUMIN SERPL-MCNC: 4.1 G/DL (ref 3.5–5.2)
ALBUMIN/GLOB SERPL: 1.5 G/DL
ALP SERPL-CCNC: 71 U/L (ref 39–117)
ALT SERPL W P-5'-P-CCNC: 17 U/L (ref 1–33)
ANION GAP SERPL CALCULATED.3IONS-SCNC: 10.2 MMOL/L (ref 5–15)
AST SERPL-CCNC: 17 U/L (ref 1–32)
BASOPHILS # BLD AUTO: 0.06 10*3/MM3 (ref 0–0.2)
BASOPHILS NFR BLD AUTO: 0.6 % (ref 0–1.5)
BILIRUB SERPL-MCNC: 0.2 MG/DL (ref 0–1.2)
BUN SERPL-MCNC: 12 MG/DL (ref 6–20)
BUN/CREAT SERPL: 18.2 (ref 7–25)
CALCIUM SPEC-SCNC: 8.7 MG/DL (ref 8.6–10.5)
CHLORIDE SERPL-SCNC: 105 MMOL/L (ref 98–107)
CO2 SERPL-SCNC: 24.8 MMOL/L (ref 22–29)
CREAT SERPL-MCNC: 0.66 MG/DL (ref 0.57–1)
D-LACTATE SERPL-SCNC: 0.7 MMOL/L (ref 0.5–2)
DEPRECATED RDW RBC AUTO: 40.9 FL (ref 37–54)
EGFRCR SERPLBLD CKD-EPI 2021: 116 ML/MIN/1.73
EOSINOPHIL # BLD AUTO: 0.19 10*3/MM3 (ref 0–0.4)
EOSINOPHIL NFR BLD AUTO: 1.8 % (ref 0.3–6.2)
ERYTHROCYTE [DISTWIDTH] IN BLOOD BY AUTOMATED COUNT: 13.2 % (ref 12.3–15.4)
GLOBULIN UR ELPH-MCNC: 2.7 GM/DL
GLUCOSE SERPL-MCNC: 101 MG/DL (ref 65–99)
HCT VFR BLD AUTO: 38.4 % (ref 34–46.6)
HGB BLD-MCNC: 12.8 G/DL (ref 12–15.9)
IMM GRANULOCYTES # BLD AUTO: 0.03 10*3/MM3 (ref 0–0.05)
IMM GRANULOCYTES NFR BLD AUTO: 0.3 % (ref 0–0.5)
LIPASE SERPL-CCNC: 28 U/L (ref 13–60)
LYMPHOCYTES # BLD AUTO: 2.13 10*3/MM3 (ref 0.7–3.1)
LYMPHOCYTES NFR BLD AUTO: 20.2 % (ref 19.6–45.3)
MCH RBC QN AUTO: 28.4 PG (ref 26.6–33)
MCHC RBC AUTO-ENTMCNC: 33.3 G/DL (ref 31.5–35.7)
MCV RBC AUTO: 85.1 FL (ref 79–97)
MONOCYTES # BLD AUTO: 0.72 10*3/MM3 (ref 0.1–0.9)
MONOCYTES NFR BLD AUTO: 6.8 % (ref 5–12)
NEUTROPHILS NFR BLD AUTO: 7.42 10*3/MM3 (ref 1.7–7)
NEUTROPHILS NFR BLD AUTO: 70.3 % (ref 42.7–76)
NRBC BLD AUTO-RTO: 0 /100 WBC (ref 0–0.2)
PLATELET # BLD AUTO: 243 10*3/MM3 (ref 140–450)
PMV BLD AUTO: 10.1 FL (ref 6–12)
POTASSIUM SERPL-SCNC: 4.3 MMOL/L (ref 3.5–5.2)
PROT SERPL-MCNC: 6.8 G/DL (ref 6–8.5)
RBC # BLD AUTO: 4.51 10*6/MM3 (ref 3.77–5.28)
SODIUM SERPL-SCNC: 140 MMOL/L (ref 136–145)
WBC NRBC COR # BLD: 10.55 10*3/MM3 (ref 3.4–10.8)

## 2022-06-29 PROCEDURE — 83690 ASSAY OF LIPASE: CPT | Performed by: FAMILY MEDICINE

## 2022-06-29 PROCEDURE — 85025 COMPLETE CBC W/AUTO DIFF WBC: CPT | Performed by: FAMILY MEDICINE

## 2022-06-29 PROCEDURE — 99283 EMERGENCY DEPT VISIT LOW MDM: CPT

## 2022-06-29 PROCEDURE — 74018 RADEX ABDOMEN 1 VIEW: CPT

## 2022-06-29 PROCEDURE — 83605 ASSAY OF LACTIC ACID: CPT | Performed by: FAMILY MEDICINE

## 2022-06-29 PROCEDURE — 80053 COMPREHEN METABOLIC PANEL: CPT | Performed by: FAMILY MEDICINE

## 2022-06-29 PROCEDURE — 25010000002 IOPAMIDOL 61 % SOLUTION: Performed by: FAMILY MEDICINE

## 2022-06-29 PROCEDURE — 74177 CT ABD & PELVIS W/CONTRAST: CPT

## 2022-06-29 PROCEDURE — 96374 THER/PROPH/DIAG INJ IV PUSH: CPT

## 2022-06-29 PROCEDURE — 25010000002 MORPHINE PER 10 MG: Performed by: FAMILY MEDICINE

## 2022-06-29 RX ORDER — SODIUM CHLORIDE 0.9 % (FLUSH) 0.9 %
10 SYRINGE (ML) INJECTION AS NEEDED
Status: DISCONTINUED | OUTPATIENT
Start: 2022-06-29 | End: 2022-06-30 | Stop reason: HOSPADM

## 2022-06-29 RX ORDER — MORPHINE SULFATE 2 MG/ML
2 INJECTION, SOLUTION INTRAMUSCULAR; INTRAVENOUS ONCE
Status: COMPLETED | OUTPATIENT
Start: 2022-06-29 | End: 2022-06-29

## 2022-06-29 RX ORDER — DICYCLOMINE HCL 20 MG
20 TABLET ORAL EVERY 6 HOURS PRN
Qty: 15 TABLET | Refills: 0 | Status: SHIPPED | OUTPATIENT
Start: 2022-06-29 | End: 2023-03-24 | Stop reason: HOSPADM

## 2022-06-29 RX ORDER — DICYCLOMINE HYDROCHLORIDE 10 MG/1
20 CAPSULE ORAL ONCE
Status: COMPLETED | OUTPATIENT
Start: 2022-06-29 | End: 2022-06-29

## 2022-06-29 RX ADMIN — SODIUM CHLORIDE 1000 ML: 9 INJECTION, SOLUTION INTRAVENOUS at 20:24

## 2022-06-29 RX ADMIN — IOPAMIDOL 100 ML: 612 INJECTION, SOLUTION INTRAVENOUS at 21:32

## 2022-06-29 RX ADMIN — DICYCLOMINE HYDROCHLORIDE 20 MG: 10 CAPSULE ORAL at 22:34

## 2022-06-29 RX ADMIN — MORPHINE SULFATE 2 MG: 2 INJECTION, SOLUTION INTRAMUSCULAR; INTRAVENOUS at 20:24

## 2022-09-06 NOTE — PROGRESS NOTES
"Patient: Laura Thomosn    YOB: 1985    Date: 09/08/2022    Primary Care Provider: Maximino Whelan MD    Chief Complaint   Patient presents with   • Plantar Warts     Left foot       SUBJECTIVE:    History of present illness:  Patient is here for evaluation of a \"plantar wart\" on her left foot.  Apparently has been present for several months now, it causes her dull discomfort, sharp when it is touched, worse with pressure, associated with a palpable mass, this is on the medial aspect of the left heel.  Nonradiating in nature.    The following portions of the patient's history were reviewed and updated as appropriate: allergies, current medications, past family history, past medical history, past social history, past surgical history and problem list.      Review of Systems   Constitutional: Negative for chills, fever and unexpected weight change.   HENT: Negative for hearing loss, trouble swallowing and voice change.    Eyes: Negative for visual disturbance.   Respiratory: Negative for apnea, cough, chest tightness, shortness of breath and wheezing.    Cardiovascular: Negative for chest pain, palpitations and leg swelling.   Gastrointestinal: Negative for abdominal distention, abdominal pain, anal bleeding, blood in stool, constipation, diarrhea, nausea, rectal pain and vomiting.   Endocrine: Negative for cold intolerance and heat intolerance.   Genitourinary: Negative for difficulty urinating, dysuria and flank pain.   Musculoskeletal: Negative for back pain and gait problem.   Skin: Negative for color change, rash and wound.   Neurological: Negative for dizziness, syncope, speech difficulty, weakness, light-headedness, numbness and headaches.   Hematological: Negative for adenopathy. Does not bruise/bleed easily.   Psychiatric/Behavioral: Negative for confusion. The patient is not nervous/anxious.        Allergies:  No Known Allergies    Medications:    Current Outpatient Medications:   •  " acetaminophen (TYLENOL) 500 MG tablet, Take 2 tablets by mouth Every 8 (Eight) Hours As Needed for Mild Pain ., Disp: 60 tablet, Rfl: 0  •  buPROPion XL (Wellbutrin XL) 150 MG 24 hr tablet, Take 1 tablet by mouth Daily., Disp: 90 tablet, Rfl: 1  •  buPROPion XL (Wellbutrin XL) 300 MG 24 hr tablet, Take 1 tablet by mouth Every Morning. Total of 450 mg po daily (takes with a 150 mg tab), Disp: 90 tablet, Rfl: 1  •  escitalopram (LEXAPRO) 10 MG tablet, Take 1 tablet by mouth Daily., Disp: 90 tablet, Rfl: 1  •  estradiol (CLIMARA) 0.1 MG/24HR patch, Place 1 patch on the skin as directed by provider., Disp: , Rfl:   •  hydrOXYzine (ATARAX) 25 MG tablet, Take 1 tablet by mouth Every 8 (Eight) Hours As Needed for Anxiety., Disp: 90 tablet, Rfl: 1  •  lidocaine (XYLOCAINE) 5 % ointment, Apply  topically to the appropriate area as directed Every Night. (Patient taking differently: Apply 1 application topically to the appropriate area as directed At Night As Needed.), Disp: 35 g, Rfl: 1  •  loratadine (Claritin) 10 MG tablet, Take 1 tablet by mouth Daily. (Patient taking differently: Take 10 mg by mouth As Needed.), Disp: 30 tablet, Rfl: 1  •  ondansetron (ZOFRAN) 4 MG tablet, Take 1 tablet by mouth Every 8 (Eight) Hours As Needed for Nausea or Vomiting., Disp: 30 tablet, Rfl: 1  •  pantoprazole (PROTONIX) 40 MG EC tablet, 1 po daily in the am 30 minutes before breakfast (Patient taking differently: Take 40 mg by mouth Daily. 1 po daily in the am 30 minutes before breakfast), Disp: 30 tablet, Rfl: 3  •  dicyclomine (BENTYL) 20 MG tablet, Take 1 tablet by mouth Every 6 (Six) Hours As Needed (abdominal cramping)., Disp: 15 tablet, Rfl: 0  •  metoclopramide (Reglan) 10 MG tablet, Take 1 tablet by mouth 4 (Four) Times a Day Before Meals & at Bedtime., Disp: 120 tablet, Rfl: 5  •  promethazine (PHENERGAN) 25 MG tablet, Take 1 tablet by mouth Every 6 (Six) Hours As Needed for Nausea or Vomiting., Disp: 30 tablet, Rfl: 0  •   promethazine-dextromethorphan (PROMETHAZINE-DM) 6.25-15 MG/5ML syrup, Take 5 mL by mouth 4 (Four) Times a Day As Needed for Cough., Disp: 120 mL, Rfl: 0  •  sucralfate (Carafate) 1 g tablet, Take 1 tablet by mouth 4 (Four) Times a Day., Disp: 120 tablet, Rfl: 3    History:  Past Medical History:   Diagnosis Date   • Abdominal pain, periumbilical    • Abdominal pain, RLQ (right lower quadrant)    • Acute pharyngitis    • Anxiety    • Appetite lost    • Chest pain 2018?    states had a heart monitor and was WNL.  States was told pain was attributed to acid reflux.     • Colon polyp 2017   • Depression     patient denies   • Diarrhea     Secondary to IBS   • Difficulty swallowing     food and liquids   • Fatigue    • Fracture     RIGHT WRIST TWICE, LEFT HAND   • GERD (gastroesophageal reflux disease)    • H/O foot surgery 10/2020    left x 2   • Hearing loss     No use of hearing aids   • History of bronchitis    • History of colonoscopy    • History of exercise stress test 2018?   • History of left salpingo-oophorectomy  2018   • History of ovarian cyst    • History of pneumonia    • Hyperthyroidism     hyperactive taken off medicine when pregnant.  Reported she was medication at one time, but none since pregnancy.    • Irritable bowel syndrome    • Ovarian cyst, bilateral    • PONV (postoperative nausea and vomiting)    • Seasonal allergies    • Seasonal allergies    • RITU (stress urinary incontinence, female) 2020   • TMJ (dislocation of temporomandibular joint)    • Upper respiratory infection    • Vomiting    • Wears glasses        Past Surgical History:   Procedure Laterality Date   •  SECTION  2008    DR NAVARRETE   •  SECTION  10/30/2015    DR VASQUEZ   •  SECTION  2010    DR ESPINOZA   • CHOLECYSTECTOMY     • COLONOSCOPY N/A 2017     COLONOSCOPY WITH COLD BIOPSY POLYPECTOMY; BIOPSIES , QUICK CLIP;  Surgeon: Perfecto Mcknight MD;  Location:   [facility];  Service:    • DIAGNOSTIC LAPAROSCOPY  07/07/2005    DR JONES NAVA/cystectomy   • DIAGNOSTIC LAPAROSCOPY  02/14/2012    DR ESPINOZA/ELIJAH   • DIAGNOSTIC LAPAROSCOPY N/A 8/10/2017     DIAGNOSTIC LAPAROSCOPY, LEFT S&O;  Keren Espinoza MD;  Location: [facility] OR;  Service:    • DIAGNOSTIC LAPAROSCOPY N/A 1/19/2022    Procedure: DIAGNOSTIC LAPAROSCOPY,  LYSIS OF ADHESIONS, RIGHT SALPINGO-OOPHORECTOMY;  Surgeon: Kem Sahni MD;  Location: [facility] OR;  Service: Obstetrics/Gynecology;  Laterality: N/A;   • ENDOSCOPY N/A 10/15/2019    Procedure: ESOPHAGOGASTRODUODENOSCOPY WITH BIOPSY AND ESOPHAGEAL DILATATION;  Surgeon: Perfecto Mcknight MD;  Location: [facility] ENDOSCOPY;  Service: Gastroenterology   • ENDOSCOPY N/A 10/11/2021    Procedure: ESOPHAGOGASTRODUODENOSCOPY with dilatation and biopsies;  Surgeon: Hieu Shultz MD;  Location: [facility] ENDOSCOPY;  Service: Gastroenterology;  Laterality: N/A;   • FOOT SURGERY Left     ligament and tendon repair   • HERNIA REPAIR  12/20/2019    abd   • MID-URETHRAL SLING WITH CYSTOSCOPY N/A 6/9/2021     MID-URETHRAL SLING WITH CYSTOSCOPY TVT EXACT;  Luc Alicea MD;  Location:  [facility];  Service: Obstetrics/Gynecology;  Laterality: N/A;   • NISSEN FUNDOPLICATION  06/2013   • UPPER GASTROINTESTINAL ENDOSCOPY  2013   • UPPER GASTROINTESTINAL ENDOSCOPY  06/19/2020   • VAGINAL HYSTERECTOMY N/A 7/9/2018    VAGINAL HYSTERECTOMY, BILATERAL SALPINGECTOMY;  Luc Alicea MD;  Location:  [facility];  Service: Gynecology   • WISDOM TOOTH EXTRACTION  2000       Family History   Problem Relation Age of Onset   • Coronary artery disease Mother    • Diabetes Mother    • Hypertension Mother    • Kidney disease Father    • Skin cancer Father    • Stroke Maternal Grandfather    • Hypertension Maternal Grandfather    • Diabetes Maternal Grandfather    • Colon cancer Maternal Great-Grandmother    • Osteoporosis Neg Hx        Social History     Tobacco Use   • Smoking status: Never Smoker   •  "Smokeless tobacco: Never Used   Vaping Use   • Vaping Use: Never used   Substance Use Topics   • Alcohol use: No   • Drug use: No        OBJECTIVE:    Vital Signs:   Vitals:    09/08/22 1517   BP: 124/80   Pulse: 102   Temp: 97.9 °F (36.6 °C)   SpO2: 99%   Weight: 92.1 kg (203 lb)   Height: 162.6 cm (64\")       Physical Exam:   General Appearance:    Alert, cooperative, in no acute distress   Head:    Normocephalic, without obvious abnormality, atraumatic   Eyes:            Lids and lashes normal, conjunctivae and sclerae normal, no   icterus, no pallor, corneas clear, PERRLA   Ears:    Ears appear intact with no abnormalities noted   Throat:   No oral lesions, no thrush, oral mucosa moist   Neck:   No adenopathy, supple, trachea midline, no thyromegaly, no   carotid bruit, no JVD   Lungs:     Clear to auscultation,respirations regular, even and                  unlabored    Heart:    Regular rhythm and normal rate, normal S1 and S2, no            murmur, no gallop, no rub, no click   Chest Wall:    No abnormalities observed   Abdomen:     Normal bowel sounds, no masses, no organomegaly, soft        non-tender, non-distended, no guarding, no rebound                tenderness   Extremities:   Moves all extremities well, no edema, no cyanosis, no             redness   Pulses:   Pulses palpable and equal bilaterally   Skin:   No bleeding, bruising or rash, large plantar wart of the medial aspect of the left heel   Lymph nodes:   No palpable adenopathy   Neurologic:   Cranial nerves 2 - 12 grossly intact, sensation intact, DTR       present and equal bilaterally     Results Review:   I reviewed the patient's new clinical results.  I reviewed the patient's new imaging results and agree with the interpretation.  I reviewed the patient's other test results and agree with the interpretation    Review of Systems was reviewed and confirmed as accurate as documented by the MA.    ASSESSMENT/PLAN:    1. Plantar wart  "       Patient needs undergo excision of this wart, risk and benefits of operative versus nonoperative intervention have been discussed with the patient, she understands and agrees.    I discussed the patients findings and my recommendations with patient        Electronically signed by Franck King MD  09/13/22

## 2022-09-08 ENCOUNTER — OFFICE VISIT (OUTPATIENT)
Dept: SURGERY | Facility: CLINIC | Age: 37
End: 2022-09-08

## 2022-09-08 VITALS
DIASTOLIC BLOOD PRESSURE: 80 MMHG | HEIGHT: 64 IN | WEIGHT: 203 LBS | OXYGEN SATURATION: 99 % | BODY MASS INDEX: 34.66 KG/M2 | SYSTOLIC BLOOD PRESSURE: 124 MMHG | HEART RATE: 102 BPM | TEMPERATURE: 97.9 F

## 2022-09-08 DIAGNOSIS — B07.0 PLANTAR WART: Primary | ICD-10-CM

## 2022-09-08 PROCEDURE — 99242 OFF/OP CONSLTJ NEW/EST SF 20: CPT | Performed by: SURGERY

## 2022-09-13 ENCOUNTER — PROCEDURE VISIT (OUTPATIENT)
Dept: SURGERY | Facility: CLINIC | Age: 37
End: 2022-09-13

## 2022-09-13 VITALS
BODY MASS INDEX: 34.84 KG/M2 | DIASTOLIC BLOOD PRESSURE: 64 MMHG | OXYGEN SATURATION: 97 % | RESPIRATION RATE: 18 BRPM | HEART RATE: 102 BPM | HEIGHT: 64 IN | TEMPERATURE: 98 F | SYSTOLIC BLOOD PRESSURE: 124 MMHG

## 2022-09-13 DIAGNOSIS — B07.0 PLANTAR WART: Primary | ICD-10-CM

## 2022-09-13 PROCEDURE — 11402 EXC TR-EXT B9+MARG 1.1-2 CM: CPT | Performed by: SURGERY

## 2022-09-14 NOTE — PROGRESS NOTES
Location:  Left foot heel    Procedure: Left heel plantar wart excision      I recommend excision. Procedure and the risks and benefits were explained including bleeding and infection. The patient understands these and wishes to proceed.     The patient was brought to the procedure room. Consent and time out were performed. The area was prepped and draped in the usual fashion. 1% lidocaine with epinephrine was infused locally. An circular incision was made around the lesion. Full thickness excision was performed. The lesion size was 2 cm. There was no closure performed. There were no complications and the patient tolerated the procedure well. Hemostasis was well controlled with pressure and there was minimal blood loss. Wound instructions were given.

## 2022-09-15 LAB — REF LAB TEST METHOD: NORMAL

## 2022-09-21 ENCOUNTER — OFFICE VISIT (OUTPATIENT)
Dept: INTERNAL MEDICINE | Facility: CLINIC | Age: 37
End: 2022-09-21

## 2022-09-21 VITALS
DIASTOLIC BLOOD PRESSURE: 77 MMHG | HEART RATE: 70 BPM | SYSTOLIC BLOOD PRESSURE: 114 MMHG | TEMPERATURE: 97.5 F | WEIGHT: 204 LBS | BODY MASS INDEX: 36.14 KG/M2 | OXYGEN SATURATION: 99 % | HEIGHT: 63 IN | RESPIRATION RATE: 15 BRPM

## 2022-09-21 DIAGNOSIS — K21.00 GASTROESOPHAGEAL REFLUX DISEASE WITH ESOPHAGITIS WITHOUT HEMORRHAGE: ICD-10-CM

## 2022-09-21 DIAGNOSIS — F41.9 ANXIETY: ICD-10-CM

## 2022-09-21 DIAGNOSIS — G47.00 INSOMNIA, UNSPECIFIED TYPE: ICD-10-CM

## 2022-09-21 DIAGNOSIS — R39.9 UTI SYMPTOMS: Primary | ICD-10-CM

## 2022-09-21 DIAGNOSIS — R55 SYNCOPE, UNSPECIFIED SYNCOPE TYPE: ICD-10-CM

## 2022-09-21 LAB
BILIRUB BLD-MCNC: NEGATIVE MG/DL
CLARITY, POC: ABNORMAL
COLOR UR: YELLOW
EXPIRATION DATE: ABNORMAL
GLUCOSE UR STRIP-MCNC: NEGATIVE MG/DL
KETONES UR QL: NEGATIVE
LEUKOCYTE EST, POC: NEGATIVE
Lab: ABNORMAL
NITRITE UR-MCNC: NEGATIVE MG/ML
PH UR: 5.5 [PH] (ref 5–8)
PROT UR STRIP-MCNC: ABNORMAL MG/DL
RBC # UR STRIP: ABNORMAL /UL
SP GR UR: 1.03 (ref 1–1.03)
UROBILINOGEN UR QL: NORMAL

## 2022-09-21 PROCEDURE — 99214 OFFICE O/P EST MOD 30 MIN: CPT | Performed by: NURSE PRACTITIONER

## 2022-09-21 RX ORDER — HYDROXYZINE HYDROCHLORIDE 25 MG/1
25 TABLET, FILM COATED ORAL 3 TIMES DAILY PRN
Qty: 90 TABLET | Refills: 0 | Status: SHIPPED | OUTPATIENT
Start: 2022-09-21

## 2022-09-21 RX ORDER — CITALOPRAM 10 MG/1
10 TABLET ORAL DAILY
Qty: 30 TABLET | Refills: 1 | Status: SHIPPED | OUTPATIENT
Start: 2022-09-21 | End: 2022-10-19 | Stop reason: SDUPTHER

## 2022-09-21 RX ORDER — PHENAZOPYRIDINE HYDROCHLORIDE 100 MG/1
100 TABLET, FILM COATED ORAL 3 TIMES DAILY PRN
Qty: 6 TABLET | Refills: 0 | Status: SHIPPED | OUTPATIENT
Start: 2022-09-21 | End: 2022-10-19

## 2022-09-21 NOTE — PROGRESS NOTES
Chief Complaint / Reason:      Chief Complaint   Patient presents with   • Loss of Consciousness     In [employer]. Hit head on piano. I think I have infection in my body like last time. Hurting in kidneys. Taken off all meds.        Subjective     Dysuria   The current episode started 1 to 4 weeks ago. The problem occurs every urination. The problem has been gradually worsening. The quality of the pain is described as aching and burning. The pain is at a severity of 6/10. There has been no fever. She is sexually active. There is a history of pyelonephritis. Associated symptoms include a discharge, flank pain, frequency, hesitancy, sweats and urgency. Pertinent negatives include no chills, possible pregnancy or vomiting.   Patient also complains about abdominal bloating and states that she does have discomfort on both lower sides and her flank pain.  Patient has seen GI multiple times and further evaluation for her symptoms.  She does have esophagitis and reactive gastropathy.  Only has partial fundoplication.  Denies nausea or vomiting.  Answers for HPI/ROS submitted by the patient on 9/19/2022  What is the primary reason for your visit?: Painful Urination and has seen urology in the past.  She also would like to discuss flashes consciousness and states that she hit her head on the piano and does not know if it was because she had not ate or what caused that it did happen in the past.  Denies any dizziness or vertigo at this time.  History taken from: patient    PMH/FH/Social History were reviewed and updated appropriately in the electronic medical record.   Past Medical History:   Diagnosis Date   • Abdominal pain, periumbilical    • Abdominal pain, RLQ (right lower quadrant)    • Acute pharyngitis    • Anxiety    • Appetite lost    • Chest pain 2018?    states had a heart monitor and was WNL.  States was told pain was attributed to acid reflux.     • Colon polyp 02/17/2017   • Depression     patient denies   •  Diarrhea     Secondary to IBS   • Difficulty swallowing     food and liquids   • Fatigue    • Fracture     RIGHT WRIST TWICE, LEFT HAND   • GERD (gastroesophageal reflux disease)    • H/O foot surgery 10/2020    left x 2   • Hearing loss     No use of hearing aids   • History of bronchitis    • History of colonoscopy    • History of exercise stress test ?   • History of left salpingo-oophorectomy  2018   • History of ovarian cyst    • History of pneumonia    • Hyperthyroidism     hyperactive taken off medicine when pregnant.  Reported she was medication at one time, but none since pregnancy.    • Irritable bowel syndrome    • Ovarian cyst, bilateral    • PONV (postoperative nausea and vomiting)    • Seasonal allergies    • Seasonal allergies    • RITU (stress urinary incontinence, female) 2020   • TMJ (dislocation of temporomandibular joint)    • Upper respiratory infection    • Vomiting    • Wears glasses      Past Surgical History:   Procedure Laterality Date   •  SECTION  2008    DR NAVARRETE   •  SECTION  10/30/2015    DR VASQUEZ   •  SECTION  2010    DR ESPINOZA   • CHOLECYSTECTOMY     • COLONOSCOPY N/A 2017     COLONOSCOPY WITH COLD BIOPSY POLYPECTOMY; BIOPSIES , QUICK CLIP;  Surgeon: Perfecto Mcknight MD;  Location: [facility] END[facility]COPY;  Service:    • DIAGNOSTIC LAPAROSCOPY  2005    DR JONES NAVA/cystectomy   • DIAGNOSTIC LAPAROSCOPY  2012    DR ESPINOZA/ELIJAH   • DIAGNOSTIC LAPAROSCOPY N/A 8/10/2017     DIAGNOSTIC LAPAROSCOPY, LEFT S&O;  Keren Espinoza MD;  Location: [facility] OR;  Service:    • DIAGNOSTIC LAPAROSCOPY N/A 2022    Procedure: DIAGNOSTIC LAPAROSCOPY,  LYSIS OF ADHESIONS, RIGHT SALPINGO-OOPHORECTOMY;  Surgeon: Kem Sahni MD;  Location: [facility] OR;  Service: Obstetrics/Gynecology;  Laterality: N/A;   • ENDOSCOPY N/A 10/15/2019    Procedure: ESOPHAGOGASTRODUODENOSCOPY WITH BIOPSY AND ESOPHAGEAL DILATATION;  Surgeon: Roxanna  Perfecto GUZMAN MD;  Location: [facility] ENDOSCOPY;  Service: Gastroenterology   • ENDOSCOPY N/A 10/11/2021    Procedure: ESOPHAGOGASTRODUODENOSCOPY with dilatation and biopsies;  Surgeon: Hieu Shultz MD;  Location: [facility] ENDOSCOPY;  Service: Gastroenterology;  Laterality: N/A;   • FOOT SURGERY Left     ligament and tendon repair   • HERNIA REPAIR  12/20/2019    abd   • MID-URETHRAL SLING WITH CYSTOSCOPY N/A 6/9/2021     MID-URETHRAL SLING WITH CYSTOSCOPY TVT EXACT;  Luc Alicea MD;  Location: [facility] OR;  Service: Obstetrics/Gynecology;  Laterality: N/A;   • NISSEN FUNDOPLICATION  06/2013   • UPPER GASTROINTESTINAL ENDOSCOPY  2013   • UPPER GASTROINTESTINAL ENDOSCOPY  06/19/2020   • VAGINAL HYSTERECTOMY N/A 7/9/2018    VAGINAL HYSTERECTOMY, BILATERAL SALPINGECTOMY;  Luc Alicea MD;  Location:  [facility];  Service: Gynecology   • WISDOM TOOTH EXTRACTION  2000     Social History     Socioeconomic History   • Marital status:    Tobacco Use   • Smoking status: Never Smoker   • Smokeless tobacco: Never Used   Vaping Use   • Vaping Use: Never used   Substance and Sexual Activity   • Alcohol use: No   • Drug use: No   • Sexual activity: Yes     Partners: Male     Birth control/protection: None     Family History   Problem Relation Age of Onset   • Coronary artery disease Mother    • Diabetes Mother    • Hypertension Mother    • Kidney disease Father    • Skin cancer Father    • Stroke Maternal Grandfather    • Hypertension Maternal Grandfather    • Diabetes Maternal Grandfather    • Colon cancer Maternal Great-Grandmother    • Osteoporosis Neg Hx        Review of Systems:   Review of Systems   Constitutional: Negative for chills.   Gastrointestinal: Negative for vomiting.   Genitourinary: Positive for dysuria, flank pain, frequency, hesitancy and urgency.         All other systems were reviewed and are negative.  Exceptions are noted in the subjective or above.      Objective     Vital Signs  Vitals:    09/21/22  0814   BP: 114/77   Pulse: 70   Resp: 15   Temp: 97.5 °F (36.4 °C)   SpO2: 99%       Body mass index is 36.14 kg/m².    Physical Exam  Vitals and nursing note reviewed.   Constitutional:       General: She is not in acute distress.     Appearance: She is well-developed.   HENT:      Head: Normocephalic and atraumatic.      Right Ear: Ear canal and external ear normal. Tympanic membrane is erythematous and bulging.      Left Ear: Ear canal and external ear normal. Tympanic membrane is erythematous and bulging.      Nose: Mucosal edema present. No rhinorrhea.      Right Sinus: No maxillary sinus tenderness or frontal sinus tenderness.      Left Sinus: No maxillary sinus tenderness or frontal sinus tenderness.      Mouth/Throat:      Mouth: Mucous membranes are dry.      Pharynx: Posterior oropharyngeal erythema present.      Comments: PND    Eyes:      Conjunctiva/sclera: Conjunctivae normal.   Cardiovascular:      Rate and Rhythm: Normal rate and regular rhythm.      Heart sounds: Normal heart sounds.   Pulmonary:      Effort: Pulmonary effort is normal. No respiratory distress.      Breath sounds: Normal breath sounds.   Lymphadenopathy:      Head:      Right side of head: No submental, submandibular or tonsillar adenopathy.      Left side of head: No submental, submandibular or tonsillar adenopathy.      Cervical: No cervical adenopathy.   Skin:     General: Skin is warm and dry.      Capillary Refill: Capillary refill takes less than 2 seconds.      Findings: No rash.   Neurological:      Mental Status: She is alert and oriented to person, place, and time.   Psychiatric:         Behavior: Behavior normal.         Thought Content: Thought content normal.         Judgment: Judgment normal.              Results Review:    I reviewed the patient's new clinical results.   Office Visit on 09/21/2022   Component Date Value Ref Range Status   • Color 09/21/2022 Yellow  Yellow, Straw, Dark Yellow, Pushpa Final   • Clarity,  UA 09/21/2022 Cloudy (A) Clear Final   • Specific Gravity  09/21/2022 1.030  1.005 - 1.030 Final   • pH, Urine 09/21/2022 5.5  5.0 - 8.0 Final   • Leukocytes 09/21/2022 Negative  Negative Final   • Nitrite, UA 09/21/2022 Negative  Negative Final   • Protein, POC 09/21/2022 1+ (A) Negative mg/dL Final   • Glucose, UA 09/21/2022 Negative  Negative mg/dL Final   • Ketones, UA 09/21/2022 Negative  Negative Final   • Urobilinogen, UA 09/21/2022 Normal  Normal, 0.2 E.U./dL Final   • Bilirubin 09/21/2022 Negative  Negative Final   • Blood, UA 09/21/2022 1+ (A) Negative Final   • Lot Number 09/21/2022 [id number]   Final   • Expiration Date 09/21/2022 12/11/2023   Final         Medication Review:     Current Outpatient Medications:   •  acetaminophen (TYLENOL) 500 MG tablet, Take 2 tablets by mouth Every 8 (Eight) Hours As Needed for Mild Pain ., Disp: 60 tablet, Rfl: 0  •  citalopram (CeleXA) 10 MG tablet, Take 1 tablet by mouth Daily., Disp: 30 tablet, Rfl: 1  •  dicyclomine (BENTYL) 20 MG tablet, Take 1 tablet by mouth Every 6 (Six) Hours As Needed (abdominal cramping)., Disp: 15 tablet, Rfl: 0  •  estradiol (CLIMARA) 0.1 MG/24HR patch, Place 1 patch on the skin as directed by provider., Disp: , Rfl:   •  hydrOXYzine (ATARAX) 25 MG tablet, Take 1 tablet by mouth 3 (Three) Times a Day As Needed for Itching, Allergies or Anxiety., Disp: 90 tablet, Rfl: 0  •  lidocaine (XYLOCAINE) 5 % ointment, Apply  topically to the appropriate area as directed Every Night. (Patient taking differently: Apply 1 application topically to the appropriate area as directed At Night As Needed.), Disp: 35 g, Rfl: 1  •  loratadine (Claritin) 10 MG tablet, Take 1 tablet by mouth Daily. (Patient taking differently: Take 10 mg by mouth As Needed.), Disp: 30 tablet, Rfl: 1  •  metoclopramide (Reglan) 10 MG tablet, Take 1 tablet by mouth 4 (Four) Times a Day Before Meals & at Bedtime., Disp: 120 tablet, Rfl: 5  •  ondansetron (ZOFRAN) 4 MG tablet,  Take 1 tablet by mouth Every 8 (Eight) Hours As Needed for Nausea or Vomiting., Disp: 30 tablet, Rfl: 1  •  pantoprazole (PROTONIX) 40 MG EC tablet, 1 po daily in the am 30 minutes before breakfast (Patient taking differently: Take 40 mg by mouth Daily. 1 po daily in the am 30 minutes before breakfast), Disp: 30 tablet, Rfl: 3  •  phenazopyridine (Pyridium) 100 MG tablet, Take 1 tablet by mouth 3 (Three) Times a Day As Needed for Bladder Spasms., Disp: 6 tablet, Rfl: 0  •  promethazine (PHENERGAN) 25 MG tablet, Take 1 tablet by mouth Every 6 (Six) Hours As Needed for Nausea or Vomiting., Disp: 30 tablet, Rfl: 0  •  sucralfate (Carafate) 1 g tablet, Take 1 tablet by mouth 4 (Four) Times a Day., Disp: 120 tablet, Rfl: 3    Diagnoses and all orders for this visit:    UTI symptoms  -     POCT urinalysis dipstick, automated  -     Urine Culture - Urine, Urine, Clean Catch  -     phenazopyridine (Pyridium) 100 MG tablet; Take 1 tablet by mouth 3 (Three) Times a Day As Needed for Bladder Spasms.  Discussed UTI prevention.  Syncope, unspecified syncope type  Advised patient use caution when changing position discussed worsening signs and symptoms recommend increasing water intake to prevent dehydration  Anxiety  -     citalopram (CeleXA) 10 MG tablet; Take 1 tablet by mouth Daily.  -     hydrOXYzine (ATARAX) 25 MG tablet; Take 1 tablet by mouth 3 (Three) Times a Day As Needed for Itching, Allergies or Anxiety.    Gastroesophageal reflux disease with esophagitis without hemorrhage  Avoid eating spicy foods  Avoid caffeinated beverages  Avoid carbonated beverages  Do not eat or drink within 2-3 hours of going to bed in the evening  Elevate head of bed on 4-6 inch blocks  Eat smaller portions of food throughout the day  Discussed medication options dosage side effects and indications.  Insomnia, unspecified type  -     hydrOXYzine (ATARAX) 25 MG tablet; Take 1 tablet by mouth 3 (Three) Times a Day As Needed for Itching,  Allergies or Anxiety.          Return if symptoms worsen or fail to improve, for Annual.    Megha Fregoso, APRN  09/21/2022

## 2022-09-23 LAB
BACTERIA UR CULT: NORMAL
BACTERIA UR CULT: NORMAL

## 2022-10-19 ENCOUNTER — OFFICE VISIT (OUTPATIENT)
Dept: INTERNAL MEDICINE | Facility: CLINIC | Age: 37
End: 2022-10-19

## 2022-10-19 VITALS
OXYGEN SATURATION: 100 % | TEMPERATURE: 97.7 F | BODY MASS INDEX: 35.68 KG/M2 | HEIGHT: 64 IN | WEIGHT: 209 LBS | DIASTOLIC BLOOD PRESSURE: 82 MMHG | RESPIRATION RATE: 16 BRPM | SYSTOLIC BLOOD PRESSURE: 118 MMHG | HEART RATE: 74 BPM

## 2022-10-19 DIAGNOSIS — R51.9 GENERALIZED HEADACHE: Primary | ICD-10-CM

## 2022-10-19 DIAGNOSIS — R10.84 GENERALIZED ABDOMINAL PAIN: ICD-10-CM

## 2022-10-19 DIAGNOSIS — F41.9 ANXIETY: ICD-10-CM

## 2022-10-19 PROCEDURE — 99213 OFFICE O/P EST LOW 20 MIN: CPT | Performed by: NURSE PRACTITIONER

## 2022-10-19 RX ORDER — CITALOPRAM 20 MG/1
20 TABLET ORAL DAILY
Qty: 90 TABLET | Refills: 1 | Status: SHIPPED | OUTPATIENT
Start: 2022-10-19 | End: 2023-03-24 | Stop reason: HOSPADM

## 2022-10-19 RX ORDER — BACLOFEN 20 MG
1 TABLET ORAL DAILY
Qty: 30 TABLET | Refills: 3 | Status: SHIPPED | OUTPATIENT
Start: 2022-10-19 | End: 2023-03-24 | Stop reason: HOSPADM

## 2022-10-28 ENCOUNTER — APPOINTMENT (OUTPATIENT)
Dept: CT IMAGING | Facility: HOSPITAL | Age: 37
End: 2022-10-28

## 2022-10-28 ENCOUNTER — HOSPITAL ENCOUNTER (EMERGENCY)
Facility: HOSPITAL | Age: 37
Discharge: HOME | End: 2022-10-29
Attending: EMERGENCY MEDICINE | Admitting: EMERGENCY MEDICINE

## 2022-10-28 DIAGNOSIS — R10.9 ABDOMINAL PAIN, UNSPECIFIED ABDOMINAL LOCATION: Primary | ICD-10-CM

## 2022-10-28 LAB
ALBUMIN SERPL-MCNC: 4.1 G/DL (ref 3.5–5.2)
ALBUMIN/GLOB SERPL: 1.3 G/DL
ALP SERPL-CCNC: 80 U/L (ref 39–117)
ALT SERPL W P-5'-P-CCNC: 7 U/L (ref 1–33)
ANION GAP SERPL CALCULATED.3IONS-SCNC: 9.5 MMOL/L (ref 5–15)
AST SERPL-CCNC: 15 U/L (ref 1–32)
BACTERIA UR QL AUTO: ABNORMAL /HPF
BASOPHILS # BLD AUTO: 0.08 10*3/MM3 (ref 0–0.2)
BASOPHILS NFR BLD AUTO: 0.6 % (ref 0–1.5)
BILIRUB SERPL-MCNC: 0.5 MG/DL (ref 0–1.2)
BILIRUB UR QL STRIP: NEGATIVE
BUN SERPL-MCNC: 11 MG/DL (ref 6–20)
BUN/CREAT SERPL: 14.9 (ref 7–25)
CALCIUM SPEC-SCNC: 8.9 MG/DL (ref 8.6–10.5)
CHLORIDE SERPL-SCNC: 102 MMOL/L (ref 98–107)
CLARITY UR: CLEAR
CO2 SERPL-SCNC: 26.5 MMOL/L (ref 22–29)
COLOR UR: YELLOW
CREAT SERPL-MCNC: 0.74 MG/DL (ref 0.57–1)
DEPRECATED RDW RBC AUTO: 39.9 FL (ref 37–54)
EGFRCR SERPLBLD CKD-EPI 2021: 106.4 ML/MIN/1.73
EOSINOPHIL # BLD AUTO: 0.08 10*3/MM3 (ref 0–0.4)
EOSINOPHIL NFR BLD AUTO: 0.6 % (ref 0.3–6.2)
ERYTHROCYTE [DISTWIDTH] IN BLOOD BY AUTOMATED COUNT: 13.2 % (ref 12.3–15.4)
GLOBULIN UR ELPH-MCNC: 3.2 GM/DL
GLUCOSE SERPL-MCNC: 114 MG/DL (ref 65–99)
GLUCOSE UR STRIP-MCNC: NEGATIVE MG/DL
HCT VFR BLD AUTO: 37.5 % (ref 34–46.6)
HGB BLD-MCNC: 12.7 G/DL (ref 12–15.9)
HGB UR QL STRIP.AUTO: ABNORMAL
HOLD SPECIMEN: NORMAL
HOLD SPECIMEN: NORMAL
HYALINE CASTS UR QL AUTO: ABNORMAL /LPF
IMM GRANULOCYTES # BLD AUTO: 0.05 10*3/MM3 (ref 0–0.05)
IMM GRANULOCYTES NFR BLD AUTO: 0.4 % (ref 0–0.5)
KETONES UR QL STRIP: NEGATIVE
LEUKOCYTE ESTERASE UR QL STRIP.AUTO: NEGATIVE
LIPASE SERPL-CCNC: 29 U/L (ref 13–60)
LYMPHOCYTES # BLD AUTO: 1.88 10*3/MM3 (ref 0.7–3.1)
LYMPHOCYTES NFR BLD AUTO: 14.1 % (ref 19.6–45.3)
MCH RBC QN AUTO: 28.2 PG (ref 26.6–33)
MCHC RBC AUTO-ENTMCNC: 33.9 G/DL (ref 31.5–35.7)
MCV RBC AUTO: 83.1 FL (ref 79–97)
MONOCYTES # BLD AUTO: 1.25 10*3/MM3 (ref 0.1–0.9)
MONOCYTES NFR BLD AUTO: 9.4 % (ref 5–12)
NEUTROPHILS NFR BLD AUTO: 10.01 10*3/MM3 (ref 1.7–7)
NEUTROPHILS NFR BLD AUTO: 74.9 % (ref 42.7–76)
NITRITE UR QL STRIP: NEGATIVE
NRBC BLD AUTO-RTO: 0 /100 WBC (ref 0–0.2)
PH UR STRIP.AUTO: <=5 [PH] (ref 5–8)
PLATELET # BLD AUTO: 258 10*3/MM3 (ref 140–450)
PMV BLD AUTO: 10.1 FL (ref 6–12)
POTASSIUM SERPL-SCNC: 4 MMOL/L (ref 3.5–5.2)
PROT SERPL-MCNC: 7.3 G/DL (ref 6–8.5)
PROT UR QL STRIP: NEGATIVE
RBC # BLD AUTO: 4.51 10*6/MM3 (ref 3.77–5.28)
RBC # UR STRIP: ABNORMAL /HPF
REF LAB TEST METHOD: ABNORMAL
SODIUM SERPL-SCNC: 138 MMOL/L (ref 136–145)
SP GR UR STRIP: 1.03 (ref 1–1.03)
SQUAMOUS #/AREA URNS HPF: ABNORMAL /HPF
UROBILINOGEN UR QL STRIP: ABNORMAL
WBC # UR STRIP: ABNORMAL /HPF
WBC NRBC COR # BLD: 13.35 10*3/MM3 (ref 3.4–10.8)
WHOLE BLOOD HOLD COAG: NORMAL
WHOLE BLOOD HOLD SPECIMEN: NORMAL

## 2022-10-28 PROCEDURE — 96374 THER/PROPH/DIAG INJ IV PUSH: CPT

## 2022-10-28 PROCEDURE — 81001 URINALYSIS AUTO W/SCOPE: CPT | Performed by: EMERGENCY MEDICINE

## 2022-10-28 PROCEDURE — 80053 COMPREHEN METABOLIC PANEL: CPT | Performed by: EMERGENCY MEDICINE

## 2022-10-28 PROCEDURE — 96375 TX/PRO/DX INJ NEW DRUG ADDON: CPT

## 2022-10-28 PROCEDURE — 99283 EMERGENCY DEPT VISIT LOW MDM: CPT

## 2022-10-28 PROCEDURE — 25010000002 MORPHINE PER 10 MG: Performed by: EMERGENCY MEDICINE

## 2022-10-28 PROCEDURE — 74176 CT ABD & PELVIS W/O CONTRAST: CPT

## 2022-10-28 PROCEDURE — 83690 ASSAY OF LIPASE: CPT | Performed by: EMERGENCY MEDICINE

## 2022-10-28 PROCEDURE — 85025 COMPLETE CBC W/AUTO DIFF WBC: CPT | Performed by: EMERGENCY MEDICINE

## 2022-10-28 PROCEDURE — 36415 COLL VENOUS BLD VENIPUNCTURE: CPT

## 2022-10-28 RX ORDER — SODIUM CHLORIDE 0.9 % (FLUSH) 0.9 %
10 SYRINGE (ML) INJECTION AS NEEDED
Status: DISCONTINUED | OUTPATIENT
Start: 2022-10-28 | End: 2022-10-29 | Stop reason: HOSPADM

## 2022-10-28 RX ORDER — FAMOTIDINE 10 MG/ML
20 INJECTION, SOLUTION INTRAVENOUS ONCE
Status: COMPLETED | OUTPATIENT
Start: 2022-10-28 | End: 2022-10-28

## 2022-10-28 RX ADMIN — MORPHINE SULFATE 4 MG: 4 INJECTION, SOLUTION INTRAMUSCULAR; INTRAVENOUS at 22:27

## 2022-10-28 RX ADMIN — FAMOTIDINE 20 MG: 10 INJECTION INTRAVENOUS at 22:15

## 2022-10-29 VITALS
OXYGEN SATURATION: 96 % | BODY MASS INDEX: 38.41 KG/M2 | RESPIRATION RATE: 20 BRPM | SYSTOLIC BLOOD PRESSURE: 102 MMHG | HEART RATE: 105 BPM | HEIGHT: 64 IN | WEIGHT: 225 LBS | DIASTOLIC BLOOD PRESSURE: 70 MMHG | TEMPERATURE: 99 F

## 2022-10-29 PROCEDURE — 96376 TX/PRO/DX INJ SAME DRUG ADON: CPT

## 2022-10-29 PROCEDURE — 25010000002 MORPHINE PER 10 MG: Performed by: EMERGENCY MEDICINE

## 2022-10-29 RX ORDER — ONDANSETRON 4 MG/1
4 TABLET, ORALLY DISINTEGRATING ORAL 4 TIMES DAILY PRN
Qty: 20 TABLET | Refills: 0 | Status: SHIPPED | OUTPATIENT
Start: 2022-10-29

## 2022-10-29 RX ORDER — HYDROCODONE BITARTRATE AND ACETAMINOPHEN 5; 325 MG/1; MG/1
1 TABLET ORAL EVERY 6 HOURS PRN
Qty: 10 TABLET | Refills: 0 | Status: SHIPPED | OUTPATIENT
Start: 2022-10-29 | End: 2023-03-24 | Stop reason: HOSPADM

## 2022-10-29 RX ADMIN — MORPHINE SULFATE 4 MG: 4 INJECTION, SOLUTION INTRAMUSCULAR; INTRAVENOUS at 00:26

## 2022-10-31 ENCOUNTER — OFFICE VISIT (OUTPATIENT)
Dept: INTERNAL MEDICINE | Facility: CLINIC | Age: 37
End: 2022-10-31

## 2022-10-31 VITALS
HEIGHT: 64 IN | DIASTOLIC BLOOD PRESSURE: 67 MMHG | HEART RATE: 77 BPM | SYSTOLIC BLOOD PRESSURE: 110 MMHG | RESPIRATION RATE: 15 BRPM | BODY MASS INDEX: 35.51 KG/M2 | OXYGEN SATURATION: 100 % | TEMPERATURE: 98 F | WEIGHT: 208 LBS

## 2022-10-31 DIAGNOSIS — R10.13 EPIGASTRIC PAIN: ICD-10-CM

## 2022-10-31 DIAGNOSIS — R63.0 DECREASED APPETITE: ICD-10-CM

## 2022-10-31 DIAGNOSIS — Z98.890 HISTORY OF NISSEN FUNDOPLICATION: Primary | ICD-10-CM

## 2022-10-31 DIAGNOSIS — R14.0 ABDOMINAL BLOATING: ICD-10-CM

## 2022-10-31 PROCEDURE — 99214 OFFICE O/P EST MOD 30 MIN: CPT | Performed by: NURSE PRACTITIONER

## 2022-10-31 RX ORDER — OMEPRAZOLE 40 MG/1
40 CAPSULE, DELAYED RELEASE ORAL DAILY
Qty: 90 CAPSULE | Refills: 1 | Status: SHIPPED | OUTPATIENT
Start: 2022-10-31 | End: 2023-03-24 | Stop reason: HOSPADM

## 2022-11-21 ENCOUNTER — OFFICE VISIT (OUTPATIENT)
Dept: SURGERY | Facility: CLINIC | Age: 37
End: 2022-11-21

## 2022-11-21 VITALS
TEMPERATURE: 97.2 F | WEIGHT: 209 LBS | HEIGHT: 64 IN | SYSTOLIC BLOOD PRESSURE: 116 MMHG | HEART RATE: 73 BPM | OXYGEN SATURATION: 98 % | BODY MASS INDEX: 35.68 KG/M2 | DIASTOLIC BLOOD PRESSURE: 72 MMHG

## 2022-11-21 DIAGNOSIS — R10.13 EPIGASTRIC PAIN: Primary | ICD-10-CM

## 2022-11-21 PROCEDURE — 99213 OFFICE O/P EST LOW 20 MIN: CPT | Performed by: SURGERY

## 2022-11-28 ENCOUNTER — TELEPHONE (OUTPATIENT)
Dept: SURGERY | Facility: CLINIC | Age: 37
End: 2022-11-28

## 2022-11-28 DIAGNOSIS — R13.19 ESOPHAGEAL DYSPHAGIA: Primary | ICD-10-CM

## 2022-11-28 NOTE — TELEPHONE ENCOUNTER
Caller: Laura Thomson    Best call back number: [phone]    Patient is needing: PT WAS SUPPOSED TO HAVE ORDERS SENT TO HOSPITAL FOR A BARIUM SWALLOW. SHE DID NOT HEAR FROM THEM SO SHE CALLED AND THEY STATED THEY HAVE NOT RECEIVED THE ORDERS. PT HAS FU FROM BARIUM SWALLOW ON 12/7.

## 2022-12-06 ENCOUNTER — HOSPITAL ENCOUNTER (OUTPATIENT)
Dept: GENERAL RADIOLOGY | Facility: HOSPITAL | Age: 37
Discharge: HOME | End: 2022-12-06
Admitting: SURGERY

## 2022-12-06 DIAGNOSIS — R13.19 ESOPHAGEAL DYSPHAGIA: ICD-10-CM

## 2022-12-06 PROCEDURE — 74246 X-RAY XM UPR GI TRC 2CNTRST: CPT

## 2022-12-06 NOTE — PROGRESS NOTES
Patient: Laura Thomson  YOB: 1985    Date: 12/07/2022    Primary Care Provider: Maximino Whelan MD    Chief Complaint   Patient presents with   • Follow-up     UGI       History: Patient is s/p laparoscopic Nissen fundoplication which was done in the remote past.  Patient is here for follow-up upper GI which was performed 12/06/2022.    She did have a Nissen fundoplication performed by myself many years ago.  She has had a previous CT scan performed without contrast in October of this year at [facility] that showed inflammatory changes between the pancreas and the stomach.  Recent upper GI did show evidence of nice passage of contrast into the stomach, a smooth narrowing of the GE junction below the diaphragm, no evidence of hiatal hernia, and easily passable barium tablet with minimal reflux.    She continues to complain of epigastric abdominal discomfort and bloating.    The following portions of the patient's history were reviewed and updated as appropriate: allergies, current medications, past family history, past medical history, past social history, past surgical history and problem list.    Review of Systems   Constitutional: Negative for chills, fever and unexpected weight change.   HENT: Negative for hearing loss, trouble swallowing and voice change.    Eyes: Negative for visual disturbance.   Respiratory: Negative for apnea, cough, chest tightness, shortness of breath and wheezing.    Cardiovascular: Negative for chest pain, palpitations and leg swelling.   Gastrointestinal: Negative for abdominal distention, abdominal pain, anal bleeding, blood in stool, constipation, diarrhea, nausea, rectal pain and vomiting.   Endocrine: Negative for cold intolerance and heat intolerance.   Genitourinary: Negative for difficulty urinating, dysuria and flank pain.   Musculoskeletal: Negative for back pain and gait problem.   Skin: Negative for color change, rash and wound.   Neurological:  "Negative for dizziness, syncope, speech difficulty, weakness, light-headedness, numbness and headaches.   Hematological: Negative for adenopathy. Does not bruise/bleed easily.   Psychiatric/Behavioral: Negative for confusion. The patient is not nervous/anxious.        Vital Signs  Vitals:    12/07/22 1351   BP: 120/76   Pulse: 87   Temp: 99.3 °F (37.4 °C)   SpO2: 97%   Weight: 95.5 kg (210 lb 9.6 oz)   Height: 161.3 cm (63.5\")       Allergies:  No Known Allergies    Medications:    Current Outpatient Medications:   •  acetaminophen (TYLENOL) 500 MG tablet, Take 2 tablets by mouth Every 8 (Eight) Hours As Needed for Mild Pain ., Disp: 60 tablet, Rfl: 0  •  citalopram (CeleXA) 20 MG tablet, Take 1 tablet by mouth Daily., Disp: 90 tablet, Rfl: 1  •  dicyclomine (BENTYL) 20 MG tablet, Take 1 tablet by mouth Every 6 (Six) Hours As Needed (abdominal cramping)., Disp: 15 tablet, Rfl: 0  •  estradiol (CLIMARA) 0.1 MG/24HR patch, APPLY 1 PATCH TOPICALLY ONCE A WEEK, Disp: 12 patch, Rfl: 0  •  HYDROcodone-acetaminophen (NORCO) 5-325 MG per tablet, Take 1 tablet by mouth Every 6 (Six) Hours As Needed for Severe Pain., Disp: 10 tablet, Rfl: 0  •  hydrOXYzine (ATARAX) 25 MG tablet, Take 1 tablet by mouth 3 (Three) Times a Day As Needed for Itching, Allergies or Anxiety., Disp: 90 tablet, Rfl: 0  •  lidocaine (XYLOCAINE) 5 % ointment, Apply  topically to the appropriate area as directed Every Night. (Patient taking differently: Apply 1 application topically to the appropriate area as directed At Night As Needed.), Disp: 35 g, Rfl: 1  •  loratadine (Claritin) 10 MG tablet, Take 1 tablet by mouth Daily. (Patient taking differently: Take 10 mg by mouth As Needed.), Disp: 30 tablet, Rfl: 1  •  Magnesium Oxide 500 MG tablet, Take 1 tablet by mouth Daily., Disp: 30 tablet, Rfl: 3  •  metoclopramide (Reglan) 10 MG tablet, Take 1 tablet by mouth 4 (Four) Times a Day Before Meals & at Bedtime., Disp: 120 tablet, Rfl: 5  •  omeprazole " (priLOSEC) 40 MG capsule, Take 1 capsule by mouth Daily., Disp: 90 capsule, Rfl: 1  •  ondansetron ODT (ZOFRAN-ODT) 4 MG disintegrating tablet, Place 1 tablet on the tongue 4 (Four) Times a Day As Needed for Nausea., Disp: 20 tablet, Rfl: 0  •  promethazine (PHENERGAN) 25 MG tablet, Take 1 tablet by mouth Every 6 (Six) Hours As Needed for Nausea or Vomiting., Disp: 30 tablet, Rfl: 0    Physical Exam:   General Appearance:    Alert, cooperative, in no acute distress   Head:    Normocephalic, without obvious abnormality, atraumatic   Lungs:     Clear to auscultation,respirations regular, even and                  unlabored    Heart:    Regular rhythm and normal rate, normal S1 and S2, no            murmur, no gallop, no rub, no click   Abdomen:     Normal bowel sounds, no masses, no organomegaly, soft        non-tender, non-distended, no guarding, no rebound                tenderness, well-healed midline incision   Extremities:   Moves all extremities well, no edema, no cyanosis, no             redness   Pulses:   Pulses palpable and equal bilaterally   Skin:   No bleeding, bruising or rash     Results Review:   I reviewed the patient's new clinical results.  I reviewed the patient's new imaging results and agree with the interpretation.  I reviewed the patient's other test results and agree with the interpretation     Review of Systems was reviewed and confirmed as accurate as documented by the MA.    ASSESSMENT/PLAN:    1. Epigastric pain       I did have a detailed and extensive discussion with the patient and her mother in the office today.  I reviewed her old CT scan from October up, this does show evidence of slight inflammatory changes between the pancreas and the stomach.  Recent laboratory values from October were reviewed and were fairly normal.  Recent upper GI showed minimal reflux with no hiatal hernia and an intact wrap.    I do not have a good reason for the patient's abdominal discomfort, she did have  an upper endoscopy performed at the [facility] earlier in November of this year.  I would like to get a repeat CT scan of the abdomen and pelvis with oral and IV contrast with more laboratory analysis and then see the patient back in the office in follow-up.    Electronically signed by Franck King MD  12/07/22

## 2022-12-07 ENCOUNTER — OFFICE VISIT (OUTPATIENT)
Dept: SURGERY | Facility: CLINIC | Age: 37
End: 2022-12-07

## 2022-12-07 VITALS
HEIGHT: 64 IN | DIASTOLIC BLOOD PRESSURE: 76 MMHG | WEIGHT: 210.6 LBS | SYSTOLIC BLOOD PRESSURE: 120 MMHG | HEART RATE: 87 BPM | OXYGEN SATURATION: 97 % | TEMPERATURE: 99.3 F | BODY MASS INDEX: 35.96 KG/M2

## 2022-12-07 DIAGNOSIS — R10.13 EPIGASTRIC PAIN: Primary | ICD-10-CM

## 2022-12-07 PROCEDURE — 99213 OFFICE O/P EST LOW 20 MIN: CPT | Performed by: SURGERY

## 2022-12-12 ENCOUNTER — APPOINTMENT (OUTPATIENT)
Dept: CT IMAGING | Facility: HOSPITAL | Age: 37
End: 2022-12-12

## 2022-12-14 ENCOUNTER — TELEPHONE (OUTPATIENT)
Dept: SURGERY | Facility: CLINIC | Age: 37
End: 2022-12-14

## 2022-12-14 NOTE — TELEPHONE ENCOUNTER
Caller: Laura Thomson Kavita    Relationship: Self    Best call back number: [phone]  What is the best time to reach you:     Who are you requesting to speak with (clinical staff, provider,  specific staff member): KATYA  Do you know the name of the person who called: PT  What was the call regarding: PT RETURNING CALL TO KATYA    Do you require a callback: YES        DELETE AFTER READING TO PATIENT: “ Thank you for sharing this information with me. I will send a message to the . Please allow up to 48 hours for the  to follow up on this request.”

## 2022-12-14 NOTE — TELEPHONE ENCOUNTER
Per Dr. King, CT scan ok to be scheduled at [facility] 12/19/2022, npo x 2 hours prior to CT scan.

## 2022-12-19 ENCOUNTER — HOSPITAL ENCOUNTER (OUTPATIENT)
Dept: CT IMAGING | Facility: HOSPITAL | Age: 37
Discharge: HOME | End: 2022-12-19
Admitting: SURGERY

## 2022-12-19 DIAGNOSIS — R10.13 EPIGASTRIC PAIN: ICD-10-CM

## 2022-12-19 PROCEDURE — 25010000002 IOPAMIDOL 61 % SOLUTION: Performed by: SURGERY

## 2022-12-19 PROCEDURE — 74177 CT ABD & PELVIS W/CONTRAST: CPT

## 2022-12-19 RX ADMIN — IOPAMIDOL 95 ML: 612 INJECTION, SOLUTION INTRAVENOUS at 16:08

## 2022-12-20 LAB
ALBUMIN SERPL-MCNC: 4.5 G/DL (ref 3.8–4.8)
ALBUMIN/GLOB SERPL: 1.9 {RATIO} (ref 1.2–2.2)
ALP SERPL-CCNC: 81 IU/L (ref 44–121)
ALT SERPL-CCNC: 15 IU/L (ref 0–32)
AST SERPL-CCNC: 19 IU/L (ref 0–40)
BASOPHILS # BLD AUTO: 0.1 X10E3/UL (ref 0–0.2)
BASOPHILS NFR BLD AUTO: 1 %
BILIRUB SERPL-MCNC: 0.3 MG/DL (ref 0–1.2)
BUN SERPL-MCNC: 8 MG/DL (ref 6–20)
BUN/CREAT SERPL: 12 (ref 9–23)
CALCIUM SERPL-MCNC: 9 MG/DL (ref 8.7–10.2)
CHLORIDE SERPL-SCNC: 106 MMOL/L (ref 96–106)
CO2 SERPL-SCNC: 26 MMOL/L (ref 20–29)
CREAT SERPL-MCNC: 0.69 MG/DL (ref 0.57–1)
EGFRCR SERPLBLD CKD-EPI 2021: 114 ML/MIN/1.73
EOSINOPHIL # BLD AUTO: 0.3 X10E3/UL (ref 0–0.4)
EOSINOPHIL NFR BLD AUTO: 3 %
ERYTHROCYTE [DISTWIDTH] IN BLOOD BY AUTOMATED COUNT: 13.6 % (ref 11.7–15.4)
GLOBULIN SER CALC-MCNC: 2.4 G/DL (ref 1.5–4.5)
GLUCOSE SERPL-MCNC: 93 MG/DL (ref 70–99)
HCT VFR BLD AUTO: 38.8 % (ref 34–46.6)
HGB BLD-MCNC: 12.7 G/DL (ref 11.1–15.9)
IMM GRANULOCYTES # BLD AUTO: 0 X10E3/UL (ref 0–0.1)
IMM GRANULOCYTES NFR BLD AUTO: 0 %
LIPASE SERPL-CCNC: 29 U/L (ref 14–72)
LYMPHOCYTES # BLD AUTO: 2.4 X10E3/UL (ref 0.7–3.1)
LYMPHOCYTES NFR BLD AUTO: 25 %
MCH RBC QN AUTO: 27.3 PG (ref 26.6–33)
MCHC RBC AUTO-ENTMCNC: 32.7 G/DL (ref 31.5–35.7)
MCV RBC AUTO: 83 FL (ref 79–97)
MONOCYTES # BLD AUTO: 0.8 X10E3/UL (ref 0.1–0.9)
MONOCYTES NFR BLD AUTO: 8 %
NEUTROPHILS # BLD AUTO: 6.1 X10E3/UL (ref 1.4–7)
NEUTROPHILS NFR BLD AUTO: 63 %
PLATELET # BLD AUTO: 273 X10E3/UL (ref 150–450)
POTASSIUM SERPL-SCNC: 4 MMOL/L (ref 3.5–5.2)
PROT SERPL-MCNC: 6.9 G/DL (ref 6–8.5)
RBC # BLD AUTO: 4.66 X10E6/UL (ref 3.77–5.28)
SODIUM SERPL-SCNC: 144 MMOL/L (ref 134–144)
WBC # BLD AUTO: 9.7 X10E3/UL (ref 3.4–10.8)

## 2022-12-20 NOTE — PROGRESS NOTES
Patient: Laura Thomson  YOB: 1985    Date: 12/22/2022    Primary Care Provider: Maximino Whelan MD    Chief Complaint   Patient presents with   • Follow-up     Labs and ct scan       History: Patient is here for follow-up CT scan of the abdomen and pelvis which was performed 12/19/2022, it did not show acute findings in the abdomen or pelvis.  Patient is also here for follow-up lab work including CBC, CMP and Lipase.  Please see attached documents for complete reports. Patient states that she is still in a lot of pain.    She has had a previous laparoscopic Nissen fundoplication, she has had a recent CT scan of the abdomen and pelvis that was basically normal.  She had a work-up recently at the [facility] including upper endoscopy that showed an intact wrap.  She has had a recent upper GI and barium swallow that showed no reflux with an intact wrap and no evidence of hiatal hernia.    The following portions of the patient's history were reviewed and updated as appropriate: allergies, current medications, past family history, past medical history, past social history, past surgical history and problem list.    Review of Systems   Constitutional: Negative for chills, fever and unexpected weight change.   HENT: Negative for hearing loss, trouble swallowing and voice change.    Eyes: Negative for visual disturbance.   Respiratory: Negative for apnea, cough, chest tightness, shortness of breath and wheezing.    Cardiovascular: Negative for chest pain, palpitations and leg swelling.   Gastrointestinal: Negative for abdominal distention, abdominal pain, anal bleeding, blood in stool, constipation, diarrhea, nausea, rectal pain and vomiting.   Endocrine: Negative for cold intolerance and heat intolerance.   Genitourinary: Negative for difficulty urinating, dysuria and flank pain.   Musculoskeletal: Negative for back pain and gait problem.   Skin: Negative for color change, rash and  "wound.   Neurological: Negative for dizziness, syncope, speech difficulty, weakness, light-headedness, numbness and headaches.   Hematological: Negative for adenopathy. Does not bruise/bleed easily.   Psychiatric/Behavioral: Negative for confusion. The patient is not nervous/anxious.        Vital Signs  Vitals:    12/22/22 1255   BP: 122/76   BP Location: Left arm   Patient Position: Sitting   Cuff Size: Adult   Pulse: 87   Resp: 18   Temp: 97.8 °F (36.6 °C)   TempSrc: Skin   SpO2: 98%   Weight: 96.2 kg (212 lb)   Height: 162.6 cm (64\")       Allergies:  No Known Allergies    Medications:    Current Outpatient Medications:   •  acetaminophen (TYLENOL) 500 MG tablet, Take 2 tablets by mouth Every 8 (Eight) Hours As Needed for Mild Pain ., Disp: 60 tablet, Rfl: 0  •  estradiol (CLIMARA) 0.1 MG/24HR patch, APPLY 1 PATCH TOPICALLY ONCE A WEEK, Disp: 12 patch, Rfl: 0  •  omeprazole (priLOSEC) 40 MG capsule, Take 1 capsule by mouth Daily., Disp: 90 capsule, Rfl: 1  •  ondansetron ODT (ZOFRAN-ODT) 4 MG disintegrating tablet, Place 1 tablet on the tongue 4 (Four) Times a Day As Needed for Nausea., Disp: 20 tablet, Rfl: 0  •  promethazine (PHENERGAN) 25 MG tablet, Take 1 tablet by mouth Every 6 (Six) Hours As Needed for Nausea or Vomiting., Disp: 30 tablet, Rfl: 0  •  citalopram (CeleXA) 20 MG tablet, Take 1 tablet by mouth Daily., Disp: 90 tablet, Rfl: 1  •  dicyclomine (BENTYL) 20 MG tablet, Take 1 tablet by mouth Every 6 (Six) Hours As Needed (abdominal cramping)., Disp: 15 tablet, Rfl: 0  •  HYDROcodone-acetaminophen (NORCO) 5-325 MG per tablet, Take 1 tablet by mouth Every 6 (Six) Hours As Needed for Severe Pain., Disp: 10 tablet, Rfl: 0  •  hydrOXYzine (ATARAX) 25 MG tablet, Take 1 tablet by mouth 3 (Three) Times a Day As Needed for Itching, Allergies or Anxiety., Disp: 90 tablet, Rfl: 0  •  lidocaine (XYLOCAINE) 5 % ointment, Apply  topically to the appropriate area as directed Every Night. (Patient taking " differently: Apply 1 application topically to the appropriate area as directed At Night As Needed.), Disp: 35 g, Rfl: 1  •  loratadine (Claritin) 10 MG tablet, Take 1 tablet by mouth Daily. (Patient taking differently: Take 10 mg by mouth As Needed.), Disp: 30 tablet, Rfl: 1  •  Magnesium Oxide 500 MG tablet, Take 1 tablet by mouth Daily., Disp: 30 tablet, Rfl: 3  •  metoclopramide (Reglan) 10 MG tablet, Take 1 tablet by mouth 4 (Four) Times a Day Before Meals & at Bedtime., Disp: 120 tablet, Rfl: 5    Physical Exam:   General Appearance:    Alert, cooperative, in no acute distress   Head:    Normocephalic, without obvious abnormality, atraumatic   Lungs:     Clear to auscultation,respirations regular, even and                  unlabored    Heart:    Regular rhythm and normal rate, normal S1 and S2, no            murmur, no gallop, no rub, no click   Abdomen:     Normal bowel sounds, no masses, no organomegaly, soft        non-tender, non-distended, no guarding, no rebound                tenderness, distended but nontender   Extremities:   Moves all extremities well, no edema, no cyanosis, no             redness   Pulses:   Pulses palpable and equal bilaterally   Skin:   No bleeding, bruising or rash     Results Review:   I reviewed the patient's new clinical results.  I reviewed the patient's new imaging results and agree with the interpretation.  I reviewed the patient's other test results and agree with the interpretation     Review of Systems was reviewed and confirmed as accurate as documented by the MA.    ASSESSMENT/PLAN:    1. Epigastric pain       I want to get a gastric emptying study on the patient and then see her back in the office in follow-up.    Electronically signed by Franck King MD  12/22/22

## 2022-12-22 ENCOUNTER — OFFICE VISIT (OUTPATIENT)
Dept: SURGERY | Facility: CLINIC | Age: 37
End: 2022-12-22

## 2022-12-22 VITALS
DIASTOLIC BLOOD PRESSURE: 76 MMHG | SYSTOLIC BLOOD PRESSURE: 122 MMHG | HEART RATE: 87 BPM | BODY MASS INDEX: 36.19 KG/M2 | OXYGEN SATURATION: 98 % | TEMPERATURE: 97.8 F | RESPIRATION RATE: 18 BRPM | WEIGHT: 212 LBS | HEIGHT: 64 IN

## 2022-12-22 DIAGNOSIS — R10.13 EPIGASTRIC PAIN: Primary | ICD-10-CM

## 2022-12-22 PROCEDURE — 99213 OFFICE O/P EST LOW 20 MIN: CPT | Performed by: SURGERY

## 2023-01-09 ENCOUNTER — APPOINTMENT (OUTPATIENT)
Dept: CT IMAGING | Facility: HOSPITAL | Age: 38
End: 2023-01-09

## 2023-01-13 ENCOUNTER — OFFICE VISIT (OUTPATIENT)
Dept: OBSTETRICS AND GYNECOLOGY | Facility: CLINIC | Age: 38
End: 2023-01-13
Payer: COMMERCIAL

## 2023-01-13 VITALS
SYSTOLIC BLOOD PRESSURE: 118 MMHG | HEIGHT: 64 IN | BODY MASS INDEX: 36.02 KG/M2 | WEIGHT: 211 LBS | DIASTOLIC BLOOD PRESSURE: 78 MMHG

## 2023-01-13 DIAGNOSIS — Z90.710 H/O TOTAL HYSTERECTOMY: ICD-10-CM

## 2023-01-13 DIAGNOSIS — R10.9 ABDOMINAL PAIN, UNSPECIFIED ABDOMINAL LOCATION: Primary | ICD-10-CM

## 2023-01-13 DIAGNOSIS — Z90.79 HX OF BSO (BILATERAL SALPINGO-OOPHORECTOMY): ICD-10-CM

## 2023-01-13 DIAGNOSIS — Z90.722 HX OF BSO (BILATERAL SALPINGO-OOPHORECTOMY): ICD-10-CM

## 2023-01-13 DIAGNOSIS — R10.2 PELVIC PAIN: ICD-10-CM

## 2023-01-13 PROCEDURE — 99213 OFFICE O/P EST LOW 20 MIN: CPT | Performed by: OBSTETRICS & GYNECOLOGY

## 2023-01-19 NOTE — PROGRESS NOTES
"GYN Office Visit  Subjective   Chief Complaint   Patient presents with   • Abdominal Pain     Abdominal swelling and pain, no appetite.        Laura Thomson is a 37 y.o. year old  presenting to be seen for abdominal swelling and pain.    She reports abdominal swelling and pain and decreased appetite.  Previous hysterectomy + LSO.  I performed a laparoscopic ELIJAH, RSO roughly 1 year ago.       Objective   /78   Ht 162.6 cm (64\")   Wt 95.7 kg (211 lb)   LMP 2018   BMI 36.22 kg/m²     Physical Exam:  None     Assessment   Abdominal and pelvic pain  Swelling + bloating  Decreased appetite    Reviewed her situation in detail today.  She is interested in additional laparoscopy for adhesive disease.  She will return for a pelvic ultrasound next week and discussion of surgery.  All questions answered.    Coding/billing based on time: 20 total minutes were required for this encounter.    Kem Sahni MD  Obstetrics and Gynecology  [facility]  "

## 2023-02-17 ENCOUNTER — TELEPHONE (OUTPATIENT)
Dept: INTERNAL MEDICINE | Facility: CLINIC | Age: 38
End: 2023-02-17
Payer: COMMERCIAL

## 2023-02-17 RX ORDER — BUSPIRONE HYDROCHLORIDE 5 MG/1
5 TABLET ORAL 3 TIMES DAILY
Qty: 90 TABLET | Refills: 1 | Status: SHIPPED | OUTPATIENT
Start: 2023-02-17

## 2023-02-17 NOTE — TELEPHONE ENCOUNTER
I contacted patient and discussed medication options dosage side effects and indications.  Patient is going through a tough time currently and dealing with family matters.  She has been on BuSpar in the past and states she feels like she is going to have a nervous breakdown.  Denies SI or HI.

## 2023-02-17 NOTE — TELEPHONE ENCOUNTER
Spoke with patient who states that she has a lot of family issues going on. Patient is wanting to know if something can be sent in for her nerves?

## 2023-03-15 ENCOUNTER — OFFICE VISIT (OUTPATIENT)
Dept: OBSTETRICS AND GYNECOLOGY | Facility: CLINIC | Age: 38
End: 2023-03-15
Payer: COMMERCIAL

## 2023-03-15 ENCOUNTER — PREP FOR SURGERY (OUTPATIENT)
Dept: OTHER | Facility: HOSPITAL | Age: 38
End: 2023-03-15
Payer: COMMERCIAL

## 2023-03-15 VITALS
BODY MASS INDEX: 35.17 KG/M2 | HEIGHT: 64 IN | SYSTOLIC BLOOD PRESSURE: 118 MMHG | WEIGHT: 206 LBS | DIASTOLIC BLOOD PRESSURE: 76 MMHG

## 2023-03-15 DIAGNOSIS — N73.6 PELVIC ADHESIVE DISEASE: ICD-10-CM

## 2023-03-15 DIAGNOSIS — Z90.710 H/O TOTAL HYSTERECTOMY WITH BILATERAL SALPINGO-OOPHORECTOMY (BSO): ICD-10-CM

## 2023-03-15 DIAGNOSIS — Z90.722 H/O TOTAL HYSTERECTOMY WITH BILATERAL SALPINGO-OOPHORECTOMY (BSO): ICD-10-CM

## 2023-03-15 DIAGNOSIS — R10.2 PELVIC PAIN: Primary | ICD-10-CM

## 2023-03-15 DIAGNOSIS — Z79.890 HORMONE REPLACEMENT THERAPY (HRT): ICD-10-CM

## 2023-03-15 DIAGNOSIS — Z90.79 H/O TOTAL HYSTERECTOMY WITH BILATERAL SALPINGO-OOPHORECTOMY (BSO): ICD-10-CM

## 2023-03-15 DIAGNOSIS — N73.6 PELVIC ADHESIONS: ICD-10-CM

## 2023-03-15 PROCEDURE — 99214 OFFICE O/P EST MOD 30 MIN: CPT | Performed by: OBSTETRICS & GYNECOLOGY

## 2023-03-15 RX ORDER — SODIUM CHLORIDE 9 MG/ML
40 INJECTION, SOLUTION INTRAVENOUS AS NEEDED
Status: CANCELLED | OUTPATIENT
Start: 2023-03-15

## 2023-03-15 RX ORDER — SODIUM CHLORIDE 0.9 % (FLUSH) 0.9 %
10 SYRINGE (ML) INJECTION AS NEEDED
Status: CANCELLED | OUTPATIENT
Start: 2023-03-15

## 2023-03-15 RX ORDER — SODIUM CHLORIDE 0.9 % (FLUSH) 0.9 %
3 SYRINGE (ML) INJECTION EVERY 12 HOURS SCHEDULED
Status: CANCELLED | OUTPATIENT
Start: 2023-03-15

## 2023-03-16 ENCOUNTER — TELEPHONE (OUTPATIENT)
Dept: PREADMISSION TESTING | Facility: HOSPITAL | Age: 38
End: 2023-03-16

## 2023-03-17 ENCOUNTER — PRE-ADMISSION TESTING (OUTPATIENT)
Dept: PREADMISSION TESTING | Facility: HOSPITAL | Age: 38
End: 2023-03-17
Payer: COMMERCIAL

## 2023-03-17 VITALS — WEIGHT: 204.4 LBS | HEIGHT: 64 IN | BODY MASS INDEX: 34.89 KG/M2

## 2023-03-17 DIAGNOSIS — N73.6 PELVIC ADHESIONS: ICD-10-CM

## 2023-03-17 DIAGNOSIS — Z90.79 H/O TOTAL HYSTERECTOMY WITH BILATERAL SALPINGO-OOPHORECTOMY (BSO): ICD-10-CM

## 2023-03-17 DIAGNOSIS — Z90.722 H/O TOTAL HYSTERECTOMY WITH BILATERAL SALPINGO-OOPHORECTOMY (BSO): ICD-10-CM

## 2023-03-17 DIAGNOSIS — R10.2 PELVIC PAIN: ICD-10-CM

## 2023-03-17 DIAGNOSIS — Z90.710 H/O TOTAL HYSTERECTOMY WITH BILATERAL SALPINGO-OOPHORECTOMY (BSO): ICD-10-CM

## 2023-03-17 LAB
ALBUMIN SERPL-MCNC: 4.1 G/DL (ref 3.5–5.2)
ALBUMIN/GLOB SERPL: 1.5 G/DL
ALP SERPL-CCNC: 73 U/L (ref 39–117)
ALT SERPL W P-5'-P-CCNC: 11 U/L (ref 1–33)
ANION GAP SERPL CALCULATED.3IONS-SCNC: 6.2 MMOL/L (ref 5–15)
AST SERPL-CCNC: 19 U/L (ref 1–32)
BACTERIA UR QL AUTO: ABNORMAL /HPF
BASOPHILS # BLD AUTO: 0.07 10*3/MM3 (ref 0–0.2)
BASOPHILS NFR BLD AUTO: 0.7 % (ref 0–1.5)
BILIRUB SERPL-MCNC: 0.4 MG/DL (ref 0–1.2)
BILIRUB UR QL STRIP: NEGATIVE
BUN SERPL-MCNC: 12 MG/DL (ref 6–20)
BUN/CREAT SERPL: 18.2 (ref 7–25)
CALCIUM SPEC-SCNC: 9.1 MG/DL (ref 8.6–10.5)
CHLORIDE SERPL-SCNC: 104 MMOL/L (ref 98–107)
CLARITY UR: ABNORMAL
CO2 SERPL-SCNC: 28.8 MMOL/L (ref 22–29)
COD CRY URNS QL: ABNORMAL /HPF
COLOR UR: YELLOW
CREAT SERPL-MCNC: 0.66 MG/DL (ref 0.57–1)
DEPRECATED RDW RBC AUTO: 40.4 FL (ref 37–54)
EGFRCR SERPLBLD CKD-EPI 2021: 115.3 ML/MIN/1.73
EOSINOPHIL # BLD AUTO: 0.14 10*3/MM3 (ref 0–0.4)
EOSINOPHIL NFR BLD AUTO: 1.3 % (ref 0.3–6.2)
ERYTHROCYTE [DISTWIDTH] IN BLOOD BY AUTOMATED COUNT: 13.2 % (ref 12.3–15.4)
GLOBULIN UR ELPH-MCNC: 2.8 GM/DL
GLUCOSE SERPL-MCNC: 81 MG/DL (ref 65–99)
GLUCOSE UR STRIP-MCNC: NEGATIVE MG/DL
HCT VFR BLD AUTO: 40 % (ref 34–46.6)
HGB BLD-MCNC: 12.8 G/DL (ref 12–15.9)
HGB UR QL STRIP.AUTO: ABNORMAL
HYALINE CASTS UR QL AUTO: ABNORMAL /LPF
IMM GRANULOCYTES # BLD AUTO: 0.03 10*3/MM3 (ref 0–0.05)
IMM GRANULOCYTES NFR BLD AUTO: 0.3 % (ref 0–0.5)
KETONES UR QL STRIP: ABNORMAL
LEUKOCYTE ESTERASE UR QL STRIP.AUTO: NEGATIVE
LYMPHOCYTES # BLD AUTO: 2.09 10*3/MM3 (ref 0.7–3.1)
LYMPHOCYTES NFR BLD AUTO: 19.7 % (ref 19.6–45.3)
MCH RBC QN AUTO: 26.8 PG (ref 26.6–33)
MCHC RBC AUTO-ENTMCNC: 32 G/DL (ref 31.5–35.7)
MCV RBC AUTO: 83.9 FL (ref 79–97)
MONOCYTES # BLD AUTO: 0.92 10*3/MM3 (ref 0.1–0.9)
MONOCYTES NFR BLD AUTO: 8.7 % (ref 5–12)
NEUTROPHILS NFR BLD AUTO: 69.3 % (ref 42.7–76)
NEUTROPHILS NFR BLD AUTO: 7.35 10*3/MM3 (ref 1.7–7)
NITRITE UR QL STRIP: NEGATIVE
NRBC BLD AUTO-RTO: 0 /100 WBC (ref 0–0.2)
PH UR STRIP.AUTO: <=5 [PH] (ref 5–8)
PLATELET # BLD AUTO: 246 10*3/MM3 (ref 140–450)
PMV BLD AUTO: 10.5 FL (ref 6–12)
POTASSIUM SERPL-SCNC: 3.9 MMOL/L (ref 3.5–5.2)
PROT SERPL-MCNC: 6.9 G/DL (ref 6–8.5)
PROT UR QL STRIP: NEGATIVE
RBC # BLD AUTO: 4.77 10*6/MM3 (ref 3.77–5.28)
RBC # UR STRIP: ABNORMAL /HPF
REF LAB TEST METHOD: ABNORMAL
SODIUM SERPL-SCNC: 139 MMOL/L (ref 136–145)
SP GR UR STRIP: >=1.03 (ref 1–1.03)
SQUAMOUS #/AREA URNS HPF: ABNORMAL /HPF
UNIDENT CRYS URNS QL MICRO: ABNORMAL /HPF
UROBILINOGEN UR QL STRIP: ABNORMAL
WBC # UR STRIP: ABNORMAL /HPF
WBC NRBC COR # BLD: 10.6 10*3/MM3 (ref 3.4–10.8)

## 2023-03-17 PROCEDURE — 36415 COLL VENOUS BLD VENIPUNCTURE: CPT

## 2023-03-17 PROCEDURE — 80053 COMPREHEN METABOLIC PANEL: CPT

## 2023-03-17 PROCEDURE — 85025 COMPLETE CBC W/AUTO DIFF WBC: CPT

## 2023-03-17 PROCEDURE — 81001 URINALYSIS AUTO W/SCOPE: CPT

## 2023-03-17 NOTE — PAT
"Pt here for PAT for upcoming procedure on 3/24/23 with Dr. Sahni.  When assessing her \"profile\" during her PAT visit, pt reports that she does feel safe at home now since her spouse is incarcerated.  States that she has been away from him since Jan 29, 2023, and that he has been incarcerated since March 2, 2023.  She stated that she has a DVO, and that she has  involved, and that her name is Milana.  Pt states that she feels safe and doesn't need anything at this time.      Called  and spoke with Sarah Braxton.  Informed regarding above.  Sarah stated to let pt know that if she needs anything when she comes in the day of her procedure, to let nursing know, and that she will come to see her in [facility].  Pt informed, and verbalized understanding.    "

## 2023-03-24 ENCOUNTER — HOSPITAL ENCOUNTER (OUTPATIENT)
Facility: HOSPITAL | Age: 38
Setting detail: HOSPITAL OUTPATIENT SURGERY
Discharge: HOME | End: 2023-03-24
Attending: OBSTETRICS & GYNECOLOGY | Admitting: OBSTETRICS & GYNECOLOGY
Payer: COMMERCIAL

## 2023-03-24 ENCOUNTER — ANESTHESIA (OUTPATIENT)
Dept: PERIOP | Facility: HOSPITAL | Age: 38
End: 2023-03-24
Payer: COMMERCIAL

## 2023-03-24 ENCOUNTER — ANESTHESIA EVENT (OUTPATIENT)
Dept: PERIOP | Facility: HOSPITAL | Age: 38
End: 2023-03-24
Payer: COMMERCIAL

## 2023-03-24 VITALS
RESPIRATION RATE: 15 BRPM | OXYGEN SATURATION: 94 % | DIASTOLIC BLOOD PRESSURE: 65 MMHG | HEART RATE: 55 BPM | SYSTOLIC BLOOD PRESSURE: 107 MMHG | TEMPERATURE: 97.5 F

## 2023-03-24 DIAGNOSIS — Z90.722 H/O TOTAL HYSTERECTOMY WITH BILATERAL SALPINGO-OOPHORECTOMY (BSO): ICD-10-CM

## 2023-03-24 DIAGNOSIS — N73.6 PELVIC ADHESIONS: ICD-10-CM

## 2023-03-24 DIAGNOSIS — R10.2 PELVIC PAIN: ICD-10-CM

## 2023-03-24 DIAGNOSIS — Z90.710 H/O TOTAL HYSTERECTOMY WITH BILATERAL SALPINGO-OOPHORECTOMY (BSO): ICD-10-CM

## 2023-03-24 DIAGNOSIS — Z90.79 H/O TOTAL HYSTERECTOMY WITH BILATERAL SALPINGO-OOPHORECTOMY (BSO): ICD-10-CM

## 2023-03-24 DIAGNOSIS — K66.0 ABDOMINAL ADHESIONS: Primary | ICD-10-CM

## 2023-03-24 PROCEDURE — 25010000002 HYDROMORPHONE 1 MG/ML SOLUTION: Performed by: NURSE ANESTHETIST, CERTIFIED REGISTERED

## 2023-03-24 PROCEDURE — 25010000002 DEXAMETHASONE PER 1 MG: Performed by: NURSE ANESTHETIST, CERTIFIED REGISTERED

## 2023-03-24 PROCEDURE — 25010000002 ONDANSETRON PER 1 MG: Performed by: NURSE ANESTHETIST, CERTIFIED REGISTERED

## 2023-03-24 PROCEDURE — 25010000002 MIDAZOLAM PER 1MG: Performed by: NURSE ANESTHETIST, CERTIFIED REGISTERED

## 2023-03-24 PROCEDURE — C1765 ADHESION BARRIER: HCPCS | Performed by: OBSTETRICS & GYNECOLOGY

## 2023-03-24 PROCEDURE — 25010000002 FENTANYL CITRATE (PF) 100 MCG/2ML SOLUTION: Performed by: NURSE ANESTHETIST, CERTIFIED REGISTERED

## 2023-03-24 PROCEDURE — 25010000002 KETOROLAC TROMETHAMINE PER 15 MG: Performed by: NURSE ANESTHETIST, CERTIFIED REGISTERED

## 2023-03-24 PROCEDURE — 25010000002 PROPOFOL 200 MG/20ML EMULSION: Performed by: NURSE ANESTHETIST, CERTIFIED REGISTERED

## 2023-03-24 DEVICE — ABSORBABLE ADHESION BARRIER
Type: IMPLANTABLE DEVICE | Site: ABDOMEN | Status: FUNCTIONAL
Brand: GYNECARE INTERCEED

## 2023-03-24 RX ORDER — DEXAMETHASONE SODIUM PHOSPHATE 4 MG/ML
INJECTION, SOLUTION INTRA-ARTICULAR; INTRALESIONAL; INTRAMUSCULAR; INTRAVENOUS; SOFT TISSUE AS NEEDED
Status: DISCONTINUED | OUTPATIENT
Start: 2023-03-24 | End: 2023-03-24 | Stop reason: SURG

## 2023-03-24 RX ORDER — SODIUM CHLORIDE 9 MG/ML
40 INJECTION, SOLUTION INTRAVENOUS AS NEEDED
Status: DISCONTINUED | OUTPATIENT
Start: 2023-03-24 | End: 2023-03-24 | Stop reason: HOSPADM

## 2023-03-24 RX ORDER — ONDANSETRON 2 MG/ML
4 INJECTION INTRAMUSCULAR; INTRAVENOUS ONCE AS NEEDED
Status: DISCONTINUED | OUTPATIENT
Start: 2023-03-24 | End: 2023-03-24 | Stop reason: HOSPADM

## 2023-03-24 RX ORDER — LIDOCAINE HYDROCHLORIDE 20 MG/ML
INJECTION, SOLUTION INTRAVENOUS AS NEEDED
Status: DISCONTINUED | OUTPATIENT
Start: 2023-03-24 | End: 2023-03-24 | Stop reason: SURG

## 2023-03-24 RX ORDER — ACETAMINOPHEN 500 MG
1000 TABLET ORAL EVERY 8 HOURS PRN
Qty: 60 TABLET | Refills: 0 | Status: SHIPPED | OUTPATIENT
Start: 2023-03-24 | End: 2024-03-23

## 2023-03-24 RX ORDER — SCOLOPAMINE TRANSDERMAL SYSTEM 1 MG/1
1 PATCH, EXTENDED RELEASE TRANSDERMAL ONCE
Status: DISCONTINUED | OUTPATIENT
Start: 2023-03-24 | End: 2023-03-24 | Stop reason: HOSPADM

## 2023-03-24 RX ORDER — IBUPROFEN 800 MG/1
800 TABLET ORAL EVERY 8 HOURS PRN
Qty: 30 TABLET | Refills: 0 | Status: SHIPPED | OUTPATIENT
Start: 2023-03-24

## 2023-03-24 RX ORDER — MORPHINE SULFATE 2 MG/ML
2 INJECTION, SOLUTION INTRAMUSCULAR; INTRAVENOUS
Status: DISCONTINUED | OUTPATIENT
Start: 2023-03-24 | End: 2023-03-24 | Stop reason: HOSPADM

## 2023-03-24 RX ORDER — FENTANYL CITRATE 50 UG/ML
INJECTION, SOLUTION INTRAMUSCULAR; INTRAVENOUS AS NEEDED
Status: DISCONTINUED | OUTPATIENT
Start: 2023-03-24 | End: 2023-03-24 | Stop reason: SURG

## 2023-03-24 RX ORDER — KETOROLAC TROMETHAMINE 30 MG/ML
INJECTION, SOLUTION INTRAMUSCULAR; INTRAVENOUS AS NEEDED
Status: DISCONTINUED | OUTPATIENT
Start: 2023-03-24 | End: 2023-03-24 | Stop reason: SURG

## 2023-03-24 RX ORDER — ONDANSETRON 2 MG/ML
INJECTION INTRAMUSCULAR; INTRAVENOUS AS NEEDED
Status: DISCONTINUED | OUTPATIENT
Start: 2023-03-24 | End: 2023-03-24 | Stop reason: SURG

## 2023-03-24 RX ORDER — SODIUM CHLORIDE 0.9 % (FLUSH) 0.9 %
10 SYRINGE (ML) INJECTION AS NEEDED
Status: DISCONTINUED | OUTPATIENT
Start: 2023-03-24 | End: 2023-03-24 | Stop reason: HOSPADM

## 2023-03-24 RX ORDER — SODIUM CHLORIDE, SODIUM LACTATE, POTASSIUM CHLORIDE, CALCIUM CHLORIDE 600; 310; 30; 20 MG/100ML; MG/100ML; MG/100ML; MG/100ML
1000 INJECTION, SOLUTION INTRAVENOUS CONTINUOUS
Status: DISCONTINUED | OUTPATIENT
Start: 2023-03-24 | End: 2023-03-24 | Stop reason: HOSPADM

## 2023-03-24 RX ORDER — MEPERIDINE HYDROCHLORIDE 25 MG/ML
25 INJECTION INTRAMUSCULAR; INTRAVENOUS; SUBCUTANEOUS ONCE AS NEEDED
Status: DISCONTINUED | OUTPATIENT
Start: 2023-03-24 | End: 2023-03-24 | Stop reason: HOSPADM

## 2023-03-24 RX ORDER — PROPOFOL 10 MG/ML
INJECTION, EMULSION INTRAVENOUS AS NEEDED
Status: DISCONTINUED | OUTPATIENT
Start: 2023-03-24 | End: 2023-03-24 | Stop reason: SURG

## 2023-03-24 RX ORDER — KETAMINE HCL IN NACL, ISO-OSM 100MG/10ML
SYRINGE (ML) INJECTION AS NEEDED
Status: DISCONTINUED | OUTPATIENT
Start: 2023-03-24 | End: 2023-03-24 | Stop reason: SURG

## 2023-03-24 RX ORDER — LORAZEPAM 2 MG/ML
1 INJECTION INTRAMUSCULAR ONCE AS NEEDED
Status: DISCONTINUED | OUTPATIENT
Start: 2023-03-24 | End: 2023-03-24 | Stop reason: HOSPADM

## 2023-03-24 RX ORDER — OXYCODONE HYDROCHLORIDE 5 MG/1
5 TABLET ORAL EVERY 8 HOURS PRN
Qty: 5 TABLET | Refills: 0 | Status: SHIPPED | OUTPATIENT
Start: 2023-03-24

## 2023-03-24 RX ORDER — NEOSTIGMINE METHYLSULFATE 5 MG/5 ML
SYRINGE (ML) INTRAVENOUS AS NEEDED
Status: DISCONTINUED | OUTPATIENT
Start: 2023-03-24 | End: 2023-03-24 | Stop reason: SURG

## 2023-03-24 RX ORDER — ROCURONIUM BROMIDE 10 MG/ML
INJECTION, SOLUTION INTRAVENOUS AS NEEDED
Status: DISCONTINUED | OUTPATIENT
Start: 2023-03-24 | End: 2023-03-24 | Stop reason: SURG

## 2023-03-24 RX ORDER — SODIUM CHLORIDE 0.9 % (FLUSH) 0.9 %
3 SYRINGE (ML) INJECTION EVERY 12 HOURS SCHEDULED
Status: DISCONTINUED | OUTPATIENT
Start: 2023-03-24 | End: 2023-03-24 | Stop reason: HOSPADM

## 2023-03-24 RX ORDER — MIDAZOLAM HYDROCHLORIDE 2 MG/2ML
INJECTION, SOLUTION INTRAMUSCULAR; INTRAVENOUS AS NEEDED
Status: DISCONTINUED | OUTPATIENT
Start: 2023-03-24 | End: 2023-03-24 | Stop reason: SURG

## 2023-03-24 RX ADMIN — DEXAMETHASONE SODIUM PHOSPHATE 4 MG: 4 INJECTION, SOLUTION INTRAMUSCULAR; INTRAVENOUS at 09:29

## 2023-03-24 RX ADMIN — ONDANSETRON 4 MG: 2 INJECTION INTRAMUSCULAR; INTRAVENOUS at 09:24

## 2023-03-24 RX ADMIN — SUGAMMADEX 200 MG: 100 INJECTION, SOLUTION INTRAVENOUS at 10:16

## 2023-03-24 RX ADMIN — SCOLOPAMINE TRANSDERMAL SYSTEM 1 PATCH: 1 PATCH, EXTENDED RELEASE TRANSDERMAL at 08:07

## 2023-03-24 RX ADMIN — SODIUM CHLORIDE, POTASSIUM CHLORIDE, SODIUM LACTATE AND CALCIUM CHLORIDE: 600; 310; 30; 20 INJECTION, SOLUTION INTRAVENOUS at 10:09

## 2023-03-24 RX ADMIN — FENTANYL CITRATE 50 MCG: 50 INJECTION INTRAMUSCULAR; INTRAVENOUS at 09:49

## 2023-03-24 RX ADMIN — FENTANYL CITRATE 50 MCG: 50 INJECTION INTRAMUSCULAR; INTRAVENOUS at 09:29

## 2023-03-24 RX ADMIN — HYDROMORPHONE HYDROCHLORIDE 0.5 MG: 1 INJECTION, SOLUTION INTRAMUSCULAR; INTRAVENOUS; SUBCUTANEOUS at 10:42

## 2023-03-24 RX ADMIN — KETOROLAC TROMETHAMINE 30 MG: 30 INJECTION, SOLUTION INTRAMUSCULAR at 10:08

## 2023-03-24 RX ADMIN — Medication 10 MG: at 09:29

## 2023-03-24 RX ADMIN — HYDROMORPHONE HYDROCHLORIDE 0.5 MG: 1 INJECTION, SOLUTION INTRAMUSCULAR; INTRAVENOUS; SUBCUTANEOUS at 10:53

## 2023-03-24 RX ADMIN — SODIUM CHLORIDE, POTASSIUM CHLORIDE, SODIUM LACTATE AND CALCIUM CHLORIDE 1000 ML: 600; 310; 30; 20 INJECTION, SOLUTION INTRAVENOUS at 07:30

## 2023-03-24 RX ADMIN — MIDAZOLAM HYDROCHLORIDE 2 MG: 1 INJECTION, SOLUTION INTRAMUSCULAR; INTRAVENOUS at 09:24

## 2023-03-24 RX ADMIN — ROCURONIUM BROMIDE 40 MG: 10 INJECTION INTRAVENOUS at 09:29

## 2023-03-24 RX ADMIN — GLYCOPYRROLATE 0.6 MG: 0.2 INJECTION, SOLUTION INTRAMUSCULAR; INTRAVENOUS at 10:12

## 2023-03-24 RX ADMIN — LIDOCAINE HYDROCHLORIDE 60 MG: 20 INJECTION, SOLUTION INTRAVENOUS at 09:29

## 2023-03-24 RX ADMIN — PROPOFOL 200 MG: 10 INJECTION, EMULSION INTRAVENOUS at 09:29

## 2023-03-24 RX ADMIN — Medication 4 MG: at 10:12

## 2023-03-24 NOTE — ANESTHESIA PREPROCEDURE EVALUATION
Anesthesia Evaluation     Patient summary reviewed and Nursing notes reviewed   history of anesthetic complications: PONV  NPO Solid Status: > 8 hours  NPO Liquid Status: > 8 hours           Airway   Mallampati: I  TM distance: >3 FB  Neck ROM: full  Possible difficult intubation  Dental - normal exam     Pulmonary - normal exam   Cardiovascular - normal exam    Rhythm: regular  Rate: normal      ROS comment: Treadmill exercise stress  Interpretation Summary    1.  Normal exercise treadmill stress test.  2.  No ischemic ECG changes or significant anginal symptoms during peak stress.  3.  Normal chronotropic and BP response to exercise.  4.  Average exercise capacity, reaching 10.1 METS.  5.  Low risk Duke treadmill score, 7.0.      Neuro/Psych  (+) psychiatric history Anxiety and Depression,    GI/Hepatic/Renal/Endo    (+) obesity,  GERD,  thyroid problem hypothyroidism    Musculoskeletal     Abdominal  - normal exam    Abdomen: soft.  Bowel sounds: normal.   Substance History      OB/GYN          Other                        Anesthesia Plan    ASA 2     general     (Risks and benefits discussed including risk of aspiration, recall and dental damage. All patient questions answered. Will continue with POC.)  intravenous induction     Anesthetic plan, risks, benefits, and alternatives have been provided, discussed and informed consent has been obtained with: patient.  Pre-procedure education provided      CODE STATUS:

## 2023-03-24 NOTE — ANESTHESIA PROCEDURE NOTES
Airway  Urgency: elective    Date/Time: 3/24/2023 9:30 AM  End Time:3/24/2023 9:31 AM  Airway not difficult    General Information and Staff    Patient location during procedure: OR  CRNA/CAA: Gaetano Diaz CRNA    Indications and Patient Condition  Indications for airway management: airway protection    Preoxygenated: yes  MILS not maintained throughout  Mask difficulty assessment: 1 - vent by mask    Final Airway Details  Final airway type: endotracheal airway      Successful airway: ETT  Cuffed: yes   Successful intubation technique: direct laryngoscopy  Facilitating devices/methods: anterior pressure/BURP  Endotracheal tube insertion site: oral  Blade: Asher  Blade size: 3  ETT size (mm): 7.0  Cormack-Lehane Classification: grade I - full view of glottis  Placement verified by: chest auscultation and capnometry   Cuff volume (mL): 6  Measured from: lips  ETT/EBT  to lips (cm): 20  Number of attempts at approach: 1  Assessment: lips, teeth, and gum same as pre-op and atraumatic intubation    Additional Comments  Negative epigastric sounds, Breath sound equal bilaterally with symmetric chest rise and fall

## 2023-03-24 NOTE — PROGRESS NOTES
"GYN Office Visit  Subjective   Chief Complaint   Patient presents with   • Pelvic Pain     TVS done today, needs refills on HRT       Laura Thomson is a 37 y.o. year old  presenting to be seen for follow-up pelvic pain.    She reports ongoing abdominal swelling and pain and decreased appetite.  Her pain is debilitating and affects her ability to participate in activities of daily life.  Previous hysterectomy + LSO.  I performed a laparoscopic ELIJAH, RSO roughly 1 year ago.  She reports compliance with Climara patches and needs refills.       Objective   /76   Ht 162.6 cm (64\")   Wt 93.4 kg (206 lb)   LMP 2018   BMI 35.36 kg/m²     Physical Exam:  None    Medical decision making:    I reviewed her ultrasound from today.    Ultrasound:  Previous hysterectomy + BSO.  Vaginal cuff is normal.  No pelvic masses are noted.  No free fluid.       Assessment   Chronic pelvic pain  Previous hysterectomy + BSO  Known pelvic adhesive disease  BMI 35  Hormone replacement therapy    We reviewed her situation in detail today.  She is interested in additional laparoscopy for potential adhesive disease.  Given the severity of her pain symptoms, I think this is reasonable.  We discussed utilizing an adhesion barrier if extensive adhesiolysis is required.  Risks for the procedure were reviewed today.  All questions answered.    Coding/billing based on time: 30 total minutes were required for this encounter.    Kem Sahni MD  Obstetrics and Gynecology  [facility]  "

## 2023-03-24 NOTE — ANESTHESIA POSTPROCEDURE EVALUATION
Patient: Laura Thomson    Procedure Summary     Date: 03/24/23 Room / Location: [facility] OR 2 /  [facility]    Anesthesia Start: 0926 Anesthesia Stop: 1023    Procedure: DIAGNOSTIC LAPAROSCOPY WITH LYSIS OF ADHESIONS (Abdomen) Diagnosis:       Pelvic pain      Pelvic adhesions      H/O total hysterectomy with bilateral salpingo-oophorectomy (BSO)      (Pelvic pain [R10.2])      (Pelvic adhesions [N73.6])      (H/O total hysterectomy with bilateral salpingo-oophorectomy (BSO) [Z90.710, Z90.722, Z90.79])    Surgeons: Kem Sahni MD Provider: Gaetano Diaz CRNA    Anesthesia Type: general ASA Status: 2          Anesthesia Type: general    Vitals  No vitals data found for the desired time range.          Post Anesthesia Care and Evaluation    Patient location during evaluation: PACU  Patient participation: complete - patient participated  Level of consciousness: awake and alert  Pain score: 2  Pain management: satisfactory to patient    Airway patency: patent  Anesthetic complications: No anesthetic complications  PONV Status: none  Cardiovascular status: acceptable and stable  Respiratory status: acceptable, nonlabored ventilation, spontaneous ventilation and unassisted  Hydration status: acceptable    Comments: Vitals signs as noted in nursing documentation as per protocol.

## 2023-03-24 NOTE — H&P
GYNECOLOGY HISTORY AND PHYSICAL    CHIEF COMPLAINT: Scheduled surgery    DIAGNOSIS:  Chronic pelvic pain  Previous hysterectomy + BSO  Known pelvic adhesive disease  BMI 35    ASSESSMENT/PLAN     37 y.o.  female who presents for scheduled diagnostic laparoscopy, possible lysis of adhesions and all other indicated procedures.    - Proceed to the OR for scheduled surgery  - Risks for the procedure reviewed  - SCDs for DVT ppx  - Antibiotics: None    HISTORY OF PRESENT ILLNESS     37 y.o.  female who presents for the scheduled procedure listed above.    She has no complaints today and there are no interval changes to the history.    REVIEW OF SYSTEMS: A complete review of systems was performed and was specifically negative for headache, changes in vision, RUQ pain, shortness of breath, chest pain, lower extremity edema and dysuria.     HISTORY:  Obstetrical History:  OB History    Para Term  AB Living   3 3 3     3   SAB IAB Ectopic Molar Multiple Live Births             3      # Outcome Date GA Lbr Bishnu/2nd Weight Sex Delivery Anes PTL Lv   3 Term 10/30/15 37w0d  3430 g (7 lb 9 oz) M CS-Unspec  N LUMA   2 Term 04/27/10 39w0d  3345 g (7 lb 6 oz)  CS-Unspec  N LUMA   1 Term 08 40w0d  3402 g (7 lb 8 oz) F CS-Unspec  N LUMA       Past Medical History:  Past Medical History:   Diagnosis Date   • Abdominal pain, periumbilical    • Abdominal pain, RLQ (right lower quadrant)    • Acute pharyngitis    • Anxiety    • Appetite lost    • Chest pain ?    states had a heart monitor and was WNL.  States was told pain was attributed to acid reflux.  No pain since.  (assessed 3/17/23)   • Colon polyp 2017   • Depression     patient denies   • Diarrhea     Secondary to IBS   • Difficulty swallowing     food and liquids   • Fatigue    • Fracture     RIGHT WRIST TWICE, LEFT HAND   • GERD (gastroesophageal reflux disease)    • H/O foot surgery 10/2020    left x 2   • Hearing loss     No use of  hearing aids   • History of bronchitis    • History of colonoscopy    • History of exercise stress test    • History of left salpingo-oophorectomy  2018   • History of ovarian cyst    • History of pneumonia    • Hyperthyroidism     hyperactive taken off medicine when pregnant.  Reported she was medication at one time, but none since pregnancy.    • Irritable bowel syndrome    • Multiple gestation [id number]   • Ovarian cyst, bilateral    • PONV (postoperative nausea and vomiting)    • Seasonal allergies    • RITU (stress urinary incontinence, female) 2020   • TMJ (dislocation of temporomandibular joint)    • Upper respiratory infection    • Vomiting    • Wears glasses        Past Surgical History:  Past Surgical History:   Procedure Laterality Date   • ANKLE SURGERY Left     x2   •  SECTION  2008    DR NAVARRETE   •  SECTION  10/30/2015    DR VASQUEZ   •  SECTION  2010    DR ESPINOZA   • CHOLECYSTECTOMY     • COLONOSCOPY N/A 2017     COLONOSCOPY WITH COLD BIOPSY POLYPECTOMY; BIOPSIES , QUICK CLIP;  Surgeon: Perfecto Mcknight MD;  Location: [facility] ENDOSCOPY;  Service:    • DIAGNOSTIC LAPAROSCOPY  2005    DR JONES NAVA/cystectomy   • DIAGNOSTIC LAPAROSCOPY  2012    DR ESPINOZA/ELIJAH   • DIAGNOSTIC LAPAROSCOPY N/A 08/10/2017     DIAGNOSTIC LAPAROSCOPY, LEFT S&O;  Keren Espinoza MD;  Location: [facility] OR;  Service:    • DIAGNOSTIC LAPAROSCOPY N/A 2022    Procedure: DIAGNOSTIC LAPAROSCOPY,  LYSIS OF ADHESIONS, RIGHT SALPINGO-OOPHORECTOMY;  Surgeon: Kem Sahni MD;  Location: [facility] OR;  Service: Obstetrics/Gynecology;  Laterality: N/A;   • ENDOSCOPY N/A 10/15/2019    Procedure: ESOPHAGOGASTRODUODENOSCOPY WITH BIOPSY AND ESOPHAGEAL DILATATION;  Surgeon: Perfecto Mcknight MD;  Location: [facility] ENDOSCOPY;  Service: Gastroenterology   • ENDOSCOPY N/A 10/11/2021    Procedure: ESOPHAGOGASTRODUODENOSCOPY with dilatation and biopsies;  Surgeon:  Hieu Shultz MD;  Location:  [facility] ENDOSCOPY;  Service: Gastroenterology;  Laterality: N/A;   • FOOT SURGERY Left     ligament and tendon repair   • HERNIA REPAIR  12/20/2019    abd   • MID-URETHRAL SLING WITH CYSTOSCOPY N/A 06/09/2021     MID-URETHRAL SLING WITH CYSTOSCOPY TVT EXACT;  Luc Alicea MD;  Location:  [facility] OR;  Service: Obstetrics/Gynecology;  Laterality: N/A;   • NISSEN FUNDOPLICATION  06/2013   • UPPER GASTROINTESTINAL ENDOSCOPY  2013   • UPPER GASTROINTESTINAL ENDOSCOPY  06/19/2020   • VAGINAL HYSTERECTOMY N/A 07/09/2018    VAGINAL HYSTERECTOMY, BILATERAL SALPINGECTOMY;  Luc lAicea MD;  Location:  [facility] OR;  Service: Gynecology   • WISDOM TOOTH EXTRACTION  2000       Social History:  Social History     Tobacco Use   • Smoking status: Never   • Smokeless tobacco: Never   Vaping Use   • Vaping Use: Never used   Substance Use Topics   • Alcohol use: No   • Drug use: No       Family History  Family History   Problem Relation Age of Onset   • Coronary artery disease Mother    • Diabetes Mother    • Hypertension Mother    • Kidney disease Father    • Skin cancer Father    • Stroke Maternal Grandfather    • Hypertension Maternal Grandfather    • Diabetes Maternal Grandfather    • Colon cancer Maternal Great-Grandmother    • Osteoporosis Neg Hx         Allergies: No Known Allergies    OBJECTIVE   VITALS:  Temp:  [97.1 °F (36.2 °C)] 97.1 °F (36.2 °C)  Heart Rate:  [70] 70  Resp:  [14] 14  BP: (117)/(86) 117/86    PHYSICAL EXAM:  GENERAL: NAD, alert  CHEST: No increased work of breathing, CTAB  CV: RRR, WWP  ABDOMEN: Soft, NTTP  EXTREMITIES:  Warm and well-perfused, nontender, nonedematous  NEURO: AAO x 3, CN II-XII grossly intact    No current facility-administered medications on file prior to encounter.     Current Outpatient Medications on File Prior to Encounter   Medication Sig Dispense Refill   • busPIRone (BUSPAR) 5 MG tablet Take 1 tablet by mouth 3 (Three) Times a Day. 90 tablet 1   •  estradiol (CLIMARA) 0.1 MG/24HR patch Place 1 patch on the skin as directed by provider 1 (One) Time Per Week. 12 patch 5   • lidocaine (XYLOCAINE) 5 % ointment Apply  topically to the appropriate area as directed Every Night. (Patient taking differently: Apply 1 application topically to the appropriate area as directed At Night As Needed.) 35 g 1   • loratadine (Claritin) 10 MG tablet Take 1 tablet by mouth Daily. (Patient taking differently: Take 1 tablet by mouth As Needed.) 30 tablet 1   • citalopram (CeleXA) 20 MG tablet Take 1 tablet by mouth Daily. (Patient not taking: Reported on 3/17/2023) 90 tablet 1   • dicyclomine (BENTYL) 20 MG tablet Take 1 tablet by mouth Every 6 (Six) Hours As Needed (abdominal cramping). (Patient not taking: Reported on 3/17/2023) 15 tablet 0   • HYDROcodone-acetaminophen (NORCO) 5-325 MG per tablet Take 1 tablet by mouth Every 6 (Six) Hours As Needed for Severe Pain. (Patient not taking: Reported on 3/17/2023) 10 tablet 0   • hydrOXYzine (ATARAX) 25 MG tablet Take 1 tablet by mouth 3 (Three) Times a Day As Needed for Itching, Allergies or Anxiety. 90 tablet 0   • Magnesium Oxide 500 MG tablet Take 1 tablet by mouth Daily. (Patient not taking: Reported on 3/17/2023) 30 tablet 3   • metoclopramide (Reglan) 10 MG tablet Take 1 tablet by mouth 4 (Four) Times a Day Before Meals & at Bedtime. (Patient not taking: Reported on 3/17/2023) 120 tablet 5   • omeprazole (priLOSEC) 40 MG capsule Take 1 capsule by mouth Daily. (Patient not taking: Reported on 3/17/2023) 90 capsule 1   • ondansetron ODT (ZOFRAN-ODT) 4 MG disintegrating tablet Place 1 tablet on the tongue 4 (Four) Times a Day As Needed for Nausea. 20 tablet 0   • promethazine (PHENERGAN) 25 MG tablet Take 1 tablet by mouth Every 6 (Six) Hours As Needed for Nausea or Vomiting. 30 tablet 0   • [DISCONTINUED] fluticasone (Flonase) 50 MCG/ACT nasal spray 2 sprays into the nostril(s) as directed by provider Daily. 15.8 mL 1        DIAGNOSTIC STUDIES:  Results from last 7 days   Lab Units 03/17/23  1558   WBC 10*3/mm3 10.60   HEMOGLOBIN g/dL 12.8   HEMATOCRIT % 40.0   PLATELETS 10*3/mm3 246   SODIUM mmol/L 139   POTASSIUM mmol/L 3.9   CHLORIDE mmol/L 104   CO2 mmol/L 28.8   BUN mg/dL 12   CREATININE mg/dL 0.66   GLUCOSE mg/dL 81   CALCIUM mg/dL 9.1   AST (SGOT) U/L 19   ALT (SGPT) U/L 11       No results found for this or any previous visit (from the past 24 hour(s)).    Kem Sahni MD  Obstetrics and Gynecology  [facility]

## 2023-03-24 NOTE — OP NOTE
Edgar Thomson  : 1985  MRN: [id number]  CSN: [id number]  Date: 3/24/2023    Operative Report    Pre-op Diagnosis:  Chronic pelvic pain  Previous hysterectomy + BSO  Known pelvic adhesive disease  BMI 35   Post-op Diagnosis:  Same   Procedure: Diagnostic laparoscopy  Lysis of adhesions   Surgeon:  Surgical assistant: JOY Berrios was responsible for performing the following activities: Retraction, Placing Dressing and Manipulation of laparoscopic instruments and their skilled assistance was necessary for the success of this case.     OR Staff: Circulator: Kristi Singh RN; Jessy Raymond RN  Scrub Person: Georgie Xavier, KATYA; Martha Finley     Anesthesia: General   Estimated Blood Loss: 5 mls   ABx: none   Specimens:  None       Complications: None         Findings:   Omental and small bowel adhesions to the anterior abdominal wall from the umbilicus to the xiphoid.  Omental and bowel adhesions to the bladder and vaginal cuff.  Previous hysterectomy + BSO.    Description of Procedure:   After the appropriate time out and adequate dosing of her anesthesia, the patient was prepped and draped in the usual sterile fashion. The patient was placed in the dorsal lithotomy position using Jake stirrups. A caraballo catheter had been placed in the bladder for drainage during the procedure. A sponge stick was placed in the vagina for uterine manipulation. The manipulator was covered over with a sterile towel.     Attention was then turned to the abdomen. An infraumbilical skin incision was made with the scalpel and a 5 mm trocar was inserted under direct visualization without any complications. The abdomen was insufflated with CO2 being sure to keep the pressure less than 15 mmHg. The patient was placed in Trendelenburg position. A 12 mm trocar was then inserted in the left lower quadrant with the aid of transillumination and after identification of the inferior  epigastric vessels. The ports were placed under direct visualization without any complications and all entry sites were inspected and found to be atraumatic. The abdomen and pelvis were then explored with the above findings noted.     Monopolar scissors were used to take down the omental/bowel adhesions attached to the bladder and vaginal cuff. Adequate hemostasis was noted at all surgical sites.  The pelvis was suctioned.  Interceed was applied along the bladder, vaginal cuff and bowel to prevent further adhesions at this site. All instruments were removed from the patient and all trocars were removed under direct visualization. The pneumoperitoneum was evacuated and the skin incisions were made hemostatic with the Bovie. All skin incisions were closed with a 3-0 Monocryl suture in a subcuticular fashion. Steri-Strips were applied. All counts were correct at the end of the procedure.  There were no complications. The patient tolerated the procedure well.  She was taken to the postoperative recovery room in stable condition.    Kem Sahni MD  Obstetrics and Gynecology  [facility]

## 2023-03-24 NOTE — DISCHARGE INSTRUCTIONS
Pelvic Surgery  Home Care Instructions:  Dr. Kem Sahni      Thank you for choosing [facility]. Please let us know if you have questions or concerns or do not understand the information we give you. Always ask us to explain words or phrases you do not understand.    You have had pelvic surgery. Here are some basic guidelines to help you recover more quickly at home. Your doctor may give you more guidelines. If you have any questions after you go home, please call the numbers listed on the next page.    General Guidelines    1. You may resume normal daily activities.  2. Do not put anything in the vagina (no tampons or douching).    3.   Do not have sex until cleared by your physician.  4.   You may climb steps.  5. You may bathe or shower.  6. The only exercise you should do is walking. This is important for your recovery.  7. Do not drive while taking narcotics.  8. Take the medicines you were taking before surgery. Other medicines you need to take at home are:    Alternate Motrin and Tylenol around the clock. Take each every 8 hours in alternation so that you are getting one of the medications every 4 hours.   Roxicodone 5mg tab  - One tablet by mouth every 4-6 hours as needed for breakthrough pain   Metamucil + Colace daily to keep bowel movements soft        If you have questions about your medicines, let us know. Or, talk to your local pharmacist.    Surgery, lack of activity, pain medicines and iron pills tend to cause constipation. Take something every day to prevent constipation and straining.  Taking something like Metamucil® every day to increase fiber may prevent constipation. Follow the instructions on the container. If you need a gentle laxative, then something like Milk of Magnesia® or Correctol® work fairly well. You should not need to take laxatives often.    10. Call your doctor if you have:     a. Fever of 101 degrees or higher.  b. Heavy vaginal bleeding.  c. Increased redness  around your incision (cut); incision pulling apart; drainage (pus), bleeding, or a large amount of fluid from your incision.  d. Worsening nausea or pain.  e. Other problems or concern.    If you have any questions or concerns about your usual routines, please talk to the nurse or doctor.       To talk with your doctor at [facility], call:  Obstetrics and Gynecology Outpatient Clinic  Phone: [phone]      We appreciate the opportunity you have given us to participate in your care.     No pushing, pulling, tugging,  heavy lifting, or strenuous activity.  No major decision making, driving, or drinking alcoholic beverages for 24 hours. ( due to the medications you have  received)  Always use good hand hygiene/washing techniques.  NO driving while taking pain medications.    * if you have an incision:  Check your incision area every day for signs of infection.   Check for:  * more redness, swelling, or pain  *more fluid or blood  *warmth  *pus or bad smell To assist you in voiding:  Drink plenty of fluids  Listen to running water while attempting to void.    If you are unable to urinate and you have an uncomfortable urge to void or it has been   6 hours since you were discharged, return to the Emergency Room

## 2023-03-30 ENCOUNTER — OFFICE VISIT (OUTPATIENT)
Dept: OBSTETRICS AND GYNECOLOGY | Facility: CLINIC | Age: 38
End: 2023-03-30
Payer: COMMERCIAL

## 2023-03-30 VITALS
DIASTOLIC BLOOD PRESSURE: 76 MMHG | WEIGHT: 206 LBS | HEIGHT: 64 IN | SYSTOLIC BLOOD PRESSURE: 122 MMHG | BODY MASS INDEX: 35.17 KG/M2

## 2023-03-30 DIAGNOSIS — Z90.722 H/O TOTAL HYSTERECTOMY WITH BILATERAL SALPINGO-OOPHORECTOMY (BSO): ICD-10-CM

## 2023-03-30 DIAGNOSIS — Z98.890 S/P LAPAROSCOPY: Primary | ICD-10-CM

## 2023-03-30 DIAGNOSIS — K66.0 ABDOMINAL ADHESIONS: ICD-10-CM

## 2023-03-30 DIAGNOSIS — Z90.79 H/O TOTAL HYSTERECTOMY WITH BILATERAL SALPINGO-OOPHORECTOMY (BSO): ICD-10-CM

## 2023-03-30 DIAGNOSIS — Z90.710 H/O TOTAL HYSTERECTOMY WITH BILATERAL SALPINGO-OOPHORECTOMY (BSO): ICD-10-CM

## 2023-03-30 NOTE — PROGRESS NOTES
"Postoperative Assessment Visit    Subjective   Chief Complaint   Patient presents with   • Post-op     6 days post op Dx laparoscopy, ELIJAH. No complaints       37 y.o.  female who presents for a post-op visit.    Pre-op Diagnosis:  Chronic pelvic pain  Previous hysterectomy + BSO  Known pelvic adhesive disease  BMI 35   Post-op Diagnosis:  Same   Procedure: Diagnostic laparoscopy  Lysis of adhesions     The patient is doing well with no issues.      Objective   Vitals:    23 0906   BP: 122/76   Weight: 93.4 kg (206 lb)   Height: 162.6 cm (64\")     Physical Exam:  Incision(s): Well-healing, no signs of infection or separation  Reassuring postoperative exam       Medical Decision Making:    I have reviewed the operative note.  I have reviewed the postoperative labs where appropriate.    Assessment   Laparoscopic ELIJAH  Post-op evaluation  Abdominal/pelvic adhesive disease  Previous hysterectomy + BSO     Plan    No orders of the defined types were placed in this encounter.      Medication Management: none    Patient is meeting all post-op milestones.  Surgical findings discussed.  Postoperative precautions and restrictions reviewed  If pain persists and she requires additional surgery, we will refer to Gen Surg moving forward    Kem Sahni MD  Obstetrics and Gynecology  [facility]    "

## 2023-04-05 ENCOUNTER — TELEPHONE (OUTPATIENT)
Dept: OBSTETRICS AND GYNECOLOGY | Facility: CLINIC | Age: 38
End: 2023-04-05
Payer: COMMERCIAL

## 2023-04-05 NOTE — TELEPHONE ENCOUNTER
Patient called stating she is swelling and having a lot of abdominal pain. She is requesting referral to surgeon for scar tissue removal of the bowels.

## 2023-04-06 ENCOUNTER — TELEPHONE (OUTPATIENT)
Dept: INTERNAL MEDICINE | Facility: CLINIC | Age: 38
End: 2023-04-06
Payer: COMMERCIAL

## 2023-04-06 DIAGNOSIS — R10.9 ABDOMINAL PAIN, UNSPECIFIED ABDOMINAL LOCATION: Primary | ICD-10-CM

## 2023-04-06 NOTE — TELEPHONE ENCOUNTER
This is the patient that I spoke with you about this morning.  She is requesting to be seen only by Sri on 4/10.  Please return call to patient with appointment time.

## 2023-04-10 ENCOUNTER — OFFICE VISIT (OUTPATIENT)
Dept: INTERNAL MEDICINE | Facility: CLINIC | Age: 38
End: 2023-04-10
Payer: COMMERCIAL

## 2023-04-10 VITALS
DIASTOLIC BLOOD PRESSURE: 65 MMHG | BODY MASS INDEX: 35.3 KG/M2 | TEMPERATURE: 98.3 F | HEIGHT: 64 IN | RESPIRATION RATE: 16 BRPM | WEIGHT: 206.8 LBS | SYSTOLIC BLOOD PRESSURE: 108 MMHG | HEART RATE: 78 BPM | OXYGEN SATURATION: 98 %

## 2023-04-10 DIAGNOSIS — F41.9 ANXIETY: ICD-10-CM

## 2023-04-10 DIAGNOSIS — G47.00 INSOMNIA, UNSPECIFIED TYPE: ICD-10-CM

## 2023-04-10 PROCEDURE — 99214 OFFICE O/P EST MOD 30 MIN: CPT | Performed by: NURSE PRACTITIONER

## 2023-04-10 PROCEDURE — 96127 BRIEF EMOTIONAL/BEHAV ASSMT: CPT | Performed by: NURSE PRACTITIONER

## 2023-04-10 RX ORDER — HYDROXYZINE HYDROCHLORIDE 25 MG/1
25 TABLET, FILM COATED ORAL 3 TIMES DAILY PRN
Qty: 90 TABLET | Refills: 0 | Status: SHIPPED | OUTPATIENT
Start: 2023-04-10
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
## 2023-04-12 NOTE — PROGRESS NOTES
Patient: Laura Thomson    YOB: 1985    Date: 04/13/2023    Primary Care Provider: Megha Fregoso APRN    Chief Complaint   Patient presents with   • Abdominal Pain       SUBJECTIVE:    History of present illness:  Patient is here for evaluation of abdominal pain which is located in the epigastric area and mid abdominal areas.  Patient had recent diagnostic laparoscopy with lysis of adhesions performed by Dr. Sahni.  Findings of the diagnostic laparoscopy showed omental and small bowel adhesions to the anterior abdominal wall from the umbilicus to the xiphoid.    Ms. Thomson has had multiple previous surgeries including 3 previous C-sections, laparoscopic cholecystectomy, and previous laparoscopic Nissen fundoplication by myself in 2014.  She also has had a previous midline incisional herniorrhaphy with a piece of 8 x 12 dual sided Marlex mesh in December 2019.  She has had 4 previous diagnostic laparoscopies via the GYN service over the past 10 years, some of which have been associated with salpingo-oophorectomy but definitely she had lysis of adhesions at that time of surgical intervention at the time of diagnostic laparoscopies.    She did present to the [facility] for abdominal pain in October 2022 and at that time she did have extensive work-up including upper endoscopy that showed no evidence of hiatal hernia, there was slight Nissen stretch and they felt that this was more equivalent now to a Toupet fundoplication, there was no evidence of pathologic reflux.  Upper GI series at that time showed normal esophageal caliber, no stricture, no ulcer, no hiatal hernia, there was no evidence of reflux.  She did have a gastric emptying study that was normal.  pH Bravo probe was also performed at that time that showed no reflux and no symptom correlation.    The patient states that she recently did have diagnostic laparoscopy with lysis of adhesions in the pelvis.    "Kaitlyn supposedly told the patient that she had extensive adhesions of the omentum and small bowel in the mid epigastric region in the midline down to the umbilicus and this was \"twisting her bowel\".    She was extremely tearful in the office today, she stated that she has had such abdominal distention and bloating associated with diarrhea after meals that she is having trouble each and every day.  She has had very stressful situation at home with a recent divorce from her  who currently has been having legal issues.    The following portions of the patient's history were reviewed and updated as appropriate: allergies, current medications, past family history, past medical history, past social history, past surgical history and problem list.      Review of Systems   Constitutional: Negative for chills, fever and unexpected weight change.   HENT: Negative for hearing loss, trouble swallowing and voice change.    Eyes: Negative for visual disturbance.   Respiratory: Negative for apnea, cough, chest tightness, shortness of breath and wheezing.    Cardiovascular: Negative for chest pain, palpitations and leg swelling.   Gastrointestinal: Negative for abdominal distention, abdominal pain, anal bleeding, blood in stool, constipation, diarrhea, nausea, rectal pain and vomiting.   Endocrine: Negative for cold intolerance and heat intolerance.   Genitourinary: Negative for difficulty urinating, dysuria and flank pain.   Musculoskeletal: Negative for back pain and gait problem.   Skin: Negative for color change, rash and wound.   Neurological: Negative for dizziness, syncope, speech difficulty, weakness, light-headedness, numbness and headaches.   Hematological: Negative for adenopathy. Does not bruise/bleed easily.   Psychiatric/Behavioral: Negative for confusion. The patient is not nervous/anxious.        Allergies:  No Known Allergies    Medications:    Current Outpatient Medications:   •  acetaminophen (TYLENOL) " 500 MG tablet, Take 2 tablets by mouth Every 8 (Eight) Hours As Needed for Mild Pain., Disp: 60 tablet, Rfl: 0  •  busPIRone (BUSPAR) 5 MG tablet, Take 1 tablet by mouth 3 (Three) Times a Day., Disp: 90 tablet, Rfl: 1  •  estradiol (CLIMARA) 0.1 MG/24HR patch, Place 1 patch on the skin as directed by provider 1 (One) Time Per Week., Disp: 12 patch, Rfl: 5  •  hydrOXYzine (ATARAX) 25 MG tablet, Take 1 tablet by mouth 3 (Three) Times a Day As Needed for Itching, Allergies or Anxiety., Disp: 90 tablet, Rfl: 0  •  ibuprofen (ADVIL,MOTRIN) 800 MG tablet, Take 1 tablet by mouth Every 8 (Eight) Hours As Needed for Mild Pain, Disp: 30 tablet, Rfl: 0  •  ondansetron ODT (ZOFRAN-ODT) 4 MG disintegrating tablet, Place 1 tablet on the tongue 4 (Four) Times a Day As Needed for Nausea., Disp: 20 tablet, Rfl: 0  •  promethazine (PHENERGAN) 25 MG tablet, Take 1 tablet by mouth Every 6 (Six) Hours As Needed for Nausea or Vomiting., Disp: 30 tablet, Rfl: 0    History:  Past Medical History:   Diagnosis Date   • Abdominal pain, periumbilical    • Abdominal pain, RLQ (right lower quadrant)    • Acute pharyngitis    • Anxiety    • Appetite lost    • Chest pain 2018?    states had a heart monitor and was WNL.  States was told pain was attributed to acid reflux.  No pain since.  (assessed 3/17/23)   • Colon polyp 02/17/2017   • Depression     patient denies   • Diarrhea     Secondary to IBS   • Difficulty swallowing     food and liquids   • Fatigue    • Fracture     RIGHT WRIST TWICE, LEFT HAND   • GERD (gastroesophageal reflux disease)    • H/O foot surgery 10/2020    left x 2   • Hearing loss     No use of hearing aids   • History of bronchitis    • History of colonoscopy    • History of exercise stress test 2020   • History of left salpingo-oophorectomy  2017 August 05/30/2018   • History of ovarian cyst    • History of pneumonia    • Hyperthyroidism     hyperactive taken off medicine when pregnant.  Reported she was medication at one  time, but none since pregnancy.    • Irritable bowel syndrome    • Multiple gestation [id number]   • Ovarian cyst, bilateral    • PONV (postoperative nausea and vomiting)    • Seasonal allergies    • RITU (stress urinary incontinence, female) 2020   • TMJ (dislocation of temporomandibular joint)    • Upper respiratory infection    • Vomiting    • Wears glasses        Past Surgical History:   Procedure Laterality Date   • ANKLE SURGERY Left     x2   •  SECTION  2008    DR NAVARRETE   •  SECTION  10/30/2015    DR VASQUEZ   •  SECTION  2010    DR ESPINOZA   • CHOLECYSTECTOMY     • COLONOSCOPY N/A 2017     COLONOSCOPY WITH COLD BIOPSY POLYPECTOMY; BIOPSIES , QUICK CLIP;  Surgeon: Perfecto Mcknight MD;  Location: [facility] ENDOSCOPY;  Service:    • DIAGNOSTIC LAPAROSCOPY  2005    DR JONES NAVA/cystectomy   • DIAGNOSTIC LAPAROSCOPY  2012    DR ESPINOZA/ELIJAH   • DIAGNOSTIC LAPAROSCOPY N/A 08/10/2017     DIAGNOSTIC LAPAROSCOPY, LEFT S&O;  Keren Espinoza MD;  Location: [facility] OR;  Service:    • DIAGNOSTIC LAPAROSCOPY N/A 2022    Procedure: DIAGNOSTIC LAPAROSCOPY,  LYSIS OF ADHESIONS, RIGHT SALPINGO-OOPHORECTOMY;  Surgeon: Kem Sahni MD;  Location: [facility] OR;  Service: Obstetrics/Gynecology;  Laterality: N/A;   • DIAGNOSTIC LAPAROSCOPY N/A 3/24/2023    Procedure: DIAGNOSTIC LAPAROSCOPY WITH LYSIS OF ADHESIONS;  Surgeon: Kem Sahni MD;  Location: [facility] OR;  Service: Obstetrics/Gynecology;  Laterality: N/A;   • ENDOSCOPY N/A 10/15/2019    Procedure: ESOPHAGOGASTRODUODENOSCOPY WITH BIOPSY AND ESOPHAGEAL DILATATION;  Surgeon: Perfecto Mcknight MD;  Location: [facility] ENDOSCOPY;  Service: Gastroenterology   • ENDOSCOPY N/A 10/11/2021    Procedure: ESOPHAGOGASTRODUODENOSCOPY with dilatation and biopsies;  Surgeon: Hieu Shultz MD;  Location: [facility] ENDOSCOPY;  Service: Gastroenterology;  Laterality: N/A;   • FOOT SURGERY Left     ligament and tendon repair  "  • HERNIA REPAIR  12/20/2019    abd   • MID-URETHRAL SLING WITH CYSTOSCOPY N/A 06/09/2021     MID-URETHRAL SLING WITH CYSTOSCOPY TVT EXACT;  Luc Alicea MD;  Location:  [facility] OR;  Service: Obstetrics/Gynecology;  Laterality: N/A;   • NISSEN FUNDOPLICATION  06/2013   • UPPER GASTROINTESTINAL ENDOSCOPY  2013   • UPPER GASTROINTESTINAL ENDOSCOPY  06/19/2020   • VAGINAL HYSTERECTOMY N/A 07/09/2018    VAGINAL HYSTERECTOMY, BILATERAL SALPINGECTOMY;  Luc Alicea MD;  Location:  [facility] OR;  Service: Gynecology   • WISDOM TOOTH EXTRACTION  2000       Family History   Problem Relation Age of Onset   • Coronary artery disease Mother    • Diabetes Mother    • Hypertension Mother    • Kidney disease Father    • Skin cancer Father    • Stroke Maternal Grandfather    • Hypertension Maternal Grandfather    • Diabetes Maternal Grandfather    • Colon cancer Maternal Great-Grandmother    • Osteoporosis Neg Hx        Social History     Tobacco Use   • Smoking status: Never   • Smokeless tobacco: Never   Vaping Use   • Vaping Use: Never used   Substance Use Topics   • Alcohol use: No   • Drug use: No        OBJECTIVE:    Vital Signs:   Vitals:    04/13/23 1419   BP: 104/62   Pulse: 80   Resp: 18   Temp: 98.2 °F (36.8 °C)   TempSrc: Temporal   SpO2: 98%   Weight: 93.6 kg (206 lb 6.4 oz)   Height: 161.3 cm (63.5\")       Physical Exam:   General Appearance:    Alert, cooperative, in no acute distress   Head:    Normocephalic, without obvious abnormality, atraumatic   Eyes:            Lids and lashes normal, conjunctivae and sclerae normal, no   icterus, no pallor, corneas clear, PERRLA   Ears:    Ears appear intact with no abnormalities noted   Throat:   No oral lesions, no thrush, oral mucosa moist   Neck:   No adenopathy, supple, trachea midline, no thyromegaly, no   carotid bruit, no JVD   Lungs:     Clear to auscultation,respirations regular, even and                  unlabored    Heart:    Regular rhythm and normal rate, normal S1 " and S2, no            murmur, no gallop, no rub, no click   Chest Wall:    No abnormalities observed   Abdomen:     Normal bowel sounds, no masses, no organomegaly, soft        non-tender, distended, no guarding, no rebound                tenderness, well-healed midline incision with no evidence of hernia   Extremities:   Moves all extremities well, no edema, no cyanosis, no             redness   Pulses:   Pulses palpable and equal bilaterally   Skin:   No bleeding, bruising or rash   Lymph nodes:   No palpable adenopathy   Neurologic:   Cranial nerves 2 - 12 grossly intact, sensation intact, DTR       present and equal bilaterally     Results Review:   I reviewed the patient's new clinical results.  I reviewed the patient's new imaging results and agree with the interpretation.  I reviewed the patient's other test results and agree with the interpretation    Review of Systems was reviewed and confirmed as accurate as documented by the MA.    ASSESSMENT/PLAN:    1. Nausea    2. Right lower quadrant abdominal pain    3. Left lower quadrant abdominal pain        I did have an a detailed and extensive discussion with the patient and her mother in the office today.  While she does have adhesions to the anterior abdominal wall this is not too surprising since she has had a previous incisional herniorrhaphy with mesh implantation.  She has no symptomatology of bowel obstruction, I cannot explain her abdominal distention.  She has had previous colonoscopies performed by the GI service.    Review of all of her records including CT scan upper GI and gastric emptying were fairly normal, there is no evidence of bowel distention on CT scan.    The patient is convinced that she needs exploratory laparotomy with lysis of adhesions but I am not convinced that this is going to help her from a symptomatology standpoint.  I would like a week to review her other laboratory values and x-ray studies and then I would like to see her back  in the office in 1 week.  I think I would suggest that the patient get a small bowel series as an outpatient study prior to deciding on possible surgical intervention.    I discussed the patients findings and my recommendations with patient and family, they did not seem happy at the end of the office visit and were rather insistent that the patient be scheduled for surgical intervention.        Electronically signed by Franck King MD  04/13/23

## 2023-04-13 ENCOUNTER — OFFICE VISIT (OUTPATIENT)
Dept: SURGERY | Facility: CLINIC | Age: 38
End: 2023-04-13
Payer: COMMERCIAL

## 2023-04-13 ENCOUNTER — TELEPHONE (OUTPATIENT)
Dept: OBSTETRICS AND GYNECOLOGY | Facility: CLINIC | Age: 38
End: 2023-04-13
Payer: COMMERCIAL

## 2023-04-13 VITALS
BODY MASS INDEX: 35.24 KG/M2 | DIASTOLIC BLOOD PRESSURE: 62 MMHG | OXYGEN SATURATION: 98 % | HEART RATE: 80 BPM | WEIGHT: 206.4 LBS | SYSTOLIC BLOOD PRESSURE: 104 MMHG | TEMPERATURE: 98.2 F | RESPIRATION RATE: 18 BRPM | HEIGHT: 64 IN

## 2023-04-13 DIAGNOSIS — R10.32 LEFT LOWER QUADRANT ABDOMINAL PAIN: ICD-10-CM

## 2023-04-13 DIAGNOSIS — R10.31 RIGHT LOWER QUADRANT ABDOMINAL PAIN: ICD-10-CM

## 2023-04-13 DIAGNOSIS — R11.0 NAUSEA: Primary | ICD-10-CM

## 2023-04-13 PROCEDURE — 1159F MED LIST DOCD IN RCRD: CPT | Performed by: SURGERY

## 2023-04-13 PROCEDURE — 99244 OFF/OP CNSLTJ NEW/EST MOD 40: CPT | Performed by: SURGERY

## 2023-04-13 PROCEDURE — 1160F RVW MEDS BY RX/DR IN RCRD: CPT | Performed by: SURGERY

## 2023-04-13 NOTE — TELEPHONE ENCOUNTER
----- Message from Laura Thomson sent at 4/13/2023  2:53 PM EDT -----  Regarding: Follow up appointment   Contact: [phone]  I went and seen dr King. He is not wanting to go in and do surgery to remove scar tissues from everywhere you said they was. He said he is going to call you and see if it is as bad as I said it was and to see if he really needs to remove them. Would you please tell him I need some kind of relief. If he’s not going to do it, will you please refer me to someone who will. I’m hurting, I’m swelled, I don’t feel good, and I need relief.     Thanks, Laura thomson

## 2023-04-14 ENCOUNTER — TELEPHONE (OUTPATIENT)
Dept: SURGERY | Facility: CLINIC | Age: 38
End: 2023-04-14
Payer: COMMERCIAL

## 2023-04-14 DIAGNOSIS — R10.9 ABDOMINAL PAIN, UNSPECIFIED ABDOMINAL LOCATION: Primary | ICD-10-CM

## 2023-04-14 NOTE — TELEPHONE ENCOUNTER
"Per Dr. King, UGI with SBFT ordered, scheduled for 04/20/2023 @ 1:15 PM,  confirmed with pt.  Pt informed to \"stay NPO after midnight the night before UGI and keep follow-up appt\" scheduled 04/20/2023 as well.  "

## 2023-04-17 ENCOUNTER — TELEPHONE (OUTPATIENT)
Dept: SURGERY | Facility: CLINIC | Age: 38
End: 2023-04-17
Payer: COMMERCIAL

## 2023-04-17 NOTE — TELEPHONE ENCOUNTER
Provider: DR SANCHEZ    Caller:SHAWN [employer]ER[employer]MAN    Relationship to Patient: SELF    Phone Number: [phone]    Reason for Call: PT CALLED TO LET KATYA KNOW THAT SHE WAS [facility]CR[facility] FOR [place] [facility] UGI W AIR CONTRAST AND SBFT ON 4-20-23 BUT IT WAS MOVED  UP TO 4-18-23.      NO CALLED BACK NEEDED

## 2023-04-18 ENCOUNTER — HOSPITAL ENCOUNTER (OUTPATIENT)
Dept: GENERAL RADIOLOGY | Facility: HOSPITAL | Age: 38
Discharge: HOME | End: 2023-04-18
Admitting: SURGERY
Payer: COMMERCIAL

## 2023-04-18 DIAGNOSIS — R10.9 ABDOMINAL PAIN, UNSPECIFIED ABDOMINAL LOCATION: ICD-10-CM

## 2023-04-18 PROCEDURE — 74248 X-RAY SM INT F-THRU STD: CPT

## 2023-04-18 PROCEDURE — 74246 X-RAY XM UPR GI TRC 2CNTRST: CPT

## 2023-04-18 NOTE — TELEPHONE ENCOUNTER
Caller: SHAWN WORKMAN   Relationship: SELF   Best call back number: [phone]    Who are you requesting to speak with (clinical staff, provider,  specific staff member): KATYA     What was the call regarding: PT STATES SHE IS SCHEDULED FOR [place] Upper GI With Air Contrast & SBFT TODAY(04/18).  SHE STATED SHE GOT TO THINKING AND REMEMBERED SHE HAD THIS TEST DONE ON 12/06/22.   SHE'S INQUIRING WHY THIS NEEDS TO BE DONE AGAIN?     Do you require a callback: YES
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
## 2023-04-20 ENCOUNTER — HOSPITAL ENCOUNTER (OUTPATIENT)
Dept: GENERAL RADIOLOGY | Facility: HOSPITAL | Age: 38
Discharge: HOME | End: 2023-04-20
Payer: COMMERCIAL

## 2023-04-20 ENCOUNTER — OFFICE VISIT (OUTPATIENT)
Dept: SURGERY | Facility: CLINIC | Age: 38
End: 2023-04-20
Payer: COMMERCIAL

## 2023-04-20 VITALS
HEART RATE: 136 BPM | TEMPERATURE: 98 F | DIASTOLIC BLOOD PRESSURE: 82 MMHG | WEIGHT: 206.35 LBS | BODY MASS INDEX: 35.23 KG/M2 | HEIGHT: 64 IN | OXYGEN SATURATION: 98 % | SYSTOLIC BLOOD PRESSURE: 110 MMHG

## 2023-04-20 DIAGNOSIS — R10.9 ABDOMINAL PAIN, UNSPECIFIED ABDOMINAL LOCATION: Primary | ICD-10-CM

## 2023-04-20 DIAGNOSIS — R14.0 BLOATING: ICD-10-CM

## 2023-04-20 PROCEDURE — 1160F RVW MEDS BY RX/DR IN RCRD: CPT | Performed by: SURGERY

## 2023-04-20 PROCEDURE — 1159F MED LIST DOCD IN RCRD: CPT | Performed by: SURGERY

## 2023-04-20 PROCEDURE — 99213 OFFICE O/P EST LOW 20 MIN: CPT | Performed by: SURGERY

## 2023-04-21 RX ORDER — MONTELUKAST SODIUM 4 MG/1
1 TABLET, CHEWABLE ORAL 2 TIMES DAILY
Qty: 90 TABLET | Refills: 3 | Status: SHIPPED | OUTPATIENT
Start: 2023-04-21

## 2023-08-19 NOTE — PROGRESS NOTES
EMERGENCY DEPARTMENT ENCOUNTER      CHIEF COMPLAINT    Facial drooping    HISTORY OF PRESENT ILLNESS   Aaron Bautista is a 75 year old male who presents to the emergency room today for evaluation of facial drooping.  Patient presents with his family.  He has underlying dementia and daughter is critical in providing the history.   also used.  Apparently the patient was noted to have a droop of his mouth yesterday with the left side of his mouth drooping.  Family did not think much of it because he has had Bell's palsy in the past.  The patient then was noted today to have some left eye watering and drooping.  His speech was somewhat slurred.  They also note that he bit his tongue today while trying to eat.  No weakness in the arms or the legs.  No gait abnormalities.  No confusion out of his normal baseline due to dementia.  The patient has not had any recent illness, fever, chills.  No recent medication changes.    ALLERGIES    ALLERGIES:   Allergen Reactions   • Codeine VOMITING   • Lisinopril Other (See Comments)   • Midazolam ANXIETY and Other (See Comments)   • Pollen        CURRENT MEDICATIONS    No current facility-administered medications for this encounter.     Current Outpatient Medications   Medication Sig Dispense Refill   • TEGRETOL  MG XR TABLET Take 6 tablets bid x 7 days 84 tablet 3   • escitalopram (LEXAPRO) 5 MG tablet Take 1 tablet by mouth daily. 90 tablet 1   • donepezil (ARICEPT) 10 MG tablet Take 1 tablet by mouth daily. 90 tablet 1   • atorvastatin (LIPITOR) 10 MG tablet TAKE 1 TABLET AT BEDTIME 90 tablet 3   • metFORMIN (GLUCOPHAGE-XR) 500 MG 24 hr tablet TAKE 2 TABLETS TWICE A DAY BEFORE MEALS 360 tablet 3   • methIMAzole (TAPAZOLE) 5 MG tablet Take 1 tablet by mouth daily. 30 tablet 3       PAST MEDICAL HISTORY    Past Medical History:   Diagnosis Date   • Alzheimer's dementia (CMD) 11/11/2016    Neuropsych testing done by Dr. Sajan Kitchen.   • Epilepsy (CMD)   Pt arrived to [address] [place] [address] Room for out pt Regen-cov infusion. RN utilizing proper aerosol droplet Covid precautions. Educated pt on infusion therapy. VSS. IV inserted, #22G in right AC. No complications at site and appears to be in good working condition. Pharmacy notified. Medications delivered. Infusions started. See MAR. Will continue to monitor pt throughout infusion.   • HTN (hypertension), benign 10/10/2016   • Hypercalcemia    • Hyperlipidemia    • Hyperthyroidism    • Low vitamin D level    • Seizure disorder (CMD)    • Subclinical hyperthyroidism    • Type 2 diabetes mellitus (CMD)    • Vitamin D deficiency        SURGICAL HISTORY    Past Surgical History:   Procedure Laterality Date   • Colonoscopy diagnostic  08/02/2021    Affi screening, TA polyp, recall 5 yrs       SOCIAL HISTORY    Social History     Tobacco Use   • Smoking status: Never   • Smokeless tobacco: Never   Substance Use Topics   • Alcohol use: Never     Alcohol/week: 0.0 standard drinks of alcohol   • Drug use: No       FAMILY HISTORY    Family History   Problem Relation Age of Onset   • Diabetes Sister    • Diabetes Sister        REVIEW OF SYSTEMS    Constitutional:  Denies fever or chills.   Eyes:  Denies blurry or double vision.  Also see HPI.  HENT:  Denies nasal congestion or sore throat.   Respiratory:  Denies cough or shortness of breath.   Cardiovascular:  Denies chest pain or lower extremity edema.   Gastrointestinal:  Denies abdominal pain, nausea, vomiting, or diarrhea.   Genitourinary:  Denies dysuria, frequency, urgency.   Musculoskeletal:  Denies back pain or joint pain.  Possible right arm pain.  Neurologic:  See HPI  Psychiatric:  Denies depression or anxiety.     PHYSICAL EXAM       Vitals:    08/19/23 1108   BP: (!) 163/76   Pulse: 74   Resp: 14   Temp: 98.8 °F (37.1 °C)   SpO2: 99%   Weight: 69.7 kg (153 lb 10.6 oz)   Height: 5' 3\" (1.6 m)       Pulse Ox Interpretation:  99% on room air.  Constitutional:  Well developed, well nourished. No acute distress, non-toxic appearance.   Eyes:  PERRL, conjunctivae normal. He has drooping of the left eyelid, left eye is watering.  The patient has intact visual fields and EOMS.  No nystagmus.    HENT:  Head atraumatic. External ears normal. External nose normal. Oropharynx moist.   Respiratory:  No respiratory distress, normal breath sounds. No  crackles. No wheezing.   Cardiovascular:  Normal rate, normal rhythm. No murmurs, gallops, or rubs.  Gastrointestinal:  Soft, nondistended. Normal bowel sounds, nontender.   Musculoskeletal:  No extremity edema, tenderness, or deformities.   Neurologic:  Alert & oriented at baseline per family.  He follows commands.  He can move all 4 extremities without difficulty, but does have some drift of the left lower extremity.  There is intact sensation to light touch in the upper and lower extremities and face.  Face is drooped, left mouth is drooped, left eye is drooped.  Forehead does move bilaterally.  Speech is still slighlty slurred per daughter.  His tongue does protrude to the right.  He has no ataxia finger to nose but could not understand directions to do heel down shin.  He can identify all pictures on the NIH testing.  He does score a 4 on the NIH.  Psychiatric:  Speech and behavior appropriate.     EKG    EKG Interpretation  Time: 11:11 AM  Rate: 75/min  Rhythm: normal sinus rhythm   Abnormality: No STEMI  Reviewed by: Dr. Hamilton.  Final interpretation per cardiology.    RADIOLOGY    CT HEAD WO CONTRAST   Final Result   IMPRESSION:   1.   No acute appearing intracranial abnormality.   2.   Mild generalized atrophy.   3.   Mild chronic small vessel ischemic disease.   4.   Small to moderate-sized deep subcutaneous posterior right convexity   soft tissue density material, probably a recurrent subgaleal/scalp   hemorrhage. No underlying skull fracture.      Electronically Signed by: Marvel Tracy MD    Signed on: 8/19/2023 1:00 PM    Workstation ID: [id number]      CTA HEAD AND NECK   Final Result   IMPRESSION:      CTA head:     *   No acute large-vessel occlusion.   *   No high-grade intracranial stenosis.     *   No evidence of intracranial aneurysm.   *   Left cerebellar benign developmental venous anomaly.      CTA neck:     *   No evidence of high-grade stenosis, dissection, or occlusion.   *   No other  significant findings.         Electronically Signed by: Crow Barrow MD    Signed on: 8/19/2023 1:00 PM    Workstation ID: [id number]            LABS    Results for orders placed or performed during the hospital encounter of 08/19/23   Comprehensive Metabolic Panel   Result Value    Fasting Status     Sodium 134 (L)    Potassium 4.4    Chloride 98    Carbon Dioxide 30    Anion Gap 10    Glucose 316 (H)    BUN 19    Creatinine 0.76    Glomerular Filtration Rate >90     Comment: eGFR results = or >60 mL/min/1.73m2 = Normal kidney function. Estimated GFR calculated using the CKD-EPI-R (2021) equation that does not include race in the creatinine calculation.    BUN/Cr 25    Calcium 10.7 (H)    Bilirubin, Total 0.2    GOT/AST 12    GPT/ALT 20    Alkaline Phosphatase 109    Albumin 3.6    Protein, Total 6.9    Globulin 3.3    A/G Ratio 1.1   Prothrombin Time (INR/PT)   Result Value    Protime- PT 9.9    INR 1.0     Comment: INR Therapeutic Range: 2.0 to 3.0 (2.5 to 3.5 recommended for recurrent thrombotic episodes and mechanical prosthetic heart valves.)   Partial Thromboplastin Time (PTT)   Result Value    PTT 30   TROPONIN I, HIGH SENSITIVITY   Result Value    Troponin I, High Sensitivity 4   CBC with Automated Differential (performable only)   Result Value    WBC 5.1    RBC 4.50    HGB 13.4    HCT 39.6    MCV 88.0    MCH 29.8    MCHC 33.8    RDW-CV 12.1    RDW-SD 39.1        NRBC 0    Neutrophil, Percent 50    Lymphocytes, Percent 39    Mono, Percent 8    Eosinophils, Percent 3    Basophils, Percent 0    Immature Granulocytes 0    Absolute Neutrophils 2.5    Absolute Lymphocytes 2.0    Absolute Monocytes 0.4    Absolute Eosinophils  0.2    Absolute Basophils 0.0    Absolute Immature Granulocytes 0.0       ED COURSE & MEDICAL DECISION MAKING    75-year-old male patient history of hypertension, diabetes, hyperlipidemia, dementia presents to the emergency department evaluation of left facial drooping, noted  yesterday.  Differential includes, but is not limited to, stroke, Bell's palsy, infection, metabolic or other neurologic process or other.  I ordered stroke workup including CT head, CTA head and neck, lab work.  Patient and family agreeable.  Patient would not be a candidate for thrombolytics given last known well time over 24 hours ago.      Labs reviewed and are unremarkable.  Awaiting imaging results.      Imaging also unremarkable.  Given presentation, I am concerned for stroke.  Will discuss with neurology, Dr. Oviedo.  He recommended hospitalization for full stroke workup given persistent symptoms, aspirin administration.  Patient and family updated with daughter translating.  No questions at this time.  Discussed with Dr. Coyle, hospitalist, who accepts for hospitalization.  I did order 325 mg aspirin.    Impression:  The encounter diagnosis was Facial droop.    The patient is not expected to have a 2 midnight stay in the hospital.       Attending physician:Discussed with Ariadna Zaman, PAAlexanderC  08/19/23 9993

## 2023-11-01 ENCOUNTER — HOSPITAL ENCOUNTER (EMERGENCY)
Facility: HOSPITAL | Age: 38
Discharge: HOME | End: 2023-11-01
Attending: EMERGENCY MEDICINE | Admitting: EMERGENCY MEDICINE
Payer: COMMERCIAL

## 2023-11-01 VITALS
TEMPERATURE: 97.8 F | WEIGHT: 206.8 LBS | DIASTOLIC BLOOD PRESSURE: 86 MMHG | SYSTOLIC BLOOD PRESSURE: 123 MMHG | OXYGEN SATURATION: 97 % | HEART RATE: 71 BPM | BODY MASS INDEX: 35.3 KG/M2 | HEIGHT: 64 IN | RESPIRATION RATE: 14 BRPM

## 2023-11-01 DIAGNOSIS — T30.4 CHEMICAL BURN: Primary | ICD-10-CM

## 2023-11-01 PROCEDURE — 99283 EMERGENCY DEPT VISIT LOW MDM: CPT

## 2023-11-01 RX ORDER — HYDROCODONE BITARTRATE AND ACETAMINOPHEN 5; 325 MG/1; MG/1
1 TABLET ORAL EVERY 6 HOURS PRN
Qty: 12 TABLET | Refills: 0 | Status: SHIPPED | OUTPATIENT
Start: 2023-11-01

## 2023-11-01 RX ORDER — GINSENG 100 MG
1 CAPSULE ORAL 2 TIMES DAILY
Qty: 28 G | Refills: 0 | Status: SHIPPED | OUTPATIENT
Start: 2023-11-01

## 2023-11-01 RX ORDER — CHLORHEXIDINE GLUCONATE ORAL RINSE 1.2 MG/ML
10 SOLUTION DENTAL 2 TIMES DAILY
Qty: 140 ML | Refills: 0 | Status: SHIPPED | OUTPATIENT
Start: 2023-11-01 | End: 2023-11-01

## 2023-11-01 RX ORDER — IBUPROFEN 200 MG
1 TABLET ORAL ONCE
Status: COMPLETED | OUTPATIENT
Start: 2023-11-01 | End: 2023-11-01

## 2023-11-01 RX ADMIN — Medication 1 APPLICATION: at 14:49
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
## 2023-11-16 ENCOUNTER — TELEPHONE (OUTPATIENT)
Dept: INTERNAL MEDICINE | Facility: CLINIC | Age: 38
End: 2023-11-16

## 2023-11-16 ENCOUNTER — OFFICE VISIT (OUTPATIENT)
Dept: INTERNAL MEDICINE | Facility: CLINIC | Age: 38
End: 2023-11-16
Payer: COMMERCIAL

## 2023-11-16 VITALS
HEART RATE: 66 BPM | TEMPERATURE: 97.8 F | SYSTOLIC BLOOD PRESSURE: 130 MMHG | RESPIRATION RATE: 20 BRPM | DIASTOLIC BLOOD PRESSURE: 72 MMHG | BODY MASS INDEX: 35.34 KG/M2 | OXYGEN SATURATION: 97 % | WEIGHT: 207 LBS | HEIGHT: 64 IN

## 2023-11-16 DIAGNOSIS — F41.9 ANXIETY: Primary | ICD-10-CM

## 2023-11-16 PROCEDURE — 1160F RVW MEDS BY RX/DR IN RCRD: CPT | Performed by: NURSE PRACTITIONER

## 2023-11-16 PROCEDURE — 99213 OFFICE O/P EST LOW 20 MIN: CPT | Performed by: NURSE PRACTITIONER

## 2023-11-16 PROCEDURE — 1159F MED LIST DOCD IN RCRD: CPT | Performed by: NURSE PRACTITIONER

## 2023-11-16 RX ORDER — CITALOPRAM HYDROBROMIDE 10 MG/1
10 TABLET ORAL DAILY
Qty: 90 TABLET | Refills: 3 | Status: SHIPPED | OUTPATIENT
Start: 2023-11-16

## 2023-11-16 NOTE — PROGRESS NOTES
Chief Complaint / Reason:      Chief Complaint   Patient presents with    Anxiety       Subjective     HPI    History taken from: patient    The patient is a 38-year-old female who presents to the clinic for evaluation of anxiety.    She is attending counseling once a week via Zoom on the computer an hour a week, which is helping. Her therapist informed her a couple of months ago that she is keeping herself busy and not comprehending everything that is really going on. She is experiencing 3 to 4 panic attacks a day. If she is at work, she will go to the bathroom and sit on the floor and rock, cry, or go to the freezer and scream when she is in there. If she is at home, she locks herself in the bedroom and bathroom. She is an  at [email or web address] and works anywhere from 50 to 55 hours a week. She has been taking her BuSpar. She had therapy on Tuesday 11/14/2023 night and was informed that she did not look good and needed to contact her doctor to see if she can give her something stronger. She was on Lexapro and hydroxyzine in the past. She is trying to lose weight, but she is unable to loose weight. She eats 1 meal a day. She eats coronel, sausage, and eggs. She is not eating any bread, starch, drinking pop, or sweets. She is worried about Lalo. She goes to [employer] occasionally. She denies any thoughts of self-harm or harm to others.     PMH/FH/Social History were reviewed and updated appropriately in the electronic medical record.   Past Medical History:   Diagnosis Date    Abdominal pain, periumbilical     Abdominal pain, RLQ (right lower quadrant)     Acute pharyngitis     Anxiety     Appetite lost     Chest pain 2018?    states had a heart monitor and was WNL.  States was told pain was attributed to acid reflux.  No pain since.  (assessed 3/17/23)    Colon polyp 02/17/2017    Depression     patient denies    Diarrhea     Secondary to IBS    Difficulty swallowing     food and liquids     Fatigue     Fracture     RIGHT WRIST TWICE, LEFT HAND    GERD (gastroesophageal reflux disease)     H/O foot surgery 10/2020    left x 2    Hearing loss     No use of hearing aids    History of bronchitis     History of colonoscopy     History of exercise stress test     History of left salpingo-oophorectomy  2018    History of ovarian cyst     History of pneumonia     Hyperthyroidism     hyperactive taken off medicine when pregnant.  Reported she was medication at one time, but none since pregnancy.     Irritable bowel syndrome     Multiple gestation [id number]    Ovarian cyst, bilateral     PONV (postoperative nausea and vomiting)     Seasonal allergies     RITU (stress urinary incontinence, female) 2020    TMJ (dislocation of temporomandibular joint)     Upper respiratory infection     Vomiting     Wears glasses      Past Surgical History:   Procedure Laterality Date    ANKLE SURGERY Left     x2     SECTION  2008    DR NAVARRETE     SECTION  10/30/2015    DR VASQUEZ     SECTION  2010    DR ESPINOZA    CHOLECYSTECTOMY      COLONOSCOPY N/A 2017     COLONOSCOPY WITH COLD BIOPSY POLYPECTOMY; BIOPSIES , QUICK CLIP;  Surgeon: Perfecto Mcknight MD;  Location: [facility] ENDOSCOPY;  Service:     DIAGNOSTIC LAPAROSCOPY  2005    DR JONES NAVA/cystectomy    DIAGNOSTIC LAPAROSCOPY  2012    DR ESPINOZA/ELIJAH    DIAGNOSTIC LAPAROSCOPY N/A 08/10/2017     DIAGNOSTIC LAPAROSCOPY, LEFT S&O;  Keren Espinoza MD;  Location: [facility] OR;  Service:     DIAGNOSTIC LAPAROSCOPY N/A 2022    Procedure: DIAGNOSTIC LAPAROSCOPY,  LYSIS OF ADHESIONS, RIGHT SALPINGO-OOPHORECTOMY;  Surgeon: Kem Sahni MD;  Location: [facility] OR;  Service: Obstetrics/Gynecology;  Laterality: N/A;    DIAGNOSTIC LAPAROSCOPY N/A 3/24/2023    Procedure: DIAGNOSTIC LAPAROSCOPY WITH LYSIS OF ADHESIONS;  Surgeon: Kem Sahni MD;  Location: [facility] OR;  Service: Obstetrics/Gynecology;   Laterality: N/A;    ENDOSCOPY N/A 10/15/2019    Procedure: ESOPHAGOGASTRODUODENOSCOPY WITH BIOPSY AND ESOPHAGEAL DILATATION;  Surgeon: Perfecto Mcknight MD;  Location: [facility] ENDOSCOPY;  Service: Gastroenterology    ENDOSCOPY N/A 10/11/2021    Procedure: ESOPHAGOGASTRODUODENOSCOPY with dilatation and biopsies;  Surgeon: Hieu Shultz MD;  Location: [facility] ENDOSCOPY;  Service: Gastroenterology;  Laterality: N/A;    FOOT SURGERY Left     ligament and tendon repair    HERNIA REPAIR  12/20/2019    abd    MID-URETHRAL SLING WITH CYSTOSCOPY N/A 06/09/2021     MID-URETHRAL SLING WITH CYSTOSCOPY TVT EXACT;  Luc Alicea MD;  Location:  [facility] OR;  Service: Obstetrics/Gynecology;  Laterality: N/A;    NISSEN FUNDOPLICATION  06/2013    UPPER GASTROINTESTINAL ENDOSCOPY  2013    UPPER GASTROINTESTINAL ENDOSCOPY  06/19/2020    VAGINAL HYSTERECTOMY N/A 07/09/2018    VAGINAL HYSTERECTOMY, BILATERAL SALPINGECTOMY;  Luc Alicea MD;  Location:  [facility] OR;  Service: Gynecology    WISDOM TOOTH EXTRACTION  2000     Social History     Socioeconomic History    Marital status:    Tobacco Use    Smoking status: Never    Smokeless tobacco: Never   Vaping Use    Vaping Use: Never used   Substance and Sexual Activity    Alcohol use: No    Drug use: No    Sexual activity: Defer     Family History   Problem Relation Age of Onset    Coronary artery disease Mother     Diabetes Mother     Hypertension Mother     Kidney disease Father     Skin cancer Father     Stroke Maternal Grandfather     Hypertension Maternal Grandfather     Diabetes Maternal Grandfather     Colon cancer Maternal Great-Grandmother     Osteoporosis Neg Hx        Review of Systems:   Review of Systems      All other systems were reviewed and are negative.  Exceptions are noted in the subjective or above.      Objective     Vital Signs  Vitals:    11/16/23 1556   BP: 130/72   Pulse: 66   Resp: 20   Temp: 97.8 °F (36.6 °C)   SpO2: 97%       Body mass index is 36.09  kg/m².          Physical Exam  Vitals and nursing note reviewed.   Constitutional:       Appearance: She is well-developed. She is obese.   HENT:      Head: Normocephalic.   Eyes:      Conjunctiva/sclera: Conjunctivae normal.      Pupils: Pupils are equal, round, and reactive to light.   Cardiovascular:      Rate and Rhythm: Normal rate and regular rhythm.      Pulses: Normal pulses.      Heart sounds: Normal heart sounds.   Pulmonary:      Effort: Pulmonary effort is normal.      Breath sounds: Normal breath sounds.   Abdominal:      General: Bowel sounds are normal.      Palpations: Abdomen is soft.   Musculoskeletal:         General: Normal range of motion.      Cervical back: Normal range of motion and neck supple.   Skin:     General: Skin is warm and dry.   Neurological:      Mental Status: She is alert and oriented to person, place, and time.   Psychiatric:         Mood and Affect: Mood normal.         Behavior: Behavior normal.         Thought Content: Thought content normal.         Judgment: Judgment normal.              Results Review:    I reviewed the patient's new clinical results.       Medication Review:     Current Outpatient Medications:     acetaminophen (TYLENOL) 500 MG tablet, Take 2 tablets by mouth Every 8 (Eight) Hours As Needed for Mild Pain., Disp: 60 tablet, Rfl: 0    busPIRone (BUSPAR) 5 MG tablet, Take 1 tablet by mouth 3 (Three) Times a Day., Disp: 90 tablet, Rfl: 1    estradiol (CLIMARA) 0.1 MG/24HR patch, Place 1 patch on the skin as directed by provider 1 (One) Time Per Week., Disp: 12 patch, Rfl: 5    hydrOXYzine (ATARAX) 25 MG tablet, Take 1 tablet by mouth 3 (Three) Times a Day As Needed for Itching, Allergies or Anxiety., Disp: 90 tablet, Rfl: 0    ibuprofen (ADVIL,MOTRIN) 800 MG tablet, Take 1 tablet by mouth Every 8 (Eight) Hours As Needed for Mild Pain, Disp: 30 tablet, Rfl: 0    ondansetron ODT (ZOFRAN-ODT) 4 MG disintegrating tablet, Place 1 tablet on the tongue 4 (Four)  Times a Day As Needed for Nausea., Disp: 20 tablet, Rfl: 0    promethazine (PHENERGAN) 25 MG tablet, Take 1 tablet by mouth Every 6 (Six) Hours As Needed for Nausea or Vomiting., Disp: 30 tablet, Rfl: 0    citalopram (CeleXA) 10 MG tablet, Take 1 tablet by mouth Daily., Disp: 90 tablet, Rfl: 3    Diagnoses and all orders for this visit:    Anxiety  -     [facility] - Swab, Oral Cavity  -     citalopram (CeleXA) 10 MG tablet; Take 1 tablet by mouth Daily.          Plan:    1. Anxiety.  I will swab the patient's mouth to send off a sample to determine which medication would be beneficial.   She will start taking Celexa in the morning. If it makes her tired, she will take it at night. If Celexa is not enough, we may increase the dosage.   She will continue to take hydroxyzine at night.     2. Weight management.  I will get her scheduled for her annual physical examination.   I will check her thyroid, B12, and other basic labs.      Follow-up  The patient will follow up in 4 weeks.          Return in about 4 weeks (around 12/14/2023), or if symptoms worsen or fail to improve, for Annual.    TRISTEN Barker  11/16/2023          Transcribed from ambient dictation for TRISTEN Barker by Niki Ribeiro.  11/17/23   10:12 EST    Patient or patient representative verbalized consent to the visit recording.  I have personally performed the services described in this document as transcribed by the above individual, and it is both accurate and complete.

## 2023-11-16 NOTE — TELEPHONE ENCOUNTER
Caller: Shawn Thomson    Relationship: Self    Best call back number: [phone]     What is the best time to reach you: ANY    Who are you requesting to speak with (clinical staff, provider,  specific staff member): ELENI PONCE    Do you know the name of the person who called:      What was the call regarding: SHAWN WANTS TO DISCUSS BUSPAR AND HYDROXYZINE-THEY ARE NOT WORKING    Is it okay if the provider responds through MyChart: CALL

## 2023-11-25 ENCOUNTER — HOSPITAL ENCOUNTER (EMERGENCY)
Facility: HOSPITAL | Age: 38
Discharge: HOME | End: 2023-11-26
Attending: EMERGENCY MEDICINE
Payer: COMMERCIAL

## 2023-11-25 ENCOUNTER — APPOINTMENT (OUTPATIENT)
Dept: CT IMAGING | Facility: HOSPITAL | Age: 38
End: 2023-11-25
Payer: COMMERCIAL

## 2023-11-25 DIAGNOSIS — M54.9 BACK PAIN, UNSPECIFIED BACK LOCATION, UNSPECIFIED BACK PAIN LATERALITY, UNSPECIFIED CHRONICITY: ICD-10-CM

## 2023-11-25 DIAGNOSIS — K85.90 ACUTE PANCREATITIS, UNSPECIFIED COMPLICATION STATUS, UNSPECIFIED PANCREATITIS TYPE: Primary | ICD-10-CM

## 2023-11-25 LAB
ALBUMIN SERPL-MCNC: 4.3 G/DL (ref 3.5–5.2)
ALBUMIN/GLOB SERPL: 1.5 G/DL
ALP SERPL-CCNC: 70 U/L (ref 39–117)
ALT SERPL W P-5'-P-CCNC: 6 U/L (ref 1–33)
ANION GAP SERPL CALCULATED.3IONS-SCNC: 7.1 MMOL/L (ref 5–15)
AST SERPL-CCNC: 22 U/L (ref 1–32)
BASOPHILS # BLD AUTO: 0.06 10*3/MM3 (ref 0–0.2)
BASOPHILS NFR BLD AUTO: 0.7 % (ref 0–1.5)
BILIRUB SERPL-MCNC: 0.2 MG/DL (ref 0–1.2)
BILIRUB UR QL STRIP: NEGATIVE
BUN SERPL-MCNC: 13 MG/DL (ref 6–20)
BUN/CREAT SERPL: 18.3 (ref 7–25)
CALCIUM SPEC-SCNC: 9.1 MG/DL (ref 8.6–10.5)
CHLORIDE SERPL-SCNC: 105 MMOL/L (ref 98–107)
CLARITY UR: CLEAR
CO2 SERPL-SCNC: 26.9 MMOL/L (ref 22–29)
COLOR UR: YELLOW
CREAT SERPL-MCNC: 0.71 MG/DL (ref 0.57–1)
DEPRECATED RDW RBC AUTO: 43.8 FL (ref 37–54)
EGFRCR SERPLBLD CKD-EPI 2021: 111.1 ML/MIN/1.73
EOSINOPHIL # BLD AUTO: 0.12 10*3/MM3 (ref 0–0.4)
EOSINOPHIL NFR BLD AUTO: 1.4 % (ref 0.3–6.2)
ERYTHROCYTE [DISTWIDTH] IN BLOOD BY AUTOMATED COUNT: 13.9 % (ref 12.3–15.4)
GLOBULIN UR ELPH-MCNC: 2.9 GM/DL
GLUCOSE SERPL-MCNC: 122 MG/DL (ref 65–99)
GLUCOSE UR STRIP-MCNC: NEGATIVE MG/DL
HCT VFR BLD AUTO: 36.7 % (ref 34–46.6)
HGB BLD-MCNC: 11.8 G/DL (ref 12–15.9)
HGB UR QL STRIP.AUTO: NEGATIVE
HOLD SPECIMEN: NORMAL
HOLD SPECIMEN: NORMAL
IMM GRANULOCYTES # BLD AUTO: 0.02 10*3/MM3 (ref 0–0.05)
IMM GRANULOCYTES NFR BLD AUTO: 0.2 % (ref 0–0.5)
KETONES UR QL STRIP: NEGATIVE
LEUKOCYTE ESTERASE UR QL STRIP.AUTO: NEGATIVE
LIPASE SERPL-CCNC: 44 U/L (ref 13–60)
LYMPHOCYTES # BLD AUTO: 2.36 10*3/MM3 (ref 0.7–3.1)
LYMPHOCYTES NFR BLD AUTO: 27.7 % (ref 19.6–45.3)
MCH RBC QN AUTO: 27.8 PG (ref 26.6–33)
MCHC RBC AUTO-ENTMCNC: 32.2 G/DL (ref 31.5–35.7)
MCV RBC AUTO: 86.4 FL (ref 79–97)
MONOCYTES # BLD AUTO: 0.67 10*3/MM3 (ref 0.1–0.9)
MONOCYTES NFR BLD AUTO: 7.9 % (ref 5–12)
NEUTROPHILS NFR BLD AUTO: 5.29 10*3/MM3 (ref 1.7–7)
NEUTROPHILS NFR BLD AUTO: 62.1 % (ref 42.7–76)
NITRITE UR QL STRIP: NEGATIVE
NRBC BLD AUTO-RTO: 0 /100 WBC (ref 0–0.2)
PH UR STRIP.AUTO: 6 [PH] (ref 5–8)
PLATELET # BLD AUTO: 241 10*3/MM3 (ref 140–450)
PMV BLD AUTO: 10.5 FL (ref 6–12)
POTASSIUM SERPL-SCNC: 4.3 MMOL/L (ref 3.5–5.2)
PROT SERPL-MCNC: 7.2 G/DL (ref 6–8.5)
PROT UR QL STRIP: NEGATIVE
RBC # BLD AUTO: 4.25 10*6/MM3 (ref 3.77–5.28)
SODIUM SERPL-SCNC: 139 MMOL/L (ref 136–145)
SP GR UR STRIP: 1.03 (ref 1–1.03)
UROBILINOGEN UR QL STRIP: NORMAL
WBC NRBC COR # BLD AUTO: 8.52 10*3/MM3 (ref 3.4–10.8)
WHOLE BLOOD HOLD COAG: NORMAL
WHOLE BLOOD HOLD SPECIMEN: NORMAL

## 2023-11-25 PROCEDURE — 85025 COMPLETE CBC W/AUTO DIFF WBC: CPT

## 2023-11-25 PROCEDURE — 25010000002 KETOROLAC TROMETHAMINE PER 15 MG: Performed by: EMERGENCY MEDICINE

## 2023-11-25 PROCEDURE — 80053 COMPREHEN METABOLIC PANEL: CPT

## 2023-11-25 PROCEDURE — 25010000002 MORPHINE PER 10 MG: Performed by: EMERGENCY MEDICINE

## 2023-11-25 PROCEDURE — 72131 CT LUMBAR SPINE W/O DYE: CPT

## 2023-11-25 PROCEDURE — 74176 CT ABD & PELVIS W/O CONTRAST: CPT

## 2023-11-25 PROCEDURE — 99284 EMERGENCY DEPT VISIT MOD MDM: CPT

## 2023-11-25 PROCEDURE — 25010000002 ONDANSETRON PER 1 MG: Performed by: EMERGENCY MEDICINE

## 2023-11-25 PROCEDURE — 81003 URINALYSIS AUTO W/O SCOPE: CPT

## 2023-11-25 PROCEDURE — 96374 THER/PROPH/DIAG INJ IV PUSH: CPT

## 2023-11-25 PROCEDURE — 83690 ASSAY OF LIPASE: CPT

## 2023-11-25 PROCEDURE — 96375 TX/PRO/DX INJ NEW DRUG ADDON: CPT

## 2023-11-25 RX ORDER — SODIUM CHLORIDE 0.9 % (FLUSH) 0.9 %
10 SYRINGE (ML) INJECTION AS NEEDED
Status: DISCONTINUED | OUTPATIENT
Start: 2023-11-25 | End: 2023-11-26 | Stop reason: HOSPADM

## 2023-11-25 RX ORDER — KETOROLAC TROMETHAMINE 30 MG/ML
10 INJECTION, SOLUTION INTRAMUSCULAR; INTRAVENOUS ONCE
Status: COMPLETED | OUTPATIENT
Start: 2023-11-25 | End: 2023-11-25

## 2023-11-25 RX ORDER — LIDOCAINE 50 MG/G
1 PATCH TOPICAL ONCE
Status: DISCONTINUED | OUTPATIENT
Start: 2023-11-25 | End: 2023-11-26 | Stop reason: HOSPADM

## 2023-11-25 RX ORDER — ONDANSETRON 2 MG/ML
4 INJECTION INTRAMUSCULAR; INTRAVENOUS ONCE
Status: COMPLETED | OUTPATIENT
Start: 2023-11-25 | End: 2023-11-25

## 2023-11-25 RX ADMIN — MORPHINE SULFATE 4 MG: 4 INJECTION, SOLUTION INTRAMUSCULAR; INTRAVENOUS at 22:16

## 2023-11-25 RX ADMIN — ONDANSETRON 4 MG: 2 INJECTION INTRAMUSCULAR; INTRAVENOUS at 22:15

## 2023-11-25 RX ADMIN — KETOROLAC TROMETHAMINE 10 MG: 30 INJECTION, SOLUTION INTRAMUSCULAR; INTRAVENOUS at 22:15

## 2023-11-25 RX ADMIN — LIDOCAINE 1 PATCH: 700 PATCH TOPICAL at 22:16

## 2023-11-25 NOTE — Clinical Note
[facility] EMERGENCY DEPARTMENT  [address] [address]  Phone: [phone]    Laura Thomson was seen and treated in our emergency department on 11/25/2023.  She may return to work on 11/28/2023.         Thank you for choosing [facility].    Camille Messina MD

## 2023-11-25 NOTE — PROGRESS NOTES
Patient: Laura Thomson    YOB: 1985    Date: 11/21/2022    Primary Care Provider: Maximino Whelan MD    Chief Complaint   Patient presents with   • Bloated       SUBJECTIVE:    History of present illness:  Patient is s/p laparoscopic nissen fundoplication.  I saw the patient in the office  today as a consultation for evaluation and treatment of abdominal bloating with bouts of nausea.    She does give a history significant for bloating, she has had a previous laparoscopic Nissen fundoplication many years ago.  She recently seen at the [facility] and reportedly upper endoscopy was performed, this showed no evidence of ulcerations and what seemed to be an intact laparoscopic Nissen.    She has had upper endoscopies performed over the last several years with dilation of distal esophageal stricture.    The following portions of the patient's history were reviewed and updated as appropriate: allergies, current medications, past family history, past medical history, past social history, past surgical history and problem list.    Review of Systems   Constitutional: Negative for chills, fever and unexpected weight change.   HENT: Negative for hearing loss, trouble swallowing and voice change.    Eyes: Negative for visual disturbance.   Respiratory: Negative for apnea, cough, chest tightness, shortness of breath and wheezing.    Cardiovascular: Negative for chest pain, palpitations and leg swelling.   Gastrointestinal: Positive for abdominal distention and nausea. Negative for abdominal pain, anal bleeding, blood in stool, constipation, diarrhea, rectal pain and vomiting.   Endocrine: Negative for cold intolerance and heat intolerance.   Genitourinary: Negative for difficulty urinating, dysuria and flank pain.   Musculoskeletal: Negative for back pain and gait problem.   Skin: Negative for color change, rash and wound.   Neurological: Negative for dizziness, syncope, speech difficulty,  weakness, light-headedness, numbness and headaches.   Hematological: Negative for adenopathy. Does not bruise/bleed easily.   Psychiatric/Behavioral: Negative for confusion. The patient is not nervous/anxious.        History:  Past Medical History:   Diagnosis Date   • Abdominal pain, periumbilical    • Abdominal pain, RLQ (right lower quadrant)    • Acute pharyngitis    • Anxiety    • Appetite lost    • Chest pain 2018?    states had a heart monitor and was WNL.  States was told pain was attributed to acid reflux.     • Colon polyp 2017   • Depression     patient denies   • Diarrhea     Secondary to IBS   • Difficulty swallowing     food and liquids   • Fatigue    • Fracture     RIGHT WRIST TWICE, LEFT HAND   • GERD (gastroesophageal reflux disease)    • H/O foot surgery 10/2020    left x 2   • Hearing loss     No use of hearing aids   • History of bronchitis    • History of colonoscopy    • History of exercise stress test ?   • History of left salpingo-oophorectomy  2018   • History of ovarian cyst    • History of pneumonia    • Hyperthyroidism     hyperactive taken off medicine when pregnant.  Reported she was medication at one time, but none since pregnancy.    • Irritable bowel syndrome    • Ovarian cyst, bilateral    • PONV (postoperative nausea and vomiting)    • Seasonal allergies    • Seasonal allergies    • RITU (stress urinary incontinence, female) 2020   • TMJ (dislocation of temporomandibular joint)    • Upper respiratory infection    • Vomiting    • Wears glasses        Past Surgical History:   Procedure Laterality Date   •  SECTION  2008    DR NAVARRETE   •  SECTION  10/30/2015    DR VASQUEZ   •  SECTION  2010    DR ESPINOZA   • CHOLECYSTECTOMY     • COLONOSCOPY N/A 2017     COLONOSCOPY WITH COLD BIOPSY POLYPECTOMY; BIOPSIES , QUICK CLIP;  Surgeon: Perfecto Mcknight MD;  Location: [facility] ENDOSCOPY;  Service:    • DIAGNOSTIC  LAPAROSCOPY  07/07/2005    DR JONES NAVA/cystectomy   • DIAGNOSTIC LAPAROSCOPY  02/14/2012    DR ESPINOZA/ELIJAH   • DIAGNOSTIC LAPAROSCOPY N/A 08/10/2017     DIAGNOSTIC LAPAROSCOPY, LEFT S&O;  Keren Espinoza MD;  Location: [facility] OR;  Service:    • DIAGNOSTIC LAPAROSCOPY N/A 01/19/2022    Procedure: DIAGNOSTIC LAPAROSCOPY,  LYSIS OF ADHESIONS, RIGHT SALPINGO-OOPHORECTOMY;  Surgeon: Kem Sahni MD;  Location: [facility] OR;  Service: Obstetrics/Gynecology;  Laterality: N/A;   • ENDOSCOPY N/A 10/15/2019    Procedure: ESOPHAGOGASTRODUODENOSCOPY WITH BIOPSY AND ESOPHAGEAL DILATATION;  Surgeon: Perfecto Mcknight MD;  Location: [facility] ENDOSCOPY;  Service: Gastroenterology   • ENDOSCOPY N/A 10/11/2021    Procedure: ESOPHAGOGASTRODUODENOSCOPY with dilatation and biopsies;  Surgeon: Hieu Shultz MD;  Location: [facility] ENDOSCOPY;  Service: Gastroenterology;  Laterality: N/A;   • FOOT SURGERY Left     ligament and tendon repair   • HERNIA REPAIR  12/20/2019    abd   • MID-URETHRAL SLING WITH CYSTOSCOPY N/A 06/09/2021     MID-URETHRAL SLING WITH CYSTOSCOPY TVT EXACT;  Luc Alicea MD;  Location:  [facility] OR;  Service: Obstetrics/Gynecology;  Laterality: N/A;   • NISSEN FUNDOPLICATION  06/2013   • UPPER GASTROINTESTINAL ENDOSCOPY  2013   • UPPER GASTROINTESTINAL ENDOSCOPY  06/19/2020   • VAGINAL HYSTERECTOMY N/A 07/09/2018    VAGINAL HYSTERECTOMY, BILATERAL SALPINGECTOMY;  Luc Alicea MD;  Location: [facility] OR;  Service: Gynecology   • WISDOM TOOTH EXTRACTION  2000       Family History   Problem Relation Age of Onset   • Coronary artery disease Mother    • Diabetes Mother    • Hypertension Mother    • Kidney disease Father    • Skin cancer Father    • Stroke Maternal Grandfather    • Hypertension Maternal Grandfather    • Diabetes Maternal Grandfather    • Colon cancer Maternal Great-Grandmother    • Osteoporosis Neg Hx        Social History     Tobacco Use   • Smoking status: Never   • Smokeless tobacco: Never   Vaping Use   •  Vaping Use: Never used   Substance Use Topics   • Alcohol use: No   • Drug use: No       Allergies:  No Known Allergies    Medications:    Current Outpatient Medications:   •  acetaminophen (TYLENOL) 500 MG tablet, Take 2 tablets by mouth Every 8 (Eight) Hours As Needed for Mild Pain ., Disp: 60 tablet, Rfl: 0  •  dicyclomine (BENTYL) 20 MG tablet, Take 1 tablet by mouth Every 6 (Six) Hours As Needed (abdominal cramping)., Disp: 15 tablet, Rfl: 0  •  estradiol (CLIMARA) 0.1 MG/24HR patch, APPLY 1 PATCH TOPICALLY ONCE A WEEK, Disp: 12 patch, Rfl: 0  •  HYDROcodone-acetaminophen (NORCO) 5-325 MG per tablet, Take 1 tablet by mouth Every 6 (Six) Hours As Needed for Severe Pain., Disp: 10 tablet, Rfl: 0  •  hydrOXYzine (ATARAX) 25 MG tablet, Take 1 tablet by mouth 3 (Three) Times a Day As Needed for Itching, Allergies or Anxiety., Disp: 90 tablet, Rfl: 0  •  lidocaine (XYLOCAINE) 5 % ointment, Apply  topically to the appropriate area as directed Every Night. (Patient taking differently: Apply 1 application topically to the appropriate area as directed At Night As Needed.), Disp: 35 g, Rfl: 1  •  loratadine (Claritin) 10 MG tablet, Take 1 tablet by mouth Daily. (Patient taking differently: Take 10 mg by mouth As Needed.), Disp: 30 tablet, Rfl: 1  •  Magnesium Oxide 500 MG tablet, Take 1 tablet by mouth Daily., Disp: 30 tablet, Rfl: 3  •  omeprazole (priLOSEC) 40 MG capsule, Take 1 capsule by mouth Daily., Disp: 90 capsule, Rfl: 1  •  ondansetron ODT (ZOFRAN-ODT) 4 MG disintegrating tablet, Place 1 tablet on the tongue 4 (Four) Times a Day As Needed for Nausea., Disp: 20 tablet, Rfl: 0  •  promethazine (PHENERGAN) 25 MG tablet, Take 1 tablet by mouth Every 6 (Six) Hours As Needed for Nausea or Vomiting., Disp: 30 tablet, Rfl: 0  •  citalopram (CeleXA) 20 MG tablet, Take 1 tablet by mouth Daily., Disp: 90 tablet, Rfl: 1  •  metoclopramide (Reglan) 10 MG tablet, Take 1 tablet by mouth 4 (Four) Times a Day Before Meals & at  "Bedtime., Disp: 120 tablet, Rfl: 5    OBJECTIVE:    Vital Signs:   Vitals:    11/21/22 1025   BP: 116/72   Pulse: 73   Temp: 97.2 °F (36.2 °C)   SpO2: 98%   Weight: 94.8 kg (209 lb)   Height: 161.3 cm (63.5\")       Physical Exam:   General Appearance:    Alert, cooperative, in no acute distress   Head:    Normocephalic, without obvious abnormality, atraumatic   Eyes:            Lids and lashes normal, conjunctivae and sclerae normal, no   icterus, no pallor, corneas clear, PERRLA   Ears:    Ears appear intact with no abnormalities noted   Throat:   No oral lesions, no thrush, oral mucosa moist   Neck:   No adenopathy, supple, trachea midline, no thyromegaly, no   carotid bruit, no JVD   Lungs:     Clear to auscultation,respirations regular, even and                  unlabored    Heart:    Regular rhythm and normal rate, normal S1 and S2, no            murmur, no gallop, no rub, no click   Chest Wall:    No abnormalities observed   Abdomen:     Normal bowel sounds, no masses, no organomegaly, soft        non-tender, non-distended, no guarding, there is evidence of epigastric  tenderness, well-healed incisions   Extremities:   Moves all extremities well, no edema, no cyanosis, no             redness   Pulses:   Pulses palpable and equal bilaterally   Skin:   No bleeding, bruising or rash   Lymph nodes:   No palpable adenopathy   Neurologic:   Cranial nerves 2 - 12 grossly intact, sensation intact     Results Review:   I reviewed the patient's new clinical results.  I reviewed the patient's new imaging results and agree with the interpretation.  I reviewed the patient's other test results and agree with the interpretation    Review of Systems was reviewed and confirmed as accurate as documented by the MA.    ASSESSMENT/PLAN:    1. Epigastric pain        I did have a detailed and extensive discussion with the patient in the office and they understand that they need to undergo upper endoscopy. Full risks and benefits of " operative versus nonoperative intervention were discussed with the patient and these include bleeding and esophageal injury. The patient understands, agrees, and wishes to proceed with the surgical treatment plan as mentioned above. The patient had no questions for me at the end of the discussion.       I discussed the patients findings and my recommendations with patient.  She understands that she may need to undergo dilation of any stricture that we find.    Prior to performing upper endoscopy though I think the patient needs to have a barium swallow with upper GI to evaluate the function of the esophagus and the amount of reflux.    Electronically signed by Franck King MD  11/23/22 08:33 EST                 no

## 2023-11-25 NOTE — Clinical Note
[facility] EMERGENCY DEPARTMENT  [address] [address]  Phone: [phone]    Laura Thomson was seen and treated in our emergency department on 11/25/2023.  She may return to work on 11/27/2023.         Thank you for choosing [facility].    Camille Messina MD

## 2023-11-25 NOTE — Clinical Note
[facility] EMERGENCY DEPARTMENT  [address] [address]  Phone: [phone]    Laura Thomson was seen and treated in our emergency department on 11/25/2023.  She may return to work on 11/28/2023.         Thank you for choosing [facility].    Camille Messina MD

## 2023-11-26 VITALS
SYSTOLIC BLOOD PRESSURE: 104 MMHG | BODY MASS INDEX: 35.37 KG/M2 | RESPIRATION RATE: 14 BRPM | DIASTOLIC BLOOD PRESSURE: 67 MMHG | HEIGHT: 64 IN | TEMPERATURE: 97.9 F | WEIGHT: 207.2 LBS | OXYGEN SATURATION: 97 % | HEART RATE: 51 BPM

## 2023-11-26 RX ORDER — ONDANSETRON 4 MG/1
4 TABLET, ORALLY DISINTEGRATING ORAL EVERY 8 HOURS PRN
Qty: 12 TABLET | Refills: 0 | Status: SHIPPED | OUTPATIENT
Start: 2023-11-26

## 2023-11-26 RX ORDER — LIDOCAINE HYDROCHLORIDE 20 MG/ML
5 SOLUTION OROPHARYNGEAL ONCE
Status: COMPLETED | OUTPATIENT
Start: 2023-11-26 | End: 2023-11-26

## 2023-11-26 RX ORDER — PROMETHAZINE HYDROCHLORIDE 25 MG/1
25 TABLET ORAL EVERY 6 HOURS PRN
Qty: 12 TABLET | Refills: 0 | Status: SHIPPED | OUTPATIENT
Start: 2023-11-26

## 2023-11-26 RX ORDER — HYDROCODONE BITARTRATE AND ACETAMINOPHEN 5; 325 MG/1; MG/1
1 TABLET ORAL EVERY 6 HOURS PRN
Qty: 10 TABLET | Refills: 0 | Status: SHIPPED | OUTPATIENT
Start: 2023-11-26

## 2023-11-26 RX ORDER — HYDROCODONE BITARTRATE AND ACETAMINOPHEN 5; 325 MG/1; MG/1
1 TABLET ORAL ONCE
Status: COMPLETED | OUTPATIENT
Start: 2023-11-26 | End: 2023-11-26

## 2023-11-26 RX ADMIN — ALUMINUM HYDROXIDE, MAGNESIUM HYDROXIDE, AND DIMETHICONE: 400; 400; 40 SUSPENSION ORAL at 00:25

## 2023-11-26 RX ADMIN — HYDROCODONE BITARTRATE AND ACETAMINOPHEN 1 TABLET: 5; 325 TABLET ORAL at 01:18

## 2023-11-26 RX ADMIN — LIDOCAINE HYDROCHLORIDE 5 ML: 20 SOLUTION ORAL at 00:25

## 2023-11-26 NOTE — ED PROVIDER NOTES
TRIAGE CHIEF COMPLAINT:     Nursing and triage notes reviewed    Chief Complaint   Patient presents with    Back Pain      HPI: Laura Walls Workman is a 38 y.o. female who presents to the emergency department complaining of left-sided lower back discomfort.  Patient states that symptoms have been ongoing since yesterday.  She describes a sharp pain in her left lower back.  Pain is worse with movement such as bending over or twisting from side-to-side.  She has had some nausea due to the discomfort.  She denies to me burning with urination or urinary frequency.  She denies fevers or chills or diarrhea.    REVIEW OF SYSTEMS: All other systems reviewed and are negative     PAST MEDICAL HISTORY:   Past Medical History:   Diagnosis Date    Abdominal pain, periumbilical     Abdominal pain, RLQ (right lower quadrant)     Acute pharyngitis     Anxiety     Appetite lost     Chest pain 2018?    states had a heart monitor and was WNL.  States was told pain was attributed to acid reflux.  No pain since.  (assessed 3/17/23)    Colon polyp 02/17/2017    Depression     patient denies    Diarrhea     Secondary to IBS    Difficulty swallowing     food and liquids    Fatigue     Fracture     RIGHT WRIST TWICE, LEFT HAND    GERD (gastroesophageal reflux disease)     H/O foot surgery 10/2020    left x 2    Hearing loss     No use of hearing aids    History of bronchitis     History of colonoscopy     History of exercise stress test 2020    History of left salpingo-oophorectomy  2017 August 05/30/2018    History of ovarian cyst     History of pneumonia     Hyperthyroidism     hyperactive taken off medicine when pregnant.  Reported she was medication at one time, but none since pregnancy.     Irritable bowel syndrome     Multiple gestation [id number]    Ovarian cyst, bilateral     PONV (postoperative nausea and vomiting)     Seasonal allergies     RITU (stress urinary incontinence, female) 02/13/2020    TMJ (dislocation of temporomandibular  joint)     Upper respiratory infection     Vomiting     Wears glasses         FAMILY HISTORY:   Family History   Problem Relation Age of Onset    Coronary artery disease Mother     Diabetes Mother     Hypertension Mother     Kidney disease Father     Skin cancer Father     Stroke Maternal Grandfather     Hypertension Maternal Grandfather     Diabetes Maternal Grandfather     Colon cancer Maternal Great-Grandmother     Osteoporosis Neg Hx         SOCIAL HISTORY:   Social History     Socioeconomic History    Marital status:    Tobacco Use    Smoking status: Never    Smokeless tobacco: Never   Vaping Use    Vaping Use: Never used   Substance and Sexual Activity    Alcohol use: No    Drug use: No    Sexual activity: Defer        SURGICAL HISTORY:   Past Surgical History:   Procedure Laterality Date    ANKLE SURGERY Left     x2     SECTION  2008    DR NAVARRETE     SECTION  10/30/2015    DR VASQUEZ     SECTION  2010    DR ESPINOZA    CHOLECYSTECTOMY      COLONOSCOPY N/A 2017     COLONOSCOPY WITH COLD BIOPSY POLYPECTOMY; BIOPSIES , QUICK CLIP;  Surgeon: Perfecto Mcknight MD;  Location: [facility];  Service:     DIAGNOSTIC LAPAROSCOPY  2005    DR ESPINOZA- ELIJAH/cystectomy    DIAGNOSTIC LAPAROSCOPY  2012    DR ESPINOZA/ELIJAH    DIAGNOSTIC LAPAROSCOPY N/A 08/10/2017     DIAGNOSTIC LAPAROSCOPY, LEFT S&O;  Keren Espinoza MD;  Location: [facility] OR;  Service:     DIAGNOSTIC LAPAROSCOPY N/A 2022    Procedure: DIAGNOSTIC LAPAROSCOPY,  LYSIS OF ADHESIONS, RIGHT SALPINGO-OOPHORECTOMY;  Surgeon: Kem Sahni MD;  Location: [facility] OR;  Service: Obstetrics/Gynecology;  Laterality: N/A;    DIAGNOSTIC LAPAROSCOPY N/A 3/24/2023    Procedure: DIAGNOSTIC LAPAROSCOPY WITH LYSIS OF ADHESIONS;  Surgeon: Kem Sahni MD;  Location: [facility] OR;  Service: Obstetrics/Gynecology;  Laterality: N/A;    ENDOSCOPY N/A 10/15/2019    Procedure: ESOPHAGOGASTRODUODENOSCOPY WITH BIOPSY  AND ESOPHAGEAL DILATATION;  Surgeon: Perfecto Mcknight MD;  Location:  [facility];  Service: Gastroenterology    ENDOSCOPY N/A 10/11/2021    Procedure: ESOPHAGOGASTRODUODENOSCOPY with dilatation and biopsies;  Surgeon: Hieu Shultz MD;  Location:  [facility];  Service: Gastroenterology;  Laterality: N/A;    FOOT SURGERY Left     ligament and tendon repair    HERNIA REPAIR  12/20/2019    abd    MID-URETHRAL SLING WITH CYSTOSCOPY N/A 06/09/2021     MID-URETHRAL SLING WITH CYSTOSCOPY TVT EXACT;  Luc Alicea MD;  Location:  [facility];  Service: Obstetrics/Gynecology;  Laterality: N/A;    NISSEN FUNDOPLICATION  06/2013    UPPER GASTROINTESTINAL ENDOSCOPY  2013    UPPER GASTROINTESTINAL ENDOSCOPY  06/19/2020    VAGINAL HYSTERECTOMY N/A 07/09/2018    VAGINAL HYSTERECTOMY, BILATERAL SALPINGECTOMY;  Luc Alicea MD;  Location:  [facility];  Service: Gynecology    WISDOM TOOTH EXTRACTION  2000        CURRENT MEDICATIONS:      Medication List        ASK your doctor about these medications      Acetaminophen Extra Strength 500 MG tablet  Take 2 tablets by mouth Every 8 (Eight) Hours As Needed for Mild Pain.     busPIRone 5 MG tablet  Commonly known as: BUSPAR  Take 1 tablet by mouth 3 (Three) Times a Day.     citalopram 10 MG tablet  Commonly known as: CeleXA  Take 1 tablet by mouth Daily.     estradiol 0.1 MG/24HR patch  Commonly known as: CLIMARA  Place 1 patch on the skin as directed by provider 1 (One) Time Per Week.     hydrOXYzine 25 MG tablet  Commonly known as: ATARAX  Take 1 tablet by mouth 3 (Three) Times a Day As Needed for Itching, Allergies or Anxiety.     ibuprofen 800 MG tablet  Commonly known as: ADVIL,MOTRIN  Take 1 tablet by mouth Every 8 (Eight) Hours As Needed for Mild Pain     ondansetron ODT 4 MG disintegrating tablet  Commonly known as: ZOFRAN-ODT  Place 1 tablet on the tongue 4 (Four) Times a Day As Needed for Nausea.     promethazine 25 MG tablet  Commonly known as: PHENERGAN  Take 1  tablet by mouth Every 6 (Six) Hours As Needed for Nausea or Vomiting.               ALLERGIES: Patient has no known allergies.     PHYSICAL EXAM:   VITAL SIGNS:   Vitals:    11/25/23 2034   BP: 127/77   Pulse: 51   Resp: 16   Temp: 97.9 °F (36.6 °C)   SpO2: 98%      CONSTITUTIONAL: Awake, oriented, appears nontoxic   HENT: Atraumatic, normocephalic, oral mucosa pink and moist, airway patent. Nares patent without drainage. External ears normal.   EYES: Conjunctivae clear   NECK: Trachea midline  BACK: Some tenderness in the left lower back.  Pain reproduced by twisting from side-to-side.  CARDIOVASCULAR: Normal heart rate, Normal rhythm, No murmurs, rubs, gallops   PULMONARY/CHEST: Clear to auscultation, no rhonchi, wheezes, or rales. Symmetrical breath sounds.  ABDOMINAL: Nondistended, soft, nontender - no rebound or guarding.   NEUROLOGIC: Nonfocal, moving all four extremities, no gross sensory or motor deficits.   EXTREMITIES: No clubbing, cyanosis, or edema   SKIN: Warm, Dry, No erythema, No rash     ED COURSE / MEDICAL DECISION MAKING:   Laura Thomson is a 38 y.o. female who presents to the emergency department for evaluation of low back discomfort.  Patient is tender in the left lower back.  Reproducible symptoms with twisting from side-to-side.    Differential diagnosis includes skill skeletal strain, kidney stone, UTI among other etiologies.    Basic labs, urinalysis, imaging of the low back and abdomen and pelvis was ordered for further evaluation of the patient's presentation.    Diagnostic information from other sources: Family, chart review    Interventions: Toradol, lidocaine patch, Zofran, morphine    Narrative: Presents with low back discomfort.  Laboratory tests are largely unremarkable including white blood cell count, urinalysis, liver enzymes, and lipase.  CT scan of the lumbar spine per radiology interpretation reveals no obvious acute process.  CT scan of the abdomen pelvis does reveal  some edema of the pancreas which could potentially represent pancreatitis.  No other obvious acute abnormalities appreciated per report.  Patient did improve with treatment in the emergency department.  Symptoms could be secondary to pancreatitis.  Given her improvement will trial outpatient therapy.  Return precautions discussed.  Patient comfortable with this plan of care.        DECISION TO DISCHARGE/ADMIT: see ED care timeline     FINAL IMPRESSION:   1 --pancreatitis  2 --back pain  3 --     Electronically signed by: Camille Messina MD, 11/25/2023 22:01 Camille Orourke MD  11/26/23 0102

## 2023-12-04 ENCOUNTER — OFFICE VISIT (OUTPATIENT)
Dept: GASTROENTEROLOGY | Facility: CLINIC | Age: 38
End: 2023-12-04
Payer: COMMERCIAL

## 2023-12-04 ENCOUNTER — LAB (OUTPATIENT)
Dept: LAB | Facility: HOSPITAL | Age: 38
End: 2023-12-04
Payer: COMMERCIAL

## 2023-12-04 VITALS
WEIGHT: 204.6 LBS | DIASTOLIC BLOOD PRESSURE: 70 MMHG | SYSTOLIC BLOOD PRESSURE: 118 MMHG | OXYGEN SATURATION: 99 % | BODY MASS INDEX: 35.67 KG/M2 | HEART RATE: 72 BPM

## 2023-12-04 DIAGNOSIS — Z98.890 HISTORY OF FUNDOPLICATION: ICD-10-CM

## 2023-12-04 DIAGNOSIS — R10.9 ABDOMINAL PAIN, UNSPECIFIED ABDOMINAL LOCATION: ICD-10-CM

## 2023-12-04 DIAGNOSIS — Z87.19 HISTORY OF PANCREATITIS: ICD-10-CM

## 2023-12-04 DIAGNOSIS — R10.13 DYSPEPSIA: Primary | ICD-10-CM

## 2023-12-04 DIAGNOSIS — R10.13 DYSPEPSIA: ICD-10-CM

## 2023-12-04 DIAGNOSIS — R93.3 ABNORMAL FINDING ON GI TRACT IMAGING: ICD-10-CM

## 2023-12-04 LAB
CHOLEST SERPL-MCNC: 153 MG/DL (ref 0–200)
HDLC SERPL-MCNC: 48 MG/DL (ref 40–60)
LDLC SERPL CALC-MCNC: 89 MG/DL (ref 0–100)
LDLC/HDLC SERPL: 1.84 {RATIO}
TRIGL SERPL-MCNC: 84 MG/DL (ref 0–150)
VLDLC SERPL-MCNC: 16 MG/DL (ref 5–40)

## 2023-12-04 PROCEDURE — 36415 COLL VENOUS BLD VENIPUNCTURE: CPT

## 2023-12-04 PROCEDURE — 80061 LIPID PANEL: CPT | Performed by: INTERNAL MEDICINE

## 2023-12-04 PROCEDURE — 82787 IGG 1 2 3 OR 4 EACH: CPT

## 2023-12-04 PROCEDURE — 86381 MITOCHONDRIAL ANTIBODY EACH: CPT | Performed by: INTERNAL MEDICINE

## 2023-12-04 NOTE — PROGRESS NOTES
New Patient Consult      Date: 2023   Patient Name: Laura Thomson  MRN: [id number]  : 1985     Referring Physician: Camille Messina,*    Chief Complaint   Patient presents with    Pancreatitis       History of Present Illness: Laura Thomson is a 38 y.o. female who is here today to establish care with Gastroenterology.    She went to the emergency room after having abdominal pain, which was a little different than her existing abdominal pain symptoms for the past few years.  This time there was some radiation to the back.    A CT scan was obtained which revealed mild edema of the pancreas, possible pancreatitis, suggesting clinical correlation.  Her lipase during this time was unremarkable.  She is status postcholecystectomy.  Has not had any prior episodes of pancreatitis in the past.    She does carry a diagnosis of IBS.  Has had this for many many years.    She is also status post fundoplication.  Sometimes she will have some dysphagia symptoms as well.      Subjective      Past Medical History:   Diagnosis Date    Abdominal pain, periumbilical     Abdominal pain, RLQ (right lower quadrant)     Acute pharyngitis     Anxiety     Appetite lost     Chest pain ?    states had a heart monitor and was WNL.  States was told pain was attributed to acid reflux.  No pain since.  (assessed 3/17/23)    Colon polyp 2017    Depression     patient denies    Diarrhea     Secondary to IBS    Difficulty swallowing     food and liquids    Fatigue     Fracture     RIGHT WRIST TWICE, LEFT HAND    GERD (gastroesophageal reflux disease)     H/O foot surgery 10/2020    left x 2    Hearing loss     No use of hearing aids    History of bronchitis     History of colonoscopy     History of exercise stress test     History of left salpingo-oophorectomy  2018    History of ovarian cyst     History of pneumonia     Hyperthyroidism     hyperactive taken off medicine when  pregnant.  Reported she was medication at one time, but none since pregnancy.     Irritable bowel syndrome     Multiple gestation [id number]    Ovarian cyst, bilateral     PONV (postoperative nausea and vomiting)     Seasonal allergies     RITU (stress urinary incontinence, female) 2020    TMJ (dislocation of temporomandibular joint)     Upper respiratory infection     Vomiting     Wears glasses        Past Surgical History:   Procedure Laterality Date    ANKLE SURGERY Left     x2     SECTION  2008    DR NAVARRETE     SECTION  10/30/2015    DR VASQUEZ     SECTION  2010    DR ESPINOZA    CHOLECYSTECTOMY      COLONOSCOPY N/A 2017     COLONOSCOPY WITH COLD BIOPSY POLYPECTOMY; BIOPSIES , QUICK CLIP;  Surgeon: Perfecto Mcknight MD;  Location: [facility] END[facility]COPY;  Service:     DIAGNOSTIC LAPAROSCOPY  2005    DR JONES NAVA/cystectomy    DIAGNOSTIC LAPAROSCOPY  2012    DR ESPINOZA/ELIJAH    DIAGNOSTIC LAPAROSCOPY N/A 08/10/2017     DIAGNOSTIC LAPAROSCOPY, LEFT S&O;  Keren Espinoza MD;  Location: [facility] OR;  Service:     DIAGNOSTIC LAPAROSCOPY N/A 2022    Procedure: DIAGNOSTIC LAPAROSCOPY,  LYSIS OF ADHESIONS, RIGHT SALPINGO-OOPHORECTOMY;  Surgeon: Kem Sahni MD;  Location: [facility] OR;  Service: Obstetrics/Gynecology;  Laterality: N/A;    DIAGNOSTIC LAPAROSCOPY N/A 3/24/2023    Procedure: DIAGNOSTIC LAPAROSCOPY WITH LYSIS OF ADHESIONS;  Surgeon: Kem Sahni MD;  Location: [facility] OR;  Service: Obstetrics/Gynecology;  Laterality: N/A;    ENDOSCOPY N/A 10/15/2019    Procedure: ESOPHAGOGASTRODUODENOSCOPY WITH BIOPSY AND ESOPHAGEAL DILATATION;  Surgeon: Perfecto Mcknight MD;  Location: [facility] ENDOSCOPY;  Service: Gastroenterology    ENDOSCOPY N/A 10/11/2021    Procedure: ESOPHAGOGASTRODUODENOSCOPY with dilatation and biopsies;  Surgeon: Hieu Shultz MD;  Location: [facility] ENDOSCOPY;  Service: Gastroenterology;  Laterality: N/A;    FOOT SURGERY Left      ligament and tendon repair    HERNIA REPAIR  12/20/2019    abd    MID-URETHRAL SLING WITH CYSTOSCOPY N/A 06/09/2021     MID-URETHRAL SLING WITH CYSTOSCOPY TVT EXACT;  Luc Alicea MD;  Location:  [facility];  Service: Obstetrics/Gynecology;  Laterality: N/A;    NISSEN FUNDOPLICATION  06/2013    UPPER GASTROINTESTINAL ENDOSCOPY  2013    UPPER GASTROINTESTINAL ENDOSCOPY  06/19/2020    VAGINAL HYSTERECTOMY N/A 07/09/2018    VAGINAL HYSTERECTOMY, BILATERAL SALPINGECTOMY;  Luc Alicea MD;  Location:  [facility] OR;  Service: Gynecology    WISDOM TOOTH EXTRACTION  2000       Family History   Problem Relation Age of Onset    Coronary artery disease Mother     Diabetes Mother     Hypertension Mother     Kidney disease Father     Skin cancer Father     Stroke Maternal Grandfather     Hypertension Maternal Grandfather     Diabetes Maternal Grandfather     Colon cancer Maternal Great-Grandmother     Osteoporosis Neg Hx        Social History     Socioeconomic History    Marital status: Legally    Tobacco Use    Smoking status: Never    Smokeless tobacco: Never   Vaping Use    Vaping Use: Never used   Substance and Sexual Activity    Alcohol use: No    Drug use: No    Sexual activity: Defer         Current Outpatient Medications:     acetaminophen (TYLENOL) 500 MG tablet, Take 2 tablets by mouth Every 8 (Eight) Hours As Needed for Mild Pain., Disp: 60 tablet, Rfl: 0    citalopram (CeleXA) 10 MG tablet, Take 1 tablet by mouth Daily., Disp: 90 tablet, Rfl: 3    estradiol (CLIMARA) 0.1 MG/24HR patch, Place 1 patch on the skin as directed by provider 1 (One) Time Per Week., Disp: 12 patch, Rfl: 5    HYDROcodone-acetaminophen (NORCO) 5-325 MG per tablet, Take 1 tablet by mouth Every 6 (Six) Hours As Needed for Severe Pain., Disp: 10 tablet, Rfl: 0    hydrOXYzine (ATARAX) 25 MG tablet, Take 1 tablet by mouth 3 (Three) Times a Day As Needed for Itching, Allergies or Anxiety., Disp: 90 tablet, Rfl: 0    ibuprofen (ADVIL,MOTRIN) 800 MG  tablet, Take 1 tablet by mouth Every 8 (Eight) Hours As Needed for Mild Pain, Disp: 30 tablet, Rfl: 0    ondansetron ODT (ZOFRAN-ODT) 4 MG disintegrating tablet, Place 1 tablet on the tongue 4 (Four) Times a Day As Needed for Nausea., Disp: 20 tablet, Rfl: 0    ondansetron ODT (ZOFRAN-ODT) 4 MG disintegrating tablet, Place 1 tablet on the tongue Every 8 (Eight) Hours As Needed for Nausea or Vomiting., Disp: 12 tablet, Rfl: 0    promethazine (PHENERGAN) 25 MG tablet, Take 1 tablet by mouth Every 6 (Six) Hours As Needed for Nausea or Vomiting., Disp: 30 tablet, Rfl: 0    promethazine (PHENERGAN) 25 MG tablet, Take 1 tablet by mouth Every 6 (Six) Hours As Needed for Nausea or Vomiting., Disp: 12 tablet, Rfl: 0    busPIRone (BUSPAR) 5 MG tablet, Take 1 tablet by mouth 3 (Three) Times a Day. (Patient not taking: Reported on 12/4/2023), Disp: 90 tablet, Rfl: 1    No Known Allergies    Review of Systems   Constitutional:  Negative for unexpected weight loss.   HENT:  Negative for trouble swallowing.    Gastrointestinal:  Negative for abdominal distention, abdominal pain, anal bleeding, blood in stool, constipation, diarrhea, nausea, rectal pain, vomiting, GERD and indigestion.       The following portions of the patient's history were reviewed and updated as appropriate: allergies, current medications, past family history, past medical history, past social history, past surgical history and problem list.    Objective     Physical Exam:  Vitals:    12/04/23 0850   BP: 118/70   Pulse: 72   SpO2: 99%   Weight: 92.8 kg (204 lb 9.6 oz)       Physical Exam  Constitutional:       General: She is not in acute distress.     Appearance: Normal appearance. She is not ill-appearing.   HENT:      Head: Normocephalic and atraumatic.      Mouth/Throat:      Mouth: Mucous membranes are moist.   Eyes:      General: No scleral icterus.     Conjunctiva/sclera: Conjunctivae normal.   Cardiovascular:      Rate and Rhythm: Normal rate.       Heart sounds: Normal heart sounds.   Pulmonary:      Effort: Pulmonary effort is normal. No respiratory distress.      Breath sounds: Normal breath sounds.   Abdominal:      General: There is no distension.      Palpations: Abdomen is soft.      Tenderness: There is no abdominal tenderness. There is no guarding.   Musculoskeletal:         General: No swelling or tenderness.      Cervical back: Neck supple. No rigidity.   Skin:     General: Skin is warm and dry.      Capillary Refill: Capillary refill takes less than 2 seconds.      Coloration: Skin is not jaundiced or pale.   Neurological:      General: No focal deficit present.      Mental Status: She is alert and oriented to person, place, and time. Mental status is at baseline.   Psychiatric:         Mood and Affect: Mood normal.         Behavior: Behavior normal.         Results Review:   I have reviewed the patient's new clinical and imaging results and agree with the interpretation.     Admission on 11/25/2023, Discharged on 11/26/2023   Component Date Value Ref Range Status    Glucose 11/25/2023 122 (H)  65 - 99 mg/dL Final    BUN 11/25/2023 13  6 - 20 mg/dL Final    Creatinine 11/25/2023 0.71  0.57 - 1.00 mg/dL Final    Sodium 11/25/2023 139  136 - 145 mmol/L Final    Potassium 11/25/2023 4.3  3.5 - 5.2 mmol/L Final    Chloride 11/25/2023 105  98 - 107 mmol/L Final    CO2 11/25/2023 26.9  22.0 - 29.0 mmol/L Final    Calcium 11/25/2023 9.1  8.6 - 10.5 mg/dL Final    Total Protein 11/25/2023 7.2  6.0 - 8.5 g/dL Final    Albumin 11/25/2023 4.3  3.5 - 5.2 g/dL Final    ALT (SGPT) 11/25/2023 6  1 - 33 U/L Final    AST (SGOT) 11/25/2023 22  1 - 32 U/L Final    Alkaline Phosphatase 11/25/2023 70  39 - 117 U/L Final    Total Bilirubin 11/25/2023 0.2  0.0 - 1.2 mg/dL Final    Globulin 11/25/2023 2.9  gm/dL Final    A/G Ratio 11/25/2023 1.5  g/dL Final    BUN/Creatinine Ratio 11/25/2023 18.3  7.0 - 25.0 Final    Anion Gap 11/25/2023 7.1  5.0 - 15.0 mmol/L Final     eGFR 11/25/2023 111.1  >60.0 mL/min/1.73 Final    Lipase 11/25/2023 44  13 - 60 U/L Final    Color, UA 11/25/2023 Yellow  Yellow, Straw Final    Appearance, UA 11/25/2023 Clear  Clear Final    pH, UA 11/25/2023 6.0  5.0 - 8.0 Final    Specific [place], UA 11/25/2023 1.028  1.005 - 1.030 Final    Glucose, UA 11/25/2023 Negative  Negative Final    Ketones, UA 11/25/2023 Negative  Negative Final    Bilirubin, UA 11/25/2023 Negative  Negative Final    Blood, UA 11/25/2023 Negative  Negative Final    Protein, UA 11/25/2023 Negative  Negative Final    Leuk Esterase, UA 11/25/2023 Negative  Negative Final    Nitrite, UA 11/25/2023 Negative  Negative Final    Urobilinogen, UA 11/25/2023 0.2 E.U./dL  0.2 - 1.0 E.U./dL Final    Extra Tube 11/25/2023 Hold for add-ons.   Final    Auto resulted.    Extra Tube 11/25/2023 hold for add-on   Final    Auto resulted    Extra Tube 11/25/2023 Hold for add-ons.   Final    Auto resulted.    Extra Tube 11/25/2023 Hold for add-ons.   Final    Auto resulted    WBC 11/25/2023 8.52  3.40 - 10.80 10*3/mm3 Final    RBC 11/25/2023 4.25  3.77 - 5.28 10*6/mm3 Final    Hemoglobin 11/25/2023 11.8 (L)  12.0 - 15.9 g/dL Final    Hematocrit 11/25/2023 36.7  34.0 - 46.6 % Final    MCV 11/25/2023 86.4  79.0 - 97.0 fL Final    MCH 11/25/2023 27.8  26.6 - 33.0 pg Final    MCHC 11/25/2023 32.2  31.5 - 35.7 g/dL Final    RDW 11/25/2023 13.9  12.3 - 15.4 % Final    RDW-SD 11/25/2023 43.8  37.0 - 54.0 fl Final    MPV 11/25/2023 10.5  6.0 - 12.0 fL Final    Platelets 11/25/2023 241  140 - 450 10*3/mm3 Final    Neutrophil % 11/25/2023 62.1  42.7 - 76.0 % Final    Lymphocyte % 11/25/2023 27.7  19.6 - 45.3 % Final    Monocyte % 11/25/2023 7.9  5.0 - 12.0 % Final    Eosinophil % 11/25/2023 1.4  0.3 - 6.2 % Final    Basophil % 11/25/2023 0.7  0.0 - 1.5 % Final    Immature Grans % 11/25/2023 0.2  0.0 - 0.5 % Final    Neutrophils, Absolute 11/25/2023 5.29  1.70 - 7.00 10*3/mm3 Final    Lymphocytes, Absolute 11/25/2023  2.36  0.70 - 3.10 10*3/mm3 Final    Monocytes, Absolute 11/25/2023 0.67  0.10 - 0.90 10*3/mm3 Final    Eosinophils, Absolute 11/25/2023 0.12  0.00 - 0.40 10*3/mm3 Final    Basophils, Absolute 11/25/2023 0.06  0.00 - 0.20 10*3/mm3 Final    Immature Grans, Absolute 11/25/2023 0.02  0.00 - 0.05 10*3/mm3 Final    nRBC 11/25/2023 0.0  0.0 - 0.2 /100 WBC Final      CT Abdomen Pelvis Without Contrast    Result Date: 11/26/2023  Pancreas appears mildly edematous, correlate with lipase for pancreatitis. Authenticated and Electronically Signed by Hayden Saucedo MD on 11/26/2023 12:07:01 AM    CT Lumbar Spine Without Contrast    Result Date: 11/25/2023  No acute fracture or malalignment of the lumbar spine. Authenticated and Electronically Signed by Hayden Saucedo MD on 11/25/2023 11:55:10 PM     Assessment / Plan      Assessment & Plan:  Diagnoses and all orders for this visit:    1. Dyspepsia (Primary)  -     Follow Anesthesia Guidelines / Protocol; Standing  -     Verify NPO; Standing  -     Case Request; Standing  -     Obtain Informed Consent; Standing  -     Case Request  -     MRI abdomen wo contrast mrcp; Future  -     Mitochondrial Antibodies, M2  -     IgG 4; Future    2. Abdominal pain, unspecified abdominal location  -     Follow Anesthesia Guidelines / Protocol; Standing  -     Verify NPO; Standing  -     Case Request; Standing  -     Obtain Informed Consent; Standing  -     Case Request  -     MRI abdomen wo contrast mrcp; Future  -     Mitochondrial Antibodies, M2  -     IgG 4; Future  -     Lipid Panel    3. History of fundoplication  -     Follow Anesthesia Guidelines / Protocol; Standing  -     Verify NPO; Standing  -     Case Request; Standing  -     Obtain Informed Consent; Standing  -     Case Request  -     MRI abdomen wo contrast mrcp; Future  -     Mitochondrial Antibodies, M2  -     IgG 4; Future    4. History of pancreatitis  -     Lipid Panel    5. Abnormal finding on GI tract imaging      Broad  differential diagnosis.  Symptoms could be from mild pancreatitis, but she does not have many risk factors.  Will check first lipid panel, autoimmune workup, and an MRCP given her postcholecystectomy state.  May also be from peptic ulcer disease.  Needs an EGD.    We did briefly discuss the utility of a savory dilation, with some of her dysphagia symptoms, but we will consider this after we have better addressed her current symptoms.    Plan for EGD with monitored anesthesia care. Counseled in detail regarding the risks, benefits and alternatives of the procedure, including but not limited to perforation, bleeding, infection, post-operative pain, complications from anesthesia, aspiration, cardiac decompensation, need for further procedures or surgery, which may occur in approximately 1 in 1000 procedures. Counseled also that endoscopies are not perfect tests, and there is a possibility of missed diagnoses. Counseled regarding pre procedural instructions. Written instructions provided. Instructed to arrange for a ride home for the day of procedure. Patient voiced understanding of the above, and agrees to proceed.      Follow Up:   Return for Follow-up TBD after testing is reviewed.    Consuelo Avalos MD  [facility]    12/4/2023  12:22 EST    Part of this note may be an electronic transcription/translation of spoken language to printed text using the Dragon Dictation System.

## 2023-12-04 NOTE — H&P (VIEW-ONLY)
New Patient Consult      Date: 2023   Patient Name: Laura Thomson  MRN: [id number]  : 1985     Referring Physician: Camille Messina,*    Chief Complaint   Patient presents with    Pancreatitis       History of Present Illness: Laura Thomson is a 38 y.o. female who is here today to establish care with Gastroenterology.    She went to the emergency room after having abdominal pain, which was a little different than her existing abdominal pain symptoms for the past few years.  This time there was some radiation to the back.    A CT scan was obtained which revealed mild edema of the pancreas, possible pancreatitis, suggesting clinical correlation.  Her lipase during this time was unremarkable.  She is status postcholecystectomy.  Has not had any prior episodes of pancreatitis in the past.    She does carry a diagnosis of IBS.  Has had this for many many years.    She is also status post fundoplication.  Sometimes she will have some dysphagia symptoms as well.      Subjective      Past Medical History:   Diagnosis Date    Abdominal pain, periumbilical     Abdominal pain, RLQ (right lower quadrant)     Acute pharyngitis     Anxiety     Appetite lost     Chest pain ?    states had a heart monitor and was WNL.  States was told pain was attributed to acid reflux.  No pain since.  (assessed 3/17/23)    Colon polyp 2017    Depression     patient denies    Diarrhea     Secondary to IBS    Difficulty swallowing     food and liquids    Fatigue     Fracture     RIGHT WRIST TWICE, LEFT HAND    GERD (gastroesophageal reflux disease)     H/O foot surgery 10/2020    left x 2    Hearing loss     No use of hearing aids    History of bronchitis     History of colonoscopy     History of exercise stress test     History of left salpingo-oophorectomy  2018    History of ovarian cyst     History of pneumonia     Hyperthyroidism     hyperactive taken off medicine when  pregnant.  Reported she was medication at one time, but none since pregnancy.     Irritable bowel syndrome     Multiple gestation [id number]    Ovarian cyst, bilateral     PONV (postoperative nausea and vomiting)     Seasonal allergies     RITU (stress urinary incontinence, female) 2020    TMJ (dislocation of temporomandibular joint)     Upper respiratory infection     Vomiting     Wears glasses        Past Surgical History:   Procedure Laterality Date    ANKLE SURGERY Left     x2     SECTION  2008    DR NAVARRETE     SECTION  10/30/2015    DR VASQUEZ     SECTION  2010    DR ESPINOZA    CHOLECYSTECTOMY      COLONOSCOPY N/A 2017     COLONOSCOPY WITH COLD BIOPSY POLYPECTOMY; BIOPSIES , QUICK CLIP;  Surgeon: Perfecto Mcknight MD;  Location: [facility] ENDOSCOPY;  Service:     DIAGNOSTIC LAPAROSCOPY  2005    DR JONES NAVA/cystectomy    DIAGNOSTIC LAPAROSCOPY  2012    DR ESPINOZA/ELIJAH    DIAGNOSTIC LAPAROSCOPY N/A 08/10/2017     DIAGNOSTIC LAPAROSCOPY, LEFT S&O;  Keren Espinoza MD;  Location: [facility] OR;  Service:     DIAGNOSTIC LAPAROSCOPY N/A 2022    Procedure: DIAGNOSTIC LAPAROSCOPY,  LYSIS OF ADHESIONS, RIGHT SALPINGO-OOPHORECTOMY;  Surgeon: Kem Sahni MD;  Location: [facility] OR;  Service: Obstetrics/Gynecology;  Laterality: N/A;    DIAGNOSTIC LAPAROSCOPY N/A 3/24/2023    Procedure: DIAGNOSTIC LAPAROSCOPY WITH LYSIS OF ADHESIONS;  Surgeon: Kem Sahni MD;  Location: [facility] OR;  Service: Obstetrics/Gynecology;  Laterality: N/A;    ENDOSCOPY N/A 10/15/2019    Procedure: ESOPHAGOGASTRODUODENOSCOPY WITH BIOPSY AND ESOPHAGEAL DILATATION;  Surgeon: Perfecto Mcknight MD;  Location: [facility] ENDOSCOPY;  Service: Gastroenterology    ENDOSCOPY N/A 10/11/2021    Procedure: ESOPHAGOGASTRODUODENOSCOPY with dilatation and biopsies;  Surgeon: Hieu Shultz MD;  Location: [facility] ENDOSCOPY;  Service: Gastroenterology;  Laterality: N/A;    FOOT SURGERY Left      ligament and tendon repair    HERNIA REPAIR  12/20/2019    abd    MID-URETHRAL SLING WITH CYSTOSCOPY N/A 06/09/2021     MID-URETHRAL SLING WITH CYSTOSCOPY TVT EXACT;  Luc Alicea MD;  Location:  [facility] OR;  Service: Obstetrics/Gynecology;  Laterality: N/A;    NISSEN FUNDOPLICATION  06/2013    UPPER GASTROINTESTINAL ENDOSCOPY  2013    UPPER GASTROINTESTINAL ENDOSCOPY  06/19/2020    VAGINAL HYSTERECTOMY N/A 07/09/2018    VAGINAL HYSTERECTOMY, BILATERAL SALPINGECTOMY;  Luc Alicea MD;  Location:  [facility] OR;  Service: Gynecology    WISDOM TOOTH EXTRACTION  2000       Family History   Problem Relation Age of Onset    Coronary artery disease Mother     Diabetes Mother     Hypertension Mother     Kidney disease Father     Skin cancer Father     Stroke Maternal Grandfather     Hypertension Maternal Grandfather     Diabetes Maternal Grandfather     Colon cancer Maternal Great-Grandmother     Osteoporosis Neg Hx        Social History     Socioeconomic History    Marital status: Legally    Tobacco Use    Smoking status: Never    Smokeless tobacco: Never   Vaping Use    Vaping Use: Never used   Substance and Sexual Activity    Alcohol use: No    Drug use: No    Sexual activity: Defer         Current Outpatient Medications:     acetaminophen (TYLENOL) 500 MG tablet, Take 2 tablets by mouth Every 8 (Eight) Hours As Needed for Mild Pain., Disp: 60 tablet, Rfl: 0    citalopram (CeleXA) 10 MG tablet, Take 1 tablet by mouth Daily., Disp: 90 tablet, Rfl: 3    estradiol (CLIMARA) 0.1 MG/24HR patch, Place 1 patch on the skin as directed by provider 1 (One) Time Per Week., Disp: 12 patch, Rfl: 5    HYDROcodone-acetaminophen (NORCO) 5-325 MG per tablet, Take 1 tablet by mouth Every 6 (Six) Hours As Needed for Severe Pain., Disp: 10 tablet, Rfl: 0    hydrOXYzine (ATARAX) 25 MG tablet, Take 1 tablet by mouth 3 (Three) Times a Day As Needed for Itching, Allergies or Anxiety., Disp: 90 tablet, Rfl: 0    ibuprofen (ADVIL,MOTRIN) 800 MG  tablet, Take 1 tablet by mouth Every 8 (Eight) Hours As Needed for Mild Pain, Disp: 30 tablet, Rfl: 0    ondansetron ODT (ZOFRAN-ODT) 4 MG disintegrating tablet, Place 1 tablet on the tongue 4 (Four) Times a Day As Needed for Nausea., Disp: 20 tablet, Rfl: 0    ondansetron ODT (ZOFRAN-ODT) 4 MG disintegrating tablet, Place 1 tablet on the tongue Every 8 (Eight) Hours As Needed for Nausea or Vomiting., Disp: 12 tablet, Rfl: 0    promethazine (PHENERGAN) 25 MG tablet, Take 1 tablet by mouth Every 6 (Six) Hours As Needed for Nausea or Vomiting., Disp: 30 tablet, Rfl: 0    promethazine (PHENERGAN) 25 MG tablet, Take 1 tablet by mouth Every 6 (Six) Hours As Needed for Nausea or Vomiting., Disp: 12 tablet, Rfl: 0    busPIRone (BUSPAR) 5 MG tablet, Take 1 tablet by mouth 3 (Three) Times a Day. (Patient not taking: Reported on 12/4/2023), Disp: 90 tablet, Rfl: 1    No Known Allergies    Review of Systems   Constitutional:  Negative for unexpected weight loss.   HENT:  Negative for trouble swallowing.    Gastrointestinal:  Negative for abdominal distention, abdominal pain, anal bleeding, blood in stool, constipation, diarrhea, nausea, rectal pain, vomiting, GERD and indigestion.       The following portions of the patient's history were reviewed and updated as appropriate: allergies, current medications, past family history, past medical history, past social history, past surgical history and problem list.    Objective     Physical Exam:  Vitals:    12/04/23 0850   BP: 118/70   Pulse: 72   SpO2: 99%   Weight: 92.8 kg (204 lb 9.6 oz)       Physical Exam  Constitutional:       General: She is not in acute distress.     Appearance: Normal appearance. She is not ill-appearing.   HENT:      Head: Normocephalic and atraumatic.      Mouth/Throat:      Mouth: Mucous membranes are moist.   Eyes:      General: No scleral icterus.     Conjunctiva/sclera: Conjunctivae normal.   Cardiovascular:      Rate and Rhythm: Normal rate.       Heart sounds: Normal heart sounds.   Pulmonary:      Effort: Pulmonary effort is normal. No respiratory distress.      Breath sounds: Normal breath sounds.   Abdominal:      General: There is no distension.      Palpations: Abdomen is soft.      Tenderness: There is no abdominal tenderness. There is no guarding.   Musculoskeletal:         General: No swelling or tenderness.      Cervical back: Neck supple. No rigidity.   Skin:     General: Skin is warm and dry.      Capillary Refill: Capillary refill takes less than 2 seconds.      Coloration: Skin is not jaundiced or pale.   Neurological:      General: No focal deficit present.      Mental Status: She is alert and oriented to person, place, and time. Mental status is at baseline.   Psychiatric:         Mood and Affect: Mood normal.         Behavior: Behavior normal.         Results Review:   I have reviewed the patient's new clinical and imaging results and agree with the interpretation.     Admission on 11/25/2023, Discharged on 11/26/2023   Component Date Value Ref Range Status    Glucose 11/25/2023 122 (H)  65 - 99 mg/dL Final    BUN 11/25/2023 13  6 - 20 mg/dL Final    Creatinine 11/25/2023 0.71  0.57 - 1.00 mg/dL Final    Sodium 11/25/2023 139  136 - 145 mmol/L Final    Potassium 11/25/2023 4.3  3.5 - 5.2 mmol/L Final    Chloride 11/25/2023 105  98 - 107 mmol/L Final    CO2 11/25/2023 26.9  22.0 - 29.0 mmol/L Final    Calcium 11/25/2023 9.1  8.6 - 10.5 mg/dL Final    Total Protein 11/25/2023 7.2  6.0 - 8.5 g/dL Final    Albumin 11/25/2023 4.3  3.5 - 5.2 g/dL Final    ALT (SGPT) 11/25/2023 6  1 - 33 U/L Final    AST (SGOT) 11/25/2023 22  1 - 32 U/L Final    Alkaline Phosphatase 11/25/2023 70  39 - 117 U/L Final    Total Bilirubin 11/25/2023 0.2  0.0 - 1.2 mg/dL Final    Globulin 11/25/2023 2.9  gm/dL Final    A/G Ratio 11/25/2023 1.5  g/dL Final    BUN/Creatinine Ratio 11/25/2023 18.3  7.0 - 25.0 Final    Anion Gap 11/25/2023 7.1  5.0 - 15.0 mmol/L Final     eGFR 11/25/2023 111.1  >60.0 mL/min/1.73 Final    Lipase 11/25/2023 44  13 - 60 U/L Final    Color, UA 11/25/2023 Yellow  Yellow, Straw Final    Appearance, UA 11/25/2023 Clear  Clear Final    pH, UA 11/25/2023 6.0  5.0 - 8.0 Final    Specific [place], UA 11/25/2023 1.028  1.005 - 1.030 Final    Glucose, UA 11/25/2023 Negative  Negative Final    Ketones, UA 11/25/2023 Negative  Negative Final    Bilirubin, UA 11/25/2023 Negative  Negative Final    Blood, UA 11/25/2023 Negative  Negative Final    Protein, UA 11/25/2023 Negative  Negative Final    Leuk Esterase, UA 11/25/2023 Negative  Negative Final    Nitrite, UA 11/25/2023 Negative  Negative Final    Urobilinogen, UA 11/25/2023 0.2 E.U./dL  0.2 - 1.0 E.U./dL Final    Extra Tube 11/25/2023 Hold for add-ons.   Final    Auto resulted.    Extra Tube 11/25/2023 hold for add-on   Final    Auto resulted    Extra Tube 11/25/2023 Hold for add-ons.   Final    Auto resulted.    Extra Tube 11/25/2023 Hold for add-ons.   Final    Auto resulted    WBC 11/25/2023 8.52  3.40 - 10.80 10*3/mm3 Final    RBC 11/25/2023 4.25  3.77 - 5.28 10*6/mm3 Final    Hemoglobin 11/25/2023 11.8 (L)  12.0 - 15.9 g/dL Final    Hematocrit 11/25/2023 36.7  34.0 - 46.6 % Final    MCV 11/25/2023 86.4  79.0 - 97.0 fL Final    MCH 11/25/2023 27.8  26.6 - 33.0 pg Final    MCHC 11/25/2023 32.2  31.5 - 35.7 g/dL Final    RDW 11/25/2023 13.9  12.3 - 15.4 % Final    RDW-SD 11/25/2023 43.8  37.0 - 54.0 fl Final    MPV 11/25/2023 10.5  6.0 - 12.0 fL Final    Platelets 11/25/2023 241  140 - 450 10*3/mm3 Final    Neutrophil % 11/25/2023 62.1  42.7 - 76.0 % Final    Lymphocyte % 11/25/2023 27.7  19.6 - 45.3 % Final    Monocyte % 11/25/2023 7.9  5.0 - 12.0 % Final    Eosinophil % 11/25/2023 1.4  0.3 - 6.2 % Final    Basophil % 11/25/2023 0.7  0.0 - 1.5 % Final    Immature Grans % 11/25/2023 0.2  0.0 - 0.5 % Final    Neutrophils, Absolute 11/25/2023 5.29  1.70 - 7.00 10*3/mm3 Final    Lymphocytes, Absolute 11/25/2023  2.36  0.70 - 3.10 10*3/mm3 Final    Monocytes, Absolute 11/25/2023 0.67  0.10 - 0.90 10*3/mm3 Final    Eosinophils, Absolute 11/25/2023 0.12  0.00 - 0.40 10*3/mm3 Final    Basophils, Absolute 11/25/2023 0.06  0.00 - 0.20 10*3/mm3 Final    Immature Grans, Absolute 11/25/2023 0.02  0.00 - 0.05 10*3/mm3 Final    nRBC 11/25/2023 0.0  0.0 - 0.2 /100 WBC Final      CT Abdomen Pelvis Without Contrast    Result Date: 11/26/2023  Pancreas appears mildly edematous, correlate with lipase for pancreatitis. Authenticated and Electronically Signed by Hayden Saucedo MD on 11/26/2023 12:07:01 AM    CT Lumbar Spine Without Contrast    Result Date: 11/25/2023  No acute fracture or malalignment of the lumbar spine. Authenticated and Electronically Signed by Hayden Saucedo MD on 11/25/2023 11:55:10 PM     Assessment / Plan      Assessment & Plan:  Diagnoses and all orders for this visit:    1. Dyspepsia (Primary)  -     Follow Anesthesia Guidelines / Protocol; Standing  -     Verify NPO; Standing  -     Case Request; Standing  -     Obtain Informed Consent; Standing  -     Case Request  -     MRI abdomen wo contrast mrcp; Future  -     Mitochondrial Antibodies, M2  -     IgG 4; Future    2. Abdominal pain, unspecified abdominal location  -     Follow Anesthesia Guidelines / Protocol; Standing  -     Verify NPO; Standing  -     Case Request; Standing  -     Obtain Informed Consent; Standing  -     Case Request  -     MRI abdomen wo contrast mrcp; Future  -     Mitochondrial Antibodies, M2  -     IgG 4; Future  -     Lipid Panel    3. History of fundoplication  -     Follow Anesthesia Guidelines / Protocol; Standing  -     Verify NPO; Standing  -     Case Request; Standing  -     Obtain Informed Consent; Standing  -     Case Request  -     MRI abdomen wo contrast mrcp; Future  -     Mitochondrial Antibodies, M2  -     IgG 4; Future    4. History of pancreatitis  -     Lipid Panel    5. Abnormal finding on GI tract imaging      Broad  differential diagnosis.  Symptoms could be from mild pancreatitis, but she does not have many risk factors.  Will check first lipid panel, autoimmune workup, and an MRCP given her postcholecystectomy state.  May also be from peptic ulcer disease.  Needs an EGD.    We did briefly discuss the utility of a savory dilation, with some of her dysphagia symptoms, but we will consider this after we have better addressed her current symptoms.    Plan for EGD with monitored anesthesia care. Counseled in detail regarding the risks, benefits and alternatives of the procedure, including but not limited to perforation, bleeding, infection, post-operative pain, complications from anesthesia, aspiration, cardiac decompensation, need for further procedures or surgery, which may occur in approximately 1 in 1000 procedures. Counseled also that endoscopies are not perfect tests, and there is a possibility of missed diagnoses. Counseled regarding pre procedural instructions. Written instructions provided. Instructed to arrange for a ride home for the day of procedure. Patient voiced understanding of the above, and agrees to proceed.      Follow Up:   Return for Follow-up TBD after testing is reviewed.    Consuelo Avalos MD  [facility]    12/4/2023  12:22 EST    Part of this note may be an electronic transcription/translation of spoken language to printed text using the Dragon Dictation System.

## 2023-12-05 LAB
IGG4 SER-MCNC: 11 MG/DL (ref 2–96)
MITOCHONDRIA M2 IGG SER-ACNC: <20 UNITS (ref 0–20)

## 2023-12-06 PROBLEM — Z98.890 HISTORY OF FUNDOPLICATION: Status: ACTIVE | Noted: 2023-12-04

## 2023-12-06 PROBLEM — R10.13 DYSPEPSIA: Status: ACTIVE | Noted: 2023-12-04

## 2023-12-06 PROBLEM — R10.9 ABDOMINAL PAIN: Status: ACTIVE | Noted: 2023-12-04

## 2023-12-26 ENCOUNTER — TELEPHONE (OUTPATIENT)
Dept: INTERNAL MEDICINE | Facility: CLINIC | Age: 38
End: 2023-12-26
Payer: COMMERCIAL

## 2023-12-26 NOTE — TELEPHONE ENCOUNTER
Spoke with patient and advised an appt is needed. Patient states she will call back if she decides an appt is needed

## 2023-12-26 NOTE — TELEPHONE ENCOUNTER
Caller: Laura Thomson    Relationship: Self    Best call back number: [phone]     What orders are you requesting (i.e. lab or imaging): ULTRASOUND OR XRAY    In what timeframe would the patient need to come in: ASAP    Where will you receive your lab/imaging services: [facility]    Additional notes: PATIENT STATES THAT SHE BELIEVES SHE HAS A HERNIA ON THE RIGHT SIDE UNDER HER RIB CAGE. PATIENT STATES THAT THE SPOT IS TENDER AND PAINFUL. WANTING TO SEE IF A ULTRASOUND OR XRAY COULD BE ORDERED. PLEASE CALL AND ADVISE.

## 2023-12-29 ENCOUNTER — ANESTHESIA (OUTPATIENT)
Dept: GASTROENTEROLOGY | Facility: HOSPITAL | Age: 38
End: 2023-12-29
Payer: COMMERCIAL

## 2023-12-29 ENCOUNTER — HOSPITAL ENCOUNTER (OUTPATIENT)
Facility: HOSPITAL | Age: 38
Setting detail: HOSPITAL OUTPATIENT SURGERY
Discharge: HOME | End: 2023-12-29
Attending: INTERNAL MEDICINE | Admitting: INTERNAL MEDICINE
Payer: COMMERCIAL

## 2023-12-29 ENCOUNTER — ANESTHESIA EVENT (OUTPATIENT)
Dept: GASTROENTEROLOGY | Facility: HOSPITAL | Age: 38
End: 2023-12-29
Payer: COMMERCIAL

## 2023-12-29 VITALS
WEIGHT: 200 LBS | SYSTOLIC BLOOD PRESSURE: 114 MMHG | OXYGEN SATURATION: 99 % | HEART RATE: 87 BPM | HEIGHT: 64 IN | TEMPERATURE: 96.9 F | BODY MASS INDEX: 34.15 KG/M2 | RESPIRATION RATE: 18 BRPM | DIASTOLIC BLOOD PRESSURE: 70 MMHG

## 2023-12-29 DIAGNOSIS — R10.9 ABDOMINAL PAIN, UNSPECIFIED ABDOMINAL LOCATION: ICD-10-CM

## 2023-12-29 DIAGNOSIS — R10.13 DYSPEPSIA: ICD-10-CM

## 2023-12-29 DIAGNOSIS — Z98.890 HISTORY OF FUNDOPLICATION: ICD-10-CM

## 2023-12-29 PROCEDURE — 25810000003 LACTATED RINGERS PER 1000 ML: Performed by: INTERNAL MEDICINE

## 2023-12-29 PROCEDURE — 25010000002 PROPOFOL 10 MG/ML EMULSION: Performed by: NURSE ANESTHETIST, CERTIFIED REGISTERED

## 2023-12-29 PROCEDURE — 43239 EGD BIOPSY SINGLE/MULTIPLE: CPT | Performed by: INTERNAL MEDICINE

## 2023-12-29 RX ORDER — SODIUM CHLORIDE, SODIUM LACTATE, POTASSIUM CHLORIDE, CALCIUM CHLORIDE 600; 310; 30; 20 MG/100ML; MG/100ML; MG/100ML; MG/100ML
1000 INJECTION, SOLUTION INTRAVENOUS CONTINUOUS
Status: DISCONTINUED | OUTPATIENT
Start: 2023-12-29 | End: 2023-12-29 | Stop reason: HOSPADM

## 2023-12-29 RX ORDER — KETAMINE HCL IN NACL, ISO-OSM 100MG/10ML
SYRINGE (ML) INJECTION AS NEEDED
Status: DISCONTINUED | OUTPATIENT
Start: 2023-12-29 | End: 2023-12-29 | Stop reason: SURG

## 2023-12-29 RX ORDER — PROPOFOL 10 MG/ML
VIAL (ML) INTRAVENOUS CONTINUOUS PRN
Status: DISCONTINUED | OUTPATIENT
Start: 2023-12-29 | End: 2023-12-29 | Stop reason: SURG

## 2023-12-29 RX ADMIN — LIDOCAINE HYDROCHLORIDE 60 MG: 20 INJECTION, SOLUTION INTRAVENOUS at 12:01

## 2023-12-29 RX ADMIN — PROPOFOL 200 MCG/KG/MIN: 10 INJECTION, EMULSION INTRAVENOUS at 12:01

## 2023-12-29 RX ADMIN — GLYCOPYRROLATE 0.2 MCG: 0.2 INJECTION, SOLUTION INTRAMUSCULAR; INTRAVENOUS at 12:01

## 2023-12-29 RX ADMIN — SODIUM CHLORIDE, POTASSIUM CHLORIDE, SODIUM LACTATE AND CALCIUM CHLORIDE 1000 ML: 600; 310; 30; 20 INJECTION, SOLUTION INTRAVENOUS at 11:17

## 2023-12-29 RX ADMIN — Medication 20 MG: at 12:01

## 2023-12-29 NOTE — ANESTHESIA PREPROCEDURE EVALUATION
Anesthesia Evaluation     Patient summary reviewed and Nursing notes reviewed   history of anesthetic complications:  PONV  NPO Solid Status: > 8 hours  NPO Liquid Status: > 8 hours           Airway   Mallampati: II  TM distance: >3 FB  Neck ROM: full  Possible difficult intubation  Dental - normal exam     Pulmonary - normal exam   Cardiovascular - negative cardio ROS and normal exam    ECG reviewed  Rhythm: regular  Rate: normal      ROS comment: Treadmill exercise stress  Interpretation Summary    1.  Normal exercise treadmill stress test.  2.  No ischemic ECG changes or significant anginal symptoms during peak stress.  3.  Normal chronotropic and BP response to exercise.  4.  Average exercise capacity, reaching 10.1 METS.  5.  Low risk Duke treadmill score, 7.0.      Neuro/Psych  (+) psychiatric history Anxiety and Depression  GI/Hepatic/Renal/Endo    (+) obesity, GERD, thyroid problem hypothyroidism    Musculoskeletal     Abdominal  - normal exam    Abdomen: soft.  Bowel sounds: normal.   Substance History - negative use     OB/GYN      Comment: hysterectomy      Other                      Anesthesia Plan    ASA 2     MAC     (Risks and benefits discussed including risk of aspiration, recall and dental damage. All patient questions answered. Will continue with POC.)  intravenous induction     Anesthetic plan, risks, benefits, and alternatives have been provided, discussed and informed consent has been obtained with: patient.  Pre-procedure education provided  Plan discussed with CRNA.      CODE STATUS:

## 2023-12-29 NOTE — ANESTHESIA POSTPROCEDURE EVALUATION
Patient: Laura Thomson    Procedure Summary       Date: 12/29/23 Room / Location: [facility] ENDOSCOPY 1 / [facility] ENDOSCOPY    Anesthesia Start: 1157 Anesthesia Stop: 1209    Procedure: ESOPHAGOGASTRODUODENOSCOPY  WITH BIOPSY (Esophagus) Diagnosis:       Dyspepsia      Abdominal pain, unspecified abdominal location      History of fundoplication      (Dyspepsia [R10.13])      (Abdominal pain, unspecified abdominal location [R10.9])      (History of fundoplication [Z98.890])    Surgeons: Consuelo Avalos MD Provider: Gaetano Diaz CRNA    Anesthesia Type: MAC ASA Status: 2            Anesthesia Type: MAC    Vitals  No vitals data found for the desired time range.          Post Anesthesia Care and Evaluation    Patient location during evaluation: PHASE II  Patient participation: complete - patient participated  Level of consciousness: awake and alert  Pain score: 0  Pain management: satisfactory to patient    Airway patency: patent  Anesthetic complications: No anesthetic complications  PONV Status: none  Cardiovascular status: acceptable and stable  Respiratory status: acceptable and spontaneous ventilation  Hydration status: acceptable    Comments: Vitals signs as noted in nursing documentation as per protocol.

## 2024-01-02 LAB — REF LAB TEST METHOD: NORMAL

## 2024-01-02 NOTE — PROGRESS NOTES
Please give the patient the following message:  ----- Results -----  Visually unremarkable duodenal biopsy.  Negative for celiac disease.  Gastric biopsies are negative for Helicobacter pylori.

## 2024-01-31 ENCOUNTER — HOSPITAL ENCOUNTER (OUTPATIENT)
Dept: MRI IMAGING | Facility: HOSPITAL | Age: 39
Discharge: HOME | End: 2024-01-31
Payer: COMMERCIAL

## 2024-02-12 DIAGNOSIS — F41.9 ANXIETY: ICD-10-CM

## 2024-02-12 DIAGNOSIS — G47.00 INSOMNIA, UNSPECIFIED TYPE: ICD-10-CM

## 2024-02-12 RX ORDER — HYDROXYZINE HYDROCHLORIDE 25 MG/1
25 TABLET, FILM COATED ORAL 3 TIMES DAILY PRN
Qty: 90 TABLET | Refills: 0 | Status: SHIPPED | OUTPATIENT
Start: 2024-02-12

## 2024-02-27 ENCOUNTER — OFFICE VISIT (OUTPATIENT)
Dept: INTERNAL MEDICINE | Facility: CLINIC | Age: 39
End: 2024-02-27
Payer: COMMERCIAL

## 2024-02-27 VITALS
WEIGHT: 205.8 LBS | HEART RATE: 62 BPM | SYSTOLIC BLOOD PRESSURE: 112 MMHG | TEMPERATURE: 97.7 F | BODY MASS INDEX: 35.13 KG/M2 | HEIGHT: 64 IN | OXYGEN SATURATION: 99 % | DIASTOLIC BLOOD PRESSURE: 78 MMHG

## 2024-02-27 DIAGNOSIS — L60.0 INGROWN LEFT BIG TOENAIL: ICD-10-CM

## 2024-02-27 DIAGNOSIS — B35.1 ONYCHOMYCOSIS: Primary | ICD-10-CM

## 2024-02-27 PROCEDURE — 1159F MED LIST DOCD IN RCRD: CPT | Performed by: NURSE PRACTITIONER

## 2024-02-27 PROCEDURE — 1160F RVW MEDS BY RX/DR IN RCRD: CPT | Performed by: NURSE PRACTITIONER

## 2024-02-27 PROCEDURE — 99213 OFFICE O/P EST LOW 20 MIN: CPT | Performed by: NURSE PRACTITIONER

## 2024-02-27 RX ORDER — TERBINAFINE HYDROCHLORIDE 250 MG/1
250 TABLET ORAL DAILY
Qty: 30 TABLET | Refills: 1 | Status: SHIPPED | OUTPATIENT
Start: 2024-02-27

## 2024-02-27 NOTE — PROGRESS NOTES
"  Office Visit      Patient Name: Laura Thomson  : 1985   MRN: [id number]   Care Team: Patient Care Team:  Megha Fregoso APRN as PCP - General (Nurse Practitioner)  Franck King MD as Consulting Physician (General Surgery)  Megha Fregoso APRN (Family Medicine)  Consuelo Avalos MD as Consulting Physician (Gastroenterology)    Chief Complaint  Pain (Pain in left great toe and has a toenail fungus.  )    Subjective     Subjective      Laura Thomson presents to [facility] for left great toe pain.   Symptoms present for 1 month with associated worsening.   Endorses yellowing and thickening of left great toe nail, erythematous skin surrounding toenail, and pain.  She has tried OTC topical therapy for onychomycosis but has not helped. Has not had this problem in the past.   No prior history of liver disease.     Objective     Objective   Vital Signs:   /78   Pulse 62   Temp 97.7 °F (36.5 °C) (Tympanic)   Ht 162.6 cm (64\")   Wt 93.4 kg (205 lb 12.8 oz)   SpO2 99%   BMI 35.33 kg/m²     Physical Exam  Vitals and nursing note reviewed.   Constitutional:       General: She is not in acute distress.     Appearance: Normal appearance. She is not toxic-appearing.   Eyes:      Pupils: Pupils are equal, round, and reactive to light.   Cardiovascular:      Rate and Rhythm: Normal rate and regular rhythm.      Heart sounds: Normal heart sounds. No murmur heard.  Pulmonary:      Effort: Pulmonary effort is normal. No respiratory distress.      Breath sounds: Normal breath sounds. No wheezing.   Feet:      Left foot:      Skin integrity: Erythema (both sides of great toe) present.      Toenail Condition: Left toenails are abnormally thick and ingrown. Fungal disease present.  Skin:     General: Skin is warm and dry.      Findings: No rash.   Neurological:      General: No focal deficit present.      Mental Status: She is alert.   Psychiatric:         " Mood and Affect: Mood normal.         Behavior: Behavior normal.          Assessment / Plan      Assessment & Plan   Problem List Items Addressed This Visit    None  Visit Diagnoses       Onychomycosis    -  Primary    Relevant Medications    hydrocortisone 2.5 % cream    terbinafine (lamiSIL) 250 MG tablet    Other Relevant Orders    Comprehensive metabolic panel    Discussed length of treatment time with toenails, do not suspect this is leading to her pain more so below. She would like to proceed with oral therapy, discussed liver toxicity she will have labs drawn in 4 weeks, LFTs have always been in normal range. Take with food.     Ingrown left big toenail        Relevant Medications    hydrocortisone 2.5 % cream    Recommend epsom salt soaks BID, hydrocortisone cream BID x 7 days, and use nail tool to gently push skin back. Avoid tight fitting footwear and cutting toenails too short. Follow-up in 4 weeks, podiatry referral if needed.              Follow Up   Return in about 4 weeks (around 3/26/2024) for with pcp discuss anxiety, Annual.  Patient was given instructions and counseling regarding her condition or for health maintenance advice. Please see specific information pulled into the AVS if appropriate.     TRISTEN Rader  [facility]

## 2024-03-13 ENCOUNTER — HOSPITAL ENCOUNTER (OUTPATIENT)
Dept: MRI IMAGING | Facility: HOSPITAL | Age: 39
Discharge: HOME | End: 2024-03-13
Admitting: INTERNAL MEDICINE
Payer: COMMERCIAL

## 2024-03-13 DIAGNOSIS — R10.13 DYSPEPSIA: ICD-10-CM

## 2024-03-13 DIAGNOSIS — Z98.890 HISTORY OF FUNDOPLICATION: ICD-10-CM

## 2024-03-13 DIAGNOSIS — R10.9 ABDOMINAL PAIN, UNSPECIFIED ABDOMINAL LOCATION: ICD-10-CM

## 2024-03-13 PROCEDURE — 74181 MRI ABDOMEN W/O CONTRAST: CPT

## 2024-03-15 ENCOUNTER — TELEPHONE (OUTPATIENT)
Dept: GASTROENTEROLOGY | Facility: CLINIC | Age: 39
End: 2024-03-15
Payer: COMMERCIAL

## 2024-03-15 NOTE — PROGRESS NOTES
Please give the patient the following message:  ----- Results -----  The MRI reveals subtle enlargement of the pancreas without any overt pancreatitis.  They mention that this finding could be seen with autoimmune pancreatitis.  They also mention it could be an artifact and be completely normal.  Her autoimmune pancreatitis workup has thus far been negative.  Her lipase is also unremarkable.  Will discuss further at her follow-up visit scheduled for next month.  We may talk about a referral for endoscopic ultrasound at that visit.

## 2024-03-15 NOTE — TELEPHONE ENCOUNTER
----- Message from Consuelo Avalos MD sent at 3/15/2024  1:30 PM EDT -----  Please give the patient the following message:  ----- Results -----  The MRI reveals subtle enlargement of the pancreas without any overt pancreatitis.  They mention that this finding could be seen with autoimmune pancreatitis.  They also mention it could be an artifact and be completely normal.  Her autoimmune pancreatitis workup has thus far been negative.  Her lipase is also unremarkable.  Will discuss further at her follow-up visit scheduled for next month.  We may talk about a referral for endoscopic ultrasound at that visit.

## 2024-03-26 ENCOUNTER — LAB (OUTPATIENT)
Dept: LAB | Facility: HOSPITAL | Age: 39
End: 2024-03-26
Payer: COMMERCIAL

## 2024-03-26 ENCOUNTER — OFFICE VISIT (OUTPATIENT)
Dept: GASTROENTEROLOGY | Facility: CLINIC | Age: 39
End: 2024-03-26
Payer: COMMERCIAL

## 2024-03-26 VITALS
HEART RATE: 78 BPM | BODY MASS INDEX: 36.22 KG/M2 | OXYGEN SATURATION: 98 % | DIASTOLIC BLOOD PRESSURE: 80 MMHG | SYSTOLIC BLOOD PRESSURE: 118 MMHG | WEIGHT: 211 LBS

## 2024-03-26 DIAGNOSIS — Z98.890 HISTORY OF FUNDOPLICATION: ICD-10-CM

## 2024-03-26 DIAGNOSIS — R14.0 ABDOMINAL BLOATING: ICD-10-CM

## 2024-03-26 DIAGNOSIS — R10.13 DYSPEPSIA: ICD-10-CM

## 2024-03-26 DIAGNOSIS — K21.9 GASTROESOPHAGEAL REFLUX DISEASE, UNSPECIFIED WHETHER ESOPHAGITIS PRESENT: ICD-10-CM

## 2024-03-26 DIAGNOSIS — R10.13 EPIGASTRIC PAIN: ICD-10-CM

## 2024-03-26 DIAGNOSIS — Z87.19 HISTORY OF PANCREATITIS: ICD-10-CM

## 2024-03-26 DIAGNOSIS — R10.13 DYSPEPSIA: Primary | ICD-10-CM

## 2024-03-26 DIAGNOSIS — R93.3 ABNORMAL FINDING ON GI TRACT IMAGING: ICD-10-CM

## 2024-03-26 LAB
ALBUMIN SERPL-MCNC: 4.3 G/DL (ref 3.5–5.2)
ALBUMIN/GLOB SERPL: 2 G/DL
ALP SERPL-CCNC: 89 U/L (ref 39–117)
ALT SERPL W P-5'-P-CCNC: 13 U/L (ref 1–33)
ANION GAP SERPL CALCULATED.3IONS-SCNC: 8 MMOL/L (ref 5–15)
AST SERPL-CCNC: 18 U/L (ref 1–32)
BILIRUB SERPL-MCNC: 0.3 MG/DL (ref 0–1.2)
BUN SERPL-MCNC: 10 MG/DL (ref 6–20)
BUN/CREAT SERPL: 12 (ref 7–25)
CALCIUM SPEC-SCNC: 9.1 MG/DL (ref 8.6–10.5)
CHLORIDE SERPL-SCNC: 105 MMOL/L (ref 98–107)
CO2 SERPL-SCNC: 31 MMOL/L (ref 22–29)
CREAT SERPL-MCNC: 0.83 MG/DL (ref 0.57–1)
EGFRCR SERPLBLD CKD-EPI 2021: 92.1 ML/MIN/1.73
GLOBULIN UR ELPH-MCNC: 2.2 GM/DL
GLUCOSE SERPL-MCNC: 101 MG/DL (ref 65–99)
LIPASE SERPL-CCNC: 31 U/L (ref 13–60)
POTASSIUM SERPL-SCNC: 3.7 MMOL/L (ref 3.5–5.2)
PROT SERPL-MCNC: 6.5 G/DL (ref 6–8.5)
SODIUM SERPL-SCNC: 144 MMOL/L (ref 136–145)

## 2024-03-26 PROCEDURE — 86003 ALLG SPEC IGE CRUDE XTRC EA: CPT

## 2024-03-26 PROCEDURE — 80053 COMPREHEN METABOLIC PANEL: CPT | Performed by: NURSE PRACTITIONER

## 2024-03-26 PROCEDURE — 82785 ASSAY OF IGE: CPT

## 2024-03-26 PROCEDURE — 86008 ALLG SPEC IGE RECOMB EA: CPT

## 2024-03-26 PROCEDURE — 83690 ASSAY OF LIPASE: CPT | Performed by: NURSE PRACTITIONER

## 2024-03-26 PROCEDURE — 36415 COLL VENOUS BLD VENIPUNCTURE: CPT

## 2024-03-26 RX ORDER — FAMOTIDINE 20 MG/1
20 TABLET, FILM COATED ORAL 2 TIMES DAILY
Qty: 120 TABLET | Refills: 0 | Status: SHIPPED | OUTPATIENT
Start: 2024-03-26 | End: 2024-05-25

## 2024-03-26 NOTE — PROGRESS NOTES
Follow Up Note     Date: 2024   Patient Name: Laura Thomson  MRN: [id number]  : 1985     Referring Physician: Megha Fregoso APRN    Chief Complaint:    Chief Complaint   Patient presents with    Follow-up    Abdominal Pain    Nausea       Interval History:   3/26/2024  Laura Thomson is a 38 y.o. female who is here today for follow up.    She was originally seen for abdominal pain, dyspepsia, in the setting of a recent ER visit, and CT scan that showed possible edema in the pancreas.  Lipase was within normal limits at that time.  This was followed up with an MRCP, which also reveals subtle abnormality with the pancreas.  Reported as subtle enlargement of the pancreas without any secondary signs of pancreatitis.  This appearance may be seen with autoimmune pancreatitis.  At that time her lipase was within normal limits.    CCY at age 17.    She does have a history of GI symptoms, and carries a clinical diagnosis of IBS.    Recent EGD given her upper abdominal symptoms, did not reveal any obvious pathology.  Negative for peptic ulcer disease.  Mild gastritis and duodenitis identified.  She is status post fundoplication with an appropriate wrap.    She has abdominal bloating, a component of sharp epigastric pain which radiates to her back sometimes.    Subjective      Past Medical History:   Diagnosis Date    Abdominal pain, periumbilical     Abdominal pain, RLQ (right lower quadrant)     Acute pharyngitis     Anxiety     Appetite lost     Chest pain ?    states had a heart monitor and was WNL.  States was told pain was attributed to acid reflux.  No pain since.  (assessed 3/17/23)    Colon polyp 2017    Depression     patient denies    Diarrhea     Secondary to IBS    Difficulty swallowing     food and liquids    Fatigue     Fracture     RIGHT WRIST TWICE, LEFT HAND    GERD (gastroesophageal reflux disease)     H/O foot surgery 10/2020    left x 2    Hearing loss      No use of hearing aids    History of bronchitis     History of colonoscopy     History of exercise stress test     History of left salpingo-oophorectomy  2018    History of ovarian cyst     History of pneumonia     Hyperthyroidism     hyperactive taken off medicine when pregnant.  Reported she was medication at one time, but none since pregnancy.     Irritable bowel syndrome     Multiple gestation [id number]    Ovarian cyst, bilateral     PONV (postoperative nausea and vomiting)     Seasonal allergies     RITU (stress urinary incontinence, female) 2020    TMJ (dislocation of temporomandibular joint)     Upper respiratory infection     Vomiting     Wears glasses      Past Surgical History:   Procedure Laterality Date    ANKLE SURGERY Left     x2     SECTION  2008    DR NAVARRETE     SECTION  10/30/2015    DR VASQUEZ     SECTION  2010    DR ESPINOZA    CHOLECYSTECTOMY      COLONOSCOPY N/A 2017     COLONOSCOPY WITH COLD BIOPSY POLYPECTOMY; BIOPSIES , QUICK CLIP;  Surgeon: Perfecto Mcknight MD;  Location: [facility] ENDOSCOPY;  Service:     DIAGNOSTIC LAPAROSCOPY  2005    DR JONES NAVA/cystectomy    DIAGNOSTIC LAPAROSCOPY  2012    DR ESPINOZA/ELIJAH    DIAGNOSTIC LAPAROSCOPY N/A 08/10/2017     DIAGNOSTIC LAPAROSCOPY, LEFT S&O;  Keren Espinoza MD;  Location: [facility] OR;  Service:     DIAGNOSTIC LAPAROSCOPY N/A 2022    Procedure: DIAGNOSTIC LAPAROSCOPY,  LYSIS OF ADHESIONS, RIGHT SALPINGO-OOPHORECTOMY;  Surgeon: Kem Sahni MD;  Location: [facility] OR;  Service: Obstetrics/Gynecology;  Laterality: N/A;    DIAGNOSTIC LAPAROSCOPY N/A 3/24/2023    Procedure: DIAGNOSTIC LAPAROSCOPY WITH LYSIS OF ADHESIONS;  Surgeon: Kem Sahni MD;  Location: [facility] OR;  Service: Obstetrics/Gynecology;  Laterality: N/A;    ENDOSCOPY N/A 10/15/2019    Procedure: ESOPHAGOGASTRODUODENOSCOPY WITH BIOPSY AND ESOPHAGEAL DILATATION;  Surgeon: Perfecto Mcknight MD;   Location: [facility] ENDOSCOPY;  Service: Gastroenterology    ENDOSCOPY N/A 10/11/2021    Procedure: ESOPHAGOGASTRODUODENOSCOPY with dilatation and biopsies;  Surgeon: Hieu Shultz MD;  Location: [facility] ENDOSCOPY;  Service: Gastroenterology;  Laterality: N/A;    ENDOSCOPY N/A 12/29/2023    Procedure: ESOPHAGOGASTRODUODENOSCOPY  WITH BIOPSY;  Surgeon: Consuelo Avalos MD;  Location: [facility] ENDOSCOPY;  Service: Gastroenterology;  Laterality: N/A;    FOOT SURGERY Left     ligament and tendon repair    HERNIA REPAIR  12/20/2019    abd    MID-URETHRAL SLING WITH CYSTOSCOPY N/A 06/09/2021     MID-URETHRAL SLING WITH CYSTOSCOPY TVT EXACT;  Luc Alicea MD;  Location:  [facility] OR;  Service: Obstetrics/Gynecology;  Laterality: N/A;    NISSEN FUNDOPLICATION  06/2013    UPPER GASTROINTESTINAL ENDOSCOPY  2013    UPPER GASTROINTESTINAL ENDOSCOPY  06/19/2020    VAGINAL HYSTERECTOMY N/A 07/09/2018    VAGINAL HYSTERECTOMY, BILATERAL SALPINGECTOMY;  Luc Alicea MD;  Location:  [facility] OR;  Service: Gynecology    WISDOM TOOTH EXTRACTION  2000     Family History   Problem Relation Age of Onset    Coronary artery disease Mother     Diabetes Mother     Hypertension Mother     Kidney disease Father     Skin cancer Father     Stroke Maternal Grandfather     Hypertension Maternal Grandfather     Diabetes Maternal Grandfather     Colon cancer Maternal Great-Grandmother     Osteoporosis Neg Hx      Social History     Socioeconomic History    Marital status: Legally    Tobacco Use    Smoking status: Never     Passive exposure: Never    Smokeless tobacco: Never   Vaping Use    Vaping status: Never Used   Substance and Sexual Activity    Alcohol use: No    Drug use: No    Sexual activity: Defer       Current Outpatient Medications:     azelastine (ASTEPRO) 0.15 % solution nasal spray, 1 spray into the nostril(s) as directed by provider 2 (Two) Times a Day., Disp: 23 mL, Rfl: 0    citalopram (CeleXA) 10 MG tablet, Take 1 tablet by mouth  Daily., Disp: 90 tablet, Rfl: 3    estradiol (CLIMARA) 0.1 MG/24HR patch, Place 1 patch on the skin as directed by provider 1 (One) Time Per Week., Disp: 12 patch, Rfl: 5    fluticasone (FLONASE) 50 MCG/ACT nasal spray, 1 spray into the nostril(s) as directed by provider Daily., Disp: 11.1 mL, Rfl: 0    hydrOXYzine (ATARAX) 25 MG tablet, Take 1 tablet by mouth 3 (Three) Times a Day As Needed for Itching, Allergies or Anxiety., Disp: 90 tablet, Rfl: 0    ibuprofen (ADVIL,MOTRIN) 800 MG tablet, Take 1 tablet by mouth Every 8 (Eight) Hours As Needed for Mild Pain, Disp: 30 tablet, Rfl: 0    ondansetron ODT (ZOFRAN-ODT) 4 MG disintegrating tablet, Place 1 tablet on the tongue Every 8 (Eight) Hours As Needed for Nausea or Vomiting., Disp: 12 tablet, Rfl: 0    promethazine (PHENERGAN) 25 MG tablet, Take 1 tablet by mouth Every 6 (Six) Hours As Needed for Nausea or Vomiting., Disp: 12 tablet, Rfl: 0    famotidine (Pepcid) 20 MG tablet, Take 1 tablet by mouth 2 (Two) Times a Day for 60 days., Disp: 120 tablet, Rfl: 0    terbinafine (lamiSIL) 250 MG tablet, Take 1 tablet by mouth Daily. (Patient not taking: Reported on 3/26/2024), Disp: 30 tablet, Rfl: 1  No Known Allergies  Review of Systems   Constitutional:  Negative for unexpected weight loss.   HENT:  Positive for trouble swallowing.    Gastrointestinal:  Positive for abdominal distention, abdominal pain, nausea, GERD and indigestion. Negative for anal bleeding, blood in stool, constipation, diarrhea, rectal pain and vomiting.       The following portions of the patient's history were reviewed and updated as appropriate: allergies, current medications, past family history, past medical history, past social history, past surgical history and problem list.    Objective     Physical Exam:  Vitals:    03/26/24 1316   BP: 118/80   Pulse: 78   SpO2: 98%   Weight: 95.7 kg (211 lb)       Physical Exam  Constitutional:       General: She is not in acute distress.     Appearance:  Normal appearance.   HENT:      Head: Normocephalic and atraumatic.      Nose: Nose normal.      Mouth/Throat:      Mouth: Mucous membranes are moist.   Eyes:      General: No scleral icterus.     Conjunctiva/sclera: Conjunctivae normal.   Cardiovascular:      Rate and Rhythm: Normal rate.   Pulmonary:      Effort: Pulmonary effort is normal. No respiratory distress.   Abdominal:      General: There is no distension.      Tenderness: There is no abdominal tenderness. There is no guarding.   Musculoskeletal:         General: No deformity or signs of injury.      Cervical back: Neck supple. No rigidity.   Skin:     Capillary Refill: Capillary refill takes less than 2 seconds.      Coloration: Skin is not jaundiced or pale.   Neurological:      General: No focal deficit present.      Mental Status: She is alert and oriented to person, place, and time.   Psychiatric:         Mood and Affect: Mood normal.         Behavior: Behavior normal.         Results Review:   I reviewed the patient's new clinical results.    Admission on 2023, Discharged on 2023   Component Date Value Ref Range Status    Reference Lab Report 2023    Final                    Value:[facility]  [address] [place] [address]  Phone: [phone] or [phone]  Fax: [phone]  Ac Francois M.D., Medical Director    PATIENT NAME                                     LABORATORY NO.  SHAWN SORIA.                          [id number]  [id number]                                 AGE                    SEX   SSN              CLIENT REF #  [facility]                    38        1985      F     [id number]      [id number]    [address]                        REQUESTING M.D.           ATTENDING MWAN.         COPY TO.  [address]                                [place] [address]                         DATE  "COLLECTED            DATE RECEIVED          DATE REPORTED  12/29/2023 12/29/2023 01/02/2024    DIAGNOSIS:  A.     DUODENUM, BIOPSY:  Unremarkable duodenal mucosa with preserved villous architecture.  Negative for                           celiac-type changes.  B.     STOMACH, BIOPSY:  Antral mucosa with reactive gastropathy.  No H. pylori organisms are identified on H&E-stained sections.      CLINICAL HISTORY:  Dyspepsia, abdominal pain, unspecified abdominal location, history of  fundoplication    SPECIMENS RECEIVED:  A.    DUODENUM, BIOPSY  B.    STOMACH, BIOPSY    MICROSCOPIC DESCRIPTION:  Tissue blocks are prepared and slides are examined microscopically on all  specimens. See diagnosis for details.    Professional interpretation rendered by Brian Whipple M.D., CEFERINO.C.A.P. at [employer], [address] [place] [address].    GROSS DESCRIPTION:  A.    Labeled as \"duodenum\", consisting of 2 pieces of tan soft tissue measuring  0.5 x 0.4 x 0.2 cm, submitted entirely in 1 cassette.  SOG  B.    Labeled as \"stomach\", consisting of 1 piece of tan soft tissue measuring 0.4  x 0.3 x 0.2 cm, submitted entirely in 1 cassette.    REVIEWED, DIAGNOSED AND ELECTRONICALLY  SIGNED BY:    Brian Whipple M.D., F.C.A.P.  CPT                           CODES:  [id number]        MRI abdomen wo contrast mrcp    Result Date: 3/14/2024  Subtle enlargement of the pancreas without any secondary signs of pancreatitis. The appearance of the pancreas can be seen with autoimmune pancreatitis. However, if this patient does not have an elevated lipase or other abnormal laboratory studies, this may be normal.   This report was signed and finalized on 3/14/2024 9:10 AM by Nevaeh Dickens MD.       Assessment / Plan      Diagnoses and all orders for this visit:    1. Dyspepsia (Primary)  -     Lipase  -     Ambulatory Referral to Gastroenterology  -     Allergens (52) Food; Future  -     Alpha-Gal IgE Panel; " Future    2. History of pancreatitis  -     Lipase  -     Ambulatory Referral to Gastroenterology  -     Allergens (52) Food; Future  -     Alpha-Gal IgE Panel; Future    3. Abnormal finding on GI tract imaging  -     Lipase  -     Ambulatory Referral to Gastroenterology  -     Allergens (52) Food; Future  -     Alpha-Gal IgE Panel; Future    4. History of fundoplication  -     Lipase  -     Ambulatory Referral to Gastroenterology  -     Allergens (52) Food; Future  -     Alpha-Gal IgE Panel; Future    5. Abdominal bloating  -     Lipase  -     Ambulatory Referral to Gastroenterology  -     Allergens (52) Food; Future  -     Alpha-Gal IgE Panel; Future    6. Epigastric pain  -     Lipase  -     Ambulatory Referral to Gastroenterology  -     Allergens (52) Food; Future  -     Alpha-Gal IgE Panel; Future    7. Gastroesophageal reflux disease, unspecified whether esophagitis present  -     famotidine (Pepcid) 20 MG tablet; Take 1 tablet by mouth 2 (Two) Times a Day for 60 days.  Dispense: 120 tablet; Refill: 0         Florajen for 4 weeks  FODMAP diet.  Recheck lipase.  Alpha gal and food allergy testing as above.  Nonurgent EUS referral.  Results of prior testing were communicated with her in detail and all of her questions were addressed.  Avoid NSAIDs.    Follow Up:   No follow-ups on file.    Consuelo Avalos MD  [facility]  3/26/2024  14:07 EDT     Part of this note may be an electronic transcription/translation of spoken language to printed text using the Dragon Dictation System.

## 2024-03-31 LAB
ALPHA-GAL IGE QN: <0.1 KU/L
BEEF IGE QN: <0.1 KU/L
CONV CLASS DESCRIPTION: NORMAL
IGE SERPL-ACNC: 7 IU/ML (ref 6–495)
LAMB IGE QN: <0.1 KU/L
PORK IGE QN: <0.1 KU/L

## 2024-04-01 LAB
A-LACTALB IGE QN: <0.1 KU/L
ALMOND IGE QN: <0.1 KU/L
APPLE IGE QN: <0.1 KU/L
AVOCADO IGE QN: <0.1 KU/L
BAKER'S YEAST IGE QN: <0.1 KU/L
BANANA IGE QN: <0.1 KU/L
BEEF IGE QN: <0.1 KU/L
BLACK PEPPER IGE QN: <0.1 KU/L
BROCCOLI IGE QN: <0.1 KU/L
CABBAGE IGE QN: <0.1 KU/L
CARROT IGE QN: <0.1 KU/L
CASHEW NUT IGE QN: <0.1 KU/L
CHEESE MOLD IGE QN: <0.1 KU/L
CHICKEN MEAT IGE QN: <0.1 KU/L
CHILI PEPPER IGE QN: <0.1 KU/L
COCOA IGE QN: <0.1 KU/L
COFFEE IGE QN: <0.1 KU/L
CONV CLASS DESCRIPTION: NORMAL
CORN IGE QN: <0.1 KU/L
COW MILK IGE QN: <0.1 KU/L
CRAB IGE QN: <0.1 KU/L
EGG WHITE IGE QN: <0.1 KU/L
EGG YOLK IGE QN: <0.1 KU/L
GARLIC IGE QN: <0.1 KU/L
GRAPE IGE QN: <0.1 KU/L
GREEN BEAN IGE QN: <0.1 KU/L
HADDOCK IGE QN: <0.1 KU/L
HALIBUT IGE QN: <0.1 KU/L
HAZELNUT IGE QN: <0.1 KU/L
LAMB IGE QN: <0.1 KU/L
LEMON IGE QN: <0.1 KU/L
MELON IGE QN: <0.1 KU/L
MUSHROOM IGE QN: <0.1 KU/L
OAT IGE QN: <0.1 KU/L
ONION IGE QN: <0.1 KU/L
ORANGE IGE QN: <0.1 KU/L
OYSTER IGE QN: <0.1 KU/L
PEANUT IGE QN: <0.1 KU/L
PORK IGE QN: <0.1 KU/L
POTATO IGE QN: <0.1 KU/L
RICE IGE QN: <0.1 KU/L
RYE IGE QN: <0.1 KU/L
SALMON IGE QN: <0.1 KU/L
SHRIMP IGE QN: <0.1 KU/L
SOYBEAN IGE QN: <0.1 KU/L
STRAWBERRY IGE QN: <0.1 KU/L
TEA IGE QN: <0.1 KU/L
TOMATO IGE QN: <0.1 KU/L
TUNA IGE QN: <0.1 KU/L
TURKEY MEAT IGE QN: <0.1 KU/L
VANILLA IGE QN: <0.1 KU/L
WHEAT IGE QN: <0.1 KU/L
WHOLE EGG IGE QN: <0.1 KU/L

## 2024-04-03 ENCOUNTER — TELEPHONE (OUTPATIENT)
Dept: GASTROENTEROLOGY | Facility: CLINIC | Age: 39
End: 2024-04-03
Payer: COMMERCIAL

## 2024-04-03 NOTE — TELEPHONE ENCOUNTER
----- Message from Martita Pelletier MA sent at 4/3/2024 12:06 PM EDT -----    ----- Message -----  From: Consuelo Avalos MD  Sent: 4/3/2024  11:54 AM EDT  To: [facility]    Please give the patient the following message:  ----- Results -----  Alpha gal and food allergy tests are negative.

## 2024-04-23 ENCOUNTER — PATIENT ROUNDING (BHMG ONLY) (OUTPATIENT)
Dept: URGENT CARE | Facility: CLINIC | Age: 39
End: 2024-04-23
Payer: COMMERCIAL

## 2024-05-08 ENCOUNTER — TELEPHONE (OUTPATIENT)
Dept: OBSTETRICS AND GYNECOLOGY | Facility: CLINIC | Age: 39
End: 2024-05-08

## 2024-05-08 NOTE — TELEPHONE ENCOUNTER
Caller: Laura Thomson    Relationship: Self    Best call back number: [phone]     Requested Prescriptions:   Requested Prescriptions      No prescriptions requested or ordered in this encounter      estradiol (CLIMARA) 0.1 MG/24HR patch [[id number]]    Pharmacy where request should be sent: [facility] [address] [place] [address] [phone] [facility] [phone] [facility]     Last office visit with prescribing clinician: 3/30/2023   Last telemedicine visit with prescribing clinician: Visit date not found   Next office visit with prescribing clinician: 5/30/2024     Additional details provided by patient: ZERO DAY SUPPLY. HUB SCHEDULED ANNUAL FOR 05/30/24.    Does the patient have less than a 3 day supply:  [x] Yes  [] No    Would you like a call back once the refill request has been completed: [] Yes [x] No    If the office needs to give you a call back, can they leave a voicemail: [x] Yes [] No    Campbell Wild Rep   05/08/24 10:10 EDT

## 2024-05-10 DIAGNOSIS — F41.9 ANXIETY: ICD-10-CM

## 2024-05-10 DIAGNOSIS — G47.00 INSOMNIA, UNSPECIFIED TYPE: ICD-10-CM

## 2024-05-12 RX ORDER — HYDROXYZINE HYDROCHLORIDE 25 MG/1
25 TABLET, FILM COATED ORAL 3 TIMES DAILY PRN
Qty: 90 TABLET | Refills: 0 | Status: SHIPPED | OUTPATIENT
Start: 2024-05-12

## 2024-05-30 ENCOUNTER — OFFICE VISIT (OUTPATIENT)
Dept: OBSTETRICS AND GYNECOLOGY | Facility: CLINIC | Age: 39
End: 2024-05-30
Payer: COMMERCIAL

## 2024-05-30 VITALS — WEIGHT: 214 LBS | HEIGHT: 64 IN | BODY MASS INDEX: 36.54 KG/M2

## 2024-05-30 DIAGNOSIS — Z90.722 H/O TOTAL HYSTERECTOMY WITH BILATERAL SALPINGO-OOPHORECTOMY (BSO): ICD-10-CM

## 2024-05-30 DIAGNOSIS — Z90.79 H/O TOTAL HYSTERECTOMY WITH BILATERAL SALPINGO-OOPHORECTOMY (BSO): ICD-10-CM

## 2024-05-30 DIAGNOSIS — Z01.419 WELL WOMAN EXAM WITH ROUTINE GYNECOLOGICAL EXAM: Primary | ICD-10-CM

## 2024-05-30 DIAGNOSIS — Z79.890 HORMONE REPLACEMENT THERAPY (HRT): ICD-10-CM

## 2024-05-30 DIAGNOSIS — Z90.710 H/O TOTAL HYSTERECTOMY WITH BILATERAL SALPINGO-OOPHORECTOMY (BSO): ICD-10-CM

## 2024-05-30 DIAGNOSIS — E89.40 SURGICAL MENOPAUSE: ICD-10-CM

## 2024-05-30 RX ORDER — ESTRADIOL 2 MG/1
2 TABLET ORAL DAILY
Qty: 30 TABLET | Refills: 11 | Status: SHIPPED | OUTPATIENT
Start: 2024-05-30

## 2024-06-06 NOTE — PROGRESS NOTES
GYN Office Visit    Subjective   Chief Complaint   Patient presents with   • Follow-up     TVS / Abdominal pain     Laura Thomson is a 36 y.o. year old  presenting to be seen for follow-up pelvic pain.    No interval changes to history.  She has chronic pelvic pain.  Pain is worse on the right side.  Pain is daily with episodic flares, typically following activity.  Previous hysterectomy and LSO.  She does have hot flashes and night sweats.  No current hormonal medication.  She does have pain with sex.  No clear cyclical pattern to her pain symptoms that she has noticed.  Recent vaginal cultures were negative.  Pelvic ultrasound today.    TVT sling placed  of this year.  No incontinence now.    OB Hx: 3 C-sections  Pap smear: Not sure, no history of cervical dysplasia, normal cervix on hysterectomy path specimen  Mammogram: Never  Colonoscopy: Never  DEXA Scan: Never    Past Medical History:   Diagnosis Date   • Abdominal pain, periumbilical    • Abdominal pain, RLQ (right lower quadrant)    • Acute pharyngitis    • Anxiety    • Appetite lost    • Chest pain    • Chest pain, atypical 2020   • Colon polyp 2017   • Depression     patient denies   • Diarrhea     Secondary to IBS   • Difficulty swallowing    • Dysphagia     Patient reported she is noticing that she gets choked easily   • Fatigue    • Fracture     RIGHT WRIST TWICE, LEFT HAND   • GERD (gastroesophageal reflux disease)    • H/O foot surgery 10/2020   • Hearing loss     No use of hearing aids   • History of bronchitis    • History of colonoscopy    • History of left salpingo-oophorectomy  2018   • History of ovarian cyst    • History of pneumonia    • Hyperthyroidism     hyperactive taken off medicine when pregnant.  Reported she was medication at one time, but none since pregnancy.    • Irritable bowel syndrome    • Ovarian cyst, bilateral    • Seasonal allergies    • RITU (stress urinary incontinence, female)  2020   • TMJ (dislocation of temporomandibular joint)    • Upper respiratory infection    • Vomiting        Past Surgical History:   Procedure Laterality Date   •  SECTION  2008    DR NAVARRETE   •  SECTION  10/30/2015    DR VASQUEZ   •  SECTION  2010    DR ESPINOZA   • CHOLECYSTECTOMY     • COLONOSCOPY N/A 2017     COLONOSCOPY WITH COLD BIOPSY POLYPECTOMY; BIOPSIES , QUICK CLIP;  Surgeon: Perfecto Mcknight MD;  Location: [facility] ENDOSCOPY;  Service:    • DIAGNOSTIC LAPAROSCOPY  2005    DR JONES NAVA/cystectomy   • DIAGNOSTIC LAPAROSCOPY  2012    DR TERAN   • DIAGNOSTIC LAPAROSCOPY N/A 8/10/2017     DIAGNOSTIC LAPAROSCOPY, LEFT S&O;  Keren RITIKA Espinoza MD;  Location: [facility] OR;  Service:    • ENDOSCOPY N/A 10/15/2019    Procedure: ESOPHAGOGASTRODUODENOSCOPY WITH BIOPSY AND ESOPHAGEAL DILATATION;  Surgeon: Perfecto Mcknight MD;  Location: [facility] ENDOSCOPY;  Service: Gastroenterology   • ENDOSCOPY N/A 10/11/2021    Procedure: ESOPHAGOGASTRODUODENOSCOPY with dilatation and biopsies;  Surgeon: Hieu Shultz MD;  Location: [facility] ENDOSCOPY;  Service: Gastroenterology;  Laterality: N/A;   • FOOT SURGERY Left     ligament and tendon repair   • HERNIA REPAIR  2019    abd   • MID-URETHRAL SLING WITH CYSTOSCOPY N/A 2021     MID-URETHRAL SLING WITH CYSTOSCOPY TVT EXACT;  Luc Alicea MD;  Location: [facility] OR;  Service: Obstetrics/Gynecology;  Laterality: N/A;   • NISSEN FUNDOPLICATION  2013   • UPPER GASTROINTESTINAL ENDOSCOPY     • UPPER GASTROINTESTINAL ENDOSCOPY  2020   • VAGINAL HYSTERECTOMY N/A 2018    VAGINAL HYSTERECTOMY, BILATERAL SALPINGECTOMY;  Luc Alicea MD;  Location: [facility] OR;  Service: Gynecology   • WISDOM TOOTH EXTRACTION         Family History   Problem Relation Age of Onset   • Coronary artery disease Mother    • Diabetes Mother    • Hypertension Mother    • Kidney disease Father    • Skin cancer Father    • Stroke  "Maternal Grandfather    • Hypertension Maternal Grandfather    • Diabetes Maternal Grandfather    • Colon cancer Maternal Great-Grandmother    • Osteoporosis Neg Hx         Social History     Tobacco Use   • Smoking status: Never Smoker   • Smokeless tobacco: Never Used   Vaping Use   • Vaping Use: Never used   Substance Use Topics   • Alcohol use: No   • Drug use: No       (Not in a hospital admission)      Patient has no known allergies.    Current Outpatient Medications on File Prior to Visit   Medication Sig Dispense Refill   • buPROPion XL (Wellbutrin XL) 150 MG 24 hr tablet Take 1 tablet by mouth Daily. 90 tablet 1   • buPROPion XL (Wellbutrin XL) 300 MG 24 hr tablet Take 1 tablet by mouth Every Morning. 90 tablet 1   • escitalopram (LEXAPRO) 10 MG tablet Take 1 tablet by mouth Daily. 90 tablet 1   • hydrOXYzine (ATARAX) 25 MG tablet Take 1 tablet by mouth Every 8 (Eight) Hours As Needed for Anxiety. 90 tablet 1   • ibuprofen (ADVIL,MOTRIN) 600 MG tablet      • lidocaine (XYLOCAINE) 5 % ointment Apply  topically to the appropriate area as directed Every Night. 35 g 1   • loratadine (Claritin) 10 MG tablet Take 1 tablet by mouth Daily. (Patient taking differently: Take 10 mg by mouth As Needed.) 30 tablet 1   • acetaminophen (TYLENOL) 325 MG tablet Take 2 tablets by mouth Every 6 (Six) Hours. For 3 to 5 days postoperatively     • promethazine (PHENERGAN) 25 MG tablet Take 1 tablet by mouth Every 6 (Six) Hours As Needed for Nausea or Vomiting. 30 tablet 0   • [DISCONTINUED] fluticasone (Flonase) 50 MCG/ACT nasal spray 2 sprays into the nostril(s) as directed by provider Daily. 15.8 mL 1     No current facility-administered medications on file prior to visit.       Social History    Tobacco Use      Smoking status: Never Smoker      Smokeless tobacco: Never Used         Objective   /70   Ht 162.6 cm (64\")   Wt 84.4 kg (186 lb)   LMP 06/26/2018   Breastfeeding No   BMI 31.93 kg/m²     Physical " Exam:  General Appearance: alert, pleasant, appears stated age, interactive and cooperative         Medical Decision Making:    I reviewed my most recent note, her vaginal culture results and ultrasound from today.    Ultrasound:  Previous hysterectomy and LSO.  The vaginal cuff is normal in appearance.  The right ovary has multiple small follicles and normal vascularity.  No free fluid in the cul-de-sac.    Assessment   Chronic pelvic pain  Right lower quadrant pain  Previous hysterectomy + LSO     Plan    Orders Placed This Encounter   Procedures   • US Non-ob Transvaginal     Order Specific Question:   Reason for Exam:     Answer:   RLQ pain       Medication Management: Ortho-Cyclen OCP    Procedures Performed: None    We reviewed her situation in detail. She would very much like the remaining ovary removed, but we discussed the implications of surgical menopause today.  We discussed a trial of ovarian suppression with hormonal medications.  This could potentially address her pain symptoms as well as the hot flashes and night sweats.  We will try Ortho-Cyclen for 8 weeks and reassess symptoms.  If pain persists, we will revisit the oophorectomy discussion.  All questions answered.    Kem Sahni MD  Obstetrics and Gynecology  [facility]   Attending with

## 2024-06-13 ENCOUNTER — OFFICE VISIT (OUTPATIENT)
Dept: INTERNAL MEDICINE | Facility: CLINIC | Age: 39
End: 2024-06-13
Payer: COMMERCIAL

## 2024-06-13 VITALS
TEMPERATURE: 97 F | HEART RATE: 64 BPM | HEIGHT: 64 IN | OXYGEN SATURATION: 98 % | SYSTOLIC BLOOD PRESSURE: 118 MMHG | DIASTOLIC BLOOD PRESSURE: 66 MMHG | RESPIRATION RATE: 17 BRPM | WEIGHT: 210 LBS | BODY MASS INDEX: 35.85 KG/M2

## 2024-06-13 DIAGNOSIS — Z13.21 SCREENING FOR ENDOCRINE, NUTRITIONAL, METABOLIC AND IMMUNITY DISORDER: ICD-10-CM

## 2024-06-13 DIAGNOSIS — E66.01 CLASS 2 SEVERE OBESITY DUE TO EXCESS CALORIES WITH SERIOUS COMORBIDITY AND BODY MASS INDEX (BMI) OF 39.0 TO 39.9 IN ADULT: ICD-10-CM

## 2024-06-13 DIAGNOSIS — Z13.0 SCREENING FOR ENDOCRINE, NUTRITIONAL, METABOLIC AND IMMUNITY DISORDER: ICD-10-CM

## 2024-06-13 DIAGNOSIS — Z13.29 SCREENING FOR ENDOCRINE, NUTRITIONAL, METABOLIC AND IMMUNITY DISORDER: ICD-10-CM

## 2024-06-13 DIAGNOSIS — Z13.228 SCREENING FOR ENDOCRINE, NUTRITIONAL, METABOLIC AND IMMUNITY DISORDER: ICD-10-CM

## 2024-06-13 DIAGNOSIS — E55.9 VITAMIN D INSUFFICIENCY: ICD-10-CM

## 2024-06-13 DIAGNOSIS — F41.9 ANXIETY: ICD-10-CM

## 2024-06-13 DIAGNOSIS — Z00.00 PHYSICAL EXAM, ANNUAL: Primary | ICD-10-CM

## 2024-06-13 PROBLEM — Z79.890 HORMONE REPLACEMENT THERAPY (HRT): Status: ACTIVE | Noted: 2024-06-13

## 2024-06-13 PROBLEM — E89.40 SURGICAL MENOPAUSE: Status: ACTIVE | Noted: 2024-06-13

## 2024-06-13 PROCEDURE — 2014F MENTAL STATUS ASSESS: CPT | Performed by: NURSE PRACTITIONER

## 2024-06-13 PROCEDURE — 1160F RVW MEDS BY RX/DR IN RCRD: CPT | Performed by: NURSE PRACTITIONER

## 2024-06-13 PROCEDURE — 99395 PREV VISIT EST AGE 18-39: CPT | Performed by: NURSE PRACTITIONER

## 2024-06-13 PROCEDURE — 1159F MED LIST DOCD IN RCRD: CPT | Performed by: NURSE PRACTITIONER

## 2024-06-13 PROCEDURE — 1125F AMNT PAIN NOTED PAIN PRSNT: CPT | Performed by: NURSE PRACTITIONER

## 2024-06-13 RX ORDER — SEMAGLUTIDE 0.25 MG/.5ML
0.25 INJECTION, SOLUTION SUBCUTANEOUS WEEKLY
Qty: 2 ML | Refills: 0 | Status: SHIPPED | OUTPATIENT
Start: 2024-06-13 | End: 2024-07-05

## 2024-06-13 RX ORDER — CITALOPRAM 20 MG/1
20 TABLET ORAL DAILY
Qty: 90 TABLET | Refills: 3 | Status: SHIPPED | OUTPATIENT
Start: 2024-06-13
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
## 2024-06-13 NOTE — PROGRESS NOTES
Annual Well Woman Visit    Subjective   Chief Complaint   Patient presents with    Gynecologic Exam     Unsure of last pap, wants hormones increased.     Laura Walls Workman is a 38 y.o. year old  presenting to be seen for an annual well woman visit.    Previous hysterectomy + BSO.  I have performed two ELIJAH procedures for chronic pain.  She is on estradiol 2 mg p.o. daily.    She denies sexual dysfunction. She denies urinary complaints including incontinence.    OB Hx: C/S x 3  Contraception: N/A    Past Medical History:   Diagnosis Date    Abdominal pain, periumbilical     Abdominal pain, RLQ (right lower quadrant)     Acute pharyngitis     Anxiety     Appetite lost     Chest pain ?    states had a heart monitor and was WNL.  States was told pain was attributed to acid reflux.  No pain since.  (assessed 3/17/23)    Colon polyp 2017    Depression     patient denies    Diarrhea     Secondary to IBS    Difficulty swallowing     food and liquids    Fatigue     Fracture     RIGHT WRIST TWICE, LEFT HAND    GERD (gastroesophageal reflux disease)     H/O foot surgery 10/2020    left x 2    Hearing loss     No use of hearing aids    History of bronchitis     History of colonoscopy     History of exercise stress test     History of left salpingo-oophorectomy  2018    History of ovarian cyst     History of pneumonia     Hyperthyroidism     hyperactive taken off medicine when pregnant.  Reported she was medication at one time, but none since pregnancy.     Irritable bowel syndrome     Multiple gestation [id number]    Ovarian cyst, bilateral     PONV (postoperative nausea and vomiting)     Seasonal allergies     RITU (stress urinary incontinence, female) 2020    TMJ (dislocation of temporomandibular joint)     Upper respiratory infection     Vomiting     Wears glasses      Past Surgical History:   Procedure Laterality Date    ANKLE SURGERY Left     x2     SECTION  2008     DR NAVARRETE     SECTION  10/30/2015    DR VASQUEZ     SECTION  2010    DR ESPINOZA    CHOLECYSTECTOMY  1989    COLONOSCOPY N/A 2017     COLONOSCOPY WITH COLD BIOPSY POLYPECTOMY; BIOPSIES , QUICK CLIP;  Surgeon: Perfecto Mcknight MD;  Location: [facility] ENDOSCOPY;  Service:     DIAGNOSTIC LAPAROSCOPY  2005    DR ESPINOZA- ELIJAH/cystectomy    DIAGNOSTIC LAPAROSCOPY  2012    DR ESPINOZA/ELIJAH    DIAGNOSTIC LAPAROSCOPY N/A 08/10/2017     DIAGNOSTIC LAPAROSCOPY, LEFT S&O;  Keren Espinoza MD;  Location: [facility] OR;  Service:     DIAGNOSTIC LAPAROSCOPY N/A 2022    Procedure: DIAGNOSTIC LAPAROSCOPY,  LYSIS OF ADHESIONS, RIGHT SALPINGO-OOPHORECTOMY;  Surgeon: Kem Sahni MD;  Location: [facility] OR;  Service: Obstetrics/Gynecology;  Laterality: N/A;    DIAGNOSTIC LAPAROSCOPY N/A 3/24/2023    Procedure: DIAGNOSTIC LAPAROSCOPY WITH LYSIS OF ADHESIONS;  Surgeon: Kem Sahni MD;  Location: [facility] OR;  Service: Obstetrics/Gynecology;  Laterality: N/A;    ENDOSCOPY N/A 10/15/2019    Procedure: ESOPHAGOGASTRODUODENOSCOPY WITH BIOPSY AND ESOPHAGEAL DILATATION;  Surgeon: Perfecto Mcknight MD;  Location: [facility] ENDOSCOPY;  Service: Gastroenterology    ENDOSCOPY N/A 10/11/2021    Procedure: ESOPHAGOGASTRODUODENOSCOPY with dilatation and biopsies;  Surgeon: Hieu Shultz MD;  Location: [facility] ENDOSCOPY;  Service: Gastroenterology;  Laterality: N/A;    ENDOSCOPY N/A 2023    Procedure: ESOPHAGOGASTRODUODENOSCOPY  WITH BIOPSY;  Surgeon: Consuelo Avalos MD;  Location: [facility] ENDOSCOPY;  Service: Gastroenterology;  Laterality: N/A;    FOOT SURGERY Left     ligament and tendon repair    HERNIA REPAIR  2019    abd    MID-URETHRAL SLING WITH CYSTOSCOPY N/A 2021     MID-URETHRAL SLING WITH CYSTOSCOPY TVT EXACT;  Luc Alicea MD;  Location: [facility] OR;  Service: Obstetrics/Gynecology;  Laterality: N/A;    NISSEN FUNDOPLICATION  2013    UPPER GASTROINTESTINAL ENDOSCOPY      UPPER  "GASTROINTESTINAL ENDOSCOPY  06/19/2020    VAGINAL HYSTERECTOMY N/A 07/09/2018    VAGINAL HYSTERECTOMY, BILATERAL SALPINGECTOMY;  Luc Alicea MD;  Location: [facility];  Service: Gynecology    WISDOM TOOTH EXTRACTION  2000     Family History   Problem Relation Age of Onset    Coronary artery disease Mother     Diabetes Mother     Hypertension Mother     Kidney disease Father     Skin cancer Father     Stroke Maternal Grandfather     Hypertension Maternal Grandfather     Diabetes Maternal Grandfather     Colon cancer Maternal Great-Grandmother     Osteoporosis Neg Hx      Social History     Tobacco Use    Smoking status: Never     Passive exposure: Never    Smokeless tobacco: Never   Vaping Use    Vaping status: Never Used   Substance Use Topics    Alcohol use: No    Drug use: No     (Not in a hospital admission)    Patient has no known allergies.  Current Outpatient Medications on File Prior to Visit   Medication Sig Dispense Refill    hydrOXYzine (ATARAX) 25 MG tablet Take 1 tablet by mouth 3 (Three) Times a Day As Needed for Itching, Allergies or Anxiety. 90 tablet 0    ibuprofen (ADVIL,MOTRIN) 800 MG tablet Take 1 tablet by mouth Every 8 (Eight) Hours As Needed for Mild Pain 30 tablet 0    promethazine (PHENERGAN) 25 MG tablet Take 1 tablet by mouth Every 6 (Six) Hours As Needed for Nausea or Vomiting. 12 tablet 0    [DISCONTINUED] citalopram (CeleXA) 10 MG tablet Take 1 tablet by mouth Daily. 90 tablet 3     No current facility-administered medications on file prior to visit.     Social History    Tobacco Use      Smoking status: Never        Passive exposure: Never      Smokeless tobacco: Never      Review of Systems  Pertinent items are noted in HPI, all other systems were reviewed and negative       Objective   Ht 162.6 cm (64\")   Wt 97.1 kg (214 lb)   LMP 06/26/2018   BMI 36.73 kg/m²     Physical Exam:  General Appearance: alert, pleasant, appears stated age, interactive and cooperative  Breasts: Examined " in supine position  Symmetric without masses or skin dimpling  Nipples normal without inversion, lesions or discharge  There are no palpable axillary nodes  Abdomen: no masses, no hepatomegaly, no splenomegaly, soft non-tender, no guarding and no rebound tenderness    Pelvis:  Pelvic: Clinical staff was present for exam  External genitalia:  normal appearance of the external genitalia including Bartholin's and [place]mont's glands.  :  urethral meatus normal;  Vagina:  normal pink mucosa without prolapse or lesions.  Cervix:  absent.  Uterus:  absent.  Adnexa:  absent, bilateral.  Rectal:  digital rectal exam not performed; anus visually normal appearing.       Assessment   Annual well woman exam with age appropriate screening  Previous hysterectomy + BSO  Hormone replacement therapy     Plan    No orders of the defined types were placed in this encounter.    Medications ordered: refill HRT    Procedures performed: none    - Mammogram: Not indicated  - Pap screening guidelines reviewed; pap smear not indicated  - Yearly clinical breast and pelvic exams recommended regardless of pap recommendations  - Dexa scan: not indicated  - Colonoscopy: not indicated   - Healthy diet and exercise encouraged  - Calcium and Vitamin D requirements reviewed  - Contraception: N/A  - Screening: none    Follow up for annual visits    Kem Sahni MD  Obstetrics and Gynecology  [facility]

## 2024-06-14 ENCOUNTER — PRIOR AUTHORIZATION (OUTPATIENT)
Dept: INTERNAL MEDICINE | Facility: CLINIC | Age: 39
End: 2024-06-14
Payer: COMMERCIAL

## 2024-06-14 DIAGNOSIS — E53.8 LOW SERUM VITAMIN B12: Primary | ICD-10-CM

## 2024-06-14 LAB
25(OH)D3+25(OH)D2 SERPL-MCNC: 18.5 NG/ML (ref 30–100)
ALBUMIN SERPL-MCNC: 4.2 G/DL (ref 3.9–4.9)
ALBUMIN/GLOB SERPL: 1.8 {RATIO}
ALP SERPL-CCNC: 73 IU/L (ref 44–121)
ALT SERPL-CCNC: 12 IU/L (ref 0–32)
AST SERPL-CCNC: 20 IU/L (ref 0–40)
BASOPHILS # BLD AUTO: 0.1 X10E3/UL (ref 0–0.2)
BASOPHILS NFR BLD AUTO: 1 %
BILIRUB SERPL-MCNC: 0.3 MG/DL (ref 0–1.2)
BUN SERPL-MCNC: 11 MG/DL (ref 6–20)
BUN/CREAT SERPL: 17 (ref 9–23)
CALCIUM SERPL-MCNC: 8.9 MG/DL (ref 8.7–10.2)
CHLORIDE SERPL-SCNC: 105 MMOL/L (ref 96–106)
CHOLEST SERPL-MCNC: 173 MG/DL (ref 100–199)
CO2 SERPL-SCNC: 24 MMOL/L (ref 20–29)
CREAT SERPL-MCNC: 0.64 MG/DL (ref 0.57–1)
EGFRCR SERPLBLD CKD-EPI 2021: 115 ML/MIN/1.73
EOSINOPHIL # BLD AUTO: 0.1 X10E3/UL (ref 0–0.4)
EOSINOPHIL NFR BLD AUTO: 1 %
ERYTHROCYTE [DISTWIDTH] IN BLOOD BY AUTOMATED COUNT: 13.1 % (ref 11.7–15.4)
GLOBULIN SER CALC-MCNC: 2.3 G/DL (ref 1.5–4.5)
GLUCOSE SERPL-MCNC: 90 MG/DL (ref 70–99)
HBA1C MFR BLD: 5.9 % (ref 4.8–5.6)
HCT VFR BLD AUTO: 37 % (ref 34–46.6)
HDLC SERPL-MCNC: 52 MG/DL
HGB BLD-MCNC: 12 G/DL (ref 11.1–15.9)
IMM GRANULOCYTES # BLD AUTO: 0 X10E3/UL (ref 0–0.1)
IMM GRANULOCYTES NFR BLD AUTO: 0 %
LDLC SERPL CALC-MCNC: 108 MG/DL (ref 0–99)
LYMPHOCYTES # BLD AUTO: 2.1 X10E3/UL (ref 0.7–3.1)
LYMPHOCYTES NFR BLD AUTO: 30 %
MCH RBC QN AUTO: 27.1 PG (ref 26.6–33)
MCHC RBC AUTO-ENTMCNC: 32.4 G/DL (ref 31.5–35.7)
MCV RBC AUTO: 84 FL (ref 79–97)
MONOCYTES # BLD AUTO: 0.7 X10E3/UL (ref 0.1–0.9)
MONOCYTES NFR BLD AUTO: 10 %
NEUTROPHILS # BLD AUTO: 4.1 X10E3/UL (ref 1.4–7)
NEUTROPHILS NFR BLD AUTO: 58 %
PLATELET # BLD AUTO: 271 X10E3/UL (ref 150–450)
POTASSIUM SERPL-SCNC: 4 MMOL/L (ref 3.5–5.2)
PROT SERPL-MCNC: 6.5 G/DL (ref 6–8.5)
RBC # BLD AUTO: 4.43 X10E6/UL (ref 3.77–5.28)
SODIUM SERPL-SCNC: 141 MMOL/L (ref 134–144)
T4 FREE SERPL-MCNC: 1.17 NG/DL (ref 0.82–1.77)
TRIGL SERPL-MCNC: 66 MG/DL (ref 0–149)
TSH SERPL DL<=0.005 MIU/L-ACNC: 1.28 UIU/ML (ref 0.45–4.5)
VIT B12 SERPL-MCNC: 292 PG/ML (ref 232–1245)
VLDLC SERPL CALC-MCNC: 13 MG/DL (ref 5–40)
WBC # BLD AUTO: 7 X10E3/UL (ref 3.4–10.8)

## 2024-06-14 RX ORDER — CYANOCOBALAMIN 1000 UG/ML
INJECTION, SOLUTION INTRAMUSCULAR; SUBCUTANEOUS
Qty: 10 ML | Refills: 0 | Status: SHIPPED | OUTPATIENT
Start: 2024-06-14

## 2024-06-14 RX ORDER — ERGOCALCIFEROL 1.25 MG/1
50000 CAPSULE ORAL WEEKLY
Qty: 12 CAPSULE | Refills: 0 | Status: SHIPPED | OUTPATIENT
Start: 2024-06-14

## 2024-06-14 NOTE — TELEPHONE ENCOUNTER
Tried to do PA for Wegovy on Cover My Meds.    jn durand (Key: [id number])  Wegovy 0.25MG/0.5ML auto-injectors     Message from Plan  This drug/product is not covered under the pharmacy benefit. Prior Authorization is not available.

## 2024-06-14 NOTE — PROGRESS NOTES
Please contact patient and let her know that her A1c did increase recommend dietary modifications along with exercise to improve A1c and LDL as also increased.  Patient's vitamin D is deficient and we can send in 50,000 for 12 weeks and then patient needs to take a daily supplement of 2000 vitamin D3 over-the-counter and in the wintertime recommend taking 4000.  B12 was also on the lower end of normal recommend B12 injections if patient is willing to give them to herself I can send in Rx as I like it around 400 otherwise labs unremarkable.

## 2024-07-11 ENCOUNTER — TELEMEDICINE (OUTPATIENT)
Dept: INTERNAL MEDICINE | Facility: CLINIC | Age: 39
End: 2024-07-11
Payer: COMMERCIAL

## 2024-07-11 DIAGNOSIS — F41.9 ANXIETY: Primary | ICD-10-CM

## 2024-07-11 DIAGNOSIS — E66.01 CLASS 2 SEVERE OBESITY DUE TO EXCESS CALORIES WITH SERIOUS COMORBIDITY AND BODY MASS INDEX (BMI) OF 39.0 TO 39.9 IN ADULT: ICD-10-CM

## 2024-07-11 PROCEDURE — 99213 OFFICE O/P EST LOW 20 MIN: CPT | Performed by: NURSE PRACTITIONER

## 2024-07-11 PROCEDURE — 1159F MED LIST DOCD IN RCRD: CPT | Performed by: NURSE PRACTITIONER

## 2024-07-11 PROCEDURE — 1125F AMNT PAIN NOTED PAIN PRSNT: CPT | Performed by: NURSE PRACTITIONER

## 2024-07-11 PROCEDURE — 1160F RVW MEDS BY RX/DR IN RCRD: CPT | Performed by: NURSE PRACTITIONER

## 2024-07-11 RX ORDER — BUPROPION HYDROCHLORIDE 150 MG/1
150 TABLET ORAL DAILY
Qty: 30 TABLET | Refills: 1 | Status: SHIPPED | OUTPATIENT
Start: 2024-07-11

## 2024-07-11 NOTE — PROGRESS NOTES
You have chosen to receive care through a telehealth visit.  Do you consent to use a video/audio connection for your medical care today? Yes  Active Parties: Megha RAMON (work), Ari Davenport MA (work) and patient (work)      Chief Complaint / Reason:      Chief Complaint   Patient presents with   • Anxiety     4 wk f/u, discuss weight loss meds        Subjective   History of Present Illness      HPI  Patient presents today via video visit for follow-up on anxiety.  She states that she is doing well and she has started a new job at a [employer] in [place].  Denies SI or HI and denies any side effects to current medication regimen.  She would also like to discuss weight loss medication as she states that she called her insurance and they told her that it could be wording from the provider and I explained to patient that she is not diabetic so insurance will not cover for prediabetes regarding Ozempic and Mounjaro.  She states that she has tried doing dietary modifications and exercise but nothing seems to help.   History taken from: patient    PMH/FH/Social History were reviewed and updated appropriately in the electronic medical record.   Past Medical History:   Diagnosis Date   • Abdominal pain, periumbilical    • Abdominal pain, RLQ (right lower quadrant)    • Acute pharyngitis    • Anxiety    • Appetite lost    • Chest pain 2018?    states had a heart monitor and was WNL.  States was told pain was attributed to acid reflux.  No pain since.  (assessed 3/17/23)   • Colon polyp 02/17/2017   • Depression     patient denies   • Diarrhea     Secondary to IBS   • Difficulty swallowing     food and liquids   • Fatigue    • Fracture     RIGHT WRIST TWICE, LEFT HAND   • GERD (gastroesophageal reflux disease)    • H/O foot surgery 10/2020    left x 2   • Hearing loss     No use of hearing aids   • History of bronchitis    • History of colonoscopy    • History of exercise stress test 2020   • History of left  salpingo-oophorectomy  2018   • History of ovarian cyst    • History of pneumonia    • Hyperthyroidism     hyperactive taken off medicine when pregnant.  Reported she was medication at one time, but none since pregnancy.    • Irritable bowel syndrome    • Multiple gestation [id number]   • Ovarian cyst, bilateral    • PONV (postoperative nausea and vomiting)    • Seasonal allergies    • RITU (stress urinary incontinence, female) 2020   • TMJ (dislocation of temporomandibular joint)    • Upper respiratory infection    • Vomiting    • Wears glasses      Past Surgical History:   Procedure Laterality Date   • ANKLE SURGERY Left     x2   •  SECTION  2008    DR NAVARRETE   •  SECTION  10/30/2015    DR VASQUEZ   •  SECTION  2010    DR ESPINOZA   • CHOLECYSTECTOMY     • COLONOSCOPY N/A 2017     COLONOSCOPY WITH COLD BIOPSY POLYPECTOMY; BIOPSIES , QUICK CLIP;  Surgeon: Perfecto Mcknight MD;  Location: [facility] ENDOSCOPY;  Service:    • DIAGNOSTIC LAPAROSCOPY  2005    DR JONES NAVA/cystectomy   • DIAGNOSTIC LAPAROSCOPY  2012    DR ESPINOZA/ELIJAH   • DIAGNOSTIC LAPAROSCOPY N/A 08/10/2017     DIAGNOSTIC LAPAROSCOPY, LEFT S&O;  Keren Espinoza MD;  Location: [facility] OR;  Service:    • DIAGNOSTIC LAPAROSCOPY N/A 2022    Procedure: DIAGNOSTIC LAPAROSCOPY,  LYSIS OF ADHESIONS, RIGHT SALPINGO-OOPHORECTOMY;  Surgeon: Kem Sahni MD;  Location: [facility] OR;  Service: Obstetrics/Gynecology;  Laterality: N/A;   • DIAGNOSTIC LAPAROSCOPY N/A 3/24/2023    Procedure: DIAGNOSTIC LAPAROSCOPY WITH LYSIS OF ADHESIONS;  Surgeon: Kem Sahni MD;  Location: [facility] OR;  Service: Obstetrics/Gynecology;  Laterality: N/A;   • ENDOSCOPY N/A 10/15/2019    Procedure: ESOPHAGOGASTRODUODENOSCOPY WITH BIOPSY AND ESOPHAGEAL DILATATION;  Surgeon: Perfecto Mcknight MD;  Location: [facility] ENDOSCOPY;  Service: Gastroenterology   • ENDOSCOPY N/A 10/11/2021    Procedure:  ESOPHAGOGASTRODUODENOSCOPY with dilatation and biopsies;  Surgeon: Hieu Shultz MD;  Location: [facility] ENDOSCOPY;  Service: Gastroenterology;  Laterality: N/A;   • ENDOSCOPY N/A 12/29/2023    Procedure: ESOPHAGOGASTRODUODENOSCOPY  WITH BIOPSY;  Surgeon: Consuelo Avalos MD;  Location: [facility] ENDOSCOPY;  Service: Gastroenterology;  Laterality: N/A;   • FOOT SURGERY Left     ligament and tendon repair   • HERNIA REPAIR  12/20/2019    abd   • MID-URETHRAL SLING WITH CYSTOSCOPY N/A 06/09/2021     MID-URETHRAL SLING WITH CYSTOSCOPY TVT EXACT;  Luc Alicea MD;  Location:  [facility] OR;  Service: Obstetrics/Gynecology;  Laterality: N/A;   • NISSEN FUNDOPLICATION  06/2013   • UPPER GASTROINTESTINAL ENDOSCOPY  2013   • UPPER GASTROINTESTINAL ENDOSCOPY  06/19/2020   • VAGINAL HYSTERECTOMY N/A 07/09/2018    VAGINAL HYSTERECTOMY, BILATERAL SALPINGECTOMY;  Luc Alicea MD;  Location:  [facility] OR;  Service: Gynecology   • WISDOM TOOTH EXTRACTION  2000     Social History     Socioeconomic History   • Marital status: Legally    Tobacco Use   • Smoking status: Never     Passive exposure: Never   • Smokeless tobacco: Never   Vaping Use   • Vaping status: Never Used   Substance and Sexual Activity   • Alcohol use: No   • Drug use: No   • Sexual activity: Defer     Family History   Problem Relation Age of Onset   • Coronary artery disease Mother    • Diabetes Mother    • Hypertension Mother    • Kidney disease Father    • Skin cancer Father    • Stroke Maternal Grandfather    • Hypertension Maternal Grandfather    • Diabetes Maternal Grandfather    • Colon cancer Maternal Great-Grandmother    • Osteoporosis Neg Hx        Review of Systems:   Review of Systems      All other systems were reviewed and are negative.  Exceptions are noted in the subjective or above.      Objective     Vital Signs  There were no vitals filed for this visit.    There is no height or weight on file to calculate BMI.         Physical Exam  Nursing  note reviewed.   Constitutional:       General: She is not in acute distress.     Appearance: She is well-developed. She is obese.   Pulmonary:      Effort: Pulmonary effort is normal. No respiratory distress.   Chest:      Chest wall: No tenderness.   Musculoskeletal:         General: Normal range of motion.   Skin:     General: Skin is warm and dry.      Capillary Refill: Capillary refill takes less than 2 seconds.   Neurological:      Mental Status: She is alert and oriented to person, place, and time.      Coordination: Coordination normal.   Psychiatric:         Behavior: Behavior normal.         Thought Content: Thought content normal.         Judgment: Judgment normal.            Results Review:    I reviewed the patient's new clinical results.       Medication Review:     Current Outpatient Medications:   •  citalopram (CeleXA) 20 MG tablet, Take 1 tablet by mouth Daily., Disp: 90 tablet, Rfl: 3  •  cyanocobalamin 1000 MCG/ML injection, Inject 1 ml subcutaneously 1 dose per week x4 weeks and then every 28 days x 5 doses, Disp: 10 mL, Rfl: 0  •  estradiol (ESTRACE) 2 MG tablet, Take 1 tablet by mouth Daily., Disp: 30 tablet, Rfl: 11  •  hydrOXYzine (ATARAX) 25 MG tablet, Take 1 tablet by mouth 3 (Three) Times a Day As Needed for Itching, Allergies or Anxiety., Disp: 90 tablet, Rfl: 0  •  ibuprofen (ADVIL,MOTRIN) 800 MG tablet, Take 1 tablet by mouth Every 8 (Eight) Hours As Needed for Mild Pain, Disp: 30 tablet, Rfl: 0  •  promethazine (PHENERGAN) 25 MG tablet, Take 1 tablet by mouth Every 6 (Six) Hours As Needed for Nausea or Vomiting., Disp: 12 tablet, Rfl: 0  •  vitamin D (ERGOCALCIFEROL) 1.25 MG (99501 UT) capsule capsule, Take 1 capsule by mouth 1 (One) Time Per Week., Disp: 12 capsule, Rfl: 0  •  buPROPion XL (Wellbutrin XL) 150 MG 24 hr tablet, Take 1 tablet by mouth Daily., Disp: 30 tablet, Rfl: 1    Assessment & Plan   Assessment & Plan      Diagnoses and all orders for this visit:    1. Anxiety  (Primary)  -     buPROPion XL (Wellbutrin XL) 150 MG 24 hr tablet; Take 1 tablet by mouth Daily.  Dispense: 30 tablet; Refill: 1    2. Class 2 severe obesity due to excess calories with serious comorbidity and body mass index (BMI) of 39.0 to 39.9 in adult  -     buPROPion XL (Wellbutrin XL) 150 MG 24 hr tablet; Take 1 tablet by mouth Daily.  Dispense: 30 tablet; Refill: 1    Will start Wellbutrin and maybe add naltrexone to see if that will help curb diet and improve mood.  Advised patient to continue other medications as prescribed    Return in about 4 weeks (around 8/8/2024), or if symptoms worsen or fail to improve.    Megha Fregoso, APRN  07/11/2024

## 2024-08-07 DIAGNOSIS — G47.00 INSOMNIA, UNSPECIFIED TYPE: ICD-10-CM

## 2024-08-07 DIAGNOSIS — F41.9 ANXIETY: ICD-10-CM

## 2024-08-07 RX ORDER — HYDROXYZINE HYDROCHLORIDE 25 MG/1
25 TABLET, FILM COATED ORAL 3 TIMES DAILY PRN
Qty: 90 TABLET | Refills: 0 | Status: SHIPPED | OUTPATIENT
Start: 2024-08-07

## 2024-10-08 DIAGNOSIS — G47.00 INSOMNIA, UNSPECIFIED TYPE: ICD-10-CM

## 2024-10-08 DIAGNOSIS — F41.9 ANXIETY: ICD-10-CM

## 2024-10-08 RX ORDER — HYDROXYZINE HYDROCHLORIDE 25 MG/1
25 TABLET, FILM COATED ORAL 3 TIMES DAILY PRN
Qty: 90 TABLET | Refills: 0 | Status: SHIPPED | OUTPATIENT
Start: 2024-10-08

## 2024-10-08 NOTE — TELEPHONE ENCOUNTER
Rx Refill Note  Requested Prescriptions     Pending Prescriptions Disp Refills    hydrOXYzine (ATARAX) 25 MG tablet 90 tablet 0     Sig: Take 1 tablet by mouth 3 (Three) Times a Day As Needed for Itching, Allergies or Anxiety.      Last office visit with prescribing clinician: 6/13/2024   Last telemedicine visit with prescribing clinician: 7/11/2024   Next office visit with prescribing clinician: Visit date not found                         Would you like a call back once the refill request has been completed: [] Yes [] No    If the office needs to give you a call back, can they leave a voicemail: [] Yes [] No    Jena Briggs MA  10/08/24, 08:42 EDT

## 2024-11-02 DIAGNOSIS — F41.9 ANXIETY: ICD-10-CM

## 2024-11-02 RX ORDER — CITALOPRAM HYDROBROMIDE 10 MG/1
10 TABLET ORAL DAILY
Qty: 90 TABLET | Refills: 0 | OUTPATIENT
Start: 2024-11-02

## 2024-11-07 ENCOUNTER — READMISSION MANAGEMENT (OUTPATIENT)
Dept: CALL CENTER | Facility: HOSPITAL | Age: 39
End: 2024-11-07
Payer: COMMERCIAL

## 2024-11-07 NOTE — OUTREACH NOTE
Prep Survey      Flowsheet Row Responses   [facility] facility patient discharged from? Non-BH   Is LACE score < 7 ? Non-BH Discharge   Eligibility [facility]   Date of Admission 11/05/24   Date of Discharge 11/07/24   Discharge Disposition Home or Self Care   Discharge diagnosis Bariatric surgery status (Primary Dx),   Morbid obesity due to excess calories (HCC)   Does the patient have one of the following disease processes/diagnoses(primary or secondary)? General Surgery   Prep survey completed? Yes            Bridget BRASHER - Registered Nurse

## 2024-11-08 ENCOUNTER — TRANSITIONAL CARE MANAGEMENT TELEPHONE ENCOUNTER (OUTPATIENT)
Dept: CALL CENTER | Facility: HOSPITAL | Age: 39
End: 2024-11-08
Payer: COMMERCIAL

## 2024-11-08 NOTE — OUTREACH NOTE
Call Center TCM Note      Flowsheet Row Responses   [facility] patient discharged from? Non-  [[facility]]   Does the patient have one of the following disease processes/diagnoses(primary or secondary)? General Surgery   TCM attempt successful? Yes   Call start time 1357   Call end time 1359   Discharge diagnosis Bariatric surgery   Does the patient have all medications ordered at discharge? Yes   Is the patient taking all medications as directed (includes completed medication regime)? Yes   Does the patient have an appointment with their PCP within 7-14 days of discharge? No   Nursing Interventions Patient declined scheduling/rescheduling appointment at this time, Patient desires to follow up with specialty only   Has home health visited the patient within 72 hours of discharge? N/A   Psychosocial issues? No   What is the patient's perception of their health status since discharge? Improving   Is the patient/caregiver able to teach back signs and symptoms related to disease process for when to call PCP? Yes   Is the patient/caregiver able to teach back signs and symptoms related to disease process for when to call 911? Yes   Is the patient/caregiver able to teach back the hierarchy of who to call/visit for symptoms/problems? PCP, Specialist, Home health nurse, Urgent Care, ED, 911 Yes   TCM call completed? Yes   Wrap up additional comments Doing well, denies any questions or concerns, declined to schedule with PCP at this time and states she will be seeing her surgeon next week.   Call end time 1359   Would this patient benefit from a Referral to Amb Social Work? No   Is the patient interested in additional calls from an ambulatory ? No            Daisy Berry RN    11/8/2024, 14:00 EST

## 2024-11-08 NOTE — OUTREACH NOTE
Call Center TCM Note      Flowsheet Row Responses   [facility] patient discharged from? Non-  [[facility]]   Does the patient have one of the following disease processes/diagnoses(primary or secondary)? General Surgery   TCM attempt successful? No   Unsuccessful attempts Attempt 1            Daisy Berry RN    11/8/2024, 08:54 EST

## 2024-12-18 ENCOUNTER — HOSPITAL ENCOUNTER (EMERGENCY)
Facility: HOSPITAL | Age: 39
Discharge: HOME | End: 2024-12-18
Attending: STUDENT IN AN ORGANIZED HEALTH CARE EDUCATION/TRAINING PROGRAM | Admitting: STUDENT IN AN ORGANIZED HEALTH CARE EDUCATION/TRAINING PROGRAM
Payer: COMMERCIAL

## 2024-12-18 ENCOUNTER — APPOINTMENT (OUTPATIENT)
Facility: HOSPITAL | Age: 39
End: 2024-12-18
Payer: COMMERCIAL

## 2024-12-18 VITALS
HEIGHT: 63 IN | TEMPERATURE: 98 F | DIASTOLIC BLOOD PRESSURE: 78 MMHG | SYSTOLIC BLOOD PRESSURE: 124 MMHG | WEIGHT: 191 LBS | OXYGEN SATURATION: 100 % | BODY MASS INDEX: 33.84 KG/M2 | HEART RATE: 62 BPM | RESPIRATION RATE: 18 BRPM

## 2024-12-18 DIAGNOSIS — N20.1 URETEROLITHIASIS: Primary | ICD-10-CM

## 2024-12-18 DIAGNOSIS — N23 RENAL COLIC: ICD-10-CM

## 2024-12-18 LAB
ALBUMIN SERPL-MCNC: 4.5 G/DL (ref 3.5–5.2)
ALBUMIN/GLOB SERPL: 1.7 G/DL
ALP SERPL-CCNC: 77 U/L (ref 39–117)
ALT SERPL W P-5'-P-CCNC: <5 U/L (ref 1–33)
ANION GAP SERPL CALCULATED.3IONS-SCNC: 16.7 MMOL/L (ref 5–15)
AST SERPL-CCNC: 22 U/L (ref 1–32)
BACTERIA UR QL AUTO: ABNORMAL /HPF
BASOPHILS # BLD AUTO: 0.03 10*3/MM3 (ref 0–0.2)
BASOPHILS NFR BLD AUTO: 0.4 % (ref 0–1.5)
BILIRUB SERPL-MCNC: 0.5 MG/DL (ref 0–1.2)
BILIRUB UR QL STRIP: ABNORMAL
BUN SERPL-MCNC: 10 MG/DL (ref 6–20)
BUN/CREAT SERPL: 14.9 (ref 7–25)
CALCIUM SPEC-SCNC: 9.4 MG/DL (ref 8.6–10.5)
CHLORIDE SERPL-SCNC: 99 MMOL/L (ref 98–107)
CLARITY UR: ABNORMAL
CO2 SERPL-SCNC: 25.3 MMOL/L (ref 22–29)
COLOR UR: ABNORMAL
CREAT SERPL-MCNC: 0.67 MG/DL (ref 0.57–1)
DEPRECATED RDW RBC AUTO: 42.9 FL (ref 37–54)
EGFRCR SERPLBLD CKD-EPI 2021: 113.5 ML/MIN/1.73
EOSINOPHIL # BLD AUTO: 0.12 10*3/MM3 (ref 0–0.4)
EOSINOPHIL NFR BLD AUTO: 1.4 % (ref 0.3–6.2)
ERYTHROCYTE [DISTWIDTH] IN BLOOD BY AUTOMATED COUNT: 14.3 % (ref 12.3–15.4)
GLOBULIN UR ELPH-MCNC: 2.7 GM/DL
GLUCOSE SERPL-MCNC: 95 MG/DL (ref 65–99)
GLUCOSE UR STRIP-MCNC: NEGATIVE MG/DL
HCT VFR BLD AUTO: 43.6 % (ref 34–46.6)
HGB BLD-MCNC: 14.3 G/DL (ref 12–15.9)
HGB UR QL STRIP.AUTO: ABNORMAL
HYALINE CASTS UR QL AUTO: ABNORMAL /LPF
IMM GRANULOCYTES # BLD AUTO: 0.01 10*3/MM3 (ref 0–0.05)
IMM GRANULOCYTES NFR BLD AUTO: 0.1 % (ref 0–0.5)
KETONES UR QL STRIP: ABNORMAL
LEUKOCYTE ESTERASE UR QL STRIP.AUTO: NEGATIVE
LIPASE SERPL-CCNC: 25 U/L (ref 13–60)
LYMPHOCYTES # BLD AUTO: 1.63 10*3/MM3 (ref 0.7–3.1)
LYMPHOCYTES NFR BLD AUTO: 19.6 % (ref 19.6–45.3)
MCH RBC QN AUTO: 26.8 PG (ref 26.6–33)
MCHC RBC AUTO-ENTMCNC: 32.8 G/DL (ref 31.5–35.7)
MCV RBC AUTO: 81.8 FL (ref 79–97)
MONOCYTES # BLD AUTO: 0.69 10*3/MM3 (ref 0.1–0.9)
MONOCYTES NFR BLD AUTO: 8.3 % (ref 5–12)
NEUTROPHILS NFR BLD AUTO: 5.82 10*3/MM3 (ref 1.7–7)
NEUTROPHILS NFR BLD AUTO: 70.2 % (ref 42.7–76)
NITRITE UR QL STRIP: NEGATIVE
PH UR STRIP.AUTO: 5.5 [PH] (ref 5–8)
PLATELET # BLD AUTO: 260 10*3/MM3 (ref 140–450)
PMV BLD AUTO: 10.9 FL (ref 6–12)
POTASSIUM SERPL-SCNC: 3.4 MMOL/L (ref 3.5–5.2)
PROT SERPL-MCNC: 7.2 G/DL (ref 6–8.5)
PROT UR QL STRIP: ABNORMAL
RBC # BLD AUTO: 5.33 10*6/MM3 (ref 3.77–5.28)
RBC # UR STRIP: ABNORMAL /HPF
REF LAB TEST METHOD: ABNORMAL
SODIUM SERPL-SCNC: 141 MMOL/L (ref 136–145)
SP GR UR STRIP: >=1.03 (ref 1–1.03)
SQUAMOUS #/AREA URNS HPF: ABNORMAL /HPF
UROBILINOGEN UR QL STRIP: ABNORMAL
WBC # UR STRIP: ABNORMAL /HPF
WBC NRBC COR # BLD AUTO: 8.3 10*3/MM3 (ref 3.4–10.8)

## 2024-12-18 PROCEDURE — 80053 COMPREHEN METABOLIC PANEL: CPT | Performed by: PHYSICIAN ASSISTANT

## 2024-12-18 PROCEDURE — 25510000001 IOPAMIDOL 61 % SOLUTION: Performed by: STUDENT IN AN ORGANIZED HEALTH CARE EDUCATION/TRAINING PROGRAM

## 2024-12-18 PROCEDURE — 85025 COMPLETE CBC W/AUTO DIFF WBC: CPT | Performed by: PHYSICIAN ASSISTANT

## 2024-12-18 PROCEDURE — 96361 HYDRATE IV INFUSION ADD-ON: CPT

## 2024-12-18 PROCEDURE — 25010000002 KETOROLAC TROMETHAMINE PER 15 MG: Performed by: PHYSICIAN ASSISTANT

## 2024-12-18 PROCEDURE — 36415 COLL VENOUS BLD VENIPUNCTURE: CPT

## 2024-12-18 PROCEDURE — 96376 TX/PRO/DX INJ SAME DRUG ADON: CPT

## 2024-12-18 PROCEDURE — 96374 THER/PROPH/DIAG INJ IV PUSH: CPT

## 2024-12-18 PROCEDURE — 83690 ASSAY OF LIPASE: CPT | Performed by: PHYSICIAN ASSISTANT

## 2024-12-18 PROCEDURE — 87086 URINE CULTURE/COLONY COUNT: CPT | Performed by: PHYSICIAN ASSISTANT

## 2024-12-18 PROCEDURE — 25010000002 MORPHINE PER 10 MG: Performed by: STUDENT IN AN ORGANIZED HEALTH CARE EDUCATION/TRAINING PROGRAM

## 2024-12-18 PROCEDURE — 74177 CT ABD & PELVIS W/CONTRAST: CPT

## 2024-12-18 PROCEDURE — 99285 EMERGENCY DEPT VISIT HI MDM: CPT

## 2024-12-18 PROCEDURE — 81001 URINALYSIS AUTO W/SCOPE: CPT | Performed by: PHYSICIAN ASSISTANT

## 2024-12-18 PROCEDURE — 25010000002 ONDANSETRON PER 1 MG: Performed by: PHYSICIAN ASSISTANT

## 2024-12-18 PROCEDURE — 96375 TX/PRO/DX INJ NEW DRUG ADDON: CPT

## 2024-12-18 PROCEDURE — 25810000003 SODIUM CHLORIDE 0.9 % SOLUTION: Performed by: PHYSICIAN ASSISTANT

## 2024-12-18 RX ORDER — IOPAMIDOL 612 MG/ML
100 INJECTION, SOLUTION INTRAVASCULAR
Status: COMPLETED | OUTPATIENT
Start: 2024-12-18 | End: 2024-12-18

## 2024-12-18 RX ORDER — SODIUM CHLORIDE 0.9 % (FLUSH) 0.9 %
10 SYRINGE (ML) INJECTION AS NEEDED
Status: DISCONTINUED | OUTPATIENT
Start: 2024-12-18 | End: 2024-12-18 | Stop reason: HOSPADM

## 2024-12-18 RX ORDER — OXYCODONE AND ACETAMINOPHEN 5; 325 MG/1; MG/1
1-2 TABLET ORAL EVERY 6 HOURS PRN
Qty: 15 TABLET | Refills: 0 | Status: SHIPPED | OUTPATIENT
Start: 2024-12-18 | End: 2024-12-18

## 2024-12-18 RX ORDER — KETOROLAC TROMETHAMINE 30 MG/ML
30 INJECTION, SOLUTION INTRAMUSCULAR; INTRAVENOUS ONCE
Status: COMPLETED | OUTPATIENT
Start: 2024-12-18 | End: 2024-12-18

## 2024-12-18 RX ORDER — TAMSULOSIN HYDROCHLORIDE 0.4 MG/1
1 CAPSULE ORAL DAILY
Qty: 12 CAPSULE | Refills: 0 | Status: SHIPPED | OUTPATIENT
Start: 2024-12-18

## 2024-12-18 RX ORDER — FAMOTIDINE 10 MG/ML
20 INJECTION, SOLUTION INTRAVENOUS ONCE
Status: COMPLETED | OUTPATIENT
Start: 2024-12-18 | End: 2024-12-18

## 2024-12-18 RX ORDER — ONDANSETRON 2 MG/ML
4 INJECTION INTRAMUSCULAR; INTRAVENOUS ONCE
Status: COMPLETED | OUTPATIENT
Start: 2024-12-18 | End: 2024-12-18

## 2024-12-18 RX ORDER — OXYCODONE AND ACETAMINOPHEN 5; 325 MG/1; MG/1
1 TABLET ORAL EVERY 6 HOURS PRN
Qty: 8 TABLET | Refills: 0 | Status: ON HOLD | OUTPATIENT
Start: 2024-12-18 | End: 2024-12-21

## 2024-12-18 RX ORDER — ONDANSETRON 4 MG/1
4 TABLET, ORALLY DISINTEGRATING ORAL EVERY 4 HOURS PRN
Qty: 15 TABLET | Refills: 0 | Status: SHIPPED | OUTPATIENT
Start: 2024-12-18

## 2024-12-18 RX ADMIN — MORPHINE SULFATE 4 MG: 4 INJECTION, SOLUTION INTRAMUSCULAR; INTRAVENOUS at 12:14

## 2024-12-18 RX ADMIN — MORPHINE SULFATE 4 MG: 4 INJECTION, SOLUTION INTRAMUSCULAR; INTRAVENOUS at 10:51

## 2024-12-18 RX ADMIN — FAMOTIDINE 20 MG: 10 INJECTION, SOLUTION INTRAVENOUS at 12:19

## 2024-12-18 RX ADMIN — ONDANSETRON 4 MG: 2 INJECTION INTRAMUSCULAR; INTRAVENOUS at 10:11

## 2024-12-18 RX ADMIN — KETOROLAC TROMETHAMINE 30 MG: 30 INJECTION, SOLUTION INTRAMUSCULAR; INTRAVENOUS at 10:11

## 2024-12-18 RX ADMIN — IOPAMIDOL 90 ML: 612 INJECTION, SOLUTION INTRAVENOUS at 11:30

## 2024-12-18 RX ADMIN — SODIUM CHLORIDE 1000 ML: 9 INJECTION, SOLUTION INTRAVENOUS at 10:11

## 2024-12-18 NOTE — Clinical Note
[facility] EMERGENCY DEPARTMENT [place]  [address]  [facility] [address]  Phone: [phone]  Fax: [phone]    Laura Thomson was seen and treated in our emergency department on 12/18/2024.  She may return to work on 12/20/2024.         Thank you for choosing [facility].    Estela Bob PA

## 2024-12-18 NOTE — FSED PROVIDER NOTE
Subjective   History of Present Illness  Patient presents the ER with complaint of hematuria and flank pain.  She reports the pain started just prior to arrival when she was urinating.  She states her urine was edie blood.  She denies history of similar symptoms.  She does not take blood thinners.  She is never had a kidney stone.  She denies fever, chills, vomiting, diarrhea, chest pain, shortness of air.  She does have mild suprapubic pain and nausea.  Gastric bypass surgery in November without complication.         Review of Systems   Genitourinary:  Positive for difficulty urinating, dysuria, flank pain and hematuria.   All other systems reviewed and are negative.      Past Medical History:   Diagnosis Date    Abdominal pain, periumbilical     Abdominal pain, RLQ (right lower quadrant)     Acute pharyngitis     Anxiety     Appetite lost     Chest pain 2018?    states had a heart monitor and was WNL.  States was told pain was attributed to acid reflux.  No pain since.  (assessed 3/17/23)    Colon polyp 02/17/2017    Depression     patient denies    Diarrhea     Secondary to IBS    Difficulty swallowing     food and liquids    Fatigue     Fracture     RIGHT WRIST TWICE, LEFT HAND    GERD (gastroesophageal reflux disease)     H/O foot surgery 10/2020    left x 2    Hearing loss     No use of hearing aids    History of bronchitis     History of colonoscopy     History of exercise stress test 2020    History of left salpingo-oophorectomy  2017 August 05/30/2018    History of ovarian cyst     History of pneumonia     Hyperthyroidism     hyperactive taken off medicine when pregnant.  Reported she was medication at one time, but none since pregnancy.     Irritable bowel syndrome     Multiple gestation [id number]    Ovarian cyst, bilateral     PONV (postoperative nausea and vomiting)     Seasonal allergies     RITU (stress urinary incontinence, female) 02/13/2020    TMJ (dislocation of temporomandibular joint)     Upper  respiratory infection     Vomiting     Wears glasses        No Known Allergies    Past Surgical History:   Procedure Laterality Date    ANKLE SURGERY Left     x2     SECTION  2008    DR NAVARRETE     SECTION  10/30/2015    DR VASQUEZ     SECTION  2010    DR ESPINOZA    CHOLECYSTECTOMY      COLONOSCOPY N/A 2017     COLONOSCOPY WITH COLD BIOPSY POLYPECTOMY; BIOPSIES , QUICK CLIP;  Surgeon: Perfecto Mcknight MD;  Location: [facility] ENDOSCOPY;  Service:     DIAGNOSTIC LAPAROSCOPY  2005    DR ESPINOZA- ELIJAH/cystectomy    DIAGNOSTIC LAPAROSCOPY  2012    DR ESPINOZA/ELIJAH    DIAGNOSTIC LAPAROSCOPY N/A 08/10/2017     DIAGNOSTIC LAPAROSCOPY, LEFT S&O;  Keren Espinoza MD;  Location: [facility] OR;  Service:     DIAGNOSTIC LAPAROSCOPY N/A 2022    Procedure: DIAGNOSTIC LAPAROSCOPY,  LYSIS OF ADHESIONS, RIGHT SALPINGO-OOPHORECTOMY;  Surgeon: Kem Sahni MD;  Location: [facility] OR;  Service: Obstetrics/Gynecology;  Laterality: N/A;    DIAGNOSTIC LAPAROSCOPY N/A 3/24/2023    Procedure: DIAGNOSTIC LAPAROSCOPY WITH LYSIS OF ADHESIONS;  Surgeon: Kem Sahni MD;  Location: [facility] OR;  Service: Obstetrics/Gynecology;  Laterality: N/A;    ENDOSCOPY N/A 10/15/2019    Procedure: ESOPHAGOGASTRODUODENOSCOPY WITH BIOPSY AND ESOPHAGEAL DILATATION;  Surgeon: Perfecto Mcknight MD;  Location: [facility] ENDOSCOPY;  Service: Gastroenterology    ENDOSCOPY N/A 10/11/2021    Procedure: ESOPHAGOGASTRODUODENOSCOPY with dilatation and biopsies;  Surgeon: Hieu Shultz MD;  Location: [facility] ENDOSCOPY;  Service: Gastroenterology;  Laterality: N/A;    ENDOSCOPY N/A 2023    Procedure: ESOPHAGOGASTRODUODENOSCOPY  WITH BIOPSY;  Surgeon: Consuelo Avalos MD;  Location: [facility] ENDOSCOPY;  Service: Gastroenterology;  Laterality: N/A;    FOOT SURGERY Left     ligament and tendon repair    HERNIA REPAIR  2019    abd    MID-URETHRAL SLING WITH CYSTOSCOPY N/A 2021     MID-URETHRAL SLING WITH  CYSTOSCOPY TVT EXACT;  Luc Alicea MD;  Location:  [facility] OR;  Service: Obstetrics/Gynecology;  Laterality: N/A;    NISSEN FUNDOPLICATION  06/2013    UPPER GASTROINTESTINAL ENDOSCOPY  2013    UPPER GASTROINTESTINAL ENDOSCOPY  06/19/2020    VAGINAL HYSTERECTOMY N/A 07/09/2018    VAGINAL HYSTERECTOMY, BILATERAL SALPINGECTOMY;  Luc Alicea MD;  Location:  [facility] OR;  Service: Gynecology    WISDOM TOOTH EXTRACTION  2000       Family History   Problem Relation Age of Onset    Coronary artery disease Mother     Diabetes Mother     Hypertension Mother     Kidney disease Father     Skin cancer Father     Stroke Maternal Grandfather     Hypertension Maternal Grandfather     Diabetes Maternal Grandfather     Colon cancer Maternal Great-Grandmother     Osteoporosis Neg Hx        Social History     Socioeconomic History    Marital status: Legally    Tobacco Use    Smoking status: Never     Passive exposure: Never    Smokeless tobacco: Never   Vaping Use    Vaping status: Never Used   Substance and Sexual Activity    Alcohol use: No    Drug use: No    Sexual activity: Defer           Objective   Physical Exam  Vitals and nursing note reviewed.   Constitutional:       Appearance: Normal appearance. She is normal weight. She is ill-appearing.   HENT:      Head: Normocephalic and atraumatic.      Right Ear: External ear normal.      Left Ear: External ear normal.      Nose: Nose normal.      Mouth/Throat:      Mouth: Mucous membranes are moist.      Pharynx: Oropharynx is clear.   Eyes:      Extraocular Movements: Extraocular movements intact.      Pupils: Pupils are equal, round, and reactive to light.   Cardiovascular:      Rate and Rhythm: Normal rate and regular rhythm.   Pulmonary:      Effort: Pulmonary effort is normal. No respiratory distress.      Breath sounds: Normal breath sounds. No stridor. No wheezing.   Abdominal:      General: Abdomen is flat. Bowel sounds are normal.      Palpations: There is no mass.       Tenderness: There is abdominal tenderness in the suprapubic area. There is right CVA tenderness and left CVA tenderness. There is no guarding or rebound. Negative signs include Reyna's sign, Rovsing's sign, McBurney's sign and psoas sign.      Hernia: No hernia is present.   Musculoskeletal:         General: Normal range of motion.      Cervical back: Normal range of motion.   Skin:     General: Skin is warm and dry.      Coloration: Skin is not jaundiced or pale.      Findings: No erythema.   Neurological:      General: No focal deficit present.      Mental Status: She is alert.   Psychiatric:         Mood and Affect: Mood normal.         Behavior: Behavior normal.         Procedures           ED Course                                           Medical Decision Making      Final diagnoses:   None       ED Disposition  ED Disposition       None            No follow-up provider specified.       Medication List      No changes were made to your prescriptions during this visit.

## 2024-12-19 ENCOUNTER — APPOINTMENT (OUTPATIENT)
Dept: CT IMAGING | Facility: HOSPITAL | Age: 39
End: 2024-12-19
Payer: COMMERCIAL

## 2024-12-19 ENCOUNTER — HOSPITAL ENCOUNTER (OUTPATIENT)
Facility: HOSPITAL | Age: 39
Setting detail: OBSERVATION
Discharge: HOME | End: 2024-12-21
Attending: EMERGENCY MEDICINE | Admitting: STUDENT IN AN ORGANIZED HEALTH CARE EDUCATION/TRAINING PROGRAM
Payer: COMMERCIAL

## 2024-12-19 DIAGNOSIS — N20.1 LEFT URETERAL STONE: ICD-10-CM

## 2024-12-19 DIAGNOSIS — N20.1 URETEROLITHIASIS: ICD-10-CM

## 2024-12-19 DIAGNOSIS — R52 INTRACTABLE PAIN: ICD-10-CM

## 2024-12-19 DIAGNOSIS — R11.10 INTRACTABLE VOMITING: Primary | ICD-10-CM

## 2024-12-19 DIAGNOSIS — E86.0 DEHYDRATION: ICD-10-CM

## 2024-12-19 DIAGNOSIS — N13.2 HYDRONEPHROSIS WITH URINARY OBSTRUCTION DUE TO URETERAL CALCULUS: ICD-10-CM

## 2024-12-19 PROBLEM — N20.0 RENAL STONE: Status: ACTIVE | Noted: 2024-12-19

## 2024-12-19 PROBLEM — R11.2 N&V (NAUSEA AND VOMITING): Status: ACTIVE | Noted: 2024-12-19

## 2024-12-19 PROBLEM — N39.0 ACUTE UTI (URINARY TRACT INFECTION): Status: ACTIVE | Noted: 2024-12-19

## 2024-12-19 LAB
ALBUMIN SERPL-MCNC: 4.2 G/DL (ref 3.5–5.2)
ALBUMIN/GLOB SERPL: 1.6 G/DL
ALP SERPL-CCNC: 98 U/L (ref 39–117)
ALT SERPL W P-5'-P-CCNC: 24 U/L (ref 1–33)
ANION GAP SERPL CALCULATED.3IONS-SCNC: 20 MMOL/L (ref 5–15)
AST SERPL-CCNC: 35 U/L (ref 1–32)
BACTERIA UR QL AUTO: ABNORMAL /HPF
BASOPHILS # BLD AUTO: 0.05 10*3/MM3 (ref 0–0.2)
BASOPHILS NFR BLD AUTO: 0.6 % (ref 0–1.5)
BILIRUB SERPL-MCNC: 0.4 MG/DL (ref 0–1.2)
BILIRUB UR QL STRIP: NEGATIVE
BUN SERPL-MCNC: 6 MG/DL (ref 6–20)
BUN/CREAT SERPL: 7.5 (ref 7–25)
CALCIUM SPEC-SCNC: 8.9 MG/DL (ref 8.6–10.5)
CHLORIDE SERPL-SCNC: 101 MMOL/L (ref 98–107)
CLARITY UR: ABNORMAL
CO2 SERPL-SCNC: 22 MMOL/L (ref 22–29)
COLOR UR: ABNORMAL
CREAT SERPL-MCNC: 0.8 MG/DL (ref 0.57–1)
D-LACTATE SERPL-SCNC: 0.7 MMOL/L (ref 0.5–2)
DEPRECATED RDW RBC AUTO: 44.3 FL (ref 37–54)
EGFRCR SERPLBLD CKD-EPI 2021: 95.7 ML/MIN/1.73
EOSINOPHIL # BLD AUTO: 0.02 10*3/MM3 (ref 0–0.4)
EOSINOPHIL NFR BLD AUTO: 0.2 % (ref 0.3–6.2)
ERYTHROCYTE [DISTWIDTH] IN BLOOD BY AUTOMATED COUNT: 14.5 % (ref 12.3–15.4)
GLOBULIN UR ELPH-MCNC: 2.7 GM/DL
GLUCOSE SERPL-MCNC: 101 MG/DL (ref 65–99)
GLUCOSE UR STRIP-MCNC: NEGATIVE MG/DL
HCT VFR BLD AUTO: 43.1 % (ref 34–46.6)
HGB BLD-MCNC: 14.1 G/DL (ref 12–15.9)
HGB UR QL STRIP.AUTO: ABNORMAL
HOLD SPECIMEN: NORMAL
HYALINE CASTS UR QL AUTO: ABNORMAL /LPF
IMM GRANULOCYTES # BLD AUTO: 0.02 10*3/MM3 (ref 0–0.05)
IMM GRANULOCYTES NFR BLD AUTO: 0.2 % (ref 0–0.5)
KETONES UR QL STRIP: ABNORMAL
LEUKOCYTE ESTERASE UR QL STRIP.AUTO: ABNORMAL
LIPASE SERPL-CCNC: 24 U/L (ref 13–60)
LYMPHOCYTES # BLD AUTO: 0.71 10*3/MM3 (ref 0.7–3.1)
LYMPHOCYTES NFR BLD AUTO: 8.5 % (ref 19.6–45.3)
MCH RBC QN AUTO: 27.5 PG (ref 26.6–33)
MCHC RBC AUTO-ENTMCNC: 32.7 G/DL (ref 31.5–35.7)
MCV RBC AUTO: 84.2 FL (ref 79–97)
MONOCYTES # BLD AUTO: 0.63 10*3/MM3 (ref 0.1–0.9)
MONOCYTES NFR BLD AUTO: 7.5 % (ref 5–12)
NEUTROPHILS NFR BLD AUTO: 6.97 10*3/MM3 (ref 1.7–7)
NEUTROPHILS NFR BLD AUTO: 83 % (ref 42.7–76)
NITRITE UR QL STRIP: NEGATIVE
NRBC BLD AUTO-RTO: 0 /100 WBC (ref 0–0.2)
PH UR STRIP.AUTO: 5.5 [PH] (ref 5–8)
PLATELET # BLD AUTO: 200 10*3/MM3 (ref 140–450)
PMV BLD AUTO: 11.1 FL (ref 6–12)
POTASSIUM SERPL-SCNC: 3.8 MMOL/L (ref 3.5–5.2)
PROT SERPL-MCNC: 6.9 G/DL (ref 6–8.5)
PROT UR QL STRIP: ABNORMAL
RBC # BLD AUTO: 5.12 10*6/MM3 (ref 3.77–5.28)
RBC # UR STRIP: ABNORMAL /HPF
REF LAB TEST METHOD: ABNORMAL
SODIUM SERPL-SCNC: 143 MMOL/L (ref 136–145)
SP GR UR STRIP: 1.03 (ref 1–1.03)
SQUAMOUS #/AREA URNS HPF: ABNORMAL /HPF
UROBILINOGEN UR QL STRIP: ABNORMAL
WBC # UR STRIP: ABNORMAL /HPF
WBC NRBC COR # BLD AUTO: 8.4 10*3/MM3 (ref 3.4–10.8)
WHOLE BLOOD HOLD COAG: NORMAL
WHOLE BLOOD HOLD SPECIMEN: NORMAL

## 2024-12-19 PROCEDURE — 83690 ASSAY OF LIPASE: CPT | Performed by: EMERGENCY MEDICINE

## 2024-12-19 PROCEDURE — G0378 HOSPITAL OBSERVATION PER HR: HCPCS

## 2024-12-19 PROCEDURE — 25010000002 MORPHINE PER 10 MG: Performed by: EMERGENCY MEDICINE

## 2024-12-19 PROCEDURE — 85025 COMPLETE CBC W/AUTO DIFF WBC: CPT | Performed by: EMERGENCY MEDICINE

## 2024-12-19 PROCEDURE — 99285 EMERGENCY DEPT VISIT HI MDM: CPT

## 2024-12-19 PROCEDURE — 80053 COMPREHEN METABOLIC PANEL: CPT | Performed by: EMERGENCY MEDICINE

## 2024-12-19 PROCEDURE — 25010000002 KETOROLAC TROMETHAMINE PER 15 MG: Performed by: PHYSICIAN ASSISTANT

## 2024-12-19 PROCEDURE — 25010000002 PROCHLORPERAZINE 10 MG/2ML SOLUTION: Performed by: EMERGENCY MEDICINE

## 2024-12-19 PROCEDURE — 83605 ASSAY OF LACTIC ACID: CPT | Performed by: PHYSICIAN ASSISTANT

## 2024-12-19 PROCEDURE — 74177 CT ABD & PELVIS W/CONTRAST: CPT

## 2024-12-19 PROCEDURE — 96372 THER/PROPH/DIAG INJ SC/IM: CPT

## 2024-12-19 PROCEDURE — 25010000002 CEFTRIAXONE PER 250 MG: Performed by: PHYSICIAN ASSISTANT

## 2024-12-19 PROCEDURE — 25010000002 ONDANSETRON PER 1 MG: Performed by: PHYSICIAN ASSISTANT

## 2024-12-19 PROCEDURE — 81001 URINALYSIS AUTO W/SCOPE: CPT | Performed by: EMERGENCY MEDICINE

## 2024-12-19 PROCEDURE — 96365 THER/PROPH/DIAG IV INF INIT: CPT

## 2024-12-19 PROCEDURE — 99222 1ST HOSP IP/OBS MODERATE 55: CPT | Performed by: NURSE PRACTITIONER

## 2024-12-19 PROCEDURE — 87086 URINE CULTURE/COLONY COUNT: CPT | Performed by: PHYSICIAN ASSISTANT

## 2024-12-19 PROCEDURE — 25810000003 SODIUM CHLORIDE 0.9 % SOLUTION: Performed by: PHYSICIAN ASSISTANT

## 2024-12-19 PROCEDURE — 25810000003 SODIUM CHLORIDE 0.9 % SOLUTION: Performed by: NURSE PRACTITIONER

## 2024-12-19 PROCEDURE — 96375 TX/PRO/DX INJ NEW DRUG ADDON: CPT

## 2024-12-19 PROCEDURE — 25010000002 ENOXAPARIN PER 10 MG

## 2024-12-19 RX ORDER — MORPHINE SULFATE 4 MG/ML
4 INJECTION, SOLUTION INTRAMUSCULAR; INTRAVENOUS ONCE
Status: COMPLETED | OUTPATIENT
Start: 2024-12-19 | End: 2024-12-19

## 2024-12-19 RX ORDER — MORPHINE SULFATE 2 MG/ML
2 INJECTION, SOLUTION INTRAMUSCULAR; INTRAVENOUS
Status: DISCONTINUED | OUTPATIENT
Start: 2024-12-19 | End: 2024-12-21 | Stop reason: HOSPADM

## 2024-12-19 RX ORDER — ENOXAPARIN SODIUM 100 MG/ML
40 INJECTION SUBCUTANEOUS NIGHTLY
Status: DISCONTINUED | OUTPATIENT
Start: 2024-12-19 | End: 2024-12-21 | Stop reason: HOSPADM

## 2024-12-19 RX ORDER — KETOROLAC TROMETHAMINE 15 MG/ML
15 INJECTION, SOLUTION INTRAMUSCULAR; INTRAVENOUS EVERY 6 HOURS PRN
Status: DISCONTINUED | OUTPATIENT
Start: 2024-12-19 | End: 2024-12-21 | Stop reason: HOSPADM

## 2024-12-19 RX ORDER — OXYCODONE AND ACETAMINOPHEN 5; 325 MG/1; MG/1
1 TABLET ORAL EVERY 6 HOURS PRN
Status: ACTIVE | OUTPATIENT
Start: 2024-12-19 | End: 2024-12-20

## 2024-12-19 RX ORDER — SODIUM CHLORIDE 9 MG/ML
75 INJECTION, SOLUTION INTRAVENOUS CONTINUOUS
Status: ACTIVE | OUTPATIENT
Start: 2024-12-19 | End: 2024-12-20

## 2024-12-19 RX ORDER — AMOXICILLIN 250 MG
2 CAPSULE ORAL 2 TIMES DAILY PRN
Status: DISCONTINUED | OUTPATIENT
Start: 2024-12-19 | End: 2024-12-21 | Stop reason: HOSPADM

## 2024-12-19 RX ORDER — PANTOPRAZOLE SODIUM 40 MG/1
40 TABLET, DELAYED RELEASE ORAL
Status: DISCONTINUED | OUTPATIENT
Start: 2024-12-20 | End: 2024-12-21 | Stop reason: HOSPADM

## 2024-12-19 RX ORDER — ONDANSETRON 4 MG/1
4 TABLET, ORALLY DISINTEGRATING ORAL EVERY 4 HOURS PRN
Status: DISCONTINUED | OUTPATIENT
Start: 2024-12-19 | End: 2024-12-21 | Stop reason: HOSPADM

## 2024-12-19 RX ORDER — POLYETHYLENE GLYCOL 3350 17 G/17G
17 POWDER, FOR SOLUTION ORAL DAILY PRN
Status: DISCONTINUED | OUTPATIENT
Start: 2024-12-19 | End: 2024-12-21 | Stop reason: HOSPADM

## 2024-12-19 RX ORDER — SODIUM CHLORIDE 9 MG/ML
10 INJECTION, SOLUTION INTRAMUSCULAR; INTRAVENOUS; SUBCUTANEOUS AS NEEDED
Status: DISCONTINUED | OUTPATIENT
Start: 2024-12-19 | End: 2024-12-20 | Stop reason: SDUPTHER

## 2024-12-19 RX ORDER — BISACODYL 5 MG/1
5 TABLET, DELAYED RELEASE ORAL DAILY PRN
Status: DISCONTINUED | OUTPATIENT
Start: 2024-12-19 | End: 2024-12-21 | Stop reason: HOSPADM

## 2024-12-19 RX ORDER — SODIUM CHLORIDE 0.9 % (FLUSH) 0.9 %
10 SYRINGE (ML) INJECTION EVERY 12 HOURS SCHEDULED
Status: DISCONTINUED | OUTPATIENT
Start: 2024-12-19 | End: 2024-12-20 | Stop reason: SDUPTHER

## 2024-12-19 RX ORDER — TAMSULOSIN HYDROCHLORIDE 0.4 MG/1
0.4 CAPSULE ORAL DAILY
Status: DISCONTINUED | OUTPATIENT
Start: 2024-12-20 | End: 2024-12-21 | Stop reason: HOSPADM

## 2024-12-19 RX ORDER — PROCHLORPERAZINE EDISYLATE 5 MG/ML
10 INJECTION INTRAMUSCULAR; INTRAVENOUS ONCE
Status: COMPLETED | OUTPATIENT
Start: 2024-12-19 | End: 2024-12-19

## 2024-12-19 RX ORDER — ONDANSETRON 2 MG/ML
4 INJECTION INTRAMUSCULAR; INTRAVENOUS ONCE
Status: COMPLETED | OUTPATIENT
Start: 2024-12-19 | End: 2024-12-19

## 2024-12-19 RX ORDER — SODIUM CHLORIDE 9 MG/ML
40 INJECTION, SOLUTION INTRAVENOUS AS NEEDED
Status: DISCONTINUED | OUTPATIENT
Start: 2024-12-19 | End: 2024-12-21 | Stop reason: HOSPADM

## 2024-12-19 RX ORDER — SODIUM CHLORIDE 0.9 % (FLUSH) 0.9 %
10 SYRINGE (ML) INJECTION AS NEEDED
Status: DISCONTINUED | OUTPATIENT
Start: 2024-12-19 | End: 2024-12-20 | Stop reason: SDUPTHER

## 2024-12-19 RX ORDER — ONDANSETRON 2 MG/ML
4 INJECTION INTRAMUSCULAR; INTRAVENOUS EVERY 8 HOURS PRN
Status: DISCONTINUED | OUTPATIENT
Start: 2024-12-19 | End: 2024-12-21 | Stop reason: HOSPADM

## 2024-12-19 RX ORDER — BISACODYL 10 MG
10 SUPPOSITORY, RECTAL RECTAL DAILY PRN
Status: DISCONTINUED | OUTPATIENT
Start: 2024-12-19 | End: 2024-12-21 | Stop reason: HOSPADM

## 2024-12-19 RX ORDER — KETOROLAC TROMETHAMINE 15 MG/ML
15 INJECTION, SOLUTION INTRAMUSCULAR; INTRAVENOUS ONCE
Status: COMPLETED | OUTPATIENT
Start: 2024-12-19 | End: 2024-12-19

## 2024-12-19 RX ADMIN — SODIUM CHLORIDE 75 ML/HR: 9 INJECTION, SOLUTION INTRAVENOUS at 20:26

## 2024-12-19 RX ADMIN — PROCHLORPERAZINE EDISYLATE 10 MG: 5 INJECTION INTRAMUSCULAR; INTRAVENOUS at 17:36

## 2024-12-19 RX ADMIN — KETOROLAC TROMETHAMINE 15 MG: 15 INJECTION, SOLUTION INTRAMUSCULAR; INTRAVENOUS at 14:36

## 2024-12-19 RX ADMIN — ONDANSETRON 4 MG: 2 INJECTION INTRAMUSCULAR; INTRAVENOUS at 14:34

## 2024-12-19 RX ADMIN — ENOXAPARIN SODIUM 40 MG: 100 INJECTION SUBCUTANEOUS at 20:25

## 2024-12-19 RX ADMIN — SODIUM CHLORIDE 2000 MG: 900 INJECTION INTRAVENOUS at 17:37

## 2024-12-19 RX ADMIN — SODIUM CHLORIDE 1000 ML: 9 INJECTION, SOLUTION INTRAVENOUS at 16:33

## 2024-12-19 RX ADMIN — MORPHINE SULFATE 4 MG: 4 INJECTION, SOLUTION INTRAMUSCULAR; INTRAVENOUS at 17:36

## 2024-12-19 NOTE — ED PROVIDER NOTES
Subjective   History of Present Illness  Pt is a 40 yo female presenting to ED with complaints of flank pain and vomiting. PMHx significant for Hyperthyroidism, IVS, ovarian cyst, Anxiety and Depression. Pt explains on Monday noticed decreased in amount of urine output. Yesterday developed blood in urine and bilateral flank pain. She went to [facility] ED and had CT scan showing 4 mm proximal left ureteral stone and sent home on Oxycyoine, Zofran and Flomax. Pt reports unable to stop vomiting and keep meds down. She reports worse pain in bilateral flank and lower abdomen. She denies fever, CP, SOB, cough or diarrhea. Pt with hx of gastric bypass with Dr. Solis November 5th 2024. Denies tobacco, drug or ETOH use.     History provided by:  Patient, relative and medical records      Review of Systems   Constitutional:  Positive for activity change, appetite change and fatigue. Negative for chills and fever.   HENT:  Negative for congestion and trouble swallowing.    Eyes:  Negative for pain and visual disturbance.   Respiratory:  Negative for cough and shortness of breath.    Cardiovascular:  Negative for chest pain and leg swelling.   Gastrointestinal:  Positive for abdominal pain, nausea and vomiting. Negative for diarrhea.   Genitourinary:  Positive for flank pain and hematuria. Negative for difficulty urinating and dysuria.   Musculoskeletal:  Negative for arthralgias, back pain, joint swelling and neck pain.   Skin:  Negative for color change, rash and wound.   Neurological:  Negative for dizziness, syncope, speech difficulty, numbness and headaches.   Psychiatric/Behavioral:  Negative for confusion.    All other systems reviewed and are negative.      Past Medical History:   Diagnosis Date    Abdominal pain, periumbilical     Abdominal pain, RLQ (right lower quadrant)     Acute pharyngitis     Anxiety     Appetite lost     Chest pain 2018?    states had a heart monitor and was WNL.  [facility] was told pain was  attributed to acid reflux.  No pain since.  (assessed 3/17/23)    Colon polyp 2017    Depression     patient denies    Diarrhea     Secondary to IBS    Difficulty swallowing     food and liquids    Fatigue     Fracture     RIGHT WRIST TWICE, LEFT HAND    GERD (gastroesophageal reflux disease)     H/O foot surgery 10/2020    left x 2    Hearing loss     No use of hearing aids    History of bronchitis     History of colonoscopy     History of exercise stress test     History of left salpingo-oophorectomy  2018    History of ovarian cyst     History of pneumonia     Hyperthyroidism     hyperactive taken off medicine when pregnant.  Reported she was medication at one time, but none since pregnancy.     Irritable bowel syndrome     Multiple gestation [id number]    Ovarian cyst, bilateral     PONV (postoperative nausea and vomiting)     Seasonal allergies     RITU (stress urinary incontinence, female) 2020    TMJ (dislocation of temporomandibular joint)     Upper respiratory infection     Vomiting     Wears glasses        No Known Allergies    Past Surgical History:   Procedure Laterality Date    ANKLE SURGERY Left     x2     SECTION  2008    DR NAVARRETE     SECTION  10/30/2015    DR VASQUEZ     SECTION  2010    DR ESPINOZA    CHOLECYSTECTOMY      COLONOSCOPY N/A 2017     COLONOSCOPY WITH COLD BIOPSY POLYPECTOMY; BIOPSIES , QUICK CLIP;  Surgeon: Perfecto Mcknight MD;  Location: [facility] ENDOSCOPY;  Service:     DIAGNOSTIC LAPAROSCOPY  2005    DR JONES NAVA/cystectomy    DIAGNOSTIC LAPAROSCOPY  2012    DR ESPINOZA/ELIJAH    DIAGNOSTIC LAPAROSCOPY N/A 08/10/2017     DIAGNOSTIC LAPAROSCOPY, LEFT S&O;  Keren Espinoza MD;  Location: [facility] OR;  Service:     DIAGNOSTIC LAPAROSCOPY N/A 2022    Procedure: DIAGNOSTIC LAPAROSCOPY,  LYSIS OF ADHESIONS, RIGHT SALPINGO-OOPHORECTOMY;  Surgeon: Kem Sahni MD;  Location: [facility] OR;  Service:  Obstetrics/Gynecology;  Laterality: N/A;    DIAGNOSTIC LAPAROSCOPY N/A 03/24/2023    Procedure: DIAGNOSTIC LAPAROSCOPY WITH LYSIS OF ADHESIONS;  Surgeon: Kem Sahni MD;  Location: [facility] OR;  Service: Obstetrics/Gynecology;  Laterality: N/A;    ENDOSCOPY N/A 10/15/2019    Procedure: ESOPHAGOGASTRODUODENOSCOPY WITH BIOPSY AND ESOPHAGEAL DILATATION;  Surgeon: Perfecto Mcknight MD;  Location: [facility] ENDOSCOPY;  Service: Gastroenterology    ENDOSCOPY N/A 10/11/2021    Procedure: ESOPHAGOGASTRODUODENOSCOPY with dilatation and biopsies;  Surgeon: Hieu Shultz MD;  Location: [facility] ENDOSCOPY;  Service: Gastroenterology;  Laterality: N/A;    ENDOSCOPY N/A 12/29/2023    Procedure: ESOPHAGOGASTRODUODENOSCOPY  WITH BIOPSY;  Surgeon: Consuelo Avalos MD;  Location: [facility] ENDOSCOPY;  Service: Gastroenterology;  Laterality: N/A;    FOOT SURGERY Left     ligament and tendon repair    HERNIA REPAIR  12/20/2019    abd    MID-URETHRAL SLING WITH CYSTOSCOPY N/A 06/09/2021     MID-URETHRAL SLING WITH CYSTOSCOPY TVT EXACT;  Luc Alicea MD;  Location:  [facility] OR;  Service: Obstetrics/Gynecology;  Laterality: N/A;    NISSEN FUNDOPLICATION  06/2013    BRADEN-EN-Y PROCEDURE      UPPER GASTROINTESTINAL ENDOSCOPY  2013    UPPER GASTROINTESTINAL ENDOSCOPY  06/19/2020    VAGINAL HYSTERECTOMY N/A 07/09/2018    VAGINAL HYSTERECTOMY, BILATERAL SALPINGECTOMY;  Luc Alicea MD;  Location:  [facility] OR;  Service: Gynecology    WISDOM TOOTH EXTRACTION  2000       Family History   Problem Relation Age of Onset    Coronary artery disease Mother     Diabetes Mother     Hypertension Mother     Kidney disease Father     Skin cancer Father     Diabetes Father     Stroke Maternal Grandfather     Hypertension Maternal Grandfather     Diabetes Maternal Grandfather     Colon cancer Maternal Great-Grandmother     Osteoporosis Neg Hx        Social History     Socioeconomic History    Marital status: Legally    Tobacco Use    Smoking status:  Never     Passive exposure: Never    Smokeless tobacco: Never   Vaping Use    Vaping status: Never Used   Substance and Sexual Activity    Alcohol use: No    Drug use: No    Sexual activity: Defer           Objective   Physical Exam  Vitals and nursing note reviewed.   Constitutional:       General: She is not in acute distress.     Appearance: She is well-developed. She is ill-appearing.   HENT:      Head: Atraumatic.      Nose: Nose normal.      Mouth/Throat:      Mouth: Mucous membranes are dry.   Eyes:      General: Lids are normal.      Conjunctiva/sclera: Conjunctivae normal.      Pupils: Pupils are equal, round, and reactive to light.   Cardiovascular:      Rate and Rhythm: Normal rate and regular rhythm.      Heart sounds: Normal heart sounds.   Pulmonary:      Effort: Pulmonary effort is normal.      Breath sounds: Normal breath sounds. No wheezing.   Abdominal:      General: There is no distension.      Palpations: Abdomen is soft.      Tenderness: There is generalized abdominal tenderness. There is right CVA tenderness and left CVA tenderness. There is no guarding or rebound.   Musculoskeletal:         General: No tenderness. Normal range of motion.      Cervical back: Normal range of motion and neck supple.   Skin:     General: Skin is warm and dry.      Findings: No erythema or rash.   Neurological:      Mental Status: She is alert and oriented to person, place, and time.      Sensory: No sensory deficit.   Psychiatric:         Speech: Speech normal.         Behavior: Behavior normal.         Procedures           ED Course  ED Course as of 12/19/24 2217   Thu Dec 19, 2024   1400 I personally reviewed and interpreted labs results and went over with patient. I personally reviewed and interpreted vitals. [RT]   1600 WBC: 8.40 [RT]   1600 Lactate: 0.7 [RT]   1615 Ketones, UA(!): >=160 mg/dL (4+)  Patient receiving fluids.  [RT]   1630 Discussed patient with Dr. Graves who is agreeable with ED course and tx  plan  [RT]   1645 Paged Frederick with Urology to discuss patient.  [RT]   1700 Re-examined patient several times in ED. Pt resting and but still having pain and vomiting. Discussed results and tx plan including Urology consult and admission.  [RT]   1726 Spoke with Dr. Mack and he is agreeable with plan for admission.  [RT]   1730 Discussed patient with hospitalist Dr. Doran [RT]      ED Course User Index  [RT] Patricia Muniz, PA      Recent Results (from the past 24 hours)   Comprehensive Metabolic Panel    Collection Time: 12/19/24  2:31 PM    Specimen: Blood   Result Value Ref Range    Glucose 101 (H) 65 - 99 mg/dL    BUN 6 6 - 20 mg/dL    Creatinine 0.80 0.57 - 1.00 mg/dL    Sodium 143 136 - 145 mmol/L    Potassium 3.8 3.5 - 5.2 mmol/L    Chloride 101 98 - 107 mmol/L    CO2 22.0 22.0 - 29.0 mmol/L    Calcium 8.9 8.6 - 10.5 mg/dL    Total Protein 6.9 6.0 - 8.5 g/dL    Albumin 4.2 3.5 - 5.2 g/dL    ALT (SGPT) 24 1 - 33 U/L    AST (SGOT) 35 (H) 1 - 32 U/L    Alkaline Phosphatase 98 39 - 117 U/L    Total Bilirubin 0.4 0.0 - 1.2 mg/dL    Globulin 2.7 gm/dL    A/G Ratio 1.6 g/dL    BUN/Creatinine Ratio 7.5 7.0 - 25.0    Anion Gap 20.0 (H) 5.0 - 15.0 mmol/L    eGFR 95.7 >60.0 mL/min/1.73   Lipase    Collection Time: 12/19/24  2:31 PM    Specimen: Blood   Result Value Ref Range    Lipase 24 13 - 60 U/L   Green Top (Gel)    Collection Time: 12/19/24  2:31 PM   Result Value Ref Range    Extra Tube Hold for add-ons.    Lavender Top    Collection Time: 12/19/24  2:31 PM   Result Value Ref Range    Extra Tube hold for add-on    Gold Top - SST    Collection Time: 12/19/24  2:31 PM   Result Value Ref Range    Extra Tube Hold for add-ons.    Gray Top    Collection Time: 12/19/24  2:31 PM   Result Value Ref Range    Extra Tube Hold for add-ons.    Light Blue Top    Collection Time: 12/19/24  2:31 PM   Result Value Ref Range    Extra Tube Hold for add-ons.    CBC Auto Differential    Collection Time: 12/19/24  2:31 PM     Specimen: Blood   Result Value Ref Range    WBC 8.40 3.40 - 10.80 10*3/mm3    RBC 5.12 3.77 - 5.28 10*6/mm3    Hemoglobin 14.1 12.0 - 15.9 g/dL    Hematocrit 43.1 34.0 - 46.6 %    MCV 84.2 79.0 - 97.0 fL    MCH 27.5 26.6 - 33.0 pg    MCHC 32.7 31.5 - 35.7 g/dL    RDW 14.5 12.3 - 15.4 %    RDW-SD 44.3 37.0 - 54.0 fl    MPV 11.1 6.0 - 12.0 fL    Platelets 200 140 - 450 10*3/mm3    Neutrophil % 83.0 (H) 42.7 - 76.0 %    Lymphocyte % 8.5 (L) 19.6 - 45.3 %    Monocyte % 7.5 5.0 - 12.0 %    Eosinophil % 0.2 (L) 0.3 - 6.2 %    Basophil % 0.6 0.0 - 1.5 %    Immature Grans % 0.2 0.0 - 0.5 %    Neutrophils, Absolute 6.97 1.70 - 7.00 10*3/mm3    Lymphocytes, Absolute 0.71 0.70 - 3.10 10*3/mm3    Monocytes, Absolute 0.63 0.10 - 0.90 10*3/mm3    Eosinophils, Absolute 0.02 0.00 - 0.40 10*3/mm3    Basophils, Absolute 0.05 0.00 - 0.20 10*3/mm3    Immature Grans, Absolute 0.02 0.00 - 0.05 10*3/mm3    nRBC 0.0 0.0 - 0.2 /100 WBC   Lactic Acid, Plasma    Collection Time: 12/19/24  2:31 PM    Specimen: Blood   Result Value Ref Range    Lactate 0.7 0.5 - 2.0 mmol/L   Urinalysis With Microscopic If Indicated (No Culture) - Urine, Clean Catch    Collection Time: 12/19/24  2:32 PM    Specimen: Urine, Clean Catch   Result Value Ref Range    Color, UA Orange (A) Yellow, Straw    Appearance, UA Cloudy (A) Clear    pH, UA 5.5 5.0 - 8.0    Specific Gravity, UA 1.026 1.001 - 1.030    Glucose, UA Negative Negative    Ketones, UA >=160 mg/dL (4+) (A) Negative    Bilirubin, UA Negative Negative    Blood, UA Large (3+) (A) Negative    Protein,  mg/dL (2+) (A) Negative    Leuk Esterase, UA Trace (A) Negative    Nitrite, UA Negative Negative    Urobilinogen, UA 1.0 E.U./dL 0.2 - 1.0 E.U./dL   Urinalysis, Microscopic Only - Urine, Clean Catch    Collection Time: 12/19/24  2:32 PM    Specimen: Urine, Clean Catch   Result Value Ref Range    RBC, UA Too Numerous to Count (A) None Seen, 0-2 /HPF    WBC, UA 21-50 (A) None Seen, 0-2 /HPF    Bacteria,  UA 2+ (A) None Seen, Trace /HPF    Squamous Epithelial Cells, UA 3-6 (A) None Seen, 0-2 /HPF    Hyaline Casts, UA 7-12 0 - 6 /LPF    Methodology Automated Microscopy      Note: In addition to lab results from this visit, the labs listed above may include labs taken at another facility or during a different encounter within the last 24 hours. Please correlate lab times with ED admission and discharge times for further clarification of the services performed during this visit.    CT Abdomen Pelvis With Contrast   Final Result   Impression:      1. Progressive left renal obstructive uropathy with increasing perinephric stranding and fluid and a persistent nephrogram. A 5 mm ureteral calculus shows some migration from prior exam but is still located in the mid to distal left ureter at    approximately level L3/4.            Electronically Signed: aMgy Frey MD     12/19/2024 3:29 PM EST     Workstation ID: [id number]        Vitals:    12/19/24 1832 12/19/24 1938 12/19/24 1941 12/19/24 2100   BP: 104/79  101/77    BP Location:   Left arm    Patient Position:   Lying    Pulse: 62  61 54   Resp:   16    Temp:   97.7 °F (36.5 °C)    TempSrc:   Oral    SpO2: 99%  98% 100%   Weight:  86.5 kg (190 lb 12.8 oz)     Height:         Medications   Sodium Chloride (PF) 0.9 % 10 mL (has no administration in time range)   pantoprazole (PROTONIX) EC tablet 40 mg (has no administration in time range)   oxyCODONE-acetaminophen (PERCOCET) 5-325 MG per tablet 1 tablet (has no administration in time range)   tamsulosin (FLOMAX) 24 hr capsule 0.4 mg (has no administration in time range)   cefTRIAXone (ROCEPHIN) 2,000 mg in sodium chloride 0.9 % 100 mL MBP (has no administration in time range)   ondansetron (ZOFRAN) injection 4 mg (has no administration in time range)   ondansetron ODT (ZOFRAN-ODT) disintegrating tablet 4 mg ( Translingual Held by provider 12/19/24 1937)   morphine injection 2 mg (has no administration in time range)    ketorolac (TORADOL) injection 15 mg (has no administration in time range)   sodium chloride 0.9 % flush 10 mL ( Intravenous Canceled Entry 12/19/24 2000)   sodium chloride 0.9 % flush 10 mL (has no administration in time range)   sodium chloride 0.9 % infusion 40 mL (has no administration in time range)   Pharmacy to Dose enoxaparin (LOVENOX) (has no administration in time range)   sodium chloride 0.9 % infusion (75 mL/hr Intravenous New Bag 12/19/24 2026)   sennosides-docusate (PERICOLACE) 8.6-50 MG per tablet 2 tablet (has no administration in time range)     And   polyethylene glycol (MIRALAX) packet 17 g (has no administration in time range)     And   bisacodyl (DULCOLAX) EC tablet 5 mg (has no administration in time range)     And   bisacodyl (DULCOLAX) suppository 10 mg (has no administration in time range)   Enoxaparin Sodium (LOVENOX) syringe 40 mg (40 mg Subcutaneous Given 12/19/24 2025)   ondansetron (ZOFRAN) injection 4 mg (4 mg Intravenous Given 12/19/24 1434)   sodium chloride 0.9 % bolus 1,000 mL (0 mL Intravenous Stopped 12/19/24 1826)   ketorolac (TORADOL) injection 15 mg (15 mg Intravenous Given 12/19/24 1436)   prochlorperazine (COMPAZINE) injection 10 mg (10 mg Intravenous Given 12/19/24 1736)   Morphine sulfate (PF) injection 4 mg (4 mg Intravenous Given 12/19/24 1736)   cefTRIAXone (ROCEPHIN) 2,000 mg in sodium chloride 0.9 % 100 mL MBP (0 mg Intravenous Stopped 12/19/24 1826)     ECG/EMG Results (last 24 hours)       ** No results found for the last 24 hours. **          No orders to display                                                          Medical Decision Making  Pt is a 40 yo female presenting to ED with complaints of flank and abdominal pain with recent kidney stone. I had a discussion with the patient / family regarding ED course, diagnosis, diagnostic results and treatment plan including medications and admission.    DDx  JOSSELINE, Sepsis, Kidney stone, Dehydration, Post op  complication     Problems Addressed:  Dehydration: complicated acute illness or injury  Hydronephrosis with urinary obstruction due to ureteral calculus: complicated acute illness or injury  Intractable pain: complicated acute illness or injury  Intractable vomiting: complicated acute illness or injury  Left ureteral stone: complicated acute illness or injury    Amount and/or Complexity of Data Reviewed  Independent Historian:      Details: Family   External Data Reviewed: notes.     Details: Reviewed previous non ED visits including prior labs, imaging, available notes, medications, allergies and surgical hx.     Labs: ordered. Decision-making details documented in ED Course.  Radiology: ordered. Decision-making details documented in ED Course.    Risk  Prescription drug management.  Decision regarding hospitalization.        Final diagnoses:   Intractable vomiting   Intractable pain   Left ureteral stone   Hydronephrosis with urinary obstruction due to ureteral calculus   Dehydration       ED Disposition  ED Disposition       ED Disposition   Decision to Admit    Condition   --    Comment   Level of Care: Telemetry [5]   Diagnosis: Renal stone [199105]   Admitting Physician: LATASHA GARCIA [[id number]]   Attending Physician: LATASHA GARCIA [[id number]]   Is patient appropriate for Inpatient Observation Unit?: No [0]                 No follow-up provider specified.       Medication List      No changes were made to your prescriptions during this visit.            Patricia Muniz PA  12/19/24 [id number]

## 2024-12-19 NOTE — H&P
[facility] Medicine Services  HISTORY AND PHYSICAL    Patient Name: Laura Thomson  : 1985  MRN: [id number]  Primary Care Physician: Megha Fregoso APRN  Date of admission: 2024    Subjective   Subjective     Chief Complaint:  N/V, abdominal pain    HPI:  Laura Thomson is a 39 y.o. female with PMH of recent Jacque-EN-Y procedure, GERD who began having abdominal pain and hematuria 24 and presented to ED and diagnosed with kidney stone.  She was dc'd home on flomax and pain control, but vomiting and pain persisted and she represented today.  This is her 1st kidney stone. ED contacted  who will consult        Review of Systems   Constitutional:  Positive for appetite change. Negative for fever.   HENT:  Negative for congestion and hearing loss.    Eyes:  Negative for visual disturbance.   Respiratory:  Negative for cough and shortness of breath.    Gastrointestinal:  Positive for abdominal pain, nausea and vomiting.   Genitourinary:  Positive for hematuria. Negative for dysuria.   Musculoskeletal:  Positive for back pain.   Neurological:  Negative for dizziness and weakness.   Psychiatric/Behavioral:  Negative for behavioral problems and confusion.           Personal History     Past Medical History:   Diagnosis Date    Abdominal pain, periumbilical     Abdominal pain, RLQ (right lower quadrant)     Acute pharyngitis     Anxiety     Appetite lost     Chest pain ?    states had a heart monitor and was WNL.  States was told pain was attributed to acid reflux.  No pain since.  (assessed 3/17/23)    Colon polyp 2017    Depression     patient denies    Diarrhea     Secondary to IBS    Difficulty swallowing     food and liquids    Fatigue     Fracture     RIGHT WRIST TWICE, LEFT HAND    GERD (gastroesophageal reflux disease)     H/O foot surgery 10/2020    left x 2    Hearing loss     No use of hearing aids    History of bronchitis     History of  colonoscopy     History of exercise stress test     History of left salpingo-oophorectomy  2018    History of ovarian cyst     History of pneumonia     Hyperthyroidism     hyperactive taken off medicine when pregnant.  Reported she was medication at one time, but none since pregnancy.     Irritable bowel syndrome     Multiple gestation [id number]    Ovarian cyst, bilateral     PONV (postoperative nausea and vomiting)     Seasonal allergies     RITU (stress urinary incontinence, female) 2020    TMJ (dislocation of temporomandibular joint)     Upper respiratory infection     Vomiting     Wears glasses              Past Surgical History:   Procedure Laterality Date    ANKLE SURGERY Left     x2     SECTION  2008    DR NAVARRETE     SECTION  10/30/2015    DR VASQUEZ     SECTION  2010    DR ESPINOZA    CHOLECYSTECTOMY      COLONOSCOPY N/A 2017     COLONOSCOPY WITH COLD BIOPSY POLYPECTOMY; BIOPSIES , QUICK CLIP;  Surgeon: Perfecto Mcknight MD;  Location: [facility] ENDOSCOPY;  Service:     DIAGNOSTIC LAPAROSCOPY  2005    DR ESPINOZA- ELIJAH/cystectomy    DIAGNOSTIC LAPAROSCOPY  2012    DR ESPINOZA/ELIJAH    DIAGNOSTIC LAPAROSCOPY N/A 08/10/2017     DIAGNOSTIC LAPAROSCOPY, LEFT S&O;  Keren Espinoza MD;  Location: [facility] OR;  Service:     DIAGNOSTIC LAPAROSCOPY N/A 2022    Procedure: DIAGNOSTIC LAPAROSCOPY,  LYSIS OF ADHESIONS, RIGHT SALPINGO-OOPHORECTOMY;  Surgeon: Kem Sahni MD;  Location: [facility] OR;  Service: Obstetrics/Gynecology;  Laterality: N/A;    DIAGNOSTIC LAPAROSCOPY N/A 2023    Procedure: DIAGNOSTIC LAPAROSCOPY WITH LYSIS OF ADHESIONS;  Surgeon: Kem Sahni MD;  Location: [facility] OR;  Service: Obstetrics/Gynecology;  Laterality: N/A;    ENDOSCOPY N/A 10/15/2019    Procedure: ESOPHAGOGASTRODUODENOSCOPY WITH BIOPSY AND ESOPHAGEAL DILATATION;  Surgeon: Perfecto Mcknight MD;  Location: [facility] ENDOSCOPY;  Service: Gastroenterology     ENDOSCOPY N/A 10/11/2021    Procedure: ESOPHAGOGASTRODUODENOSCOPY with dilatation and biopsies;  Surgeon: Hieu Shultz MD;  Location: [facility] ENDOSCOPY;  Service: Gastroenterology;  Laterality: N/A;    ENDOSCOPY N/A 12/29/2023    Procedure: ESOPHAGOGASTRODUODENOSCOPY  WITH BIOPSY;  Surgeon: Consuelo Avalos MD;  Location: [facility] ENDOSCOPY;  Service: Gastroenterology;  Laterality: N/A;    FOOT SURGERY Left     ligament and tendon repair    HERNIA REPAIR  12/20/2019    abd    MID-URETHRAL SLING WITH CYSTOSCOPY N/A 06/09/2021     MID-URETHRAL SLING WITH CYSTOSCOPY TVT EXACT;  Luc Alicea MD;  Location:  [facility] OR;  Service: Obstetrics/Gynecology;  Laterality: N/A;    NISSEN FUNDOPLICATION  06/2013    BRADEN-EN-Y PROCEDURE      UPPER GASTROINTESTINAL ENDOSCOPY  2013    UPPER GASTROINTESTINAL ENDOSCOPY  06/19/2020    VAGINAL HYSTERECTOMY N/A 07/09/2018    VAGINAL HYSTERECTOMY, BILATERAL SALPINGECTOMY;  Luc Alicea MD;  Location:  [facility] OR;  Service: Gynecology    WISDOM TOOTH EXTRACTION  2000       Family History: family history includes Colon cancer in her maternal great-grandmother; Coronary artery disease in her mother; Diabetes in her father, maternal grandfather, and mother; Hypertension in her maternal grandfather and mother; Kidney disease in her father; Skin cancer in her father; Stroke in her maternal grandfather.     Social History:  reports that she has never smoked. She has never been exposed to tobacco smoke. She has never used smokeless tobacco. She reports that she does not drink alcohol and does not use drugs.  Social History     Social History Narrative    Lives in [place] with her 3 children       Medications:  buPROPion XL, citalopram, cyanocobalamin, estradiol, hydrOXYzine, ibuprofen, omeprazole, ondansetron ODT, oxyCODONE-acetaminophen, promethazine, tamsulosin, and vitamin D    No Known Allergies    Objective   Objective     Vital Signs:   Temp:  [98 °F (36.7 °C)] 98 °F (36.7 °C)  Heart Rate:   [69-87] 69  Resp:  [18] 18  BP: (107-129)/(45-79) 109/71    Physical Exam   Constitutional: No acute distress, awake, alert, parents at bedside, currently comfortable  HENT: NCAT, mucous membranes moist  Respiratory: Clear to auscultation bilaterally, respiratory effort normal   Cardiovascular: RRR, no murmurs, rubs, or gallops  Gastrointestinal: Positive bowel sounds, soft, nontender, nondistended  Musculoskeletal: No bilateral ankle edema  Psychiatric: Appropriate affect, cooperative  Neurologic: Oriented x 3, PURI, speech clear  Skin: No rashes noted      Result Review:  I have personally reviewed the results from the time of this admission to 12/19/2024 18:49 EST and agree with these findings:  [x]  Laboratory list / accordion  []  Microbiology  []  Radiology  []  EKG/Telemetry   []  Cardiology/Vascular   []  Pathology  [x]  Old records  []  Other:  Most notable findings include: 5mm stone    LAB RESULTS:      Lab 12/19/24  1431 12/18/24  1009   WBC 8.40 8.30   HEMOGLOBIN 14.1 14.3   HEMATOCRIT 43.1 43.6   PLATELETS 200 260   NEUTROS ABS 6.97 5.82   IMMATURE GRANS (ABS) 0.02 0.01   LYMPHS ABS 0.71 1.63   MONOS ABS 0.63 0.69   EOS ABS 0.02 0.12   MCV 84.2 81.8   LACTATE 0.7  --          Lab 12/19/24  1431 12/18/24  1009   SODIUM 143 141   POTASSIUM 3.8 3.4*   CHLORIDE 101 99   CO2 22.0 25.3   ANION GAP 20.0* 16.7*   BUN 6 10   CREATININE 0.80 0.67   EGFR 95.7 113.5   GLUCOSE 101* 95   CALCIUM 8.9 9.4         Lab 12/19/24  1431 12/18/24  1009   TOTAL PROTEIN 6.9 7.2   ALBUMIN 4.2 4.5   GLOBULIN 2.7 2.7   ALT (SGPT) 24 <5   AST (SGOT) 35* 22   BILIRUBIN 0.4 0.5   ALK PHOS 98 77   LIPASE 24 25                     Brief Urine Lab Results  (Last result in the past 365 days)        Color   Clarity   Blood   Leuk Est   Nitrite   Protein   CREAT   Urine HCG        12/19/24 1432 Orange   Cloudy   Large (3+)   Trace   Negative   100 mg/dL (2+)                 Microbiology Results (last 10 days)       Procedure Component  Value - Date/Time    Urine Culture - Urine, Urine, Clean Catch [[id number]]  (Normal) Collected: 12/18/24 1053    Lab Status: Preliminary result Specimen: Urine, Clean Catch Updated: 12/19/24 1033     Urine Culture No growth            CT Abdomen Pelvis With Contrast    Result Date: 12/19/2024  CT ABDOMEN PELVIS W CONTRAST Date of Exam: 12/19/2024 3:06 PM EST Indication: shoaib flank pain, diffuse abd pain, vomitng, ct yesterday with 4 m proximal left stone but today worse pain / vomiting. Comparison: CT abdomen and pelvis 12/18/2024 Technique: Axial CT images were obtained of the abdomen and pelvis following the uneventful intravenous administration of 90 mL Isovue-300. Reconstructed coronal and sagittal images were also obtained. Automated exposure control and iterative construction methods were used. Findings: LUNG BASES:  Unremarkable without mass or infiltrate. LIVER:  Unremarkable parenchyma without focal lesion. BILIARY/GALLBLADDER: Surgically absent. SPLEEN:  Unremarkable PANCREAS:  Unremarkable ADRENAL:  Unremarkable KIDNEYS: Evaluation of the left kidney again demonstrates hydronephrosis and hydroureter due to a 5 mm calculus which has migrated compared to prior study now located at the level of L3/4. There is increased perinephric stranding and perinephric fluid from prior examination and delayed excretion noted. Additional findings include a persistent nephrogram along the medial lower pole of the left kidney (image 68 of series 2) likely related to the obstructive changes. The left ureter distal to the calculus is normal in caliber without additional ureterolithiasis. The right kidney is unremarkable without obstructive changes noted. GASTROINTESTINAL/MESENTERY: Evaluation of the gastrointestinal tract demonstrates postsurgical changes from a gastric bypass. No evidence for bowel wall thickening or obstruction. There is trace fluid in the pelvis which is new from prior exam. MESENTERIC VESSELS:  Patent.  AORTA/IVC:  Normal caliber. RETROPERITONEUM/LYMPH NODES:  Unremarkable REPRODUCTIVE: The uterus is surgically absent. BLADDER:  Unremarkable OSSEUS STRUCTURES:  Typical for age with no acute process identified.     Impression: Impression: 1. Progressive left renal obstructive uropathy with increasing perinephric stranding and fluid and a persistent nephrogram. A 5 mm ureteral calculus shows some migration from prior exam but is still located in the mid to distal left ureter at approximately level L3/4. Electronically Signed: Magy Frey MD  12/19/2024 3:29 PM EST  Workstation ID: [id number]    CT Abdomen Pelvis With Contrast    Result Date: 12/18/2024  CT ABDOMEN PELVIS W CONTRAST Date of Exam: 12/18/2024 11:23 AM EST Indication: flank pain, bilateral. Comparison: CT of the abdomen and pelvis dated 11/25/2023 Technique: Axial CT images were obtained of the abdomen and pelvis following the uneventful intravenous administration of 85 mL Isovue-300. Reconstructed coronal and sagittal images were also obtained. Automated exposure control and iterative construction methods were used. Findings: Liver: The liver is unremarkable in morphology. No focal liver lesion is seen. No biliary dilation is seen. Gallbladder: Surgically absent. Pancreas: Unremarkable. Spleen: Unremarkable. Adrenal glands: Unremarkable. Genitourinary tract: 4 mm obstructing calculus within the left proximal ureter causes mild left hydronephrosis and a delayed left nephrogram. Right kidney is unremarkable. No hydronephrosis on the right. Visualized right ureter and urinary bladder are unremarkable. Status post hysterectomy. Gastrointestinal tract: There are postsurgical changes of gastric bypass. Hollow viscera appear otherwise unremarkable. There is no evidence of bowel obstruction. Appendix: No findings to suggest acute appendicitis. Other findings: No free air or free fluid. No pathologically enlarged lymph nodes. The abdominal aorta and IVC  appear unremarkable. Bones and soft tissues: No acute or suspicious osseous or soft tissue lesion is identified. Lung bases: The visualized lung bases are clear.     Impression: Impression: 1.4 mm obstructing calculus within the left proximal ureter causes mild left hydronephrosis. 2.Additional findings as detailed above. Electronically Signed: Omkar Sanchez MD  12/18/2024 11:42 AM EST  Workstation ID: [id number]         Assessment & Plan   Assessment & Plan       Renal stone    N&V (nausea and vomiting)    Acute UTI (urinary tract infection)    Left ureteral stone  --5mm per imaging  --urology to see  --pain and nausea control  --repeat labs in am    UTI  --continue rocephin  --urine culture pending    Recent Jacque-en-Y  --early November  --recovering well and diet advancing    Total time spent: 55 minutes  Time spent includes time reviewing chart, face-to-face time, counseling patient/family/caregiver, ordering medications/tests/procedures, communicating with other health care professionals, documenting clinical information in the electronic health record, and coordination of care.      DVT prophylaxis:  Lovenox    CODE STATUS:    Code Status (Patient has no pulse and is not breathing): CPR (Attempt to Resuscitate)  Medical Interventions (Patient has pulse or is breathing): Full Support      Expected Discharge HOME  Expected discharge date/ time has not been documented.      Signature: Electronically signed by TRISTEN Pryor, 12/19/24, 6:52 PM EST               Cord around leg

## 2024-12-19 NOTE — ED NOTES
Laura Walls Workman    Nursing Report ED to Floor:  Mental status: aox4  Ambulatory status: assistx1  Oxygen Therapy:  ra  Cardiac Rhythm: nsr  Admitted from: home  Safety Concerns:  fall risk  Social Issues: none  ED Room #:  17    ED Nurse Phone Extension - [phone] or may call [phone].      HPI:   Chief Complaint   Patient presents with    Flank Pain       Past Medical History:  Past Medical History:   Diagnosis Date    Abdominal pain, periumbilical     Abdominal pain, RLQ (right lower quadrant)     Acute pharyngitis     Anxiety     Appetite lost     Chest pain ?    states had a heart monitor and was WNL.  States was told pain was attributed to acid reflux.  No pain since.  (assessed 3/17/23)    Colon polyp 2017    Depression     patient denies    Diarrhea     Secondary to IBS    Difficulty swallowing     food and liquids    Fatigue     Fracture     RIGHT WRIST TWICE, LEFT HAND    GERD (gastroesophageal reflux disease)     H/O foot surgery 10/2020    left x 2    Hearing loss     No use of hearing aids    History of bronchitis     History of colonoscopy     History of exercise stress test     History of left salpingo-oophorectomy  2018    History of ovarian cyst     History of pneumonia     Hyperthyroidism     hyperactive taken off medicine when pregnant.  Reported she was medication at one time, but none since pregnancy.     Irritable bowel syndrome     Multiple gestation [id number]    Ovarian cyst, bilateral     PONV (postoperative nausea and vomiting)     Seasonal allergies     RITU (stress urinary incontinence, female) 2020    TMJ (dislocation of temporomandibular joint)     Upper respiratory infection     Vomiting     Wears glasses         Past Surgical History:  Past Surgical History:   Procedure Laterality Date    ANKLE SURGERY Left     x2     SECTION  2008    DR NAVARRETE     SECTION  10/30/2015    DR VASQUEZ     SECTION  2010    DR ESPINOZA     CHOLECYSTECTOMY  1989    COLONOSCOPY N/A 02/17/2017     COLONOSCOPY WITH COLD BIOPSY POLYPECTOMY; BIOPSIES , QUICK CLIP;  Surgeon: Perfecto Mcknight MD;  Location: [facility] ENDOSCOPY;  Service:     DIAGNOSTIC LAPAROSCOPY  07/07/2005    DR JONES NAVA/cystectomy    DIAGNOSTIC LAPAROSCOPY  02/14/2012    DR TERAN    DIAGNOSTIC LAPAROSCOPY N/A 08/10/2017     DIAGNOSTIC LAPAROSCOPY, LEFT S&O;  Keren Thomas MD;  Location: [facility] OR;  Service:     DIAGNOSTIC LAPAROSCOPY N/A 01/19/2022    Procedure: DIAGNOSTIC LAPAROSCOPY,  LYSIS OF ADHESIONS, RIGHT SALPINGO-OOPHORECTOMY;  Surgeon: Kem Sahni MD;  Location: [facility] OR;  Service: Obstetrics/Gynecology;  Laterality: N/A;    DIAGNOSTIC LAPAROSCOPY N/A 3/24/2023    Procedure: DIAGNOSTIC LAPAROSCOPY WITH LYSIS OF ADHESIONS;  Surgeon: Kem Sahni MD;  Location: [facility] OR;  Service: Obstetrics/Gynecology;  Laterality: N/A;    ENDOSCOPY N/A 10/15/2019    Procedure: ESOPHAGOGASTRODUODENOSCOPY WITH BIOPSY AND ESOPHAGEAL DILATATION;  Surgeon: Perfecto Mcknight MD;  Location: [facility] ENDOSCOPY;  Service: Gastroenterology    ENDOSCOPY N/A 10/11/2021    Procedure: ESOPHAGOGASTRODUODENOSCOPY with dilatation and biopsies;  Surgeon: Hieu Shultz MD;  Location: [facility] ENDOSCOPY;  Service: Gastroenterology;  Laterality: N/A;    ENDOSCOPY N/A 12/29/2023    Procedure: ESOPHAGOGASTRODUODENOSCOPY  WITH BIOPSY;  Surgeon: Consuelo Avalos MD;  Location: [facility] ENDOSCOPY;  Service: Gastroenterology;  Laterality: N/A;    FOOT SURGERY Left     ligament and tendon repair    HERNIA REPAIR  12/20/2019    abd    MID-URETHRAL SLING WITH CYSTOSCOPY N/A 06/09/2021     MID-URETHRAL SLING WITH CYSTOSCOPY TVT EXACT;  Luc Alicea MD;  Location: [facility] OR;  Service: Obstetrics/Gynecology;  Laterality: N/A;    NISSEN FUNDOPLICATION  06/2013    UPPER GASTROINTESTINAL ENDOSCOPY  2013    UPPER GASTROINTESTINAL ENDOSCOPY  06/19/2020    VAGINAL HYSTERECTOMY N/A 07/09/2018    VAGINAL HYSTERECTOMY,  "BILATERAL SALPINGECTOMY;  Luc Alicea MD;  Location: [facility];  Service: Gynecology    WISDOM TOOTH EXTRACTION  2000        Admitting Doctor:   Adwoa Doran MD    Consulting Provider(s):  Consults       No orders found from 11/20/2024 to 12/20/2024.             Admitting Diagnosis:   The primary encounter diagnosis was Intractable vomiting. Diagnoses of Intractable pain, Left ureteral stone, Hydronephrosis with urinary obstruction due to ureteral calculus, and Dehydration were also pertinent to this visit.    Most Recent Vitals:   Vitals:    12/19/24 1324 12/19/24 1632 12/19/24 1702   BP: 129/79 107/45 109/71   BP Location: Left arm     Patient Position: Sitting     Pulse: 87 77 69   Resp: 18     Temp: 98 °F (36.7 °C)     TempSrc: Oral     SpO2: 96% 91% 100%   Weight: 86.2 kg (190 lb)     Height: 160 cm (63\")         Active LDAs/IV Access:   Lines, Drains & Airways       Active LDAs       Name Placement date Placement time Site Days    Peripheral IV 12/19/24 1433 Left Antecubital 12/19/24  1433  Antecubital  less than 1                    Labs (abnormal labs have a star):   Labs Reviewed   COMPREHENSIVE METABOLIC PANEL - Abnormal; Notable for the following components:       Result Value    Glucose 101 (*)     AST (SGOT) 35 (*)     Anion Gap 20.0 (*)     All other components within normal limits    Narrative:     GFR Categories in Chronic Kidney Disease (CKD)      GFR Category          GFR (mL/min/1.73)    Interpretation  G1                     90 or greater         Normal or high (1)  G2                      60-89                Mild decrease (1)  G3a                   45-59                Mild to moderate decrease  G3b                   30-44                Moderate to severe decrease  G4                    15-29                Severe decrease  G5                    14 or less           Kidney failure          (1)In the absence of evidence of kidney disease, neither GFR category G1 or G2 fulfill the criteria " for CKD.    eGFR calculation 2021 CKD-EPI creatinine equation, which does not include race as a factor   URINALYSIS W/ MICROSCOPIC IF INDICATED (NO CULTURE) - Abnormal; Notable for the following components:    Color, UA Orange (*)     Appearance, UA Cloudy (*)     Ketones, UA >=160 mg/dL (4+) (*)     Blood, UA Large (3+) (*)     Protein,  mg/dL (2+) (*)     Leuk Esterase, UA Trace (*)     All other components within normal limits   CBC WITH AUTO DIFFERENTIAL - Abnormal; Notable for the following components:    Neutrophil % 83.0 (*)     Lymphocyte % 8.5 (*)     Eosinophil % 0.2 (*)     All other components within normal limits   URINALYSIS, MICROSCOPIC ONLY - Abnormal; Notable for the following components:    RBC, UA Too Numerous to Count (*)     WBC, UA 21-50 (*)     Bacteria, UA 2+ (*)     Squamous Epithelial Cells, UA 3-6 (*)     All other components within normal limits   LIPASE - Normal   LACTIC ACID, PLASMA - Normal   URINE CULTURE   RAINBOW DRAW    Narrative:     The following orders were created for panel order Mayer Draw.  Procedure                               Abnormality         Status                     ---------                               -----------         ------                     Green Top (Gel)[[id number]]                                  Final result               Lavender Top[[id number]]                                     Final result               Gold Top - SST[[id number]]                                   Final result               Braxton Top[[id number]]                                         Final result               Light Blue Top[[id number]]                                   Final result                 Please view results for these tests on the individual orders.   CBC AND DIFFERENTIAL    Narrative:     The following orders were created for panel order CBC & Differential.  Procedure                               Abnormality         Status                     ---------                                -----------         ------                     CBC Auto Differential[[id number]]        Abnormal            Final result                 Please view results for these tests on the individual orders.   GREEN TOP   LAVENDER TOP   GOLD TOP - SST   GRAY TOP   LIGHT BLUE TOP       Meds Given in ED:   Medications   Sodium Chloride (PF) 0.9 % 10 mL (has no administration in time range)   ondansetron (ZOFRAN) injection 4 mg (4 mg Intravenous Given 12/19/24 1434)   sodium chloride 0.9 % bolus 1,000 mL (0 mL Intravenous Stopped 12/19/24 1826)   ketorolac (TORADOL) injection 15 mg (15 mg Intravenous Given 12/19/24 1436)   prochlorperazine (COMPAZINE) injection 10 mg (10 mg Intravenous Given 12/19/24 1736)   Morphine sulfate (PF) injection 4 mg (4 mg Intravenous Given 12/19/24 1736)   cefTRIAXone (ROCEPHIN) 2,000 mg in sodium chloride 0.9 % 100 mL MBP (0 mg Intravenous Stopped 12/19/24 1826)           Last NIH score:                                                          Dysphagia screening results:  Patient Factors Component (Dysphagia:Stroke or Rule-out)  Best Eye Response: 4-->(E4) spontaneous (12/19/24 1636)  Best Motor Response: 6-->(M6) obeys commands (12/19/24 1636)  Best Verbal Response: 5-->(V5) oriented (12/19/24 1636)  [place] Coma Scale Score: 15 (12/19/24 1636)     [place] Coma Scale:  No data recorded     CIWA:        Restraint Type:            Isolation Status:  No active isolations

## 2024-12-20 ENCOUNTER — ANESTHESIA (OUTPATIENT)
Dept: PERIOP | Facility: HOSPITAL | Age: 39
End: 2024-12-20
Payer: COMMERCIAL

## 2024-12-20 ENCOUNTER — APPOINTMENT (OUTPATIENT)
Dept: GENERAL RADIOLOGY | Facility: HOSPITAL | Age: 39
End: 2024-12-20
Payer: COMMERCIAL

## 2024-12-20 ENCOUNTER — ANESTHESIA EVENT (OUTPATIENT)
Dept: PERIOP | Facility: HOSPITAL | Age: 39
End: 2024-12-20
Payer: COMMERCIAL

## 2024-12-20 LAB
ANION GAP SERPL CALCULATED.3IONS-SCNC: 15 MMOL/L (ref 5–15)
BACTERIA SPEC AEROBE CULT: NO GROWTH
BASOPHILS # BLD AUTO: 0.03 10*3/MM3 (ref 0–0.2)
BASOPHILS NFR BLD AUTO: 0.5 % (ref 0–1.5)
BUN SERPL-MCNC: 5 MG/DL (ref 6–20)
BUN/CREAT SERPL: 8.8 (ref 7–25)
CALCIUM SPEC-SCNC: 8.5 MG/DL (ref 8.6–10.5)
CHLORIDE SERPL-SCNC: 109 MMOL/L (ref 98–107)
CO2 SERPL-SCNC: 22 MMOL/L (ref 22–29)
CREAT SERPL-MCNC: 0.57 MG/DL (ref 0.57–1)
DEPRECATED RDW RBC AUTO: 46.5 FL (ref 37–54)
EGFRCR SERPLBLD CKD-EPI 2021: 118 ML/MIN/1.73
EOSINOPHIL # BLD AUTO: 0.13 10*3/MM3 (ref 0–0.4)
EOSINOPHIL NFR BLD AUTO: 2.2 % (ref 0.3–6.2)
ERYTHROCYTE [DISTWIDTH] IN BLOOD BY AUTOMATED COUNT: 14.7 % (ref 12.3–15.4)
GLUCOSE SERPL-MCNC: 90 MG/DL (ref 65–99)
HCT VFR BLD AUTO: 35.9 % (ref 34–46.6)
HGB BLD-MCNC: 11.3 G/DL (ref 12–15.9)
IMM GRANULOCYTES # BLD AUTO: 0.02 10*3/MM3 (ref 0–0.05)
IMM GRANULOCYTES NFR BLD AUTO: 0.3 % (ref 0–0.5)
LYMPHOCYTES # BLD AUTO: 1.5 10*3/MM3 (ref 0.7–3.1)
LYMPHOCYTES NFR BLD AUTO: 25.6 % (ref 19.6–45.3)
MCH RBC QN AUTO: 27.1 PG (ref 26.6–33)
MCHC RBC AUTO-ENTMCNC: 31.5 G/DL (ref 31.5–35.7)
MCV RBC AUTO: 86.1 FL (ref 79–97)
MONOCYTES # BLD AUTO: 0.68 10*3/MM3 (ref 0.1–0.9)
MONOCYTES NFR BLD AUTO: 11.6 % (ref 5–12)
NEUTROPHILS NFR BLD AUTO: 3.5 10*3/MM3 (ref 1.7–7)
NEUTROPHILS NFR BLD AUTO: 59.8 % (ref 42.7–76)
NRBC BLD AUTO-RTO: 0 /100 WBC (ref 0–0.2)
PLATELET # BLD AUTO: 176 10*3/MM3 (ref 140–450)
PMV BLD AUTO: 11.7 FL (ref 6–12)
POTASSIUM SERPL-SCNC: 3.7 MMOL/L (ref 3.5–5.2)
RBC # BLD AUTO: 4.17 10*6/MM3 (ref 3.77–5.28)
SODIUM SERPL-SCNC: 146 MMOL/L (ref 136–145)
WBC NRBC COR # BLD AUTO: 5.86 10*3/MM3 (ref 3.4–10.8)

## 2024-12-20 PROCEDURE — C1769 GUIDE WIRE: HCPCS | Performed by: UROLOGY

## 2024-12-20 PROCEDURE — 25810000003 LACTATED RINGERS PER 1000 ML: Performed by: ANESTHESIOLOGY

## 2024-12-20 PROCEDURE — 96376 TX/PRO/DX INJ SAME DRUG ADON: CPT

## 2024-12-20 PROCEDURE — 25010000002 FENTANYL CITRATE (PF) 100 MCG/2ML SOLUTION: Performed by: ANESTHESIOLOGY

## 2024-12-20 PROCEDURE — 25010000002 LIDOCAINE PF 1% 1 % SOLUTION: Performed by: ANESTHESIOLOGY

## 2024-12-20 PROCEDURE — 25010000002 PROPOFOL 10 MG/ML EMULSION: Performed by: ANESTHESIOLOGY

## 2024-12-20 PROCEDURE — 25010000002 KETOROLAC TROMETHAMINE PER 15 MG: Performed by: NURSE PRACTITIONER

## 2024-12-20 PROCEDURE — 99232 SBSQ HOSP IP/OBS MODERATE 35: CPT | Performed by: STUDENT IN AN ORGANIZED HEALTH CARE EDUCATION/TRAINING PROGRAM

## 2024-12-20 PROCEDURE — G0378 HOSPITAL OBSERVATION PER HR: HCPCS

## 2024-12-20 PROCEDURE — 25010000002 DEXAMETHASONE PER 1 MG: Performed by: ANESTHESIOLOGY

## 2024-12-20 PROCEDURE — 76000 FLUOROSCOPY <1 HR PHYS/QHP: CPT

## 2024-12-20 PROCEDURE — 80048 BASIC METABOLIC PNL TOTAL CA: CPT | Performed by: NURSE PRACTITIONER

## 2024-12-20 PROCEDURE — 25010000002 ONDANSETRON PER 1 MG: Performed by: NURSE PRACTITIONER

## 2024-12-20 PROCEDURE — 25510000001 IOPAMIDOL 61 % SOLUTION: Performed by: UROLOGY

## 2024-12-20 PROCEDURE — 25010000002 KETOROLAC TROMETHAMINE PER 15 MG: Performed by: UROLOGY

## 2024-12-20 PROCEDURE — 25010000002 CEFTRIAXONE PER 250 MG: Performed by: NURSE PRACTITIONER

## 2024-12-20 PROCEDURE — 25010000002 ENOXAPARIN PER 10 MG: Performed by: UROLOGY

## 2024-12-20 PROCEDURE — 85025 COMPLETE CBC W/AUTO DIFF WBC: CPT | Performed by: NURSE PRACTITIONER

## 2024-12-20 PROCEDURE — 25010000002 ONDANSETRON PER 1 MG: Performed by: ANESTHESIOLOGY

## 2024-12-20 PROCEDURE — C1894 INTRO/SHEATH, NON-LASER: HCPCS | Performed by: UROLOGY

## 2024-12-20 PROCEDURE — C2617 STENT, NON-COR, TEM W/O DEL: HCPCS | Performed by: UROLOGY

## 2024-12-20 DEVICE — URETERAL STENT
Type: IMPLANTABLE DEVICE | Site: URETER | Status: FUNCTIONAL
Brand: PERCUFLEX™ PLUS

## 2024-12-20 RX ORDER — FENTANYL CITRATE 50 UG/ML
INJECTION, SOLUTION INTRAMUSCULAR; INTRAVENOUS AS NEEDED
Status: DISCONTINUED | OUTPATIENT
Start: 2024-12-20 | End: 2024-12-20 | Stop reason: SURG

## 2024-12-20 RX ORDER — PHENAZOPYRIDINE HYDROCHLORIDE 100 MG/1
100 TABLET, FILM COATED ORAL 3 TIMES DAILY PRN
Status: DISCONTINUED | OUTPATIENT
Start: 2024-12-20 | End: 2024-12-21 | Stop reason: HOSPADM

## 2024-12-20 RX ORDER — IOPAMIDOL 612 MG/ML
INJECTION, SOLUTION INTRAVASCULAR AS NEEDED
Status: DISCONTINUED | OUTPATIENT
Start: 2024-12-20 | End: 2024-12-20 | Stop reason: HOSPADM

## 2024-12-20 RX ORDER — FAMOTIDINE 10 MG/ML
20 INJECTION, SOLUTION INTRAVENOUS ONCE
Status: DISCONTINUED | OUTPATIENT
Start: 2024-12-20 | End: 2024-12-20 | Stop reason: HOSPADM

## 2024-12-20 RX ORDER — HYDROMORPHONE HYDROCHLORIDE 1 MG/ML
0.5 INJECTION, SOLUTION INTRAMUSCULAR; INTRAVENOUS; SUBCUTANEOUS
Status: DISCONTINUED | OUTPATIENT
Start: 2024-12-20 | End: 2024-12-20 | Stop reason: HOSPADM

## 2024-12-20 RX ORDER — MIDAZOLAM HYDROCHLORIDE 1 MG/ML
1 INJECTION, SOLUTION INTRAMUSCULAR; INTRAVENOUS
Status: DISCONTINUED | OUTPATIENT
Start: 2024-12-20 | End: 2024-12-20 | Stop reason: HOSPADM

## 2024-12-20 RX ORDER — FAMOTIDINE 20 MG/1
20 TABLET, FILM COATED ORAL ONCE
Status: DISCONTINUED | OUTPATIENT
Start: 2024-12-20 | End: 2024-12-20 | Stop reason: HOSPADM

## 2024-12-20 RX ORDER — PHENAZOPYRIDINE HYDROCHLORIDE 100 MG/1
100 TABLET, FILM COATED ORAL 3 TIMES DAILY PRN
Qty: 21 TABLET | Refills: 0 | Status: SHIPPED | OUTPATIENT
Start: 2024-12-20

## 2024-12-20 RX ORDER — SODIUM CHLORIDE 0.9 % (FLUSH) 0.9 %
10 SYRINGE (ML) INJECTION EVERY 12 HOURS SCHEDULED
Status: DISCONTINUED | OUTPATIENT
Start: 2024-12-20 | End: 2024-12-20 | Stop reason: HOSPADM

## 2024-12-20 RX ORDER — MAGNESIUM HYDROXIDE 1200 MG/15ML
LIQUID ORAL AS NEEDED
Status: DISCONTINUED | OUTPATIENT
Start: 2024-12-20 | End: 2024-12-20 | Stop reason: HOSPADM

## 2024-12-20 RX ORDER — FENTANYL CITRATE 50 UG/ML
50 INJECTION, SOLUTION INTRAMUSCULAR; INTRAVENOUS
Status: DISCONTINUED | OUTPATIENT
Start: 2024-12-20 | End: 2024-12-20 | Stop reason: HOSPADM

## 2024-12-20 RX ORDER — PROPOFOL 10 MG/ML
VIAL (ML) INTRAVENOUS AS NEEDED
Status: DISCONTINUED | OUTPATIENT
Start: 2024-12-20 | End: 2024-12-20 | Stop reason: SURG

## 2024-12-20 RX ORDER — LIDOCAINE HYDROCHLORIDE 10 MG/ML
0.5 INJECTION, SOLUTION EPIDURAL; INFILTRATION; INTRACAUDAL; PERINEURAL ONCE AS NEEDED
Status: DISCONTINUED | OUTPATIENT
Start: 2024-12-20 | End: 2024-12-20 | Stop reason: HOSPADM

## 2024-12-20 RX ORDER — ONDANSETRON 2 MG/ML
4 INJECTION INTRAMUSCULAR; INTRAVENOUS ONCE AS NEEDED
Status: DISCONTINUED | OUTPATIENT
Start: 2024-12-20 | End: 2024-12-20

## 2024-12-20 RX ORDER — DEXAMETHASONE SODIUM PHOSPHATE 4 MG/ML
INJECTION, SOLUTION INTRA-ARTICULAR; INTRALESIONAL; INTRAMUSCULAR; INTRAVENOUS; SOFT TISSUE AS NEEDED
Status: DISCONTINUED | OUTPATIENT
Start: 2024-12-20 | End: 2024-12-20 | Stop reason: SURG

## 2024-12-20 RX ORDER — SODIUM CHLORIDE 0.9 % (FLUSH) 0.9 %
10 SYRINGE (ML) INJECTION AS NEEDED
Status: DISCONTINUED | OUTPATIENT
Start: 2024-12-20 | End: 2024-12-20 | Stop reason: HOSPADM

## 2024-12-20 RX ORDER — ONDANSETRON 2 MG/ML
INJECTION INTRAMUSCULAR; INTRAVENOUS AS NEEDED
Status: DISCONTINUED | OUTPATIENT
Start: 2024-12-20 | End: 2024-12-20 | Stop reason: SURG

## 2024-12-20 RX ORDER — LIDOCAINE HYDROCHLORIDE 10 MG/ML
INJECTION, SOLUTION EPIDURAL; INFILTRATION; INTRACAUDAL; PERINEURAL AS NEEDED
Status: DISCONTINUED | OUTPATIENT
Start: 2024-12-20 | End: 2024-12-20 | Stop reason: SURG

## 2024-12-20 RX ORDER — SODIUM CHLORIDE, SODIUM LACTATE, POTASSIUM CHLORIDE, CALCIUM CHLORIDE 600; 310; 30; 20 MG/100ML; MG/100ML; MG/100ML; MG/100ML
INJECTION, SOLUTION INTRAVENOUS CONTINUOUS PRN
Status: DISCONTINUED | OUTPATIENT
Start: 2024-12-20 | End: 2024-12-20 | Stop reason: SURG

## 2024-12-20 RX ORDER — SCOLOPAMINE TRANSDERMAL SYSTEM 1 MG/1
1 PATCH, EXTENDED RELEASE TRANSDERMAL ONCE
Status: DISCONTINUED | OUTPATIENT
Start: 2024-12-20 | End: 2024-12-20

## 2024-12-20 RX ORDER — L.ACID,PARA/B.BIFIDUM/S.THERM 8B CELL
1 CAPSULE ORAL DAILY
Status: DISCONTINUED | OUTPATIENT
Start: 2024-12-20 | End: 2024-12-21 | Stop reason: HOSPADM

## 2024-12-20 RX ORDER — SODIUM CHLORIDE, SODIUM LACTATE, POTASSIUM CHLORIDE, CALCIUM CHLORIDE 600; 310; 30; 20 MG/100ML; MG/100ML; MG/100ML; MG/100ML
9 INJECTION, SOLUTION INTRAVENOUS CONTINUOUS
Status: DISCONTINUED | OUTPATIENT
Start: 2024-12-21 | End: 2024-12-21 | Stop reason: HOSPADM

## 2024-12-20 RX ADMIN — KETOROLAC TROMETHAMINE 15 MG: 15 INJECTION, SOLUTION INTRAMUSCULAR; INTRAVENOUS at 08:26

## 2024-12-20 RX ADMIN — Medication 1 CAPSULE: at 22:40

## 2024-12-20 RX ADMIN — FENTANYL CITRATE 100 MCG: 50 INJECTION, SOLUTION INTRAMUSCULAR; INTRAVENOUS at 20:23

## 2024-12-20 RX ADMIN — DEXAMETHASONE SODIUM PHOSPHATE 4 MG: 4 INJECTION INTRA-ARTICULAR; INTRALESIONAL; INTRAMUSCULAR; INTRAVENOUS; SOFT TISSUE at 20:22

## 2024-12-20 RX ADMIN — PROPOFOL 100 MG: 10 INJECTION, EMULSION INTRAVENOUS at 20:22

## 2024-12-20 RX ADMIN — ONDANSETRON 4 MG: 2 INJECTION INTRAMUSCULAR; INTRAVENOUS at 08:35

## 2024-12-20 RX ADMIN — KETOROLAC TROMETHAMINE 15 MG: 15 INJECTION, SOLUTION INTRAMUSCULAR; INTRAVENOUS at 22:35

## 2024-12-20 RX ADMIN — PANTOPRAZOLE SODIUM 40 MG: 40 TABLET, DELAYED RELEASE ORAL at 05:55

## 2024-12-20 RX ADMIN — TAMSULOSIN HYDROCHLORIDE 0.4 MG: 0.4 CAPSULE ORAL at 08:26

## 2024-12-20 RX ADMIN — LIDOCAINE HYDROCHLORIDE 50 MG: 10 INJECTION, SOLUTION EPIDURAL; INFILTRATION; INTRACAUDAL; PERINEURAL at 20:22

## 2024-12-20 RX ADMIN — ENOXAPARIN SODIUM 40 MG: 100 INJECTION SUBCUTANEOUS at 22:40

## 2024-12-20 RX ADMIN — SODIUM CHLORIDE, POTASSIUM CHLORIDE, SODIUM LACTATE AND CALCIUM CHLORIDE: 600; 310; 30; 20 INJECTION, SOLUTION INTRAVENOUS at 20:22

## 2024-12-20 RX ADMIN — Medication 10 ML: at 08:26

## 2024-12-20 RX ADMIN — ONDANSETRON 4 MG: 2 INJECTION INTRAMUSCULAR; INTRAVENOUS at 20:22

## 2024-12-20 RX ADMIN — SCOPOLAMINE 1 PATCH: 1.5 PATCH, EXTENDED RELEASE TRANSDERMAL at 19:50

## 2024-12-20 RX ADMIN — SODIUM CHLORIDE 2000 MG: 900 INJECTION INTRAVENOUS at 16:40

## 2024-12-20 NOTE — ANESTHESIA PREPROCEDURE EVALUATION
Anesthesia Evaluation     Patient summary reviewed and Nursing notes reviewed   history of anesthetic complications:  PONV  NPO Solid Status: > 8 hours  NPO Liquid Status: > 8 hours           Airway   Mallampati: I  TM distance: >3 FB  Neck ROM: full  No difficulty expected  Dental      Pulmonary     breath sounds clear to auscultation  (+) ,recent URI  Cardiovascular     Rhythm: regular  Rate: normal        Neuro/Psych  (+) psychiatric history Anxiety and Depression  GI/Hepatic/Renal/Endo    (+) GERD, renal disease- stones, thyroid problem     Musculoskeletal     Abdominal    Substance History      OB/GYN          Other                      Anesthesia Plan    ASA 2     general     intravenous induction     Anesthetic plan, risks, benefits, and alternatives have been provided, discussed and informed consent has been obtained with: patient.    CODE STATUS:    Code Status (Patient has no pulse and is not breathing): CPR (Attempt to Resuscitate)  Medical Interventions (Patient has pulse or is breathing): Full Support

## 2024-12-20 NOTE — PROGRESS NOTES
[facility] Medicine Services  PROGRESS NOTE    Patient Name: Laura Thomson  : 1985  MRN: [id number]    Date of Admission: 2024  Primary Care Physician: Megha Fregoso APRN    Subjective   Subjective     CC:  Left-sided kidney stone    HPI:  Left flank pain that radiates to the left lower abdomen.  Reports nausea without emesis.  Denies dysuria.  Reports hematuria has improved.  Patient's mother is at bedside and patient okay with her being updated.    Objective   Objective     Vital Signs:   Temp:  [97.7 °F (36.5 °C)-98 °F (36.7 °C)] 97.7 °F (36.5 °C)  Heart Rate:  [50-87] 50  Resp:  [16-18] 16  BP: (101-130)/(45-79) 103/68     Physical Exam:  Constitutional: No acute distress, awake, alert  HENT: NCAT, mucous membranes moist  Respiratory: Clear to auscultation bilaterally, respiratory effort normal   Cardiovascular: RRR, no murmurs, rubs, or gallops  Gastrointestinal: Positive bowel sounds, soft, tender to palpation, nondistended  Musculoskeletal: No bilateral ankle edema  Psychiatric: Appropriate affect, cooperative  Neurologic: Alert, oriented, symmetric facies, moves all extremities, speech clear  Skin: No rashes    Results Reviewed:  LAB RESULTS:      Lab 24  1431 24  1009   WBC 8.40 8.30   HEMOGLOBIN 14.1 14.3   HEMATOCRIT 43.1 43.6   PLATELETS 200 260   NEUTROS ABS 6.97 5.82   IMMATURE GRANS (ABS) 0.02 0.01   LYMPHS ABS 0.71 1.63   MONOS ABS 0.63 0.69   EOS ABS 0.02 0.12   MCV 84.2 81.8   LACTATE 0.7  --          Lab 24  1431 24  1009   SODIUM 143 141   POTASSIUM 3.8 3.4*   CHLORIDE 101 99   CO2 22.0 25.3   ANION GAP 20.0* 16.7*   BUN 6 10   CREATININE 0.80 0.67   EGFR 95.7 113.5   GLUCOSE 101* 95   CALCIUM 8.9 9.4         Lab 24  1431 24  1009   TOTAL PROTEIN 6.9 7.2   ALBUMIN 4.2 4.5   GLOBULIN 2.7 2.7   ALT (SGPT) 24 <5   AST (SGOT) 35* 22   BILIRUBIN 0.4 0.5   ALK PHOS 98 77   LIPASE 24 25                     Brief  Urine Lab Results  (Last result in the past 365 days)        Color   Clarity   Blood   Leuk Est   Nitrite   Protein   CREAT   Urine HCG        12/19/24 1432 Orange   Cloudy   Large (3+)   Trace   Negative   100 mg/dL (2+)                   Microbiology Results Abnormal       None            CT Abdomen Pelvis With Contrast    Result Date: 12/19/2024  CT ABDOMEN PELVIS W CONTRAST Date of Exam: 12/19/2024 3:06 PM EST Indication: shoaib flank pain, diffuse abd pain, vomitng, ct yesterday with 4 m proximal left stone but today worse pain / vomiting. Comparison: CT abdomen and pelvis 12/18/2024 Technique: Axial CT images were obtained of the abdomen and pelvis following the uneventful intravenous administration of 90 mL Isovue-300. Reconstructed coronal and sagittal images were also obtained. Automated exposure control and iterative construction methods were used. Findings: LUNG BASES:  Unremarkable without mass or infiltrate. LIVER:  Unremarkable parenchyma without focal lesion. BILIARY/GALLBLADDER: Surgically absent. SPLEEN:  Unremarkable PANCREAS:  Unremarkable ADRENAL:  Unremarkable KIDNEYS: Evaluation of the left kidney again demonstrates hydronephrosis and hydroureter due to a 5 mm calculus which has migrated compared to prior study now located at the level of L3/4. There is increased perinephric stranding and perinephric fluid from prior examination and delayed excretion noted. Additional findings include a persistent nephrogram along the medial lower pole of the left kidney (image 68 of series 2) likely related to the obstructive changes. The left ureter distal to the calculus is normal in caliber without additional ureterolithiasis. The right kidney is unremarkable without obstructive changes noted. GASTROINTESTINAL/MESENTERY: Evaluation of the gastrointestinal tract demonstrates postsurgical changes from a gastric bypass. No evidence for bowel wall thickening or obstruction. There is trace fluid in the pelvis which  is new from prior exam. MESENTERIC VESSELS:  Patent. AORTA/IVC:  Normal caliber. RETROPERITONEUM/LYMPH NODES:  Unremarkable REPRODUCTIVE: The uterus is surgically absent. BLADDER:  Unremarkable OSSEUS STRUCTURES:  Typical for age with no acute process identified.     Impression: Impression: 1. Progressive left renal obstructive uropathy with increasing perinephric stranding and fluid and a persistent nephrogram. A 5 mm ureteral calculus shows some migration from prior exam but is still located in the mid to distal left ureter at approximately level L3/4. Electronically Signed: Magy Frey MD  12/19/2024 3:29 PM EST  Workstation ID: [id number]    CT Abdomen Pelvis With Contrast    Result Date: 12/18/2024  CT ABDOMEN PELVIS W CONTRAST Date of Exam: 12/18/2024 11:23 AM EST Indication: flank pain, bilateral. Comparison: CT of the abdomen and pelvis dated 11/25/2023 Technique: Axial CT images were obtained of the abdomen and pelvis following the uneventful intravenous administration of 85 mL Isovue-300. Reconstructed coronal and sagittal images were also obtained. Automated exposure control and iterative construction methods were used. Findings: Liver: The liver is unremarkable in morphology. No focal liver lesion is seen. No biliary dilation is seen. Gallbladder: Surgically absent. Pancreas: Unremarkable. Spleen: Unremarkable. Adrenal glands: Unremarkable. Genitourinary tract: 4 mm obstructing calculus within the left proximal ureter causes mild left hydronephrosis and a delayed left nephrogram. Right kidney is unremarkable. No hydronephrosis on the right. Visualized right ureter and urinary bladder are unremarkable. Status post hysterectomy. Gastrointestinal tract: There are postsurgical changes of gastric bypass. Hollow viscera appear otherwise unremarkable. There is no evidence of bowel obstruction. Appendix: No findings to suggest acute appendicitis. Other findings: No free air or free fluid. No pathologically  enlarged lymph nodes. The abdominal aorta and IVC appear unremarkable. Bones and soft tissues: No acute or suspicious osseous or soft tissue lesion is identified. Lung bases: The visualized lung bases are clear.     Impression: Impression: 1.4 mm obstructing calculus within the left proximal ureter causes mild left hydronephrosis. 2.Additional findings as detailed above. Electronically Signed: Omkar Sanchez MD  12/18/2024 11:42 AM EST  Workstation ID: [id number]         Current medications:  Scheduled Meds:cefTRIAXone, 2,000 mg, Intravenous, Q24H  enoxaparin, 40 mg, Subcutaneous, Nightly  pantoprazole, 40 mg, Oral, Q AM  sodium chloride, 10 mL, Intravenous, Q12H  tamsulosin, 0.4 mg, Oral, Daily      Continuous Infusions:Pharmacy to Dose enoxaparin (LOVENOX),       PRN Meds:.  senna-docusate sodium **AND** polyethylene glycol **AND** bisacodyl **AND** bisacodyl    ketorolac    Morphine    ondansetron    [Held by provider] ondansetron ODT    oxyCODONE-acetaminophen    Pharmacy to Dose enoxaparin (LOVENOX)    Sodium Chloride (PF)    sodium chloride    sodium chloride    Assessment & Plan   Assessment & Plan     Active Hospital Problems    Diagnosis  POA    **Renal stone [N20.0]  Yes    N&V (nausea and vomiting) [R11.2]  Yes    Acute UTI (urinary tract infection) [N39.0]  Yes      Resolved Hospital Problems   No resolved problems to display.        Brief Hospital Course to date:  Laura Thomson is a 39 y.o. female with history of Jacque-en-Y procedure, GERD, who developed abdominal pain and hematuria on 12/18 and diagnosed with kidney stone.    This patient's problems and plans were partially entered by my partner and updated as appropriate by me 12/20/24.    Left ureteral stone  --5mm per imaging  --urology evaluated, plan for surgical intervention this afternoon and if doing well later today, then potentially home with urology follow-up outpatient  --tamsulosin  --pain and nausea control     UTI  --continue  rocephin  --follow-up urine culture      Recent Jacque-en-Y  --early November  --recovering well     Expected Discharge Location and Transportation: home later today versus tomorrow  Expected Discharge   Expected Discharge Date: 12/21/2024; Expected Discharge Time:      VTE Prophylaxis:  Pharmacologic VTE prophylaxis orders are present.         AM-PAC 6 Clicks Score (PT): 24 (12/19/24 1946)    CODE STATUS:   Code Status and Medical Interventions: CPR (Attempt to Resuscitate); Full Support   Ordered at: 12/19/24 3480     Code Status (Patient has no pulse and is not breathing):    CPR (Attempt to Resuscitate)     Medical Interventions (Patient has pulse or is breathing):    Full Support       Laina Murguia MD  12/20/24

## 2024-12-20 NOTE — CASE MANAGEMENT/SOCIAL WORK
Discharge Planning Assessment  [facility]     Patient Name: Laura Thomson  MRN: [id number]  Today's Date: 12/20/2024    Admit Date: 12/19/2024    Plan: Home with family   Discharge Needs Assessment       Row Name 12/20/24 0829       Living Environment    People in Home child(neal), dependent    Current Living Arrangements home    Potentially Unsafe Housing Conditions none    In the past 12 months has the electric, gas, oil, or water company threatened to shut off services in your home? No    Primary Care Provided by self    Provides Primary Care For no one    Family Caregiver if Needed parent(s)    Family Caregiver Names Munira camarena (mother) [phone]    Quality of Family Relationships helpful;involved;supportive    Able to Return to Prior Arrangements yes       Resource/Environmental Concerns    Resource/Environmental Concerns none    Transportation Concerns none       Transportation Needs    In the past 12 months, has lack of transportation kept you from medical appointments or from getting medications? no    In the past 12 months, has lack of transportation kept you from meetings, work, or from getting things needed for daily living? No       Food Insecurity    Within the past 12 months, you worried that your food would run out before you got the money to buy more. Never true    Within the past 12 months, the food you bought just didn't last and you didn't have money to get more. Never true       Transition Planning    Patient/Family Anticipates Transition to home with family    Patient/Family Anticipated Services at Transition none    Transportation Anticipated family or friend will provide       Discharge Needs Assessment    Readmission Within the Last 30 Days no previous admission in last 30 days    Equipment Currently Used at Home none    Concerns to be Addressed denies needs/concerns at this time    Do you want help finding or keeping work or a job? I do not need or want help    Do you want  help with school or training? For example, starting or completing job training or getting a high school diploma, GED or equivalent No    Anticipated Changes Related to Illness none    Equipment Needed After Discharge none                   Discharge Plan       Row Name 12/20/24 9730       Plan    Plan Home with family    Patient/Family in Agreement with Plan yes    Plan Comments Spoke to patient at bedside. Lives with children in [facility]. Contact is Munira Cadena (mother) [phone]. Is independent with ADL's. No problems affording [employer] or medications. Uses [facility]. Uses no DME. PCP is TRISTEN Durand. Plan is home with family. Parents will transport. CM will continue to follow.    Final Discharge Disposition Code 01 - home or self-care                  Continued Care and Services - Admitted Since 12/19/2024    No active coordination exists for this encounter.       Expected Discharge Date and Time       Expected Discharge Date Expected Discharge Time    Dec 21, 2024            Demographic Summary       Row Name 12/20/24 0828       General Information    Admission Type observation    Arrived From emergency department    Referral Source admission list    Reason for Consult discharge planning    Preferred Language English       Contact Information    Permission Granted to Share Info With     Contact Information Obtained for                    Functional Status       Row Name 12/20/24 0828       Functional Status    Usual Activity Tolerance excellent    Current Activity Tolerance good       Physical Activity    On average, how many days per week do you engage in moderate to strenuous exercise (like a brisk walk)? 5 days    On average, how many minutes do you engage in exercise at this level? 30 min    Number of minutes of exercise per week 150       Functional Status, IADL    Medications independent    Meal Preparation independent    Housekeeping independent     Laundry independent    Shopping independent       Mental Status    General Appearance WDL WDL       Mental Status Summary    Recent Changes in Mental Status/Cognitive Functioning no changes       Employment/    Employment Status employed full-time                   Psychosocial    No documentation.                  Abuse/Neglect    No documentation.                  Legal    No documentation.                  Substance Abuse    No documentation.                  Patient Forms    No documentation.                     Ac Bo RN

## 2024-12-20 NOTE — PLAN OF CARE
Goal Outcome Evaluation:              Outcome Evaluation: Patient continues to wait to go to surgery, has been NPO since midnight, have spoken with pre-op, unsure of when patient will be able to go. Will continue to follow-up

## 2024-12-20 NOTE — CONSULTS
Urology Consult    Date of Admission: 2024  Date of Consult: 24    Primary Care Physician: Megha Fregoso APRN  Referring Physician: TRISTEN Pryor    Chief complaint/Reason for consultation ureteral stone    Subjective     History of Present Illness Milana is a 39-year-old white female G3, P3 who began having back pain 2 days ago.  She went to the [facility] emergency room where a CT scan diagnosed a stone in her left ureter.  She then developed some nausea vomiting and left-sided renal colic as well as gross hematuria and was admitted overnight by the hospitalist service.  Her pain is fairly well-controlled here in the hospital and her nausea is better.  This is her first stone.    Review of Systems   Otherwise complete ROS is negative except as mentioned above.    Past Medical History:  has a past medical history of G3, P3 abdominal pain, periumbilical, Abdominal pain, RLQ (right lower quadrant), Acute pharyngitis, Anxiety, Appetite lost, Chest pain (?), Colon polyp (2017), Depression, Diarrhea, Difficulty swallowing, Fatigue, Fracture, GERD (gastroesophageal reflux disease), H/O foot surgery (10/2020), Hearing loss, History of bronchitis, History of colonoscopy, History of exercise stress test (), History of left salpingo-oophorectomy  2017 (2018), History of ovarian cyst, History of pneumonia, Hyperthyroidism, Irritable bowel syndrome, Multiple gestation ([id number]), Ovarian cyst, bilateral, PONV (postoperative nausea and vomiting), Seasonal allergies, RITU (stress urinary incontinence, female) (2020), TMJ (dislocation of temporomandibular joint), Upper respiratory infection, Vomiting, and Wears glasses.    Past Surgical History:  has a past surgical history that includes Cholecystectomy (); Colonoscopy (N/A, 2017); Diagnostic laparoscopy (2005); Diagnostic laparoscopy (2012);  section (2008);  section  (10/30/2015);  section (2010); Nissen fundoplication (2013); Laparoscopy (N/A, 08/10/2017); [place] tooth extraction (); Esophagogastroduodenoscopy (N/A, 10/15/2019); Foot surgery (Left); Hernia repair (2019); Mid-Urethral Sling (N/A, 2021); Upper gastrointestinal endoscopy (); Upper gastrointestinal endoscopy (2020); Total vaginal hysterectomy (N/A, 2018); Esophagogastroduodenoscopy (N/A, 10/11/2021); Laparoscopy (N/A, 2022); Ankle surgery (Left); Laparoscopy (N/A, 2023); Esophagogastroduodenoscopy (N/A, 2023); and Jacque-en-y procedure.    Family History: family history includes Colon cancer in her maternal great-grandmother; Coronary artery disease in her mother; Diabetes in her father, maternal grandfather, and mother; Hypertension in her maternal grandfather and mother; Kidney disease in her father; Skin cancer in her father; Stroke in her maternal grandfather.    Social History:  reports that she has never smoked. She has never been exposed to tobacco smoke. She has never used smokeless tobacco. She reports that she does not drink alcohol and does not use drugs.  She has 3 children.  She works in an office.  She is currently going through a divorce.    Medications: Scheduled Meds:cefTRIAXone, 2,000 mg, Intravenous, Q24H  enoxaparin, 40 mg, Subcutaneous, Nightly  pantoprazole, 40 mg, Oral, Q AM  sodium chloride, 10 mL, Intravenous, Q12H  tamsulosin, 0.4 mg, Oral, Daily      Continuous Infusions:Pharmacy to Dose enoxaparin (LOVENOX),       PRN Meds:.  senna-docusate sodium **AND** polyethylene glycol **AND** bisacodyl **AND** bisacodyl    ketorolac    Morphine    ondansetron    [Held by provider] ondansetron ODT    oxyCODONE-acetaminophen    Pharmacy to Dose enoxaparin (LOVENOX)    Sodium Chloride (PF)    sodium chloride    sodium chloride  No Known Allergies    Objective    Physical Exam:   Vital Signs have been reviewed  Physical  Exam  Well-developed well-nourished white female no acute distress  HEENT: NCAT PERRLA EOMI  Heart: Regular rate rhythm  Lungs clear to auscultation  Abdomen is doughy soft and nontender  Positive left CVA tenderness (mild)  Pelvic: Deferred  Extremities Free of sinus clubbing or edema  Neurologic: Grossly intact  Psych: Normal    Results Reviewed:  I have personally reviewed current lab, radiology, and data and agree with results.  CT scan shows a left upper ureteral stone (4 to 5 mm)  Results from last 7 days   Lab Units 12/19/24  1431   WBC 10*3/mm3 8.40   HEMOGLOBIN g/dL 14.1   PLATELETS 10*3/mm3 200     Results from last 7 days   Lab Units 12/19/24  1431   SODIUM mmol/L 143   POTASSIUM mmol/L 3.8   CO2 mmol/L 22.0   BUN mg/dL 6   CREATININE mg/dL 0.80   GLUCOSE mg/dL 101*   CALCIUM mg/dL 8.9             Renal stone    N&V (nausea and vomiting)    Acute UTI (urinary tract infection)        Assessment & Plan   Assessment & Plan  4-5 mm stone in her left upper ureter.  I talked with her about the natural history of ureteral stones and what she could expect going forward.  I offered her both trial of stone passage (in which case she could go home today or tomorrow) or the procedure of ureteroscopy with laser lithotripsy and stent insertion.  I described the operation and its risks and possible complications.  At this time she cannot decide which she would like to do so she is going to consider it and let us know.  I discussed the patients findings and my recommendations with: Patient and her mom.      Rome Mack MD  12/20/24  07:15 EST

## 2024-12-20 NOTE — DISCHARGE SUMMARY
[facility] Medicine Services  DISCHARGE SUMMARY    Patient Name: Laura Thomson  : 1985  MRN: [id number]    Date of Admission: 2024  4:01 PM  Date of Discharge:  2024  Primary Care Physician: Megha Fregoso APRN    Consults       Date and Time Order Name Status Description    2024  7:38 PM Inpatient Urology Consult              Hospital Course     Presenting Problem: obstructing kidney stone    Active Hospital Problems    Diagnosis  POA    **Renal stone [N20.0]  Yes    N&V (nausea and vomiting) [R11.2]  Yes    Acute UTI (urinary tract infection) [N39.0]  Yes      Resolved Hospital Problems   No resolved problems to display.          Hospital Course:  Laura Thomson is a 39 y.o. female with history of Jacque-en-Y procedure, GERD, who developed abdominal pain and hematuria on  and diagnosed with kidney stone.     This patient's problems and plans were partially entered by my partner and updated as appropriate by me 24.     Left ureteral stone  --5mm per imaging  --urology evaluated, status post urethral stent placement on   --Pyridium, pain control, continue tamsulosin on discharge  -- Dr. Mccray recommended KUB next week, likely on Thursday to follow-up and will decide stent disposition at that time.  Patient instructed to call Dr. Mccray's clinic on Monday to make the appointment.     UTI  --continue rocephin while in the hospital, keflex on DC   --follow-up urine culture, still prelim     Recent Jacque-en-Y  --early November  --recovering well, bariatric diet level 3 per report      Discharge Follow Up Recommendations for outpatient labs/diagnostics:  Follow-up with Dr. Mccrya on 2024 for KUB and stent management   Follow-up with primary care doctor in 5-7 days regarding this hospitalization    Day of Discharge     HPI:   Patient with some flank pain.  No nausea.  No vomiting.  Patient's mother and father at  bedside.    Review of Systems  As above    Vital Signs:   Temp:  [97.4 °F (36.3 °C)-98.3 °F (36.8 °C)] 97.8 °F (36.6 °C)  Heart Rate:  [50-82] 54  Resp:  [16-20] 18  BP: (107-132)/(63-84) 117/68  Flow (L/min) (Oxygen Therapy):  [4] 4      Physical Exam:  Constitutional: No acute distress, awake, alert  HENT: NCAT, mucous membranes moist  Respiratory: Clear to auscultation bilaterally, respiratory effort normal   Cardiovascular: RRR, no murmurs, rubs, or gallops  Gastrointestinal: Positive bowel sounds, soft, tender to palpation, nondistended  Musculoskeletal: No bilateral ankle edema  Psychiatric: Appropriate affect, cooperative  Neurologic: Alert, oriented, symmetric facies, moves all extremities, speech clear  Skin: No rashes on exposed skin    Pertinent  and/or Most Recent Results     LAB RESULTS:      Lab 12/20/24  0623 12/19/24  1431 12/18/24  1009   WBC 5.86 8.40 8.30   HEMOGLOBIN 11.3* 14.1 14.3   HEMATOCRIT 35.9 43.1 43.6   PLATELETS 176 200 260   NEUTROS ABS 3.50 6.97 5.82   IMMATURE GRANS (ABS) 0.02 0.02 0.01   LYMPHS ABS 1.50 0.71 1.63   MONOS ABS 0.68 0.63 0.69   EOS ABS 0.13 0.02 0.12   MCV 86.1 84.2 81.8   LACTATE  --  0.7  --          Lab 12/20/24  0623 12/19/24  1431 12/18/24  1009   SODIUM 146* 143 141   POTASSIUM 3.7 3.8 3.4*   CHLORIDE 109* 101 99   CO2 22.0 22.0 25.3   ANION GAP 15.0 20.0* 16.7*   BUN 5* 6 10   CREATININE 0.57 0.80 0.67   EGFR 118.0 95.7 113.5   GLUCOSE 90 101* 95   CALCIUM 8.5* 8.9 9.4         Lab 12/19/24  1431 12/18/24  1009   TOTAL PROTEIN 6.9 7.2   ALBUMIN 4.2 4.5   GLOBULIN 2.7 2.7   ALT (SGPT) 24 <5   AST (SGOT) 35* 22   BILIRUBIN 0.4 0.5   ALK PHOS 98 77   LIPASE 24 25                     Brief Urine Lab Results  (Last result in the past 365 days)        Color   Clarity   Blood   Leuk Est   Nitrite   Protein   CREAT   Urine HCG        12/19/24 1432 Orange   Cloudy   Large (3+)   Trace   Negative   100 mg/dL (2+)                 Microbiology Results (last 10 days)        Procedure Component Value - Date/Time    Urine Culture - Urine, Urine, Clean Catch [[id number]]  (Normal) Collected: 12/19/24 1432    Lab Status: Preliminary result Specimen: Urine, Clean Catch Updated: 12/20/24 1143     Urine Culture No growth    Urine Culture - Urine, Urine, Clean Catch [[id number]]  (Normal) Collected: 12/18/24 1053    Lab Status: Final result Specimen: Urine, Clean Catch Updated: 12/20/24 0321     Urine Culture No growth            FL C Arm During Surgery    Result Date: 12/20/2024  This procedure was auto-finalized with no dictation required.    CT Abdomen Pelvis With Contrast    Result Date: 12/19/2024  CT ABDOMEN PELVIS W CONTRAST Date of Exam: 12/19/2024 3:06 PM EST Indication: shoaib flank pain, diffuse abd pain, vomitng, ct yesterday with 4 m proximal left stone but today worse pain / vomiting. Comparison: CT abdomen and pelvis 12/18/2024 Technique: Axial CT images were obtained of the abdomen and pelvis following the uneventful intravenous administration of 90 mL Isovue-300. Reconstructed coronal and sagittal images were also obtained. Automated exposure control and iterative construction methods were used. Findings: LUNG BASES:  Unremarkable without mass or infiltrate. LIVER:  Unremarkable parenchyma without focal lesion. BILIARY/GALLBLADDER: Surgically absent. SPLEEN:  Unremarkable PANCREAS:  Unremarkable ADRENAL:  Unremarkable KIDNEYS: Evaluation of the left kidney again demonstrates hydronephrosis and hydroureter due to a 5 mm calculus which has migrated compared to prior study now located at the level of L3/4. There is increased perinephric stranding and perinephric fluid from prior examination and delayed excretion noted. Additional findings include a persistent nephrogram along the medial lower pole of the left kidney (image 68 of series 2) likely related to the obstructive changes. The left ureter distal to the calculus is normal in caliber without additional ureterolithiasis. The  right kidney is unremarkable without obstructive changes noted. GASTROINTESTINAL/MESENTERY: Evaluation of the gastrointestinal tract demonstrates postsurgical changes from a gastric bypass. No evidence for bowel wall thickening or obstruction. There is trace fluid in the pelvis which is new from prior exam. MESENTERIC VESSELS:  Patent. AORTA/IVC:  Normal caliber. RETROPERITONEUM/LYMPH NODES:  Unremarkable REPRODUCTIVE: The uterus is surgically absent. BLADDER:  Unremarkable OSSEUS STRUCTURES:  Typical for age with no acute process identified.     Impression: 1. Progressive left renal obstructive uropathy with increasing perinephric stranding and fluid and a persistent nephrogram. A 5 mm ureteral calculus shows some migration from prior exam but is still located in the mid to distal left ureter at approximately level L3/4. Electronically Signed: Magy Frey MD  12/19/2024 3:29 PM EST  Workstation ID: [id number]    CT Abdomen Pelvis With Contrast    Result Date: 12/18/2024  CT ABDOMEN PELVIS W CONTRAST Date of Exam: 12/18/2024 11:23 AM EST Indication: flank pain, bilateral. Comparison: CT of the abdomen and pelvis dated 11/25/2023 Technique: Axial CT images were obtained of the abdomen and pelvis following the uneventful intravenous administration of 85 mL Isovue-300. Reconstructed coronal and sagittal images were also obtained. Automated exposure control and iterative construction methods were used. Findings: Liver: The liver is unremarkable in morphology. No focal liver lesion is seen. No biliary dilation is seen. Gallbladder: Surgically absent. Pancreas: Unremarkable. Spleen: Unremarkable. Adrenal glands: Unremarkable. Genitourinary tract: 4 mm obstructing calculus within the left proximal ureter causes mild left hydronephrosis and a delayed left nephrogram. Right kidney is unremarkable. No hydronephrosis on the right. Visualized right ureter and urinary bladder are unremarkable. Status post hysterectomy.  Gastrointestinal tract: There are postsurgical changes of gastric bypass. Hollow viscera appear otherwise unremarkable. There is no evidence of bowel obstruction. Appendix: No findings to suggest acute appendicitis. Other findings: No free air or free fluid. No pathologically enlarged lymph nodes. The abdominal aorta and IVC appear unremarkable. Bones and soft tissues: No acute or suspicious osseous or soft tissue lesion is identified. Lung bases: The visualized lung bases are clear.     Impression: 1.4 mm obstructing calculus within the left proximal ureter causes mild left hydronephrosis. 2.Additional findings as detailed above. Electronically Signed: Omkar Sanchez MD  12/18/2024 11:42 AM EST  Workstation ID: [id number]                 Plan for Follow-up of Pending Labs/Results: pcp  Pending Labs       Order Current Status    Urine Culture - Urine, Urine, Clean Catch Preliminary result          Discharge Details        Discharge Medications        New Medications        Instructions Start Date   cephalexin 500 MG capsule  Commonly known as: Keflex   500 mg, Oral, 4 Times Daily      lactobacillus acidophilus capsule capsule   1 capsule, Oral, Daily   Start Date: December 22, 2024     PHARMACY MEDS TO BED CONSULT   Not Applicable, Daily      phenazopyridine 100 MG tablet  Commonly known as: PYRIDIUM   100 mg, Oral, 3 Times Daily PRN             Changes to Medications        Instructions Start Date   oxyCODONE-acetaminophen 5-325 MG per tablet  Commonly known as: Percocet  What changed: when to take this   1 tablet, Oral, Every 8 Hours PRN             Continue These Medications        Instructions Start Date   buPROPion  MG 24 hr tablet  Commonly known as: Wellbutrin XL   150 mg, Oral, Daily      citalopram 20 MG tablet  Commonly known as: CeleXA   20 mg, Oral, Daily      hydrOXYzine 25 MG tablet  Commonly known as: ATARAX   25 mg, Oral, 3 Times Daily PRN      ibuprofen 800 MG tablet  Commonly known as:  ADVIL,MOTRIN   Take 1 tablet by mouth Every 8 (Eight) Hours As Needed for Mild Pain      omeprazole 20 MG capsule  Commonly known as: priLOSEC   20 mg, Daily      ondansetron ODT 4 MG disintegrating tablet  Commonly known as: ZOFRAN-ODT   Place 1 tablet on the tongue Every 4 (Four) Hours As Needed for nausea      tamsulosin 0.4 MG capsule 24 hr capsule  Commonly known as: Flomax   0.4 mg, Oral, Daily, Discontinue if stone passes or lightheaded when upright.      vitamin D 1.25 MG (67646 UT) capsule capsule  Commonly known as: ERGOCALCIFEROL   50,000 Units, Oral, Weekly             Stop These Medications      estradiol 2 MG tablet  Commonly known as: ESTRACE     promethazine 25 MG tablet  Commonly known as: PHENERGAN            ASK your doctor about these medications        Instructions Start Date   cyanocobalamin 1000 MCG/ML injection   Inject 1 ml subcutaneously 1 dose per week x4 weeks and then every 28 days x 5 doses               No Known Allergies      Discharge Disposition:  Home or Self Care    Diet:  Hospital:  Diet Order   Procedures    Diet: Regular/House, Gastrointestinal; Low Irritant; Fluid Consistency: Thin (IDDSI 0)       Diet Instructions       Diet: Gastrointestinal Diets; Low Irritant; Soft to Chew (NDD 3); Chopped Meat; Thin (IDDSI 0)      Discharge Diet: Gastrointestinal Diets    Gastrointestinal Diet: Low Irritant    Texture: Soft to Chew (NDD 3)    Soft to Chew: Chopped Meat    Fluid Consistency: Thin (IDDSI 0)             Activity:  Activity Instructions       Driving Restrictions      Type of Restriction: Driving    Driving Restrictions: No Driving While Taking Narcotics    Driving Restrictions      Type of Restriction: Driving    Driving Restrictions: No Driving While Taking Narcotics            Restrictions or Other Recommendations:       CODE STATUS:    Code Status and Medical Interventions: CPR (Attempt to Resuscitate); Full Support   Ordered at: 12/19/24 2195     Code Status (Patient has  no pulse and is not breathing):    CPR (Attempt to Resuscitate)     Medical Interventions (Patient has pulse or is breathing):    Full Support       No future appointments.    Additional Instructions for the Follow-ups that You Need to Schedule       Call MD With Problems / Concerns   As directed      Please seek medical attention for any of the following: Difficulty breathing, chest pain, passing out, worsening flank pain, and/or any other concerning symptoms    Order Comments: Please seek medical attention for any of the following: Difficulty breathing, chest pain, passing out, worsening flank pain, and/or any other concerning symptoms         Discharge Follow-up with PCP   As directed       Currently Documented PCP:    Megha Fregoso APRN    PCP Phone Number:    [phone]     Follow Up Details: Follow-up with primary care doctor in 1 week regarding this hospitalization, chronic disease management        Discharge Follow-up with Specified Provider: Follow-up with Dr. Mccray on 12/26/2024 for x-ray and stone follow-up   As directed      To: Follow-up with Dr. Mccray on 12/26/2024 for x-ray and stone follow-up                      Laina Murguia MD  12/21/24      Time Spent on Discharge:  I spent  34  minutes on this discharge activity which included: face-to-face encounter with the patient, reviewing the data in the system, coordination of the care with the nursing staff as well as consultants, documentation, and entering orders.

## 2024-12-20 NOTE — PLAN OF CARE
Goal Outcome Evaluation:  Plan of Care Reviewed With: patient        Progress: no change  Outcome Evaluation: VSS. No complaints of pain or nausea during shift. Fluids infusing. Urology consult for am. Will continue plan of care.

## 2024-12-21 ENCOUNTER — READMISSION MANAGEMENT (OUTPATIENT)
Dept: CALL CENTER | Facility: HOSPITAL | Age: 39
End: 2024-12-21
Payer: COMMERCIAL

## 2024-12-21 VITALS
HEIGHT: 63 IN | DIASTOLIC BLOOD PRESSURE: 68 MMHG | WEIGHT: 190 LBS | TEMPERATURE: 97.8 F | HEART RATE: 54 BPM | RESPIRATION RATE: 18 BRPM | SYSTOLIC BLOOD PRESSURE: 117 MMHG | OXYGEN SATURATION: 96 % | BODY MASS INDEX: 33.66 KG/M2

## 2024-12-21 LAB — BACTERIA SPEC AEROBE CULT: NO GROWTH

## 2024-12-21 PROCEDURE — G0378 HOSPITAL OBSERVATION PER HR: HCPCS

## 2024-12-21 PROCEDURE — 25010000002 MORPHINE PER 10 MG: Performed by: UROLOGY

## 2024-12-21 PROCEDURE — 99239 HOSP IP/OBS DSCHRG MGMT >30: CPT | Performed by: STUDENT IN AN ORGANIZED HEALTH CARE EDUCATION/TRAINING PROGRAM

## 2024-12-21 RX ORDER — L.ACID,PARA/B.BIFIDUM/S.THERM 8B CELL
1 CAPSULE ORAL DAILY
Qty: 7 CAPSULE | Refills: 0 | Status: SHIPPED | OUTPATIENT
Start: 2024-12-22 | End: 2024-12-21

## 2024-12-21 RX ORDER — L.ACID,PARA/B.BIFIDUM/S.THERM 8B CELL
1 CAPSULE ORAL DAILY
Qty: 7 CAPSULE | Refills: 0 | Status: SHIPPED | OUTPATIENT
Start: 2024-12-22

## 2024-12-21 RX ORDER — OXYCODONE AND ACETAMINOPHEN 5; 325 MG/1; MG/1
1 TABLET ORAL EVERY 8 HOURS PRN
Qty: 6 TABLET | Refills: 0 | Status: SHIPPED | OUTPATIENT
Start: 2024-12-21

## 2024-12-21 RX ORDER — CEPHALEXIN 500 MG/1
500 CAPSULE ORAL 4 TIMES DAILY
Qty: 12 CAPSULE | Refills: 0 | Status: SHIPPED | OUTPATIENT
Start: 2024-12-21 | End: 2024-12-24

## 2024-12-21 RX ADMIN — MORPHINE SULFATE 2 MG: 2 INJECTION, SOLUTION INTRAMUSCULAR; INTRAVENOUS at 03:26

## 2024-12-21 RX ADMIN — TAMSULOSIN HYDROCHLORIDE 0.4 MG: 0.4 CAPSULE ORAL at 08:40

## 2024-12-21 RX ADMIN — PANTOPRAZOLE SODIUM 40 MG: 40 TABLET, DELAYED RELEASE ORAL at 05:38

## 2024-12-21 RX ADMIN — Medication 1 CAPSULE: at 08:40

## 2024-12-21 RX ADMIN — Medication 5 MG: at 00:28

## 2024-12-21 NOTE — PLAN OF CARE
Problem: Adult Inpatient Plan of Care  Goal: Plan of Care Review  Outcome: Progressing  Flowsheets (Taken 12/21/2024 0307)  Progress: improving  Plan of Care Reviewed With: patient  Goal: Patient-Specific Goal (Individualized)  Outcome: Progressing  Goal: Absence of Hospital-Acquired Illness or Injury  Outcome: Progressing  Intervention: Identify and Manage Fall Risk  Recent Flowsheet Documentation  Taken 12/21/2024 0200 by Camille Toscano RN  Safety Promotion/Fall Prevention:   activity supervised   assistive device/personal items within reach   clutter free environment maintained   lighting adjusted   nonskid shoes/slippers when out of bed   room organization consistent   safety round/check completed  Taken 12/21/2024 0000 by Camille Toscano RN  Safety Promotion/Fall Prevention:   activity supervised   assistive device/personal items within reach   clutter free environment maintained   lighting adjusted   nonskid shoes/slippers when out of bed   room organization consistent   safety round/check completed  Taken 12/20/2024 2206 by Camille Toscano RN  Safety Promotion/Fall Prevention:   activity supervised   assistive device/personal items within reach   clutter free environment maintained   lighting adjusted   nonskid shoes/slippers when out of bed   room organization consistent   safety round/check completed  Intervention: Prevent Skin Injury  Recent Flowsheet Documentation  Taken 12/21/2024 0200 by Camille Toscano RN  Body Position: position changed independently  Taken 12/21/2024 0000 by Camille Toscano RN  Body Position: position changed independently  Taken 12/20/2024 2206 by Camille Toscano RN  Body Position: position changed independently  Intervention: Prevent and Manage VTE (Venous Thromboembolism) Risk  Recent Flowsheet Documentation  Taken 12/21/2024 0200 by Camille Toscano RN  VTE Prevention/Management:   bilateral   SCDs (sequential compression devices) on  Taken 12/21/2024 0000 by Camille Toscano RN  VTE  Prevention/Management:   bilateral   SCDs (sequential compression devices) on  Taken 12/20/2024 2206 by Camille Toscano RN  VTE Prevention/Management:   bilateral   SCDs (sequential compression devices) on  Intervention: Prevent Infection  Recent Flowsheet Documentation  Taken 12/21/2024 0200 by Camille Toscano RN  Infection Prevention: environmental surveillance performed  Taken 12/21/2024 0000 by Camille Toscano RN  Infection Prevention: environmental surveillance performed  Taken 12/20/2024 2206 by Camille Toscano RN  Infection Prevention: environmental surveillance performed  Goal: Optimal Comfort and Wellbeing  Outcome: Progressing  Intervention: Monitor Pain and Promote Comfort  Recent Flowsheet Documentation  Taken 12/20/2024 2305 by Camille Toscano RN  Pain Management Interventions:   pillow support provided   position adjusted  Taken 12/20/2024 2235 by Camille Toscano RN  Pain Management Interventions: pain medication given  Intervention: Provide Person-Centered Care  Recent Flowsheet Documentation  Taken 12/20/2024 2206 by Camille Toscano RN  Trust Relationship/Rapport:   care explained   choices provided  Goal: Readiness for Transition of Care  Outcome: Progressing   Goal Outcome Evaluation:  Plan of Care Reviewed With: patient        Progress: improving     -VSS, RA  -Pt recovering well from procedure   -Pt complains of pain. PRN pain medication given   -Family at bedside  -Pt resting comfortably this shift. No further complaints at this time.

## 2024-12-21 NOTE — OP NOTE
CYSTOSCOPY URETEROSCOPY LASER LITHOTRIPSY  Procedure Note    Laura Thomson  12/20/2024    Pre-op Diagnosis:   Left ureteral stone    Post-op Diagnosis:     Left ureteral stone with extremely narrow left distal ureter, possible spontaneous passage of left ureteral stent    Procedure/CPT® Codes:      Procedure(s):  CYSTOSCOPY URETEROSCOPY WITH STENT INSERTION    Surgeon(s):  Dami Angel MD Slabaugh, Thomas Kirk Jr., MD    Anesthesia: General    Staff:   Circulator: Melonie Kenney RN; Johana Green RN  Radiology Technologist: Bhavani Mc  Scrub Person: Juli Alarcon      Was needed to assist in this case with retraction, exposure, instrument placement.    Estimated Blood Loss: minimal  Urine Voided: * No values recorded between 12/20/2024  8:16 PM and 12/20/2024  8:45 PM *    Specimens:                None      Drains:   Ureteral Drain/Stent (Active)       Findings: Extremely narrow ureter and the distal aspect with apparent spontaneous passage of stone but I was unable to confirm this with your flexible ureteroscopy due to the narrow Dr. Of the distal ureter    Complications: None    History: 39-year-old female with left ureteral stone and renal colic.  She is opted for surgical therapy as above.  She gives her full consent.     Operative Report: After consent is obtained patient is brought to the operating suite was placed in supine position.  Anesthesia was induced.  She was sterilely prepped and draped in normal fashion.  Scope was inserted and patient urethra and bladder atraumatically.  We advanced a wire into the left ureteral orifice and up into the renal pelvis under fluoroscopic guidance.  Did not see a radiopaque ureteral stone.  The ureteral orifice was narrow so we dilated to 10 [place] using a 10 coaxial dilator.  Then alongside the wire rigid ureteroscope was advanced up into the ureter we were able to put perform rigid ureteroscopy up to the level of the renal pelvis.  We never  encountered a stone.  There was no ureteral injury and the wire was in good position.  We performed ureteroscopy as we removed the rigid ureteroscope and again did not see a stone however noted a narrow character of the distal ureter.  We then attempted to drive the flexible ureteroscope into the ureter and up into the renal pelvis however we are unable to navigate the distal ureter.  A wire was advanced through the flexible ureteroscope and up into the renal pelvis under fluoroscopic guidance but even over a sensor wire we were unable to advance the flexible ureteroscope for confirmation that the stone was indeed gone and had not migrated proximally.  Because of this we decided to go ahead and leave a stent 6 x 22 without string and plan for follow-up with imaging for potential further treatment.  Bladder was emptied.  Anesthesia reversed.  Patient was taken to the recovery room stable condition.    James Mccray Jr., MD     Date: 12/20/2024  Time: 20:45 EST

## 2024-12-21 NOTE — CASE MANAGEMENT/SOCIAL WORK
Discharge Planning Assessment  [facility]     Patient Name: Laura Thomson  MRN: [id number]  Today's Date: 12/21/2024    Admit Date: 12/19/2024    Plan: home today   Discharge Needs Assessment    No documentation.                  Discharge Plan       Row Name 12/21/24 0905       Plan    Plan home today    Final Discharge Disposition Code 01 - home or self-care    Final Note home today, RA, has transportation. No d/c needs at this time.                  Continued Care and Services - Admitted Since 12/19/2024    No active coordination exists for this encounter.       Expected Discharge Date and Time       Expected Discharge Date Expected Discharge Time    Dec 21, 2024            Demographic Summary    No documentation.                  Functional Status    No documentation.                  Psychosocial    No documentation.                  Abuse/Neglect    No documentation.                  Legal    No documentation.                  Substance Abuse    No documentation.                  Patient Forms    No documentation.                     Uzma Dodson RN

## 2024-12-21 NOTE — ANESTHESIA PROCEDURE NOTES
Airway  Urgency: elective    Date/Time: 12/20/2024 8:25 PM  Airway not difficult    General Information and Staff    Patient location during procedure: OR    Indications and Patient Condition  Indications for airway management: airway protection    Preoxygenated: yes  Mask difficulty assessment: 0 - not attempted    Final Airway Details  Final airway type: supraglottic airway      Successful airway: I-gel  Size 4     Number of attempts at approach: 1  Assessment: lips, teeth, and gum same as pre-op    Additional Comments  LMA placed without difficulty, ventilation with assist, equal breath sounds and symmetric chest rise and fall

## 2024-12-21 NOTE — OUTREACH NOTE
Prep Survey      Flowsheet Row Responses   [facility] patient discharged from? [facility]   Is LACE score < 7 ? Yes   Eligibility [facility]   Date of Admission 12/19/24   Date of Discharge 12/21/24   Discharge Disposition Home or Self Care   Discharge diagnosis Renal stone  [CYSTOSCOPY URETEROSCOPY WITH STENT INSERTION]   Does the patient have one of the following disease processes/diagnoses(primary or secondary)? General Surgery   Does the patient have Home health ordered? No   Is there a DME ordered? No   Comments regarding appointments Follow-up with Dr. Mccray on 12/26/2024 for x-ray and stone follow-up   Medication alerts for this patient see AVS   Prep survey completed? Yes            [facility] - Registered Nurse

## 2024-12-21 NOTE — PROGRESS NOTES
Patient feels well this morning  Some discomfort but overall pain well-controlled        Discussed procedure yesterday.  Did not find mid ureteral stone as imaged on CT scan imaging.  She may have passed this but stone spontaneously or had retroportion of the stone into the kidney.  Because of the narrow character of her distal ureter we were unable to advance our flexible ureteroscope into the kidney to perform renoscopy.  As such we left a stent in place without a string.  Plan for follow-up with KUB next week and disposition of the stent at that time.

## 2024-12-21 NOTE — ANESTHESIA POSTPROCEDURE EVALUATION
Patient: Laura Thomson    Procedure Summary       Date: 12/20/24 Room / Location:  [facility] /  [facility]    Anesthesia Start: 2017 Anesthesia Stop:     Procedure: CYSTOSCOPY URETEROSCOPY WITH STENT INSERTION (Left: Bladder) Diagnosis:     Surgeons: Dami Angel MD Provider: Bunny Alvarenga MD    Anesthesia Type: general ASA Status: 2            Anesthesia Type: general    Vitals  No vitals data found for the desired time range.          Post Anesthesia Care and Evaluation    Patient location during evaluation: PACU  Patient participation: complete - patient participated  Level of consciousness: awake and alert  Pain management: adequate    Airway patency: patent  Anesthetic complications: No anesthetic complications  PONV Status: none  Cardiovascular status: hemodynamically stable and acceptable  Respiratory status: nonlabored ventilation, acceptable and nasal cannula  Hydration status: acceptable

## 2024-12-23 ENCOUNTER — TRANSITIONAL CARE MANAGEMENT TELEPHONE ENCOUNTER (OUTPATIENT)
Dept: CALL CENTER | Facility: HOSPITAL | Age: 39
End: 2024-12-23
Payer: COMMERCIAL

## 2024-12-23 NOTE — OUTREACH NOTE
Call Center TCM Note      Flowsheet Row Responses   [facility] ([facility] patient discharged from? [facility]   Does the patient have one of the following disease processes/diagnoses(primary or secondary)? General Surgery   TCM attempt successful? Yes  [VR listing Munira]   Call start time 1428   Call end time 1446   Discharge diagnosis Renal stone   Meds reviewed with patient/caregiver? Yes   Is the patient having any side effects they believe may be caused by any medication additions or changes? Yes   Side effects comments  nauseated from medication.Pt reports that she is not able to take the med on with food as she has had Gastric Bypass, the protocol calls for no beverages for 45min after meals. Pt c/o by time she can drink she has no food in stomach where she takes in so little. RN and pt discuss options, she will try to take abx with pudding/yogurt/applesauce at end of meal or take small sip just to swallow capsule with meal. Pt v/u of need to take abx with food and complete entire prescription.   Does the patient have all medications related to this admission filled (includes all antibiotics, pain medications, etc.) Yes   Is the patient taking all medications as directed (includes completed medication regime)? Yes   Comments Pt is eligible for TCM f/u appt within 14 days of DC. Pt prefers to f/u with PCP only.   Does the patient have an appointment with their PCP within 7-14 days of discharge? No appointments available  [Pt prefers to f/u with her PCP only.]   Nursing Interventions PCP office requested to make appointment - message sent   Comments Pt continues to feel as she is not emptying completely, burns to urinate but not as painful as she previously was, pt reports. Pt c/o back pain lower left, pt is drinking and attempts to maintain hydration intake is approx 2.5 bottles of water per day.RN encouraged pt to hydrate/consume more fluid than the 32oz/day that she has been getting for better hydration, pt v/u.  Pt denies hematuria, fever or chills. Pt reports urethral stent remains intact   Did the patient receive a copy of their discharge instructions? Yes   Nursing interventions Reviewed instructions with patient   What is the patient's perception of their health status since discharge? Improving   Nursing interventions Nurse provided patient education   Is the patient/caregiver able to teach back steps to recovery at home? Set small, achievable goals for return to baseline health, Rest and rebuild strength, gradually increase activity   If the patient is a current smoker, are they able to teach back resources for cessation? Not a smoker   Is the patient/caregiver able to teach back the hierarchy of who to call/visit for symptoms/problems? PCP, Specialist, Home health nurse, Urgent Care, ED, 911 Yes   TCM call completed? Yes   Call end time 0556            Viviane Kern RN    12/23/2024, 14:48 EST

## 2025-02-19 NOTE — TELEPHONE ENCOUNTER
30-Dec-2024 Pt informed of results, reminded of appointment on 9/2, states daughter (+) for COVID and changed appointment to a Video.   30-Dec-2024 24-Jan-2025 24-Jan-2025 05-Feb-2025 05-Feb-2025 05-Feb-2025 24-Jan-2025 30-Jan-2025 30-Dec-2024 05-Feb-2025 16-Jan-2025 05-Feb-2025 05-Feb-2025 06-Jan-2025 06-Jan-2025 18-Feb-2025 12-Feb-2025 12-Feb-2025 24-Jan-2025 30-Dec-2024 16-Jan-2025 24-Jan-2025 30-Dec-2024 24-Jan-2025 12-Feb-2025 30-Dec-2024 30-Jan-2025 06-Jan-2025 30-Jan-2025 06-Jan-2025 06-Jan-2025 18-Feb-2025 06-Jan-2025 12-Feb-2025 30-Jan-2025 05-Feb-2025 06-Jan-2025 16-Jan-2025 06-Jan-2025 16-Jan-2025 24-Jan-2025 16-Jan-2025 12-Feb-2025

## 2025-02-24 DIAGNOSIS — G47.00 INSOMNIA, UNSPECIFIED TYPE: ICD-10-CM

## 2025-02-24 DIAGNOSIS — F41.9 ANXIETY: ICD-10-CM

## 2025-02-25 RX ORDER — HYDROXYZINE HYDROCHLORIDE 25 MG/1
25 TABLET, FILM COATED ORAL 3 TIMES DAILY PRN
Qty: 90 TABLET | Refills: 0 | Status: SHIPPED | OUTPATIENT
Start: 2025-02-25

## 2025-03-02 ENCOUNTER — HOSPITAL ENCOUNTER (EMERGENCY)
Facility: HOSPITAL | Age: 40
Discharge: HOME | End: 2025-03-02
Attending: STUDENT IN AN ORGANIZED HEALTH CARE EDUCATION/TRAINING PROGRAM | Admitting: STUDENT IN AN ORGANIZED HEALTH CARE EDUCATION/TRAINING PROGRAM
Payer: COMMERCIAL

## 2025-03-02 ENCOUNTER — APPOINTMENT (OUTPATIENT)
Dept: CT IMAGING | Facility: HOSPITAL | Age: 40
End: 2025-03-02
Payer: COMMERCIAL

## 2025-03-02 VITALS
SYSTOLIC BLOOD PRESSURE: 135 MMHG | DIASTOLIC BLOOD PRESSURE: 82 MMHG | HEART RATE: 56 BPM | OXYGEN SATURATION: 100 % | WEIGHT: 167.4 LBS | RESPIRATION RATE: 18 BRPM | BODY MASS INDEX: 29.66 KG/M2 | TEMPERATURE: 98.6 F | HEIGHT: 63 IN

## 2025-03-02 DIAGNOSIS — R31.9 URINARY TRACT INFECTION WITH HEMATURIA, SITE UNSPECIFIED: Primary | ICD-10-CM

## 2025-03-02 DIAGNOSIS — N39.0 URINARY TRACT INFECTION WITH HEMATURIA, SITE UNSPECIFIED: Primary | ICD-10-CM

## 2025-03-02 LAB
ALBUMIN SERPL-MCNC: 4.6 G/DL (ref 3.5–5.2)
ALBUMIN/GLOB SERPL: 2 G/DL
ALP SERPL-CCNC: 74 U/L (ref 39–117)
ALT SERPL W P-5'-P-CCNC: 29 U/L (ref 1–33)
AMORPH URATE CRY URNS QL MICRO: ABNORMAL /HPF
ANION GAP SERPL CALCULATED.3IONS-SCNC: 11.8 MMOL/L (ref 5–15)
AST SERPL-CCNC: 34 U/L (ref 1–32)
BACTERIA UR QL AUTO: ABNORMAL /HPF
BASOPHILS # BLD AUTO: 0.05 10*3/MM3 (ref 0–0.2)
BASOPHILS NFR BLD AUTO: 0.7 % (ref 0–1.5)
BILIRUB SERPL-MCNC: 0.4 MG/DL (ref 0–1.2)
BILIRUB UR QL STRIP: NEGATIVE
BUN SERPL-MCNC: 10 MG/DL (ref 6–20)
BUN/CREAT SERPL: 16.4 (ref 7–25)
CALCIUM SPEC-SCNC: 9.4 MG/DL (ref 8.6–10.5)
CHLORIDE SERPL-SCNC: 105 MMOL/L (ref 98–107)
CLARITY UR: ABNORMAL
CO2 SERPL-SCNC: 26.2 MMOL/L (ref 22–29)
COLOR UR: YELLOW
CREAT SERPL-MCNC: 0.61 MG/DL (ref 0.57–1)
DEPRECATED RDW RBC AUTO: 45.1 FL (ref 37–54)
EGFRCR SERPLBLD CKD-EPI 2021: 116.1 ML/MIN/1.73
EOSINOPHIL # BLD AUTO: 0.14 10*3/MM3 (ref 0–0.4)
EOSINOPHIL NFR BLD AUTO: 2 % (ref 0.3–6.2)
ERYTHROCYTE [DISTWIDTH] IN BLOOD BY AUTOMATED COUNT: 14.4 % (ref 12.3–15.4)
GLOBULIN UR ELPH-MCNC: 2.3 GM/DL
GLUCOSE SERPL-MCNC: 94 MG/DL (ref 65–99)
GLUCOSE UR STRIP-MCNC: NEGATIVE MG/DL
HCT VFR BLD AUTO: 38.8 % (ref 34–46.6)
HGB BLD-MCNC: 12.9 G/DL (ref 12–15.9)
HGB UR QL STRIP.AUTO: NEGATIVE
HYALINE CASTS UR QL AUTO: ABNORMAL /LPF
IMM GRANULOCYTES # BLD AUTO: 0.01 10*3/MM3 (ref 0–0.05)
IMM GRANULOCYTES NFR BLD AUTO: 0.1 % (ref 0–0.5)
KETONES UR QL STRIP: ABNORMAL
LEUKOCYTE ESTERASE UR QL STRIP.AUTO: ABNORMAL
LYMPHOCYTES # BLD AUTO: 2.13 10*3/MM3 (ref 0.7–3.1)
LYMPHOCYTES NFR BLD AUTO: 30.3 % (ref 19.6–45.3)
MCH RBC QN AUTO: 28.6 PG (ref 26.6–33)
MCHC RBC AUTO-ENTMCNC: 33.2 G/DL (ref 31.5–35.7)
MCV RBC AUTO: 86 FL (ref 79–97)
MONOCYTES # BLD AUTO: 0.69 10*3/MM3 (ref 0.1–0.9)
MONOCYTES NFR BLD AUTO: 9.8 % (ref 5–12)
NEUTROPHILS NFR BLD AUTO: 4 10*3/MM3 (ref 1.7–7)
NEUTROPHILS NFR BLD AUTO: 57.1 % (ref 42.7–76)
NITRITE UR QL STRIP: NEGATIVE
NRBC BLD AUTO-RTO: 0 /100 WBC (ref 0–0.2)
PH UR STRIP.AUTO: 5.5 [PH] (ref 5–8)
PLATELET # BLD AUTO: 193 10*3/MM3 (ref 140–450)
PMV BLD AUTO: 11.1 FL (ref 6–12)
POTASSIUM SERPL-SCNC: 4 MMOL/L (ref 3.5–5.2)
PROT SERPL-MCNC: 6.9 G/DL (ref 6–8.5)
PROT UR QL STRIP: NEGATIVE
RBC # BLD AUTO: 4.51 10*6/MM3 (ref 3.77–5.28)
RBC # UR STRIP: ABNORMAL /HPF
REF LAB TEST METHOD: ABNORMAL
SODIUM SERPL-SCNC: 143 MMOL/L (ref 136–145)
SP GR UR STRIP: 1.03 (ref 1–1.03)
SQUAMOUS #/AREA URNS HPF: ABNORMAL /HPF
UROBILINOGEN UR QL STRIP: ABNORMAL
WBC # UR STRIP: ABNORMAL /HPF
WBC NRBC COR # BLD AUTO: 7.02 10*3/MM3 (ref 3.4–10.8)

## 2025-03-02 PROCEDURE — 96365 THER/PROPH/DIAG IV INF INIT: CPT

## 2025-03-02 PROCEDURE — 99284 EMERGENCY DEPT VISIT MOD MDM: CPT | Performed by: STUDENT IN AN ORGANIZED HEALTH CARE EDUCATION/TRAINING PROGRAM

## 2025-03-02 PROCEDURE — 85025 COMPLETE CBC W/AUTO DIFF WBC: CPT | Performed by: NURSE PRACTITIONER

## 2025-03-02 PROCEDURE — 81001 URINALYSIS AUTO W/SCOPE: CPT | Performed by: NURSE PRACTITIONER

## 2025-03-02 PROCEDURE — 87086 URINE CULTURE/COLONY COUNT: CPT | Performed by: NURSE PRACTITIONER

## 2025-03-02 PROCEDURE — 80053 COMPREHEN METABOLIC PANEL: CPT | Performed by: NURSE PRACTITIONER

## 2025-03-02 PROCEDURE — 96361 HYDRATE IV INFUSION ADD-ON: CPT

## 2025-03-02 PROCEDURE — 25010000002 CEFTRIAXONE PER 250 MG: Performed by: NURSE PRACTITIONER

## 2025-03-02 PROCEDURE — 96375 TX/PRO/DX INJ NEW DRUG ADDON: CPT

## 2025-03-02 PROCEDURE — 25010000002 KETOROLAC TROMETHAMINE PER 15 MG: Performed by: NURSE PRACTITIONER

## 2025-03-02 PROCEDURE — 25810000003 SODIUM CHLORIDE 0.9 % SOLUTION: Performed by: NURSE PRACTITIONER

## 2025-03-02 PROCEDURE — 74176 CT ABD & PELVIS W/O CONTRAST: CPT

## 2025-03-02 RX ORDER — HYDROCODONE BITARTRATE AND ACETAMINOPHEN 5; 325 MG/1; MG/1
1 TABLET ORAL ONCE
Status: COMPLETED | OUTPATIENT
Start: 2025-03-02 | End: 2025-03-02

## 2025-03-02 RX ORDER — CEPHALEXIN 500 MG/1
500 CAPSULE ORAL 4 TIMES DAILY
Qty: 28 CAPSULE | Refills: 0 | Status: SHIPPED | OUTPATIENT
Start: 2025-03-02 | End: 2025-03-09

## 2025-03-02 RX ORDER — KETOROLAC TROMETHAMINE 30 MG/ML
30 INJECTION, SOLUTION INTRAMUSCULAR; INTRAVENOUS ONCE
Status: COMPLETED | OUTPATIENT
Start: 2025-03-02 | End: 2025-03-02

## 2025-03-02 RX ORDER — PHENAZOPYRIDINE HYDROCHLORIDE 100 MG/1
100 TABLET, FILM COATED ORAL 3 TIMES DAILY PRN
Qty: 6 TABLET | Refills: 0 | Status: SHIPPED | OUTPATIENT
Start: 2025-03-02 | End: 2025-03-04

## 2025-03-02 RX ADMIN — HYDROCODONE BITARTRATE AND ACETAMINOPHEN 1 TABLET: 5; 325 TABLET ORAL at 18:45

## 2025-03-02 RX ADMIN — SODIUM CHLORIDE 1000 ML: 9 INJECTION, SOLUTION INTRAVENOUS at 17:52

## 2025-03-02 RX ADMIN — KETOROLAC TROMETHAMINE 30 MG: 30 INJECTION, SOLUTION INTRAMUSCULAR; INTRAVENOUS at 17:52

## 2025-03-02 RX ADMIN — CEFTRIAXONE 1000 MG: 1 INJECTION, POWDER, FOR SOLUTION INTRAMUSCULAR; INTRAVENOUS at 19:02

## 2025-03-02 NOTE — ED NOTES
PT HAS PROVIDED URINE SPECIMEN IN REGISTRATION. NEEDS TO BE TOOK WITH PT TO ROOM UNTIL ORDER IS PUT IN.

## 2025-03-02 NOTE — ED PROVIDER NOTES
Subjective:  History of Present Illness:    Patient is a 39-year-old female with history of kidney stones.  Presents to the ER today with right flank pain that wraps into lower abdomen that started Friday.  Reports that she has had some nausea.  Reports some burning with urination.  Feels like pressure when she is peeing.  She denies fever she denies chest pain shortness of breath.  Denies changes in bowel habit.  Denies OTC medication or home remedy.  Denies alleviating or exacerbating factors.    Nurses Notes reviewed and agree, including vitals, allergies, social history and prior medical history.     REVIEW OF SYSTEMS: All systems reviewed and not pertinent unless noted.  Review of Systems   Genitourinary:  Positive for flank pain.   All other systems reviewed and are negative.      Past Medical History:   Diagnosis Date    Abdominal pain, periumbilical     Abdominal pain, RLQ (right lower quadrant)     Acute pharyngitis     Anxiety     Appetite lost     Chest pain 2018?    states had a heart monitor and was WNL.  States was told pain was attributed to acid reflux.  No pain since.  (assessed 3/17/23)    Colon polyp 02/17/2017    Depression     patient denies    Diarrhea     Secondary to IBS    Difficulty swallowing     food and liquids    Fatigue     Fracture     RIGHT WRIST TWICE, LEFT HAND    GERD (gastroesophageal reflux disease)     H/O foot surgery 10/2020    left x 2    Hearing loss     No use of hearing aids    History of bronchitis     History of colonoscopy     History of exercise stress test 2020    History of left salpingo-oophorectomy  2017 August 05/30/2018    History of ovarian cyst     History of pneumonia     Hyperthyroidism     hyperactive taken off medicine when pregnant.  Reported she was medication at one time, but none since pregnancy.     Irritable bowel syndrome     Multiple gestation [id number]    Ovarian cyst, bilateral     PONV (postoperative nausea and vomiting)     Seasonal allergies      RITU (stress urinary incontinence, female) 2020    TMJ (dislocation of temporomandibular joint)     Upper respiratory infection     Vomiting     Wears glasses        Allergies:    Patient has no known allergies.      Past Surgical History:   Procedure Laterality Date    ANKLE SURGERY Left     x2     SECTION  2008    DR NAVARRETE     SECTION  10/30/2015    DR VASQUEZ     SECTION  2010    DR ESPINOZA    CHOLECYSTECTOMY      COLONOSCOPY N/A 2017     COLONOSCOPY WITH COLD BIOPSY POLYPECTOMY; BIOPSIES , QUICK CLIP;  Surgeon: Perfecto Mcknight MD;  Location: [facility] ENDOSCOPY;  Service:     CYSTOSCOPY URETEROSCOPY Left 2024    Procedure: CYSTOSCOPY URETEROSCOPY WITH STENT INSERTION LEFT;  Surgeon: Dami Angel MD;  Location: [facility] OR;  Service: Urology;  Laterality: Left;    DIAGNOSTIC LAPAROSCOPY  2005    DR JONES NAVA/cystectomy    DIAGNOSTIC LAPAROSCOPY  2012    DR ESPINOZA/ELIJAH    DIAGNOSTIC LAPAROSCOPY N/A 08/10/2017     DIAGNOSTIC LAPAROSCOPY, LEFT S&O;  Keren Espinoza MD;  Location: [facility] OR;  Service:     DIAGNOSTIC LAPAROSCOPY N/A 2022    Procedure: DIAGNOSTIC LAPAROSCOPY,  LYSIS OF ADHESIONS, RIGHT SALPINGO-OOPHORECTOMY;  Surgeon: Kem Sahni MD;  Location: [facility] OR;  Service: Obstetrics/Gynecology;  Laterality: N/A;    DIAGNOSTIC LAPAROSCOPY N/A 2023    Procedure: DIAGNOSTIC LAPAROSCOPY WITH LYSIS OF ADHESIONS;  Surgeon: Kem Sahni MD;  Location: [facility] OR;  Service: Obstetrics/Gynecology;  Laterality: N/A;    ENDOSCOPY N/A 10/15/2019    Procedure: ESOPHAGOGASTRODUODENOSCOPY WITH BIOPSY AND ESOPHAGEAL DILATATION;  Surgeon: Perfecto Mcknight MD;  Location: [facility] ENDOSCOPY;  Service: Gastroenterology    ENDOSCOPY N/A 10/11/2021    Procedure: ESOPHAGOGASTRODUODENOSCOPY with dilatation and biopsies;  Surgeon: Hieu Shultz MD;  Location: [facility] ENDOSCOPY;  Service: Gastroenterology;  Laterality: N/A;    ENDOSCOPY N/A  "12/29/2023    Procedure: ESOPHAGOGASTRODUODENOSCOPY  WITH BIOPSY;  Surgeon: Consuelo Avalos MD;  Location:  [facility] ENDOSCOPY;  Service: Gastroenterology;  Laterality: N/A;    FOOT SURGERY Left     ligament and tendon repair    HERNIA REPAIR  12/20/2019    abd    MID-URETHRAL SLING WITH CYSTOSCOPY N/A 06/09/2021     MID-URETHRAL SLING WITH CYSTOSCOPY TVT EXACT;  Luc Alicea MD;  Location:  [facility] OR;  Service: Obstetrics/Gynecology;  Laterality: N/A;    NISSEN FUNDOPLICATION  06/2013    BRADEN-EN-Y PROCEDURE      UPPER GASTROINTESTINAL ENDOSCOPY  2013    UPPER GASTROINTESTINAL ENDOSCOPY  06/19/2020    VAGINAL HYSTERECTOMY N/A 07/09/2018    VAGINAL HYSTERECTOMY, BILATERAL SALPINGECTOMY;  Luc Alicea MD;  Location:  [facility] OR;  Service: Gynecology    WISDOM TOOTH EXTRACTION  2000         Social History     Socioeconomic History    Marital status: Legally    Tobacco Use    Smoking status: Never     Passive exposure: Never    Smokeless tobacco: Never   Vaping Use    Vaping status: Never Used   Substance and Sexual Activity    Alcohol use: No    Drug use: No    Sexual activity: Defer         Family History   Problem Relation Age of Onset    Coronary artery disease Mother     Diabetes Mother     Hypertension Mother     Kidney disease Father     Skin cancer Father     Diabetes Father     Stroke Maternal Grandfather     Hypertension Maternal Grandfather     Diabetes Maternal Grandfather     Colon cancer Maternal Great-Grandmother     Osteoporosis Neg Hx        Objective  Physical Exam:  /82 (BP Location: Right arm, Patient Position: Sitting)   Pulse 56   Temp 98.6 °F (37 °C) (Oral)   Resp 18   Ht 160 cm (63\")   Wt 75.9 kg (167 lb 6.4 oz)   LMP 06/26/2018   SpO2 100%   BMI 29.65 kg/m²      Physical Exam  Vitals and nursing note reviewed.   Constitutional:       Appearance: Normal appearance. She is normal weight.   HENT:      Head: Normocephalic and atraumatic.      Nose: Nose normal.      Mouth/Throat:      " Mouth: Mucous membranes are moist.      Pharynx: Oropharynx is clear.   Eyes:      Extraocular Movements: Extraocular movements intact.      Conjunctiva/sclera: Conjunctivae normal.      Pupils: Pupils are equal, round, and reactive to light.   Cardiovascular:      Rate and Rhythm: Normal rate and regular rhythm.   Pulmonary:      Effort: Pulmonary effort is normal.      Breath sounds: Normal breath sounds.   Abdominal:      General: Abdomen is flat. Bowel sounds are normal.      Palpations: Abdomen is soft.   Musculoskeletal:         General: Normal range of motion.      Cervical back: Normal range of motion and neck supple.   Skin:     General: Skin is warm and dry.      Capillary Refill: Capillary refill takes less than 2 seconds.   Neurological:      General: No focal deficit present.      Mental Status: She is alert and oriented to person, place, and time. Mental status is at baseline.   Psychiatric:         Mood and Affect: Mood normal.         Behavior: Behavior normal.         Thought Content: Thought content normal.         Judgment: Judgment normal.         Procedures    ED Course:         Lab Results (last 24 hours)       Procedure Component Value Units Date/Time    CBC & Differential [[id number]]  (Normal) Collected: 03/02/25 1751    Specimen: Blood Updated: 03/02/25 1800    Narrative:      The following orders were created for panel order CBC & Differential.  Procedure                               Abnormality         Status                     ---------                               -----------         ------                     CBC Auto Differential[[id number]]        Normal              Final result                 Please view results for these tests on the individual orders.    Comprehensive Metabolic Panel [[id number]]  (Abnormal) Collected: 03/02/25 1751    Specimen: Blood Updated: 03/02/25 1817     Glucose 94 mg/dL      BUN 10 mg/dL      Creatinine 0.61 mg/dL      Sodium 143 mmol/L      Potassium  4.0 mmol/L      Chloride 105 mmol/L      CO2 26.2 mmol/L      Calcium 9.4 mg/dL      Total Protein 6.9 g/dL      Albumin 4.6 g/dL      ALT (SGPT) 29 U/L      AST (SGOT) 34 U/L      Alkaline Phosphatase 74 U/L      Total Bilirubin 0.4 mg/dL      Globulin 2.3 gm/dL      A/G Ratio 2.0 g/dL      BUN/Creatinine Ratio 16.4     Anion Gap 11.8 mmol/L      eGFR 116.1 mL/min/1.73     Narrative:      GFR Categories in Chronic Kidney Disease (CKD)      GFR Category          GFR (mL/min/1.73)    Interpretation  G1                     90 or greater         Normal or high (1)  G2                      60-89                Mild decrease (1)  G3a                   45-59                Mild to moderate decrease  G3b                   30-44                Moderate to severe decrease  G4                    15-29                Severe decrease  G5                    14 or less           Kidney failure          (1)In the absence of evidence of kidney disease, neither GFR category G1 or G2 fulfill the criteria for CKD.    eGFR calculation 2021 CKD-EPI creatinine equation, which does not include race as a factor    CBC Auto Differential [[id number]]  (Normal) Collected: 03/02/25 1751    Specimen: Blood Updated: 03/02/25 1800     WBC 7.02 10*3/mm3      RBC 4.51 10*6/mm3      Hemoglobin 12.9 g/dL      Hematocrit 38.8 %      MCV 86.0 fL      MCH 28.6 pg      MCHC 33.2 g/dL      RDW 14.4 %      RDW-SD 45.1 fl      MPV 11.1 fL      Platelets 193 10*3/mm3      Neutrophil % 57.1 %      Lymphocyte % 30.3 %      Monocyte % 9.8 %      Eosinophil % 2.0 %      Basophil % 0.7 %      Immature Grans % 0.1 %      Neutrophils, Absolute 4.00 10*3/mm3      Lymphocytes, Absolute 2.13 10*3/mm3      Monocytes, Absolute 0.69 10*3/mm3      Eosinophils, Absolute 0.14 10*3/mm3      Basophils, Absolute 0.05 10*3/mm3      Immature Grans, Absolute 0.01 10*3/mm3      nRBC 0.0 /100 WBC     Urinalysis With Culture If Indicated - Urine, Clean Catch [[id number]]  (Abnormal)  Collected: 03/02/25 1753    Specimen: Urine, Clean Catch Updated: 03/02/25 1829     Color, UA Yellow     Appearance, UA Turbid     pH, UA 5.5     Specific Gravity, UA 1.027     Glucose, UA Negative     Ketones, UA Trace     Bilirubin, UA Negative     Blood, UA Negative     Protein, UA Negative     Leuk Esterase, UA Trace     Nitrite, UA Negative     Urobilinogen, UA 1.0 E.U./dL    Narrative:      In absence of clinical symptoms, the presence of pyuria, bacteria, and/or nitrites on the urinalysis result does not correlate with infection.    Urinalysis, Microscopic Only - Urine, Clean Catch [[id number]]  (Abnormal) Collected: 03/02/25 1753    Specimen: Urine, Clean Catch Updated: 03/02/25 1833     RBC, UA       Unable to determine due to loaded field     /HPF     WBC, UA       Unable to determine due to loaded field     /HPF     Bacteria, UA 4+ /HPF      Squamous Epithelial Cells, UA       Unable to determine due to loaded field     /HPF     Hyaline Casts, UA       Unable to determine due to loaded field     /LPF     Amorphous Crystals, UA Large/3+ /HPF      Methodology Manual Light Microscopy    Urine Culture - Urine, Urine, Clean Catch [[id number]] Collected: 03/02/25 1753    Specimen: Urine, Clean Catch Updated: 03/02/25 1833             CT Abdomen Pelvis Without Contrast    Result Date: 3/2/2025  PROCEDURE: CT ABDOMEN PELVIS WO CONTRAST-  HISTORY: Rule out renal stone  COMPARISON: None.  PROCEDURE: Axial images were obtained from the lung bases to the pubic symphysis by computed tomography. This study was performed with techniques to keep radiation doses as low as reasonably achievable, (ALARA). Individualized dose reduction techniques using automated exposure control or adjustment of mA and/or kV according to the patient size were employed.  FINDINGS:  ABDOMEN: The lung bases are clear. The heart is proper size. The limited non-contrast images of the liver demonstrate diffuse fatty infiltration of the liver. Liver  is mildly enlarged. Cholecystectomy clips noted. Gallbladder present with no CT visible stones. The spleen is unremarkable. No adrenal masses are seen. The aorta is normal in caliber. There is no significant free fluid or adenopathy.  There is no nephrolithiasis. There is no hydronephrosis.  PELVIS: The GI tract demonstrate postsurgical change from previous gastric bypass. No internal hernia identified. No evidence of obstruction identified. The appendix is not identified; no secondary signs of appendicitis seen. The urinary bladder is mostly collapsed with no abnormality seen. Uterus is diminutive or absent. There is no fluid or adenopathy.      Impression: No hydronephrosis or nephrolithiasis.  Enlarged, fatty infiltrated liver.   CTDI: 8.87 mGy DLP:452.17 mGy.cm  This report was signed and finalized on 3/2/2025 5:35 PM by Nimisha Wright MD.          MDM      Initial impression of presenting illness: Patient is a 39-year-old female with history of kidney stones.  Presents to the ER today with right flank pain that wraps into lower abdomen that started Friday.  Reports that she has had some nausea.  Reports some burning with urination.  Feels like pressure when she is peeing.  She denies fever she denies chest pain shortness of breath.  Denies changes in bowel habit.  Denies OTC medication or home remedy.  Denies alleviating or exacerbating factors.    DDX: includes but is not limited to: UTI, acute cystitis, renal stone, ureteral stone or other    Patient arrives stable with vitals interpreted by myself.     Pertinent features from physical exam: Lung sounds clear bilaterally throughout.  Ab soft nontender.  Bowel sounds normal Reichardt sounds are normal..    Initial diagnostic plan: CBC, CMP, urinalysis, CT abdomen pelvis    Results from initial plan were reviewed and interpreted by me revealing CBC, CMP, CT abdomen pelvis within appropriate range.  Urinalysis positive for bacteria leukocytes.  Consistent with  urinary tract infection.    Diagnostic information from other sources: Chart review    Interventions / Re-evaluation: Vital signs stable throughout encounter.  Patient received 1 L of normal saline and 30 mg of Toradol.  Patient received 5 mg hydrocodone while here in the ER.  Received 1 g Rocephin.    Results/clinical rationale were discussed with patient    Consultations/Discussion of results with other physicians: N/A    Disposition plan: Patient is hemodynamically stable nontoxic-appearing appropriate discharge.  Will send patient home on cephalexin.  Have her follow-up with PCP within the week.  Follow-up ER for new or worse symptoms.  -----        Final diagnoses:   Urinary tract infection with hematuria, site unspecified          Eirc De La Torre, APRN  03/02/25 1925

## 2025-03-04 LAB — BACTERIA SPEC AEROBE CULT: NO GROWTH

## 2025-05-28 NOTE — PROGRESS NOTES
She does have pain on both patient: Laura Thomson    YOB: 1985    Date: 05/29/2025    Primary Care Provider: Megha Fregoso APRN    Chief Complaint   Patient presents with    Ingrown Toenail       SUBJECTIVE:    History of present illness:  Patient is here for evaluation of an ingrown toenail.  She does have pain on both lateral aspects of the first toenails bilaterally, there is been no drainage recently.    The following portions of the patient's history were reviewed and updated as appropriate: allergies, current medications, past family history, past medical history, past social history, past surgical history and problem list.      Review of Systems   Constitutional:  Negative for chills, fever and unexpected weight change.   HENT:  Negative for hearing loss, trouble swallowing and voice change.    Eyes:  Negative for visual disturbance.   Respiratory:  Negative for apnea, cough, chest tightness, shortness of breath and wheezing.    Cardiovascular:  Negative for chest pain, palpitations and leg swelling.   Gastrointestinal:  Negative for abdominal distention, abdominal pain, anal bleeding, blood in stool, constipation, diarrhea, nausea, rectal pain and vomiting.   Endocrine: Negative for cold intolerance and heat intolerance.   Genitourinary:  Negative for difficulty urinating, dysuria and flank pain.   Musculoskeletal:  Negative for back pain and gait problem.   Skin:  Negative for color change, rash and wound.   Neurological:  Negative for dizziness, syncope, speech difficulty, weakness, light-headedness, numbness and headaches.   Hematological:  Negative for adenopathy. Does not bruise/bleed easily.   Psychiatric/Behavioral:  Negative for confusion. The patient is not nervous/anxious.        Allergies:  No Known Allergies    Medications:    Current Outpatient Medications:     hydrOXYzine (ATARAX) 25 MG tablet, Take 1 tablet by mouth 3 (Three) Times a Day As Needed for Itching,  Allergies or Anxiety., Disp: 90 tablet, Rfl: 0    ondansetron (ZOFRAN) 4 MG tablet, , Disp: , Rfl:     ondansetron ODT (ZOFRAN-ODT) 4 MG disintegrating tablet, Place 1 tablet on the tongue Every 4 (Four) Hours As Needed for nausea, Disp: 15 tablet, Rfl: 0    buPROPion XL (Wellbutrin XL) 150 MG 24 hr tablet, Take 1 tablet by mouth Daily. (Patient not taking: Reported on 5/29/2025), Disp: 30 tablet, Rfl: 1    citalopram (CeleXA) 20 MG tablet, Take 1 tablet by mouth Daily. (Patient not taking: Reported on 5/29/2025), Disp: 90 tablet, Rfl: 3    cyanocobalamin 1000 MCG/ML injection, Inject 1 ml subcutaneously 1 dose per week x4 weeks and then every 28 days x 5 doses (Patient not taking: Reported on 5/29/2025), Disp: 10 mL, Rfl: 0    estradiol (CLIMARA) 0.1 MG/24HR patch, APPLY 1 PATCH TOPICALLY ONCE A WEEK AS DIRECTED (Patient not taking: Reported on 5/29/2025), Disp: , Rfl:     famotidine (PEPCID) 20 MG tablet, Take 1 tablet by mouth 2 (Two) Times a Day. (Patient not taking: Reported on 5/29/2025), Disp: , Rfl:     fluticasone (FLONASE) 50 MCG/ACT nasal spray, USE 1 SPRAY(S) IN EACH NOSTRIL ONCE DAILY AS DIRECTED BY PROVIDER (Patient not taking: Reported on 5/29/2025), Disp: , Rfl:     ibuprofen (ADVIL,MOTRIN) 800 MG tablet, Take 1 tablet by mouth Every 8 (Eight) Hours As Needed for Mild Pain (Patient not taking: Reported on 5/29/2025), Disp: 30 tablet, Rfl: 0    lactobacillus acidophilus (RISAQUAD) capsule capsule, Take 1 capsule by mouth Daily. (Patient not taking: Reported on 5/29/2025), Disp: 7 capsule, Rfl: 0    meclizine (ANTIVERT) 25 MG tablet, Take 1 tablet by mouth 3 (Three) Times a Day As Needed. (Patient not taking: Reported on 5/29/2025), Disp: , Rfl:     meloxicam (MOBIC) 7.5 MG tablet, Take 1 tablet by mouth 2 (Two) Times a Day As Needed. (Patient not taking: Reported on 5/29/2025), Disp: , Rfl:     omeprazole (priLOSEC) 20 MG capsule, Take 1 capsule by mouth Daily. (Patient not taking: Reported on  5/29/2025), Disp: , Rfl:     oxyCODONE-acetaminophen (Percocet) 5-325 MG per tablet, Take 1 tablet by mouth Every 8 (Eight) Hours As Needed (pain). (Patient not taking: Reported on 5/29/2025), Disp: 6 tablet, Rfl: 0    PHARMACY MEDS TO BED CONSULT, Use Daily. (Patient not taking: Reported on 5/29/2025), Disp: , Rfl:     Scopolamine 1 MG/3DAYS patch, , Disp: , Rfl:     silver sulfadiazine (SSD) 1 % cream, APPLY CREAM TOPICALLY THREE TIMES DAILY TO BURN AREAS (Patient not taking: Reported on 5/29/2025), Disp: , Rfl:     tamsulosin (Flomax) 0.4 MG capsule 24 hr capsule, Take 1 capsule by mouth Daily. Discontinue if stone passes or lightheaded when upright. (Patient not taking: Reported on 5/29/2025), Disp: 12 capsule, Rfl: 0    terbinafine (lamiSIL) 250 MG tablet, Take 1 tablet by mouth Daily. (Patient not taking: Reported on 5/29/2025), Disp: , Rfl:     vitamin D (ERGOCALCIFEROL) 1.25 MG (16397 UT) capsule capsule, Take 1 capsule by mouth 1 (One) Time Per Week. (Patient not taking: Reported on 5/29/2025), Disp: 12 capsule, Rfl: 0    History:  Past Medical History:   Diagnosis Date    Abdominal pain, periumbilical     Abdominal pain, RLQ (right lower quadrant)     Acute pharyngitis     Anxiety     Appetite lost     Chest pain 2018?    states had a heart monitor and was WNL.  States was told pain was attributed to acid reflux.  No pain since.  (assessed 3/17/23)    Colon polyp 02/17/2017    Depression     patient denies    Diarrhea     Secondary to IBS    Difficulty swallowing     food and liquids    Fatigue     Fracture     RIGHT WRIST TWICE, LEFT HAND    GERD (gastroesophageal reflux disease)     H/O foot surgery 10/2020    left x 2    Hearing loss     No use of hearing aids    History of bronchitis     History of colonoscopy     History of exercise stress test 2020    History of left salpingo-oophorectomy  2017 August 05/30/2018    History of ovarian cyst     History of pneumonia     Hyperthyroidism     hyperactive  taken off medicine when pregnant.  Reported she was medication at one time, but none since pregnancy.     Irritable bowel syndrome     Multiple gestation [id number]    Ovarian cyst, bilateral     PONV (postoperative nausea and vomiting)     Seasonal allergies     RITU (stress urinary incontinence, female) 2020    TMJ (dislocation of temporomandibular joint)     Upper respiratory infection     Vomiting     Wears glasses        Past Surgical History:   Procedure Laterality Date    ANKLE SURGERY Left     x2     SECTION  2008    DR NAVARRETE     SECTION  10/30/2015    DR VASQUEZ     SECTION  2010    DR ESPINOZA    CHOLECYSTECTOMY      COLONOSCOPY N/A 2017     COLONOSCOPY WITH COLD BIOPSY POLYPECTOMY; BIOPSIES , QUICK CLIP;  Surgeon: Perfecto Mckinght MD;  Location: [facility] ENDOSCOPY;  Service:     CYSTOSCOPY URETEROSCOPY Left 2024    Procedure: CYSTOSCOPY URETEROSCOPY WITH STENT INSERTION LEFT;  Surgeon: Dami Angel MD;  Location: [facility] OR;  Service: Urology;  Laterality: Left;    DIAGNOSTIC LAPAROSCOPY  2005    DR JONES NAVA/cystectomy    DIAGNOSTIC LAPAROSCOPY  2012    DR ESPINOZA/ELIJAH    DIAGNOSTIC LAPAROSCOPY N/A 08/10/2017     DIAGNOSTIC LAPAROSCOPY, LEFT S&O;  Keren Espinoza MD;  Location: [facility] OR;  Service:     DIAGNOSTIC LAPAROSCOPY N/A 2022    Procedure: DIAGNOSTIC LAPAROSCOPY,  LYSIS OF ADHESIONS, RIGHT SALPINGO-OOPHORECTOMY;  Surgeon: Kem Sahni MD;  Location: [facility] OR;  Service: Obstetrics/Gynecology;  Laterality: N/A;    DIAGNOSTIC LAPAROSCOPY N/A 2023    Procedure: DIAGNOSTIC LAPAROSCOPY WITH LYSIS OF ADHESIONS;  Surgeon: Kem Sahni MD;  Location: [facility] OR;  Service: Obstetrics/Gynecology;  Laterality: N/A;    ENDOSCOPY N/A 10/15/2019    Procedure: ESOPHAGOGASTRODUODENOSCOPY WITH BIOPSY AND ESOPHAGEAL DILATATION;  Surgeon: Perfecto Mcknight MD;  Location: [facility] ENDOSCOPY;  Service: Gastroenterology    ENDOSCOPY N/A  "10/11/2021    Procedure: ESOPHAGOGASTRODUODENOSCOPY with dilatation and biopsies;  Surgeon: Hieu Shultz MD;  Location: [facility] ENDOSCOPY;  Service: Gastroenterology;  Laterality: N/A;    ENDOSCOPY N/A 12/29/2023    Procedure: ESOPHAGOGASTRODUODENOSCOPY  WITH BIOPSY;  Surgeon: Consuelo Avalos MD;  Location: [facility] ENDOSCOPY;  Service: Gastroenterology;  Laterality: N/A;    FOOT SURGERY Left     ligament and tendon repair    HERNIA REPAIR  12/20/2019    abd    MID-URETHRAL SLING WITH CYSTOSCOPY N/A 06/09/2021     MID-URETHRAL SLING WITH CYSTOSCOPY TVT EXACT;  Luc Alicea MD;  Location:  [facility] OR;  Service: Obstetrics/Gynecology;  Laterality: N/A;    NISSEN FUNDOPLICATION  06/2013    BRADEN-EN-Y PROCEDURE      UPPER GASTROINTESTINAL ENDOSCOPY  2013    UPPER GASTROINTESTINAL ENDOSCOPY  06/19/2020    VAGINAL HYSTERECTOMY N/A 07/09/2018    VAGINAL HYSTERECTOMY, BILATERAL SALPINGECTOMY;  Luc Alicea MD;  Location:  [facility] OR;  Service: Gynecology    WISDOM TOOTH EXTRACTION  2000       Family History   Problem Relation Age of Onset    Coronary artery disease Mother     Diabetes Mother     Hypertension Mother     Kidney disease Father     Skin cancer Father     Diabetes Father     Stroke Maternal Grandfather     Hypertension Maternal Grandfather     Diabetes Maternal Grandfather     Colon cancer Maternal Great-Grandmother     Osteoporosis Neg Hx        Social History     Tobacco Use    Smoking status: Never     Passive exposure: Never    Smokeless tobacco: Never   Vaping Use    Vaping status: Never Used   Substance Use Topics    Alcohol use: No    Drug use: No        OBJECTIVE:    Vital Signs:   Vitals:    05/29/25 1354   BP: 104/68   Pulse: 61   Temp: 97.8 °F (36.6 °C)   TempSrc: Infrared   SpO2: 99%   Weight: 68.9 kg (152 lb)   Height: 160 cm (62.99\")       Physical Exam:   General Appearance:    Alert, cooperative, in no acute distress   Head:    Normocephalic, without obvious abnormality, atraumatic   Eyes:        "     Lids and lashes normal, conjunctivae and sclerae normal, no   icterus, no pallor, corneas clear, PERRLA   Ears:    Ears appear intact with no abnormalities noted   Throat:   No oral lesions, no thrush, oral mucosa moist   Neck:   No adenopathy, supple, trachea midline, no thyromegaly, no   carotid bruit, no JVD   Lungs:     Clear to auscultation,respirations regular, even and                  unlabored    Heart:    Regular rhythm and normal rate, normal S1 and S2, no            murmur, no gallop, no rub, no click   Chest Wall:    No abnormalities observed   Abdomen:     Normal bowel sounds, no masses, no organomegaly, soft        non-tender, non-distended, no guarding, no rebound                tenderness   Extremities:   Moves all extremities well, no edema, no cyanosis, no             redness   Pulses:   Pulses palpable and equal bilaterally   Skin:   No bleeding, bruising or rash, there is very slight ingrown nature on the lateral aspects of both first toenails but certainly no evidence of infection   Lymph nodes:   No palpable adenopathy   Neurologic:   Cranial nerves 2 - 12 grossly intact, sensation intact, DTR       present and equal bilaterally     Results Review:   I reviewed the patient's new clinical results.  I reviewed the patient's new imaging results and agree with the interpretation.  I reviewed the patient's other test results and agree with the interpretation    Review of Systems was reviewed and confirmed as accurate as documented by the MA.    ASSESSMENT/PLAN:    1. Ingrown toenail        I have given the patient conservative measures with regards to her toenails but I do not think any surgical intervention is warranted.  I need to see her back in the office only if she has further problems.    I discussed the patients findings and my recommendations with patient and family        Electronically signed by Franck King MD  05/29/25

## 2025-05-29 ENCOUNTER — PROCEDURE VISIT (OUTPATIENT)
Dept: SURGERY | Facility: CLINIC | Age: 40
End: 2025-05-29
Payer: COMMERCIAL

## 2025-05-29 VITALS
WEIGHT: 152 LBS | HEART RATE: 61 BPM | HEIGHT: 63 IN | TEMPERATURE: 97.8 F | DIASTOLIC BLOOD PRESSURE: 68 MMHG | BODY MASS INDEX: 26.93 KG/M2 | OXYGEN SATURATION: 99 % | SYSTOLIC BLOOD PRESSURE: 104 MMHG

## 2025-05-29 DIAGNOSIS — L60.0 INGROWN TOENAIL: Primary | ICD-10-CM

## 2025-05-29 PROCEDURE — 99242 OFF/OP CONSLTJ NEW/EST SF 20: CPT | Performed by: SURGERY

## 2025-05-29 RX ORDER — ESTRADIOL 0.1 MG/D
PATCH TRANSDERMAL
COMMUNITY

## 2025-05-29 RX ORDER — MECLIZINE HYDROCHLORIDE 25 MG/1
1 TABLET ORAL 3 TIMES DAILY PRN
COMMUNITY

## 2025-05-29 RX ORDER — FLUTICASONE PROPIONATE 50 MCG
SPRAY, SUSPENSION (ML) NASAL
COMMUNITY

## 2025-05-29 RX ORDER — ONDANSETRON 4 MG/1
TABLET, FILM COATED ORAL
COMMUNITY

## 2025-05-29 RX ORDER — FAMOTIDINE 20 MG/1
1 TABLET, FILM COATED ORAL 2 TIMES DAILY
COMMUNITY

## 2025-05-29 RX ORDER — SILVER SULFADIAZINE 10 MG/G
CREAM TOPICAL
COMMUNITY

## 2025-05-29 RX ORDER — SCOPOLAMINE 1 MG/3D
PATCH, EXTENDED RELEASE TRANSDERMAL
COMMUNITY

## 2025-05-29 RX ORDER — TERBINAFINE HYDROCHLORIDE 250 MG/1
1 TABLET ORAL DAILY
COMMUNITY

## 2025-05-29 RX ORDER — MELOXICAM 7.5 MG/1
1 TABLET ORAL 2 TIMES DAILY PRN
COMMUNITY

## 2025-06-02 DIAGNOSIS — G47.00 INSOMNIA, UNSPECIFIED TYPE: ICD-10-CM

## 2025-06-02 DIAGNOSIS — F41.9 ANXIETY: ICD-10-CM

## 2025-06-03 RX ORDER — HYDROXYZINE HYDROCHLORIDE 25 MG/1
25 TABLET, FILM COATED ORAL 3 TIMES DAILY PRN
Qty: 90 TABLET | Refills: 0 | Status: SHIPPED | OUTPATIENT
Start: 2025-06-03

## 2025-06-09 ENCOUNTER — OFFICE VISIT (OUTPATIENT)
Dept: INTERNAL MEDICINE | Facility: CLINIC | Age: 40
End: 2025-06-09
Payer: COMMERCIAL

## 2025-06-09 VITALS
DIASTOLIC BLOOD PRESSURE: 70 MMHG | SYSTOLIC BLOOD PRESSURE: 98 MMHG | HEART RATE: 72 BPM | BODY MASS INDEX: 26.05 KG/M2 | HEIGHT: 63 IN | OXYGEN SATURATION: 99 % | WEIGHT: 147 LBS | TEMPERATURE: 97.4 F

## 2025-06-09 DIAGNOSIS — F41.9 ANXIETY: ICD-10-CM

## 2025-06-09 DIAGNOSIS — J06.9 ACUTE URI: Primary | ICD-10-CM

## 2025-06-09 DIAGNOSIS — R42 DIZZINESS: ICD-10-CM

## 2025-06-09 PROCEDURE — 1125F AMNT PAIN NOTED PAIN PRSNT: CPT | Performed by: NURSE PRACTITIONER

## 2025-06-09 PROCEDURE — 1159F MED LIST DOCD IN RCRD: CPT | Performed by: NURSE PRACTITIONER

## 2025-06-09 PROCEDURE — 99214 OFFICE O/P EST MOD 30 MIN: CPT | Performed by: NURSE PRACTITIONER

## 2025-06-09 PROCEDURE — 1160F RVW MEDS BY RX/DR IN RCRD: CPT | Performed by: NURSE PRACTITIONER

## 2025-06-09 RX ORDER — FLUTICASONE PROPIONATE 50 MCG
2 SPRAY, SUSPENSION (ML) NASAL DAILY
Qty: 18.2 G | Refills: 1 | Status: SHIPPED | OUTPATIENT
Start: 2025-06-09

## 2025-06-09 RX ORDER — VENLAFAXINE HYDROCHLORIDE 37.5 MG/1
37.5 CAPSULE, EXTENDED RELEASE ORAL DAILY
Qty: 90 CAPSULE | Refills: 3 | Status: SHIPPED | OUTPATIENT
Start: 2025-06-09

## 2025-06-09 NOTE — PROGRESS NOTES
Chief Complaint / Reason:      Chief Complaint   Patient presents with    Dizziness     Dizziness and ear pain, R worse X2 days    Earache     R ear     Head congestion        Subjective     History of Present Illness  The patient presents with complaints of dizziness, right earache, and head congestion. These symptoms have been ongoing for several days.    She reports experiencing dizziness, which she suspects may be due to an ear infection or vertigo. She maintains adequate hydration, consuming over 64 ounces of water daily. No recent changes have been made to her medication regimen.    Yesterday, she experienced a fainting episode at [employer], which she attributes to nervousness. She is uncertain if she lost consciousness during this episode.    She is currently on hydroxyzine for anxiety, which she finds effective at night but not during the day. She is considering adding a daytime medication for her anxiety. She is also seeing a therapist once a week. She has previously tried Celexa without any side effects and has not taken Effexor before. She has also tried BuSpar for weight management but did not find it helpful. She takes 2 hydroxyzine tablets at night.    She has lost about 78 pounds. She did not do the shots as insurance would not cover it. She had a bypass surgery performed by Dr. Nguyen at [facility]. She had 6 surgeries in total, including a hernia repair and removal of scar tissue on her intestines. She was in surgery for over 6 hours. She feels great now. The vitamins she was taking had too much vitamin A, which was affecting her eyes, so she has not had vitamins for 2 weeks because she is having to order some new ones. She is getting enough protein and walking regularly.    PAST SURGICAL HISTORY:  Bypass surgery, hernia repair, removal of scar tissue on intestines.    History taken from: patient    PMH/FH/Social History were reviewed and updated appropriately in the electronic medical record.   Past  Medical History:   Diagnosis Date    Abdominal pain, periumbilical     Abdominal pain, RLQ (right lower quadrant)     Acute pharyngitis     Anxiety     Appetite lost     Chest pain ?    states had a heart monitor and was WNL.  States was told pain was attributed to acid reflux.  No pain since.  (assessed 3/17/23)    Colon polyp 2017    Depression     patient denies    Diarrhea     Secondary to IBS    Difficulty swallowing     food and liquids    Fatigue     Fracture     RIGHT WRIST TWICE, LEFT HAND    GERD (gastroesophageal reflux disease)     H/O foot surgery 10/2020    left x 2    Hearing loss     No use of hearing aids    History of bronchitis     History of colonoscopy     History of exercise stress test     History of left salpingo-oophorectomy  2018    History of ovarian cyst     History of pneumonia     Hyperthyroidism     hyperactive taken off medicine when pregnant.  Reported she was medication at one time, but none since pregnancy.     Irritable bowel syndrome     Multiple gestation [id number]    Ovarian cyst, bilateral     PONV (postoperative nausea and vomiting)     Seasonal allergies     RITU (stress urinary incontinence, female) 2020    TMJ (dislocation of temporomandibular joint)     Upper respiratory infection     Vomiting     Wears glasses      Past Surgical History:   Procedure Laterality Date    ANKLE SURGERY Left     x2     SECTION  2008    DR NAVARRETE     SECTION  10/30/2015    DR VASQUEZ     SECTION  2010    DR ESPINOZA    CHOLECYSTECTOMY      COLONOSCOPY N/A 2017     COLONOSCOPY WITH COLD BIOPSY POLYPECTOMY; BIOPSIES , QUICK CLIP;  Surgeon: Perfecto Mcknight MD;  Location: [facility] ENDOSCOPY;  Service:     CYSTOSCOPY URETEROSCOPY Left 2024    Procedure: CYSTOSCOPY URETEROSCOPY WITH STENT INSERTION LEFT;  Surgeon: Dami Angel MD;  Location: [facility] OR;  Service: Urology;  Laterality: Left;    DIAGNOSTIC LAPAROSCOPY   07/07/2005    DR JONES NAVA/cystectomy    DIAGNOSTIC LAPAROSCOPY  02/14/2012    DR ESPINOZA/ELIJAH    DIAGNOSTIC LAPAROSCOPY N/A 08/10/2017     DIAGNOSTIC LAPAROSCOPY, LEFT S&O;  Keren Espinoza MD;  Location: [facility] OR;  Service:     DIAGNOSTIC LAPAROSCOPY N/A 01/19/2022    Procedure: DIAGNOSTIC LAPAROSCOPY,  LYSIS OF ADHESIONS, RIGHT SALPINGO-OOPHORECTOMY;  Surgeon: Kem Sahni MD;  Location: [facility] OR;  Service: Obstetrics/Gynecology;  Laterality: N/A;    DIAGNOSTIC LAPAROSCOPY N/A 03/24/2023    Procedure: DIAGNOSTIC LAPAROSCOPY WITH LYSIS OF ADHESIONS;  Surgeon: Kem Sahni MD;  Location: [facility] OR;  Service: Obstetrics/Gynecology;  Laterality: N/A;    ENDOSCOPY N/A 10/15/2019    Procedure: ESOPHAGOGASTRODUODENOSCOPY WITH BIOPSY AND ESOPHAGEAL DILATATION;  Surgeon: Perfecto Mcknight MD;  Location: [facility] ENDOSCOPY;  Service: Gastroenterology    ENDOSCOPY N/A 10/11/2021    Procedure: ESOPHAGOGASTRODUODENOSCOPY with dilatation and biopsies;  Surgeon: Hieu Shultz MD;  Location: [facility] ENDOSCOPY;  Service: Gastroenterology;  Laterality: N/A;    ENDOSCOPY N/A 12/29/2023    Procedure: ESOPHAGOGASTRODUODENOSCOPY  WITH BIOPSY;  Surgeon: Consuelo Avalos MD;  Location: [facility] ENDOSCOPY;  Service: Gastroenterology;  Laterality: N/A;    FOOT SURGERY Left     ligament and tendon repair    HERNIA REPAIR  12/20/2019    abd    MID-URETHRAL SLING WITH CYSTOSCOPY N/A 06/09/2021     MID-URETHRAL SLING WITH CYSTOSCOPY TVT EXACT;  Luc Alicea MD;  Location: [facility] OR;  Service: Obstetrics/Gynecology;  Laterality: N/A;    NISSEN FUNDOPLICATION  06/2013    BRADEN-EN-Y PROCEDURE      UPPER GASTROINTESTINAL ENDOSCOPY  2013    UPPER GASTROINTESTINAL ENDOSCOPY  06/19/2020    VAGINAL HYSTERECTOMY N/A 07/09/2018    VAGINAL HYSTERECTOMY, BILATERAL SALPINGECTOMY;  Luc Alicea MD;  Location: [facility] OR;  Service: Gynecology    WISDOM TOOTH EXTRACTION  2000     Social History     Socioeconomic History    Marital status: Legally  "   Tobacco Use    Smoking status: Never     Passive exposure: Never    Smokeless tobacco: Never   Vaping Use    Vaping status: Never Used   Substance and Sexual Activity    Alcohol use: No    Drug use: No    Sexual activity: Defer     Family History   Problem Relation Age of Onset    Coronary artery disease Mother     Diabetes Mother     Hypertension Mother     Kidney disease Father     Skin cancer Father     Diabetes Father     Stroke Maternal Grandfather     Hypertension Maternal Grandfather     Diabetes Maternal Grandfather     Colon cancer Maternal Great-Grandmother     Osteoporosis Neg Hx        Review of Systems:   Review of Systems      All other systems were reviewed and are negative.  Exceptions are noted in the subjective or above.      Objective   Vitals:    06/09/25 1007 06/09/25 1039 06/09/25 1050 06/09/25 1051   BP:   108/78 98/70   Patient Position:   Sitting Standing   Pulse:   71 72   Temp: 97.4 °F (36.3 °C)      SpO2: 100% 97% 97% 99%   Weight: 66.7 kg (147 lb)      Height: 160 cm (62.99\")          Body mass index is 26.05 kg/m².  BMI is >= 25 and <30. (Overweight) The following options were offered after discussion;: exercise counseling/recommendations and nutrition counseling/recommendations       Physical Exam  Vitals and nursing note reviewed.   Constitutional:       General: She is not in acute distress.     Appearance: She is well-developed.   HENT:      Head: Normocephalic and atraumatic.      Right Ear: Ear canal and external ear normal. Tympanic membrane is erythematous and bulging.      Left Ear: Ear canal and external ear normal. Tympanic membrane is erythematous and bulging.      Nose: Mucosal edema present. No rhinorrhea.      Right Sinus: No maxillary sinus tenderness or frontal sinus tenderness.      Left Sinus: No maxillary sinus tenderness or frontal sinus tenderness.      Mouth/Throat:      Mouth: Mucous membranes are dry.      Pharynx: Posterior oropharyngeal erythema " present.      Comments: PND    Eyes:      Conjunctiva/sclera: Conjunctivae normal.   Cardiovascular:      Rate and Rhythm: Normal rate and regular rhythm.      Heart sounds: Normal heart sounds.   Pulmonary:      Effort: Pulmonary effort is normal. No respiratory distress.      Breath sounds: Normal breath sounds.   Lymphadenopathy:      Head:      Right side of head: No submental, submandibular or tonsillar adenopathy.      Left side of head: No submental, submandibular or tonsillar adenopathy.      Cervical: No cervical adenopathy.   Skin:     General: Skin is warm and dry.      Capillary Refill: Capillary refill takes less than 2 seconds.      Findings: No rash.   Neurological:      Mental Status: She is alert and oriented to person, place, and time.   Psychiatric:         Behavior: Behavior normal.         Thought Content: Thought content normal.         Judgment: Judgment normal.              Results Review:    I reviewed the patient's new clinical results.       Medication Review:     Current Outpatient Medications:     hydrOXYzine (ATARAX) 25 MG tablet, Take 1 tablet by mouth 3 (Three) Times a Day As Needed for Itching, Allergies or Anxiety., Disp: 90 tablet, Rfl: 0    amoxicillin-clavulanate (AUGMENTIN) 875-125 MG per tablet, Take 1 tablet by mouth 2 (Two) Times a Day., Disp: 20 tablet, Rfl: 0    fluticasone (FLONASE) 50 MCG/ACT nasal spray, Administer 2 sprays into the nostril(s) as directed by provider Daily., Disp: 18.2 g, Rfl: 1    venlafaxine XR (Effexor XR) 37.5 MG 24 hr capsule, Take 1 capsule by mouth Daily., Disp: 90 capsule, Rfl: 3    Diagnoses and all orders for this visit:    Acute URI  -     fluticasone (FLONASE) 50 MCG/ACT nasal spray; Administer 2 sprays into the nostril(s) as directed by provider Daily.  -     amoxicillin-clavulanate (AUGMENTIN) 875-125 MG per tablet; Take 1 tablet by mouth 2 (Two) Times a Day.    Anxiety  -     venlafaxine XR (Effexor XR) 37.5 MG 24 hr capsule; Take 1  capsule by mouth Daily.    Dizziness      Assessment & Plan  1. Dizziness.  - Blood pressure is low at 90/60, which may contribute to dizziness. Orthostatic blood pressure measurements will be taken to further evaluate this.  - Advised to increase water intake and exercise caution when changing positions.  - Recent blood work was checked and vitamin D levels are normal.  - No changes in current medications except for vitamins and hydroxyzine.    2. Right earache.  - Flonase will be prescribed for use.  - Instructed to blow nose first, tilt head to the side, and then administer Flonase to ensure it reaches the nasal passages and not the back of the throat.  - An antibiotic (Augmentin) will also be prescribed but should only be taken if symptoms worsen.  - Right ear appears to be the one affected.    3. Head congestion.  - Flonase will be prescribed to help alleviate head congestion.  - Instructed on proper administration techniques as mentioned above.  - Advised to monitor symptoms and use caution with nasal sprays.    4. Anxiety.  - Effexor will be prescribed for daily use to manage anxiety.  - Potential side effects, including nausea and dizziness, have been discussed.  - Advised to continue taking hydroxyzine at night.  - Prescription will be sent to pharmacy.    Follow-up  - Follow up in 4 weeks.    Return in about 4 weeks (around 7/7/2025), or if symptoms worsen or fail to improve.    TRISTEN Barker  06/09/2025    Patient or patient representative verbalized consent for the use of Ambient Listening during the visit with  TRISTEN Barker for chart documentation.

## 2025-07-07 ENCOUNTER — OFFICE VISIT (OUTPATIENT)
Dept: INTERNAL MEDICINE | Facility: CLINIC | Age: 40
End: 2025-07-07
Payer: COMMERCIAL

## 2025-07-07 VITALS
SYSTOLIC BLOOD PRESSURE: 112 MMHG | BODY MASS INDEX: 26.05 KG/M2 | DIASTOLIC BLOOD PRESSURE: 78 MMHG | RESPIRATION RATE: 16 BRPM | OXYGEN SATURATION: 97 % | HEART RATE: 64 BPM | HEIGHT: 63 IN | TEMPERATURE: 97.6 F | WEIGHT: 147 LBS

## 2025-07-07 DIAGNOSIS — L55.9 BURN FROM THE SUN: Primary | ICD-10-CM

## 2025-07-07 PROCEDURE — 99213 OFFICE O/P EST LOW 20 MIN: CPT | Performed by: INTERNAL MEDICINE

## 2025-07-07 PROCEDURE — 1125F AMNT PAIN NOTED PAIN PRSNT: CPT | Performed by: INTERNAL MEDICINE

## 2025-07-07 RX ORDER — SILVER SULFADIAZINE 10 MG/G
1 CREAM TOPICAL 2 TIMES DAILY
Qty: 100 G | Refills: 0 | Status: SHIPPED | OUTPATIENT
Start: 2025-07-07

## 2025-07-07 NOTE — PROGRESS NOTES
"Subjective     Patient ID: Laura Thomson is a 39 y.o. female. Patient is here for management of multiple medical problems.     Chief Complaint   Patient presents with    Blister    Sunburn     History of Present Illness       Blister on leg with sever sun burn        The following portions of the patient's history were reviewed and updated as appropriate: allergies, current medications, past family history, past medical history, past social history, past surgical history and problem list.    Review of Systems    Current Outpatient Medications:     fluticasone (FLONASE) 50 MCG/ACT nasal spray, Administer 2 sprays into the nostril(s) as directed by provider Daily., Disp: 18.2 g, Rfl: 1    hydrOXYzine (ATARAX) 25 MG tablet, Take 1 tablet by mouth 3 (Three) Times a Day As Needed for Itching, Allergies or Anxiety., Disp: 90 tablet, Rfl: 0    venlafaxine XR (Effexor XR) 37.5 MG 24 hr capsule, Take 1 capsule by mouth Daily., Disp: 90 capsule, Rfl: 3    silver sulfadiazine (Silvadene) 1 % cream, Apply 1 Application topically to the appropriate area as directed 2 (Two) Times a Day., Disp: 100 g, Rfl: 0    Objective      Blood pressure 112/78, pulse 64, temperature 97.6 °F (36.4 °C), resp. rate 16, height 160 cm (62.99\"), weight 66.7 kg (147 lb), last menstrual period 06/26/2018, SpO2 97%, not currently breastfeeding.            Physical Exam     General Appearance:    Alert, cooperative, no distress, appears stated age   Head:    Normocephalic, without obvious abnormality, atraumatic   Eyes:    PERRL, conjunctiva/corneas clear, EOM's intact   Ears:    Normal TM's and external ear canals, both ears   Nose:   Nares normal, septum midline, mucosa normal, no drainage   or sinus tenderness   Throat:   Lips, mucosa, and tongue normal; teeth and gums normal   Neck:   Supple, symmetrical, trachea midline, no adenopathy;        thyroid:  No enlargement/tenderness/nodules; no carotid    bruit or JVD   Back:     Symmetric, no " curvature, ROM normal, no CVA tenderness   Lungs:     Clear to auscultation bilaterally, respirations unlabored   Chest wall:    No tenderness or deformity   Heart:    Regular rate and rhythm, S1 and S2 normal, no murmur,        rub or gallop   Abdomen:     Soft, non-tender, bowel sounds active all four quadrants,     no masses, no organomegaly   Extremities:   +1edema   Pulses:   2+ and symmetric all extremities   Skin:   Minimal blistering right shin.     Lymph nodes:   Cervical, supraclavicular, and axillary nodes normal   Neurologic:   CNII-XII intact. Normal strength, sensation and reflexes       throughout      Results for orders placed or performed during the hospital encounter of 03/02/25   Comprehensive Metabolic Panel    Collection Time: 03/02/25  5:51 PM    Specimen: Blood   Result Value Ref Range    Glucose 94 65 - 99 mg/dL    BUN 10 6 - 20 mg/dL    Creatinine 0.61 0.57 - 1.00 mg/dL    Sodium 143 136 - 145 mmol/L    Potassium 4.0 3.5 - 5.2 mmol/L    Chloride 105 98 - 107 mmol/L    CO2 26.2 22.0 - 29.0 mmol/L    Calcium 9.4 8.6 - 10.5 mg/dL    Total Protein 6.9 6.0 - 8.5 g/dL    Albumin 4.6 3.5 - 5.2 g/dL    ALT (SGPT) 29 1 - 33 U/L    AST (SGOT) 34 (H) 1 - 32 U/L    Alkaline Phosphatase 74 39 - 117 U/L    Total Bilirubin 0.4 0.0 - 1.2 mg/dL    Globulin 2.3 gm/dL    A/G Ratio 2.0 g/dL    BUN/Creatinine Ratio 16.4 7.0 - 25.0    Anion Gap 11.8 5.0 - 15.0 mmol/L    eGFR 116.1 >60.0 mL/min/1.73   CBC Auto Differential    Collection Time: 03/02/25  5:51 PM    Specimen: Blood   Result Value Ref Range    WBC 7.02 3.40 - 10.80 10*3/mm3    RBC 4.51 3.77 - 5.28 10*6/mm3    Hemoglobin 12.9 12.0 - 15.9 g/dL    Hematocrit 38.8 34.0 - 46.6 %    MCV 86.0 79.0 - 97.0 fL    MCH 28.6 26.6 - 33.0 pg    MCHC 33.2 31.5 - 35.7 g/dL    RDW 14.4 12.3 - 15.4 %    RDW-SD 45.1 37.0 - 54.0 fl    MPV 11.1 6.0 - 12.0 fL    Platelets 193 140 - 450 10*3/mm3    Neutrophil % 57.1 42.7 - 76.0 %    Lymphocyte % 30.3 19.6 - 45.3 %     Monocyte % 9.8 5.0 - 12.0 %    Eosinophil % 2.0 0.3 - 6.2 %    Basophil % 0.7 0.0 - 1.5 %    Immature Grans % 0.1 0.0 - 0.5 %    Neutrophils, Absolute 4.00 1.70 - 7.00 10*3/mm3    Lymphocytes, Absolute 2.13 0.70 - 3.10 10*3/mm3    Monocytes, Absolute 0.69 0.10 - 0.90 10*3/mm3    Eosinophils, Absolute 0.14 0.00 - 0.40 10*3/mm3    Basophils, Absolute 0.05 0.00 - 0.20 10*3/mm3    Immature Grans, Absolute 0.01 0.00 - 0.05 10*3/mm3    nRBC 0.0 0.0 - 0.2 /100 WBC   Urine Culture - Urine, Urine, Clean Catch    Collection Time: 03/02/25  5:53 PM    Specimen: Urine, Clean Catch   Result Value Ref Range    Urine Culture No growth    Urinalysis With Culture If Indicated - Urine, Clean Catch    Collection Time: 03/02/25  5:53 PM    Specimen: Urine, Clean Catch   Result Value Ref Range    Color, UA Yellow Yellow, Straw    Appearance, UA Turbid (A) Clear    pH, UA 5.5 5.0 - 8.0    Specific Gravity, UA 1.027 1.005 - 1.030    Glucose, UA Negative Negative    Ketones, UA Trace (A) Negative    Bilirubin, UA Negative Negative    Blood, UA Negative Negative    Protein, UA Negative Negative    Leuk Esterase, UA Trace (A) Negative    Nitrite, UA Negative Negative    Urobilinogen, UA 1.0 E.U./dL 0.2 - 1.0 E.U./dL   Urinalysis, Microscopic Only - Urine, Clean Catch    Collection Time: 03/02/25  5:53 PM    Specimen: Urine, Clean Catch   Result Value Ref Range    RBC, UA Unable to determine due to loaded field (A) None Seen, 0-2 /HPF    WBC, UA Unable to determine due to loaded field (A) None Seen, 0-2 /HPF    Bacteria, UA 4+ (A) None Seen /HPF    Squamous Epithelial Cells, UA Unable to determine due to loaded field (A) None Seen, 0-2 /HPF    Hyaline Casts, UA Unable to determine due to loaded field None Seen /LPF    Amorphous Crystals, UA Large/3+ None Seen /HPF    Methodology Manual Light Microscopy          Assessment & Plan   Using silvadene prn      Otc lidocain with allo.    Topical Noxzema for cooling as needed.    Monitor for  infection or DVT.      Diagnoses and all orders for this visit:    1. Burn from the sun (Primary)    Other orders  -     silver sulfadiazine (Silvadene) 1 % cream; Apply 1 Application topically to the appropriate area as directed 2 (Two) Times a Day.  Dispense: 100 g; Refill: 0      No follow-ups on file.          There are no Patient Instructions on file for this visit.     Maximino Whelan MD    Assessment & Plan

## 2025-08-04 DIAGNOSIS — F41.9 ANXIETY: ICD-10-CM

## 2025-08-04 DIAGNOSIS — G47.00 INSOMNIA, UNSPECIFIED TYPE: ICD-10-CM

## 2025-08-04 RX ORDER — HYDROXYZINE HYDROCHLORIDE 25 MG/1
25 TABLET, FILM COATED ORAL 3 TIMES DAILY PRN
Qty: 90 TABLET | Refills: 0 | Status: SHIPPED | OUTPATIENT
Start: 2025-08-04

## 2025-08-29 DIAGNOSIS — F41.9 ANXIETY: ICD-10-CM

## 2025-08-29 DIAGNOSIS — G47.00 INSOMNIA, UNSPECIFIED TYPE: ICD-10-CM

## 2025-08-29 RX ORDER — HYDROXYZINE HYDROCHLORIDE 25 MG/1
TABLET, FILM COATED ORAL
Qty: 90 TABLET | Refills: 0 | Status: SHIPPED | OUTPATIENT
Start: 2025-08-29

## (undated) DEVICE — CANNULA,OXY,ADULT,SUPERSOFT,W/7'TUB,UC: Brand: MEDLINE

## (undated) DEVICE — FRCP BX RADJAW4 NDL 2.8 240 STD OG

## (undated) DEVICE — IRRIGATOR BULB ASEPTO 60CC STRL

## (undated) DEVICE — SLV SCD CALF HEMOFORCE DVT THERP REPROC MD

## (undated) DEVICE — CYSTO/BLADDER IRRIGATION SET, REGULATING CLAMP

## (undated) DEVICE — SYR LL TP 10ML STRL

## (undated) DEVICE — STRIP,CLOSURE,WOUND,MEDI-STRIP,1/2X4: Brand: MEDLINE

## (undated) DEVICE — SUT MNCRYL PLS ANTIB UD 3/0 PS2 27IN

## (undated) DEVICE — NDL HYPO ECLPS SFTY 22G 1 1/2IN

## (undated) DEVICE — Device: Brand: DEFENDO AIR/WATER/SUCTION AND BIOPSY VALVE

## (undated) DEVICE — ENSEAL X1 TISSUE SEALER, CURVED JAW, 37 CM SHAFT LENGTH: Brand: ENSEAL

## (undated) DEVICE — SUT ETHLN 4/0 PS2 PLSTC 1667G

## (undated) DEVICE — FRCP BIOP COLD ENDOJAW ALLGTR W/NDL 2.8X2300MM BLU

## (undated) DEVICE — PK CYSTO-TUR BASIC 10

## (undated) DEVICE — TOTAL TRAY, 16FR 10ML SIL FOLEY, URN: Brand: MEDLINE

## (undated) DEVICE — BLANKT WARM UPPR/BDY ARM/OUT 57X196CM

## (undated) DEVICE — LEX VAGINAL HYSTERECTOMY: Brand: MEDLINE INDUSTRIES, INC.

## (undated) DEVICE — ENDOSCOPY PORT CONNECTOR FOR OLYMPUS® SCOPES: Brand: ERBE

## (undated) DEVICE — GLV SURG SENSICARE PI LF PF 7.5 GRN STRL

## (undated) DEVICE — TP CLTH MEDIPORE SFT 2IN 10YD

## (undated) DEVICE — ENDOPATH XCEL BLADELESS TROCARS WITH STABILITY SLEEVES: Brand: ENDOPATH XCEL

## (undated) DEVICE — LEX D AND C: Brand: MEDLINE INDUSTRIES, INC.

## (undated) DEVICE — GLV SURG BIOGEL M LTX PF 6 1/2

## (undated) DEVICE — SCRB SURG BACTOSHIELD CHG 4PCT 4OZ

## (undated) DEVICE — DEV INFL ALLIANCE2 SYS

## (undated) DEVICE — COVER,LIGHT HANDLE,FLX,1/PK: Brand: MEDLINE INDUSTRIES, INC.

## (undated) DEVICE — SOL SCRUB STAT ENDURE 420 CIDASTAT 2PCT FM 4OZ

## (undated) DEVICE — SOL IRR NACL 0.9PCT BT 1000ML

## (undated) DEVICE — STRAP STIRUP WO/RNG 19X3.5IN DISP

## (undated) DEVICE — PATIENT RETURN ELECTRODE, SINGLE-USE, CONTACT QUALITY MONITORING, ADULT, WITH 9FT CORD, FOR PATIENTS WEIGING OVER 33LBS. (15KG): Brand: MEGADYNE

## (undated) DEVICE — MEDI-VAC YANKAUER SUCTION HANDLE W/BULBOUS TIP: Brand: CARDINAL HEALTH

## (undated) DEVICE — ADHS SKIN PREMIERPRO EXOFIN TOPICAL HI/VISC .5ML

## (undated) DEVICE — PENCL ES MEGADINE EZ/CLEAN BUTN W/HOLSTR 10FT

## (undated) DEVICE — ESOPHAGEAL BALLOON DILATATION CATHETER: Brand: CRE FIXED WIRE

## (undated) DEVICE — DECANT BG O JET

## (undated) DEVICE — GLV SURG SENSICARE MICRO PF LF 6 STRL

## (undated) DEVICE — HYBRID TUBING/CAP SET FOR OLYMPUS® SCOPES: Brand: ERBE

## (undated) DEVICE — SUT CHRM 0 CT1 36IN 924H

## (undated) DEVICE — Device

## (undated) DEVICE — DILATOR/SHEATH SET: Brand: 8/10 DILATOR/SHEATH SET

## (undated) DEVICE — RICH GYN LAPAROSCOPY: Brand: MEDLINE INDUSTRIES, INC.

## (undated) DEVICE — SUT VIC 1 SUTUPAK TIES 18IN J913G

## (undated) DEVICE — MEDI-VAC NON-CONDUCTIVE SUCTION TUBING: Brand: CARDINAL HEALTH

## (undated) DEVICE — AIRWY 90MM NO9

## (undated) DEVICE — ADHS LIQ MASTISOL 2/3ML

## (undated) DEVICE — PAD SANI MAXI W/ADHS SNG WRP 11IN

## (undated) DEVICE — SPNG GZ WOVN 4X4IN 12PLY 10/BX STRL

## (undated) DEVICE — RICH LAVH: Brand: MEDLINE INDUSTRIES, INC.

## (undated) DEVICE — GLV SURG SIGNATURE TOUCH PF LTX 7.5 STRL BX/50

## (undated) DEVICE — GLV SURG BIOGEL PI ULTRATOUCH G SZ7.5 LF

## (undated) DEVICE — SUT PROLN 0 CT2 MONO 30IN 8412H

## (undated) DEVICE — DISPOSABLE MONOPOLAR ENDOSCOPIC CORD 10 FT. (3M): Brand: KIRWAN

## (undated) DEVICE — VLV SXN AIR/H2O ORCAPOD3 1P/U STRL

## (undated) DEVICE — NITINOL WIRE WITH HYDROPHILIC TIP: Brand: SENSOR

## (undated) DEVICE — SUT GUT PLN 3/0 FS2 27IN 1630H

## (undated) DEVICE — ENDOGATOR AUXILIARY WATER JET CONNECTOR: Brand: ENDOGATOR

## (undated) DEVICE — 2, DISPOSABLE SUCTION/IRRIGATOR WITHOUT DISPOSABLE TIP: Brand: STRYKEFLOW

## (undated) DEVICE — JELLY,LUBE,STERILE,FLIP TOP,TUBE,2-OZ: Brand: MEDLINE

## (undated) DEVICE — ANTIBACTERIAL UNDYED BRAIDED (POLYGLACTIN 910), SYNTHETIC ABSORBABLE SUTURE: Brand: COATED VICRYL

## (undated) DEVICE — ST. VAGINAL PACKING X-RAY DETECTABLE: Brand: DEROYAL

## (undated) DEVICE — TBG PENCL TELESCP MEGADYNE SMOKE EVAC 10FT

## (undated) DEVICE — CUFF SCD HEMOFORCE SEQ CALF STD MD

## (undated) DEVICE — CVR FTSWITCH UNIV

## (undated) DEVICE — CONTAINER,SPECIMEN,OR STERILE,4OZ: Brand: MEDLINE

## (undated) DEVICE — ENDOPATH XCEL UNIVERSAL TROCAR STABLILITY SLEEVES: Brand: ENDOPATH XCEL

## (undated) DEVICE — GLV SURG SENSICARE PI MIC PF SZ8 LF STRL

## (undated) DEVICE — TUBING, SUCTION, 1/4" X 10', STRAIGHT: Brand: MEDLINE

## (undated) DEVICE — SUT GUT CHRM 3/0 SH 27IN G122H

## (undated) DEVICE — SUCTION CANISTER, 1500CC, RIGID: Brand: DEROYAL

## (undated) DEVICE — DEV LAP LIGASURE BLNT DBL 180D 5MM 37CM 1P/U

## (undated) DEVICE — CONMED SCOPE SAVER BITE BLOCK, 20X27 MM: Brand: SCOPE SAVER

## (undated) DEVICE — ANTIBACTERIAL VIOLET BRAIDED (POLYGLACTIN 910), SYNTHETIC ABSORBABLE SURGICAL SUTURE: Brand: COATED VICRYL

## (undated) DEVICE — PAD GRND REM POLYHESIVE A/ DISP

## (undated) DEVICE — DRAPE,TOP,102X53,STERILE: Brand: MEDLINE

## (undated) DEVICE — CVR HNDL LIGHT RIGID

## (undated) DEVICE — SYR LUERLOK 30CC

## (undated) DEVICE — SOL LR 1000ML

## (undated) DEVICE — ENDOPOUCH RETRIEVER SPECIMEN RETRIEVAL BAGS: Brand: ENDOPOUCH RETRIEVER

## (undated) DEVICE — Device: Brand: 33CM LAPAROSCOPIC L-WIRE SPLIT; BLUE PTFE; BLACK EXTENDED INSULATION

## (undated) DEVICE — ENDOPATH XCEL BLUNT TIP TROCARS WITH SMOOTH SLEEVES: Brand: ENDOPATH XCEL

## (undated) DEVICE — GW PTFE FIXCORE STFF J/3MM .035 150CM

## (undated) DEVICE — TISSUE RETRIEVAL SYSTEM: Brand: INZII RETRIEVAL SYSTEM